# Patient Record
Sex: FEMALE | Race: WHITE | Employment: PART TIME | URBAN - METROPOLITAN AREA
[De-identification: names, ages, dates, MRNs, and addresses within clinical notes are randomized per-mention and may not be internally consistent; named-entity substitution may affect disease eponyms.]

---

## 2017-02-20 ENCOUNTER — MYC MEDICAL ADVICE (OUTPATIENT)
Dept: FAMILY MEDICINE | Facility: CLINIC | Age: 32
End: 2017-02-20

## 2017-02-20 DIAGNOSIS — F33.1 MAJOR DEPRESSIVE DISORDER, RECURRENT EPISODE, MODERATE (H): ICD-10-CM

## 2017-02-20 DIAGNOSIS — F43.23 ADJUSTMENT DISORDER WITH MIXED ANXIETY AND DEPRESSED MOOD: ICD-10-CM

## 2017-02-20 RX ORDER — SERTRALINE HYDROCHLORIDE 100 MG/1
TABLET, FILM COATED ORAL
Qty: 135 TABLET | Refills: 1 | Status: SHIPPED | OUTPATIENT
Start: 2017-02-20 | End: 2017-03-27

## 2017-02-20 NOTE — TELEPHONE ENCOUNTER
Prescription approved per [facility] Refill Protocol.     Buffy Carmichael RN   February 20, 2017 1:29 PM  [facility] Triage   [phone]

## 2017-03-01 ENCOUNTER — MYC MEDICAL ADVICE (OUTPATIENT)
Dept: FAMILY MEDICINE | Facility: CLINIC | Age: 32
End: 2017-03-01

## 2017-03-01 NOTE — TELEPHONE ENCOUNTER
Panel Management Review      Patient has the following on her problem list:     Depression / Dysthymia review  PHQ-9 SCORE 10/7/2016 10/21/2016 12/6/2016   Total Score - - -   Total Score MyChart - - -   Total Score 20 18 20      Patient is due for:  DAP    Asthma review     ACT Total Scores 12/6/2016   ACT TOTAL SCORE (Goal Greater than or Equal to 20) 11   In the past 12 months, how many times did you visit the emergency room for your asthma without being admitted to the hospital? 0   In the past 12 months, how many times were you hospitalized overnight because of your asthma? 0      1. Is Asthma diagnosis on the Problem List? Yes    2. Is Asthma listed on Health Maintenance? Yes    3. Patient is due for:  AAP      Composite cancer screening  Chart review shows that this patient is due/due soon for the following None  Summary:    Patient is due/failing the following:   Repeat ACT, AAP and DAP    Action needed:   Patient needs to do ACT. Last ACT was 11.    Type of outreach:    Copy of ACT mailed to patient, will reach out in 5 days.    Questions for provider review:    None                                                                                                                                    Robyn Felton MA      Chart routed to Care Team .

## 2017-03-01 NOTE — LETTER
[address] [id number] [phone]      March 1, 2017      Mary Shipman  [address] [facility] [address]              Dear Mary,    At [facility] we care about your health and well-being. A review of your chart has indicated that you are due for an Asthma Control Test. For copyright reasons we have attached a copy of the questionnaire. Please complete the questions and a nurse will call you in one to two weeks to review your answers.    You may contact the clinic at [phone] if you have any questions or concerns about this request.        Sincerely,    [facility] Care Staff/ sd

## 2017-03-07 ENCOUNTER — OFFICE VISIT (OUTPATIENT)
Dept: FAMILY MEDICINE | Facility: CLINIC | Age: 32
End: 2017-03-07
Payer: COMMERCIAL

## 2017-03-07 VITALS
WEIGHT: 245.25 LBS | TEMPERATURE: 98.6 F | DIASTOLIC BLOOD PRESSURE: 82 MMHG | SYSTOLIC BLOOD PRESSURE: 116 MMHG | HEART RATE: 72 BPM | HEIGHT: 65 IN | BODY MASS INDEX: 40.86 KG/M2

## 2017-03-07 DIAGNOSIS — J45.20 INTERMITTENT ASTHMA, UNCOMPLICATED: ICD-10-CM

## 2017-03-07 DIAGNOSIS — F41.1 GENERALIZED ANXIETY DISORDER: ICD-10-CM

## 2017-03-07 DIAGNOSIS — G43.809 OTHER MIGRAINE WITHOUT STATUS MIGRAINOSUS, NOT INTRACTABLE: Primary | ICD-10-CM

## 2017-03-07 DIAGNOSIS — F33.1 MAJOR DEPRESSIVE DISORDER, RECURRENT EPISODE, MODERATE (H): ICD-10-CM

## 2017-03-07 PROCEDURE — 99214 OFFICE O/P EST MOD 30 MIN: CPT | Performed by: PHYSICIAN ASSISTANT

## 2017-03-07 RX ORDER — TOPIRAMATE 25 MG/1
TABLET, FILM COATED ORAL
Qty: 70 TABLET | Refills: 0 | Status: SHIPPED
Start: 2017-03-07 | End: 2017-04-20

## 2017-03-07 RX ORDER — PREDNISONE 20 MG/1
TABLET ORAL
Qty: 14 TABLET | Refills: 0 | Status: SHIPPED | OUTPATIENT
Start: 2017-03-07 | End: 2017-03-27

## 2017-03-07 ASSESSMENT — ANXIETY QUESTIONNAIRES
3. WORRYING TOO MUCH ABOUT DIFFERENT THINGS: MORE THAN HALF THE DAYS
2. NOT BEING ABLE TO STOP OR CONTROL WORRYING: MORE THAN HALF THE DAYS
7. FEELING AFRAID AS IF SOMETHING AWFUL MIGHT HAPPEN: MORE THAN HALF THE DAYS
6. BECOMING EASILY ANNOYED OR IRRITABLE: NEARLY EVERY DAY
1. FEELING NERVOUS, ANXIOUS, OR ON EDGE: MORE THAN HALF THE DAYS
5. BEING SO RESTLESS THAT IT IS HARD TO SIT STILL: MORE THAN HALF THE DAYS
GAD7 TOTAL SCORE: 16

## 2017-03-07 ASSESSMENT — PAIN SCALES - GENERAL: PAINLEVEL: MODERATE PAIN (4)

## 2017-03-07 ASSESSMENT — PATIENT HEALTH QUESTIONNAIRE - PHQ9: 5. POOR APPETITE OR OVEREATING: NEARLY EVERY DAY

## 2017-03-07 NOTE — PROGRESS NOTES
SUBJECTIVE:                                                    Mary Shipman is a 31 year old female who presents to clinic today for the following health issues:      Depression and Anxiety Follow-Up    Status since last visit: No change for either depression or the anxiety    Other associated symptoms:None    Complicating factors:     Significant life event: Yes-  Still dealing with bankruptcy. Dealing with her ex-boyfriend/Fiance.  Is managing her current job but doesn't like it. She's got legal proceedings. Overall, feels quite overwhelmed. She was without insurance for a while so she didn't see her therapist. She's struggling today. No self harm thoughts or feelings.     Current substance abuse: None    PHQ-9 SCORE 10/7/2016 10/21/2016 12/6/2016   Total Score - - -   Total Score MyChart - - -   Total Score 20 18 20     CRUZ-7 SCORE 10/7/2016 10/21/2016 12/6/2016   Total Score - - -   Total Score 19 20 19        PHQ-9  English      PHQ-9   Any Language     GAD7     Asthma Follow-Up    Was ACT completed today?    Yes    ACT Total Scores 12/6/2016   ACT TOTAL SCORE (Goal Greater than or Equal to 20) 11   In the past 12 months, how many times did you visit the emergency room for your asthma without being admitted to the hospital? 0   In the past 12 months, how many times were you hospitalized overnight because of your asthma? 0       Recent asthma triggers that patient is dealing with: None      Migraine Follow-Up    Headaches symptoms:  Worsened - more frequent, more severe. Quality is the same. Vision darkens, she gets pounding, tends to be Unilateral. No new symptoms. Just worse. A lot of stress. Is also working a new job. Has 2 screens she watches.     Frequency: daily     Duration of headaches: usually starts about 10:00am/midmorning and lasts all day    Able to do normal daily activities/work with migraines: Yes except for one time    Rescue/Relief medication:ibuprofen (Advil, Motrin)               "Effectiveness: no relief    Preventative medication: None    Neurologic complications: No new stroke-like symptoms, loss of vision or speech, numbness or weakness    In the past 4 weeks, how often have you gone to Urgent Care or the emergency room because of your headaches?  0       Amount of exercise or physical activity: None    Problems taking medications regularly: No    Medication side effects: none  Diet: regular (no restrictions)    Problem list and histories reviewed & adjusted, as indicated.  Additional history: as documented    Labs reviewed in EPIC    Reviewed and updated as needed this visit by clinical staff       Reviewed and updated as needed this visit by Provider         ROS:  Constitutional, HEENT, cardiovascular, pulmonary, gi and gu systems are negative, except as otherwise noted.    OBJECTIVE:                                                    /82 (BP Location: Right arm, Cuff Size: Adult Regular)  Pulse 72  Temp 98.6  F (37  C) (Oral)  Ht 5' 5\" (1.651 m)  Wt 245 lb 4 oz (111.2 kg)  BMI 40.81 kg/m2  Body mass index is 40.81 kg/(m^2).  GENERAL: healthy, alert and no distress  EYES: Eyes grossly normal to inspection, PERRL and conjunctivae and sclerae normal  HENT: ear canals and TM's normal, nose and mouth without ulcers or lesions  NECK: no adenopathy, no asymmetry, masses, or scars and thyroid normal to palpation  RESP: lungs clear to auscultation - no rales, rhonchi or wheezes  CV: regular rate and rhythm, normal S1 S2, no S3 or S4, no murmur, click or rub, no peripheral edema and peripheral pulses strong  ABDOMEN: soft, nontender, no hepatosplenomegaly, no masses and bowel sounds normal  MS: no gross musculoskeletal defects noted, no edema  SKIN: no suspicious lesions or rashes  NEURO: Normal strength and tone, mentation intact and speech normal  PSYCH: tearful at times, but otherwise mentation appears normal, affect normal/bright  LYMPH: no cervical, supraclavicular, axillary, or " inguinal adenopathy    Diagnostic Test Results:  none      ASSESSMENT/PLAN:                                                      (G43.809) Other migraine without status migrainosus, not intractable  (primary encounter diagnosis)  Comment:   Plan: topiramate (TOPAMAX) 25 MG tablet, predniSONE         (DELTASONE) 20 MG tablet        Reviewed. Worse. She has stressors and work related triggers. Reviewed ergonomics, self cares, stretching. Prednisone burst as she seems acutely in a cycle. Restart Topamax. Did well while on it last time. Food, lifestyle changes advised    (J45.20) Intermittent asthma, uncomplicated  Comment:   Plan: Doing well    (F33.1) Major depressive disorder, recurrent episode, moderate (H)  Comment:   Plan: Worse - but due to specific life events. Encouraged get back into therapy. She agrees.     (F41.1) Generalized anxiety disorder  Comment:   Plan: As noted     Recheck in a few weeks. I Spent 30 minutes with her over 50% of which was counseling.     FRANSICO JOHNSON PA-C  [facility]

## 2017-03-07 NOTE — MR AVS SNAPSHOT
"              After Visit Summary   3/7/2017    Mary Shipman    MRN: [id number]           Patient Information     Date Of Birth          1985        Visit Information        Provider Department      3/7/2017 6:00 PM Miguel Hammer PA-C [facility]        Today's Diagnoses     Other migraine without status migrainosus, not intractable    -  1       Follow-ups after your visit        Who to contact     If you have questions or need follow up information about today's clinic visit or your schedule please contact [facility] directly at [phone].  Normal or non-critical lab and imaging results will be communicated to you by [employer], letter or phone within 4 business days after the clinic has received the results. If you do not hear from us within 7 days, please contact the clinic through [employer] or phone. If you have a critical or abnormal lab result, we will notify you by phone as soon as possible.  Submit refill requests through [employer] or call your pharmacy and they will forward the refill request to us. Please allow 3 business days for your refill to be completed.          Additional Information About Your Visit        MyChart Information     [employer] gives you secure access to your electronic health record. If you see a primary care provider, you can also send messages to your care team and make appointments. If you have questions, please call your primary care clinic.  If you do not have a primary care provider, please call [phone] and they will assist you.        Care EveryWhere ID     This is your Care EveryWhere ID. This could be used by other organizations to access your [facility] medical records  [id number]        Your Vitals Were     Pulse Temperature Height BMI (Body Mass Index)          72 98.6  F (37  C) (Oral) 5' 5\" (1.651 m) 40.81 kg/m2         Blood Pressure from Last 3 Encounters:   03/07/17 116/82   12/06/16 118/80   10/21/16 108/82    Weight " from Last 3 Encounters:   03/07/17 245 lb 4 oz (111.2 kg)   12/06/16 244 lb (110.7 kg)   10/21/16 240 lb (108.9 kg)              Today, you had the following     No orders found for display         Today's Medication Changes          These changes are accurate as of: 3/7/17  6:41 PM.  If you have any questions, ask your nurse or doctor.               Start taking these medicines.        Dose/Directions    predniSONE 20 MG tablet   Commonly known as:  DELTASONE   Used for:  Other migraine without status migrainosus, not intractable   Started by:  Miguel Hammer PA-C        3 tablets daily for 3 days, 2 tablets for 2 days, 1 tablet for 1 day   Quantity:  14 tablet   Refills:  0       topiramate 25 MG tablet   Commonly known as:  TOPAMAX   Used for:  Other migraine without status migrainosus, not intractable   Started by:  Miguel Hammer PA-C        Take 1 tablet (25 mg) at bedtime for 1 week, then 1 tablet twice daily for 1 week, then 1 tablet in AM and 2 in PM for 1 week, then 2 tablets twice daily.   Quantity:  70 tablet   Refills:  0            Where to get your medicines      These medications were sent to [facility] [address] [place] [id number]     Phone:  [phone]     predniSONE 20 MG tablet    topiramate 25 MG tablet                Primary Care Provider Office Phone # Fax #    Miguel Hammer PA-C [phone]       [address] [address]        Thank you!     Thank you for choosing [facility]  for your care. Our goal is always to provide you with excellent care. Hearing back from our patients is one way we can continue to improve our services. Please take a few minutes to complete the written survey that you may receive in the mail after your visit with us. Thank you!             Your Updated Medication List -  Protect others around you: Learn how to safely use, store and throw away your medicines at www.disposemymeds.org.          This list is accurate as of: 3/7/17  6:41 PM.  Always use your most recent med list.                   Brand Name Dispense Instructions for use    albuterol 108 (90 BASE) MCG/ACT Inhaler    PROAIR HFA/PROVENTIL HFA/VENTOLIN HFA    3 Inhaler    Inhale 2 puffs into the lungs every 6 hours as needed for shortness of breath / dyspnea or wheezing       clonazePAM 0.5 MG tablet    klonoPIN    30 tablet    Take 1 tablet (0.5 mg) by mouth 2 times daily as needed for anxiety       fexofenadine-pseudoePHEDrine  MG per 12 hr tablet    ALLEGRA-D 12 HOUR    60 tablet    Take 1 tablet by mouth 2 times daily.       fluticasone 50 MCG/ACT spray    FLONASE    3 Package    Spray 1-2 sprays into both nostrils daily.       hyoscyamine 0.125 MG tablet    ANASPAZ/LEVSIN    40 tablet    Take 1-2 tablets (125-250 mcg) by mouth every 4 hours as needed for cramping       ibuprofen 400-800 mg tablet    ADVIL,MOTRIN    30 tablet    Take 1-2 tablets by mouth every 6 hours as needed (cramping).       LANsoprazole 30 MG CR capsule    PREVACID    90 capsule    Take 1 capsule (30 mg) by mouth daily Take 30-60 minutes before a meal.       levalbuterol 1.25 MG/0.5ML Nebu neb solution    XOPENEX CONC     Take 1.25 mg by nebulization every 6 hours as needed for wheezing       metFORMIN 500 MG 24 hr tablet    GLUCOPHAGE-XR    180 tablet    Take 1 tablet (500 mg) by mouth 2 times daily (with meals)       mometasone-formoterol 200-5 MCG/ACT oral inhaler    DULERA    39 g    Inhale 2 puffs into the lungs 2 times daily       montelukast 10 MG tablet    SINGULAIR    90 tablet    Take 1 tablet (10 mg) by mouth At Bedtime       polyethylene glycol powder    MIRALAX    510 g    Take 17 g by mouth daily       predniSONE 20 MG tablet    DELTASONE    14 tablet    3 tablets daily for 3 days, 2 tablets for 2 days, 1 tablet for 1 day        QUEtiapine 25 MG tablet    SEROQUEL    30 tablet    1 to 2 tablets at bedtime       ranitidine 150 MG tablet    ZANTAC    60 tablet    Take 1 tablet by mouth 2 times daily.       sertraline 100 MG tablet    ZOLOFT    135 tablet    1 and 1/2 table daily       topiramate 25 MG tablet    TOPAMAX    70 tablet    Take 1 tablet (25 mg) at bedtime for 1 week, then 1 tablet twice daily for 1 week, then 1 tablet in AM and 2 in PM for 1 week, then 2 tablets twice daily.       traZODone 50 MG tablet    DESYREL    270 tablet    2 to 3 at bedtime for insomnia

## 2017-03-08 ASSESSMENT — ASTHMA QUESTIONNAIRES: ACT_TOTALSCORE: 19

## 2017-03-08 ASSESSMENT — ANXIETY QUESTIONNAIRES: GAD7 TOTAL SCORE: 16

## 2017-03-08 ASSESSMENT — PATIENT HEALTH QUESTIONNAIRE - PHQ9: SUM OF ALL RESPONSES TO PHQ QUESTIONS 1-9: 15

## 2017-03-08 NOTE — TELEPHONE ENCOUNTER
ACT was done in clinic yesterday.     Buffy Carmichael RN   March 8, 2017 4:02 PM  [facility] Triage   [phone]

## 2017-03-08 NOTE — NURSING NOTE
"Chief Complaint   Patient presents with     Headache     Asthma     Anxiety     Depression       Initial There were no vitals taken for this visit. Estimated body mass index is 40.6 kg/(m^2) as calculated from the following:    Height as of 12/6/16: 5' 5\" (1.651 m).    Weight as of 12/6/16: 244 lb (110.7 kg).  Medication Reconciliation: complete   Luci Sorto CMA      "

## 2017-03-27 ENCOUNTER — APPOINTMENT (OUTPATIENT)
Dept: CT IMAGING | Facility: CLINIC | Age: 32
End: 2017-03-27
Attending: EMERGENCY MEDICINE
Payer: COMMERCIAL

## 2017-03-27 ENCOUNTER — HOSPITAL ENCOUNTER (EMERGENCY)
Facility: CLINIC | Age: 32
Discharge: ANOTHER HOSPITAL | End: 2017-03-28
Attending: EMERGENCY MEDICINE | Admitting: EMERGENCY MEDICINE
Payer: COMMERCIAL

## 2017-03-27 ENCOUNTER — APPOINTMENT (OUTPATIENT)
Dept: MRI IMAGING | Facility: CLINIC | Age: 32
End: 2017-03-27
Attending: EMERGENCY MEDICINE
Payer: COMMERCIAL

## 2017-03-27 DIAGNOSIS — R56.9 SEIZURE (H): ICD-10-CM

## 2017-03-27 LAB
ALBUMIN SERPL-MCNC: 3.8 G/DL (ref 3.4–5)
ALP SERPL-CCNC: 79 U/L (ref 40–150)
ALT SERPL W P-5'-P-CCNC: 17 U/L (ref 0–50)
ANION GAP SERPL CALCULATED.3IONS-SCNC: 11 MMOL/L (ref 3–14)
AST SERPL W P-5'-P-CCNC: 11 U/L (ref 0–45)
BILIRUB SERPL-MCNC: 0.3 MG/DL (ref 0.2–1.3)
BUN SERPL-MCNC: 10 MG/DL (ref 7–30)
CALCIUM SERPL-MCNC: 8.9 MG/DL (ref 8.5–10.1)
CHLORIDE SERPL-SCNC: 108 MMOL/L (ref 94–109)
CO2 SERPL-SCNC: 20 MMOL/L (ref 20–32)
CREAT SERPL-MCNC: 0.77 MG/DL (ref 0.52–1.04)
ERYTHROCYTE [DISTWIDTH] IN BLOOD BY AUTOMATED COUNT: 12.5 % (ref 10–15)
GFR SERPL CREATININE-BSD FRML MDRD: 86 ML/MIN/1.7M2
GLUCOSE SERPL-MCNC: 92 MG/DL (ref 70–99)
HCG SERPL QL: NEGATIVE
HCT VFR BLD AUTO: 43 % (ref 35–47)
HGB BLD-MCNC: 14.7 G/DL (ref 11.7–15.7)
INTERPRETATION ECG - MUSE: NORMAL
MCH RBC QN AUTO: 29.8 PG (ref 26.5–33)
MCHC RBC AUTO-ENTMCNC: 34.2 G/DL (ref 31.5–36.5)
MCV RBC AUTO: 87 FL (ref 78–100)
PLATELET # BLD AUTO: 330 10E9/L (ref 150–450)
POTASSIUM SERPL-SCNC: 3.6 MMOL/L (ref 3.4–5.3)
PROT SERPL-MCNC: 7.6 G/DL (ref 6.8–8.8)
RBC # BLD AUTO: 4.93 10E12/L (ref 3.8–5.2)
SODIUM SERPL-SCNC: 139 MMOL/L (ref 133–144)
WBC # BLD AUTO: 6.5 10E9/L (ref 4–11)

## 2017-03-27 PROCEDURE — 96375 TX/PRO/DX INJ NEW DRUG ADDON: CPT

## 2017-03-27 PROCEDURE — 25500064 ZZH RX 255 OP 636: Performed by: EMERGENCY MEDICINE

## 2017-03-27 PROCEDURE — 25000128 H RX IP 250 OP 636: Performed by: EMERGENCY MEDICINE

## 2017-03-27 PROCEDURE — 80053 COMPREHEN METABOLIC PANEL: CPT | Performed by: EMERGENCY MEDICINE

## 2017-03-27 PROCEDURE — 93005 ELECTROCARDIOGRAM TRACING: CPT

## 2017-03-27 PROCEDURE — 70553 MRI BRAIN STEM W/O & W/DYE: CPT

## 2017-03-27 PROCEDURE — 96361 HYDRATE IV INFUSION ADD-ON: CPT

## 2017-03-27 PROCEDURE — 85027 COMPLETE CBC AUTOMATED: CPT | Performed by: EMERGENCY MEDICINE

## 2017-03-27 PROCEDURE — 84703 CHORIONIC GONADOTROPIN ASSAY: CPT | Performed by: EMERGENCY MEDICINE

## 2017-03-27 PROCEDURE — 99285 EMERGENCY DEPT VISIT HI MDM: CPT | Mod: 25

## 2017-03-27 PROCEDURE — 96374 THER/PROPH/DIAG INJ IV PUSH: CPT | Mod: 59

## 2017-03-27 PROCEDURE — 70450 CT HEAD/BRAIN W/O DYE: CPT

## 2017-03-27 PROCEDURE — A9585 GADOBUTROL INJECTION: HCPCS | Performed by: EMERGENCY MEDICINE

## 2017-03-27 RX ORDER — FLUTICASONE PROPIONATE 50 MCG
1-2 SPRAY, SUSPENSION (ML) NASAL DAILY PRN
Status: CANCELLED | OUTPATIENT
Start: 2017-03-27

## 2017-03-27 RX ORDER — FLUTICASONE PROPIONATE 50 MCG
1-2 SPRAY, SUSPENSION (ML) NASAL DAILY PRN
COMMUNITY
End: 2020-03-17

## 2017-03-27 RX ORDER — LANSOPRAZOLE 30 MG/1
30 CAPSULE, DELAYED RELEASE ORAL DAILY
Status: CANCELLED | OUTPATIENT
Start: 2017-03-28

## 2017-03-27 RX ORDER — GADOBUTROL 604.72 MG/ML
11 INJECTION INTRAVENOUS ONCE
Status: COMPLETED | OUTPATIENT
Start: 2017-03-27 | End: 2017-03-27

## 2017-03-27 RX ORDER — FEXOFENADINE HCL AND PSEUDOEPHEDRINE HCL 180; 240 MG/1; MG/1
1 TABLET, EXTENDED RELEASE ORAL DAILY
COMMUNITY
End: 2022-02-14

## 2017-03-27 RX ORDER — ALBUTEROL SULFATE 90 UG/1
2 AEROSOL, METERED RESPIRATORY (INHALATION) EVERY 6 HOURS PRN
Status: CANCELLED | OUTPATIENT
Start: 2017-03-27

## 2017-03-27 RX ORDER — MONTELUKAST SODIUM 10 MG/1
10 TABLET ORAL AT BEDTIME
Status: CANCELLED | OUTPATIENT
Start: 2017-03-27

## 2017-03-27 RX ORDER — TRAZODONE HYDROCHLORIDE 50 MG/1
50-150 TABLET, FILM COATED ORAL AT BEDTIME
Status: CANCELLED | OUTPATIENT
Start: 2017-03-27

## 2017-03-27 RX ORDER — KETOROLAC TROMETHAMINE 15 MG/ML
15 INJECTION, SOLUTION INTRAMUSCULAR; INTRAVENOUS ONCE
Status: COMPLETED | OUTPATIENT
Start: 2017-03-27 | End: 2017-03-27

## 2017-03-27 RX ORDER — POLYETHYLENE GLYCOL 3350 17 G/17G
17 POWDER, FOR SOLUTION ORAL DAILY PRN
COMMUNITY
End: 2019-08-30

## 2017-03-27 RX ORDER — METOCLOPRAMIDE HYDROCHLORIDE 5 MG/ML
10 INJECTION INTRAMUSCULAR; INTRAVENOUS ONCE
Status: COMPLETED | OUTPATIENT
Start: 2017-03-27 | End: 2017-03-27

## 2017-03-27 RX ORDER — FEXOFENADINE HCL AND PSEUDOEPHEDRINE HCL 180; 240 MG/1; MG/1
1 TABLET, EXTENDED RELEASE ORAL DAILY
Status: CANCELLED | OUTPATIENT
Start: 2017-03-28

## 2017-03-27 RX ORDER — POLYETHYLENE GLYCOL 3350 17 G/17G
17 POWDER, FOR SOLUTION ORAL DAILY PRN
Status: CANCELLED | OUTPATIENT
Start: 2017-03-27

## 2017-03-27 RX ORDER — TOPIRAMATE 25 MG/1
25 TABLET, FILM COATED ORAL AT BEDTIME
Status: CANCELLED | OUTPATIENT
Start: 2017-03-27

## 2017-03-27 RX ADMIN — KETOROLAC TROMETHAMINE 15 MG: 15 INJECTION, SOLUTION INTRAMUSCULAR; INTRAVENOUS at 17:55

## 2017-03-27 RX ADMIN — SODIUM CHLORIDE 1000 ML: 9 INJECTION, SOLUTION INTRAVENOUS at 17:38

## 2017-03-27 RX ADMIN — GADOBUTROL 11 ML: 604.72 INJECTION INTRAVENOUS at 20:03

## 2017-03-27 RX ADMIN — METOCLOPRAMIDE 10 MG: 5 INJECTION, SOLUTION INTRAMUSCULAR; INTRAVENOUS at 17:55

## 2017-03-27 ASSESSMENT — ENCOUNTER SYMPTOMS
CONFUSION: 1
WEAKNESS: 1
ABDOMINAL PAIN: 0
HEADACHES: 1
SEIZURES: 1
NUMBNESS: 1

## 2017-03-27 NOTE — ED PROVIDER NOTES
History     Chief Complaint:  Seizures     HPI   Mary Shipman is a 32 year old female with a history of Migraines, asthma, IBS, and hyperlipidemia who presents to the emergency department via EMS for evaluation following a seizure. Of note, the patient states that she has had chronic intermittent headaches for the last two weeks, with intermittent left sided tingling and weakness in her left leg for which she has been following with her primary care provider, though notes that she has otherwise been feeling generally well as of late. The patient was at work this afternoon when her coworker heard a noise and noted that the patient had approximately 5 minutes of generalized body convulsions. The patient states that she does not recall this incident, though notes that she sustained a tongue injury as a result. This seizure prompted her coworkers to contact EMS to seek further evaluation here in the emergency department.     Here in the emergency department, the patient continues to complain of headache, tongue pain, and is markedly confused per her 's report. She denies any recent abdominal pain and denies sustaining any other significant injuries as of result of this seizure. The patient has no prior personal or familial history of seizures and denies any excessive alcohol use.     Allergies:  Levaquin [Levofloxacin]  Acetaminophen  Hydrocodone    Medications:    topiramate (TOPAMAX) 25 MG tablet  sertraline (ZOLOFT) 100 MG tablet  clonazePAM (KLONOPIN) 0.5 MG tablet  traZODone (DESYREL) 50 MG tablet  albuterol (PROAIR HFA, PROVENTIL HFA, VENTOLIN HFA) 108 (90 BASE) MCG/ACT inhaler  mometasone-formoterol (DULERA) 200-5 MCG/ACT oral inhaler  metFORMIN (GLUCOPHAGE-XR) 500 MG 24 hr tablet  hyoscyamine (ANASPAZ,LEVSIN) 0.125 MG tablet  LANsoprazole (PREVACID) 30 MG capsule  levalbuterol (XOPENEX CONC) 1.25 MG/0.5ML NEBU  montelukast (SINGULAIR) 10 MG tablet  fexofenadine-pseudoePHEDrine (ALLEGRA-D 12 HOUR)  " MG per tablet  fluticasone (FLONASE) 50 MCG/ACT nasal spray  ranitidine (ZANTAC) 150 MG tablet    Past Medical History:    Antepartum mild preeclampsia  Asthma  generalized anxiety disorder  Urticaria  Migraines  Depression  GERD  Frequent UTIs  IBS  hyperlipidemia  Breast mass  Polycystic ovaries  Thyrotoxicosis    Past Surgical History:    appendectomy  Nasal surgery  T and A    Family History:    Ectopic pregnancy  Colorectal cancer  Kidney problems  hyperlipidemia  CAD  Psychotic disorder    Social History:  Presents with her significant other.  Negative for tobacco use.  Negative for alcohol use.  Marital Status:   [2]    Review of Systems   HENT:        Positive for tongue injury.    Gastrointestinal: Negative for abdominal pain.   Neurological: Positive for seizures, weakness, numbness (Tingling on left side) and headaches.   Psychiatric/Behavioral: Positive for confusion.   All other systems reviewed and are negative.    Physical Exam   First Vitals:  BP: 138/80  Pulse: 84  Temp: 98.7  F (37.1  C)  Resp: 18  Height: 165.1 cm (5' 5\")  Weight: 113.4 kg (250 lb)  SpO2: 100 %      Physical Exam  General: Resting comfortably on the gurney  Eyes:  The pupils are equal and round    Conjunctivae and sclerae are normal  ENT:    The nose is normal    Pinnae are normal    The oropharynx is clear    Tongue biting on left side of tongue  Neck:  Normal range of motion    No neck stiffness  CV:  Regular rate and rhythm    Skin warm and well perfused   Resp:  Lungs are clear    Non-labored    No rales    No wheezing   GI:  Abdomen is soft, there is no rigidity    No distension    No rebound tenderness     No abdominal tenderness  MS:  Normal muscular tone  Skin:  No rash or acute skin lesions noted  NEURO: Mildly postictal appearing    Speech is normal and fluent    Face is symmetric    Slight left lower extremity weakness compared to right    Normal finger-nose-finger     strength equal " bilaterally    Subjective numbness on left side of body    No arm drift or weakness    Oriented x3 but slow to answer get them right  Psych:  Normal affect.  Appropriate interactions.    Emergency Department Course   ECG:  Indication: Seizure  Time: 1711  Vent. Rate 84 bpm. LA interval 198. QRS duration 86. QT/QTc 384/453. P-R-T axis 47 26 53.  Normal sinus rhythm. Normal ECG. Read time: 1718    Imaging:  Radiographic findings were communicated with the patient and family who voiced understanding of the findings.    CT Head without contrast:   Normal CT scan of the head. As per radiology    MR Brain w/o and w/ contrast:   1. Supratentorial white matter lesions. Although these are nonspecific  as to etiology, the distribution and appearance raises the possibility  of demyelination. These do not fulfill the Egan criteria for  diagnosing MS with MRI.  2. Developmental venous anomaly in the right occipital lobe. No  adjacent hemorrhage.  3. Atrophy of the left side of the tongue with small lateral fluid  collection, of indeterminate etiology. As per radiology    Laboratory:  CBC: WBC: 6.5, HGB: 14.7, PLT: 330  CMP: all WNL (Creatinine: 0.77)    HCG qualitative blood: Negative    Interventions:  1738 NS 1L IV  1755 Toradol, 30 mg, IV injection   Reglan 10 mg IV    Emergency Department Course:  Nursing notes and vitals reviewed. I performed an exam of the patient as documented above.     EKG obtained in the ED, see results above.     IV inserted. Medicine administered as documented above. Blood drawn. This was sent to the lab for further testing, results above.    The patient provided a urine sample here in the emergency department. This was sent for laboratory testing, findings above.     The patient was sent for a Head CT while in the emergency department, findings above.     1739 I rechecked the patient and discussed the results of her workup thus far.     1826 I again checked the patient regarding her current  symptoms.    The patient was sent for a MRI Brain while in the emergency department, findings above.     2104 I rechecked the patient and discussed the results of her MRI.     2114 I consulted with Dr. Espino, neurology, regarding the patient's history and presentation here in the emergency department. He is recommending admission at this time.     2120 I updated the patient with plan for admission.     2121  I consulted with Dr. Hdz of the hospitalist services. They are in agreement to accept the patient for admission.    Although Dr. Hdz accepted the patient, there are currently no neurology beds available here at [facility]. Therefore, the plan will be for transfer at this time.    2222 I spoke with Dr. Singh, hospitalist at the [facility], regarding the patient's history and presentation here in the emergency department. They are in agreement to accept the patient for transfer.      Findings and plan explained to the Patient. Patient will be transferred to [facility] via EMS. Discussed the case with Dr. Singh, who will admit the patient to a monitored bed for further monitoring, evaluation, and treatment.    Impression & Plan    Medical Decision Making:  Mary Shipman is a 32 year old female who presented to the emergency department with seizures. Vital signs are normal. She is mildly postictal appearing. She does have slight left lower extremity weakness on exam. Also, she is also complaining of subjective numbness on the left side. Both of these have been ongoing for two weeks she thinks. Does not meet criteria for code stroke. Laboratory values obtained, which are unremarkable. CT head performed, which is unremarkable. Given the numbness and weakness, I did obtain an MRI, which shows supratentorial white matter lesions, which raises the possibility of demyelination such as MS. No hx of MS. They also note atrophy of the left side of the  tongue with small lateral fluid collection. This is likely from her biting the left side of her tongue, which is obvious on exam. I discussed patient with neurology, who recommended admission, EEG, and lumbar puncture in the morning. He recommended holding off on antiepileptics at this time. Patient back to normal on re-evaluation per  though still does have slight leg weakness compared to right. This was discussed with the patient, who agreed with this. I discussed the patient with hospitalist, who evaluated the patient. After this, I was told that there were no neurological beds here in the hospital, so she will need to be transferred to the [facility]. I discussed patient with  from neurology about transfer and she will be admit to the neurology floor at the [facility]. Transport was arranged.     Diagnosis:    ICD-10-CM   1. Seizure (H) R56.9     Disposition:  Transferred to [facility]    Melyssa BRAVO, am serving as a scribe on 3/27/2017 at 4:53 PM to personally document services performed by Verona Kohli MD based on my observations and the provider's statements to me.       Melyssa Kim  3/27/2017    EMERGENCY DEPARTMENT       Verona Kohli MD  03/28/17 0040

## 2017-03-27 NOTE — ED NOTES
Bed: ED04  Expected date:   Expected time:   Means of arrival:   Comments:  Dat 423 Seizure 32 female

## 2017-03-28 ENCOUNTER — HOSPITAL ENCOUNTER (INPATIENT)
Facility: CLINIC | Age: 32
LOS: 1 days | Discharge: HOME | DRG: 101 | End: 2017-03-29
Attending: PSYCHIATRY & NEUROLOGY | Admitting: PSYCHIATRY & NEUROLOGY
Payer: COMMERCIAL

## 2017-03-28 ENCOUNTER — APPOINTMENT (OUTPATIENT)
Dept: SPEECH THERAPY | Facility: CLINIC | Age: 32
DRG: 101 | End: 2017-03-28
Attending: PSYCHIATRY & NEUROLOGY
Payer: COMMERCIAL

## 2017-03-28 ENCOUNTER — APPOINTMENT (OUTPATIENT)
Dept: MRI IMAGING | Facility: CLINIC | Age: 32
DRG: 101 | End: 2017-03-28
Attending: PSYCHIATRY & NEUROLOGY
Payer: COMMERCIAL

## 2017-03-28 ENCOUNTER — APPOINTMENT (OUTPATIENT)
Dept: OCCUPATIONAL THERAPY | Facility: CLINIC | Age: 32
DRG: 101 | End: 2017-03-28
Attending: PSYCHIATRY & NEUROLOGY
Payer: COMMERCIAL

## 2017-03-28 ENCOUNTER — ALLIED HEALTH/NURSE VISIT (OUTPATIENT)
Dept: NEUROLOGY | Facility: CLINIC | Age: 32
DRG: 101 | End: 2017-03-28
Attending: PSYCHIATRY & NEUROLOGY
Payer: COMMERCIAL

## 2017-03-28 VITALS
RESPIRATION RATE: 16 BRPM | TEMPERATURE: 98.7 F | WEIGHT: 250 LBS | BODY MASS INDEX: 41.65 KG/M2 | OXYGEN SATURATION: 96 % | HEART RATE: 68 BPM | DIASTOLIC BLOOD PRESSURE: 58 MMHG | HEIGHT: 65 IN | SYSTOLIC BLOOD PRESSURE: 110 MMHG

## 2017-03-28 DIAGNOSIS — R56.9 CONVULSIONS, UNSPECIFIED CONVULSION TYPE (H): Primary | ICD-10-CM

## 2017-03-28 DIAGNOSIS — R56.9 SEIZURE (H): Primary | ICD-10-CM

## 2017-03-28 LAB
ALBUMIN UR-MCNC: ABNORMAL MG/DL
AMORPH CRY #/AREA URNS HPF: ABNORMAL /HPF
APPEARANCE UR: ABNORMAL
BILIRUB UR QL STRIP: NEGATIVE
COLOR UR AUTO: YELLOW
ERYTHROCYTE [DISTWIDTH] IN BLOOD BY AUTOMATED COUNT: 12.8 % (ref 10–15)
GLUCOSE UR STRIP-MCNC: NEGATIVE MG/DL
HCT VFR BLD AUTO: 40 % (ref 35–47)
HGB BLD-MCNC: 13.5 G/DL (ref 11.7–15.7)
HGB UR QL STRIP: NEGATIVE
KETONES UR STRIP-MCNC: NEGATIVE MG/DL
LEUKOCYTE ESTERASE UR QL STRIP: NEGATIVE
MCH RBC QN AUTO: 29.9 PG (ref 26.5–33)
MCHC RBC AUTO-ENTMCNC: 33.8 G/DL (ref 31.5–36.5)
MCV RBC AUTO: 89 FL (ref 78–100)
MUCOUS THREADS #/AREA URNS LPF: PRESENT /LPF
NITRATE UR QL: NEGATIVE
PH UR STRIP: 5.5 PH (ref 5–7)
PLATELET # BLD AUTO: 255 10E9/L (ref 150–450)
RBC # BLD AUTO: 4.51 10E12/L (ref 3.8–5.2)
RBC #/AREA URNS AUTO: 3 /HPF (ref 0–2)
SP GR UR STRIP: 1.02 (ref 1–1.03)
SQUAMOUS #/AREA URNS AUTO: 4 /HPF (ref 0–1)
URN SPEC COLLECT METH UR: ABNORMAL
UROBILINOGEN UR STRIP-MCNC: NORMAL MG/DL (ref 0–2)
WBC # BLD AUTO: 6 10E9/L (ref 4–11)
WBC #/AREA URNS AUTO: 1 /HPF (ref 0–2)

## 2017-03-28 PROCEDURE — 40000225 ZZH STATISTIC SLP WARD VISIT

## 2017-03-28 PROCEDURE — 25000132 ZZH RX MED GY IP 250 OP 250 PS 637: Performed by: STUDENT IN AN ORGANIZED HEALTH CARE EDUCATION/TRAINING PROGRAM

## 2017-03-28 PROCEDURE — 81001 URINALYSIS AUTO W/SCOPE: CPT | Performed by: PSYCHIATRY & NEUROLOGY

## 2017-03-28 PROCEDURE — 25500064 ZZH RX 255 OP 636: Performed by: PSYCHIATRY & NEUROLOGY

## 2017-03-28 PROCEDURE — 97530 THERAPEUTIC ACTIVITIES: CPT | Mod: GO | Performed by: OCCUPATIONAL THERAPIST

## 2017-03-28 PROCEDURE — 85027 COMPLETE CBC AUTOMATED: CPT | Performed by: PSYCHIATRY & NEUROLOGY

## 2017-03-28 PROCEDURE — 25000132 ZZH RX MED GY IP 250 OP 250 PS 637: Performed by: PSYCHIATRY & NEUROLOGY

## 2017-03-28 PROCEDURE — 70553 MRI BRAIN STEM W/O & W/DYE: CPT

## 2017-03-28 PROCEDURE — 40000133 ZZH STATISTIC OT WARD VISIT: Performed by: OCCUPATIONAL THERAPIST

## 2017-03-28 PROCEDURE — 70546 MR ANGIOGRAPH HEAD W/O&W/DYE: CPT

## 2017-03-28 PROCEDURE — A9585 GADOBUTROL INJECTION: HCPCS | Performed by: PSYCHIATRY & NEUROLOGY

## 2017-03-28 PROCEDURE — 97165 OT EVAL LOW COMPLEX 30 MIN: CPT | Mod: GO | Performed by: OCCUPATIONAL THERAPIST

## 2017-03-28 PROCEDURE — 36415 COLL VENOUS BLD VENIPUNCTURE: CPT | Performed by: PSYCHIATRY & NEUROLOGY

## 2017-03-28 PROCEDURE — 92610 EVALUATE SWALLOWING FUNCTION: CPT | Mod: GN

## 2017-03-28 PROCEDURE — 25000128 H RX IP 250 OP 636: Performed by: PSYCHIATRY & NEUROLOGY

## 2017-03-28 PROCEDURE — 12000008 ZZH R&B INTERMEDIATE UMMC

## 2017-03-28 RX ORDER — LEVETIRACETAM 500 MG/1
1000 TABLET ORAL 2 TIMES DAILY
Status: DISCONTINUED | OUTPATIENT
Start: 2017-03-28 | End: 2017-03-29

## 2017-03-28 RX ORDER — FEXOFENADINE HCL AND PSEUDOEPHEDRINE HCL 180; 240 MG/1; MG/1
1 TABLET, EXTENDED RELEASE ORAL DAILY
Status: DISCONTINUED | OUTPATIENT
Start: 2017-03-28 | End: 2017-03-29 | Stop reason: HOSPADM

## 2017-03-28 RX ORDER — TOPIRAMATE 25 MG/1
25 TABLET, FILM COATED ORAL EVERY EVENING
Status: DISCONTINUED | OUTPATIENT
Start: 2017-03-28 | End: 2017-03-29 | Stop reason: HOSPADM

## 2017-03-28 RX ORDER — GADOBUTROL 604.72 MG/ML
10 INJECTION INTRAVENOUS ONCE
Status: COMPLETED | OUTPATIENT
Start: 2017-03-28 | End: 2017-03-28

## 2017-03-28 RX ORDER — KETOROLAC TROMETHAMINE 30 MG/ML
30 INJECTION, SOLUTION INTRAMUSCULAR; INTRAVENOUS ONCE
Status: COMPLETED | OUTPATIENT
Start: 2017-03-28 | End: 2017-03-28

## 2017-03-28 RX ORDER — HEPARIN SODIUM 5000 [USP'U]/.5ML
5000 INJECTION, SOLUTION INTRAVENOUS; SUBCUTANEOUS EVERY 12 HOURS
Status: DISCONTINUED | OUTPATIENT
Start: 2017-03-28 | End: 2017-03-28

## 2017-03-28 RX ORDER — TRAZODONE HYDROCHLORIDE 50 MG/1
50 TABLET, FILM COATED ORAL
Status: DISCONTINUED | OUTPATIENT
Start: 2017-03-28 | End: 2017-03-29 | Stop reason: HOSPADM

## 2017-03-28 RX ORDER — MONTELUKAST SODIUM 10 MG/1
10 TABLET ORAL AT BEDTIME
Status: DISCONTINUED | OUTPATIENT
Start: 2017-03-28 | End: 2017-03-29 | Stop reason: HOSPADM

## 2017-03-28 RX ORDER — LIDOCAINE 40 MG/G
CREAM TOPICAL
Status: DISCONTINUED | OUTPATIENT
Start: 2017-03-28 | End: 2017-03-29 | Stop reason: HOSPADM

## 2017-03-28 RX ORDER — LORAZEPAM 2 MG/ML
2 INJECTION INTRAMUSCULAR
Status: DISCONTINUED | OUTPATIENT
Start: 2017-03-28 | End: 2017-03-29 | Stop reason: HOSPADM

## 2017-03-28 RX ORDER — LANSOPRAZOLE 30 MG/1
30 CAPSULE, DELAYED RELEASE ORAL DAILY
Status: DISCONTINUED | OUTPATIENT
Start: 2017-03-28 | End: 2017-03-29 | Stop reason: HOSPADM

## 2017-03-28 RX ORDER — METFORMIN HCL 500 MG
500 TABLET, EXTENDED RELEASE 24 HR ORAL 2 TIMES DAILY WITH MEALS
Status: DISCONTINUED | OUTPATIENT
Start: 2017-03-28 | End: 2017-03-29 | Stop reason: HOSPADM

## 2017-03-28 RX ADMIN — FLUTICASONE FUROATE AND VILANTEROL TRIFENATATE 1 PUFF: 200; 25 POWDER RESPIRATORY (INHALATION) at 07:53

## 2017-03-28 RX ADMIN — LEVETIRACETAM 1000 MG: 500 TABLET ORAL at 11:15

## 2017-03-28 RX ADMIN — METFORMIN HYDROCHLORIDE 500 MG: 500 TABLET, EXTENDED RELEASE ORAL at 07:53

## 2017-03-28 RX ADMIN — GADOBUTROL 10 ML: 604.72 INJECTION INTRAVENOUS at 18:55

## 2017-03-28 RX ADMIN — SERTRALINE HYDROCHLORIDE 150 MG: 100 TABLET ORAL at 07:53

## 2017-03-28 RX ADMIN — KETOROLAC TROMETHAMINE 30 MG: 30 INJECTION, SOLUTION INTRAMUSCULAR at 03:32

## 2017-03-28 RX ADMIN — MONTELUKAST SODIUM 10 MG: 10 TABLET, FILM COATED ORAL at 21:34

## 2017-03-28 RX ADMIN — ALUMINUM HYDROXIDE, MAGNESIUM HYDROXIDE, SIMETHICONE 10 ML: 400; 400; 40 SUSPENSION ORAL at 11:15

## 2017-03-28 RX ADMIN — LANSOPRAZOLE 30 MG: 30 CAPSULE, DELAYED RELEASE ORAL at 07:53

## 2017-03-28 RX ADMIN — ALUMINUM HYDROXIDE, MAGNESIUM HYDROXIDE, SIMETHICONE 10 ML: 400; 400; 40 SUSPENSION ORAL at 17:16

## 2017-03-28 RX ADMIN — TOPIRAMATE 25 MG: 25 TABLET ORAL at 21:34

## 2017-03-28 RX ADMIN — FEXOFENADINE HYDROCHLORIDE AND PSEUDOEPHEDRINE HYDROCHLORIDE 1 TABLET: 180; 240 TABLET, FILM COATED, EXTENDED RELEASE ORAL at 07:53

## 2017-03-28 RX ADMIN — HEPARIN SODIUM 5000 UNITS: 5000 INJECTION, SOLUTION INTRAVENOUS; SUBCUTANEOUS at 07:54

## 2017-03-28 RX ADMIN — LEVETIRACETAM 1000 MG: 500 TABLET ORAL at 21:34

## 2017-03-28 ASSESSMENT — VISUAL ACUITY
OU: NORMAL ACUITY

## 2017-03-28 ASSESSMENT — ACTIVITIES OF DAILY LIVING (ADL)
PREVIOUS_RESPONSIBILITIES: MEAL PREP;HOUSEKEEPING;LAUNDRY;SHOPPING;YARDWORK;MEDICATION MANAGEMENT;FINANCES;DRIVING;WORK;CHILD CARE

## 2017-03-28 NOTE — CONSULTS
32 year old woman with migraines, two weeks of left sided weakness and numbness. Today, she had a first time seizure, described as a GTC, lasted 5 minute. She is no longer having post ictal confusion but is still noted to have left leg weakness. Plan was to admit locally but no neuro beds available.    MRI brain with multiple punctate white matter lesions supratentorially.     We accepted her in transfer with tentative plan for EEG, LP, PT assessments in AM.

## 2017-03-28 NOTE — PROGRESS NOTES
03/28/17 1100   General Information   Onset Date 03/28/17   Start of Care Date 03/28/17   Referring Physician Zane Julien MD    Patient Profile Review/OT: Additional Occupational Profile Info See Profile for full history and prior level of function   Patient/Family Goals Statement Pt would like oral pain to go away   Swallowing Evaluation Bedside swallow evaluation   Behaviorial Observations Alert   Mode of current nutrition Oral diet   Type of oral diet Thin liquid  (clear liquids)   Comments Mary Shipman is a 32 year old female with pmh migraine, asthma, IBS, HLD, PCOS, who presents with first time seizure. The patient was at work this afternoon when it occurred. 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  She was reportedly witnessed to have generalized convulsions for 5 minutes. She bit her angie with this. She was taken to [facility], where she was felt to be postictal, with significant confusion which gradually cleared over the course of 2 hours. Admission was recommended, but there were no neuro beds available at [facility], so she was transferred to [facility] for further management.   Clinical Swallow Evaluation   Oral Musculature generally intact   Structural Abnormalities present  (L lateral tongue reddened, swollen)   Dentition present and adequate   Mucosal Quality sticky   Mandibular Strength and Mobility intact   Oral Labial Strength and Mobility WFL  (restricted movement likely 2/2 pain)   Lingual Strength and Mobility impaired protrusion;impaired anterior elevation;impaired right lateral movement;impaired coordination;impaired left lateral movement  (likely due to pain and swollen)   Velar Elevation (unable to visualize)   Buccal Strength and Mobility intact   Laryngeal Function Cough;Swallow;Voicing initiated;Dry swallow palpated   Oral Musculature Comments structures appear generally intact, restriction in  movement/coordination secondary to pain   Additional Documentation Yes   Clinical Swallow Eval: Thin Liquid Texture Trial   Mode of Presentation, Thin Liquids cup;spoon;self-fed   Volume of Liquid or Food Presented 2 oz   Oral Phase of Swallow Poor AP movement   Pharyngeal Phase of Swallow intact   Diagnostic Statement no overt s/s of aspiration, wincing with straw sips that minimally reduced with cup sips, swallow functional for thin liquids, increased AP transfer time likely due to pain    Clinical Swallow Eval: Puree Solid Texture Trial   Mode of Presentation, Puree spoon;self-fed   Volume of Puree Presented 2 tsp   Oral Phase, Puree Poor AP movement;Residue in oral cavity   Oral Residue, Puree mid posterior tongue   Pharyngeal Phase, Puree intact   Diagnostic Statement no overt s/s of aspiration, wincing with bolus prep phase and reduced AP movement secondary to pain associated with movement, min oral stasis cleared with thin liquid wash   VFSS Evaluation   VFSS Additional Documentation No   FEES Evaluation   Additional Documentation No   Swallow Compensations   Swallow Compensations Pacing;Reduce amounts;Alternate viscosity of consistencies   Results Oral difficulties only   Esophageal Phase of Swallow   Patient reports or presents with symptoms of esophageal dysphagia No   General Therapy Interventions   Planned Therapy Interventions Dysphagia Treatment   Dysphagia treatment Instruction of safe swallow strategies;Modified diet education   Swallow Eval: Clinical Impressions   Skilled Criteria for Therapy Intervention Skilled criteria met.  Treatment indicated.   Functional Assessment Scale (FAS) 4   Treatment Diagnosis mild moderat oral dysphagia   Diet texture recommendations Full liquid   Recommended Feeding/Eating Techniques maintain upright posture during/after eating for 30 mins;small sips/bites;alternate between small bites and sips of food/liquid   Demonstrates Need for Referral to Another Service  occupational therapy;physical therapy   Therapy Frequency 5 times/wk   Predicted Duration of Therapy Intervention (days/wks) 2 weeks   Anticipated Discharge Disposition (TBD)   Risks and Benefits of Treatment have been explained. Yes   Patient, family and/or staff in agreement with Plan of Care Yes   Clinical Impression Comments Clinical swallow evaluation completed per MD order. Per exam, Pt presents with mild-moderate oral dysphagia in the setting of tongue injury during most recent seizure. Oral motor examination limited secondary to pain associated with movement and swollen tongue. Pt reports pain with talking and swallowing but willing to participate in exam. Pt tolerated thin liquids and puree with no s/s of aspiration. Solid textures deferred due to reduced lingual ROM and pain. Recommend full liquid diet. Pt with improved oral transit when bolus placed in R side of mouth. Pt should be upright, take small bites/sips and alternate consistencies. Given significant pain associated with PO/oral movement, anticipate nutritional PO intake to be reduced. Team may wish to consider temporary means of hydration/nutrition given likely to have minimal PO intake. SLP to follow.    Total Evaluation Time   Total Evaluation Time (Minutes) 18

## 2017-03-28 NOTE — PLAN OF CARE
Problem: Goal Outcome Summary  Goal: Goal Outcome Summary  OT/6A: OT evaluation completed.  Pt with flat affect, alert, oriented x3.  Pt presenting with decreased LUE proximal strength and c/o intermittent n/t LUE and LLE.  Pt ambulating ~300 feet with  CGA and no AD. Pt will benefit from PT consult due to LLE weakness impacting pt ability to achieve full weighbearing with mobility.  Pt IND with seated ADLs, completes LB dressing with SBA.  Pt reports recent changes in cognition, will benefit from continued monitoring and formal assessment as indicated.   REC: Anticipate home with assist, OP OT to address LUE impairments

## 2017-03-28 NOTE — IP AVS SNAPSHOT
[address] [address]    Phone:  [phone]                                       After Visit Summary   3/28/2017    Mary Shipman    MRN: [id number]           After Visit Summary Signature Page     I have received my discharge instructions, and my questions have been answered. I have discussed any challenges I see with this plan with the nurse or doctor.    ..........................................................................................................................................  Patient/Patient Representative Signature      ..........................................................................................................................................  Patient Representative Print Name and Relationship to Patient    ..................................................               ................................................  Date                                            Time    ..........................................................................................................................................  Reviewed by Signature/Title    ...................................................              ..............................................  Date                                                            Time

## 2017-03-28 NOTE — PLAN OF CARE
Problem: Goal Outcome Summary  Goal: Goal Outcome Summary     SLP: Clinical swallow evaluation completed per MD order. Per exam, Pt presents with mild-moderate oral dysphagia in the setting of tongue injury during most recent seizure. Oral motor examination limited secondary to pain associated with movement and swollen tongue. Pt reports pain with talking and swallowing but willing to participate in exam. Pt tolerated thin liquids and puree with no s/s of aspiration. Solid textures deferred due to reduced lingual ROM and pain. Recommend full liquid diet. Pt with improved oral transit when bolus placed in R side of mouth. Pt should be upright, take small bites/sips and alternate consistencies. Given significant pain associated with PO/oral movement, anticipate nutritional PO intake to be reduced. Team may wish to consider temporary means of hydration/nutrition given likely to have minimal PO intake. SLP to follow.

## 2017-03-28 NOTE — PLAN OF CARE
Problem: Goal Outcome Summary  Goal: Goal Outcome Summary  Outcome: No Change  Admitted from [facility] with new onset seizures. Pt arrived via litter at 1245AM. VSS. A&Ox4. Neuros include intermittent left side numbness/tingling, currently pt denies any n/t. C/o tongue pain from injury during seizure activity. MRI completed before transfer, demyelization present. Possible diagnosis of early onset MS. PIV SL'd. Voiding spont. Up SBA and GB. Clear diet, pt having a hard time swallowing d/t toungue injury, speech consult ordered. Seizure precautions maintained. Plan for VEEG leads to be placed this AM. PT needs UA/UC collected. Admission completed. Continue to monitor and follow POC.

## 2017-03-28 NOTE — PLAN OF CARE
Problem: Goal Outcome Summary  Goal: Goal Outcome Summary  Outcome: No Change  Admitted with new onset seizures. VSS. A&Ox4. Neuros include intermittent left side numbness/tingling, currently pt denies any n/t. C/o tongue pain from injury during seizure activity, magic mouthwash givex1 with relief. PIV SL'd. Voiding spont. Up SBA and GB. Walked hallsx1. Full liquid diet, pt having a hard time swallowing d/t L tongue injury. Seizure precautions maintained, pads re-ordred. Short VEEG session completed this AM. Plan for Brain MRA to be completed around 1900. Continue to monitor and follow POC.

## 2017-03-28 NOTE — MR AVS SNAPSHOT
After Visit Summary   3/28/2017    Mary Shipman    MRN: [id number]           Patient Information     Date Of Birth          1985        Visit Information        Provider Department      3/28/2017 9:00 AM [facility] EEG TECH 2 [facility] EEG        Today's Diagnoses     Seizure (H)    -  1       Follow-ups after your visit        Your next 10 appointments already scheduled     Mar 28, 2017  7:00 PM CDT   MR HEAD W/O & W CONTRAST ANGIOGRAM with UUMR2   [facility], [place], MRI ([facility])    [address] [facility] [address] [phone]           Take your medicines as usual, unless your doctor tells you not to. Bring a list of your current medicines to your exam (including vitamins, minerals and over-the-counter drugs).  You will be given intravenous contrast for this exam. To prepare:   The day before your exam, drink extra fluids at least six 8-ounce glasses (unless your doctor tells you to restrict your fluids).   Have a blood test (creatinine test) within 30 days of your exam. Go to your clinic or Diagnostic Imaging Department for this test.  The MRI machine uses a strong magnet. Please wear clothes without metal (snaps, zippers). A sweatsuit works well, or we may give you a hospital gown.  Please remove any body piercings and hair extensions before you arrive. You will also remove watches, jewelry, hairpins, wallets, dentures, partial dental plates and hearing aids. You may wear contact lenses, and you may be able to wear your rings. We have a safe place to keep your personal items, but it is safer to leave them at home.   **IMPORTANT** THE INSTRUCTIONS BELOW ARE ONLY FOR THOSE PATIENTS WHO HAVE BEEN TOLD THEY WILL RECEIVE SEDATION OR GENERAL ANESTHESIA DURING THEIR MRI PROCEDURE:  IF YOU WILL RECEIVE SEDATION (take medicine to help you relax during your exam):   You must get the medicine from your doctor before you arrive. Bring the  medicine to the exam. Do not take it at home.   Arrive one hour early. Bring someone who can take you home after the test. Your medicine will make you sleepy. After the exam, you may not drive, take a bus or take a taxi by yourself.   No eating 8 hours before your exam. You may have clear liquids up until 4 hours before your exam. (Clear liquids include water, clear tea, black coffee and fruit juice without pulp.)  IF YOU WILL RECEIVE ANESTHESIA (be asleep for your exam):   Arrive 1 1/2 hours early. Bring someone who can take you home after the test. You may not drive, take a bus or take a taxi by yourself.   No eating 8 hours before your exam. You may have clear liquids up until 4 hours before your exam. (Clear liquids include water, clear tea, black coffee and fruit juice without pulp.)  Please call the Imaging Department at your exam site with any questions.              Future tests that were ordered for you today     Open Standing Orders        Priority Remaining Interval Expires Ordered    Platelet count Routine 12/12 EVERY THREE DAYS  3/28/2017    Notify Provider Routine [id number] PRN  3/28/2017    Pulse oximetry nursing Routine [id number] PRN  3/28/2017    Oxygen: Oxygen mask Routine [id number] PRN  3/28/2017            Who to contact     Please call your clinic at [phone] to:    Ask questions about your health    Make or cancel appointments    Discuss your medicines    Learn about your test results    Speak to your doctor   If you have compliments or concerns about an experience at your clinic, or if you wish to file a complaint, please contact [facility] Patient Relations at [phone] or email us at [email or web address]         Additional Information About Your Visit        [employer] Information     [employer] gives you secure access to your electronic health record. If you see a primary care provider, you can also send messages to your care team and make  appointments. If you have questions, please call your primary care clinic.  If you do not have a primary care provider, please call [phone] and they will assist you.      [employer] is an electronic gateway that provides easy, online access to your medical records. With [employer], you can request a clinic appointment, read your test results, renew a prescription or communicate with your care team.     To access your existing account, please contact your [facility] or call [phone] for assistance.        Care EveryWhere ID     This is your Care EveryWhere ID. This could be used by other organizations to access your [facility] medical records  [id number]         Blood Pressure from Last 3 Encounters:   03/28/17 125/71   03/27/17 110/58   03/07/17 116/82    Weight from Last 3 Encounters:   03/28/17 243 lb (110.2 kg)   03/27/17 250 lb (113.4 kg)   03/07/17 245 lb 4 oz (111.2 kg)              Today, you had the following     No orders found for display         Today's Medication Changes      Notice     This visit is during an admission. Changes to the med list made in this visit will be reflected in the After Visit Summary of the admission.             Primary Care Provider Office Phone # Fax #    Miguel Hammer PA-C [phone]       [address] [address]        Thank you!     Thank you for choosing [facility]  for your care. Our goal is always to provide you with excellent care. Hearing back from our patients is one way we can continue to improve our services. Please take a few minutes to complete the written survey that you may receive in the mail after your visit with us. Thank you!             Your Updated Medication List - Protect others around you: Learn how to safely use, store and throw away your medicines at www.disposemymeds.org.      Notice     This visit is during an admission. Changes to the med list made  in this visit will be reflected in the After Visit Summary of the admission.

## 2017-03-28 NOTE — ED NOTES
"[facility]  ED Nurse Handoff Report    ED Chief complaint: Seizures (Tonic clonic seizure at work, co worker noted duration of 5 min, no known history of seizure, otherwise healthy)      ED Diagnosis:   Final diagnoses:   None       Code Status: Full Code    Allergies:   Allergies   Allergen Reactions     Levaquin [Levofloxacin] Anaphylaxis     Acetaminophen      Uncertain - hives/anaphylaxis     Hydrocodone Hives     Vicodin       Activity level:  Stand with Assist     Needed?: No    Isolation: No  Infection: Not Applicable    Bariatric?: No      Vital Signs:   Vitals:    03/27/17 1637   BP: 138/80   Pulse: 84   Resp: 18   Temp: 98.7  F (37.1  C)   TempSrc: Oral   SpO2: 100%   Weight: 113.4 kg (250 lb)   Height: 1.651 m (5' 5\")       Cardiac Rhythm: ,        Pain level:      Is this patient confused?: No    Patient Report: Initial Complaint: Seizure  Focused Assessment: Patient came to ED after new onset of seizure at work, Co-worker heard noise and found her on the floor having full body convulsion for 5 minutes. Patient does not recall the incident, post ictal upon ED arrival with mild confusion but recovered couple of hours later. Reports 2 weeks of migraine headache and left sided numbness and tingling been followed up with PMD. PMD recommended to to head CT if symptoms did not improve.   Tests Performed: labs, CT of head, MRI of head  Abnormal Results: MRI  Treatments provided: NS bolus, Compazine, Toradol    Family Comments:  at bedside    OBS brochure/video discussed/provided to patient: N/A    ED Medications:   Medications   0.9% sodium chloride BOLUS (0 mLs Intravenous Stopped 3/27/17 1859)   ketorolac (TORADOL) injection 15 mg (15 mg Intravenous Given 3/27/17 1755)   metoclopramide (REGLAN) injection 10 mg (10 mg Intravenous Given 3/27/17 1755)   gadobutrol (GADAVIST) injection 11 mL (11 mLs Intravenous Given 3/27/17 2003)       Drips infusing?:  No      ED NURSE PHONE " NUMBER: [phone]

## 2017-03-28 NOTE — H&P
[facility]: [place]  Neurology H & P  DOS:  March 28, 2017  CC: Seizure, headache    HPI  Mary Shipman is a 32 year old female with pmh migraine, asthma, IBS, HLD, PCOS, who presents with first time seizure. The patient was at work this afternoon when it occurred. 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  She was reportedly witnessed to have generalized convulsions for 5 minutes. She bit her angie with this. She was taken to [facility], where she was felt to be postictal, with significant confusion which gradually cleared over the course of 2 hours. Admission was recommended, but there were no neuro beds available at [facility], so she was transferred to [facility] for further management.    Patient additionally reports intermittent weakness and numbness of her left arm and leg for the last 2 weeks. Those symptoms are much worse since her seizure. Over that same time period she reports a persistent headache, with blurred vision, fatigue, and difficulty concentrating. She denies any associated photophobia, phonophobia, or nausea. It is mildly improved by lying down. She has been taking ibuprofen 800mg BID for this, and feels it helps somewhat. She also has history of chronic migraines, but this is different from those as there is no nausea or photophobia. She was just started on topamax for this by her PCP.    The patient denies any prior history of seizure, or head trauma in her life. Patient reports a prior history of a blood clot in her knee as a young girl, and recalls she was on blood thinners for this for a short period. Reports feeling cold recently, but denies any recent fevers, nor any illness including GI//pulm.    10-point ROS non-contributory except as noted above.    Past Medical/Surgical History  Past Medical History:   Diagnosis Date     Allergic rhinitis due to other allergen      Antepartum mild  "preeclampsia 11/23/2012     Asthma      Depressive disorder      Esophageal reflux      Frequent UTI     had a kidney infection also in the past     Gestational hypertension 11/20/2012     Helicobacter pylori (H. pylori)     treated     Hyperlipidemia      Irritable bowel syndrome      Liver disease     \"fatty liver\" resolved on own.     Lump or mass in breast 11/30/2015     Lump or mass in breast      Mild preeclampsia 12/18/2012     Obesity      Polycystic ovaries      Thyrotoxicosis 5/9/2007     Past Surgical History:   Procedure Laterality Date     C APPENDECTOMY       C NONSPECIFIC PROCEDURE      nasal surgery      TONSILLECTOMY & ADENOIDECTOMY      surgery several times for this     Medications  Topiramate  Singulair  Dulera  Albuterol PRN  Metformin 500 BID (for PCOS)  Lansoprazole  Ibuprofen 800mg BID  Trazodone 50 qhs  Sertraline 150 qd  Allergies   Allergen Reactions     Levaquin [Levofloxacin] Anaphylaxis     Acetaminophen      Uncertain - hives/anaphylaxis     Hydrocodone Hives     Vicodin     Social History  Social History   Substance Use Topics     Smoking status: Never Smoker     Smokeless tobacco: Never Used     Alcohol use No   Patient confirms no tobacco use and no history of smoking, nor drug use.    Family History  Family History   Problem Relation Age of Onset     Gynecology Mother      ectopic pregnancy/endometriosis     Cancer - colorectal Mother      Congenital Anomalies Mother      kidney problems     Colon Cancer Mother      Hyperlipidemia Mother      Other Cancer Mother      Lung, Skin, Brain and Colon cancer     Gynecology Sister      ectopic pregnancy/endometriosis     Unknown/Adopted Sister      HEART DISEASE Father      Coronary Artery Disease Father      Psychotic Disorder Brother      Unknown/Adopted Brother      Unknown/Adopted Brother      Unknown/Adopted Brother      Unknown/Adopted Brother      Unknown/Adopted Brother      Unknown/Adopted Sister      CEREBROVASCULAR DISEASE " "Maternal Grandmother      Unknown/Adopted Maternal Grandfather      Unknown/Adopted Paternal Grandmother      Unknown/Adopted Paternal Grandfather       Other       Other       Other       Other       Other       Other    No known family history of seizure      OBJECTIVE  /67  Pulse 63  Temp 97.8  F (36.6  C) (Oral)  Resp 18  Ht 1.651 m (5' 5\")  Wt 110.2 kg (243 lb)  SpO2 98%  BMI 40.44 kg/m2  GEN Cooperative. NAD.  HEENT Sclerae anicteric, MMM. No nuchal rigidity. Significant lac and bruising over the left side of the tongue, mild lac over the R tongue. No cheek lacs noted.  CVS Peripheral Pulse palpable  PULM No respiratory distress  ABD NT. No masses.  MSK ROM intact. No joint swelling. No C-spine tenderness.  PSYC Affect wnl  NEURO   MS Appropriate in alertness, attention, orientation. No aphasia.   CN Visual fields intact. PERRL. EOMI with normal smooth pursuit. Facial movements symmetric. Hearing intact to conversation. Palate elevation symmetric, uvula midline. Tongue protrusion midline.  No dysarthria   STR No involuntary movements observed. Normal tone. No drift.    R/L SS:5  EF:  EE:-  WE:5-  H/5    R/L HF:5/3  DF:   SNS Decreased to pin in the left cheek (V1), and the left arm. Fairly symmetric in the left leg.   RFLX Brisk with spread (3+), moreso on the left than R in the biceps, brach, and hips (cross hip-adduction on the left). Ankles 2+ and symmetric. Toes somewhat upgoing bilaterally.   CRD FN intact. HS limited by strength but grossly intact.   GAIT Deferred d/t weakness    All labs and imaging reviewed.      ASSESSMENT/PLAN  # Seizure  Patient presenting with first time seizure. Left arm and leg weakness and numbness raise concern for partial onset, or even simple partial seizures with secondary generalization. New persistent headache, with blurred vision & fatigue raise concern for CVT, particularly given the patient's history of blood clot, and PCOS.  --MRI reveals " no stroke. MRI does demonstrate a couple periventricular areas of T2 hyperintensity - may be manifestations of migraine. No enhancement to suggest active demyelinating disease. Also of note is a non-hemorrhagic DVT in the R occipital lobe.  --Patient has no sign of infection (no fever, no nuchal rigidity, no leukocytosis, no confusion). Meningitis is felt to be unlikely on this basis, though LP could be considered if patient worsens.  1. No AEDs at this time  2. MRV brain - eval for CVT  3. Routine EEG  4. PT/OT for gait and arm weakness  5. SLP for tongue lac.  6. NSAIDS and Magic mouthwash PRN for tongue pain/swelling    # PCOS: Cont metformin 500 BID  # Migraine: Continue topamax  # Depression: Cont sertraline  # Asthma: Cont dulera, singulair  # GERD: Cont Lansoprazole    # Ppx: SCDs + heparin  # FEN: ADAT (not tolerating much d/t tongue lac)  # Code: Full    Patient was discussed with staff neurologist, Dr. Leyva, who agrees with the plan.  Zane Julien MD, PGY-4 Neurology.

## 2017-03-28 NOTE — PHARMACY-ADMISSION MEDICATION HISTORY
Admission medication history interview status for the 3/27/2017  admission is complete. See EPIC admission navigator for prior to admission medications     Medication history source reliability:Good    Actions taken by pharmacist (provider contacted, etc): spoke with patient     Additional medication history information not noted on PTA med list : Pt just started topamax 1 week ago, has yet to start next step in taper (so still on 25mg daily dose).     Medication reconciliation/reorder completed by provider prior to medication history? No    Time spent in this activity: 15 minutes    Prior to Admission medications    Medication Sig Last Dose Taking? Auth Provider   fexofenadine-pseudoePHEDrine (ALLEGRA-D 24) 180-240 MG per 24 hr tablet Take 1 tablet by mouth daily 3/26/2017 at Unknown time Yes Unknown, Entered By History   fluticasone (FLONASE) 50 MCG/ACT spray Spray 1-2 sprays into both nostrils daily as needed for rhinitis or allergies PRN Yes Unknown, Entered By History   polyethylene glycol (MIRALAX/GLYCOLAX) Packet Take 17 g by mouth daily as needed for constipation PRN Yes Unknown, Entered By History   TRAZODONE HCL PO Take  mg by mouth At Bedtime 3/26/2017 at Unknown time Yes Unknown, Entered By History   SERTRALINE HCL PO Take 150 mg by mouth daily 100mg x 1.5 tabs 3/26/2017 at Unknown time Yes Unknown, Entered By History   topiramate (TOPAMAX) 25 MG tablet Take 1 tablet (25 mg) at bedtime for 1 week, then 1 tablet twice daily for 1 week, then 1 tablet in AM and 2 in PM for 1 week, then 2 tablets twice daily. 3/26/2017 at Unknown time Yes Miguel Hammer PA-C   albuterol (PROAIR HFA, PROVENTIL HFA, VENTOLIN HFA) 108 (90 BASE) MCG/ACT inhaler Inhale 2 puffs into the lungs every 6 hours as needed for shortness of breath / dyspnea or wheezing PRN Yes Miguel Hammer PA-C   mometasone-formoterol (DULERA) 200-5 MCG/ACT oral inhaler Inhale 2 puffs into the lungs 2 times daily 3/26/2017 at Unknown  time Yes Miguel Hammer PA-C   metFORMIN (GLUCOPHAGE-XR) 500 MG 24 hr tablet Take 1 tablet (500 mg) by mouth 2 times daily (with meals) 3/26/2017 at Unknown time Yes Gisela Gardner MD   LANsoprazole (PREVACID) 30 MG capsule Take 1 capsule (30 mg) by mouth daily Take 30-60 minutes before a meal. 3/26/2017 at Unknown time Yes Miguel Hammer PA-C   levalbuterol (XOPENEX CONC) 1.25 MG/0.5ML NEBU Take 1.25 mg by nebulization every 6 hours as needed for wheezing PRN Yes Reported, Patient   montelukast (SINGULAIR) 10 MG tablet Take 1 tablet (10 mg) by mouth At Bedtime 3/26/2017 at Unknown time Yes Miguel Hammer PA-C   ibuprofen (ADVIL,MOTRIN) 400-800 mg tablet Take 1-2 tablets by mouth every 6 hours as needed (cramping). PRN Yes Zane Buck MD   ranitidine (ZANTAC) 150 MG tablet Take 150 mg by mouth 2 times daily as needed  PRN Yes Miguel Hammer PA-C Tiffany M. Reinitz, PharmD

## 2017-03-28 NOTE — PROCEDURES
[facility] EEG #  (In-patient Routine EEG)       DATE OF RECORDIN2017       DURATION OF RECORDIN minutes and 25 seconds.      CLINICAL SUMMARY:  Portable routine EEG on Mary Shipman, a 32-year-old female with history of migraine, who presented with witnessed first time seizure.  The seizure was described as generalized convulsions lasting 5 minutes. On this day of recording, patient reportedly was on topiramate (for headache).     TECHNICAL SUMMARY: This routine EEG monitoring procedure was performed with 23 scalp electrodes in 10-20 electrode system placements, and additional scalp, precordial and other surface electrodes used for electrical referencing and artifact detection.     BACKGROUND EEG ACTIVITIES:  During quiet wakefulness, there is a symmetric, well-modulated, approximately 9-10 Hz posterior dominant rhythm, which attenuates with eye opening.  There were occasional diffuse irregular theta slowing seen in waking.  There were frequent theta and delta range activities that were at times generalized with bi-posterior predominance, and at other times were only seen in bilateral posterior regions.  The slowing occurred in brief bursts lasting 0.5 to 2 seconds.      Frequent low-amplitude left occipital spike discharges were seen with phase reversal at O1 and extending to O2 and occasionally to P3 and a little bit to T3.  The spike discharges were followed by more widespread slow waves in bilateral posterior regions.  These spike and slow wave discharges were at times triggered by photic stimulation.      ICTAL RECORDINGS:  No electrographic seizures and no paroxysmal behavioral events were recorded today.      IMPRESSION:  Abnormal awake EEG due to the presence of occasional diffuse irregular theta slowing in waking, and frequent synchronized theta-delta slowing in bilateral posterior regions.  There were also frequent low amplitude left occipital spike  discharges with phase reversal at O1 often followed by a more widespread slow waves in posterior leads of bilateral hemispheres.  These EEG findings are consistent with mild diffuse nonspecific encephalopathy and additional focal cerebral dysfunction in bilateral posterior (especially occipital) regions.  The left occipital spike-slow wave discharges, together with her recent history of witnessed grand mal seizure, is consistent with the clinical diagnosis of partial epilepsy.   Clinical correlation is recommended.  Dictated By: Tarun Caldwell MD, Clinical Neurophysiology Fellow   I agree with the findings as reported.  I personally reviewed this EEG recording.    Armaan Bronson M.D., Professor of Neurology     D: 2017 15:39   T: 2017 16:17   MT: CD      Name:     АЛЕКСАНДР ERICKSON   MRN:      [id number]        Account:        [id number]   :      1985           Procedure Date: 2017      Document: [id number]

## 2017-03-28 NOTE — PROGRESS NOTES
03/28/17 1112   Quick Adds   Type of Visit Initial Occupational Therapy Evaluation   Living Environment   Lives With child(дмитрий), dependent;spouse   Living Arrangements house   Home Accessibility no concerns   Number of Stairs to Enter Home 0   Number of Stairs Within Home 0   Transportation Available car;family or friend will provide   Living Environment Comment Pt reports living in single story house with spouse and 5 yo son. Pt report no accessibility concerns. Pt works full time.    Self-Care   Dominant Hand right   Usual Activity Tolerance good   Current Activity Tolerance moderate   Equipment Currently Used at Home none   Functional Level Prior   Ambulation 0-->independent   Transferring 0-->independent   Toileting 0-->independent   Bathing 0-->independent   Dressing 0-->independent   Eating 0-->independent   Communication 0-->understands/communicates without difficulty   Swallowing 0-->swallows foods/liquids without difficulty   Cognition 0 - no cognition issues reported   Fall history within last six months yes   Number of times patient has fallen within last six months 1   Which of the above functional risks had a recent onset or change? (with seizure )   Prior Functional Level Comment IND with all ADLs/IADLs   General Information   Onset of Illness/Injury or Date of Surgery - Date 03/28/17   Referring Physician Zane Julien MD   Patient/Family Goals Statement Return to home   Additional Occupational Profile Info/Pertinent History of Current Problem Patient presenting with first time seizure. Left arm and leg weakness and numbness raise concern for partial onset, or even simple partial seizures with secondary generalization. New persistent headache, with blurred vision & fatigue raise concern for CVT, particularly given the patient's history of blood clot, and PCOS.   Precautions/Limitations fall precautions;seizure precautions   Weight-Bearing Status - LUE full weight-bearing   Weight-Bearing  Status - RUE full weight-bearing   Weight-Bearing Status - LLE full weight-bearing   Weight-Bearing Status - RLE full weight-bearing   General Observations Pt supine in bed on arrival, seizure precautions in place   General Info Comments Activity: Ambulate with Assist   Cognitive Status Examination   Orientation orientation to person, place and time   Level of Consciousness alert   Cognitive Comment Pt reports improvement from admission regarding confusion, although reports slow processing altered from baseline   Visual Perception   Visual Perception Comments Pt reports no new deficits   Sensory Examination   Sensory Comments Pt reports n/t LUE/LLE; intact to light touch   Pain Assessment   Patient Currently in Pain No   Range of Motion (ROM)   ROM Comment BUE WNL   Strength   Strength Comments RUE 5/5; LUE 4/5 proximally, 5/5 distally   Hand Strength   Left hand  (pounds) 58 pounds   Right hand  (pounds) 64 pounds   Coordination   Coordination Comments finger to nose intact   Mobility   Bed Mobility Comments SBA   Transfer Skill: Bed to Chair/Chair to Bed   Level of [place]: Bed to Chair contact guard   Transfer Skill: Sit to Stand   Level of [place]: Sit/Stand stand-by assist   Upper Body Dressing   Level of [place]: Dress Upper Body independent   Lower Body Dressing   Level of [place]: Dress Lower Body stand-by assist   Toileting   Level of [place]: Toilet stand-by assist   Grooming   Level of [place]: Grooming independent   Eating/Self Feeding   Level of [place]: Eating independent   Instrumental Activities of Daily Living (IADL)   Previous Responsibilities meal prep;housekeeping;laundry;shopping;yardwork;medication management;finances;driving;work;   Activities of Daily Living Analysis   Impairments Contributing to Impaired Activities of Daily Living balance impaired;cognition impaired;strength decreased;sensation decreased   General Therapy Interventions  "  Planned Therapy Interventions ADL retraining;IADL retraining;cognition;strengthening   Clinical Impression   Criteria for Skilled Therapeutic Interventions Met yes, treatment indicated   OT Diagnosis Decreased IADL IND   Influenced by the following impairments strength, cognition, medical condition   Assessment of Occupational Performance 1-3 Performance Deficits   Identified Performance Deficits home mgmt, work   Clinical Decision Making (Complexity) Low complexity   Therapy Frequency (6x/week)   Predicted Duration of Therapy Intervention (days/wks) 2 weeks   Anticipated Equipment Needs at Discharge (TBD during OT sessions)   Anticipated Discharge Disposition Home with Assist;Home with Outpatient Therapy   Risks and Benefits of Treatment have been explained. Yes   Patient, Family & other staff in agreement with plan of care Yes   Clinical Impression Comments Pt presenting with LUE impairments impacting overall participation in IADLs. Pt endorses cognitive changes, will benefit from OT to monitor and assess for safety with ADLs/IADLs   [facility] TM \"6 Clicks\"   2016, Trustees of [facility], under license to [employer].  All rights reserved.   6 Clicks Short Forms Daily Activity Inpatient Short Form   [facility] AM-PAC  \"6 Clicks\" Daily Activity Inpatient Short Form   1. Putting on and taking off regular lower body clothing? 4 - None   2. Bathing (including washing, rinsing, drying)? 3 - A Little   3. Toileting, which includes using toilet, bedpan or urinal? 4 - None   4. Putting on and taking off regular upper body clothing? 4 - None   5. Taking care of personal grooming such as brushing teeth? 4 - None   6. Eating meals? 4 - None   Daily Activity Raw Score (Score out of 24.Lower scores equate to lower levels of function) 23   Total Evaluation Time   Total Evaluation Time (Minutes) 10     "

## 2017-03-29 ENCOUNTER — APPOINTMENT (OUTPATIENT)
Dept: SPEECH THERAPY | Facility: CLINIC | Age: 32
DRG: 101 | End: 2017-03-29
Attending: PSYCHIATRY & NEUROLOGY
Payer: COMMERCIAL

## 2017-03-29 ENCOUNTER — APPOINTMENT (OUTPATIENT)
Dept: GENERAL RADIOLOGY | Facility: CLINIC | Age: 32
DRG: 101 | End: 2017-03-29
Attending: PSYCHIATRY & NEUROLOGY
Payer: COMMERCIAL

## 2017-03-29 ENCOUNTER — APPOINTMENT (OUTPATIENT)
Dept: OCCUPATIONAL THERAPY | Facility: CLINIC | Age: 32
DRG: 101 | End: 2017-03-29
Attending: PSYCHIATRY & NEUROLOGY
Payer: COMMERCIAL

## 2017-03-29 ENCOUNTER — APPOINTMENT (OUTPATIENT)
Dept: PHYSICAL THERAPY | Facility: CLINIC | Age: 32
DRG: 101 | End: 2017-03-29
Attending: PSYCHIATRY & NEUROLOGY
Payer: COMMERCIAL

## 2017-03-29 VITALS
DIASTOLIC BLOOD PRESSURE: 65 MMHG | SYSTOLIC BLOOD PRESSURE: 110 MMHG | HEART RATE: 82 BPM | WEIGHT: 243 LBS | HEIGHT: 65 IN | BODY MASS INDEX: 40.48 KG/M2 | OXYGEN SATURATION: 97 % | RESPIRATION RATE: 18 BRPM | TEMPERATURE: 98.7 F

## 2017-03-29 DIAGNOSIS — R56.9 CONVULSIONS, UNSPECIFIED CONVULSION TYPE (H): ICD-10-CM

## 2017-03-29 LAB
APPEARANCE CSF: CLEAR
COLOR CSF: COLORLESS
GLUCOSE CSF-MCNC: 47 MG/DL (ref 40–70)
GRAM STN SPEC: NORMAL
INR PPP: 1.08 (ref 0.86–1.14)
LYMPH ABN NFR CSF MANUAL: 95 %
MICRO REPORT STATUS: NORMAL
MONOS+MACROS NFR CSF MANUAL: 5 %
PROT CSF-MCNC: 47 MG/DL (ref 15–60)
RBC # CSF MANUAL: 58 /UL (ref 0–2)
SPECIMEN SOURCE: NORMAL
TUBE # CSF: 4 #
WBC # CSF MANUAL: 7 /UL (ref 0–5)

## 2017-03-29 PROCEDURE — 82040 ASSAY OF SERUM ALBUMIN: CPT | Performed by: PSYCHIATRY & NEUROLOGY

## 2017-03-29 PROCEDURE — 83519 RIA NONANTIBODY: CPT | Performed by: PSYCHIATRY & NEUROLOGY

## 2017-03-29 PROCEDURE — 97116 GAIT TRAINING THERAPY: CPT | Mod: GP

## 2017-03-29 PROCEDURE — 82164 ANGIOTENSIN I ENZYME TEST: CPT | Performed by: PSYCHIATRY & NEUROLOGY

## 2017-03-29 PROCEDURE — 82042 OTHER SOURCE ALBUMIN QUAN EA: CPT | Performed by: PSYCHIATRY & NEUROLOGY

## 2017-03-29 PROCEDURE — 92526 ORAL FUNCTION THERAPY: CPT | Mod: GN

## 2017-03-29 PROCEDURE — 36415 COLL VENOUS BLD VENIPUNCTURE: CPT | Performed by: PSYCHIATRY & NEUROLOGY

## 2017-03-29 PROCEDURE — 97110 THERAPEUTIC EXERCISES: CPT | Mod: GP

## 2017-03-29 PROCEDURE — 86341 ISLET CELL ANTIBODY: CPT | Performed by: PSYCHIATRY & NEUROLOGY

## 2017-03-29 PROCEDURE — 85610 PROTHROMBIN TIME: CPT | Performed by: PSYCHIATRY & NEUROLOGY

## 2017-03-29 PROCEDURE — 87529 HSV DNA AMP PROBE: CPT | Performed by: PSYCHIATRY & NEUROLOGY

## 2017-03-29 PROCEDURE — 40000556 ZZH STATISTIC PERIPHERAL IV START W US GUIDANCE

## 2017-03-29 PROCEDURE — 82784 ASSAY IGA/IGD/IGG/IGM EACH: CPT | Performed by: PSYCHIATRY & NEUROLOGY

## 2017-03-29 PROCEDURE — 25000125 ZZHC RX 250: Performed by: RADIOLOGY

## 2017-03-29 PROCEDURE — 84157 ASSAY OF PROTEIN OTHER: CPT | Performed by: PSYCHIATRY & NEUROLOGY

## 2017-03-29 PROCEDURE — 27210995 ZZH RX 272

## 2017-03-29 PROCEDURE — 25000128 H RX IP 250 OP 636: Performed by: STUDENT IN AN ORGANIZED HEALTH CARE EDUCATION/TRAINING PROGRAM

## 2017-03-29 PROCEDURE — 62270 DX LMBR SPI PNXR: CPT | Mod: TC

## 2017-03-29 PROCEDURE — 87070 CULTURE OTHR SPECIMN AEROBIC: CPT | Performed by: PSYCHIATRY & NEUROLOGY

## 2017-03-29 PROCEDURE — 87252 VIRUS INOCULATION TISSUE: CPT | Performed by: PSYCHIATRY & NEUROLOGY

## 2017-03-29 PROCEDURE — 82945 GLUCOSE OTHER FLUID: CPT | Performed by: PSYCHIATRY & NEUROLOGY

## 2017-03-29 PROCEDURE — 86255 FLUORESCENT ANTIBODY SCREEN: CPT | Performed by: PSYCHIATRY & NEUROLOGY

## 2017-03-29 PROCEDURE — 83916 OLIGOCLONAL BANDS: CPT | Performed by: PSYCHIATRY & NEUROLOGY

## 2017-03-29 PROCEDURE — 25000132 ZZH RX MED GY IP 250 OP 250 PS 637: Performed by: PSYCHIATRY & NEUROLOGY

## 2017-03-29 PROCEDURE — 97535 SELF CARE MNGMENT TRAINING: CPT | Mod: GO | Performed by: OCCUPATIONAL THERAPIST

## 2017-03-29 PROCEDURE — 89051 BODY FLUID CELL COUNT: CPT | Performed by: PSYCHIATRY & NEUROLOGY

## 2017-03-29 PROCEDURE — 87205 SMEAR GRAM STAIN: CPT | Performed by: PSYCHIATRY & NEUROLOGY

## 2017-03-29 PROCEDURE — 40000225 ZZH STATISTIC SLP WARD VISIT

## 2017-03-29 PROCEDURE — 40000133 ZZH STATISTIC OT WARD VISIT: Performed by: OCCUPATIONAL THERAPIST

## 2017-03-29 PROCEDURE — 97161 PT EVAL LOW COMPLEX 20 MIN: CPT | Mod: GP

## 2017-03-29 PROCEDURE — 009U3ZX DRAINAGE OF SPINAL CANAL, PERCUTANEOUS APPROACH, DIAGNOSTIC: ICD-10-PCS | Performed by: RADIOLOGY

## 2017-03-29 PROCEDURE — 40000193 ZZH STATISTIC PT WARD VISIT

## 2017-03-29 PROCEDURE — 97110 THERAPEUTIC EXERCISES: CPT | Mod: GO | Performed by: OCCUPATIONAL THERAPIST

## 2017-03-29 PROCEDURE — 84999 UNLISTED CHEMISTRY PROCEDURE: CPT | Performed by: PSYCHIATRY & NEUROLOGY

## 2017-03-29 PROCEDURE — 25000132 ZZH RX MED GY IP 250 OP 250 PS 637: Performed by: STUDENT IN AN ORGANIZED HEALTH CARE EDUCATION/TRAINING PROGRAM

## 2017-03-29 RX ORDER — LEVETIRACETAM 750 MG/1
750 TABLET ORAL 2 TIMES DAILY
Qty: 180 TABLET | Refills: 0 | Status: SHIPPED | OUTPATIENT
Start: 2017-03-29 | End: 2017-04-11

## 2017-03-29 RX ORDER — LORAZEPAM 2 MG/ML
1 INJECTION INTRAMUSCULAR ONCE
Status: COMPLETED | OUTPATIENT
Start: 2017-03-29 | End: 2017-03-29

## 2017-03-29 RX ORDER — LEVETIRACETAM 750 MG/1
750 TABLET ORAL 2 TIMES DAILY
Status: DISCONTINUED | OUTPATIENT
Start: 2017-03-29 | End: 2017-03-29 | Stop reason: HOSPADM

## 2017-03-29 RX ADMIN — METFORMIN HYDROCHLORIDE 500 MG: 500 TABLET, EXTENDED RELEASE ORAL at 09:01

## 2017-03-29 RX ADMIN — ALUMINUM HYDROXIDE, MAGNESIUM HYDROXIDE, SIMETHICONE 10 ML: 400; 400; 40 SUSPENSION ORAL at 01:12

## 2017-03-29 RX ADMIN — ALUMINUM HYDROXIDE, MAGNESIUM HYDROXIDE, SIMETHICONE 10 ML: 400; 400; 40 SUSPENSION ORAL at 09:02

## 2017-03-29 RX ADMIN — LANSOPRAZOLE 30 MG: 30 CAPSULE, DELAYED RELEASE ORAL at 09:01

## 2017-03-29 RX ADMIN — FEXOFENADINE HYDROCHLORIDE AND PSEUDOEPHEDRINE HYDROCHLORIDE 1 TABLET: 180; 240 TABLET, FILM COATED, EXTENDED RELEASE ORAL at 09:02

## 2017-03-29 RX ADMIN — LEVETIRACETAM 1000 MG: 500 TABLET ORAL at 09:03

## 2017-03-29 RX ADMIN — LORAZEPAM 1 MG: 2 INJECTION, SOLUTION INTRAMUSCULAR; INTRAVENOUS at 08:15

## 2017-03-29 RX ADMIN — SERTRALINE HYDROCHLORIDE 150 MG: 100 TABLET ORAL at 09:01

## 2017-03-29 RX ADMIN — LIDOCAINE HYDROCHLORIDE 10 ML: 10 INJECTION, SOLUTION INFILTRATION; PERINEURAL at 10:33

## 2017-03-29 RX ADMIN — FLUTICASONE FUROATE AND VILANTEROL TRIFENATATE 1 PUFF: 200; 25 POWDER RESPIRATORY (INHALATION) at 09:02

## 2017-03-29 ASSESSMENT — VISUAL ACUITY
OU: NORMAL ACUITY

## 2017-03-29 ASSESSMENT — PAIN DESCRIPTION - DESCRIPTORS: DESCRIPTORS: SORE

## 2017-03-29 NOTE — PROGRESS NOTES
"Neurology Progress Note    S: No overnight events. Feels sleepy this morning, inquires about whether this is side effect of keppra.    O:  /67 (BP Location: Right arm)  Pulse 65  Temp 98.1  F (36.7  C) (Oral)  Resp 18  Ht 1.651 m (5' 5\")  Wt 110.2 kg (243 lb)  SpO2 95%  BMI 40.44 kg/m2    Gen: NAD  CV: RRR  Lungs: CTAB  Abd: soft  Ext: no pedal edema  Neuro: awake & alert, fluent speech, follows commands appropriately. EOMI, PERRL, symmetric facial expressions, no dysarthria. Normal tone throughout. Strength 4+/5 left hip flexion, roughly 5/5 elsewhere. Decreased sensation to LT over left arm & leg.     A/P:    # First-time seizure. With left occipital spike-slow discharges on EEG, so fulfills clinical diagnosis of partial epilepsy. Has subtle LLE weakness & L-sided numbness on exam. Imaging significant for few scattered white matter lesions of unclear etiology & venous anomaly in R occipital lobe. Unclear whether these imaging findings are relevant to patient's presentation.  -keppra dose reduced to 750mg BID due to possible side effect of sedation. If continues to feel sleepy, then can consider stopping/reducing home trazodone & topiramate before switching to different AED.  -LP today - send cell counts, prot, gluc, HSV, ACE, oligoclonal bands, bact/viral cultures. May add-on additional labs if needed.  -PT/OT     # PCOS - home metformin  # Migraine/insomnia - home topiramate, trazodone  # Depression- home sertaline  # Reflux- home lansoprazole  # Allergies/atopic dz- home montelukast, fluticasone, allegra    FEN: regular diet  DVT ppx: holding for LP this AM, then can resume sq heparin  Code: full  Dispo: pending above w/u    Patient seen & discussed with Staff Dr. Leyva.    Sharon Tompkins  G2 Neurology        "

## 2017-03-29 NOTE — PROGRESS NOTES
03/29/17 1400   Quick Adds   Type of Visit Initial PT Evaluation   Living Environment   Lives With child(дмитрий), dependent;spouse;parent(s)  (lives w/ mother )   Living Arrangements house   Home Accessibility stairs to enter home   Number of Stairs to Enter Home 6   Number of Stairs Within Home 0   Transportation Available family or friend will provide   Self-Care   Usual Activity Tolerance good   Current Activity Tolerance fair   Regular Exercise no   Equipment Currently Used at Home none   Activity/Exercise/Self-Care Comment Pt has shower chair at home. Pt will have 24/7 assist from family upon DC to assist PRN. Pt stated that she does not participate in regurlar exercise however has 5 y/o son that she states keeps her on her feet and busy. 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    General Information   Onset of Illness/Injury or Date of Surgery - Date 03/28/17   Referring Physician Patient presenting with first time seizure. Left arm and leg weakness and numbness raise concern for partial onset, or even simple partial seizures with secondary generalization. New persistent headache, with blurred vision & fatigue raise concern for CVT, particularly given the patient's history of blood clot, and PCOS.   Patient/Family Goals Statement To get stronger and return home   Pertinent History of Current Problem (include personal factors and/or comorbidities that impact the POC) Patient presenting with first time seizure. Left arm and leg weakness and numbness raise concern for partial onset, or even simple partial seizures with  secondary generalization. New persistent headache, with blurred vision & fatigue raise concern for CVT, particularly given the patient's history of blood clot, and PCOS.   Precautions/Limitations fall precautions;seizure precautions   General Observations Activity: Ambulate w/ assist    Cognitive Status Examination   Orientation orientation to person, place and time   Level of Consciousness alert   Follows Commands and Answers Questions 100% of the time   Personal Safety and Judgment intact   Pain Assessment   Patient Currently in Pain No   Integumentary/Edema   Integumentary/Edema no deficits were identifed   Posture    Posture Forward head position;Protracted shoulders   Range of Motion (ROM)   ROM Comment WFL   Strength   Strength Comments Not formely assessed, grossly > 3/5 BLE however pt demonstrates increased LLE weakness compared to right side. Pt w/ gait and balance impairments    Bed Mobility   Bed Mobility Comments SBA supine > sit   Transfer Skills   Transfer Comments STS w/o AD, CGA provided, use of UE    Gait   Gait Comments Ambulates w/ FWW and SBA demonstrating decreased LLE stance time, emma, and step length    Balance   Balance Comments Good static/dynamic sitting. Poor dynamic standing. Fair static standing   Sensory Examination   Sensory Perception Comments Intact to light touch however diminished LLE compared to R. Denies N/T in UE and LE this day    Coordination   Coordination no deficits were identified   General Therapy Interventions   Planned Therapy Interventions balance training;bed mobility training;gait training;neuromuscular re-education;strengthening;transfer training;home program guidelines   Clinical Impression   Criteria for Skilled Therapeutic Intervention yes, treatment indicated   PT Diagnosis Decreased functional mobility    Influenced by the following impairments balance, strength, endurance   Functional limitations due to impairments Impaired gait and tolerance to OOB  "activity, decreased overall functional mobility    Clinical Presentation Stable/Uncomplicated   Clinical Presentation Rationale Pt w/ good family support, young age, few comorbidities, highly motivated to participate in therapy   Clinical Decision Making (Complexity) Low complexity   Therapy Frequency` daily   Predicted Duration of Therapy Intervention (days/wks) 2 weeks   Anticipated Discharge Disposition Home with Assist;Home with Home Therapy;Home with Outpatient Therapy   Risk & Benefits of therapy have been explained Yes   Patient, Family & other staff in agreement with plan of care Yes   [facility] AM-PAC  \"6 Clicks\" V.2 Basic Mobility Inpatient Short Form   1. Turning from your back to your side while in a flat bed without using bedrails? 3 - A Little   2. Moving from lying on your back to sitting on the side of a flat bed without using bedrails? 3 - A Little   3. Moving to and from a bed to a chair (including a wheelchair)? 3 - A Little   4. Standing up from a chair using your arms (e.g., wheelchair, or bedside chair)? 3 - A Little   5. To walk in hospital room? 3 - A Little   6. Climbing 3-5 steps with a railing? 2 - A Lot   Basic Mobility Raw Score (Score out of 24.Lower scores equate to lower levels of function) 17   Total Evaluation Time   Total Evaluation Time (Minutes) 12     "

## 2017-03-29 NOTE — PLAN OF CARE
Problem: Goal Outcome Summary  Goal: Goal Outcome Summary  Outcome: No Change  VSS. A&Ox4. No events witnessed or reported this shift. Pt c/o tongue pain, magic mouthwash q6hrs. Denies numbness/tingling. LLE < RLE, otherwise intact. Voiding spont. Up SBA. PIV SL. Full liquid diet. Continue to monitor.

## 2017-03-29 NOTE — PLAN OF CARE
Problem: Goal Outcome Summary  Goal: Goal Outcome Summary  PT/6A: Initial evaluation complete, treatment initiated. Pt demonstrates LLE weakness greater than RLE. Pt able to perform STS w/ CGA w/ use of BUE. Pt ambulated 100' + 200' progressing from FWW and SBA to HHA. Pt demonstrates balance and gait impairments and tends to reach for objects in hallway to steady self. Pt engaged in BLE supine strengthening exercise, required AAROM LLE.      Pending progress in therapy recommend 24/7 assist from family PRN as well as HHPT vs OPPT to address balance, strength, and gait deficits as pt below baseline functional level at this time.

## 2017-03-29 NOTE — PLAN OF CARE
Problem: Goal Outcome Summary  Goal: Goal Outcome Summary  Outcome: Improving  Discharged to home with spouse.  All discharge instructions including meds and follow up reviewed with patient and spouse using teach-back.

## 2017-03-29 NOTE — DISCHARGE SUMMARY
"[facility] Discharge Summary  Neurology Service    Mary Shipman  MRN# [id number]    Age: 32 year old year old YOB: 1985      Date of Admission:  3/28/2017   Date of Discharge::  3/29/2017    Primary Care Provider: Miguel Hammer   Discharged to: home          Admission Diagnoses:   Seizure          Discharge Diagnosis:   Seizure  White matter abnormalities on MRI          Procedures:   Lumbar puncture 3/29/17    EEG 4/1/17  \"IMPRESSION: Abnormal awake EEG due to the presence of occasional diffuse irregular theta slowing in waking, and frequent synchronized theta-delta slowing in bilateral posterior regions. There were also frequent low amplitude left occipital spike discharges with phase reversal at O1 often followed by a more widespread slow waves in posterior leads of bilateral hemispheres. These EEG findings are consistent with mild diffuse nonspecific encephalopathy and additional focal cerebral dysfunction in bilateral posterior (especially occipital) regions. The left occipital spike-slow wave discharges, together with her recent history of witnessed grand mal seizure, is consistent with the clinical diagnosis of partial epilepsy.   Clinical correlation is recommended.\"         Imaging:   MRI brain with contrast 3/28/17  \"Impression:   Unchanged, approximately 5 scattered foci of FLAIR hyperintensities  involving the bilateral cerebral hemispheres, such as the left  periventricular, right corona radiata and right occipital lobe. No  abnormal enhancement to suggest active demyelination.\"    MR venogram 3/28/17  \"Impression:  Head MRV demonstrates patent major dural and deep venous sinuses  Intracranially.\"          Discharge Medications:     Current Discharge Medication List      START taking these medications    Details   levETIRAcetam (KEPPRA) 750 MG tablet Take 1 tablet (750 mg) by mouth 2 times daily  Qty: 180 tablet, Refills: 0    Associated Diagnoses: Convulsions, unspecified " convulsion type (H)         CONTINUE these medications which have NOT CHANGED    Details   fexofenadine-pseudoePHEDrine (ALLEGRA-D 24) 180-240 MG per 24 hr tablet Take 1 tablet by mouth daily      fluticasone (FLONASE) 50 MCG/ACT spray Spray 1-2 sprays into both nostrils daily as needed for rhinitis or allergies      TRAZODONE HCL PO Take  mg by mouth At Bedtime      SERTRALINE HCL PO Take 150 mg by mouth daily 100mg x 1.5 tabs      topiramate (TOPAMAX) 25 MG tablet Take 1 tablet (25 mg) at bedtime for 1 week, then 1 tablet twice daily for 1 week, then 1 tablet in AM and 2 in PM for 1 week, then 2 tablets twice daily.  Qty: 70 tablet, Refills: 0    Associated Diagnoses: Other migraine without status migrainosus, not intractable      albuterol (PROAIR HFA, PROVENTIL HFA, VENTOLIN HFA) 108 (90 BASE) MCG/ACT inhaler Inhale 2 puffs into the lungs every 6 hours as needed for shortness of breath / dyspnea or wheezing  Qty: 3 Inhaler, Refills: 0    Associated Diagnoses: Intermittent asthma, uncomplicated      mometasone-formoterol (DULERA) 200-5 MCG/ACT oral inhaler Inhale 2 puffs into the lungs 2 times daily  Qty: 39 g, Refills: 0    Associated Diagnoses: Intermittent asthma, uncomplicated      metFORMIN (GLUCOPHAGE-XR) 500 MG 24 hr tablet Take 1 tablet (500 mg) by mouth 2 times daily (with meals)  Qty: 180 tablet, Refills: 3    Associated Diagnoses: Polycystic ovaries      LANsoprazole (PREVACID) 30 MG capsule Take 1 capsule (30 mg) by mouth daily Take 30-60 minutes before a meal.  Qty: 90 capsule, Refills: 0    Associated Diagnoses: Gastroesophageal reflux disease without esophagitis      montelukast (SINGULAIR) 10 MG tablet Take 1 tablet (10 mg) by mouth At Bedtime  Qty: 90 tablet, Refills: 1    Associated Diagnoses: Intermittent asthma, uncomplicated      polyethylene glycol (MIRALAX/GLYCOLAX) Packet Take 17 g by mouth daily as needed for constipation      levalbuterol (XOPENEX CONC) 1.25 MG/0.5ML NEBU Take 1.25  "mg by nebulization every 6 hours as needed for wheezing      ibuprofen (ADVIL,MOTRIN) 400-800 mg tablet Take 1-2 tablets by mouth every 6 hours as needed (cramping).  Qty: 30 tablet, Refills: 0    Associated Diagnoses: Abdominal pain      ranitidine (ZANTAC) 150 MG tablet Take 150 mg by mouth 2 times daily as needed   Qty: 60 tablet, Refills: 1                   Consultations:   No consultations were requested during this admission          Brief History of Illness:     \"Mary Shipman is a 32 year old female with pmh migraine, asthma, IBS, HLD, PCOS, who presents with first time seizure. The patient was at work this afternoon when it occurred. 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 She was reportedly witnessed to have generalized convulsions for 5 minutes. She bit her angie with this. She was taken to [facility], where she was felt to be postictal, with significant confusion which gradually cleared over the course of 2 hours. Admission was recommended, but there were no neuro beds available at [facility], so she was transferred to [facility] for further management.     Patient additionally reports intermittent weakness and numbness of her left arm and leg for the last 2 weeks. Those symptoms are much worse since her seizure. Over that same time period she reports a persistent headache, with blurred vision, fatigue, and difficulty concentrating. She denies any associated photophobia, phonophobia, or nausea. It is mildly improved by lying down. She has been taking ibuprofen 800mg BID for this, and feels it helps somewhat. She also has history of chronic migraines, but this is different from those as there is no nausea or photophobia. She was just started on topamax for this by her PCP.     The patient denies any prior history of seizure, or head trauma in her life. Patient reports a prior history of a blood clot in her knee as a young girl, and " "recalls she was on blood thinners for this for a short period. Reports feeling cold recently, but denies any recent fevers, nor any illness including GI//pulm.\"    See H&P for additional details.          Hospital Course (by problem list):     # First time seizure. Patient presented with first-time seizure. Found to have left occipital spike-slow discharges on EEG. Differential includes ideopathic partial epilepsy, viral meningitis, or autoimmune process. Unclear whether there is symptomatic component- found to have white matter abnormalities on MRI (few of which are juxtacorical), but none seen in left occipital lobe so did not correlate with EEG findings. No other toxic/metabolic derangements identified. MR venogram did not show venous sinus thrombosis. LP was performed: CSF with mild pleocytosis (WBC 7, RBC 58, gluc 47, prot 47) but otherwise unremarkable; [facility] Epilepsy panel is still pending. Patient was started on keppra 1000mg BID. Dose was reduced to 750mg BID due to side effect of sedation. If continues to feel sleepy, then can consider stopping/reducing home trazodone & topiramate before switching to different AED. She will follow-up in Neurology clinic.    # White matter abnormalities on MRI. Differential includes demyelinating disease, post-infectious sequelae, or migraine-related phenomenon. Patient does describe history of fluctuating left-sided weakness & numbness, so demyelinating disease is possible. No contrast enhancing lesions on MRI, so no indication for steroids. Oligoclonal bands were sent in CSF. Patient should follow-up in Neurology clinic regarding this.       ITEMS TO FOLLOW-UP ON AFTER DISCHARGE   1) Follow-up in Neurology clinic for:  -pending CSF studies include: oligoclonal bands, ACE, viral culture, [facility] Epilepsy panel).  -consider further evaluation for demyelinating disease.  -AED management: keppra 750mg BID.          Physical Exam:     BP 94/56 (BP Location: Left arm)  Pulse 101 " " Temp 98.4  F (36.9  C) (Oral)  Resp 18  Ht 1.651 m (5' 5\")  Wt 110.2 kg (243 lb)  SpO2 99%  BMI 40.44 kg/m2   Gen: NAD  CV: RRR  Lungs: CTAB  Abd: soft  Ext: no pedal edema  Neuro: awake & alert, fluent speech, follows commands appropriately. EOMI, PERRL, symmetric facial expressions, no dysarthria. Normal tone throughout. Strength 4+/5 left hip flexion, roughly 5/5 elsewhere. Decreased sensation to LT over left arm & leg.       DISCHARGE ORDERS      NEUROLOGY ADULT REFERRAL     Physical Therapy Referral     Occupational Therapy Referral     Reason for your hospital stay   Seizure     Adult [facility] Follow-up and recommended labs and tests   Follow-up in Neurology Clinic at [facility] in 2-3 weeks.    Appointments on [facility] and/or [facility] (with [facility] or [facility] provider or service). Call [phone] if you haven't heard regarding these appointments within 7 days of discharge.     Activity   Your activity upon discharge: activity as tolerated     Discharge Instructions   1) Take keppra 750mg twice daily for seizures.  2) If still feeling side effects of sedation/sleepiness, then consider decreasing or stopping topiramate and/or trazadone which can worsen these side effects.   3) Follow-up in Neurology clinic at [facility] regarding seizures & follow-up on pending tests.  4) Follow-up with PT & OT as outpatient for ongoing therapies.     Full Code     Diet   Follow this diet upon discharge: Orders Placed This Encounter     Advance Diet as Tolerated: Full Liquid Diet         Patient discussed with Staff Dr. Leyva.    Sharon Tompkins    Neurology  [phone]       "

## 2017-03-29 NOTE — PLAN OF CARE
Problem: Goal Outcome Summary  Goal: Goal Outcome Summary  Outcome: No Change  Alert and oriented x4.  Neuros intact with exception of LLE weakness and slight right tongue deviation unrelated to swelling.  Sleeping intermittently.  Tolerating full liquids. Magic mouthwash x1 for tongue pain.  Tongue swollen and bruised with intact sutures.  Up with SBA.  MRA completed this shift.  Seizure precautions in place.

## 2017-03-29 NOTE — PLAN OF CARE
Problem: Goal Outcome Summary  Goal: Goal Outcome Summary  Outcome: Improving  Pt is on 6A after first-time seizure. Pt is Alert. Orientedx4. AVSS. LLE slightly weaker than RLE.  Speech slurred d/t pain caused by tongue bite. L side of tongue is bruised. Magic mouthwash prn q6h given. Denies numbness or tingling today. Pt had lumbar puncture this morning. Denies HA. Back is sore at LP site, managed with rest and repositioning. Pt is up SBA to bathroom. Voiding spontaneously. Full liquid diet. Continue to monitor.

## 2017-03-29 NOTE — PLAN OF CARE
Problem: Goal Outcome Summary  Goal: Goal Outcome Summary     SLP: Pt seen for dysphagia tx. Pt reports tongue pain still present and that she has been placing all food/liquid on right side of mouth for ease. Pt accepted x1 trial of semi-solid textures, with extreme oral pain during oral phase. No s/s of aspiration. Pt reports none, and demonstrated no s/s of aspiration or sensations of pharyngeal retention. Recommend pt advance to regular diet and thin liquids but to self-select softer, preferred items as oral pain reduces.  Pt cognisant and cognitvely appropriate to self-select softer food itmes at this time. No further skilled SLP intervention warranted as oropharyngeal swallow function is WNL.     Speech Language Therapy Discharge Summary     Reason for therapy discharge:    All goals and outcomes met, no further needs identified.     Progress towards therapy goal(s). See goals on Care Plan in [facility] electronic health record for goal details.  Goals met     Therapy recommendation(s):    No further therapy is recommended.

## 2017-03-29 NOTE — PLAN OF CARE
Problem: Goal Outcome Summary  Goal: Goal Outcome Summary  OT/6A: Pt demonstrating decreased strength proximal LUE. Instructed in BUE HEP to increase strength for improved ADL/IADL performance. Pt completes HEP ind with use of visual handout. Educated on IADL performance and gradual increase to daily activities in home. Pt reports has assistance with childcare and IADLs within home from spouse and childcare provider.      REC: Home with Assist, OP PT to address LUE/LLE strength.

## 2017-03-30 ENCOUNTER — TELEPHONE (OUTPATIENT)
Dept: FAMILY MEDICINE | Facility: CLINIC | Age: 32
End: 2017-03-30

## 2017-03-30 ENCOUNTER — CARE COORDINATION (OUTPATIENT)
Dept: CARDIOLOGY | Facility: CLINIC | Age: 32
End: 2017-03-30

## 2017-03-30 DIAGNOSIS — R56.9 CONVULSIONS, UNSPECIFIED CONVULSION TYPE (H): Primary | ICD-10-CM

## 2017-03-30 LAB
HSV1 DNA CSF QL NAA+PROBE: NORMAL
HSV2 DNA CSF QL NAA+PROBE: NORMAL
MICROBIOLOGIST REVIEW: NORMAL
MISCELLANEOUS TEST: NORMAL

## 2017-03-30 NOTE — PROGRESS NOTES
Added-on [facility] CSF Autoimmune Epilepsy panel to CSF that was collected 3/29/17. Patient will follow-up on results during Neurology follow-up visit.    Sharon Tompkins  G2 Neurology

## 2017-03-30 NOTE — PLAN OF CARE
Problem: Goal Outcome Summary  Goal: Goal Outcome Summary  Occupational Therapy Discharge Summary     Reason for therapy discharge:    Discharged to home with outpatient therapy.     Progress towards therapy goal(s). See [facility] on Care Plan [facility] for goal details.  Goals partially met.  Barriers to achieving goals:   discharge from facility.     Therapy recommendation(s):    Continued therapy is recommended.  Rationale/Recommendations:  OP PT for LLE strengthenging and balance..

## 2017-03-30 NOTE — PROGRESS NOTES
"[facility]  \"Hello, my name is Toyin Shin , and I am calling from the [facility].  I want to check in and see how you are doing, after leaving the hospital.  You may also receive a call from your Care Coordinator (care team), but I want to make sure you don t have any urgent needs.  I have a couple questions to review with you:     Post-Discharge Outreach                                                    Mary Shipman is a 32 year old female     Follow-up Appointments           Adult [facility] Follow-up and recommended labs and tests       Follow-up in Neurology Clinic at [facility] in 2-3 weeks.                Care Team:    Patient Care Team       Relationship Specialty Notifications Start Miguel Farias PA-C PCP - General   11/25/05     Comment:  Primary Clinic is [facility] and Primary Provider is Dr Mingo Hammer 11/18/2012    Phone: [phone] Fax: [phone]         [facility] [address] [address]    Vamshi Downing MD MD ENT  5/6/13     Phone: [phone] Fax: [phone]         [address] [address]    Yehuda Javed     5/6/13     Phone: [phone] Fax: [phone]         [address] [address]            Transition of Care Review                                                      Did you have a surgery or procedure during your hospital visit? No   If yes, do you have any of the following:     Signs of infection: No    Pain:  No     Pain Scale (0-10) 0/10     Location: NA    Wound/incision concerns? NA    Do you have all of your medications/refills?  Yes    Are you having any side effects or questions about your medication(s)? No    Do you have any new or worsening symptoms?  Yes- sleepy from medication (Keppra)    Do you have any future appointments scheduled?   No             Plan                                             "          Thanks for your time.  Your Care Coordinator may follow-up within the next couple days.  In the meantime if you have questions, concerns or problems call your care team.        Toyin Shin

## 2017-03-30 NOTE — PLAN OF CARE
Problem: Goal Outcome Summary  Goal: Goal Outcome Summary  Physical Therapy Discharge Summary     Reason for therapy discharge:    Discharged to home with outpatient therapy.     Progress towards therapy goal(s). See goals on Care Plan in [facility] electronic health record for goal details.  Goals partially met.  Barriers to achieving goals:   discharge from facility.  Goals not met.  Barriers to achieving goals:   discharge from facility.     Therapy recommendation(s):    Continued therapy is recommended.  Rationale/Recommendations:  OP PT to increase LLE strength and functional mobility (I).

## 2017-03-31 LAB — ACE CSF-CCNC: 1.7 U/L

## 2017-04-01 ENCOUNTER — TELEPHONE (OUTPATIENT)
Dept: NURSING | Facility: CLINIC | Age: 32
End: 2017-04-01

## 2017-04-01 NOTE — TELEPHONE ENCOUNTER
"Call Type: Triage Call    Presenting Problem: Returned outbound call to Mary. Caller was  recently hospitalized at [facility] with seizures. Mary reports  feeling really tired about 30 mins after taking her Keppra and is  wondering if this is normal. Other symptoms include: mild dizziness,  persistent headache 5-6/10 located on back of head. Left side  continues to be \"really weak\" as a result of the seizures. Caller  also states, \"I know I suffer from depression and have been more  emotional [teary and anxiety] for the past 2 days.\" Triage  guidelines recommend to see a provider within 24 hours. FNA paged on  call provider, Dr. Ferguson, via Smart Web at 10:39am and 10:49am to  call FNA back directly as RN wanted to huddle with provider. On call  provider called back and advised that feeling tired after taking  Keppra is normal. In regards to the headaches ok to take Motrin if  no interaction between Keppea. FNA verified via Micromedex and there  were no drug to drug interaction. RN called Mary back and relayed  message from provider. Caller verbalized understanding of directives  and had no further questions. FNA warm transferred caller to  schedulers to set up an appointment with PCP for her hospital stay  follow up and current triage recommendation/ disposition. FNA  advised Mary to seek emergency care for worsening headache and/  or call FNA back with further questions or concerns.  Triage Note:  Guideline Title: Headache  Recommended Disposition: See Provider within 24 hours  Original Inclination: Did not know what to do  Override Disposition:  Intended Action: Call PCP/HCP  Physician Contacted: Yes  Symptoms began after starting or changing dose of prescription, nonprescription,  alternative medication, or illicit drug within last 7 days ?  YES  Follows head trauma ? NO  Unconscious now ? NO  Smoke/carbon monoxide exposure ? NO  Typical headache AND usual therapy is not available or is not working ? " "NO  Headache not relieved with home care measures and occurring within 48 hours of  head trauma ? NO  New seizure now or within last 6 hours ? NO  Pregnancy 20 weeks or more with sudden onset or worsening facial or hand swelling  ? NO  Temperature of 101.5 F (38.6 C) or greater that has not responded to 24 hours of  home care measures ? NO  New onset of severe dizziness/vertigo ? NO  Constant headache AND unrelieved with nonprescription medications ? NO  Sudden loss or change in vision (double or blurred vision, increased light  sensitivity, partial loss of visual field) AND not previously evaluated ? NO  Sudden, severe disabling head pain OR caller spontaneously verbalizes \"worst  headache of my life\" ? NO  Sudden change in mental status or new change in speech ? NO  New onset of moderate to severe headache and taking anticoagulants (e.g. Coumadin,  warfarin, Lovenox, Plavix, Pradaxa, or Xarelto). ? NO  New tenderness over temporal area in individuals age 40 years or older ? NO  Sudden onset of severe headache after arrival at a high altitude (5000 ft. or  higher above the altitude of their usual residence) ? NO  Being treated by a provider for a secondary infection AND no improvement in  symptoms, symptoms have worsened OR has new symptoms after following treatment  plan for the time specified by provider. ? NO  New onset of severe or worsening generalized headache AND fever, neck pain with  forward head movement (no injury) or altered mental status ? NO  High to low (but not zero) risk of exposure to Ebola within the past 21 days ? NO  Traveled out of country in past 2 months and new onset of unexplained symptom(s) ?  NO  Any temperature elevation in an immunocompromised individual OR frail elderly with  signs of dehydration ? NO  New numbness, weakness or paralysis involving face, arm or leg, especially on same  side of body, loss of balance or coordination, confusion or trouble speaking  occurring now or within " last 8 hours ? NO  Sudden onset of one-sided headache with severe eye pain or eye tearing on same  side ? NO  New onset of severe or unusual headache unrelieved with 4 hours of home care ? NO  Headache associated with or made worse by coughing, sneezing, straining at stool,  exertion or sexual activity ? NO  Onset of vomiting associated with severe, worsening headache, especially if  different from previous headaches                       See Provider within 4  hours ? NO  Physician Instructions:  Care Advice: Call  immediately if any of the following occur: any  loss of consciousness  new confusion, drowsiness or agitation  difficulty speaking  new weakness or paralysis, severe numbness, or difficulty moving.

## 2017-04-03 LAB
BACTERIA SPEC CULT: NO GROWTH
MICRO REPORT STATUS: NORMAL
SPECIMEN SOURCE: NORMAL

## 2017-04-04 ENCOUNTER — OFFICE VISIT (OUTPATIENT)
Dept: FAMILY MEDICINE | Facility: CLINIC | Age: 32
End: 2017-04-04
Payer: COMMERCIAL

## 2017-04-04 VITALS
WEIGHT: 233 LBS | HEIGHT: 65 IN | TEMPERATURE: 98.7 F | DIASTOLIC BLOOD PRESSURE: 70 MMHG | HEART RATE: 72 BPM | SYSTOLIC BLOOD PRESSURE: 112 MMHG | BODY MASS INDEX: 38.82 KG/M2

## 2017-04-04 DIAGNOSIS — G40.89 OTHER SEIZURES (H): Primary | ICD-10-CM

## 2017-04-04 DIAGNOSIS — R53.1 LEFT-SIDED WEAKNESS: ICD-10-CM

## 2017-04-04 LAB
ALB CSF/SERPL: ABNORMAL {RATIO}
ALBUMIN CSF-MCNC: 24 MG/DL
ALBUMIN SERPL-MCNC: ABNORMAL G/DL
IGG CSF-MCNC: 10.1 MG/DL
IGG SERPL-MCNC: ABNORMAL MG/DL
IGG SYNTH RATE SER+CSF CALC-MRATE: ABNORMAL MG/DL
IGG/ALB CLEAR SER+CSF-RTO: ABNORMAL
IGG/ALB CSF: 0.42 {RATIO}
OLIGOCLONAL BANDS CSF ELPH-IMP: ABNORMAL
OLIGOCLONAL BANDS CSF ELPH-IMP: ABNORMAL
OLIGOCLONAL BANDS CSF IEF: ABNORMAL

## 2017-04-04 PROCEDURE — 99214 OFFICE O/P EST MOD 30 MIN: CPT | Performed by: PHYSICIAN ASSISTANT

## 2017-04-04 RX ORDER — PREDNISONE 20 MG/1
TABLET ORAL
Qty: 30 TABLET | Refills: 0 | Status: SHIPPED | OUTPATIENT
Start: 2017-04-04 | End: 2017-04-12

## 2017-04-04 RX ORDER — LEVETIRACETAM 500 MG/1
500 TABLET ORAL 2 TIMES DAILY
Qty: 60 TABLET | Refills: 0 | Status: SHIPPED | OUTPATIENT
Start: 2017-04-04 | End: 2017-05-05 | Stop reason: DRUGHIGH

## 2017-04-04 NOTE — NURSING NOTE
"Chief Complaint   Patient presents with     Hospital F/U       Initial /70 (BP Location: Right arm, Cuff Size: Adult Large)  Pulse 72  Temp 98.7  F (37.1  C) (Oral)  Ht 5' 5\" (1.651 m)  Wt 233 lb (105.7 kg)  BMI 38.77 kg/m2 Estimated body mass index is 38.77 kg/(m^2) as calculated from the following:    Height as of this encounter: 5' 5\" (1.651 m).    Weight as of this encounter: 233 lb (105.7 kg).  Medication Reconciliation: complete     Robyn Felton MA     "

## 2017-04-04 NOTE — MR AVS SNAPSHOT
After Visit Summary   4/4/2017    Mary Shipman    MRN: [id number]           Patient Information     Date Of Birth          1985        Visit Information        Provider Department      4/4/2017 1:20 PM Miguel Hammer PA-C [facility]        Today's Diagnoses     Other seizures (H)    -  1    Left-sided weakness           Follow-ups after your visit        Your next 10 appointments already scheduled     Apr 06, 2017  3:45 PM CDT   Evaluation with Mitzy Horner OT   [facility] ([facility])    [address] [facility] [address]   [phone]            Apr 11, 2017 10:00 AM CDT   (Arrive by 9:45 AM)   Return Visit with Catrachito Michael MD   [facility] ([facility])    [address] [place] [id number] [phone]            Apr 17, 2017  4:30 PM CDT   Evaluation with Na Hills, PT   [facility] ([facility])    [address] [facility] [address]   [phone]              Who to contact     If you have questions or need follow up information about today's clinic visit or your schedule please contact [facility] directly at [phone].  Normal or non-critical lab and imaging results will be communicated to you by MyChart, letter or phone within 4 business days after the clinic has received the results. If you do not hear from us within 7 days, please contact the clinic through MyChart or phone. If you have a critical or abnormal lab result, we will notify you by phone as soon as possible.  Submit refill requests through [employer] or call your pharmacy and they will forward the refill request to us. Please allow 3 business days for your refill to be completed.          Additional Information About Your Visit        [employer] Information     [employer] gives you secure access to your electronic  "health record. If you see a primary care provider, you can also send messages to your care team and make appointments. If you have questions, please call your primary care clinic.  If you do not have a primary care provider, please call [phone] and they will assist you.        Care EveryWhere ID     This is your Care EveryWhere ID. This could be used by other organizations to access your [place] medical records  [id number]        Your Vitals Were     Pulse Temperature Height BMI (Body Mass Index)          72 98.7  F (37.1  C) (Oral) 5' 5\" (1.651 m) 38.77 kg/m2         Blood Pressure from Last 3 Encounters:   04/04/17 112/70   03/29/17 110/65   03/27/17 110/58    Weight from Last 3 Encounters:   04/04/17 233 lb (105.7 kg)   03/28/17 243 lb (110.2 kg)   03/27/17 250 lb (113.4 kg)              Today, you had the following     No orders found for display         Today's Medication Changes          These changes are accurate as of: 4/4/17  2:03 PM.  If you have any questions, ask your nurse or doctor.               Start taking these medicines.        Dose/Directions    cholecalciferol 1000 UNIT tablet   Commonly known as:  vitamin D   Used for:  Other seizures (H), Left-sided weakness   Started by:  Miguel Hammer PA-C        4 caps daily)   Quantity:  100 tablet   Refills:  3       predniSONE 20 MG tablet   Commonly known as:  DELTASONE   Used for:  Other seizures (H)   Started by:  Miguel Hammer PA-C        4 pills daily until neurology appointment   Quantity:  30 tablet   Refills:  0         These medicines have changed or have updated prescriptions.        Dose/Directions    * levETIRAcetam 750 MG tablet   Commonly known as:  KEPPRA   This may have changed:  Another medication with the same name was added. Make sure you understand how and when to take each.   Used for:  Convulsions, unspecified convulsion type (H)        Dose:  750 mg   Take 1 tablet (750 mg) by mouth 2 times daily   Quantity:  " 180 tablet   Refills:  0       * levETIRAcetam 500 MG tablet   Commonly known as:  KEPPRA   This may have changed:  You were already taking a medication with the same name, and this prescription was added. Make sure you understand how and when to take each.   Used for:  Other seizures (H)   Changed by:  Miguel Hammer PA-C        Dose:  500 mg   Take 1 tablet (500 mg) by mouth 2 times daily   Quantity:  60 tablet   Refills:  0       * Notice:  This list has 2 medication(s) that are the same as other medications prescribed for you. Read the directions carefully, and ask your doctor or other care provider to review them with you.         Where to get your medicines      These medications were sent to [facility] - [address].  [address] [place] [address], [place] [id number]     Phone:  [phone]     cholecalciferol 1000 UNIT tablet    levETIRAcetam 500 MG tablet    predniSONE 20 MG tablet                Primary Care Provider Office Phone # Fax #    Miguel Hammer PA-C [phone]       [address] [address]        Thank you!     Thank you for choosing [facility]  for your care. Our goal is always to provide you with excellent care. Hearing back from our patients is one way we can continue to improve our services. Please take a few minutes to complete the written survey that you may receive in the mail after your visit with us. Thank you!             Your Updated Medication List - Protect others around you: Learn how to safely use, store and throw away your medicines at www.disposemymeds.org.          This list is accurate as of: 4/4/17  2:03 PM.  Always use your most recent med list.                   Brand Name Dispense Instructions for use    albuterol 108 (90 BASE) MCG/ACT Inhaler    PROAIR HFA/PROVENTIL HFA/VENTOLIN HFA    3 Inhaler    Inhale 2 puffs into the lungs every 6  hours as needed for shortness of breath / dyspnea or wheezing       cholecalciferol 1000 UNIT tablet    vitamin D    100 tablet    4 caps daily)       fexofenadine-pseudoePHEDrine 180-240 MG per 24 hr tablet    ALLEGRA-D 24     Take 1 tablet by mouth daily       fluticasone 50 MCG/ACT spray    FLONASE     Spray 1-2 sprays into both nostrils daily as needed for rhinitis or allergies       ibuprofen 400-800 mg tablet    ADVIL,MOTRIN    30 tablet    Take 1-2 tablets by mouth every 6 hours as needed (cramping).       LANsoprazole 30 MG CR capsule    PREVACID    90 capsule    Take 1 capsule (30 mg) by mouth daily Take 30-60 minutes before a meal.       levalbuterol 1.25 MG/0.5ML Nebu neb solution    XOPENEX CONC     Take 1.25 mg by nebulization every 6 hours as needed for wheezing       * levETIRAcetam 750 MG tablet    KEPPRA    180 tablet    Take 1 tablet (750 mg) by mouth 2 times daily       * levETIRAcetam 500 MG tablet    KEPPRA    60 tablet    Take 1 tablet (500 mg) by mouth 2 times daily       metFORMIN 500 MG 24 hr tablet    GLUCOPHAGE-XR    180 tablet    Take 1 tablet (500 mg) by mouth 2 times daily (with meals)       mometasone-formoterol 200-5 MCG/ACT oral inhaler    DULERA    39 g    Inhale 2 puffs into the lungs 2 times daily       montelukast 10 MG tablet    SINGULAIR    90 tablet    Take 1 tablet (10 mg) by mouth At Bedtime       polyethylene glycol Packet    MIRALAX/GLYCOLAX     Take 17 g by mouth daily as needed for constipation       predniSONE 20 MG tablet    DELTASONE    30 tablet    4 pills daily until neurology appointment       ranitidine 150 MG tablet    ZANTAC    60 tablet    Take 150 mg by mouth 2 times daily as needed       SERTRALINE HCL PO      Take 150 mg by mouth daily 100mg x 1.5 tabs       topiramate 25 MG tablet    TOPAMAX    70 tablet    Take 1 tablet (25 mg) at bedtime for 1 week, then 1 tablet twice daily for 1 week, then 1 tablet in AM and 2 in PM for 1 week, then 2 tablets twice  daily.       TRAZODONE HCL PO      Take  mg by mouth At Bedtime       * Notice:  This list has 2 medication(s) that are the same as other medications prescribed for you. Read the directions carefully, and ask your doctor or other care provider to review them with you.

## 2017-04-04 NOTE — PROGRESS NOTES
"  SUBJECTIVE:                                                    Mary Shipman is a 32 year old female who presents to clinic today for the following health issues:    ED/UC Followup:    Facility:  [facility]  Date of visit: 3/27/17  Reason for visit: Seizures  Status: Patient states that her left side still feels \"weird\"/ numb.     Seizure while working, left sided - seen in ER and admitted to [facility].     ER / Hospital Discharge notes     # First time seizure. Patient presented with first-time seizure. Found to have left occipital spike-slow discharges on EEG. Differential includes ideopathic partial epilepsy, viral meningitis, or autoimmune process. Unclear whether there is symptomatic component- found to have white matter abnormalities on MRI (few of which are juxtacorical), but none seen in left occipital lobe so did not correlate with EEG findings. No other toxic/metabolic derangements identified. MR venogram did not show venous sinus thrombosis. LP was performed: CSF with mild pleocytosis (WBC 7, RBC 58, gluc 47, prot 47) but otherwise unremarkable; [place] Epilepsy panel is still pending. Patient was started on keppra 1000mg BID. Dose was reduced to 750mg BID due to side effect of sedation. If continues to feel sleepy, then can consider stopping/reducing home trazodone & topiramate before switching to different AED. She will follow-up in Neurology clinic.     # White matter abnormalities on MRI. Differential includes demyelinating disease, post-infectious sequelae, or migraine-related phenomenon. Patient does describe history of fluctuating left-sided weakness & numbness, so demyelinating disease is possible. No contrast enhancing lesions on MRI, so no indication for steroids. Oligoclonal bands were sent in CSF. Patient should follow-up in Neurology clinic regarding this.     ITEMS TO FOLLOW-UP ON AFTER DISCHARGE   1) Follow-up in Neurology clinic for:  -pending CSF studies include: oligoclonal " "bands, ACE, viral culture, [place] Epilepsy panel).  -consider further evaluation for demyelinating disease.  -AED management: keppra 750mg BID.      Physical Exam:      BP 94/56 (BP Location: Left arm)  Pulse 101  Temp 98.4  F (36.9  C) (Oral)  Resp 18  Ht 1.651 m (5' 5\")  Wt 110.2 kg (243 lb)  SpO2 99%  BMI 40.44 kg/m2   Gen: NAD  CV: RRR  Lungs: CTAB  Abd: soft  Ext: no pedal edema  Neuro: awake & alert, fluent speech, follows commands appropriately. EOMI, PERRL, symmetric facial expressions, no dysarthria. Normal tone throughout. Strength 4+/5 left hip flexion, roughly 5/5 elsewhere. Decreased sensation to LT over left arm & leg.         DISCHARGE ORDERS        NEUROLOGY ADULT REFERRAL      Physical Therapy Referral      Occupational Therapy Referral      Reason for your hospital stay   Seizure      Adult [facility] Follow-up and recommended labs and tests   Follow-up in Neurology Clinic at [facility] in 2-3 weeks.    Appointments on [place] and/or [facility] (with [facility] or [facility] provider or service). Call [phone] if you haven't heard regarding these appointments within 7 days of discharge.      Activity   Your activity upon discharge: activity as tolerated      Discharge Instructions   1) Take keppra 750mg twice daily for seizures.  2) If still feeling side effects of sedation/sleepiness, then consider decreasing or stopping topiramate and/or trazadone which can worsen these side effects.   3) Follow-up in Neurology clinic at [facility] regarding seizures & follow-up on pending tests.  4) Follow-up with PT & OT as outpatient for ongoing therapies       Hospital Follow-up Visit:    Hospital/Nursing Home/IP Rehab Facility: [facility]  Date of Admission: 3/28/17  Date of Discharge: 3/29/17  Reason(s) for Admission: Convulzions            Problems taking medications regularly:  None       Medication changes since discharge: None       Problems adhering to non-medication therapy:  " "None    Summary of hospitalization:  [facility] discharge summary reviewed  Diagnostic Tests/Treatments reviewed.  Follow up needed: none  Other Healthcare Providers Involved in Patient s Care:         None  Update since discharge: improved.     Post Discharge Medication Reconciliation: discharge medications reconciled, continue medications without change.  Plan of care communicated with patient     Coding guidelines for this visit:  Type of Medical   Decision Making Face-to-Face Visit       within 7 Days of discharge Face-to-Face Visit        within 14 days of discharge   Moderate Complexity 21733 [id number]   High Complexity 34738 [id number]               Problem list and histories reviewed & adjusted, as indicated.  Additional history: as documented    Labs reviewed in EPIC    Reviewed and updated as needed this visit by clinical staff       Reviewed and updated as needed this visit by Provider         ROS:  Constitutional, HEENT, cardiovascular, pulmonary, GI, , musculoskeletal, neuro, skin, endocrine and psych systems are negative, except as otherwise noted.    OBJECTIVE:                                                    /70 (BP Location: Right arm, Cuff Size: Adult Large)  Pulse 72  Temp 98.7  F (37.1  C) (Oral)  Ht 5' 5\" (1.651 m)  Wt 233 lb (105.7 kg)  BMI 38.77 kg/m2  Body mass index is 38.77 kg/(m^2).  GENERAL: healthy, alert and no distress  NECK: no adenopathy, no asymmetry, masses, or scars and thyroid normal to palpation  RESP: lungs clear to auscultation - no rales, rhonchi or wheezes  CV: regular rate and rhythm, normal S1 S2, no S3 or S4, no murmur, click or rub, no peripheral edema and peripheral pulses strong  NEURO: Mild left sided weakness of left leg, left foot. Left arm, trace weakness. Hands strength is normal and symmetric. No rashes. Facial strength is mildly decreased on the left side        ASSESSMENT/PLAN:                                                        (G40.89) Other " seizures (H)  (primary encounter diagnosis)  Comment:   Plan: levETIRAcetam (KEPPRA) 500 MG tablet,         predniSONE (DELTASONE) 20 MG tablet,         cholecalciferol (VITAMIN D) 1000 UNIT tablet        Uncertain, see below     (M62.81) Left-sided weakness  Comment:   Plan: cholecalciferol (VITAMIN D) 1000 UNIT tablet        Uncertain    Reviewed. The work up in the hospital confirmed seizure like EEG activity. She hasn't been on excess alcohol. She wasn't really on the Topiramate despite what she said to the hospital docs. Also doesn't seem to be related to stopping clonazepam suddenly, had only been on 0.5 mg daily.     Her work up suggests possible demyelenating condition as well. She had positive Oligoclonal Bands noted on labs/CSF rest of the labs are pending.     Because she still has some left sided weakness and eye symptoms today, I'm not sure if this is some kind of inflammatory / autoimmune process. I want to start her on steroids - higher dose until she gets in with neurology. She agrees with this.     FRANSICO JOHNSON PA-C  [facility]

## 2017-04-06 ENCOUNTER — HOSPITAL ENCOUNTER (OUTPATIENT)
Dept: OCCUPATIONAL THERAPY | Facility: CLINIC | Age: 32
Setting detail: THERAPIES SERIES
End: 2017-04-06
Attending: PSYCHIATRY & NEUROLOGY
Payer: COMMERCIAL

## 2017-04-06 PROCEDURE — 97535 SELF CARE MNGMENT TRAINING: CPT | Mod: GO | Performed by: OCCUPATIONAL THERAPIST

## 2017-04-06 PROCEDURE — 97166 OT EVAL MOD COMPLEX 45 MIN: CPT | Mod: GO | Performed by: OCCUPATIONAL THERAPIST

## 2017-04-06 PROCEDURE — 40000125 ZZHC STATISTIC OT OUTPT VISIT: Performed by: OCCUPATIONAL THERAPIST

## 2017-04-06 ASSESSMENT — ACTIVITIES OF DAILY LIVING (ADL)
IADL_QUICK_ADDS: MEAL PLANNING/PREPARATION;HOME/FINANCIAL/MANAGEMENT;COMMUNICATION/COMPUTER USE;COMMUNITY MOBILITY;CARE OF OTHERS

## 2017-04-11 ENCOUNTER — TELEPHONE (OUTPATIENT)
Dept: FAMILY MEDICINE | Facility: CLINIC | Age: 32
End: 2017-04-11

## 2017-04-11 ENCOUNTER — OFFICE VISIT (OUTPATIENT)
Dept: NEUROLOGY | Facility: CLINIC | Age: 32
End: 2017-04-11

## 2017-04-11 VITALS — HEIGHT: 65 IN | HEART RATE: 75 BPM | SYSTOLIC BLOOD PRESSURE: 120 MMHG | DIASTOLIC BLOOD PRESSURE: 76 MMHG

## 2017-04-11 DIAGNOSIS — R47.1 DYSARTHRIA: Primary | ICD-10-CM

## 2017-04-11 DIAGNOSIS — G40.909 SEIZURE DISORDER (H): ICD-10-CM

## 2017-04-11 DIAGNOSIS — G40.909 SEIZURE DISORDER (H): Primary | ICD-10-CM

## 2017-04-11 DIAGNOSIS — G43.001 MIGRAINE WITHOUT AURA AND WITH STATUS MIGRAINOSUS, NOT INTRACTABLE: ICD-10-CM

## 2017-04-11 DIAGNOSIS — R56.9 CONVULSIONS, UNSPECIFIED CONVULSION TYPE (H): ICD-10-CM

## 2017-04-11 RX ORDER — LEVETIRACETAM 500 MG/1
TABLET ORAL
Qty: 60 TABLET | Refills: 11 | Status: SHIPPED | OUTPATIENT
Start: 2017-04-11 | End: 2017-05-30

## 2017-04-11 RX ORDER — TOPIRAMATE 50 MG/1
50 TABLET, FILM COATED ORAL 2 TIMES DAILY
Qty: 60 TABLET | Refills: 11 | Status: SHIPPED | OUTPATIENT
Start: 2017-04-11 | End: 2017-04-14

## 2017-04-11 ASSESSMENT — PAIN SCALES - GENERAL: PAINLEVEL: MODERATE PAIN (4)

## 2017-04-11 NOTE — NURSING NOTE
Chief Complaint   Patient presents with     [facility] f/u    Odette German CMA

## 2017-04-11 NOTE — PROGRESS NOTES
2017      Miguel Hammer PA-C    [facility]    [address] [place] [address]       RE: Mary Shipman   MRN: [id number]   : 1985      Dear Mr. Hammer:      Mrs. Mary Shipman is a 32-year-old woman who was recently admitted to the hospital on  after she was witnessed at work to have what appears a clearly defined generalized tonic-clonic seizure where she bit the left side of her tongue badly.  She says she never had seizures before, and her mother is with her and confirms that.  She said she had an aura where she lost vision in the left eye, she fell down, trying to stand up and fell to the floor, losing consciousness and apparently having generalized tonic-clonic activity.  She was admitted to the [facility] and actually to [facility], transferred here, and an EEG did show occipital spikes and some mild generalized slowing.  She was started on Keppra, developed some anxiety at 750 twice a day and has been reduced to 750/500 combo.  Prior to this, she has been well except she has been having increasing number of migraine headaches with photophobia and other characteristics.      As far as seizure, there is no family history of seizures, no history of meningitis, encephalitis or any risk factors and no history is clearly present in the father.  The patient has not previously had any seizures or had any febrile convulsions or any other problems.  She does have a significant migraine history and recently has had increased associated with problems with her .  She has not been able to get to therapy.  Her headaches have been happening since childhood.  They have been often, and they are associated with photophobia, some visual auras and she says over the last few weeks she has had increasing amounts of these and the left side of her face has felt a little different, and she has also reported some intermittent numbness of the left leg which got accentuated  the day of the seizure.  When she was admitted here, they did report some sensory loss but this is not well defined, although I did not find Dr. Leyva's and Dr. Julien's notes.  Her imaging studies were pretty good.  She did have some flare hyperintensity lesions scattered, 4 or 5, but no changes in the DWI or the ADC mappings.  Regarding neurovascular she had venogram studies which were normal.  She did apparently have a lumbar puncture done at the time of her hospitalization, and she had a number of studies with that, which includes oligoclonal banding because of this POTS and the oligoclonal banding revealed 2 more unique bands in the CSF and no serum sample was obtained.  The significance of these 2 bands is unknown.  She did have oligoclonal IgG CSF increased, and the CSF IgG to albumin ratio was also increased.  Question of MS came up, and she has been started on steroids, and she has been on 80 mg per day and this is being tapered by yourself.  She got an angiotensin converting enzyme in the CSF, and this was negative.  Other laboratory studies showed HSV types were negative.  Cell count on CSF on 03/29 showed 7 white blood cells and 58 RBCs with the protein in the CSF being 47 and the glucose being 47 as well.      She had a paraneoplastic panel, I believe sent out to [place], and this was on the 30th and the results are not back yet.  She was discharged and referred to the clinic.  Her headaches have continued.  She still has this funny feeling in the left side of her face and in the left leg to some degree.  The headaches have become more severe, as the topiramate she run out of prescriptions.  She had tried Imitrex in the past with no help, and that was a number of years ago.  Headaches have been occurring about once per week and now this left-sided symptomatology.      PAST MEDICAL HISTORY:  She does have insulin-resistance diabetes because of all endocrine reasons, history of asthma and she has previously  been hospitalized for appendectomy and tonsillectomy.  Her diagnoses include generalized anxiety disorder, major depression, low back pain, menometrorrhagia, esophageal reflux, Helicobacter pylori, polycystic ovaries, obesity, asthma, fatty liver.  She has 1 child who is 4 years old.      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  They have a 4-year-old child.      REVIEW OF SYSTEMS:  Review of systems is given.      PHYSICAL EXAMINATION:  She looks very photophobic.  She keeps her left eye especially closed.  She duenas up and talks.  She has a nice affect.  Her mental status exam is normal.  Cranial nerve exam is also normal.  Fundus was difficult to see because of photophobia and normal eye movements.  No visual defect, no facial asymmetry, no facial loss of sensation.  She has some functional findings in the left leg.  She cannot get up well on heels and toes.  Yet on individual testing these are normal.  Reflexes are brisk throughout.  She has bilateral Aris.  There is no Babinski, no clonus.  She walks with her eyes partly closed because of the photophobia.  Vibratory sensation and position sensation is equal, and she did have in the left leg some patchy areas she could not discriminate sharp from dull.      This also possibly could be functional.  In her gait she tends to drag her left leg.  Romberg is negative.      In summary, in putting this case altogether, she does have a history of migraines and has been under increasing stress with her  and did have a seizure on 03/27 which was associated with occipital spike.  I wonder relation between her migraine and epilepsy which has been reported.      She has been on topiramate before and this was discontinued.  We will restart it at 50 b.i.d.  She does need to stay on the levetiracetam.  There are some CSF studies that are pending.  She is on therapy, PT and OT  that is encouraged.  She needs to resume her normal life.  MRI is not very impressive.  She had some few flare abnormalities which did not appear to be MS-like.  The patient's seizure etiology could be the migraine relationship.      She is diabetic and has polycystic ovary syndrome.      At this point, we will resume that topiramate, and she is already on a taper of prednisone.  We will slow that down to 20 mg taper every 4 days so that she is on 80 mg per day, which is a good dose.  We will check concentration of levetiracetam.  I told her there is no driving for 3 months and very strict about that.  I will see her in 4 weeks and will be in touch with her after the EEG and the Keppra level comes back.      Sincerely,            MD SAMI Cantu MD             D: 2017 11:30   T: 2017 18:22   MT: TAMI      Name:     АЛЕКСАНДР ERICKSON   MRN:      [id number]        Account:      [id number]   :      1985           Service Date: 2017      Document: [id number]

## 2017-04-11 NOTE — MR AVS SNAPSHOT
After Visit Summary   4/11/2017    Mary Shipman    MRN: [id number]           Patient Information     Date Of Birth          1985        Visit Information        Provider Department      4/11/2017 10:00 AM Catrachito Michael MD [facility]        Today's Diagnoses     Seizure disorder (H)    -  1    Migraine without aura and with status migrainosus, not intractable        Convulsions, unspecified convulsion type (H)           Follow-ups after your visit        Follow-up notes from your care team     Return in about 4 weeks (around 5/9/2017).      Your next 10 appointments already scheduled     Apr 11, 2017 11:45 AM CDT   Lab with  LAB   [facility] ([facility])    [address] [place] [address]  1st [facility] [address] [phone]            Apr 12, 2017  9:45 AM CDT   Treatment 60 with [facility] ([facility])    [address] [facility] [address]   [phone]            Apr 13, 2017  3:45 PM CDT   Treatment 60 with [facility] ([facility])    [address] [facility] [address]   [phone]            Apr 17, 2017  4:30 PM CDT   Evaluation with Na [place], [place]   [facility] ([facility])    [address] [facility] [address]   [phone]            Apr 18, 2017  2:00 PM CDT   Treatment 60 with [facility] ([facility])    [address] [facility] [address]   [phone]            Apr 19, 2017  4:00 PM CDT   (Arrive by 3:45 PM)   [facility] Routine Visit with  EEG TECH 1   [facility] ([facility])    [address] [place] [address]  3rd [facility] [id number] [phone]            Apr 25, 2017  2:45 PM CDT   Treatment 60 with KRYSTAL Khalil  [facility] ([facility])    [address] [facility] [address]   [phone]            May 04, 2017  4:30 PM CDT   Treatment 60 with Mitzy Horner OT   [facility] ([facility])    [address] [facility] [address]   [phone]            May 11, 2017  2:45 PM CDT   Treatment 60 with Mitzy Hroner OT   [facility] ([facility])    [address] [facility] [address]   [phone]            May 18, 2017  2:45 PM CDT   Treatment 60 with Mitzy Horner OT   [facility] ([facility])    [address] [facility] [address]   [phone]              Future tests that were ordered for you today     Open Future Orders        Priority Expected Expires Ordered    Levetiracetam level Routine  4/11/2018 4/11/2017    EEG ROUTINE (58104) Routine  5/9/2017 4/11/2017            Who to contact     Please call your clinic at [phone] to:    Ask questions about your health    Make or cancel appointments    Discuss your medicines    Learn about your test results    Speak to your doctor   If you have compliments or concerns about an experience at your clinic, or if you wish to file a complaint, please contact [facility] Patient Relations at [phone] or email us at [email or web address]         Additional Information About Your Visit        [employer] Information     [employer] gives you secure access to your electronic health record. If you see a primary care provider, you can also send messages to your care team and make appointments. If you have questions, please call your primary care clinic.  If you do not have a primary care provider, please call [phone] and they will assist you.      [employer] is an electronic gateway that provides easy, online access to your medical records.  "With [employer], you can request a clinic appointment, read your test results, renew a prescription or communicate with your care team.     To access your existing account, please contact your [facility] or call [phone] for assistance.        Care EveryWhere ID     This is your Care EveryWhere ID. This could be used by other organizations to access your [facility] medical records  [id number]        Your Vitals Were     Pulse Height                75 1.651 m (5' 5\")           Blood Pressure from Last 3 Encounters:   04/11/17 120/76   04/04/17 112/70   03/29/17 110/65    Weight from Last 3 Encounters:   04/04/17 105.7 kg (233 lb)   03/28/17 110.2 kg (243 lb)   03/27/17 113.4 kg (250 lb)                 Today's Medication Changes          These changes are accurate as of: 4/11/17 11:36 AM.  If you have any questions, ask your nurse or doctor.               These medicines have changed or have updated prescriptions.        Dose/Directions    * levETIRAcetam 500 MG tablet   Commonly known as:  KEPPRA   This may have changed:  Another medication with the same name was changed. Make sure you understand how and when to take each.   Used for:  Other seizures (H)   Changed by:  Miguel Hammer PA-C        Dose:  500 mg   Take 1 tablet (500 mg) by mouth 2 times daily   Quantity:  60 tablet   Refills:  0       * levETIRAcetam 500 MG tablet   Commonly known as:  KEPPRA   This may have changed:    - medication strength  - how much to take  - how to take this  - when to take this  - additional instructions   Used for:  Convulsions, unspecified convulsion type (H)   Changed by:  Catrachito Michael MD        750 mg in am and 500 mg pm   Quantity:  60 tablet   Refills:  11       * topiramate 25 MG tablet   Commonly known as:  TOPAMAX   This may have changed:  Another medication with the same name was added. Make sure you understand how and when to take each.   Used for:  Other migraine without " status migrainosus, not intractable   Changed by:  Miguel Hammer PA-C        Take 1 tablet (25 mg) at bedtime for 1 week, then 1 tablet twice daily for 1 week, then 1 tablet in AM and 2 in PM for 1 week, then 2 tablets twice daily.   Quantity:  70 tablet   Refills:  0       * topiramate 50 MG tablet   Commonly known as:  TOPAMAX   This may have changed:  You were already taking a medication with the same name, and this prescription was added. Make sure you understand how and when to take each.   Used for:  Seizure disorder (H), Migraine without aura and with status migrainosus, not intractable   Changed by:  Catrachito Michael MD        Dose:  50 mg   Take 1 tablet (50 mg) by mouth 2 times daily   Quantity:  60 tablet   Refills:  11       * Notice:  This list has 4 medication(s) that are the same as other medications prescribed for you. Read the directions carefully, and ask your doctor or other care provider to review them with you.         Where to get your medicines      These medications were sent to [facility] [address] [place] [address] AT [address] [place] [address]     Phone:  [phone]     levETIRAcetam 500 MG tablet    topiramate 50 MG tablet                Primary Care Provider Office Phone # Fax #    Miguel Hammer PA-C [phone]       [address] [address]        Thank you!     Thank you for choosing [facility]  for your care. Our goal is always to provide you with excellent care. Hearing back from our patients is one way we can continue to improve our services. Please take a few minutes to complete the written survey that you may receive in the mail after your visit with us. Thank you!             Your Updated Medication List - Protect others around you: Learn how to safely use, store and throw away your medicines at www.disposemymeds.org.           This list is accurate as of: 4/11/17 11:36 AM.  Always use your most recent med list.                   Brand Name Dispense Instructions for use    albuterol 108 (90 BASE) MCG/ACT Inhaler    PROAIR HFA/PROVENTIL HFA/VENTOLIN HFA    3 Inhaler    Inhale 2 puffs into the lungs every 6 hours as needed for shortness of breath / dyspnea or wheezing       cholecalciferol 1000 UNIT tablet    vitamin D    100 tablet    4 caps daily)       fexofenadine-pseudoePHEDrine 180-240 MG per 24 hr tablet    ALLEGRA-D 24     Take 1 tablet by mouth daily       fluticasone 50 MCG/ACT spray    FLONASE     Spray 1-2 sprays into both nostrils daily as needed for rhinitis or allergies       ibuprofen 400-800 mg tablet    ADVIL,MOTRIN    30 tablet    Take 1-2 tablets by mouth every 6 hours as needed (cramping).       LANsoprazole 30 MG CR capsule    PREVACID    90 capsule    Take 1 capsule (30 mg) by mouth daily Take 30-60 minutes before a meal.       levalbuterol 1.25 MG/0.5ML Nebu neb solution    XOPENEX CONC     Take 1.25 mg by nebulization every 6 hours as needed for wheezing       * levETIRAcetam 500 MG tablet    KEPPRA    60 tablet    Take 1 tablet (500 mg) by mouth 2 times daily       * levETIRAcetam 500 MG tablet    KEPPRA    60 tablet    750 mg in am and 500 mg pm       metFORMIN 500 MG 24 hr tablet    GLUCOPHAGE-XR    180 tablet    Take 1 tablet (500 mg) by mouth 2 times daily (with meals)       mometasone-formoterol 200-5 MCG/ACT oral inhaler    DULERA    39 g    Inhale 2 puffs into the lungs 2 times daily       montelukast 10 MG tablet    SINGULAIR    90 tablet    Take 1 tablet (10 mg) by mouth At Bedtime       polyethylene glycol Packet    MIRALAX/GLYCOLAX     Take 17 g by mouth daily as needed for constipation       predniSONE 20 MG tablet    DELTASONE    30 tablet    4 pills daily until neurology appointment       ranitidine 150 MG tablet    ZANTAC    60 tablet    Take 150 mg by mouth 2 times daily as needed       SERTRALINE  HCL PO      Take 150 mg by mouth daily 100mg x 1.5 tabs       * topiramate 25 MG tablet    TOPAMAX    70 tablet    Take 1 tablet (25 mg) at bedtime for 1 week, then 1 tablet twice daily for 1 week, then 1 tablet in AM and 2 in PM for 1 week, then 2 tablets twice daily.       * topiramate 50 MG tablet    TOPAMAX    60 tablet    Take 1 tablet (50 mg) by mouth 2 times daily       TRAZODONE HCL PO      Take  mg by mouth At Bedtime       * Notice:  This list has 4 medication(s) that are the same as other medications prescribed for you. Read the directions carefully, and ask your doctor or other care provider to review them with you.

## 2017-04-11 NOTE — LETTER
2017       RE: Mary Shipman  [address] [facility] [place] [address]     Dear Colleague,    Thank you for referring your patient, Mary Shipman, to the [facility] NEUROLOGY at [facility]. Please see a copy of my visit note below.    2017      Miguel Hammer PA-C    [facility]    [address] [place]  [address]       RE: Mary Shipman   MRN: [id number]   : 1985      Dear  Harman:      Mrs. Mary Shipman is a 32-year-old woman who was recently admitted to the hospital on  after she was witnessed at work to have what appears a clearly defined generalized tonic-clonic seizure where she bit the left side of her tongue badly.  She says she never had seizures before, and her mother is with her and confirms that.  She said she had an aura where she lost vision in the left eye, she fell down, trying to stand up and fell to the floor, losing consciousness and apparently having generalized tonic-clonic activity.  She was admitted to the [facility] and actually to [facility], transferred here, and an EEG did show occipital spikes and some mild generalized slowing.  She was started on Keppra, developed some anxiety at 750 twice a day and has been reduced to 750/500 combo.  Prior to this, she has been well except she has been having increasing number of migraine headaches with photophobia and other characteristics.      As far as seizure, there is no family history of seizures, no history of meningitis, encephalitis or any risk factors and no history is clearly present in the father.  The patient has not previously had any seizures or had any febrile convulsions or any other problems.  She does have a significant migraine history and recently has had increased associated with problems with her .  She has not been able to get to therapy.  Her headaches have been happening since childhood.  They have been often, and they  are associated with photophobia, some visual auras and she says over the last few weeks she has had increasing amounts of these and the left side of her face has felt a little different, and she has also reported some intermittent numbness of the left leg which got accentuated the day of the seizure.  When she was admitted here, they did report some sensory loss but this is not well defined, although I did not find Dr. Leyva's and Dr. Julien's notes.  Her imaging studies were pretty good.  She did have some flare hyperintensity lesions scattered, 4 or 5, but no changes in the DWI or the ADC mappings.  Regarding neurovascular she had venogram studies which were normal.  She did apparently have a lumbar puncture done at the time of her hospitalization, and she had a number of studies with that, which includes oligoclonal banding because of this POTS and the oligoclonal banding revealed 2 more unique bands in the CSF and no serum sample was obtained.  The significance of these 2 bands is unknown.  She did have oligoclonal IgG CSF increased, and the CSF IgG to albumin ratio was also increased.  Question of MS came up, and she has been started on steroids, and she has been on 80 mg per day and this is being tapered by yourself.  She got an angiotensin converting enzyme in the CSF, and this was negative.  Other laboratory studies showed HSV types were negative.  Cell count on CSF on 03/29 showed 7 white blood cells and 58 RBCs with the protein in the CSF being 47 and the glucose being 47 as well.      She had a paraneoplastic panel, I believe sent out to [facility], and this was on the 30th and the results are not back yet.  She was discharged and referred to the clinic.  Her headaches have continued.  She still has this funny feeling in the left side of her face and in the left leg to some degree.  The headaches have become more severe, as the topiramate she run out of prescriptions.  She had tried Imitrex in the past with no  help, and that was a number of years ago.  Headaches have been occurring about once per week and now this left-sided symptomatology.      PAST MEDICAL HISTORY:  She does have insulin-resistance diabetes because of all endocrine reasons, history of asthma and she has previously been hospitalized for appendectomy and tonsillectomy.  Her diagnoses include generalized anxiety disorder, major depression, low back pain, menometrorrhagia, esophageal reflux, Helicobacter pylori, polycystic ovaries, obesity, asthma, fatty liver.  She has 1 child who is 4 years old.      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  They have a 4-year-old child.      REVIEW OF SYSTEMS:  Review of systems is given.      PHYSICAL EXAMINATION:  She looks very photophobic.  She keeps her left eye especially closed.  She duenas up and talks.  She has a nice affect.  Her mental status exam is normal.  Cranial nerve exam is also normal.  Fundus was difficult to see because of photophobia and normal eye movements.  No visual defect, no facial asymmetry, no facial loss of sensation.  She has some functional findings in the left leg.  She cannot get up well on heels and toes.  Yet on individual testing these are normal.  Reflexes are brisk throughout.  She has bilateral Aris.  There is no Babinski, no clonus.  She walks with her eyes partly closed because of the photophobia.  Vibratory sensation and position sensation is equal, and she did have in the left leg some patchy areas she could not discriminate sharp from dull.      This also possibly could be functional.  In her gait she tends to drag her left leg.  Romberg is negative.      In summary, in putting this case altogether, she does have a history of migraines and has been under increasing stress with her  and did have a seizure on 03/27 which was associated with occipital spike.  I wonder relation  between her migraine and epilepsy which has been reported.      She has been on topiramate before and this was discontinued.  We will restart it at 50 b.i.d.  She does need to stay on the levetiracetam.  There are some CSF studies that are pending.  She is on therapy, PT and OT that is encouraged.  She needs to resume her normal life.  MRI is not very impressive.  She had some few flare abnormalities which did not appear to be MS-like.  The patient's seizure etiology could be the migraine relationship.      She is diabetic and has polycystic ovary syndrome.      At this point, we will resume that topiramate, and she is already on a taper of prednisone.  We will slow that down to 20 mg taper every 4 days so that she is on 80 mg per day, which is a good dose.  We will check concentration of levetiracetam.  I told her there is no driving for 3 months and very strict about that.  I will see her in 4 weeks and will be in touch with her after the EEG and the Keppra level comes back.      Sincerely,   Catrachito Michael MD           D: 2017 11:30   T: 2017 18:22   MT: TAMI      Name:     АЛЕКСАНДР ERICKSON   MRN:      [id number]        Account:      [id number]   :      1985           Service Date: 2017      Document: [id number]

## 2017-04-12 ENCOUNTER — TELEPHONE (OUTPATIENT)
Dept: NEUROLOGY | Facility: CLINIC | Age: 32
End: 2017-04-12

## 2017-04-12 ENCOUNTER — HOSPITAL ENCOUNTER (OUTPATIENT)
Dept: OCCUPATIONAL THERAPY | Facility: CLINIC | Age: 32
Setting detail: THERAPIES SERIES
End: 2017-04-12
Attending: PSYCHIATRY & NEUROLOGY
Payer: COMMERCIAL

## 2017-04-12 DIAGNOSIS — G40.89 OTHER SEIZURES (H): ICD-10-CM

## 2017-04-12 LAB — LEVETIRACETAM SERPL-MCNC: 6 UG/ML

## 2017-04-12 PROCEDURE — 40000125 ZZHC STATISTIC OT OUTPT VISIT: Performed by: OCCUPATIONAL THERAPIST

## 2017-04-12 PROCEDURE — 97532 ZZHC OT COGNITIVE SKILLS EA 15 MIN: CPT | Mod: GO | Performed by: OCCUPATIONAL THERAPIST

## 2017-04-12 RX ORDER — PREDNISONE 20 MG/1
TABLET ORAL
Qty: 25 TABLET | Refills: 0 | Status: SHIPPED | OUTPATIENT
Start: 2017-04-12 | End: 2017-05-26

## 2017-04-12 NOTE — TELEPHONE ENCOUNTER
Writer received message:  Caller name:pt [phone]     Treating provider/specialty:Alec     Nurse:asked for Marciano     Best time to return call:     Message left?     Description of issue/question:   Seen Alec yesterday, no script sent to pharmacy for pred taper.   Please address and educate pt please.     [facility] [address] [place] [address] AT [address] [place] [address]     Writer called patient who indicates that she does not have enough Prednisone to complete taper as directed by Dr. Michael.  Patient indicates shelli a Prescription was to be sent yesterday, but pharmacy did not receive it as verified by EHR.  Writer familiar with Dr. Gorman intent to do so as Writer was requested to type up taper and presant in exam room while they spoke about it- writer placeing order for enough to complete taper.    Marciano Powers

## 2017-04-12 NOTE — ADDENDUM NOTE
Encounter addended by: Mitzy Horner OT on: 4/12/2017 11:07 AM<BR>     Actions taken: Flowsheet accepted

## 2017-04-12 NOTE — ADDENDUM NOTE
Encounter addended by: Mitzy Horner OT on: 4/12/2017 11:03 AM<BR>     Actions taken: Sign clinical note, Flowsheet accepted

## 2017-04-12 NOTE — ADDENDUM NOTE
Encounter addended by: Mitzy Horner OT on: 4/12/2017  6:30 PM<BR>     Actions taken: Flowsheet accepted

## 2017-04-12 NOTE — PROGRESS NOTES
04/06/17 1543   Quick Adds   Type of Visit Initial Outpatient Occupational Therapy Evaluation   General Information   Start Of Care Date 04/06/17   Referring Physician Verona Kohli MD   Orders Evaluate and treat as indicated   Orders Date 03/28/17   Medical Diagnosis convulsion,  MRI changes indicating possible demyelinating disorder   Onset of Illness/Injury or Date of Surgery 03/27/17   Surgical/Medical History Reviewed Yes   Additional Occupational Profile Info/Pertinent History of Current Problem Pt had first time seizure on 3/27/17. For 3-4 weeks prior to this, was having migraines, loss of vision in L eye, L sided numbness, L facial weakness, and difficulties with comprehension/concentration.  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  Undergoing workup for MS but not officially diagnosed.  Pt sees Neurology on 4/11.  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  Pt is  and has 4 year old son, who may have autism but not formally diagnosed. Has support from her mother.     Role/Living Environment   Current Community Support Family/friend caregiver   Patient role/Employment history Employed   Current Living Environment House   Number of Stairs to Enter Home 0   Number of Stairs Within Home 0   Primary Bathroom Location/Comments has tub/shower combination, reports that she has refused using a shower chair, wants to push self back to normalcy. Has felt slightly unsteady in shower but does not feel chair or grab bar is required.    Prior Level - Transfers Independent   Prior Level - Ambulation Independent   Prior Level - ADLS Independent   Prior Responsibilities - IADL Meal Preparation;Housekeeping;Laundry;Shopping;Yardwork;Medication management;Finances;Driving;Work;   Prior Level Comments Pt did all household tasks, childcare, bill paying, groceries.    Role/Living Environment Comments wants to return to work asap,  "concerned [place] she just started this job and isn't sure they will hold her position.  Job involves a lot of computer work, 12 hour days, multi-tasking   Patient/family Goals Statement to work on my concentration and memory, to get as close to my normal as possible   Fall Risk Screen   Fall screen completed by OT   Have you fallen 2 or more times in the last year? No   Have you fallen and had an injury in the past year? No   Comments denies falls since seizure, though endorses difficulty with balance, especially when quickly scanning side to side   System Outcome Measures   Outcome Measures AM-PAC   AM-PAC  Basic Mobility Score Level  (Lower scores equate to lower levels of function) 52.48   AM-PAC  Daily Activity Score Level  (Lower scores equate to lower levels of function) 43.7   AM-PAC  Applied Cognitive Score Level  (Lower scores equate to lower levels of function) 32.38   Cognitive Status Examination   Orientation Orientation to person, place and time   Level of Consciousness Lethargic/somnolent  (slow in movements and in speech, eyelids drooping)   Follows Commands and Answers Questions 100% of the time   Personal Safety and Judgment Intact   Memory Impaired   Memory Comments recalled 4/5 words after 10 min delay. Able to accurately describe her medical history and results of medical tests   Attention Reports problems attending   Executive Function Cognitive flexibility impaired   Cognitive Comment Subjective: c/o slowed thinking, very poor concentration. Prior to seizure, was having moments of \"blanking\" with work tasks.   Objective: scored 25/30 on Newton. Defiicts with visuo-spatial construction of a cube and alignment on a clock. Short term recall impaired (recalled 4/5 words), word fluency (stated 10 words in 1 min).   All fundamental skills (abstraction, math, sequencing) were WNL, but pt required MUCH increased time to produce answers.       Visual Perception   Visual Acuity will continue to assess. Pt " squinting when reading, but able to read 12 point font.    Visual Field Able to scan into full L periphery, but reports this has been harder. Gets sense of dizziness/unsteadiness when looking to the L   Oculomotor nystagmus when scanning from the L to midline, and with downward visual scanning.  Convergence impaired.    Visual Perception Comments Overall, appears photophobic, demo'd squinting or fluttering of eyes throughout session, even with shades drawn and lights dim.    Sensation   Sensation Comments reports numbness/tingling in L face and LUE.     Posture   Posture Comments in seated, tilts head to the R   Range of Motion (ROM)   ROM Comments B shoulder flexion limited to ~100, pt reports overhead reaching feels stiff   Hand Strength   Left Hand  (pounds) 35 pounds  (>2 standard deviations below mean)   Right Hand  (pounds) 90 pounds  (1 SD above mean)   Left Lateral Pinch (pounds) 8 pounds   Right Lateral Pinch (pounds) 20 pounds   Left Three Point Pinch (pounds) 12 pounds   Right Three Point Pinch (pounds) 15 pounds   Muscle Tone   Muscle Tone Comments L hand tremor with fatigue. started to show mild tremor in fingers at end of 9 hole peg test   Coordination   Left Hand, Nine Hole Peg Test (seconds) 28   Right Hand, Nine Hole Peg Test (seconds) 27   Coordination Comments scores are >2 SD below the mean bilaterally.  Vision was a limiting factor.    Balance   Balance Comments sees PT on 4/17.   Functional Mobility   Functional Mobility Comments slowed walking speed, wide stance with gait, difficulty with visual scanning when walking   Bathing   Bathing Comments slow and cautious, feels unsteady in shower.  Only showers when someone else is at home, and has only showered twice since leaving hospital.    Upper Body Dressing   Upper Body Dressing Comments difficulty donning bra, doing buttons due to LUE weakness   Lower Body Dressing   Lower Body Dressing Comments difficulty tying shoes    Eating/Self-Feeding   Eating/Self Feeding Comments difficulty cutting foods, preparing meals, opening containers   Activity Tolerance   Activity Tolerance increased fatiguability. Speed of hand coordination decreased within 30 seconds of testing.   strength decreased with each progressive trial.    Instrumental Activities of Daily Living Assessment   IADL Quick Adds Meal Planning/Preparation;Home/Financial/Management;Communication/Computer Use;Community Mobility;Care of Others   Meal Planning/Preparation dificulty with meal prep, grocery shopping   Home/Financial Management unable to do bill paying right now, a little difficulty making purchases   Communication/Computer Use difficulty with computer use, but hasn't done much since her seizure   Community Mobility not driving for 3 months due to seizure protocol   Care of Others pt's mom has been helping more with care of her 4 year old son. Son is very active and pt's vestibular symptoms increase when she tries to watch him.   Planned Therapy Interventions   Planned Therapy Interventions ADL training;IADL training;Cognitive skills;Coordination training;Neuromuscular re-education;Self care/Home management;Strengthening;Therapeutic activities;Visual perception   Adult OT Eval Goals   OT Eval Goals (Adult) 1;2;3;4;5   OT Goal 1   Goal Identifier  strength   Goal Description Pt will demonstrate a 15 lb increase in L  strength (increase to 50#), for return to PLOF in work and leisure activities.    Target Date 07/01/17   OT Goal 2   Goal Identifier visual accommodations   Goal Description Pt will demonstrate understanding and implementation of at least 2 compensatory strategies for improved near and far vision.    Target Date 07/01/17   OT Goal 3   Goal Identifier multitasking   Goal Description Pt will complete a moderately difficult bill paying task in a busy setting, for return to prior level of function in home management tasks.    Target Date 07/01/17    OT Goal 4   Goal Identifier attention/concentration   Goal Description Pt will attend to reading task for 20 continuous minutes, for increased concentration needed to return to work.    Target Date 07/01/17   OT Goal 5   Goal Identifier Memory   Goal Description Pt will demonstrate 75% accuracy in recall of 5-word lists, for improved independence in IADLs.    Target Date 07/01/17   Clinical Impression   Criteria for Skilled Therapeutic Interventions Met Yes, treatment indicated   OT Diagnosis impaired ADLs and IADLs   Influenced by the following impairments impaired cognition, decreased L sided strength, decreased coordination, visual deficits   Assessment of Occupational Performance 3-5 Performance Deficits   Identified Performance Deficits , computer use, work, driving, grooming   Clinical Decision Making (Complexity) Moderate complexity   Therapy Frequency 2x/week, tapering down to weekly   Predicted Duration of Therapy Intervention (days/wks) 8 weeks   Risks and Benefits of Treatment have been explained. Yes   Patient, Family & other staff in agreement with plan of care Yes   Total Evaluation Time   Total Evaluation Time 30       KATARZYNA Khalil/L  [facility]  [email or web address]  Phone: [phone]

## 2017-04-13 ENCOUNTER — HOSPITAL ENCOUNTER (OUTPATIENT)
Dept: OCCUPATIONAL THERAPY | Facility: CLINIC | Age: 32
Setting detail: THERAPIES SERIES
End: 2017-04-13
Attending: PSYCHIATRY & NEUROLOGY
Payer: COMMERCIAL

## 2017-04-13 PROCEDURE — 97110 THERAPEUTIC EXERCISES: CPT | Mod: GO | Performed by: OCCUPATIONAL THERAPIST

## 2017-04-13 PROCEDURE — 40000125 ZZHC STATISTIC OT OUTPT VISIT: Performed by: OCCUPATIONAL THERAPIST

## 2017-04-13 NOTE — ADDENDUM NOTE
Encounter addended by: Mitzy Horner OT on: 4/13/2017  5:13 PM<BR>     Actions taken: Flowsheet accepted

## 2017-04-14 ENCOUNTER — MYC MEDICAL ADVICE (OUTPATIENT)
Dept: FAMILY MEDICINE | Facility: CLINIC | Age: 32
End: 2017-04-14

## 2017-04-14 DIAGNOSIS — G43.001 MIGRAINE WITHOUT AURA AND WITH STATUS MIGRAINOSUS, NOT INTRACTABLE: ICD-10-CM

## 2017-04-14 DIAGNOSIS — G47.00 INSOMNIA, UNSPECIFIED TYPE: Primary | ICD-10-CM

## 2017-04-14 DIAGNOSIS — G40.909 SEIZURE DISORDER (H): ICD-10-CM

## 2017-04-14 LAB
RESULT: NORMAL
SEND OUTS MISC TEST CODE: NORMAL
SEND OUTS MISC TEST SPECIMEN: NORMAL
TEST NAME: NORMAL

## 2017-04-14 RX ORDER — TOPIRAMATE 50 MG/1
TABLET, FILM COATED ORAL
Qty: 120 TABLET | Refills: 11 | Status: SHIPPED | OUTPATIENT
Start: 2017-04-14 | End: 2017-05-30

## 2017-04-14 RX ORDER — CLONAZEPAM 1 MG/1
0.5-1 TABLET ORAL
Qty: 20 TABLET | Refills: 0 | Status: CANCELLED | OUTPATIENT
Start: 2017-04-14

## 2017-04-14 RX ORDER — CLONAZEPAM 1 MG/1
0.5-1 TABLET ORAL
Qty: 20 TABLET | Refills: 0 | Status: SHIPPED | OUTPATIENT
Start: 2017-04-14 | End: 2017-05-05

## 2017-04-14 NOTE — TELEPHONE ENCOUNTER
Pharmacy pended-[facility].    Joel Lorenzana RN

## 2017-04-14 NOTE — TELEPHONE ENCOUNTER
"See note below. Patient recently had a seizure and was taking trazodone for sleep. Since starting new anti-seizure medications, she does not like taking the trazodone because of how it makes her feel.  Huddled with PCP, who said if patient has clonazepam or seroquel she could take that. If she doesn't, covering provider could prescribe (see medication history). Patient does not have these medications any more. Could someone please prescribe? Thanks, Joel Lorenzana RN     Mary Shipman is a 32 year old female who calls with dull headache, insomnia.    NURSING ASSESSMENT:  Description:  For the past few days, patient has been unable to sleep. She has insomnia and usually takes trazadone, but she does not like how it makes her feel with all of the other medications she is on. She has had a dull headache, she believes due to the lack of sleep. She denies \"worst headache ever\", vision changes, sudden weakness/unsteady gait, numbness/tingling on one side of the body, confusion, sudden onset of difficulty speaking/slurred speech (she has had slurred speech since her seizure), fever, vision changes, rash, vomiting, high blood pressure, light headedness, eye pain, change in ability to walk, pain that interferes with activity.   Onset/duration:  2 days  Precip. factors:  Lack of sleep  Associated symptoms:  n/a  Improves/worsens symptoms:  n/a  Pain scale (0-10)   4/10  Allergies:   Allergies   Allergen Reactions     Levaquin [Levofloxacin] Anaphylaxis     Acetaminophen      Uncertain - hives/anaphylaxis     Hydrocodone Hives     Vicodin       MEDICATIONS:   Taking medication(s) as prescribed? Yes  Taking over the counter medication(s?) No  Any medication side effects? No significant side effects    Any barriers to taking medication(s) as prescribed?  Yes-she does not like how the trazodone makes her feel  Medication(s) improving/managing symptoms?  No  Medication reconciliation completed: No      NURSING PLAN: Huddle with " "provider, plan includes see if patient still has seroquel or clonazepam. If so, she can take that. If not, will forward to covering providers to prescribe.    RECOMMENDED DISPOSITION:  Home care advice - see above. If patient is able to sleep and headache goes away no follow up needed. If headache persists, follow up with provider.  Will comply with recommendation: Yes  If further questions/concerns or if symptoms do not improve, worsen or new symptoms develop, call your PCP or [facility] as soon as possible.      Guideline used:  Telephone Triage Protocols for Nurses, Fifth Edition, Silvana Parks \"headache\"    Joel Lorenzana RN    "

## 2017-04-14 NOTE — TELEPHONE ENCOUNTER
Both clonazepam and seroquel are at risk of possibly making her symptoms worse as well.    PCP can fill seroquel remotely - or provide direction to RN.  And if PCP prefers to fill clonazepam I would want direction as to how much should be prescribed, as Clonazepam is a controlled substance.

## 2017-04-17 ENCOUNTER — HOSPITAL ENCOUNTER (OUTPATIENT)
Dept: PHYSICAL THERAPY | Facility: CLINIC | Age: 32
Setting detail: THERAPIES SERIES
End: 2017-04-17
Attending: PSYCHIATRY & NEUROLOGY
Payer: COMMERCIAL

## 2017-04-17 PROCEDURE — 40000719 ZZHC STATISTIC PT DEPARTMENT NEURO VISIT: Performed by: PHYSICAL THERAPIST

## 2017-04-17 PROCEDURE — 97162 PT EVAL MOD COMPLEX 30 MIN: CPT | Mod: GP | Performed by: PHYSICAL THERAPIST

## 2017-04-17 PROCEDURE — 97110 THERAPEUTIC EXERCISES: CPT | Mod: GP | Performed by: PHYSICAL THERAPIST

## 2017-04-17 NOTE — PROGRESS NOTES
" 04/17/17 1600   Quick Adds   Type of Visit Initial OP PT Evaluation   General Information   Start of Care Date 04/17/17   Referring Physician Preet Tompkins MD   Orders Evaluate and Treat as Indicated   Order Date 03/29/17   Medical Diagnosis convulsions   Onset of illness/injury or Date of Surgery 03/27/17   Precautions/Limitations fall precautions   Surgical/Medical history reviewed Yes   Pertinent history of current problem (include personal factors and/or comorbidities that impact the POC) Patient has seizure on 3/27/17 and was hospitalized because of this. Prior to seizures she was having some L sided sensations changes with weakness and \"aura\" that caused migraines. She has been taking anti-epiletic drugs since this episode. States that she feels like her L side is weak since she had her seizure on 3/27/17. She states she has trouble holding things. She says she feels off balance, has difficulty moving her LLE and it feels weak. She states that her weakness feels about the same since her seizure, except her L arm is starting to feel better since she started OT with less nerve sensation. No falls but has come close. She was told to use a walker but she reports that she had not used because she does not want to and is \"stuborn\". Has been getting very dizzy and lightheaded and always has someone with her. Reports that she has been getting dizzy without any explanation for the onset. She gets headaches regularly (had migraines prior to seizures) and is very photophobic. She also has been more diaphoretic from medications (prednisone). Trying to taper down right now. Reports that her memory has been difficult. Is on disability from work right now. Reports lots of fatigue which she thinks is related to her medications. Is seeing OT 2 times/week and is going to start SLP services soon too.    Pertinent Visual History  photophobic, L eye is blurry and has difficulty seeing out of it.    Prior level of function " comment Was working full time prior to seizure, 12 hour a day, .    Diagnostic Tests MRI;CT Scan   Current Community Support Family/friend caregiver  (son- 4 years olds also has  to help )   Patient role/Employment history Employed   Living environment House/[facility]   Home/Community Accessibility Comments no stairs    Assistive Devices Comments does not own ADs   Patient/Family Goals Statement Get her L side moving better and stronger   General Information Comments /72,    Fall Risk Screen   Fall screen completed by PT   Per patient - Fall 2 or more times in past year? No   Fall screen comments reports close calls with falling    System Outcome Measures   AM-PAC  Basic Mobility Score Level  (Lower scores equate to lower levels of function) 52.74   AM-PAC  Daily Activity Score Level  (Lower scores equate to lower levels of function) 44.05   AM-PAC  Applied Cognitive Score Level  (Lower scores equate to lower levels of function) 33.16   Pain   Pain comments pain on L side starting on L side of face, arm and then down to leg with numbness and nerve pain    Vital Signs   Vital Signs Pulse;BP   Pulse 112   /72   Cognitive Status Examination   Cognitive Comment Reported cognitive impairments since seizure; working with OT on this, see OT eval for full details   Integumentary   Integumentary Comments nothing remarkable    Posture   Posture Comments forward shoulder posture and foward head    Range of Motion (ROM)   ROM Comment WFL    Strength   Strength Comments RLE: grossly 5/5 for major mm group during seated exam. L significant strength deficits during testing: ankle 3-/5 df, pf, knee ext <3/5, hip flexion < 3/5 during seated exam. Functional weak in LLE as noted with decreased WS into LLE with sit<>stand and shakiness of LLE at glut/quat when stepping up onto step    Bed Mobility   Bed Mobility Comments independent with bed mobility on mat table but moves slow    Transfer Skills    Transfer Comments sit<>stand from chair independent with use of UEs; favors R LE with this transfer    Gait   Gait Comments Ambulates with slow gait speed and decreased L toe clearance. Reaching out of walls for balance.    Gait Special Tests   Gait Special Tests 25 FOOT TIMED WALK;DYNAMIC GAIT INDEX   Gait Special Tests 25 Foot Timed Walk   Seconds 18.59   Steps 21 Steps   Comments reaching out to box for balance at times    Gait Special Tests Dynamic Gait Index   Comments short form DGI- 4/12 indicating fall risk    Balance Special Tests   Balance Special Tests Sit to stand reps;Modified CTSIB Conditions   Balance Special Tests Modified CTSIB Conditions   Condition 1, seconds 30 Seconds   Condition 2, seconds 30 Seconds   Condition 4, seconds 30 Seconds   Condition 5, seconds 20 Seconds   Modified CTSIB Comments increased sway and unsteadiness with condition 5    Balance Special Tests Sit to Stand Reps in 30 Seconds   Reps in 30 seconds 2.5   Height 18   Comments green chair   Sensory Examination   Sensory Perception Comments tingling numbness on L side of face, L arm and LLE. Severe deficits with light touch testing to L foot mostly on toes, normal sensation to light touch from ankle above. Decreased proprioception at L toe and ankle. Pain with DF on LLE when therapist moved stating it feels uncomfortable; hypersensitive to touch on L side in general; twitching of L eye throughout session    Muscle Tone   Muscle Tone Comments with clonus testing, did not tolerate d/t nerve pain with quick movement of ankle    Planned Therapy Interventions   Planned Therapy Interventions balance training;gait training;neuromuscular re-education;ROM;strengthening;stretching;transfer training   Clinical Impression   Criteria for Skilled Therapeutic Interventions Met yes, treatment indicated   PT Diagnosis L sided weakness and balance deficits    Influenced by the following impairments decreased strength, balance impairments,  abnormal gait, decreased sensation and proprioception, decreased activity tolerance    Functional limitations due to impairments decreased safety with ambulation and functional mobility, unable to work at current job, difficulty taking care of self and four year old child    Clinical Presentation Evolving/Changing   Clinical Presentation Rationale waxing/waning status, several PT impairments, co-morbidites    Clinical Decision Making (Complexity) Moderate complexity   Therapy Frequency 1 time/week   Predicted Duration of Therapy Intervention (days/wks) 12 weeks    Risk & Benefits of therapy have been explained Yes   Patient, Family & other staff in agreement with plan of care Yes   Clinical Impression Comments D/t to the above impairments, patient will benefit from skilled OP PT in order to work toward improving her balance and strength to improve her function and quality of life with hopes or returning to work.    Education Assessment   Preferred Learning Style Listening;Demonstration;Pictures/video   Barriers to Learning Cognitive  (memory impairments )   GOALS   PT Eval Goals 1;2;3;4   Goal 1   Goal Identifier strength   Goal Description Patient will perform 6 sit<>stands in 30 secs in order to show improvement in LE strength and activity tolerance to improve ease of functional mobility.    Target Date 07/15/17   Goal 2   Goal Identifier stairs   Goal Description Patient will be able to negotiate 8 stairs with 1 rail and reciprocal pattern in order to better access her community.    Target Date 07/15/17   Goal 3   Goal Identifier DGI   Goal Description Patient will score a 15 or > on DGI in order to show improvement with her balance in order to more safely access her community and facilitate return to work.    Target Date 07/15/17   Goal 4   Goal Identifier HEP   Goal Description Patient will be independent with HEP to continue making functioal gains.    Target Date 07/15/17   Total Evaluation Time   Total  Evaluation Time (Minutes) 35

## 2017-04-18 ENCOUNTER — HOSPITAL ENCOUNTER (OUTPATIENT)
Dept: OCCUPATIONAL THERAPY | Facility: CLINIC | Age: 32
Setting detail: THERAPIES SERIES
End: 2017-04-18
Attending: PSYCHIATRY & NEUROLOGY
Payer: COMMERCIAL

## 2017-04-18 PROCEDURE — 40000249 ZZH STATISTIC VISIT LOW VISION CLINIC: Performed by: OCCUPATIONAL THERAPIST

## 2017-04-18 PROCEDURE — 97532 ZZHC OT COGNITIVE SKILLS EA 15 MIN: CPT | Mod: GO | Performed by: OCCUPATIONAL THERAPIST

## 2017-04-18 PROCEDURE — 97530 THERAPEUTIC ACTIVITIES: CPT | Mod: GO | Performed by: OCCUPATIONAL THERAPIST

## 2017-04-18 PROCEDURE — 97535 SELF CARE MNGMENT TRAINING: CPT | Mod: GO | Performed by: OCCUPATIONAL THERAPIST

## 2017-04-18 PROCEDURE — 40000125 ZZHC STATISTIC OT OUTPT VISIT: Performed by: OCCUPATIONAL THERAPIST

## 2017-04-19 ENCOUNTER — ALLIED HEALTH/NURSE VISIT (OUTPATIENT)
Dept: NEUROLOGY | Facility: CLINIC | Age: 32
End: 2017-04-19

## 2017-04-19 DIAGNOSIS — G43.001 MIGRAINE WITHOUT AURA AND WITH STATUS MIGRAINOSUS, NOT INTRACTABLE: ICD-10-CM

## 2017-04-19 DIAGNOSIS — G40.909 SEIZURE DISORDER (H): ICD-10-CM

## 2017-04-19 LAB
SPECIMEN SOURCE: NORMAL
STATUS - QUEST: NORMAL
VIRUS SPEC CULT: NORMAL

## 2017-04-19 NOTE — MR AVS SNAPSHOT
After Visit Summary   4/19/2017    Mary Shipman    MRN: [id number]           Patient Information     Date Of Birth          1985        Visit Information        Provider Department      4/19/2017 4:00 PM  EEG TECH 1 [facility]        Today's Diagnoses     Seizure disorder (H)        Migraine without aura and with status migrainosus, not intractable           Follow-ups after your visit        Your next 10 appointments already scheduled     May 01, 2017 11:30 AM CDT   Return Visit with López Mehta, [facility]    [address] [place] [address]   [phone]            May 01, 2017  1:45 PM CDT   Treatment 45 with Anamaria Pascual, PT   [facility] ([facility]    [address] [facility] [address]   [phone]            May 01, 2017  2:30 PM CDT   Treatment 60 with Mitzy Horner, OT   [facility] ([facility])    [address] [facility] [address]   [phone]            May 02, 2017 10:00 AM CDT   Treatment 60 with Kathryn Torres, OT   [facility] ([facility])    [address] [facility] [address]   [phone]            May 08, 2017  1:45 PM CDT   Treatment 60 with Mitzy Horner, OT   [facility] ([facility])    [address] [facility] [address]   [phone]            May 08, 2017  2:45 PM CDT   Treatment 45 with Patsy Lopez, SLP   [facility] ([facility])    [address] [facility] [address]   [phone]            May 11, 2017  2:00 PM CDT   Treatment 45 with Anamaria Pascual, PT   [facility] ([facility])    [address] [facility] [address]   [phone]            May 11, 2017  2:45 PM CDT    Treatment 60 with Mitzy Horner, OT   [facility] ([facility])    [address] [facility] [address] [phone]            May 15, 2017  1:45 PM CDT   Treatment 45 with Anamaria Pascual, PT   [facility] ([facility])    [address] [facility] [address] [phone]            May 15, 2017  2:30 PM CDT   Treatment 60 with Mitzy Horner, OT   [facility] ([facility])    [address] [facility] [id number] [phone]              Who to contact     Please call your clinic at [phone] to:    Ask questions about your health    Make or cancel appointments    Discuss your medicines    Learn about your test results    Speak to your doctor   If you have compliments or concerns about an experience at your clinic, or if you wish to file a complaint, please contact [facility] Patient Relations at [phone] or email us at [email or web address]         Additional Information About Your Visit        [employer] Information     [employer] gives you secure access to your electronic health record. If you see a primary care provider, you can also send messages to your care team and make appointments. If you have questions, please call your primary care clinic.  If you do not have a primary care provider, please call [phone] and they will assist you.      [employer] is an electronic gateway that provides easy, online access to your medical records. With [employer], you can request a clinic appointment, read your test results, renew a prescription or communicate with your care team.     To access your existing account, please contact your [facility] or call [phone] for assistance.        Care EveryWhere ID     This is your Care EveryWhere ID. This could be used by other organizations to access your  [facility] medical records  [id number]         Blood Pressure from Last 3 Encounters:   No data found for BP    Weight from Last 3 Encounters:   No data found for Wt              Today, you had the following     No orders found for display       Primary Care Provider Office Phone # Fax #    Miguel Hammer PA-C [phone]       [facility] [address] [place] [id number]        Thank you!     Thank you for choosing [facility]  for your care. Our goal is always to provide you with excellent care. Hearing back from our patients is one way we can continue to improve our services. Please take a few minutes to complete the written survey that you may receive in the mail after your visit with us. Thank you!             Your Updated Medication List - Protect others around you: Learn how to safely use, store and throw away your medicines at www.disposemymeds.org.          This list is accurate as of: 4/19/17 11:59 PM.  Always use your most recent med list.                   Brand Name Dispense Instructions for use    albuterol 108 (90 BASE) MCG/ACT Inhaler    PROAIR HFA/PROVENTIL HFA/VENTOLIN HFA    3 Inhaler    Inhale 2 puffs into the lungs every 6 hours as needed for shortness of breath / dyspnea or wheezing       cholecalciferol 1000 UNIT tablet    vitamin D    100 tablet    4 caps daily)       clonazePAM 1 MG tablet    klonoPIN    20 tablet    Take 0.5-1 tablets (0.5-1 mg) by mouth nightly as needed       fexofenadine-pseudoePHEDrine 180-240 MG per 24 hr tablet    ALLEGRA-D 24     Take 1 tablet by mouth daily       fluticasone 50 MCG/ACT spray    FLONASE     Spray 1-2 sprays into both nostrils daily as needed for rhinitis or allergies       ibuprofen 400-800 mg tablet    ADVIL,MOTRIN    30 tablet    Take 1-2 tablets by mouth every 6 hours as needed (cramping).       LANsoprazole 30 MG CR capsule    PREVACID    90 capsule    Take 1 capsule (30 mg) by mouth daily  Take 30-60 minutes before a meal.       levalbuterol 1.25 MG/0.5ML Nebu neb solution    XOPENEX CONC     Take 1.25 mg by nebulization every 6 hours as needed for wheezing       * levETIRAcetam 500 MG tablet    KEPPRA    60 tablet    Take 1 tablet (500 mg) by mouth 2 times daily       * levETIRAcetam 500 MG tablet    KEPPRA    60 tablet    750 mg in am and 500 mg pm       metFORMIN 500 MG 24 hr tablet    GLUCOPHAGE-XR    180 tablet    Take 1 tablet (500 mg) by mouth 2 times daily (with meals)       mometasone-formoterol 200-5 MCG/ACT oral inhaler    DULERA    39 g    Inhale 2 puffs into the lungs 2 times daily       montelukast 10 MG tablet    SINGULAIR    90 tablet    Take 1 tablet (10 mg) by mouth At Bedtime       polyethylene glycol Packet    MIRALAX/GLYCOLAX     Take 17 g by mouth daily as needed for constipation       predniSONE 20 MG tablet    DELTASONE    25 tablet    Taper by 20 mg every 4 day until stopping on 4/24/17       ranitidine 150 MG tablet    ZANTAC    60 tablet    Take 150 mg by mouth 2 times daily as needed       SERTRALINE HCL PO      Take 150 mg by mouth daily 100mg x 1.5 tabs       * topiramate 25 MG tablet    TOPAMAX    70 tablet    Take 1 tablet (25 mg) at bedtime for 1 week, then 1 tablet twice daily for 1 week, then 1 tablet in AM and 2 in PM for 1 week, then 2 tablets twice daily.       * topiramate 50 MG tablet    TOPAMAX    120 tablet    2 tablets twice a day.       TRAZODONE HCL PO      Take  mg by mouth At Bedtime       * Notice:  This list has 4 medication(s) that are the same as other medications prescribed for you. Read the directions carefully, and ask your doctor or other care provider to review them with you.

## 2017-04-20 ENCOUNTER — OFFICE VISIT (OUTPATIENT)
Dept: FAMILY MEDICINE | Facility: CLINIC | Age: 32
End: 2017-04-20
Payer: COMMERCIAL

## 2017-04-20 VITALS
TEMPERATURE: 98.5 F | DIASTOLIC BLOOD PRESSURE: 72 MMHG | HEART RATE: 76 BPM | SYSTOLIC BLOOD PRESSURE: 118 MMHG | BODY MASS INDEX: 38.38 KG/M2 | HEIGHT: 65 IN | WEIGHT: 230.38 LBS

## 2017-04-20 DIAGNOSIS — R56.9 CONVULSIONS, UNSPECIFIED CONVULSION TYPE (H): Primary | ICD-10-CM

## 2017-04-20 DIAGNOSIS — G81.94 LEFT HEMIPLEGIA (H): ICD-10-CM

## 2017-04-20 DIAGNOSIS — G47.00 INSOMNIA, UNSPECIFIED TYPE: ICD-10-CM

## 2017-04-20 DIAGNOSIS — G43.809 OTHER MIGRAINE WITHOUT STATUS MIGRAINOSUS, NOT INTRACTABLE: ICD-10-CM

## 2017-04-20 PROCEDURE — 99214 OFFICE O/P EST MOD 30 MIN: CPT | Performed by: PHYSICIAN ASSISTANT

## 2017-04-20 NOTE — PROCEDURES
EEG#:        HISTORY:  EEG performed on Mary Erickson, a 32-year-old, who was recently admitted with a new onset of partial seizures with secondary generalization.  Left occipital epileptiform discharges were found on EEG.  She is currently being treated with levetiracetam and topiramate.      FINDINGS:  Posterior dominant rhythm ranges from 10-12 Hz.  Subharmonics in the 6-7 Hz range were seen on occasion, so-called alpha variant.  Well-sustained posterior dominant rhythm is present throughout.  There are a few brief periods of drowsiness but deeper sleep is not seen at any point.  Eye flutter with eye flutter artifact is seen on occasion.  Hyperventilation and photic stimulation adds no information.      INTERICTAL ABNORMALITIES:  No epileptiform discharges.      ICTAL ABNORMALITIES:  No electrographic seizures.      IMPRESSION:  Normal study.  No epileptiform activity.  No seizures.         YA LOCKWOOD MD             D: 2017 17:34   T: 2017 23:52   MT: ALLIE      Name:     MARY ERICKSON   MRN:      [id number]        Account:        [id number]   :      1985           Procedure Date: 2017      Document: [id number]

## 2017-04-20 NOTE — NURSING NOTE
"Chief Complaint   Patient presents with     Forms     Short Term Disability forms       Initial There were no vitals taken for this visit. Estimated body mass index is 38.77 kg/(m^2) as calculated from the following:    Height as of 4/4/17: 5' 5\" (1.651 m).    Weight as of 4/4/17: 233 lb (105.7 kg).  Medication Reconciliation: complete   Luci Sorto CMA      "

## 2017-04-20 NOTE — PROGRESS NOTES
SUBJECTIVE:                                                    Mary Shipman is a 32 year old female who presents to clinic today for the following health issues:    Patient is here today to have Short Term Disability forms completed.   Patient also wanted to follow up on treatment plans. See prior notes.    She was admitted for acute seizure like behaviors and found to have EEG spikes suggesting seizure. MRI of the brain showed inconclusive findings of white matter changes. Her CSF and blood work up were not diagnostic per se per the neurologist.    Currently she is managed for seizures and is on Topamax and Keppra - see notes from the previous visit with Dr. Michael.    She continues to have left sided weakness, slurred / irregular speech, sensitivity to light on the left side and headaches. My review of the Neurology note shows it isn't clear exactly the cause of these symptoms as she didn't have a stroke but there may be migrainous cause vs MS? Vs fictitious cause.     Patient Active Problem List   Diagnosis     Esophageal reflux     Allergic rhinitis due to other allergen     Polycystic ovaries     Thyrotoxicosis     Urticaria     Obesity     Low back pain     Intermittent asthma     HYPERLIPIDEMIA LDL GOAL <160     Irritable bowel syndrome with constipation     Migraine headache     Not immune to rubella     Major depressive disorder, recurrent episode, moderate (H)     Health Care Home     Health care home, active care coordination     Generalized anxiety disorder     Lump or mass in breast     Menometrorrhagia     Seizure (H)      Current Outpatient Prescriptions   Medication     topiramate (TOPAMAX) 50 MG tablet     clonazePAM (KLONOPIN) 1 MG tablet     predniSONE (DELTASONE) 20 MG tablet     levETIRAcetam (KEPPRA) 500 MG tablet     levETIRAcetam (KEPPRA) 500 MG tablet     cholecalciferol (VITAMIN D) 1000 UNIT tablet     fexofenadine-pseudoePHEDrine (ALLEGRA-D 24) 180-240 MG per 24 hr tablet      "fluticasone (FLONASE) 50 MCG/ACT spray     polyethylene glycol (MIRALAX/GLYCOLAX) Packet     TRAZODONE HCL PO     SERTRALINE HCL PO     albuterol (PROAIR HFA, PROVENTIL HFA, VENTOLIN HFA) 108 (90 BASE) MCG/ACT inhaler     mometasone-formoterol (DULERA) 200-5 MCG/ACT oral inhaler     metFORMIN (GLUCOPHAGE-XR) 500 MG 24 hr tablet     LANsoprazole (PREVACID) 30 MG capsule     levalbuterol (XOPENEX CONC) 1.25 MG/0.5ML NEBU     montelukast (SINGULAIR) 10 MG tablet     ibuprofen (ADVIL,MOTRIN) 400-800 mg tablet     ranitidine (ZANTAC) 150 MG tablet     [DISCONTINUED] topiramate (TOPAMAX) 25 MG tablet     No current facility-administered medications for this visit.         Problem list and histories reviewed & adjusted, as indicated.  Additional history: as documented    Labs reviewed in EPIC    Reviewed and updated as needed this visit by clinical staff       Reviewed and updated as needed this visit by Provider         ROS:  Constitutional, HEENT, cardiovascular, pulmonary, gi and gu systems are negative, except as otherwise noted.    OBJECTIVE:                                                    /72 (BP Location: Right arm, Cuff Size: Adult Large)  Pulse 76  Temp 98.5  F (36.9  C) (Oral)  Ht 5' 5\" (1.651 m)  Wt 230 lb 6 oz (104.5 kg)  BMI 38.34 kg/m2  Body mass index is 38.34 kg/(m^2).  GENERAL: healthy, alert and no distress  NEURO: CN II-XII intact, her speech is stuttered and slurred. She has normal facial movements. Her left leg is weak 1/5 compared to right, her left arm is weak 2-3/5 compared to the right.        ASSESSMENT/PLAN:                                                        ICD-10-CM    1. Convulsions, unspecified convulsion type (H) R56.9    2. Other migraine without status migrainosus, not intractable G43.809    3. Left hemiplegia (H) G81.94    4. Insomnia, unspecified type G47.00       Unclear. Recommend continued evaluations and management by the neurology team. Watch for other symptoms. " Her speech seems to come and go, at times she speaks clearly and others she stutters and stops. Her left side weakness doesn't seem to be clearly delineated in terms of cause. She will continue speech, OT, PT. She will continue with our therapist. Could possibly be related to actual known migraine, some issues with the seizure and possibly some associated conversion type symptoms. It is odd to me that her speech is clear at times and then is quite muddled when stressed.     Follow up with me as needed - defer neurological work up and plan to neurology.    Miguel Hammer, MPAS, PA-C

## 2017-04-20 NOTE — MR AVS SNAPSHOT
After Visit Summary   4/20/2017    Mary Shipman    MRN: [id number]           Patient Information     Date Of Birth          1985        Visit Information        Provider Department      4/20/2017 1:00 PM Miguel Hammer PA-C [facility]        Today's Diagnoses     Convulsions, unspecified convulsion type (H)    -  1    Other migraine without status migrainosus, not intractable        Left hemiplegia (H)        Insomnia, unspecified type           Follow-ups after your visit        Your next 10 appointments already scheduled     Apr 24, 2017  2:00 PM CDT   Treatment 60 with K[facility]STAL [facility] ([facility])    [address] [facility] [address]   [phone]            Apr 25, 2017  3:00 PM CDT   Treatment 60 with Mitzy Horner OT   [facility] ([facility])    [address] [facility] [address]   [phone]            Apr 26, 2017  1:45 PM CDT   Evaluation with Patsy Lopez, SLP   [facility] ([facility])    [address] [facility] [address]   [phone]            May 01, 2017 11:30 AM CDT   Return Visit with López Mehta, [facility] ([facility])    [address] [place] [address]   [phone]            May 01, 2017  1:45 PM CDT   Treatment 45 with Anamaria Pascual, PT   [facility] ([facility])    [address] [facility] [address]   [phone]            May 01, 2017  2:30 PM CDT   Treatment 60 with KRYSTAL [facility] ([facility])    [address] [facility] [address]   [phone]            May 01, 2017  3:30 PM CDT   Treatment with Patsy Lopez, SLP   [facility] ([facility])    [address]  [facility] [address]   [phone]            May 02, 2017 10:00 AM CDT   Treatment 60 with Kathryn ABDULLAHI Melissa, OT   [facility] [place] [facility]    [address] [facility] [address]   [phone]            May 04, 2017  4:30 PM CDT   Treatment 60 with Mitzy KRYSTAL Horner   [facility] ([facility] [place] [facility]    [address] [facility] [address]   [phone]            May 08, 2017  1:45 PM CDT   Treatment 60 with Mitzy KRYSTAL Horner   [facility] ([facility] [place] [facility]    [address] [facility] [address]   [phone]              Who to contact     If you have questions or need follow up information about today's clinic visit or your schedule please contact [facility] directly at [phone].  Normal or non-critical lab and imaging results will be communicated to you by [employer], letter or phone within 4 business days after the clinic has received the results. If you do not hear from us within 7 days, please contact the clinic through [employer] or phone. If you have a critical or abnormal lab result, we will notify you by phone as soon as possible.  Submit refill requests through [employer] or call your pharmacy and they will forward the refill request to us. Please allow 3 business days for your refill to be completed.          Additional Information About Your Visit        [employer] Information     [employer] gives you secure access to your electronic health record. If you see a primary care provider, you can also send messages to your care team and make appointments. If you have questions, please call your primary care clinic.  If you do not have a primary care provider, please call [phone] and they will assist you.        Care EveryWhere ID     This is your Care EveryWhere ID. This could be used by other organizations to access your [facility]  "records  [id number]        Your Vitals Were     Pulse Temperature Height BMI (Body Mass Index)          76 98.5  F (36.9  C) (Oral) 5' 5\" (1.651 m) 38.34 kg/m2         Blood Pressure from Last 3 Encounters:   04/20/17 118/72   04/11/17 120/76   04/04/17 112/70    Weight from Last 3 Encounters:   04/20/17 230 lb 6 oz (104.5 kg)   04/04/17 233 lb (105.7 kg)   03/28/17 243 lb (110.2 kg)              Today, you had the following     No orders found for display         Today's Medication Changes          These changes are accurate as of: 4/20/17  1:39 PM.  If you have any questions, ask your nurse or doctor.               These medicines have changed or have updated prescriptions.        Dose/Directions    topiramate 50 MG tablet   Commonly known as:  TOPAMAX   This may have changed:  Another medication with the same name was removed. Continue taking this medication, and follow the directions you see here.   Used for:  Seizure disorder (H), Migraine without aura and with status migrainosus, not intractable   Changed by:  Catrachito Michael MD        2 tablets twice a day.   Quantity:  120 tablet   Refills:  11                Primary Care Provider Office Phone # Fax #    Miguel Hammer PA-C [phone]       [address] [address]        Thank you!     Thank you for choosing [facility]  for your care. Our goal is always to provide you with excellent care. Hearing back from our patients is one way we can continue to improve our services. Please take a few minutes to complete the written survey that you may receive in the mail after your visit with us. Thank you!             Your Updated Medication List - Protect others around you: Learn how to safely use, store and throw away your medicines at www.disposemymeds.org.          This list is accurate as of: 4/20/17  1:39 PM.  Always use your most recent med list.                   Brand Name " Dispense Instructions for use    albuterol 108 (90 BASE) MCG/ACT Inhaler    PROAIR HFA/PROVENTIL HFA/VENTOLIN HFA    3 Inhaler    Inhale 2 puffs into the lungs every 6 hours as needed for shortness of breath / dyspnea or wheezing       cholecalciferol 1000 UNIT tablet    vitamin D    100 tablet    4 caps daily)       clonazePAM 1 MG tablet    klonoPIN    20 tablet    Take 0.5-1 tablets (0.5-1 mg) by mouth nightly as needed       fexofenadine-pseudoePHEDrine 180-240 MG per 24 hr tablet    ALLEGRA-D 24     Take 1 tablet by mouth daily       fluticasone 50 MCG/ACT spray    FLONASE     Spray 1-2 sprays into both nostrils daily as needed for rhinitis or allergies       ibuprofen 400-800 mg tablet    ADVIL,MOTRIN    30 tablet    Take 1-2 tablets by mouth every 6 hours as needed (cramping).       LANsoprazole 30 MG CR capsule    PREVACID    90 capsule    Take 1 capsule (30 mg) by mouth daily Take 30-60 minutes before a meal.       levalbuterol 1.25 MG/0.5ML Nebu neb solution    XOPENEX CONC     Take 1.25 mg by nebulization every 6 hours as needed for wheezing       * levETIRAcetam 500 MG tablet    KEPPRA    60 tablet    Take 1 tablet (500 mg) by mouth 2 times daily       * levETIRAcetam 500 MG tablet    KEPPRA    60 tablet    750 mg in am and 500 mg pm       metFORMIN 500 MG 24 hr tablet    GLUCOPHAGE-XR    180 tablet    Take 1 tablet (500 mg) by mouth 2 times daily (with meals)       mometasone-formoterol 200-5 MCG/ACT oral inhaler    DULERA    39 g    Inhale 2 puffs into the lungs 2 times daily       montelukast 10 MG tablet    SINGULAIR    90 tablet    Take 1 tablet (10 mg) by mouth At Bedtime       polyethylene glycol Packet    MIRALAX/GLYCOLAX     Take 17 g by mouth daily as needed for constipation       predniSONE 20 MG tablet    DELTASONE    25 tablet    Taper by 20 mg every 4 day until stopping on 4/24/17       ranitidine 150 MG tablet    ZANTAC    60 tablet    Take 150 mg by mouth 2 times daily as needed        SERTRALINE HCL PO      Take 150 mg by mouth daily 100mg x 1.5 tabs       topiramate 50 MG tablet    TOPAMAX    120 tablet    2 tablets twice a day.       TRAZODONE HCL PO      Take  mg by mouth At Bedtime       * Notice:  This list has 2 medication(s) that are the same as other medications prescribed for you. Read the directions carefully, and ask your doctor or other care provider to review them with you.

## 2017-04-24 ENCOUNTER — HOSPITAL ENCOUNTER (OUTPATIENT)
Dept: OCCUPATIONAL THERAPY | Facility: CLINIC | Age: 32
Setting detail: THERAPIES SERIES
End: 2017-04-24
Attending: PSYCHIATRY & NEUROLOGY
Payer: COMMERCIAL

## 2017-04-24 PROCEDURE — 40000125 ZZHC STATISTIC OT OUTPT VISIT: Performed by: OCCUPATIONAL THERAPIST

## 2017-04-24 PROCEDURE — 97532 ZZHC OT COGNITIVE SKILLS EA 15 MIN: CPT | Mod: GO | Performed by: OCCUPATIONAL THERAPIST

## 2017-04-24 PROCEDURE — 97112 NEUROMUSCULAR REEDUCATION: CPT | Mod: GO | Performed by: OCCUPATIONAL THERAPIST

## 2017-04-25 ENCOUNTER — HOSPITAL ENCOUNTER (OUTPATIENT)
Dept: OCCUPATIONAL THERAPY | Facility: CLINIC | Age: 32
Setting detail: THERAPIES SERIES
End: 2017-04-25
Attending: PSYCHIATRY & NEUROLOGY
Payer: COMMERCIAL

## 2017-04-25 PROCEDURE — 97110 THERAPEUTIC EXERCISES: CPT | Mod: GO | Performed by: OCCUPATIONAL THERAPIST

## 2017-04-25 PROCEDURE — 40000125 ZZHC STATISTIC OT OUTPT VISIT: Performed by: OCCUPATIONAL THERAPIST

## 2017-04-25 PROCEDURE — 97535 SELF CARE MNGMENT TRAINING: CPT | Mod: GO | Performed by: OCCUPATIONAL THERAPIST

## 2017-04-25 PROCEDURE — 97112 NEUROMUSCULAR REEDUCATION: CPT | Mod: GO | Performed by: OCCUPATIONAL THERAPIST

## 2017-04-26 ENCOUNTER — HOSPITAL ENCOUNTER (OUTPATIENT)
Dept: SPEECH THERAPY | Facility: CLINIC | Age: 32
Setting detail: THERAPIES SERIES
End: 2017-04-26
Attending: PSYCHIATRY & NEUROLOGY
Payer: COMMERCIAL

## 2017-04-26 PROCEDURE — 40000211 ZZHC STATISTIC SLP  DEPARTMENT VISIT: Performed by: SPEECH-LANGUAGE PATHOLOGIST

## 2017-04-26 PROCEDURE — 92523 SPEECH SOUND LANG COMPREHEN: CPT | Mod: GN | Performed by: SPEECH-LANGUAGE PATHOLOGIST

## 2017-04-26 PROCEDURE — 92507 TX SP LANG VOICE COMM INDIV: CPT | Mod: GN | Performed by: SPEECH-LANGUAGE PATHOLOGIST

## 2017-04-29 NOTE — ADDENDUM NOTE
Encounter addended by: Mitzy Horner, OT on: 4/29/2017 12:32 PM<BR>     Actions taken: Flowsheet accepted, Charge Capture section accepted

## 2017-04-29 NOTE — ADDENDUM NOTE
Encounter addended by: Mitzy Horner OT on: 4/29/2017 12:36 PM<BR>     Actions taken: Flowsheet accepted

## 2017-04-29 NOTE — ADDENDUM NOTE
Encounter addended by: Mitzy Horner OT on: 4/29/2017 12:18 PM<BR>     Actions taken: Flowsheet data copied forward, Flowsheet accepted

## 2017-04-29 NOTE — ADDENDUM NOTE
Encounter addended by: Mitzy Horner OT on: 4/29/2017 12:41 PM<BR>     Actions taken: Flowsheet accepted

## 2017-04-29 NOTE — ADDENDUM NOTE
Encounter addended by: Mitzy Horner OT on: 4/29/2017 11:56 AM<BR>     Actions taken: Flowsheet data copied forward, Flowsheet accepted

## 2017-04-29 NOTE — ADDENDUM NOTE
Encounter addended by: Mitzy Horner OT on: 4/29/2017 12:56 PM<BR>     Actions taken: Flowsheet data copied forward, Flowsheet accepted

## 2017-05-01 ENCOUNTER — HOSPITAL ENCOUNTER (OUTPATIENT)
Dept: PHYSICAL THERAPY | Facility: CLINIC | Age: 32
Setting detail: THERAPIES SERIES
End: 2017-05-01
Attending: PSYCHIATRY & NEUROLOGY
Payer: COMMERCIAL

## 2017-05-01 ENCOUNTER — OFFICE VISIT (OUTPATIENT)
Dept: BEHAVIORAL HEALTH | Facility: CLINIC | Age: 32
End: 2017-05-01
Payer: COMMERCIAL

## 2017-05-01 ENCOUNTER — HOSPITAL ENCOUNTER (OUTPATIENT)
Dept: OCCUPATIONAL THERAPY | Facility: CLINIC | Age: 32
Setting detail: THERAPIES SERIES
End: 2017-05-01
Attending: PSYCHIATRY & NEUROLOGY
Payer: COMMERCIAL

## 2017-05-01 DIAGNOSIS — F41.1 GENERALIZED ANXIETY DISORDER: ICD-10-CM

## 2017-05-01 DIAGNOSIS — F33.1 MAJOR DEPRESSIVE DISORDER, RECURRENT EPISODE, MODERATE (H): Primary | ICD-10-CM

## 2017-05-01 PROCEDURE — 97110 THERAPEUTIC EXERCISES: CPT | Mod: GP | Performed by: PHYSICAL THERAPIST

## 2017-05-01 PROCEDURE — 40000719 ZZHC STATISTIC PT DEPARTMENT NEURO VISIT: Performed by: PHYSICAL THERAPIST

## 2017-05-01 PROCEDURE — 97116 GAIT TRAINING THERAPY: CPT | Mod: GP | Performed by: PHYSICAL THERAPIST

## 2017-05-01 PROCEDURE — 97535 SELF CARE MNGMENT TRAINING: CPT | Mod: GO | Performed by: OCCUPATIONAL THERAPIST

## 2017-05-01 PROCEDURE — 40000125 ZZHC STATISTIC OT OUTPT VISIT: Performed by: OCCUPATIONAL THERAPIST

## 2017-05-01 PROCEDURE — 97112 NEUROMUSCULAR REEDUCATION: CPT | Mod: GO | Performed by: OCCUPATIONAL THERAPIST

## 2017-05-01 PROCEDURE — 90791 PSYCH DIAGNOSTIC EVALUATION: CPT | Performed by: SOCIAL WORKER

## 2017-05-01 ASSESSMENT — ANXIETY QUESTIONNAIRES
2. NOT BEING ABLE TO STOP OR CONTROL WORRYING: MORE THAN HALF THE DAYS
6. BECOMING EASILY ANNOYED OR IRRITABLE: SEVERAL DAYS
GAD7 TOTAL SCORE: 11
7. FEELING AFRAID AS IF SOMETHING AWFUL MIGHT HAPPEN: MORE THAN HALF THE DAYS
IF YOU CHECKED OFF ANY PROBLEMS ON THIS QUESTIONNAIRE, HOW DIFFICULT HAVE THESE PROBLEMS MADE IT FOR YOU TO DO YOUR WORK, TAKE CARE OF THINGS AT HOME, OR GET ALONG WITH OTHER PEOPLE: VERY DIFFICULT
5. BEING SO RESTLESS THAT IT IS HARD TO SIT STILL: SEVERAL DAYS
1. FEELING NERVOUS, ANXIOUS, OR ON EDGE: MORE THAN HALF THE DAYS
3. WORRYING TOO MUCH ABOUT DIFFERENT THINGS: MORE THAN HALF THE DAYS

## 2017-05-01 ASSESSMENT — PATIENT HEALTH QUESTIONNAIRE - PHQ9: 5. POOR APPETITE OR OVEREATING: SEVERAL DAYS

## 2017-05-01 NOTE — DISCHARGE INSTRUCTIONS
This Week:    1. Continue exercises- focus on getting weight through your L leg when you do this.     2. Walking- focus on not dragging L foot, using hip and knee to pull leg through and relaxing arms and shoulders.    3. Step-ups x 10 reps using L leg for strengthening.     4. Try and stretch out L back- L arm overhead pulling forward to get that area of pain/knot. Have spouse massage x 5 minute each day if possible to see if this helps.    See you next week!!    Matty, PT, DPT

## 2017-05-01 NOTE — IP AVS SNAPSHOT
MRN:[id number]                      After Visit Summary   5/1/2017    Mary Shipman    MRN: [id number]           Visit Information        Provider Department      5/1/2017  1:45 PM Anamaria Pascual, PT [facility]        Your next 10 appointments already scheduled     May 02, 2017 10:00 AM CDT   Treatment 60 with Kathryn Torres, OT   [facility] ([facility])    [address] [facility] [address]   [phone]            May 02, 2017 12:20 PM CDT   MyChart Long with Miguel Hammer PA-C   [facility]    [address] [place] [address]   [phone]            May 08, 2017  1:45 PM CDT   Treatment 60 with Mitzy Horner OT   [facility] ([facility])    [address] [facility] [address]   [phone]            May 08, 2017  2:45 PM CDT   Treatment 45 with WYATT Mcgee   [facility] ([facility])    [address] [facility] [address]   [phone]            May 11, 2017  2:00 PM CDT   Treatment 45 with Anamaria Pascual, PT   [facility] ([facility])    [address] [facility] [address]   [phone]            May 11, 2017  2:45 PM CDT   Treatment 60 with Mitzy Horner OT   [facility] ([facility])    [address] [facility] [address]   [phone]            May 15, 2017  1:00 PM CDT   Treatment 45 with WYATT Mcgee   [facility] ([facility])    [address] [facility] [address]   [phone]            May 15, 2017  1:45 PM CDT   Treatment 45 with Anamaria Pascual, PT   [facility] ([facility])    [address] [facility] [address]   [phone]            May 15,  2017  2:30 PM CDT   Treatment 60 with Mitzy Horner OT   [facility] ([facility])    [address] [facility] [address] [phone]            May 16, 2017 10:30 AM CDT   Return Visit with López Mehta, [facility] ([facility])    [address] [facility] [address] [phone]                Further instructions from your care team       This Week:    1. Continue exercises- focus on getting weight through your L leg when you do this.     2. Walking- focus on not dragging L foot, using hip and knee to pull leg through and relaxing arms and shoulders.    3. Step-ups x 10 reps using L leg for strengthening.     4. Try and stretch out L back- L arm overhead pulling forward to get that area of pain/knot. Have spouse massage x 5 minute each day if possible to see if this helps.    See you next week!!    Matty, PT, DPT    [employer] Information     [employer] gives you secure access to your electronic health record. If you see a primary care provider, you can also send messages to your care team and make appointments. If you have questions, please call your primary care clinic.  If you do not have a primary care provider, please call [phone] and they will assist you.        Care EveryWhere ID     This is your Care EveryWhere ID. This could be used by other organizations to access your [place] medical records  [id number]

## 2017-05-01 NOTE — IP AVS SNAPSHOT
MRN:[id number]                      After Visit Summary   5/1/2017    Mary Shipman    MRN: [id number]           Visit Information        Provider Department      5/1/2017  1:45 PM Anamaria Pascual, PT [facility]        Your next 10 appointments already scheduled     May 02, 2017 10:00 AM CDT   Treatment 60 with Kathryn Torres, OT   [facility] ([facility])    [address] [facility] [address]   [phone]            May 02, 2017 12:20 PM CDT   MyChart Long with Miguel Hammer PA-C   [facility] ([facility])    [address] [place] [address]   [phone]            May 08, 2017  1:45 PM CDT   Treatment 60 with Mitzy Horner OT   [facility] ([facility])    [address] [facility] [address]   [phone]            May 08, 2017  2:45 PM CDT   Treatment 45 with WYATT Mcgee   [facility])    [address] [facility] [address]   [phone]            May 11, 2017  2:00 PM CDT   Treatment 45 with Anamaria Pascual, PT   [facility] ([facility])    [address] [facility] [address]   [phone]            May 11, 2017  2:45 PM CDT   Treatment 60 with Mitzy Horner OT   [facility] ([facility])    [address] [facility] [address]   [phone]            May 15, 2017  1:00 PM CDT   Treatment 45 with WYATT Mcgee   [facility])    [address] [facility] [address]   [phone]            May 15, 2017  1:45 PM CDT   Treatment 45 with Anamaria Pascual, PT   [facility] ([facility])    [address] [facility] [address]   [phone]            May 15,  2017  2:30 PM CDT   Treatment 60 with Mitzy Horner OT   [facility] ([facility] [place] [facility]    [address] [facility] [address] [phone]            May 16, 2017 10:30 AM CDT   Return Visit with López Mehta, [facility] ([facility])    [address] [facility] [address] [phone]                Further instructions from your care team       This Week:    1. Continue exercises- focus on getting weight through your L leg when you do this.     2. Walking- focus on not dragging L foot, using hip and knee to pull leg through and relaxing arms and shoulders.    3. Step-ups x 10 reps using L leg for strengthening.     4. Try and stretch out L back- L arm overhead pulling forward to get that area of pain/knot. Have spouse massage x 5 minute each day if possible to see if this helps.    See you next week!!    Matty, PT, DPT    [employer] Information     [employer] gives you secure access to your electronic health record. If you see a primary care provider, you can also send messages to your care team and make appointments. If you have questions, please call your primary care clinic.  If you do not have a primary care provider, please call [phone] and they will assist you.        Care EveryWhere ID     This is your Care EveryWhere ID. This could be used by other organizations to access your [place] medical records  [id number]
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
## 2017-05-01 NOTE — MR AVS SNAPSHOT
MRN:[id number]                      After Visit Summary   5/1/2017    Mary Shipman    MRN: [id number]           Visit Information        Provider Department      5/1/2017 11:30 AM López Mehta, [facility] Generic      Your next 10 appointments already scheduled     May 01, 2017  1:45 PM CDT   Treatment 45 with Anamaria Pascual, PT   [facility] ([facility])    [address] [facility] [address]   [phone]            May 01, 2017  2:30 PM CDT   Treatment 60 with Mitzy Horner, OT   [facility] ([facility])    [address] [facility] [address]   [phone]            May 02, 2017 10:00 AM CDT   Treatment 60 with Kathryn Torres, OT   [facility] ([facility])    [address] [facility] [address]   [phone]            May 02, 2017 12:20 PM CDT   MyChart Long with Miguel Hammer PA-C   [facility]    [address] [facility] [address]   [phone]            May 08, 2017  1:45 PM CDT   Treatment 60 with Mitzy Horner, OT   [facility] ([facility])    [address] [facility] [address]   [phone]            May 08, 2017  2:45 PM CDT   Treatment 45 with Patsy Lopez, SLP   [facility] ([facility])    [address] [facility] [address]   [phone]            May 11, 2017  2:00 PM CDT   Treatment 45 with Anamaria Pascual, PT   [facility] ([facility])    [address] [facility] [address]   [phone]            May 11, 2017  2:45 PM CDT   Treatment 60 with Mitzy Horner, OT   [facility] ([facility]    [address] [facility]  [place] [address]   [phone]            May 15, 2017  1:45 PM CDT   Treatment 45 with Anamaria Pascual PT   [facility] [place]    [address] [facility] [address] [phone]            May 15, 2017  2:30 PM CDT   Treatment 60 with Mitzy Horner OT   [facility]    [address] [facility] [address] [phone]              [facility] Information     [facility] gives you secure access to your electronic health record. If you see a primary care provider, you can also send messages to your care team and make appointments. If you have questions, please call your primary care clinic.  If you do not have a primary care provider, please call [phone] and they will assist you.        Care EveryWhere ID     This is your Care EveryWhere ID. This could be used by other organizations to access your [facility] medical records  [id number]

## 2017-05-02 ENCOUNTER — HOSPITAL ENCOUNTER (OUTPATIENT)
Dept: OCCUPATIONAL THERAPY | Facility: CLINIC | Age: 32
Setting detail: THERAPIES SERIES
End: 2017-05-02
Attending: PSYCHIATRY & NEUROLOGY
Payer: COMMERCIAL

## 2017-05-02 PROCEDURE — 97535 SELF CARE MNGMENT TRAINING: CPT | Mod: GO | Performed by: OCCUPATIONAL THERAPIST

## 2017-05-02 PROCEDURE — 40000249 ZZH STATISTIC VISIT LOW VISION CLINIC: Performed by: OCCUPATIONAL THERAPIST

## 2017-05-02 ASSESSMENT — PATIENT HEALTH QUESTIONNAIRE - PHQ9: SUM OF ALL RESPONSES TO PHQ QUESTIONS 1-9: 16

## 2017-05-02 ASSESSMENT — ANXIETY QUESTIONNAIRES: GAD7 TOTAL SCORE: 11

## 2017-05-02 NOTE — DISCHARGE INSTRUCTIONS
Visual Rehabilitation Summary      05/02/2017  1. Computer Settings  a. To make the display bigger  i. Control Panel  ii. Ease of Access  iii. Magnifier  iv.  +  or  -  for amount of magnification  1. You liked 200% on the laptop, less on a larger monitor  2. Phone Settings  a. Settings  i. Display  1. Very large font  2. Brightness control  ii. General  1. Accessibility  a. Vision  Zoom  3. Tinted Glasses  a. Trial light plum for indoor settings that are not too bright  b. Use the medium plum for outdoors and bright environments such as [employer] or [employer]  Kathryn Torres, OTR/L   [phone]

## 2017-05-05 ENCOUNTER — OFFICE VISIT (OUTPATIENT)
Dept: FAMILY MEDICINE | Facility: CLINIC | Age: 32
End: 2017-05-05
Payer: COMMERCIAL

## 2017-05-05 VITALS
SYSTOLIC BLOOD PRESSURE: 98 MMHG | HEART RATE: 84 BPM | HEIGHT: 65 IN | WEIGHT: 234 LBS | BODY MASS INDEX: 38.99 KG/M2 | TEMPERATURE: 99 F | DIASTOLIC BLOOD PRESSURE: 70 MMHG

## 2017-05-05 DIAGNOSIS — F41.1 GENERALIZED ANXIETY DISORDER: ICD-10-CM

## 2017-05-05 DIAGNOSIS — G47.00 INSOMNIA, UNSPECIFIED TYPE: ICD-10-CM

## 2017-05-05 DIAGNOSIS — G43.109 MIGRAINE WITH AURA AND WITHOUT STATUS MIGRAINOSUS, NOT INTRACTABLE: ICD-10-CM

## 2017-05-05 DIAGNOSIS — R56.9 CONVULSIONS, UNSPECIFIED CONVULSION TYPE (H): ICD-10-CM

## 2017-05-05 DIAGNOSIS — G81.94 LEFT HEMIPLEGIA (H): Primary | ICD-10-CM

## 2017-05-05 PROCEDURE — 99214 OFFICE O/P EST MOD 30 MIN: CPT | Performed by: PHYSICIAN ASSISTANT

## 2017-05-05 RX ORDER — CLONAZEPAM 1 MG/1
TABLET ORAL
Qty: 30 TABLET | Refills: 1 | Status: SHIPPED | OUTPATIENT
Start: 2017-05-05 | End: 2017-08-31

## 2017-05-05 ASSESSMENT — ANXIETY QUESTIONNAIRES
1. FEELING NERVOUS, ANXIOUS, OR ON EDGE: NEARLY EVERY DAY
GAD7 TOTAL SCORE: 15
2. NOT BEING ABLE TO STOP OR CONTROL WORRYING: MORE THAN HALF THE DAYS
7. FEELING AFRAID AS IF SOMETHING AWFUL MIGHT HAPPEN: MORE THAN HALF THE DAYS
6. BECOMING EASILY ANNOYED OR IRRITABLE: NEARLY EVERY DAY
3. WORRYING TOO MUCH ABOUT DIFFERENT THINGS: MORE THAN HALF THE DAYS
5. BEING SO RESTLESS THAT IT IS HARD TO SIT STILL: SEVERAL DAYS

## 2017-05-05 ASSESSMENT — PATIENT HEALTH QUESTIONNAIRE - PHQ9: 5. POOR APPETITE OR OVEREATING: MORE THAN HALF THE DAYS

## 2017-05-05 NOTE — NURSING NOTE
"Chief Complaint   Patient presents with     Mass     lump under ribcage     Insomnia       Initial BP 98/70 (Cuff Size: Adult Large)  Pulse 84  Temp 99  F (37.2  C) (Oral)  Ht 5' 5\" (1.651 m)  Wt 234 lb (106.1 kg)  LMP 04/17/2017 (Approximate)  BMI 38.94 kg/m2 Estimated body mass index is 38.94 kg/(m^2) as calculated from the following:    Height as of this encounter: 5' 5\" (1.651 m).    Weight as of this encounter: 234 lb (106.1 kg).  Medication Reconciliation: complete   Kori Jeff, Certified Medical Assistant (AAMA)     "

## 2017-05-05 NOTE — PROGRESS NOTES
SUBJECTIVE:                                                    Mary Shipman is a 32 year old female who presents to clinic today for the following health issues:    Medication Followup of klonopin    Taking Medication as prescribed: yes    Side Effects:  None    Medication Helping Symptoms:  yes     Patient would also like the lump on left side under ribs looked at today.  She states that there is no change. Present for a few years     See previous chart notes. Admitted for syncopal / seizure like activity and evaluated by [facility]. Has seen Dr. Michael. Developed left sided hemiplegia like symptoms, slowed speech and mental slowing. Not completely clear cause - MS has been on the differential.     Per review with Dr. Michael and Neurology evaluations in patient she did have positive EEG findings suggesting a partial seizure, possibly brought on by severe stress. Her other neurologic symptoms not as clear - possibly being MS related vs conversion type issues vs fictitious due to severe stress and anxiety.     Today she reports continued left sided weakness. She is improving. Seeing OT, PT and Speech currently. She has no primary concerns otherwise.    Patient Active Problem List   Diagnosis     Esophageal reflux     Allergic rhinitis due to other allergen     Polycystic ovaries     Thyrotoxicosis     Urticaria     Obesity     Low back pain     Intermittent asthma     HYPERLIPIDEMIA LDL GOAL <160     Irritable bowel syndrome with constipation     Migraine headache     Not immune to rubella     Major depressive disorder, recurrent episode, moderate (H)     Health Care Home     Health care home, active care coordination     Generalized anxiety disorder     Lump or mass in breast     Menometrorrhagia     Seizure (H)      Current Outpatient Prescriptions   Medication     clonazePAM (KLONOPIN) 1 MG tablet     topiramate (TOPAMAX) 50 MG tablet     predniSONE (DELTASONE) 20 MG tablet     levETIRAcetam (KEPPRA) 500 MG  "tablet     cholecalciferol (VITAMIN D) 1000 UNIT tablet     fexofenadine-pseudoePHEDrine (ALLEGRA-D 24) 180-240 MG per 24 hr tablet     fluticasone (FLONASE) 50 MCG/ACT spray     polyethylene glycol (MIRALAX/GLYCOLAX) Packet     SERTRALINE HCL PO     albuterol (PROAIR HFA, PROVENTIL HFA, VENTOLIN HFA) 108 (90 BASE) MCG/ACT inhaler     mometasone-formoterol (DULERA) 200-5 MCG/ACT oral inhaler     metFORMIN (GLUCOPHAGE-XR) 500 MG 24 hr tablet     LANsoprazole (PREVACID) 30 MG capsule     levalbuterol (XOPENEX CONC) 1.25 MG/0.5ML NEBU     montelukast (SINGULAIR) 10 MG tablet     ibuprofen (ADVIL,MOTRIN) 400-800 mg tablet     ranitidine (ZANTAC) 150 MG tablet     No current facility-administered medications for this visit.         Problem list and histories reviewed & adjusted, as indicated.  Additional history: as documented    Labs reviewed in EPIC    Reviewed and updated as needed this visit by clinical staff  Tobacco  Allergies  Med Hx  Surg Hx  Fam Hx  Soc Hx      Reviewed and updated as needed this visit by Provider         ROS:  Constitutional, HEENT, cardiovascular, pulmonary, gi and gu systems are negative, except as otherwise noted.    OBJECTIVE:                                                    BP 98/70 (Cuff Size: Adult Large)  Pulse 84  Temp 99  F (37.2  C) (Oral)  Ht 5' 5\" (1.651 m)  Wt 234 lb (106.1 kg)  LMP 04/17/2017 (Approximate)  BMI 38.94 kg/m2  Body mass index is 38.94 kg/(m^2).  GENERAL: healthy, alert and no distress  NEURO: Left sided mild 4/5 weakness of the lower and upper extremities. CN II-XII otherwise grossly intact.   PSYCH: Psych: Appropriate appearance.  Alert and oriented times 3; occasionally slurred and incoherent speech - but mostly coherent and normal, normal   rate and volume, able to articulate logical thoughts, able   to abstract reason, no tangential thoughts, no hallucinations   or delusions.  Normal behavior.  Her affect is flat.         ASSESSMENT/PLAN:            "                                           (G81.94) Left hemiplegia (H)  (primary encounter diagnosis)  Comment:   Plan: Unclear. Continue to follow with OT, Speech and Neurology. As noted above it is not clear if this is related to an MS type finding vs fictitious/conversion. I don't think she's malingering, she has some baseline psychiatric needs, this might be a real neurologic issue vs a manifestation of her chronic anxiety, somatic dysfunctions. Will discuss with her therapist.     (R56.9) Convulsions, unspecified convulsion type (H)  Comment:   Plan: As noted - managed by neurology     (G47.00) Insomnia, unspecified type  Comment:   Plan: Stable     (F41.1) Generalized anxiety disorder  Comment:   Plan: clonazePAM (KLONOPIN) 1 MG tablet        Stable - using Clonazepam occasionally     (G43.109) Migraine with aura and without status migrainosus, not intractable  Comment:   Plan: Noting a flare this past few days, typical for her usual migraines and seems related to ENT / Allergy cause.     FRANSICO JOHNSON PA-C  [facility]

## 2017-05-05 NOTE — Clinical Note
Luis Antonio - See my note. It isn't completely clear if her symptoms are neurologic vs some kind of psychiatric manifestation per my brief chat with Dr. Michael. I know she had a seizure, it just isn't clear why she has slowed thinking, left sided weakness, etc. I don't think she's malingering, but I do think it is being a bit played up. Franky is back in her life to help her, she has a lot of focus on her medical needs, she has an excuse not to work, etc. Let me know if you see any recognizable patterns of behavior, conversion disorder? I don't know. We might have to have neuropsych evaluate her. Thanks for all your good work.

## 2017-05-05 NOTE — MR AVS SNAPSHOT
After Visit Summary   5/5/2017    Mary Shipman    MRN: [id number]           Patient Information     Date Of Birth          1985        Visit Information        Provider Department      5/5/2017 2:40 PM Miguel Hammer PA-C [facility]        Today's Diagnoses     Migraine with aura and without status migrainosus, not intractable    -  1    Convulsions, unspecified convulsion type (H)        Insomnia, unspecified type        Generalized anxiety disorder           Follow-ups after your visit        Your next 10 appointments already scheduled     May 08, 2017  1:45 PM CDT   Treatment 60 with Mitzy Horner, OT   [facility] ([facility])    [address] [facility] [address]   [phone]            May 08, 2017  2:45 PM CDT   Treatment 45 with Patsy Loepz, SLP   [facility] ([facility])    [address] [facility] [address]   [phone]            May 09, 2017 11:00 AM CDT   Treatment 60 with Kathryn Torres, OT   [facility] ([facility])    [address] [facility] [address]   [phone]            May 11, 2017  2:00 PM CDT   Treatment 45 with Anamaria Pascual, PT   [facility] ([facility])    [address] [facility] [address]   [phone]            May 11, 2017  2:45 PM CDT   Treatment 60 with Mitzy Horner, OT   [facility] ([facility])    [address] [facility] [address]   [phone]            May 15, 2017  1:00 PM CDT   Treatment 45 with Anamaria Pascual, PT   [facility] ([facility])    [address] [facility] [address]   [phone]            May 15, 2017  1:45 PM CDT   Treatment 45 with Patsy Lopez, SLP   [facility] ([facility])    [address] [facility] [address]   [phone]            May 15, 2017  2:30 PM CDT   Treatment 60 with [facility] ([facility])    [address] [facility] [address]   [phone]            May 16, 2017 10:30 AM CDT   Return Visit with L[facility] ([address] [address]   [phone]            May 18, 2017  2:45 PM CDT   Treatment 60 with Mitzy Horner OT   [facility])    [address] [facility] [address]   [phone]              Who to contact     If you have questions or need follow up information about today's clinic visit or your schedule please contact [facility] directly at [phone].  Normal or non-critical lab and imaging results will be communicated to you by [employer], letter or phone within 4 business days after the clinic has received the results. If you do not hear from us within 7 days, please contact the clinic through [employer] or phone. If you have a critical or abnormal lab result, we will notify you by phone as soon as possible.  Submit refill requests through [employer] or call your pharmacy and they will forward the refill request to us. Please allow 3 business days for your refill to be completed.          Additional Information About Your Visit        [employer] Information     [employer] gives you secure access to your electronic health record. If you see a primary care provider, you can also send messages to your care team and make appointments. If you have questions, please call your primary care clinic.  If you do not have a primary care provider, please call [phone] and they will assist you.        Care EveryWhere ID     This is your Care EveryWhere ID. This could be used by other organizations to access your  "[place] medical records  [id number]        Your Vitals Were     Pulse Temperature Height Last Period BMI (Body Mass Index)       84 99  F (37.2  C) (Oral) 5' 5\" (1.651 m) 04/17/2017 (Approximate) 38.94 kg/m2        Blood Pressure from Last 3 Encounters:   05/05/17 98/70   04/20/17 118/72   04/11/17 120/76    Weight from Last 3 Encounters:   05/05/17 234 lb (106.1 kg)   04/20/17 230 lb 6 oz (104.5 kg)   04/04/17 233 lb (105.7 kg)              Today, you had the following     No orders found for display         Today's Medication Changes          These changes are accurate as of: 5/5/17  3:13 PM.  If you have any questions, ask your nurse or doctor.               These medicines have changed or have updated prescriptions.        Dose/Directions    clonazePAM 1 MG tablet   Commonly known as:  klonoPIN   This may have changed:    - how much to take  - how to take this  - when to take this  - reasons to take this  - additional instructions   Used for:  Generalized anxiety disorder   Changed by:  Miguel Hammer PA-C        1 tablet daily in evening   Quantity:  30 tablet   Refills:  1       levETIRAcetam 500 MG tablet   Commonly known as:  KEPPRA   This may have changed:  Another medication with the same name was removed. Continue taking this medication, and follow the directions you see here.   Used for:  Convulsions, unspecified convulsion type (H)   Changed by:  Catrachito Michael MD        750 mg in am and 500 mg pm   Quantity:  60 tablet   Refills:  11         Stop taking these medicines if you haven't already. Please contact your care team if you have questions.     TRAZODONE HCL PO   Stopped by:  Miguel Hammer PA-C                Where to get your medicines      Some of these will need a paper prescription and others can be bought over the counter.  Ask your nurse if you have questions.     Bring a paper prescription for each of these medications     clonazePAM 1 MG tablet                Primary Care " Provider Office Phone # Fax #    Miguel Hammer PA-C [phone]       [facility] [address] [place] [address]        Thank you!     Thank you for choosing [facility]  for your care. Our goal is always to provide you with excellent care. Hearing back from our patients is one way we can continue to improve our services. Please take a few minutes to complete the written survey that you may receive in the mail after your visit with us. Thank you!             Your Updated Medication List - Protect others around you: Learn how to safely use, store and throw away your medicines at www.disposemymeds.org.          This list is accurate as of: 5/5/17  3:13 PM.  Always use your most recent med list.                   Brand Name Dispense Instructions for use    albuterol 108 (90 BASE) MCG/ACT Inhaler    PROAIR HFA/PROVENTIL HFA/VENTOLIN HFA    3 Inhaler    Inhale 2 puffs into the lungs every 6 hours as needed for shortness of breath / dyspnea or wheezing       cholecalciferol 1000 UNIT tablet    vitamin D    100 tablet    4 caps daily)       clonazePAM 1 MG tablet    klonoPIN    30 tablet    1 tablet daily in evening       fexofenadine-pseudoePHEDrine 180-240 MG per 24 hr tablet    ALLEGRA-D 24     Take 1 tablet by mouth daily       fluticasone 50 MCG/ACT spray    FLONASE     Spray 1-2 sprays into both nostrils daily as needed for rhinitis or allergies       ibuprofen 400-800 mg tablet    ADVIL,MOTRIN    30 tablet    Take 1-2 tablets by mouth every 6 hours as needed (cramping).       LANsoprazole 30 MG CR capsule    PREVACID    90 capsule    Take 1 capsule (30 mg) by mouth daily Take 30-60 minutes before a meal.       levalbuterol 1.25 MG/0.5ML Nebu neb solution    XOPENEX CONC     Take 1.25 mg by nebulization every 6 hours as needed for wheezing       levETIRAcetam 500 MG tablet    KEPPRA    60 tablet    750 mg in am and 500 mg pm       metFORMIN 500 MG 24  hr tablet    GLUCOPHAGE-XR    180 tablet    Take 1 tablet (500 mg) by mouth 2 times daily (with meals)       mometasone-formoterol 200-5 MCG/ACT oral inhaler    DULERA    39 g    Inhale 2 puffs into the lungs 2 times daily       montelukast 10 MG tablet    SINGULAIR    90 tablet    Take 1 tablet (10 mg) by mouth At Bedtime       polyethylene glycol Packet    MIRALAX/GLYCOLAX     Take 17 g by mouth daily as needed for constipation       predniSONE 20 MG tablet    DELTASONE    25 tablet    Taper by 20 mg every 4 day until stopping on 4/24/17       ranitidine 150 MG tablet    ZANTAC    60 tablet    Take 150 mg by mouth 2 times daily as needed       SERTRALINE HCL PO      Take 150 mg by mouth daily 100mg x 1.5 tabs       topiramate 50 MG tablet    TOPAMAX    120 tablet    2 tablets twice a day.

## 2017-05-06 ASSESSMENT — ANXIETY QUESTIONNAIRES: GAD7 TOTAL SCORE: 15

## 2017-05-06 ASSESSMENT — PATIENT HEALTH QUESTIONNAIRE - PHQ9: SUM OF ALL RESPONSES TO PHQ QUESTIONS 1-9: 15

## 2017-05-08 ENCOUNTER — HOSPITAL ENCOUNTER (OUTPATIENT)
Dept: SPEECH THERAPY | Facility: CLINIC | Age: 32
Setting detail: THERAPIES SERIES
End: 2017-05-08
Attending: PSYCHIATRY & NEUROLOGY
Payer: COMMERCIAL

## 2017-05-08 ENCOUNTER — HOSPITAL ENCOUNTER (OUTPATIENT)
Dept: OCCUPATIONAL THERAPY | Facility: CLINIC | Age: 32
Setting detail: THERAPIES SERIES
End: 2017-05-08
Attending: PSYCHIATRY & NEUROLOGY
Payer: COMMERCIAL

## 2017-05-08 DIAGNOSIS — H53.141 PHOTOPHOBIA OF RIGHT EYE: Primary | ICD-10-CM

## 2017-05-08 PROCEDURE — 97535 SELF CARE MNGMENT TRAINING: CPT | Mod: GO | Performed by: OCCUPATIONAL THERAPIST

## 2017-05-08 PROCEDURE — 92507 TX SP LANG VOICE COMM INDIV: CPT | Mod: GN | Performed by: SPEECH-LANGUAGE PATHOLOGIST

## 2017-05-08 PROCEDURE — 40000211 ZZHC STATISTIC SLP  DEPARTMENT VISIT: Performed by: SPEECH-LANGUAGE PATHOLOGIST

## 2017-05-08 PROCEDURE — 97112 NEUROMUSCULAR REEDUCATION: CPT | Mod: GO | Performed by: OCCUPATIONAL THERAPIST

## 2017-05-08 PROCEDURE — 40000125 ZZHC STATISTIC OT OUTPT VISIT: Performed by: OCCUPATIONAL THERAPIST

## 2017-05-08 NOTE — ADDENDUM NOTE
Encounter addended by: Patsy Lopez, SLP on: 5/8/2017  3:39 PM<BR>     Actions taken: Episode edited, Flowsheet accepted, Charge Capture section accepted

## 2017-05-08 NOTE — ADDENDUM NOTE
Encounter addended by: Patsy Lopez, SLP on: 5/8/2017 11:58 AM<BR>     Actions taken: Sign clinical note, Flowsheet accepted

## 2017-05-08 NOTE — PROGRESS NOTES
Adult Outpatient Speech and Language Evaluation   04/26/17 1300       Present No   General Information   Type of Evaluation Speech and Language;Dysarthria   Type Of Visit Initial   Start Of Care Date 04/26/17   Referring Physician Miguel Hammer PA-C   Orders Evaluate And Treat   Orders Comment Dysarthria.   Medical Diagnosis s/p seizure   Onset Of Illness/injury Or Date Of Surgery 03/27/17   Precautions/Limitations no known precautions/limitations   Hearing WFL   Surgical/Medical history reviewed Yes   Pertinent History Of Current Problem Mary had a seizure on 3/27/17 from an unknown cause, but may be linked to her long history of migraines. She has been receiving PT and OT services and recently had speech services requested. Mary presents with Dysarthria. This is likely affected by an injury to her tongue when she bit it during her seizure, but continues to have ongoing dysarthria. In addition to Dysarthria, Mary also reports signs of word-finding difficulty.   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 She has not been able to return to work.   Patient Role/employment History Other/comments  (Medical leave from dispatching/communication for [employer].)   Living environment [place]  (With her  and 4 year old son.)   Patient/family Goals To return to her previous abilities, including full time work.   FALL RISK SCREEN   Comments Mary is currently being followed by PT.   Speech   Deficits in Speech Respiration None   Deficits in Phonation None   Deficits in Articulation Other (Comment)  (Unspecified Dysarthria.)   Deficits in Resonance None   Deficits in Prosody None   AIDS-words, % intelligible 98%   Speech Comments Mary has notable dysarthria, affecting her more with longer, sentence length material compared to word-level material. As noted above, Mary had an injury to her tongue during her seizure on 3/27/17 from  "biting her tongue. She reports this may be affecting her speech, but feels that this is not the only cause for the change in her speech. Mary participated in diadochokinetic speech rates with the following results: pa = 30, ta = 24 and ka = 29. [place] was completed 16 times in 12 seconds. Her \"pa\" and \"ka\" productions were mildly slowed with her \"ta\" movements being mild to moderately slowed. This is not surprising, given her tongue injury.   Language: Auditory Comprehension (understanding of spoken language)   Comments (Auditory Comprehension) Receptive skills were not formally assessed. However, Mary showed no difficulty in conversation or complex medical history questions.   Language: Verbal Expression (use of spoken language to express information)   Tests were administered at the following levels Moderate (routine daily activities);Complex (vocation/community/social activities)   [place] Naming Test, start at 30 (out of 60 total) 52  (Mild to moderate increased processing time.)   Generate Sentences; [place] Test for Differential Diagnosis Of Aphasia (out of 6 total) 6   Define Words; [place] Test for Differential Diagnosis Of Aphasia (out of 10 total) 10   Generative Naming Score; Cognitive Linguistic Quick Test 5   Generative Naming; Cognitive Linguistic Quick Test Result Below mean  (Avg for her age range is 6.5)   Conversation; [place] Diagnostic Aphasia Exam rating (out of 5 total) 4   Functional Assessment Scale (Verbal Expression) Mild Impairment   Comments (Verbal Expression) Mary demonstrates mild deficits to her expressive language, word-finding abilities. This, combined with her dysarthria, does have an affect on her expressive communication.   General Therapy Interventions   Planned Therapy Interventions Language;Communication   Language Verbal expression   Communication Improve speech intelligibility   Clinical Impression, SLP Eval   Criteria for Skilled Therapeutic Interventions Met " yes;treatment indicated   SLP Diagnosis Mild expressive language deficits and mild to moderate dysarthria.   Influenced by the following factors/impairments Injury to tongue which may be affecting Mary's dysarthria.   Functional limitations due to impairments The combination of Mary's expressive language deficits and dysarthria result in a mild to moderate affect on her efficiency and effectiveness of her swallow.   Rehab potential affected by Other symptoms including photophobia and decreased attention.   Therapy Frequency 1 time;per week   Predicted Duration of Therapy Intervention (days/wks) 2 months   Risks and Benefits of Treatment have been explained. Yes   Patient, Family & other staff in agreement with plan of care Yes   Clinical Impression Comments Mary presents with mild to moderate dysarthria and expressive language deficits. She would benefit from therapy targeting improved tongue movement and word-finding.   Language/Cognition Goals   Language/Cognition Goals 1;2;3   Language/Cognition Goal 1   Goal Identifier speech   Goal Description Mary will demonstrate at least 90% intelligibility in sentence length material.   Target Date 07/26/17   Language/Cognition Goal 2   Goal Identifier word-finding   Goal Description Mary will complete complex level word-finding tasks with at least 90% accuracy and no more than minimal increased processing time.   Target Date 07/26/17   Language/Cognition Goal 3   Goal Identifier functional   Goal Description Mary will report communication abilities in a range of environments with a range of communication partners with no more than minimal increased processing time.   Target Date 07/26/17   Total Session Time   Total Evaluation Time 40 minutes     Patsy Lopez MA, [facility]-SLP  [facility]  [phone] (Phone)  [phone] (Fax)

## 2017-05-09 ENCOUNTER — TELEPHONE (OUTPATIENT)
Dept: NEUROLOGY | Facility: CLINIC | Age: 32
End: 2017-05-09

## 2017-05-09 ENCOUNTER — HOSPITAL ENCOUNTER (OUTPATIENT)
Dept: OCCUPATIONAL THERAPY | Facility: CLINIC | Age: 32
Setting detail: THERAPIES SERIES
End: 2017-05-09
Attending: PSYCHIATRY & NEUROLOGY
Payer: COMMERCIAL

## 2017-05-09 DIAGNOSIS — H53.10 SUBJECTIVE VISUAL DISTURBANCE: Primary | ICD-10-CM

## 2017-05-09 PROCEDURE — 40000249 ZZH STATISTIC VISIT LOW VISION CLINIC: Performed by: OCCUPATIONAL THERAPIST

## 2017-05-09 PROCEDURE — 97535 SELF CARE MNGMENT TRAINING: CPT | Mod: GO | Performed by: OCCUPATIONAL THERAPIST

## 2017-05-11 ENCOUNTER — HOSPITAL ENCOUNTER (OUTPATIENT)
Dept: PHYSICAL THERAPY | Facility: CLINIC | Age: 32
Setting detail: THERAPIES SERIES
End: 2017-05-11
Attending: PSYCHIATRY & NEUROLOGY
Payer: COMMERCIAL

## 2017-05-11 PROBLEM — G81.94 LEFT HEMIPLEGIA (H): Status: ACTIVE | Noted: 2017-05-11

## 2017-05-11 PROCEDURE — 97110 THERAPEUTIC EXERCISES: CPT | Mod: GP | Performed by: PHYSICAL THERAPIST

## 2017-05-11 PROCEDURE — 97116 GAIT TRAINING THERAPY: CPT | Mod: GP | Performed by: PHYSICAL THERAPIST

## 2017-05-11 PROCEDURE — 40000719 ZZHC STATISTIC PT DEPARTMENT NEURO VISIT: Performed by: PHYSICAL THERAPIST

## 2017-05-15 ENCOUNTER — HOSPITAL ENCOUNTER (OUTPATIENT)
Dept: PHYSICAL THERAPY | Facility: CLINIC | Age: 32
Setting detail: THERAPIES SERIES
End: 2017-05-15
Attending: PSYCHIATRY & NEUROLOGY
Payer: COMMERCIAL

## 2017-05-15 ENCOUNTER — HOSPITAL ENCOUNTER (OUTPATIENT)
Dept: OCCUPATIONAL THERAPY | Facility: CLINIC | Age: 32
Setting detail: THERAPIES SERIES
End: 2017-05-15
Attending: PSYCHIATRY & NEUROLOGY
Payer: COMMERCIAL

## 2017-05-15 ENCOUNTER — HOSPITAL ENCOUNTER (OUTPATIENT)
Dept: SPEECH THERAPY | Facility: CLINIC | Age: 32
Setting detail: THERAPIES SERIES
End: 2017-05-15
Attending: PSYCHIATRY & NEUROLOGY
Payer: COMMERCIAL

## 2017-05-15 PROCEDURE — 40000125 ZZHC STATISTIC OT OUTPT VISIT: Performed by: OCCUPATIONAL THERAPIST

## 2017-05-15 PROCEDURE — 92507 TX SP LANG VOICE COMM INDIV: CPT | Mod: GN | Performed by: SPEECH-LANGUAGE PATHOLOGIST

## 2017-05-15 PROCEDURE — 97116 GAIT TRAINING THERAPY: CPT | Mod: GP | Performed by: PHYSICAL THERAPIST

## 2017-05-15 PROCEDURE — 97112 NEUROMUSCULAR REEDUCATION: CPT | Mod: GO | Performed by: OCCUPATIONAL THERAPIST

## 2017-05-15 PROCEDURE — 97110 THERAPEUTIC EXERCISES: CPT | Mod: GP | Performed by: PHYSICAL THERAPIST

## 2017-05-15 PROCEDURE — 40000211 ZZHC STATISTIC SLP  DEPARTMENT VISIT: Performed by: SPEECH-LANGUAGE PATHOLOGIST

## 2017-05-15 PROCEDURE — 40000719 ZZHC STATISTIC PT DEPARTMENT NEURO VISIT: Performed by: PHYSICAL THERAPIST

## 2017-05-16 ENCOUNTER — OFFICE VISIT (OUTPATIENT)
Dept: OPHTHALMOLOGY | Facility: CLINIC | Age: 32
End: 2017-05-16
Attending: OPHTHALMOLOGY
Payer: COMMERCIAL

## 2017-05-16 DIAGNOSIS — R25.3 EYELID TWITCH: ICD-10-CM

## 2017-05-16 DIAGNOSIS — H53.143 PHOTOPHOBIA OF BOTH EYES: Primary | ICD-10-CM

## 2017-05-16 DIAGNOSIS — H53.10 SUBJECTIVE VISUAL DISTURBANCE: ICD-10-CM

## 2017-05-16 PROCEDURE — 99212 OFFICE O/P EST SF 10 MIN: CPT | Mod: ZF

## 2017-05-16 ASSESSMENT — TONOMETRY
OS_IOP_MMHG: 15
OD_IOP_MMHG: 18
IOP_METHOD: ICARE

## 2017-05-16 ASSESSMENT — EXTERNAL EXAM - RIGHT EYE: OD_EXAM: NORMAL

## 2017-05-16 ASSESSMENT — VISUAL ACUITY
OD_SC: 20/20
OS_SC+: +1
OS_SC: 20/25
METHOD: SNELLEN - LINEAR

## 2017-05-16 ASSESSMENT — CONF VISUAL FIELD
OS_NORMAL: 1
OD_NORMAL: 1

## 2017-05-16 ASSESSMENT — SLIT LAMP EXAM - LIDS
COMMENTS: NORMAL
COMMENTS: NORMAL

## 2017-05-16 ASSESSMENT — CUP TO DISC RATIO
OS_RATIO: 0.1
OD_RATIO: 0.1

## 2017-05-16 ASSESSMENT — EXTERNAL EXAM - LEFT EYE: OS_EXAM: NORMAL

## 2017-05-16 NOTE — NURSING NOTE
Chief Complaints and History of Present Illnesses   Patient presents with     Consult For     photophobia     HPI    Affected eye(s):  Both   Symptoms:        Frequency:  Constant       Do you have eye pain now?:  No      Comments:  Seizures on March 27, 2017  Since she has had seizures she also has photophobia more the LE.    Has had some left side dental nerve sensitivity  Increased blurry  Migraines where better with steroids  Angy Catalan COT 7:39 AM May 16, 2017

## 2017-05-16 NOTE — MR AVS SNAPSHOT
After Visit Summary   5/16/2017    Mary Shipman    MRN: [id number]           Patient Information     Date Of Birth          1985        Visit Information        Provider Department      5/16/2017 7:30 AM Karl Alcantara [facility]        Today's Diagnoses     Photophobia of both eyes    -  1    Subjective visual disturbance        Eyelid twitch           Follow-ups after your visit        Your next 10 appointments already scheduled     May 18, 2017  2:45 PM CDT   Treatment 60 with Mitzy Horner OT   [facility] ([facility])    [address] [facility] [address]   [phone]            May 19, 2017  1:30 PM CDT   Return Visit with López Mehta, [facility] ([address] [address]   [phone]            May 22, 2017  1:00 PM CDT   Treatment 60 with Mitzy Horner OT   [facility] ([facility])    [address] [facility] [address]   [phone]            May 22, 2017  2:00 PM CDT   Treatment 45 with Patsy Lopez, SLP   [place] [facility] ([facility])    [address] [facility] [address]   [phone]            May 24, 2017  9:30 AM CDT   Return Visit with López Mehta, [facility] ([address] [address]   [phone]            May 25, 2017  2:00 PM CDT   Treatment 45 with Anamaria Pascual, PT   [facility] ([facility])    [address] [facility] [address]   [phone]            May 25, 2017  2:45 PM CDT   Treatment 60 with Mitzy Horner OT   [facility] ([facility])    [address] [facility] [address]   [phone]            May 30, 2017  1:00  PM CDT   Treatment 60 with Mitzy Horner OT   [facility] ([facility])    [address] [facility] [address] [phone]            May 30, 2017  2:45 PM CDT   Treatment 45 with Anamaria Pascual, PT   [facility] ([facility])    [address] [facility] [address] [phone]            May 30, 2017  4:30 PM CDT   (Arrive by 4:15 PM)   Return Visit with Catrachito Michael MD   [facility] ([facility])    [address] [facility]  [address]rd [facility] [address] [phone]              Who to contact     Please call your clinic at [phone] to:    Ask questions about your health    Make or cancel appointments    Discuss your medicines    Learn about your test results    Speak to your doctor   If you have compliments or concerns about an experience at your clinic, or if you wish to file a complaint, please contact [facility] Patient Relations at [phone] or email us at [email or web address]         Additional Information About Your Visit        [employer] Medical Information     [email or web address] gives you secure access to your electronic health record. If you see a primary care provider, you can also send messages to your care team and make appointments. If you have questions, please call your primary care clinic.  If you do not have a primary care provider, please call [phone] and they will assist you.      [email or web address] is an electronic gateway that provides easy, online access to your medical records. With [email or web address], you can request a clinic appointment, read your test results, renew a prescription or communicate with your care team.     To access your existing account, please contact your [facility] or call [phone] for assistance.        Care EveryWhere ID     This is your Care EveryWhere ID. This could be used by  other organizations to access your [place] medical records  [id number]        Your Vitals Were     Last Period                   04/17/2017 (Approximate)            Blood Pressure from Last 3 Encounters:   05/05/17 98/70   04/20/17 118/72   04/11/17 120/76    Weight from Last 3 Encounters:   05/05/17 106.1 kg (234 lb)   04/20/17 104.5 kg (230 lb 6 oz)   04/04/17 105.7 kg (233 lb)              We Performed the Following     IOP Measurement        Primary Care Provider Office Phone # Fax #    Miguel Hammer PA-C [phone]       [facility] [address] [place] [id number]        Thank you!     Thank you for choosing [facility]INIC  for your care. Our goal is always to provide you with excellent care. Hearing back from our patients is one way we can continue to improve our services. Please take a few minutes to complete the written survey that you may receive in the mail after your visit with us. Thank you!             Your Updated Medication List - Protect others around you: Learn how to safely use, store and throw away your medicines at www.disposemymeds.org.          This list is accurate as of: 5/16/17 10:51 AM.  Always use your most recent med list.                   Brand Name Dispense Instructions for use    albuterol 108 (90 BASE) MCG/ACT Inhaler    PROAIR HFA/PROVENTIL HFA/VENTOLIN HFA    3 Inhaler    Inhale 2 puffs into the lungs every 6 hours as needed for shortness of breath / dyspnea or wheezing       cholecalciferol 1000 UNIT tablet    vitamin D    100 tablet    4 caps daily)       clonazePAM 1 MG tablet    klonoPIN    30 tablet    1 tablet daily in evening       fexofenadine-pseudoePHEDrine 180-240 MG per 24 hr tablet    ALLEGRA-D 24     Take 1 tablet by mouth daily       fluticasone 50 MCG/ACT spray    FLONASE     Spray 1-2 sprays into both nostrils daily as needed for rhinitis or allergies       ibuprofen 400-800 mg tablet    ADVIL,MOTRIN    30 tablet     Take 1-2 tablets by mouth every 6 hours as needed (cramping).       LANsoprazole 30 MG CR capsule    PREVACID    90 capsule    Take 1 capsule (30 mg) by mouth daily Take 30-60 minutes before a meal.       levalbuterol 1.25 MG/0.5ML Nebu neb solution    XOPENEX CONC     Take 1.25 mg by nebulization every 6 hours as needed for wheezing       levETIRAcetam 500 MG tablet    KEPPRA    60 tablet    750 mg in am and 500 mg pm       metFORMIN 500 MG 24 hr tablet    GLUCOPHAGE-XR    180 tablet    Take 1 tablet (500 mg) by mouth 2 times daily (with meals)       mometasone-formoterol 200-5 MCG/ACT oral inhaler    DULERA    39 g    Inhale 2 puffs into the lungs 2 times daily       montelukast 10 MG tablet    SINGULAIR    90 tablet    Take 1 tablet (10 mg) by mouth At Bedtime       polyethylene glycol Packet    MIRALAX/GLYCOLAX     Take 17 g by mouth daily as needed for constipation       predniSONE 20 MG tablet    DELTASONE    25 tablet    Taper by 20 mg every 4 day until stopping on 4/24/17       ranitidine 150 MG tablet    ZANTAC    60 tablet    Take 150 mg by mouth 2 times daily as needed       SERTRALINE HCL PO      Take 150 mg by mouth daily 100mg x 1.5 tabs       topiramate 50 MG tablet    TOPAMAX    120 tablet    2 tablets twice a day.

## 2017-05-16 NOTE — LETTER
2017         RE:  :  MRN: Mary Shipman  1985  [id number]     Dear Dr. Michael,    Thank you for asking me to see your very pleasant patient, Mary Shipman, in neuro-ophthalmic consultation.  I would like to thank you for sending your records and I have summarized them in the history of present illness.   My assessment and plan are below.  For further details, please see my attached clinic note.          Assessment & Plan     Mary Shipman is a 32 year old female with the following diagnoses:   1. Photophobia of both eyes    2. Subjective visual disturbance    3. Eyelid twitch       Ms. Shipman presents today for evaluation of photophobia, eyelid twitches and migraine with visual aura. She has longstanding migraine and recently started to have ocular symptoms including light sensitivity, and episodes of vision loss since 2016. She was admitted in 2017 to the hospital for evaluation of tonic clonic seizure and brain MRI showed few non specific supratentorial white matter lesions. She also had LP that showed oligoclonal bands with a question about MS.    Today her examination showed normal visual acuity, color vision and confrontation fields. No APD. Normal looking anterior and posterior segments.     We have discussed different options to help with her light sensitivity including FL-41 tints, which she liked, and smart lightbulbs. No evidence of optic neuropathy given the questionable diagnosis of MS. I would recommend a facial EMG since her lid twitches are extremely atypical for hemifacial spasm.  I will ask Dr. Michael if he could possibly arrange.          Again, thank you for allowing me to participate in the care of your patient.      Sincerely,    Karl Alcantara MD  Professor, Neuro-Ophthalmology  Department of Ophthalmology and Visual Neurosciences  [facility]    CC: Catrachito Michael MD   Physicians  [address] [facility] [address]  [facility] [address]  VIA In Basket     Miguel  Prasanth Hammer PA-C  [facility]  [address] [facility] [address]  VIA In Basket     Vamshi Downing MD  [place] [address] [facility] [address]  VIA Facsimile: [phone]     Yehuda Javed  [address] [address]  VIA Facsimile: [phone]     Marciano Powers RN  VIA In Basket       DX = photophobia, atypical hemifacial spasm

## 2017-05-16 NOTE — PROGRESS NOTES
Assessment & Plan     Mary Shipman is a 32 year old female with the following diagnoses:   1. Photophobia of both eyes    2. Subjective visual disturbance    3. Eyelid twitch       Ms. Shipman presents today for evaluation of photophobia, eyelid twitches and migraine with visual aura. She has longstanding migraine and recently started to have ocular symptoms including light sensitivity, and episodes of vision loss since October 2016. She was admitted in March 2017 to the hospital for evaluation of tonic clonic seizure and brain MRI showed few non specific supratentorial white matter lesions. She also had LP that showed oligoclonal bands with a question about MS.    Today her examination showed normal visual acuity, color vision and confrontation fields. No APD. Normal looking anterior and posterior segments.     We have discussed different options to help with her light sensitivity including FL-41 tints, which she liked, and smart lightbulbs. No evidence of optic neuropathy given the questionable diagnosis of MS. I would recommend a facial EMG since her lid twitches are extremely atypical for hemifacial spasm.  I will ask Dr. Michael if he could possibly arrange.           Attending Physician Attestation:  Complete documentation of historical and exam elements from today's encounter can be found in the full encounter summary report (not reduplicated in this progress note).  I personally obtained the chief complaint(s) and history of present illness.  I confirmed and edited as necessary the review of systems, past medical/surgical history, family history, social history, and examination findings as documented by others; and I examined the patient myself.  I personally reviewed the relevant tests, images, and reports as documented above.  I formulated and edited as necessary the assessment and plan and discussed the findings and management plan with the patient and family. - Karl Alcantara MD 10:49 AM  5/16/2017

## 2017-05-19 ENCOUNTER — OFFICE VISIT (OUTPATIENT)
Dept: BEHAVIORAL HEALTH | Facility: CLINIC | Age: 32
End: 2017-05-19
Payer: COMMERCIAL

## 2017-05-19 DIAGNOSIS — F33.1 MAJOR DEPRESSIVE DISORDER, RECURRENT EPISODE, MODERATE (H): Primary | ICD-10-CM

## 2017-05-19 PROCEDURE — 90834 PSYTX W PT 45 MINUTES: CPT | Performed by: SOCIAL WORKER

## 2017-05-19 NOTE — PROGRESS NOTES
"                                             Progress Note    Client Name: Mary Shipman  Date: 5/19/2017          Service Type: Individual      Session Start Time: 130  Session End Time: 215      Session Length: 45m     Session #: 2     Attendees: Client attended alone    Treatment Plan Last Reviewed: 5/19/2017   PHQ-9 / CRUZ-7 :      DATA      Progress Since Last Session (Related to Symptoms / Goals / Homework):   Symptoms: Stable    Homework: Did not complete      Episode of Care Goals: Satisfactory progress - CONTEMPLATION (Considering change and yet undecided); Intervened by assessing the negative and positive thinking (ambivalence) about behavior change     Current / Ongoing Stressors and Concerns:     Things have been \"stressful.\"  Sounds like they closed her department at work, so she does not have a job right now.  Is still on short-term disability now and plans to apply for unemployment.  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  Client has had a friend come over and help her with household tasks during the weekend.  Long discussion today about challenges with treating migraine headaches and depression.  Feels like her migraines are better when she is in a dark room with quiet but knows this is not good for her depression.                    Treatment Objective(s) Addressed in This Session:    Client will use identified behavioral and cognitive skills to challenge negative self talk 90% of the time.       Intervention:   CBT: -   Discussed cognitive restructuring and behavioral activation.  Explored the connection between thoughts, feelings, and actions by using examples relative to client's presenting concerns.  Explained major domains of symptoms (cognitive, emotional, somatic, behavioral, interpersonal) and importance of targeted and specific interventions to reduce symptoms of anxiety and depression.  " Discussed role of symptom monitoring in cognitive behavioral treatment.       ASSESSMENT: Current Emotional / Mental Status (status of significant symptoms):   Risk status (Self / Other harm or suicidal ideation)   Client denies current fears or concerns for personal safety.   Client denies current or recent suicidal ideation or behaviors.   Client denies current or recent homicidal ideation or behaviors.   Client denies current or recent self injurious behavior or ideation.   Client denies other safety concerns.   A safety and risk management plan has not been developed at this time, however client was given the after-hours number / 911 should there be a change in any of these risk factors.     Appearance:   Appropriate    Eye Contact:   Good    Psychomotor Behavior: Retarded (Slowed)    Attitude:   Cooperative    Orientation:   All   Speech    Rate / Production: Monotone  Slow     Volume:  Normal    Mood:    Depressed    Affect:    Blunted    Thought Content:  Clear    Thought Form:  Coherent  Logical    Insight:    Good      Medication Review:   No changes to current psychiatric medication(s)     Medication Compliance:   Yes     Changes in Health Issues:   None reported     Chemical Use Review:   Substance Use: Chemical use reviewed, no active concerns identified      Tobacco Use: No current tobacco use.       Collateral Reports Completed:   Not Applicable    PLAN: (Client Tasks / Therapist Tasks / Other)  1.  More supportive therapy and CBT at next meeting  2.  Client will call to schedule next appointment        LAUREL WebbSW                                                         ________________________________________________________________________    Treatment Plan    Client's Name: Mary Shipman  YOB: 1985    Date: 5/19/2017     DSM5 Diagnoses: (Sustained by DSM5 Criteria Listed Above)  Diagnoses: 296.23 Major Depressive Disorder, Single Episode, Severe _  300.02 (F41.1)  Generalized Anxiety Disorder  Psychosocial & Contextual Factors: finances;  from ; stress with work  WHODAS 2.0 (12 item)  This questionnaire asks about difficulties due to health conditions. Health conditions  include  disease or illnesses, other health problems that may be short or long lasting,  injuries, mental health or emotional problems, and problems with alcohol or drugs.      Think back over the past 30 days and answer these questions, thinking about how much  difficulty you had doing the following activities. For each question, please Chinik only  one response.     S1 Standing for long periods such as 30 minutes? None = 1   S2 Taking care of household responsibilities? Mild = 2   S3 Learning a new task, for example, learning how to get to a new place? None = 1   S4 How much of a problem do you have joining community activities (for example, festivals, Orthodox or other activities) in the same way as anyone else can? Moderate = 3   S5 How much have you been emotionally affected by your health problems? Moderate = 3           In the past 30 days, how much difficulty did you have in:   S6 Concentrating on doing something for ten minutes? Mild = 2   S7 Walking a long distance such as a kilometer (or equivalent)? Mild = 2   S8 Washing your whole body? None = 1   S9 Getting dressed? None = 1   S10 Dealing with people you do not know? None = 1   S11 Maintaining a friendship? Mild = 2   S12 Your day to day work? Mild = 2      H1 Overall, in the past 30 days, how many days were these difficulties present? Record number of days 30   H2 In the past 30 days, for how many days were you totally unable to carry out your usual activities or work because of any health condition? Record number of days 0   H3 In the past 30 days, not counting the days that you were totally unable, for how many days did you cut back or reduce your usual activities or work because of any health condition? Record number of days  30       Referral / Collaboration:  Referral to another professional/service is not indicated at this time..    Anticipated number of session or this episode of care: 12-18      MeasurableTreatment Goal(s) related to diagnosis / functional impairment(s)  Goal-Depression: Client will decrease depressed mood    I will know I've met my goal when I am less depressed.      Objective #A (Client Action)    Status: New - Date: 5/19/2017    Client will use identified behavioral and cognitive skills to challenge negative self talk 90% of the time.    Intervention(s)  Therapist will provide psychoeducation, behavioral activation, and cognitive restructuring.      Objective #B  Client will complete at least 10 minutes of self-regulation practice (e.g.: yoga, meditation, relaxation breathing, etc.) per day.    Status: New - Date: 5/19/2017    Intervention(s)  Therapist will provide psychoeducation, behavioral activation, and cognitive restructuring.      Objective #C  Client will exercise 30 minutes 36 times in the next 12 weeks.  Status: New - Date: 5/19/2017    Intervention(s)  Therapist will provide psychoeducation, behavioral activation, and cognitive restructuring.                Client has reviewed and agreed to the above plan.      Abad Torres, [facility]  May 19, 2017

## 2017-05-19 NOTE — MR AVS SNAPSHOT
MRN:[id number]                      After Visit Summary   5/19/2017    Mary Shipman    MRN: [id number]           Visit Information        Provider Department      5/19/2017 1:30 PM López Mehta, [facility]      Your next 10 appointments already scheduled     May 22, 2017  1:00 PM CDT   Treatment 60 with Mitzy Horner OT   [facility] ([facility])    [address] [facility] [address]   [phone]            May 22, 2017  2:00 PM CDT   Treatment 45 with Patsy Lopez, SLP   [facility]    [address] [facility] [address]   [phone]            May 24, 2017  9:30 AM CDT   [facility]   [facility] ([facility])    [address] [place] [address]   [phone]            May 25, 2017  2:00 PM CDT   Treatment 45 with Anamaria Pascual, PT   [facility] ([facility])    [address] [facility] [address]   [phone]            May 25, 2017  2:45 PM CDT   Treatment 60 with Mitzy Horner, OT   [facility] ([facility])    [address] [facility] [address]   [phone]            May 30, 2017  1:00 PM CDT   Treatment 60 with Mitzy Horner OT   [facility] ([facility])    [address] [facility] [address]   [phone]            May 30, 2017  2:00 PM CDT   Treatment 45 with Patsy Lopez, SLP   [facility] ([facility])    [address] [facility] [address]   [phone]            May 30, 2017  2:45 PM CDT   Treatment 45 with Anamaria Pascual, PT   [facility] ([facility]    [address] [facility]  [address]   [phone]            May 30, 2017  4:30 PM CDT   (Arrive by 4:15 PM)   Return Visit with Catrachito Michael MD   [facility] ([facility])    [address] [facility]  [address]rd [facility] [address]   [phone]            Jun 05, 2017  1:00 PM CDT   Treatment 45 with Anamaria Pascual, PT   [facility] ([facility]    [address] [facility] [address]   [phone]              [employer] Information     [employer] gives you secure access to your electronic health record. If you see a primary care provider, you can also send messages to your care team and make appointments. If you have questions, please call your primary care clinic.  If you do not have a primary care provider, please call [phone] and they will assist you.        Care EveryWhere ID     This is your Care EveryWhere ID. This could be used by other organizations to access your [place] medical records  [id number]

## 2017-05-22 ENCOUNTER — HOSPITAL ENCOUNTER (OUTPATIENT)
Dept: OCCUPATIONAL THERAPY | Facility: CLINIC | Age: 32
Setting detail: THERAPIES SERIES
End: 2017-05-22
Attending: PSYCHIATRY & NEUROLOGY
Payer: COMMERCIAL

## 2017-05-22 ENCOUNTER — HOSPITAL ENCOUNTER (OUTPATIENT)
Dept: SPEECH THERAPY | Facility: CLINIC | Age: 32
Setting detail: THERAPIES SERIES
End: 2017-05-22
Attending: PSYCHIATRY & NEUROLOGY
Payer: COMMERCIAL

## 2017-05-22 PROCEDURE — 97532 ZZHC OT COGNITIVE SKILLS EA 15 MIN: CPT | Mod: GO | Performed by: OCCUPATIONAL THERAPIST

## 2017-05-22 PROCEDURE — 92507 TX SP LANG VOICE COMM INDIV: CPT | Mod: GN | Performed by: SPEECH-LANGUAGE PATHOLOGIST

## 2017-05-22 PROCEDURE — 40000218 ZZH STATISTIC SLP PEDS DEPT VISIT: Performed by: SPEECH-LANGUAGE PATHOLOGIST

## 2017-05-22 PROCEDURE — 40000125 ZZHC STATISTIC OT OUTPT VISIT: Performed by: OCCUPATIONAL THERAPIST

## 2017-05-23 ENCOUNTER — TELEPHONE (OUTPATIENT)
Dept: NEUROLOGY | Facility: CLINIC | Age: 32
End: 2017-05-23

## 2017-05-23 DIAGNOSIS — G40.909 SEIZURE DISORDER (H): Primary | ICD-10-CM

## 2017-05-23 NOTE — TELEPHONE ENCOUNTER
Writer received message from Dr. Michael indicating that patient should have AED levels drawn and that she needed to see a ophthalmologist.  Orders for labs put into chart and printed out; sent to her local [facility] in [place] at Fax #[phone]    Writer called patient to inform her of ophthalmology referral and orders for labs and she indicates that she has already seen an ophthalmologist.    She will go to her local [facility] for blood draws.

## 2017-05-24 ENCOUNTER — OFFICE VISIT (OUTPATIENT)
Dept: BEHAVIORAL HEALTH | Facility: CLINIC | Age: 32
End: 2017-05-24
Payer: COMMERCIAL

## 2017-05-24 DIAGNOSIS — F33.1 MAJOR DEPRESSIVE DISORDER, RECURRENT EPISODE, MODERATE (H): Primary | ICD-10-CM

## 2017-05-24 DIAGNOSIS — G40.909 SEIZURE DISORDER (H): ICD-10-CM

## 2017-05-24 PROCEDURE — 90834 PSYTX W PT 45 MINUTES: CPT | Performed by: SOCIAL WORKER

## 2017-05-24 PROCEDURE — 80177 DRUG SCRN QUAN LEVETIRACETAM: CPT | Mod: 90 | Performed by: PSYCHIATRY & NEUROLOGY

## 2017-05-24 PROCEDURE — 36415 COLL VENOUS BLD VENIPUNCTURE: CPT | Performed by: PSYCHIATRY & NEUROLOGY

## 2017-05-24 PROCEDURE — 80201 ASSAY OF TOPIRAMATE: CPT | Mod: 90 | Performed by: PSYCHIATRY & NEUROLOGY

## 2017-05-24 PROCEDURE — 99000 SPECIMEN HANDLING OFFICE-LAB: CPT | Performed by: PSYCHIATRY & NEUROLOGY

## 2017-05-24 NOTE — PROGRESS NOTES
"                                             Progress Note    Client Name: Mary Shipman  Date: 5/24/2017          Service Type: Individual      Session Start Time: 130  Session End Time: 215      Session Length: 45m     Session #: 3     Attendees: Client attended alone    Treatment Plan Last Reviewed: 5/19/2017   PHQ-9 / CRUZ-7 :      DATA      Progress Since Last Session (Related to Symptoms / Goals / Homework):   Symptoms: Stable    Homework: Did not complete      Episode of Care Goals: Satisfactory progress - CONTEMPLATION (Considering change and yet undecided); Intervened by assessing the negative and positive thinking (ambivalence) about behavior change     Current / Ongoing Stressors and Concerns:     Things have been \"lots of stressl.\"  Has been offered another job but does not think that she will take it.  Sounds like this would be a stressful position, and client is not sure she is up to it at this point.  Also some stress with Franky, they have been fighting more lately; he is not around that much and when they are together they tend to fight.  She does not think that he would be open to couples/family therapy, but we discussed that this would probably be a good support right now.  Also having trouble with her tongue, apparently she bit down on it during the seizure and it is still bothering her.  Doing speech therapy but has trouble with the homework because it hurts.               Treatment Objective(s) Addressed in This Session:    Client will use identified behavioral and cognitive skills to challenge negative self talk 90% of the time.       Intervention:   CBT: -   Discussed cognitive restructuring and behavioral activation.  Explored the connection between thoughts, feelings, and actions by using examples relative to client's presenting concerns.  Explained major domains of symptoms (cognitive, emotional, somatic, behavioral, interpersonal) and importance of targeted and specific interventions to " reduce symptoms of anxiety and depression.  Discussed role of symptom monitoring in cognitive behavioral treatment.       ASSESSMENT: Current Emotional / Mental Status (status of significant symptoms):   Risk status (Self / Other harm or suicidal ideation)   Client denies current fears or concerns for personal safety.   Client denies current or recent suicidal ideation or behaviors.   Client denies current or recent homicidal ideation or behaviors.   Client denies current or recent self injurious behavior or ideation.   Client denies other safety concerns.   A safety and risk management plan has not been developed at this time, however client was given the after-hours number / 911 should there be a change in any of these risk factors.     Appearance:   Appropriate    Eye Contact:   Good    Psychomotor Behavior: Retarded (Slowed)    Attitude:   Cooperative    Orientation:   All   Speech    Rate / Production: Monotone  Slow     Volume:  Normal    Mood:    Depressed    Affect:    Blunted    Thought Content:  Clear    Thought Form:  Coherent  Logical    Insight:    Good      Medication Review:   No changes to current psychiatric medication(s)     Medication Compliance:   Yes     Changes in Health Issues:   None reported     Chemical Use Review:   Substance Use: Chemical use reviewed, no active concerns identified      Tobacco Use: No current tobacco use.       Collateral Reports Completed:   Not Applicable    PLAN: (Client Tasks / Therapist Tasks / Other)  1.  More supportive therapy and CBT at next meeting  2.  Client will call to schedule next appointment        BEHZAD Webb                                                         ________________________________________________________________________    Treatment Plan    Client's Name: Mary Shipman  YOB: 1985    Date: 5/19/2017     DSM5 Diagnoses: (Sustained by DSM5 Criteria Listed Above)  Diagnoses: 296.23 Major Depressive Disorder,  Single Episode, Severe _  300.02 (F41.1) Generalized Anxiety Disorder  Psychosocial & Contextual Factors: finances;  from ; stress with work  WHODAS 2.0 (12 item)  This questionnaire asks about difficulties due to health conditions. Health conditions  include  disease or illnesses, other health problems that may be short or long lasting,  injuries, mental health or emotional problems, and problems with alcohol or drugs.      Think back over the past 30 days and answer these questions, thinking about how much  difficulty you had doing the following activities. For each question, please [place] only  one response.     S1 Standing for long periods such as 30 minutes? None = 1   S2 Taking care of household responsibilities? Mild = 2   S3 Learning a new task, for example, learning how to get to a new place? None = 1   S4 How much of a problem do you have joining community activities (for example, festivals, [employer] or other activities) in the same way as anyone else can? Moderate = 3   S5 How much have you been emotionally affected by your health problems? Moderate = 3           In the past 30 days, how much difficulty did you have in:   S6 Concentrating on doing something for ten minutes? Mild = 2   S7 Walking a long distance such as a kilometer (or equivalent)? Mild = 2   S8 Washing your whole body? None = 1   S9 Getting dressed? None = 1   S10 Dealing with people you do not know? None = 1   S11 Maintaining a friendship? Mild = 2   S12 Your day to day work? Mild = 2      H1 Overall, in the past 30 days, how many days were these difficulties present? Record number of days 30   H2 In the past 30 days, for how many days were you totally unable to carry out your usual activities or work because of any health condition? Record number of days 0   H3 In the past 30 days, not counting the days that you were totally unable, for how many days did you cut back or reduce your usual activities or work because of  any health condition? Record number of days 30       Referral / Collaboration:  Referral to another professional/service is not indicated at this time..    Anticipated number of session or this episode of care: 12-18      MeasurableTreatment Goal(s) related to diagnosis / functional impairment(s)  Goal-Depression: Client will decrease depressed mood    I will know I've met my goal when I am less depressed.      Objective #A (Client Action)    Status: New - Date: 5/19/2017    Client will use identified behavioral and cognitive skills to challenge negative self talk 90% of the time.    Intervention(s)  Therapist will provide psychoeducation, behavioral activation, and cognitive restructuring.      Objective #B  Client will complete at least 10 minutes of self-regulation practice (e.g.: yoga, meditation, relaxation breathing, etc.) per day.    Status: New - Date: 5/19/2017    Intervention(s)  Therapist will provide psychoeducation, behavioral activation, and cognitive restructuring.      Objective #C  Client will exercise 30 minutes 36 times in the next 12 weeks.  Status: New - Date: 5/19/2017    Intervention(s)  Therapist will provide psychoeducation, behavioral activation, and cognitive restructuring.                Client has reviewed and agreed to the above plan.      Abad Torres, [facility]  May 19, 2017

## 2017-05-25 ENCOUNTER — HOSPITAL ENCOUNTER (OUTPATIENT)
Dept: OCCUPATIONAL THERAPY | Facility: CLINIC | Age: 32
Setting detail: THERAPIES SERIES
End: 2017-05-25
Attending: PSYCHIATRY & NEUROLOGY
Payer: COMMERCIAL

## 2017-05-25 LAB
LEVETIRACETAM SERPL-MCNC: 9 UG/ML
TOPIRAMATE SERPL-MCNC: 4 UG/ML

## 2017-05-25 PROCEDURE — 97535 SELF CARE MNGMENT TRAINING: CPT | Mod: GO | Performed by: OCCUPATIONAL THERAPIST

## 2017-05-25 PROCEDURE — 40000125 ZZHC STATISTIC OT OUTPT VISIT: Performed by: OCCUPATIONAL THERAPIST

## 2017-05-26 ENCOUNTER — OFFICE VISIT (OUTPATIENT)
Dept: URGENT CARE | Facility: URGENT CARE | Age: 32
End: 2017-05-26
Payer: COMMERCIAL

## 2017-05-26 VITALS
TEMPERATURE: 97.3 F | DIASTOLIC BLOOD PRESSURE: 80 MMHG | SYSTOLIC BLOOD PRESSURE: 98 MMHG | HEART RATE: 84 BPM | WEIGHT: 231.5 LBS | BODY MASS INDEX: 38.52 KG/M2 | OXYGEN SATURATION: 100 %

## 2017-05-26 DIAGNOSIS — J01.90 ACUTE SINUSITIS WITH SYMPTOMS > 10 DAYS: Primary | ICD-10-CM

## 2017-05-26 PROCEDURE — 99213 OFFICE O/P EST LOW 20 MIN: CPT | Performed by: FAMILY MEDICINE

## 2017-05-26 NOTE — MR AVS SNAPSHOT
After Visit Summary   5/26/2017    Mary Shipman    MRN: [id number]           Patient Information     Date Of Birth          1985        Visit Information        Provider Department      5/26/2017 11:00 AM Pablo Hung MD [facility]        Today's Diagnoses     Acute sinusitis with symptoms > 10 days    -  1       Follow-ups after your visit        Your next 10 appointments already scheduled     May 30, 2017 12:00 PM CDT   Treatment 60 with Kathryn Torres, OT   [facility] ([facility])    [address] [facility] [address]   [phone]            May 30, 2017  2:45 PM CDT   Treatment 45 with Anamaria Pascual PT   [facility] ([facility])    [address] [facility] [address]   [phone]            May 30, 2017  4:30 PM CDT   (Arrive by 4:15 PM)   Return Visit with Catrachito Michael MD   [facility] ([facility])    [address] [place] [address]   [phone]            Jun 05, 2017  9:40 AM CDT   SHORT with Miguel Hammer PA-C   [facility] ([address] [address]   [phone]            Jun 05, 2017  1:00 PM CDT   Treatment 45 with Anamaria Pascual PT   [facility] ([facility])    [address] [facility] [address]   [phone]            Jun 07, 2017  1:30 PM CDT   Neuro Treatment with Mitzy Horner OT   [facility] ([facility])    [address] [facility] [address]   [phone]            Jun 09, 2017 10:30 AM CDT   Return Visit with López Mehta [facility] ([facility])    [address] [place] [id number] [phone]            Jun 13, 2017  10:15 AM CDT   Neuro Treatment with Mitzy Horner OT   [facility] [place]    [address] [facility] [address] [phone]            Jun 13, 2017 11:15 AM CDT   Treatment 45 with Anamaria Pascual, PT   [facility] ([facility]    [address] [facility] [address] [phone]            Jun 13, 2017 12:00 PM CDT   Treatment 45 with Patsy Lopez, SLP   [facility] ([facility]    [address] [facility] [address] [phone]              Who to contact     If you have questions or need follow up information about today's clinic visit or your schedule please contact [facility] directly at [phone].  Normal or non-critical lab and imaging results will be communicated to you by Vouchereshart, letter or phone within 4 business days after the clinic has received the results. If you do not hear from us within 7 days, please contact the clinic through Intelligent Fingerprintingt or phone. If you have a critical or abnormal lab result, we will notify you by phone as soon as possible.  Submit refill requests through Takkle or call your pharmacy and they will forward the refill request to us. Please allow 3 business days for your refill to be completed.          Additional Information About Your Visit        Vouchereshart Information     Takkle gives you secure access to your electronic health record. If you see a primary care provider, you can also send messages to your care team and make appointments. If you have questions, please call your primary care clinic.  If you do not have a primary care provider, please call [phone] and they will assist you.        Care EveryWhere ID     This is your Care EveryWhere ID. This could be used by other organizations to access your Early medical records  [id number]        Your Vitals Were     Pulse Temperature Last Period Pulse  Oximetry Breastfeeding? BMI (Body Mass Index)    84 97.3  F (36.3  C) (Oral) 04/17/2017 (Approximate) 100% No 38.52 kg/m2       Blood Pressure from Last 3 Encounters:   05/26/17 98/80   05/05/17 98/70   04/20/17 118/72    Weight from Last 3 Encounters:   05/26/17 231 lb 8 oz (105 kg)   05/05/17 234 lb (106.1 kg)   04/20/17 230 lb 6 oz (104.5 kg)              Today, you had the following     No orders found for display         Today's Medication Changes          These changes are accurate as of: 5/26/17 12:26 PM.  If you have any questions, ask your nurse or doctor.               Start taking these medicines.        Dose/Directions    amoxicillin-clavulanate 875-125 MG per tablet   Commonly known as:  AUGMENTIN   Used for:  Acute sinusitis with symptoms > 10 days   Started by:  Pablo Hung MD        Dose:  1 tablet   Take 1 tablet by mouth 2 times daily for 10 days   Quantity:  20 tablet   Refills:  0            Where to get your medicines      These medications were sent to [facility]/pharmacy #[id number] - [place], [place] - [address].  [address] [facility] [address]     Phone:  [phone]     amoxicillin-clavulanate 875-125 MG per tablet                Primary Care Provider Office Phone # Fax #    Miguel Hammer PA-C [phone]       [address] [address]        Thank you!     Thank you for choosing [facility]  for your care. Our goal is always to provide you with excellent care. Hearing back from our patients is one way we can continue to improve our services. Please take a few minutes to complete the written survey that you may receive in the mail after your visit with us. Thank you!             Your Updated Medication List - Protect others around you: Learn how to safely use, store and throw away your medicines at www.disposemymeds.org.          This list is accurate as of: 5/26/17 12:26 PM.   Always use your most recent med list.                   Brand Name Dispense Instructions for use    albuterol 108 (90 BASE) MCG/ACT Inhaler    PROAIR HFA/PROVENTIL HFA/VENTOLIN HFA    3 Inhaler    Inhale 2 puffs into the lungs every 6 hours as needed for shortness of breath / dyspnea or wheezing       amoxicillin-clavulanate 875-125 MG per tablet    AUGMENTIN    20 tablet    Take 1 tablet by mouth 2 times daily for 10 days       cholecalciferol 1000 UNIT tablet    vitamin D    100 tablet    4 caps daily)       clonazePAM 1 MG tablet    klonoPIN    30 tablet    1 tablet daily in evening       fexofenadine-pseudoePHEDrine 180-240 MG per 24 hr tablet    ALLEGRA-D 24     Take 1 tablet by mouth daily       fluticasone 50 MCG/ACT spray    FLONASE     Spray 1-2 sprays into both nostrils daily as needed for rhinitis or allergies       ibuprofen 400-800 mg tablet    ADVIL,MOTRIN    30 tablet    Take 1-2 tablets by mouth every 6 hours as needed (cramping).       LANsoprazole 30 MG CR capsule    PREVACID    90 capsule    Take 1 capsule (30 mg) by mouth daily Take 30-60 minutes before a meal.       levalbuterol 1.25 MG/0.5ML Nebu neb solution    XOPENEX CONC     Take 1.25 mg by nebulization every 6 hours as needed for wheezing       levETIRAcetam 500 MG tablet    KEPPRA    60 tablet    750 mg in am and 500 mg pm       metFORMIN 500 MG 24 hr tablet    GLUCOPHAGE-XR    180 tablet    Take 1 tablet (500 mg) by mouth 2 times daily (with meals)       mometasone-formoterol 200-5 MCG/ACT oral inhaler    DULERA    39 g    Inhale 2 puffs into the lungs 2 times daily       montelukast 10 MG tablet    SINGULAIR    90 tablet    Take 1 tablet (10 mg) by mouth At Bedtime       polyethylene glycol Packet    MIRALAX/GLYCOLAX     Take 17 g by mouth daily as needed for constipation       ranitidine 150 MG tablet    ZANTAC    60 tablet    Take 150 mg by mouth 2 times daily as needed       SERTRALINE HCL PO      Take 150 mg by mouth daily 100mg x  1.5 tabs       topiramate 50 MG tablet    TOPAMAX    120 tablet    2 tablets twice a day.

## 2017-05-26 NOTE — NURSING NOTE
"Chief Complaint   Patient presents with     Sinus Problem     present for about 3 weeks, productive cough - brownish greenish color, post nasal drip, ear pressure and sinus pressure and headaches       Initial BP 98/80 (BP Location: Right arm, Patient Position: Supine, Cuff Size: Adult Regular)  Pulse 84  Temp 97.3  F (36.3  C) (Oral)  Wt 231 lb 8 oz (105 kg)  LMP 04/17/2017 (Approximate)  SpO2 100%  Breastfeeding? No  BMI 38.52 kg/m2 Estimated body mass index is 38.52 kg/(m^2) as calculated from the following:    Height as of 5/5/17: 5' 5\" (1.651 m).    Weight as of this encounter: 231 lb 8 oz (105 kg).  Medication Reconciliation: incomplete   Eda Chandler MA    "

## 2017-05-26 NOTE — PROGRESS NOTES
"   Some of this note was populated by a medical assistant.    SUBJECTIVE:                                                    Mary Shipman is a 32 year old female who presents to clinic today for the following health issues:  RESPIRATORY SYMPTOMS      Duration: for about three weeks.     Description  nasal congestion, facial pain/pressure, cough, wheezing, ear pressure bilateral, headache --  Maxillary pressure Left > right , fatigue/malaise and hoarse voice.         Severity: severe    Accompanying signs and symptoms: as indicated above    History (predisposing factors):  strep exposure and asthma    Precipitating or alleviating factors: None    Therapies tried and outcome:  rest and fluids oral decongestant antihistamine acetaminophen nasal spray/wash - with no resolution to symptoms.  She also tried the roc pot.     Hx of sinus infection -- approx 7-8 per year and \"see [facility]\"  -- last appt was a year ago.   ENT \"wants to due surgery for a deviated septum\" has had previous polypectomy.   Hx of seasonal allergy -- allegra D and singulair   Asthma - albuterol and dulera       March 27 th seizure, first seizure while at work. Admitted to the [facility] and did not establish a diagnosis.   \"traces of MS\" -- will see neurology at the \"   May 30 th.   Hx of migraine.     Problem list and histories reviewed & adjusted, as indicated.  Additional history: as documented    Patient Active Problem List   Diagnosis     Esophageal reflux     Allergic rhinitis due to other allergen     Polycystic ovaries     Thyrotoxicosis     Urticaria     Obesity     Low back pain     Intermittent asthma     HYPERLIPIDEMIA LDL GOAL <160     Irritable bowel syndrome with constipation     Migraine headache     Not immune to rubella     Major depressive disorder, recurrent episode, moderate (H)     Health Care Home     Health care home, active care coordination     Generalized anxiety disorder     Lump or mass in breast     " Menometrorrhagia     Seizure (H)     Left hemiplegia (H)     Past Surgical History:   Procedure Laterality Date     C APPENDECTOMY       C NONSPECIFIC PROCEDURE      nasal surgery      TONSILLECTOMY & ADENOIDECTOMY      surgery several times for this       Social History   Substance Use Topics     Smoking status: Never Smoker     Smokeless tobacco: Never Used     Alcohol use No     Family History   Problem Relation Age of Onset     Gynecology Mother      ectopic pregnancy/endometriosis     Cancer - colorectal Mother      Congenital Anomalies Mother      kidney problems     Colon Cancer Mother      Hyperlipidemia Mother      Other Cancer Mother      Lung, Skin, Brain and Colon cancer     Gynecology Sister      ectopic pregnancy/endometriosis     Unknown/Adopted Sister      HEART DISEASE Father      Coronary Artery Disease Father      Psychotic Disorder Brother      Unknown/Adopted Brother      Unknown/Adopted Brother      Unknown/Adopted Brother      Unknown/Adopted Brother      Unknown/Adopted Brother      Unknown/Adopted Sister      CEREBROVASCULAR DISEASE Maternal Grandmother      Unknown/Adopted Maternal Grandfather      Unknown/Adopted Paternal Grandmother      Unknown/Adopted Paternal Grandfather       Other       Other       Other       Other       Other       Other          Current Outpatient Prescriptions   Medication Sig Dispense Refill     clonazePAM (KLONOPIN) 1 MG tablet 1 tablet daily in evening 30 tablet 1     topiramate (TOPAMAX) 50 MG tablet 2 tablets twice a day. 120 tablet 11     levETIRAcetam (KEPPRA) 500 MG tablet 750 mg in am and 500 mg pm 60 tablet 11     cholecalciferol (VITAMIN D) 1000 UNIT tablet 4 caps daily) 100 tablet 3     fexofenadine-pseudoePHEDrine (ALLEGRA-D 24) 180-240 MG per 24 hr tablet Take 1 tablet by mouth daily       fluticasone (FLONASE) 50 MCG/ACT spray Spray 1-2 sprays into both nostrils daily as needed for rhinitis or allergies       polyethylene glycol (MIRALAX/GLYCOLAX)  Packet Take 17 g by mouth daily as needed for constipation       SERTRALINE HCL PO Take 150 mg by mouth daily 100mg x 1.5 tabs       albuterol (PROAIR HFA, PROVENTIL HFA, VENTOLIN HFA) 108 (90 BASE) MCG/ACT inhaler Inhale 2 puffs into the lungs every 6 hours as needed for shortness of breath / dyspnea or wheezing 3 Inhaler 0     mometasone-formoterol (DULERA) 200-5 MCG/ACT oral inhaler Inhale 2 puffs into the lungs 2 times daily 39 g 0     metFORMIN (GLUCOPHAGE-XR) 500 MG 24 hr tablet Take 1 tablet (500 mg) by mouth 2 times daily (with meals) 180 tablet 3     LANsoprazole (PREVACID) 30 MG capsule Take 1 capsule (30 mg) by mouth daily Take 30-60 minutes before a meal. 90 capsule 0     montelukast (SINGULAIR) 10 MG tablet Take 1 tablet (10 mg) by mouth At Bedtime 90 tablet 1     ibuprofen (ADVIL,MOTRIN) 400-800 mg tablet Take 1-2 tablets by mouth every 6 hours as needed (cramping). 30 tablet 0     ranitidine (ZANTAC) 150 MG tablet Take 150 mg by mouth 2 times daily as needed  60 tablet 1     levalbuterol (XOPENEX CONC) 1.25 MG/0.5ML NEBU Take 1.25 mg by nebulization every 6 hours as needed for wheezing       Allergies   Allergen Reactions     Levaquin [Levofloxacin] Anaphylaxis     Acetaminophen      Uncertain - hives/anaphylaxis     Hydrocodone Hives     Vicodin     Hydrocodone-Acetaminophen Rash       Reviewed and updated as needed this visit by clinical staff  Tobacco  Allergies  Meds       Reviewed and updated as needed this visit by Provider         ROS:  Constitutional, HEENT, cardiovascular, pulmonary, gi and gu systems are negative, except as otherwise noted.    OBJECTIVE:                                                    BP 98/80 (BP Location: Right arm, Patient Position: Supine, Cuff Size: Adult Regular)  Pulse 84  Temp 97.3  F (36.3  C) (Oral)  Wt 231 lb 8 oz (105 kg)  LMP 04/17/2017 (Approximate)  SpO2 100%  Breastfeeding? No  BMI 38.52 kg/m2  Body mass index is 38.52 kg/(m^2).  GENERAL:  healthy, alert and no distress  NECK: no adenopathy, no asymmetry, masses, or scars and thyroid normal to palpation  Sinus congestion noted.   RESP: lungs clear to auscultation - no rales, rhonchi or wheezes  CV: regular rate and rhythm, normal S1 S2, no S3 or S4, no murmur, click or rub, no peripheral edema and peripheral pulses strong  ABDOMEN: soft, nontender, no hepatosplenomegaly, no masses and bowel sounds normal  MS: no gross musculoskeletal defects noted, no edema    Diagnostic Test Results:  none      ASSESSMENT/PLAN:                                                        ICD-10-CM    1. Acute sinusitis with symptoms > 10 days J01.90 amoxicillin-clavulanate (AUGMENTIN) 875-125 MG per tablet      PLAN  Continue allergy medicine including nasal saline and nasal steroid spray.   Follow-up per EN recommendations.   Patient educational/instructional material provided including reasons for follow-up    The patient indicates understanding of these issues and agrees with the plan.  Pablo Hung MD      [facility]

## 2017-05-30 ENCOUNTER — OFFICE VISIT (OUTPATIENT)
Dept: NEUROLOGY | Facility: CLINIC | Age: 32
End: 2017-05-30

## 2017-05-30 ENCOUNTER — HOSPITAL ENCOUNTER (OUTPATIENT)
Dept: PHYSICAL THERAPY | Facility: CLINIC | Age: 32
Setting detail: THERAPIES SERIES
End: 2017-05-30
Attending: PSYCHIATRY & NEUROLOGY
Payer: COMMERCIAL

## 2017-05-30 VITALS
RESPIRATION RATE: 20 BRPM | OXYGEN SATURATION: 98 % | SYSTOLIC BLOOD PRESSURE: 107 MMHG | HEART RATE: 91 BPM | HEIGHT: 65 IN | WEIGHT: 233 LBS | BODY MASS INDEX: 38.82 KG/M2 | DIASTOLIC BLOOD PRESSURE: 63 MMHG

## 2017-05-30 DIAGNOSIS — G43.001 MIGRAINE WITHOUT AURA AND WITH STATUS MIGRAINOSUS, NOT INTRACTABLE: ICD-10-CM

## 2017-05-30 DIAGNOSIS — G40.909 SEIZURE DISORDER (H): ICD-10-CM

## 2017-05-30 DIAGNOSIS — R56.9 CONVULSIONS, UNSPECIFIED CONVULSION TYPE (H): ICD-10-CM

## 2017-05-30 PROCEDURE — 97110 THERAPEUTIC EXERCISES: CPT | Mod: GP | Performed by: PHYSICAL THERAPIST

## 2017-05-30 PROCEDURE — 40000188 ZZHC STATISTIC PT OP PEDS VISIT: Performed by: PHYSICAL THERAPIST

## 2017-05-30 RX ORDER — LEVETIRACETAM 500 MG/1
TABLET ORAL
Qty: 60 TABLET | Refills: 11 | Status: SHIPPED | OUTPATIENT
Start: 2017-05-30 | End: 2017-06-12

## 2017-05-30 RX ORDER — TOPIRAMATE 50 MG/1
TABLET, FILM COATED ORAL
Qty: 120 TABLET | Refills: 11 | Status: SHIPPED | OUTPATIENT
Start: 2017-05-30 | End: 2017-06-05

## 2017-05-30 ASSESSMENT — PAIN SCALES - GENERAL: PAINLEVEL: MILD PAIN (2)

## 2017-05-30 NOTE — LETTER
2017       RE: Mary Shipman  [address] [facility] [place] [address]     Dear Colleague,    Thank you for referring your patient, Mary Shipman, to the [facility] NEUROLOGY at [facility]. Please see a copy of my visit note below.    May 30, 2017        Miguel Hammer PA-C   [facility]    [address] [place] [address]      RE: Mary Shipman   MRN: [id number]   : 1985       Dear Dr. Hammer:        Mrs. Mary Shipman is 32 and I have followed her for seizure problem where she had a generalized seizure and a positive EEG.  She is gradually improving.      However, the anticonvulsant medication is still in the lower range and we increased her dose to 750 in the morning and 1000 in the evening.  She has some difficulties with the higher dose before but since she is a little better and will do it gradually she should be all right.  She is still very photophobic and Ophthalmology checked her.  She reports they did not find a significant problem with left eye.  They thought it was related to migraine.  They questioned whether there was left hemifacial spasm.  She still has severe photophobia.  Her headaches have improved, although she is still having frequent several times a week, bad headaches.  She is on Topamax 100 twice a day.      PHYSICAL EXAMINATION:  Today, she has a blood pressure 107/62.  Pulse is 91.  His left eye has some twitching but it is really not hemifacial spasm it is more because of photophobia.  The eye movements full.  The pupils looks good.  We could not do her eyeground exam because she is so photophobic.  She is wearing dark glasses.      In summary, she is seizure-free.  We will increase her levetiracetam dose to a better level and also increase the Topamax to 150 b.i.d.  She had a pressure check in the eye and that was okay.  We will see her in 3 months.        Sincerely,        Catrachito Michael MD

## 2017-05-30 NOTE — MR AVS SNAPSHOT
After Visit Summary   5/30/2017    Mary Shipman    MRN: [id number]           Patient Information     Date Of Birth          1985        Visit Information        Provider Department      5/30/2017 4:30 PM Catrachito Michael MD [facility] Neurology        Today's Diagnoses     Convulsions, unspecified convulsion type (H)        Seizure disorder (H)        Migraine without aura and with status migrainosus, not intractable           Follow-ups after your visit        Follow-up notes from your care team     Return in about 3 months (around 8/30/2017).      Your next 10 appointments already scheduled     May 30, 2017  5:00 PM CDT   LAB with  LAB   [facility] ([facility] [place])    [address] [address] [phone]           Patient must bring picture ID.  Patient should be prepared to give a urine specimen  Please do not eat 10-12 hours before your appointment if you are coming in fasting for labs on lipids, cholesterol, or glucose (sugar).  Pregnant women should follow their Care Team instructions. Water with medications is okay. Do not drink coffee or other fluids.   If you have concerns about taking  your medications, please ask at office or if scheduling via [employer], send a message by clicking on Secure Messaging, Message Your Care Team.            Jun 05, 2017  9:40 AM CDT   SHORT with Miguel Hammer PA-C   [facility]    [address] [place] [id number] [phone]            Jun 05, 2017  1:00 PM CDT   Treatment 45 with Anamaria Pascual, PT   [facility] ([facility])    [address] [facility] [address] [phone]            Jun 06, 2017 12:00 PM CDT   L/Vision Treatment with Kathryn Torres, OT   [facility] ([facility])    [address] [facility] [address]    [phone]            Jun 07, 2017  1:30 PM CDT   Neuro Treatment with Mitzy Horner, OT   [facility] ([facility]    [address] [facility] [address]   [phone]            Jun 09, 2017 10:30 AM CDT   Return Visit with López Mehta, [facility]    [address] [place] [address]   [phone]            Jun 13, 2017 10:15 AM CDT   Neuro Treatment with Mitzy Horner, OT   [facility] ([facility]    [address] [facility] [address]   [phone]            Jun 13, 2017 11:15 AM CDT   Treatment 45 with Anamaria Pascual, PT   [facility]    [address] [facility] [address]   [phone]            Jun 13, 2017 12:00 PM CDT   Treatment 45 with Patsy Lopez, SLP   [place] [place] [facility] ([facility]    [address] [facility] [address]   [phone]            Jun 19, 2017  1:00 PM CDT   Treatment 45 with Anamaria Pascual, PT   [facility] ([facility]    [address] [facility] [address] [phone]              Who to contact     Please call your clinic at [phone] to:    Ask questions about your health    Make or cancel appointments    Discuss your medicines    Learn about your test results    Speak to your doctor   If you have compliments or concerns about an experience at your clinic, or if you wish to file a complaint, please contact [facility] Patient Relations at [phone] or email us at [email or web address]         Additional Information About Your Visit        MyChart Information     [employer] gives you secure access to your electronic health record. If you see a primary care provider, you can also send messages to your care  "team and make appointments. If you have questions, please call your primary care clinic.  If you do not have a primary care provider, please call [phone] and they will assist you.      [employer] is an electronic gateway that provides easy, online access to your medical records. With [employer], you can request a clinic appointment, read your test results, renew a prescription or communicate with your care team.     To access your existing account, please contact your [facility] or call [phone] for assistance.        Care EveryWhere ID     This is your Care EveryWhere ID. This could be used by other organizations to access your [place] medical records  [id number]        Your Vitals Were     Pulse Respirations Height Last Period Pulse Oximetry Breastfeeding?    91 20 1.651 m (5' 5\") 04/17/2017 (Approximate) 98% No    BMI (Body Mass Index)                   38.77 kg/m2            Blood Pressure from Last 3 Encounters:   05/30/17 107/63   05/26/17 98/80   05/05/17 98/70    Weight from Last 3 Encounters:   05/30/17 105.7 kg (233 lb)   05/26/17 105 kg (231 lb 8 oz)   05/05/17 106.1 kg (234 lb)              Today, you had the following     No orders found for display         Today's Medication Changes          These changes are accurate as of: 5/30/17  4:36 PM.  If you have any questions, ask your nurse or doctor.               These medicines have changed or have updated prescriptions.        Dose/Directions    levETIRAcetam 500 MG tablet   Commonly known as:  KEPPRA   This may have changed:  additional instructions   Used for:  Convulsions, unspecified convulsion type (H), Seizure disorder (H), Migraine without aura and with status migrainosus, not intractable   Changed by:  Catrachito Michael MD        750 mg in am and 1000 MG AT BEDTIME.   Quantity:  60 tablet   Refills:  11            Where to get your medicines      These medications were sent to [facility]/pharmacy #[id number] - [place] " [place], [place] - [address]  [address] [facility] [place] [address]     Phone:  [phone]     levETIRAcetam 500 MG tablet    topiramate 50 MG tablet                Primary Care Provider Office Phone # Fax #    Miguel Hammer PA-C [phone]       [place] [address] [facility] [address]        Thank you!     Thank you for choosing [facility] NEUROLOGY  for your care. Our goal is always to provide you with excellent care. Hearing back from our patients is one way we can continue to improve our services. Please take a few minutes to complete the written survey that you may receive in the mail after your visit with us. Thank you!             Your Updated Medication List - Protect others around you: Learn how to safely use, store and throw away your medicines at www.disposemymeds.org.          This list is accurate as of: 5/30/17  4:36 PM.  Always use your most recent med list.                   Brand Name Dispense Instructions for use    albuterol 108 (90 BASE) MCG/ACT Inhaler    PROAIR HFA/PROVENTIL HFA/VENTOLIN HFA    3 Inhaler    Inhale 2 puffs into the lungs every 6 hours as needed for shortness of breath / dyspnea or wheezing       amoxicillin-clavulanate 875-125 MG per tablet    AUGMENTIN    20 tablet    Take 1 tablet by mouth 2 times daily for 10 days       cholecalciferol 1000 UNIT tablet    vitamin D    100 tablet    4 caps daily)       clonazePAM 1 MG tablet    klonoPIN    30 tablet    1 tablet daily in evening       fexofenadine-pseudoePHEDrine 180-240 MG per 24 hr tablet    ALLEGRA-D 24     Take 1 tablet by mouth daily       fluticasone 50 MCG/ACT spray    FLONASE     Spray 1-2 sprays into both nostrils daily as needed for rhinitis or allergies       ibuprofen 400-800 mg tablet    ADVIL,MOTRIN    30 tablet    Take 1-2 tablets by mouth every 6 hours as needed (cramping).       LANsoprazole 30 MG CR capsule    PREVACID    90 capsule    Take 1  capsule (30 mg) by mouth daily Take 30-60 minutes before a meal.       levalbuterol 1.25 MG/0.5ML Nebu neb solution    XOPENEX CONC     Take 1.25 mg by nebulization every 6 hours as needed for wheezing       levETIRAcetam 500 MG tablet    KEPPRA    60 tablet    750 mg in am and 1000 MG AT BEDTIME.       metFORMIN 500 MG 24 hr tablet    GLUCOPHAGE-XR    180 tablet    Take 1 tablet (500 mg) by mouth 2 times daily (with meals)       mometasone-formoterol 200-5 MCG/ACT oral inhaler    DULERA    39 g    Inhale 2 puffs into the lungs 2 times daily       montelukast 10 MG tablet    SINGULAIR    90 tablet    Take 1 tablet (10 mg) by mouth At Bedtime       polyethylene glycol Packet    MIRALAX/GLYCOLAX     Take 17 g by mouth daily as needed for constipation       ranitidine 150 MG tablet    ZANTAC    60 tablet    Take 150 mg by mouth 2 times daily as needed       SERTRALINE HCL PO      Take 150 mg by mouth daily 100mg x 1.5 tabs       topiramate 50 MG tablet    TOPAMAX    120 tablet    2 tablets twice a day.

## 2017-05-31 NOTE — PROGRESS NOTES
May 30, 2017        Miguel Hammer PA-C   [facility]    [address] [place] [address]      RE: Александр Erickson   MRN: [id number]   : 1985              Dear Dr. Hammer:        Mrs. Александр Erickson is 32 and I have followed her for seizure problem where she had a generalized seizure and a positive EEG.  She is gradually improving.      However, the anticonvulsant medication is still in the lower range and we increased her dose to 750 in the morning and 1000 in the evening.  She has some difficulties with the higher dose before but since she is a little better and will do it gradually she should be all right.  She is still very photophobic and Ophthalmology checked her.  She reports they did not find a significant problem with left eye.  They thought it was related to migraine.  They questioned whether there was left hemifacial spasm.  She still has severe photophobia.  Her headaches have improved, although she is still having frequent several times a week, bad headaches.  She is on Topamax 100 twice a day.      PHYSICAL EXAMINATION:  Today, she has a blood pressure 107/62.  Pulse is 91.  His left eye has some twitching but it is really not hemifacial spasm it is more because of photophobia.  The eye movements full.  The pupils looks good.  We could not do her eyeground exam because she is so photophobic.  She is wearing dark glasses.      In summary, she is seizure-free.  We will increase her levetiracetam dose to a better level and also increase the Topamax to 150 b.i.d.  She had a pressure check in the eye and that was okay.  We will see her in 3 months.        Sincerely,        MD SAMI Hart MD             D: 2017 16:36   T: 2017 09:54   MT: tb      Name:     АЛЕКСАНДР ERICKSON   MRN:      [id number]        Account:      [id number]   :      1985           Service Date: 2017      Document: [id number]

## 2017-06-02 ENCOUNTER — TELEPHONE (OUTPATIENT)
Dept: FAMILY MEDICINE | Facility: CLINIC | Age: 32
End: 2017-06-02

## 2017-06-02 DIAGNOSIS — F43.23 ADJUSTMENT DISORDER WITH MIXED ANXIETY AND DEPRESSED MOOD: ICD-10-CM

## 2017-06-02 DIAGNOSIS — F33.1 MAJOR DEPRESSIVE DISORDER, RECURRENT EPISODE, MODERATE (H): ICD-10-CM

## 2017-06-02 RX ORDER — SERTRALINE HYDROCHLORIDE 100 MG/1
TABLET, FILM COATED ORAL
Qty: 135 TABLET | Refills: 1 | Status: SHIPPED | OUTPATIENT
Start: 2017-06-02 | End: 2017-07-21

## 2017-06-02 NOTE — TELEPHONE ENCOUNTER
SERTRALINE HCL PO   150 mg, DAILY   Reorder     Summary: Take 150 mg by mouth daily 100mg x 1.5 tabs   Dose, Route, Frequency: 150 mg, Oral, DAILY  Ord/Sold: 3/27/2017 (O)  Report  Taking:   Long-term:   Med Dose History  EditDiscontinue       Patient Sig: Take 150 mg by mouth daily 100mg x 1.5 tabs       Ordered on: 3/27/2017       Authorized by: UNKNOWN, ENTERED BY HISTORY       Admin Instructions: 100mg x 1.5 tabs           Last Office Visit with LUCY, P or [facility] prescribing provider: 5-5-2017  Future Office visit:    Next 5 appointments (look out 90 days)     Jun 05, 2017  9:40 AM CDT   SHORT with Miguel Hammer PA-C   [facility] ([address] [address]   [phone]            Jun 09, 2017 10:30 AM CDT   Return Visit with BEHZAD Adkins   [facility] ([address] [address]   [phone]            Jun 30, 2017 10:30 AM CDT   Return Visit with BEHZAD Adkins   [facility] ([address] [address]   [phone]                   Routing refill request to provider for review/approval because:  Medication is reported/historical

## 2017-06-02 NOTE — TELEPHONE ENCOUNTER
Routing refill request to provider for review/approval because:  Medication is reported/historical    Joel Lorenzana RN

## 2017-06-05 ENCOUNTER — OFFICE VISIT (OUTPATIENT)
Dept: FAMILY MEDICINE | Facility: CLINIC | Age: 32
End: 2017-06-05
Payer: MEDICAID

## 2017-06-05 ENCOUNTER — HOSPITAL ENCOUNTER (OUTPATIENT)
Dept: PHYSICAL THERAPY | Facility: CLINIC | Age: 32
Setting detail: THERAPIES SERIES
End: 2017-06-05
Attending: PSYCHIATRY & NEUROLOGY
Payer: MEDICAID

## 2017-06-05 VITALS
HEIGHT: 65 IN | SYSTOLIC BLOOD PRESSURE: 124 MMHG | TEMPERATURE: 98.4 F | DIASTOLIC BLOOD PRESSURE: 70 MMHG | WEIGHT: 230 LBS | BODY MASS INDEX: 38.32 KG/M2 | HEART RATE: 76 BPM

## 2017-06-05 DIAGNOSIS — F41.1 GAD (GENERALIZED ANXIETY DISORDER): ICD-10-CM

## 2017-06-05 DIAGNOSIS — R56.9 CONVULSIONS, UNSPECIFIED CONVULSION TYPE (H): ICD-10-CM

## 2017-06-05 DIAGNOSIS — R47.9 SPEECH PROBLEM: ICD-10-CM

## 2017-06-05 DIAGNOSIS — G81.94 LEFT HEMIPLEGIA (H): ICD-10-CM

## 2017-06-05 DIAGNOSIS — H53.149 SENSITIVENESS TO LIGHT: ICD-10-CM

## 2017-06-05 DIAGNOSIS — G40.909 SEIZURE DISORDER (H): Primary | ICD-10-CM

## 2017-06-05 DIAGNOSIS — G43.001 MIGRAINE WITHOUT AURA AND WITH STATUS MIGRAINOSUS, NOT INTRACTABLE: ICD-10-CM

## 2017-06-05 PROCEDURE — 99214 OFFICE O/P EST MOD 30 MIN: CPT | Performed by: PHYSICIAN ASSISTANT

## 2017-06-05 PROCEDURE — 40000719 ZZHC STATISTIC PT DEPARTMENT NEURO VISIT: Performed by: PHYSICAL THERAPIST

## 2017-06-05 PROCEDURE — 97112 NEUROMUSCULAR REEDUCATION: CPT | Mod: GP | Performed by: PHYSICAL THERAPIST

## 2017-06-05 PROCEDURE — 97110 THERAPEUTIC EXERCISES: CPT | Mod: GP | Performed by: PHYSICAL THERAPIST

## 2017-06-05 RX ORDER — TOPIRAMATE 50 MG/1
TABLET, FILM COATED ORAL
Qty: 180 TABLET | Refills: 5 | Status: SHIPPED | OUTPATIENT
Start: 2017-06-05 | End: 2017-06-20

## 2017-06-05 NOTE — NURSING NOTE
Disability forms copied and placed into abstract bin.    Silvia Vale CMA ([facility])

## 2017-06-05 NOTE — NURSING NOTE
"Chief Complaint   Patient presents with     RECHECK     Follow up after seeing neurology     Forms     Disability       Initial /70 (BP Location: Right arm, Cuff Size: Adult Large)  Pulse 76  Temp 98.4  F (36.9  C) (Oral)  Ht 5' 5\" (1.651 m)  Wt 230 lb (104.3 kg)  BMI 38.27 kg/m2 Estimated body mass index is 38.27 kg/(m^2) as calculated from the following:    Height as of this encounter: 5' 5\" (1.651 m).    Weight as of this encounter: 230 lb (104.3 kg).  Medication Reconciliation: complete     Robyn Felton MA     "

## 2017-06-05 NOTE — PROGRESS NOTES
[facility]        OUTPATIENT PHYSICAL THERAPY FUNCTIONAL EVALUATION  PLAN OF TREATMENT FOR OUTPATIENT REHABILITATION  (COMPLETE FOR INITIAL CLAIMS ONLY)  Patient's Last Name, First Name, M.I.  YOB: 1985  ShipmanMary  AARTI     Provider's Name   [facility]   Medical Record No.  [id number]     Start of Care Date:   4/17/17 (MA became active 6/2/17)   Onset Date:   3/27/17   Type:     _X__PT   ____OT  ____SLP Medical Diagnosis:        PT Diagnosis:  L sided weakness and balance deficits  Visits from SOC:  1                              __________________________________________________________________________________  Plan of Treatment/Functional Goals:              GOALS  strength  Patient will perform 6 sit<>stands in 30 secs in order to show improvement in LE strength and activity tolerance to improve ease of functional mobility.   07/15/17    stairs  Patient will be able to negotiate 8 stairs with 1 rail and reciprocal pattern in order to better access her community.   07/15/17    DGI  Patient will score a 15 or > on DGI in order to show improvement with her balance in order to more safely access her community and facilitate return to work.   07/15/17    HEP  Patient will be independent with HEP to continue making functioal gains.   07/15/17       Therapy Frequency:    1 time/week  Predicted Duration of Therapy Intervention: until 7/31/17      Anamaria Pascual PT                                    I CERTIFY THE NEED FOR THESE SERVICES FURNISHED UNDER        THIS PLAN OF TREATMENT AND WHILE UNDER MY CARE     (Physician co-signature of this document indicates review and certification of the therapy plan).                Certification Date From: 6/2/17  Certification Date To:   7/31/17    Referring Provider:   Dr. Sharon Tompkins    Initial Assessment  See Epic  Evaluation-

## 2017-06-05 NOTE — DISCHARGE INSTRUCTIONS
Focus on for PT:    1. Eye Exercise   -Track you progress  -3-5 times/day   -after you feel dizzy- continue for 5-10 more secs  -symptoms should go away after 10-20 minutes    2. Strengthening  -burst of ankle pumps  -standing marching  -clamshell   But can do others     3. Walking more each day- trying to get 15-30 minutes at a time.     4. Work on posture- stretches and strengthening

## 2017-06-05 NOTE — MR AVS SNAPSHOT
After Visit Summary   6/5/2017    Mary Shipman    MRN: [id number]           Patient Information     Date Of Birth          1985        Visit Information        Provider Department      6/5/2017 9:40 AM Miguel Hammer PA-C [facility]        Today's Diagnoses     Seizure disorder (H)    -  1    Migraine without aura and with status migrainosus, not intractable        Convulsions, unspecified convulsion type (H)        Left hemiplegia (H)        Speech problem        Sensitiveness to light        CRUZ (generalized anxiety disorder)           Follow-ups after your visit        Additional Services     NEUROLOGY ADULT REFERRAL       Your provider has referred you to: [facility]: [facility] [place] [phone]   [email or web address]    Reason for Referral: Consult    Please be aware that coverage of these services is subject to the terms and limitations of your health insurance plan.  Call member services at your health plan with any benefit or coverage questions.      Please bring the following with you to your appointment:    (1) Any X-Rays, CTs or MRIs which have been performed.  Contact the facility where they were done to arrange for  prior to your scheduled appointment.    (2) List of current medications  (3) This referral request   (4) Any documents/labs given to you for this referral                  Your next 10 appointments already scheduled     Jun 05, 2017  1:00 PM CDT   Treatment 45 with Anamaria Pascual, PT   [facility]    [address] [facility] [address]   [phone]            Jun 06, 2017 12:00 PM CDT   L/Vision Treatment with Kathryn Torres, OT   [facility] ([facility]    [address] [facility] [address]   [phone]            Jun 09, 2017 10:30 AM CDT   Return Visit with López  Ozzy Mehta, Located within [facility] ([facility])    [address] [facility] [address]   [phone]            Jun 13, 2017 10:15 AM CDT   Neuro Treatment with Mitzy Horner, OT   [facility] ([facility]    [address] [facility] [address]   [phone]            Jun 13, 2017 11:15 AM CDT   Treatment 45 with Anamaria Pascual, PT   [facility]    [address] [facility] [address]   [phone]            Jun 13, 2017 12:00 PM CDT   Treatment 45 with WYATT Mcgee   [facility] ([facility]    [address] [facility] [address]   [phone]            Jun 19, 2017  1:00 PM CDT   Treatment 45 with Anamaria Pascual, PT   [facility] ([facility]    [address] [facility] [address]   [phone]            Jun 19, 2017  1:45 PM CDT   Neuro Treatment with Mitzy Horner, OT   [facility] ([facility]    [address] [facility] [address]   [phone]            Jun 26, 2017  1:45 PM CDT   Neuro Treatment with Anamaria Pascual, PT   [facility] ([facility]    [address] [facility] [address]   [phone]            Jun 26, 2017  2:30 PM CDT   Neuro Treatment with Mitzy Horner, OT   [facility] ([facility]    [address] [facility] [address]   [phone]              Who to contact     If you have questions or need follow up information about today's clinic visit or your schedule please contact [facility] directly at [phone].  Normal or non-critical lab and imaging results will be communicated to you by MyChart, letter or phone within 4 business days after the clinic has  "received the results. If you do not hear from us within 7 days, please contact the clinic through [employer] or phone. If you have a critical or abnormal lab result, we will notify you by phone as soon as possible.  Submit refill requests through [employer] or call your pharmacy and they will forward the refill request to us. Please allow 3 business days for your refill to be completed.          Additional Information About Your Visit        [employer] Information     [employer] gives you secure access to your electronic health record. If you see a primary care provider, you can also send messages to your care team and make appointments. If you have questions, please call your primary care clinic.  If you do not have a primary care provider, please call [phone] and they will assist you.        Care EveryWhere ID     This is your Care EveryWhere ID. This could be used by other organizations to access your [place] medical records  [id number]        Your Vitals Were     Pulse Temperature Height BMI (Body Mass Index)          76 98.4  F (36.9  C) (Oral) 5' 5\" (1.651 m) 38.27 kg/m2         Blood Pressure from Last 3 Encounters:   06/05/17 124/70   05/30/17 107/63   05/26/17 98/80    Weight from Last 3 Encounters:   06/05/17 230 lb (104.3 kg)   05/30/17 233 lb (105.7 kg)   05/26/17 231 lb 8 oz (105 kg)              We Performed the Following     NEUROLOGY ADULT REFERRAL          Today's Medication Changes          These changes are accurate as of: 6/5/17 10:13 AM.  If you have any questions, ask your nurse or doctor.               These medicines have changed or have updated prescriptions.        Dose/Directions    sertraline 100 MG tablet   Commonly known as:  ZOLOFT   This may have changed:  Another medication with the same name was removed. Continue taking this medication, and follow the directions you see here.   Used for:  Major depressive disorder, recurrent episode, moderate (H), Adjustment disorder with mixed anxiety " and depressed mood   Changed by:  Miguel Hammer PA-C        1 and 1/2 table daily   Quantity:  135 tablet   Refills:  1       topiramate 50 MG tablet   Commonly known as:  TOPAMAX   This may have changed:  additional instructions   Used for:  Seizure disorder (H), Migraine without aura and with status migrainosus, not intractable, Convulsions, unspecified convulsion type (H)   Changed by:  Miguel Hammer PA-C        3 tablets twice a day.   Quantity:  180 tablet   Refills:  5         Stop taking these medicines if you haven't already. Please contact your care team if you have questions.     amoxicillin-clavulanate 875-125 MG per tablet   Commonly known as:  AUGMENTIN   Stopped by:  Miguel Hammer PA-C                Where to get your medicines      These medications were sent to [facility]pharm[facility] #[id number] - [place], [place] - [address].  [address] [facility] [address]     Phone:  [phone]     topiramate 50 MG tablet                Primary Care Provider Office Phone # Fax #    Miguel Hammer PA-C [phone]       [address] [address]        Thank you!     Thank you for choosing [facility]  for your care. Our goal is always to provide you with excellent care. Hearing back from our patients is one way we can continue to improve our services. Please take a few minutes to complete the written survey that you may receive in the mail after your visit with us. Thank you!             Your Updated Medication List - Protect others around you: Learn how to safely use, store and throw away your medicines at www.disposemymeds.org.          This list is accurate as of: 6/5/17 10:13 AM.  Always use your most recent med list.                   Brand Name Dispense Instructions for use    albuterol 108 (90 BASE) MCG/ACT Inhaler    PROAIR HFA/PROVENTIL HFA/VENTOLIN HFA    3 Inhaler    Inhale 2 puffs into  the lungs every 6 hours as needed for shortness of breath / dyspnea or wheezing       cholecalciferol 1000 UNIT tablet    vitamin D    100 tablet    4 caps daily)       clonazePAM 1 MG tablet    klonoPIN    30 tablet    1 tablet daily in evening       fexofenadine-pseudoePHEDrine 180-240 MG per 24 hr tablet    ALLEGRA-D 24     Take 1 tablet by mouth daily       fluticasone 50 MCG/ACT spray    FLONASE     Spray 1-2 sprays into both nostrils daily as needed for rhinitis or allergies       ibuprofen 400-800 mg tablet    ADVIL,MOTRIN    30 tablet    Take 1-2 tablets by mouth every 6 hours as needed (cramping).       LANsoprazole 30 MG CR capsule    PREVACID    90 capsule    Take 1 capsule (30 mg) by mouth daily Take 30-60 minutes before a meal.       levalbuterol 1.25 MG/0.5ML Nebu neb solution    XOPENEX CONC     Take 1.25 mg by nebulization every 6 hours as needed for wheezing       levETIRAcetam 500 MG tablet    KEPPRA    60 tablet    750 mg in am and 1000 MG AT BEDTIME.       metFORMIN 500 MG 24 hr tablet    GLUCOPHAGE-XR    180 tablet    Take 1 tablet (500 mg) by mouth 2 times daily (with meals)       mometasone-formoterol 200-5 MCG/ACT oral inhaler    DULERA    39 g    Inhale 2 puffs into the lungs 2 times daily       montelukast 10 MG tablet    SINGULAIR    90 tablet    Take 1 tablet (10 mg) by mouth At Bedtime       polyethylene glycol Packet    MIRALAX/GLYCOLAX     Take 17 g by mouth daily as needed for constipation       ranitidine 150 MG tablet    ZANTAC    60 tablet    Take 150 mg by mouth 2 times daily as needed       sertraline 100 MG tablet    ZOLOFT    135 tablet    1 and 1/2 table daily       topiramate 50 MG tablet    TOPAMAX    180 tablet    3 tablets twice a day.

## 2017-06-05 NOTE — IP AVS SNAPSHOT
MRN:[id number]                      After Visit Summary   6/5/2017    Mary Shipman    MRN: [id number]           Visit Information        Provider Department      6/5/2017  1:00 PM Anamaria Pascual, PT [facility]        Your next 10 appointments already scheduled     Jun 06, 2017 12:00 PM CDT   L/Vision Treatment with Kathryn Torres, OT   [facility] ([facility])    [address] [employer] [address]   [phone]            Jun 09, 2017 10:30 AM CDT   Return Visit with López Mehta, [facility] ([facility])    [address] [place] [address]   [phone]            Jun 13, 2017 10:15 AM CDT   Neuro Treatment with Mitzy Horner, OT   [facility] ([facility])    [address] [employer] [address]   [phone]            Jun 13, 2017 11:15 AM CDT   Treatment 45 with Anamaria Pascual, PT   [facility] ([facility])    [address] [employer] [facility] [address]   [phone]            Jun 13, 2017 12:00 PM CDT   Treatment 45 with Patsy Lopez, SLP   [facility] ([facility])    [address] [employer] [address]   [phone]            Jun 19, 2017  1:00 PM CDT   Treatment 45 with Anamaria Pascual, PT   [facility] ([facility])    [address] [employer] [address]   [phone]            Jun 19, 2017  1:45 PM CDT   Neuro Treatment with Mitzy Horner, OT   [facility] ([facility])    [address] [employer] [address]   [phone]            Jun 20, 2017  1:40 PM CDT   SHORT with Miguel Hammer PA-C   [facility]    [address] [facility] [address]    [phone]            Jun 26, 2017  1:45 PM CDT   Neuro Treatment with Anamaria Pascual, PT   [facility] ([facility])    [address] [facility] [address] [phone]            Jun 26, 2017  2:30 PM CDT   Neuro Treatment with Mitzy Horner, OT   [facility] ([facility])    [address] [facility] [address] [phone]                Further instructions from your care team       Focus on for PT:    1. Eye Exercise   -Track you progress  -3-5 times/day   -after you feel dizzy- continue for 5-10 more secs  -symptoms should go away after 10-20 minutes    2. Strengthening  -burst of ankle pumps  -standing marching  -clamshell   But can do others     3. Walking more each day- trying to get 15-30 minutes at a time.     4. Work on posture- stretches and strengthening         [employer] Information     [employer] gives you secure access to your electronic health record. If you see a primary care provider, you can also send messages to your care team and make appointments. If you have questions, please call your primary care clinic.  If you do not have a primary care provider, please call [phone] and they will assist you.        Care EveryWhere ID     This is your Care EveryWhere ID. This could be used by other organizations to access your [place] medical records  [id number]

## 2017-06-05 NOTE — PROGRESS NOTES
SUBJECTIVE:                                                    Mary Shipman is a 32 year old female who presents to clinic today for the following health issues:    Recheck - see previous chart notes. She is primarily concerned as she notes she doesn't feel she's getting good care from neurology. She reports she has a lot of questions and feels they aren't answered. She's frustrated that it isn't clear what is causing her symptoms. She primarily wants a new neurologist.     My review of the chart and discussions with Dr. Michael have been complete and timely, so I'm not entirely clear on her specific concerns, but she's mostly requesting a 2nd / new opinion from a new neurologist in a new system.    To summarize, it isn't clear what is going on with Mary. 3/27 she was taken via EMS to the [place] ER for a syncopal / seizure like episode and was evaluated, found to have EEG changes suggestive of some seizure like behavior. She was started on anti-seizure meds. The running theory was that she had migraines/seizures.    What hasn't been clear is that she's suffered from left sided hemiplegia, light sensitivity, left sided tongue pain and injury, intermittent slurred speech. She thinks this is mostly related to migraines vs seizure. However, imaging has been normal.    I've been in touch with her OT and Neurologist Dr. Michael as well as her therapist and the goal has been to optimize her current care and encourage her continued work toward improved gait, speech, mood, anxiety management, etc.     Her anxiety she says is okay if she's not thinking about anxiety inducing things like work, family needs, and bills.     Patient Active Problem List   Diagnosis     Esophageal reflux     Allergic rhinitis due to other allergen     Polycystic ovaries     Thyrotoxicosis     Urticaria     Obesity     Low back pain     Intermittent asthma     HYPERLIPIDEMIA LDL GOAL <160     Irritable bowel syndrome with constipation      Migraine headache     Not immune to rubella     Major depressive disorder, recurrent episode, moderate (H)     Health Care Home     Health care home, active care coordination     Generalized anxiety disorder     Lump or mass in breast     Menometrorrhagia     Seizure (H)     Left hemiplegia (H)      Current Outpatient Prescriptions   Medication     topiramate (TOPAMAX) 50 MG tablet     sertraline (ZOLOFT) 100 MG tablet     levETIRAcetam (KEPPRA) 500 MG tablet     clonazePAM (KLONOPIN) 1 MG tablet     cholecalciferol (VITAMIN D) 1000 UNIT tablet     fexofenadine-pseudoePHEDrine (ALLEGRA-D 24) 180-240 MG per 24 hr tablet     fluticasone (FLONASE) 50 MCG/ACT spray     polyethylene glycol (MIRALAX/GLYCOLAX) Packet     albuterol (PROAIR HFA, PROVENTIL HFA, VENTOLIN HFA) 108 (90 BASE) MCG/ACT inhaler     mometasone-formoterol (DULERA) 200-5 MCG/ACT oral inhaler     metFORMIN (GLUCOPHAGE-XR) 500 MG 24 hr tablet     LANsoprazole (PREVACID) 30 MG capsule     levalbuterol (XOPENEX CONC) 1.25 MG/0.5ML NEBU     montelukast (SINGULAIR) 10 MG tablet     ibuprofen (ADVIL,MOTRIN) 400-800 mg tablet     ranitidine (ZANTAC) 150 MG tablet     [DISCONTINUED] topiramate (TOPAMAX) 50 MG tablet     [DISCONTINUED] SERTRALINE HCL PO     No current facility-administered medications for this visit.       Social History     Social History     Marital status:      Spouse name: N/A     Number of children: 0     Years of education: N/A     Occupational History     Not on file.     Social History Main Topics     Smoking status: Never Smoker     Smokeless tobacco: Never Used     Alcohol use No     Drug use: No     Sexual activity: Yes     Partners: Male     Birth control/ protection: None     Other Topics Concern     Parent/Sibling W/ Cabg, Mi Or Angioplasty Before 65f 55m? No     Social History Narrative    Caffeine intake/servings daily - 0    Calcium intake/servings daily - 1-2    Exercise 0 times weekly - describe     Sunscreen used - No  "   Seatbelts used - Yes    Guns stored in the home - No    Self Breast Exam - No    Pap test up to date -  Yes    Eye exam up to date -  Yes    Dental exam up to date -  Yes    DEXA scan up to date -  Not Applicable    Flex Sig/Colonoscopy up to date -  Yes    Mammography up to date -  Not Applicable    Immunizations reviewed and up to date - Yes    Abuse: Current or Past (Physical, Sexual or Emotional) - No    Do you feel safe in your environment - Yes    Do you cope well with stress - Yes    Do you suffer from insomnia - No    Last updated by: STEPHANIE SANDERS  5/21/2012                                        Problem list and histories reviewed & adjusted, as indicated.  Additional history: as documented    Labs reviewed in EPIC    Reviewed and updated as needed this visit by clinical staff  Tobacco  Allergies  Med Hx  Surg Hx  Fam Hx  Soc Hx      Reviewed and updated as needed this visit by Provider         ROS:  Constitutional, HEENT, cardiovascular, pulmonary, gi and gu systems are negative, except as otherwise noted.    OBJECTIVE:                                                    /70 (BP Location: Right arm, Cuff Size: Adult Large)  Pulse 76  Temp 98.4  F (36.9  C) (Oral)  Ht 5' 5\" (1.651 m)  Wt 230 lb (104.3 kg)  BMI 38.27 kg/m2  Body mass index is 38.27 kg/(m^2).  GENERAL: healthy, alert and no distress  NEURO: CN II-XII intact. Speech is clear when she's talking about something non threatening but seems to slur or get stutter like when pressed / stress issues are asked about. She has trace weakness 4-5/5 on the left side, normal on the right. She has slow movements on the right and an unsteady shuffling gait on the left. No spasticity noted.   Psych: Appropriate appearance.  Alert and oriented times 3; coherent speech, normal   rate and volume, able to articulate logical thoughts, able   to abstract reason, no tangential thoughts, no hallucinations   or delusions.  Normal behavior.  Her " "affect is flat.    MOUTH: Linear area of erythema along the left side of the tongue. No open areas.      ASSESSMENT/PLAN:                                                        ICD-10-CM    1. Seizure disorder (H) G40.909 topiramate (TOPAMAX) 50 MG tablet   2. Migraine without aura and with status migrainosus, not intractable G43.001 topiramate (TOPAMAX) 50 MG tablet   3. Convulsions, unspecified convulsion type (H) R56.9 topiramate (TOPAMAX) 50 MG tablet     NEUROLOGY ADULT REFERRAL   4. Left hemiplegia (H) G81.94 NEUROLOGY ADULT REFERRAL   5. Speech problem R47.9 NEUROLOGY ADULT REFERRAL   6. Sensitiveness to light H53.149 NEUROLOGY ADULT REFERRAL   7. CRUZ (generalized anxiety disorder) F41.1       Reviewed. I'm still not clear what is going on. She obviously had seizure like findings on the EEG upon admission, she also has known migraines. MS was not found but not completely ruled out as I understand it.     She continues to do OT and see her therapist.    Her exam and history are not clear. I do not doubt she has very real symptoms to her. However the speech comes and goes and her strength, gait and ROM, seem to come and go. I think some of this could be a conversion disorder of sorts though things aren't really clear.    She wants a second opinion with a new neurologist - that's fine, I think a full neuropsych evaluation somewhere would benefit her as well. For now she's seizure free and migraines are okay though she claims she has a \"constant migraine.\" Her Topamax was just increased to 150 twice daily - will see how that goes.     Overall, she's stable however and no red flags are occurring right now. We can monitor for now and go from there. Follow up with me in 2-3 weeks. Sooner if issues.     Miguel Hammer, IDAS, PA-C   "

## 2017-06-08 ENCOUNTER — MYC MEDICAL ADVICE (OUTPATIENT)
Dept: FAMILY MEDICINE | Facility: CLINIC | Age: 32
End: 2017-06-08

## 2017-06-09 ENCOUNTER — OFFICE VISIT (OUTPATIENT)
Dept: BEHAVIORAL HEALTH | Facility: CLINIC | Age: 32
End: 2017-06-09
Payer: MEDICAID

## 2017-06-09 DIAGNOSIS — F33.1 MAJOR DEPRESSIVE DISORDER, RECURRENT EPISODE, MODERATE (H): Primary | ICD-10-CM

## 2017-06-09 PROCEDURE — 90834 PSYTX W PT 45 MINUTES: CPT | Performed by: SOCIAL WORKER

## 2017-06-09 NOTE — MR AVS SNAPSHOT
MRN:[id number]                      After Visit Summary   6/9/2017    Mary Shipman    MRN: [id number]           Visit Information        Provider Department      6/9/2017 10:30 AM López Mehta, [facility]      Your next 10 appointments already scheduled     Jun 13, 2017 10:15 AM CDT   Neuro Treatment with Mitzy Horner, OT   [facility] ([facility])    [address] [facility] [address]   [phone]            Jun 13, 2017 11:15 AM CDT   Treatment 45 with Anamaria Pascual, PT   [facility] ([facility])    [address] [facility] [address]   [phone]            Jun 13, 2017 12:00 PM CDT   Treatment 45 with Patsy Lopez, SLP   [facility] ([facility])    [address] [facility] [address]   [phone]            Jun 19, 2017  1:00 PM CDT   Treatment 45 with Anamaria Pascual, PT   [facility] ([facility])    [address] [facility] [address]   [phone]            Jun 19, 2017  1:45 PM CDT   Neuro Treatment with Mitzy Horner, OT   [facility] ([facility])    [address] [facility] [address]   [phone]            Jun 20, 2017  1:40 PM CDT   SHORT with Miguel Hammer [facility]-C   [facility]    [address] [facility] [address] [phone]            Jun 26, 2017  1:45 PM CDT   Neuro Treatment with Anamaria Pascual, PT   [facility] ([facility])    [address] [facility] [address]   [phone]            Jun 26, 2017  2:30 PM CDT   Neuro Treatment with Mitzy Horner, OT   [facility] ([facility])    [address] [place] [address]  [place] [address]   [phone]            Jun 30, 2017 10:30 AM CDT   Return Visit with López Mehta [facility] ([facility])    [address] [place] [address] [phone]            Aug 31, 2017  4:30 PM CDT   (Arrive by 4:15 PM)   Return Visit with Catrachito Michael MD   [facility] ([facility])    [address] [place] [id number] [phone]              Your [employer] Information     [employer] gives you secure access to your electronic health record. If you see a primary care provider, you can also send messages to your care team and make appointments. If you have questions, please call your primary care clinic.  If you do not have a primary care provider, please call [phone] and they will assist you.        Care EveryWhere ID     This is your Care EveryWhere ID. This could be used by other organizations to access your [place] medical records  [id number]

## 2017-06-09 NOTE — PROGRESS NOTES
"                                             Progress Note    Client Name: Mary Shipman  Date: 6/9/2017          Service Type: Individual      Session Start Time: 130  Session End Time: 215      Session Length: 45m     Session #: 4     Attendees: Client attended alone    Treatment Plan Last Reviewed: 5/19/2017   PHQ-9 / CRUZ-7 :      DATA      Progress Since Last Session (Related to Symptoms / Goals / Homework):   Symptoms: Stable    Homework: Did not complete      Episode of Care Goals: Satisfactory progress - CONTEMPLATION (Considering change and yet undecided); Intervened by assessing the negative and positive thinking (ambivalence) about behavior change     Current / Ongoing Stressors and Concerns:     Things have been \"pretty crazy.\"  Notes that she has been dealing with \"lots of chaos.\"  She has been able to submit all of the documents to her  for her bankruptcy case.  Did miss a court appearance but is working with her  to get this case handled.  Reports trouble with concentration, attention, and focus.  Irritability has been up as well.  Short-term disability ends later this months and client has submitted the paperwork for long-term disability.  Processed her stress with this.                Led client in a standing devaughn chi meditation exercise focused on breathing, balance, and staying present-focused. Explained neurobiological benefits of relaxation.  Discussed the benefits of daily practice as a way to manage stress and anxiety.  Encouraged client to practice on his own daily 5 minutes in AM, 5 minutes in PM, and whenever client notices an increase in anxiety, depression, stress, or strong emotion.       Treatment Objective(s) Addressed in This Session:    Client will use identified behavioral and cognitive skills to challenge negative self talk 90% of the time.       Intervention:   CBT: -   Discussed cognitive restructuring and behavioral activation.  Explored the connection between " thoughts, feelings, and actions by using examples relative to client's presenting concerns.  Explained major domains of symptoms (cognitive, emotional, somatic, behavioral, interpersonal) and importance of targeted and specific interventions to reduce symptoms of anxiety and depression.  Discussed role of symptom monitoring in cognitive behavioral treatment.       ASSESSMENT: Current Emotional / Mental Status (status of significant symptoms):   Risk status (Self / Other harm or suicidal ideation)   Client denies current fears or concerns for personal safety.   Client denies current or recent suicidal ideation or behaviors.   Client denies current or recent homicidal ideation or behaviors.   Client denies current or recent self injurious behavior or ideation.   Client denies other safety concerns.   A safety and risk management plan has not been developed at this time, however client was given the after-hours number / 911 should there be a change in any of these risk factors.     Appearance:   Appropriate    Eye Contact:   Good    Psychomotor Behavior: Retarded (Slowed)    Attitude:   Cooperative    Orientation:   All   Speech    Rate / Production: Monotone  Slow     Volume:  Normal    Mood:    Depressed    Affect:    Blunted    Thought Content:  Clear    Thought Form:  Coherent  Logical    Insight:    Good      Medication Review:   No changes to current psychiatric medication(s)     Medication Compliance:   Yes     Changes in Health Issues:   None reported     Chemical Use Review:   Substance Use: Chemical use reviewed, no active concerns identified      Tobacco Use: No current tobacco use.       Collateral Reports Completed:   Routed note to PCP  Communicated with: clinic     PLAN: (Client Tasks / Therapist Tasks / Other)  1.  More supportive therapy and CBT at next meeting  2.  Standing meditation 5 mn am/pm  3.  Client will call to schedule next appointment        BEHZAD Webb                                                          ________________________________________________________________________    Treatment Plan    Client's Name: Mary Shipman  YOB: 1985    Date: 5/19/2017     DSM5 Diagnoses: (Sustained by DSM5 Criteria Listed Above)  Diagnoses: 296.23 Major Depressive Disorder, Single Episode, Severe _  300.02 (F41.1) Generalized Anxiety Disorder  Psychosocial & Contextual Factors: finances;  from ; stress with work  WHODAS 2.0 (12 item)  This questionnaire asks about difficulties due to health conditions. Health conditions  include  disease or illnesses, other health problems that may be short or long lasting,  injuries, mental health or emotional problems, and problems with alcohol or drugs.      Think back over the past 30 days and answer these questions, thinking about how much  difficulty you had doing the following activities. For each question, please [place] only  one response.     S1 Standing for long periods such as 30 minutes? None = 1   S2 Taking care of household responsibilities? Mild = 2   S3 Learning a new task, for example, learning how to get to a new place? None = 1   S4 How much of a problem do you have joining community activities (for example, festivals, Jehovah's witness or other activities) in the same way as anyone else can? Moderate = 3   S5 How much have you been emotionally affected by your health problems? Moderate = 3           In the past 30 days, how much difficulty did you have in:   S6 Concentrating on doing something for ten minutes? Mild = 2   S7 Walking a long distance such as a kilometer (or equivalent)? Mild = 2   S8 Washing your whole body? None = 1   S9 Getting dressed? None = 1   S10 Dealing with people you do not know? None = 1   S11 Maintaining a friendship? Mild = 2   S12 Your day to day work? Mild = 2      H1 Overall, in the past 30 days, how many days were these difficulties present? Record number of days 30   H2 In the  past 30 days, for how many days were you totally unable to carry out your usual activities or work because of any health condition? Record number of days 0   H3 In the past 30 days, not counting the days that you were totally unable, for how many days did you cut back or reduce your usual activities or work because of any health condition? Record number of days 30       Referral / Collaboration:  Referral to another professional/service is not indicated at this time..    Anticipated number of session or this episode of care: 12-18      MeasurableTreatment Goal(s) related to diagnosis / functional impairment(s)  Goal-Depression: Client will decrease depressed mood    I will know I've met my goal when I am less depressed.      Objective #A (Client Action)    Status: New - Date: 5/19/2017    Client will use identified behavioral and cognitive skills to challenge negative self talk 90% of the time.    Intervention(s)  Therapist will provide psychoeducation, behavioral activation, and cognitive restructuring.      Objective #B  Client will complete at least 10 minutes of self-regulation practice (e.g.: yoga, meditation, relaxation breathing, etc.) per day.    Status: New - Date: 5/19/2017    Intervention(s)  Therapist will provide psychoeducation, behavioral activation, and cognitive restructuring.      Objective #C  Client will exercise 30 minutes 36 times in the next 12 weeks.  Status: New - Date: 5/19/2017    Intervention(s)  Therapist will provide psychoeducation, behavioral activation, and cognitive restructuring.                Client has reviewed and agreed to the above plan.      Abad Torres, [facility]  May 19, 2017

## 2017-06-09 NOTE — Clinical Note
Hi all, Working with Mary and it sounds like she has run into a snag with her insurance.  Has been working with the county to get insurance straightened out.  Wondering if Olga might have some suggestions or ideas??? -Luis Antonio

## 2017-06-12 DIAGNOSIS — G43.001 MIGRAINE WITHOUT AURA AND WITH STATUS MIGRAINOSUS, NOT INTRACTABLE: ICD-10-CM

## 2017-06-12 DIAGNOSIS — G40.909 SEIZURE DISORDER (H): ICD-10-CM

## 2017-06-12 DIAGNOSIS — R56.9 CONVULSIONS, UNSPECIFIED CONVULSION TYPE (H): ICD-10-CM

## 2017-06-12 RX ORDER — LEVETIRACETAM 500 MG/1
TABLET ORAL
Qty: 60 TABLET | Refills: 11 | Status: SHIPPED | OUTPATIENT
Start: 2017-06-12 | End: 2017-06-20

## 2017-06-15 NOTE — PROGRESS NOTES
Chart reviewed.  Encounter was not reviewed with provider.  Patient was not examined by me.  Amina Fraga MD

## 2017-06-16 ENCOUNTER — TELEPHONE (OUTPATIENT)
Dept: NEUROLOGY | Facility: CLINIC | Age: 32
End: 2017-06-16

## 2017-06-16 NOTE — TELEPHONE ENCOUNTER
Received phone transferred from triage nurse who indicates patient is upset about issues with not being able to contact pt records about getting her records.  Spoke with patient who asks to have them sent to her home address.  Writer called pt. Records with patient information and request that they give her a call.  Person who answered took information and agreed to call patient.

## 2017-06-19 ENCOUNTER — HOSPITAL ENCOUNTER (OUTPATIENT)
Dept: PHYSICAL THERAPY | Facility: CLINIC | Age: 32
Setting detail: THERAPIES SERIES
End: 2017-06-19
Attending: PSYCHIATRY & NEUROLOGY
Payer: MEDICAID

## 2017-06-19 ENCOUNTER — HOSPITAL ENCOUNTER (OUTPATIENT)
Dept: OCCUPATIONAL THERAPY | Facility: CLINIC | Age: 32
Setting detail: THERAPIES SERIES
End: 2017-06-19
Attending: PSYCHIATRY & NEUROLOGY
Payer: MEDICAID

## 2017-06-19 PROCEDURE — 97535 SELF CARE MNGMENT TRAINING: CPT | Mod: GO | Performed by: OCCUPATIONAL THERAPIST

## 2017-06-19 PROCEDURE — 40000719 ZZHC STATISTIC PT DEPARTMENT NEURO VISIT: Performed by: PHYSICAL THERAPIST

## 2017-06-19 PROCEDURE — 97112 NEUROMUSCULAR REEDUCATION: CPT | Mod: GP | Performed by: PHYSICAL THERAPIST

## 2017-06-19 PROCEDURE — 97112 NEUROMUSCULAR REEDUCATION: CPT | Mod: GO | Performed by: OCCUPATIONAL THERAPIST

## 2017-06-19 PROCEDURE — 40000125 ZZHC STATISTIC OT OUTPT VISIT: Performed by: OCCUPATIONAL THERAPIST

## 2017-06-19 NOTE — DISCHARGE INSTRUCTIONS
"Exercises for PT:    Retraining exercises----    1. Keeping doing the Gaze Stabilization exercises I gave you last session  -small head movements in both directions (do not separate R and L)  -as long as you can tolerate + 5-10 secs    2. Moving your head R to L in standing- try to let the eyes move naturally with your head   10 reps in each direction     3-5 times/day    Substitution/symptoms management-----    3. Walking or standing and keeping eyes focus on a target.    4. Turning head when walking but keeping eyes \"focused\" on object to prevent onset of dizziness and balance problems     Continue walking and trying to stay active during the day  Left Leg strengthening     Thanks,  Matty, PT, DPT            "

## 2017-06-19 NOTE — IP AVS SNAPSHOT
MRN:[id number]                      After Visit Summary   6/19/2017    Mary Shipman    MRN: [id number]           Visit Information        Provider Department      6/19/2017  1:00 PM Anamaria Pascual, PT [facility]        Your next 10 appointments already scheduled     Jun 20, 2017  1:40 PM CDT   SHORT with Miguel Hammer PA-C   [facility] ([facility])    [address] [place] [id number]   [phone]            Jun 26, 2017  1:45 PM CDT   Neuro Treatment with Anamaria Pascual, PT   [facility] ([facility])    [address] [facility] [address]   [phone]            Jun 26, 2017  2:30 PM CDT   Neuro Treatment with Mitzy Horner, OT   [facility] ([facility])    [address] [facility] [address]   [phone]            Jun 30, 2017 10:30 AM CDT   Return Visit with López Mehta [facility] ([facility])    [address] [place] [address]   [phone]            Aug 31, 2017  4:30 PM CDT   (Arrive by 4:15 PM)   Return Visit with Catrachito Michael MD   [facility] ([facility])    [address] [place] [id number] [phone]                Further instructions from your care team       Exercises for PT:    Retraining exercises----    1. Keeping doing the Gaze Stabilization exercises I gave you last session  -small head movements in both directions (do not separate R and L)  -as long as you can tolerate + 5-10 secs    2. Moving your head R to L in standing- try to let the eyes move naturally with your head   10 reps in each direction     3-5 times/day    Substitution/symptoms management-----    3. Walking or standing and keeping eyes focus on a target.    4. Turning head when walking but  "keeping eyes \"focused\" on object to prevent onset of dizziness and balance problems     Continue walking and trying to stay active during the day  Left Leg strengthening     Thanks,  Matty, PT, DPT              [facility] Information     [employer] gives you secure access to your electronic health record. If you see a primary care provider, you can also send messages to your care team and make appointments. If you have questions, please call your primary care clinic.  If you do not have a primary care provider, please call [phone] and they will assist you.        Care EveryWhere ID     This is your Care EveryWhere ID. This could be used by other organizations to access your [place] medical records  [id number]        "

## 2017-06-20 ENCOUNTER — OFFICE VISIT (OUTPATIENT)
Dept: FAMILY MEDICINE | Facility: CLINIC | Age: 32
End: 2017-06-20
Payer: MEDICAID

## 2017-06-20 VITALS
HEIGHT: 65 IN | BODY MASS INDEX: 38.15 KG/M2 | HEART RATE: 76 BPM | TEMPERATURE: 98.6 F | SYSTOLIC BLOOD PRESSURE: 110 MMHG | DIASTOLIC BLOOD PRESSURE: 62 MMHG | WEIGHT: 229 LBS

## 2017-06-20 DIAGNOSIS — G43.001 MIGRAINE WITHOUT AURA AND WITH STATUS MIGRAINOSUS, NOT INTRACTABLE: ICD-10-CM

## 2017-06-20 DIAGNOSIS — G81.94 LEFT HEMIPLEGIA (H): Primary | ICD-10-CM

## 2017-06-20 DIAGNOSIS — G40.909 SEIZURE DISORDER (H): ICD-10-CM

## 2017-06-20 DIAGNOSIS — R56.9 CONVULSIONS, UNSPECIFIED CONVULSION TYPE (H): ICD-10-CM

## 2017-06-20 PROCEDURE — 99214 OFFICE O/P EST MOD 30 MIN: CPT | Performed by: PHYSICIAN ASSISTANT

## 2017-06-20 RX ORDER — LEVETIRACETAM 500 MG/1
TABLET ORAL
Qty: 60 TABLET | Refills: 11 | COMMUNITY
Start: 2017-06-20 | End: 2017-08-31

## 2017-06-20 RX ORDER — TOPIRAMATE 50 MG/1
TABLET, FILM COATED ORAL
Qty: 180 TABLET | Refills: 5 | COMMUNITY
Start: 2017-06-20 | End: 2017-08-31

## 2017-06-20 ASSESSMENT — ANXIETY QUESTIONNAIRES
1. FEELING NERVOUS, ANXIOUS, OR ON EDGE: NEARLY EVERY DAY
GAD7 TOTAL SCORE: 18
7. FEELING AFRAID AS IF SOMETHING AWFUL MIGHT HAPPEN: NEARLY EVERY DAY
3. WORRYING TOO MUCH ABOUT DIFFERENT THINGS: MORE THAN HALF THE DAYS
5. BEING SO RESTLESS THAT IT IS HARD TO SIT STILL: MORE THAN HALF THE DAYS
6. BECOMING EASILY ANNOYED OR IRRITABLE: NEARLY EVERY DAY
2. NOT BEING ABLE TO STOP OR CONTROL WORRYING: MORE THAN HALF THE DAYS

## 2017-06-20 ASSESSMENT — PATIENT HEALTH QUESTIONNAIRE - PHQ9: 5. POOR APPETITE OR OVEREATING: NEARLY EVERY DAY

## 2017-06-20 NOTE — PROGRESS NOTES
SUBJECTIVE:                                                    Mary Shipman is a 32 year old female who presents to clinic today for the following health issues:    Medication Followup of TOPAMAX    Taking Medication as prescribed: NO-Is taking 2 tablets in the AM and 3 tablets at night.    Side Effects:  Unsure - is on so many meds and isn't sure    Medication Helping Symptoms:  Unsure because she is on so many meds.     Reviewing her ongoing neurologic concerns. She has an appointment with a new neurologist with [facility] Dr. Molina. She notes that she wants a second opinion. She has not had seizures or seizure like activity. She is having ongoing migraine type headaches. Her Topamax was increased but she didn't go up on the dose due to daytime side effects, only doing 2 pills twice daily. Denies focal neurologic symptoms that are progressive.    Mood is still difficult. Feeling anxious and worried and tense. Ongoing issues. She is seeing her therapist Luis Antonio Means. She feels like she's still having a hard time with her anxiety.     Patient Active Problem List   Diagnosis     Esophageal reflux     Allergic rhinitis due to other allergen     Polycystic ovaries     Thyrotoxicosis     Urticaria     Obesity     Low back pain     Intermittent asthma     HYPERLIPIDEMIA LDL GOAL <160     Irritable bowel syndrome with constipation     Migraine headache     Not immune to rubella     Major depressive disorder, recurrent episode, moderate (H)     Health Care Home     Health care home, active care coordination     Generalized anxiety disorder     Lump or mass in breast     Menometrorrhagia     Seizure (H)     Left hemiplegia (H)      Current Outpatient Prescriptions   Medication     levETIRAcetam (KEPPRA) 500 MG tablet     topiramate (TOPAMAX) 50 MG tablet     sertraline (ZOLOFT) 100 MG tablet     clonazePAM (KLONOPIN) 1 MG tablet     cholecalciferol (VITAMIN D) 1000 UNIT tablet      "fexofenadine-pseudoePHEDrine (ALLEGRA-D 24) 180-240 MG per 24 hr tablet     fluticasone (FLONASE) 50 MCG/ACT spray     polyethylene glycol (MIRALAX/GLYCOLAX) Packet     albuterol (PROAIR HFA, PROVENTIL HFA, VENTOLIN HFA) 108 (90 BASE) MCG/ACT inhaler     mometasone-formoterol (DULERA) 200-5 MCG/ACT oral inhaler     metFORMIN (GLUCOPHAGE-XR) 500 MG 24 hr tablet     LANsoprazole (PREVACID) 30 MG capsule     levalbuterol (XOPENEX CONC) 1.25 MG/0.5ML NEBU     montelukast (SINGULAIR) 10 MG tablet     ibuprofen (ADVIL,MOTRIN) 400-800 mg tablet     ranitidine (ZANTAC) 150 MG tablet     No current facility-administered medications for this visit.         Problem list and histories reviewed & adjusted, as indicated.  Additional history: as documented    Labs reviewed in EPIC    Reviewed and updated as needed this visit by clinical staff       Reviewed and updated as needed this visit by Provider         ROS:  Constitutional, HEENT, cardiovascular, pulmonary, gi and gu systems are negative, except as otherwise noted.    OBJECTIVE:                                                    /62 (BP Location: Right arm, Cuff Size: Adult Large)  Pulse 76  Temp 98.6  F (37  C) (Oral)  Ht 5' 5\" (1.651 m)  Wt 229 lb (103.9 kg)  BMI 38.11 kg/m2  Body mass index is 38.11 kg/(m^2).  GENERAL: healthy, alert and no distress  EYES: Eyes grossly normal to inspection, PERRL and conjunctivae and sclerae normal  NEURO: Normal strength and tone, mentation intact and speech normal  Psych: Appropriate appearance.  Alert and oriented times 3; coherent speech, normal   rate and volume, able to articulate logical thoughts, able   to abstract reason, no tangential thoughts, no hallucinations   or delusions.  Normal behavior.  Her affect is flat.      Diagnostic Test Results:  none      ASSESSMENT/PLAN:                                                      (G81.94) Left hemiplegia (H)  (primary encounter diagnosis)  Comment:   Plan: Improving. Her " exam shows better focus, movement, gait. She is still light sensitive. She reports continued biting of her tongue at night. Her mouth guard was not tolerated. She continues PT / OT. Again, it is not clear the specific cause of her left hemiplegia as she had an MRI and EEG showing probable seizure but no findings of stroke or MS. The 2nd opinion she will be having is fine. Referral given.    (R56.9) Convulsions, unspecified convulsion type (H)  Comment:   Plan: levETIRAcetam (KEPPRA) 500 MG tablet,         topiramate (TOPAMAX) 50 MG tablet        As noted. She wanted me to adjust and manage her anti-seizure meds but I am not comfortable doing that. She needs to work with her current neurologist or discuss with her new one. I do think she can try increasing her night time Topamax, so taking 100 mg in the AM and 150 mg in the PM. She agrees to try that.     (G40.909) Seizure disorder (H)  Comment:   Plan: levETIRAcetam (KEPPRA) 500 MG tablet,         topiramate (TOPAMAX) 50 MG tablet        As noted     (G43.001) Migraine without aura and with status migrainosus, not intractable  Comment:   Plan: levETIRAcetam (KEPPRA) 500 MG tablet,         topiramate (TOPAMAX) 50 MG tablet        As noted     Continue PT, continue with therapist. Will be seeing new neurologist.     FRANSICO JOHNSON PA-C  [facility]

## 2017-06-20 NOTE — MR AVS SNAPSHOT
After Visit Summary   6/20/2017    Mary Shipman    MRN: [id number]           Patient Information     Date Of Birth          1985        Visit Information        Provider Department      6/20/2017 1:40 PM Miguel Hammer PA-C [facility]        Today's Diagnoses     Left hemiplegia (H)    -  1    Convulsions, unspecified convulsion type (H)        Seizure disorder (H)        Migraine without aura and with status migrainosus, not intractable           Follow-ups after your visit        Your next 10 appointments already scheduled     Jun 26, 2017  1:45 PM CDT   Neuro Treatment with Anamaria Pascual, PT   [facility]    [address] [facility] [address]   [phone]            Jun 27, 2017  9:30 AM CDT   L/Vision Treatment with Kathryn Torres, OT   [facility]    [address] [facility] [address]   [phone]            Jun 30, 2017 10:30 AM CDT   [facility]   [facility] ([facility])    [address] [place] [address]   [phone]            Jul 07, 2017  8:30 AM CDT   [facility]   [facility] ([facility])    [address] [place] [address]   [phone]            Jul 10, 2017  1:30 PM CDT   Neuro Treatment with Mitzy Horner, OT   [facility]    [address] [facility] [address]   [phone]            Jul 10, 2017  2:45 PM CDT   Neuro Treatment with Anamaria Pascual, PT   [facility] ([facility]    [address] [facility] [address]   [phone]            Jul 14, 2017 10:30 AM CDT   Return Visit with López Mehta, [facility]  [address] [address]   [phone]            Jul 17, 2017  1:00 PM CDT   Neuro Treatment with Anamaria Pascual PT   [facility] ([facility] [place])    [address] [facility] [address]   [phone]            Jul 17, 2017  1:45 PM CDT   Neuro Treatment with Mitzy Horner OT   [facility] ([facility] [place])    [address] [facility] [address]   [phone]            Jul 21, 2017 10:30 AM CDT   Return Visit with López Mehta, [facility] ([facility])    [address] [place] [id number] [phone]              Who to contact     If you have questions or need follow up information about today's clinic visit or your schedule please contact [facility] directly at [phone].  Normal or non-critical lab and imaging results will be communicated to you by MyChart, letter or phone within 4 business days after the clinic has received the results. If you do not hear from us within 7 days, please contact the clinic through [employer] or phone. If you have a critical or abnormal lab result, we will notify you by phone as soon as possible.  Submit refill requests through [employer] or call your pharmacy and they will forward the refill request to us. Please allow 3 business days for your refill to be completed.          Additional Information About Your Visit        MyChart Information     [employer] gives you secure access to your electronic health record. If you see a primary care provider, you can also send messages to your care team and make appointments. If you have questions, please call your primary care clinic.  If you do not have a primary care provider, please call [phone] and they will assist you.        Care EveryWhere ID     This is your Care  "EveryWhere ID. This could be used by other organizations to access your [place] medical records  [id number]        Your Vitals Were     Pulse Temperature Height BMI (Body Mass Index)          76 98.6  F (37  C) (Oral) 5' 5\" (1.651 m) 38.11 kg/m2         Blood Pressure from Last 3 Encounters:   06/20/17 110/62   06/05/17 124/70   05/30/17 107/63    Weight from Last 3 Encounters:   06/20/17 229 lb (103.9 kg)   06/05/17 230 lb (104.3 kg)   05/30/17 233 lb (105.7 kg)              Today, you had the following     No orders found for display         Today's Medication Changes          These changes are accurate as of: 6/20/17  2:33 PM.  If you have any questions, ask your nurse or doctor.               These medicines have changed or have updated prescriptions.        Dose/Directions    levETIRAcetam 500 MG tablet   Commonly known as:  KEPPRA   This may have changed:  additional instructions   Used for:  Convulsions, unspecified convulsion type (H), Seizure disorder (H), Migraine without aura and with status migrainosus, not intractable   Changed by:  Miguel Hammer PA-C        1000 mg in am and 1000 MG AT BEDTIME. (Pt Reports Taking 500 am and 1000 pm)   Quantity:  60 tablet   Refills:  11       topiramate 50 MG tablet   Commonly known as:  TOPAMAX   This may have changed:  additional instructions   Used for:  Seizure disorder (H), Migraine without aura and with status migrainosus, not intractable, Convulsions, unspecified convulsion type (H)   Changed by:  Miguel Hammer PA-C        3 tablets twice a day. (Patient reports 100 in AM and 150 in PM)   Quantity:  180 tablet   Refills:  5                Primary Care Provider Office Phone # Fax #    Miguel Hammer PA-C [phone]       [address] [address]        Thank you!     Thank you for choosing [facility]  for your care. Our goal is always to provide you with " excellent care. Hearing back from our patients is one way we can continue to improve our services. Please take a few minutes to complete the written survey that you may receive in the mail after your visit with us. Thank you!             Your Updated Medication List - Protect others around you: Learn how to safely use, store and throw away your medicines at www.disposemymeds.org.          This list is accurate as of: 6/20/17  2:33 PM.  Always use your most recent med list.                   Brand Name Dispense Instructions for use    albuterol 108 (90 BASE) MCG/ACT Inhaler    PROAIR HFA/PROVENTIL HFA/VENTOLIN HFA    3 Inhaler    Inhale 2 puffs into the lungs every 6 hours as needed for shortness of breath / dyspnea or wheezing       cholecalciferol 1000 UNIT tablet    vitamin D    100 tablet    4 caps daily)       clonazePAM 1 MG tablet    klonoPIN    30 tablet    1 tablet daily in evening       fexofenadine-pseudoePHEDrine 180-240 MG per 24 hr tablet    ALLEGRA-D 24     Take 1 tablet by mouth daily       fluticasone 50 MCG/ACT spray    FLONASE     Spray 1-2 sprays into both nostrils daily as needed for rhinitis or allergies       ibuprofen 400-800 mg tablet    ADVIL,MOTRIN    30 tablet    Take 1-2 tablets by mouth every 6 hours as needed (cramping).       LANsoprazole 30 MG CR capsule    PREVACID    90 capsule    Take 1 capsule (30 mg) by mouth daily Take 30-60 minutes before a meal.       levalbuterol 1.25 MG/0.5ML Nebu neb solution    XOPENEX CONC     Take 1.25 mg by nebulization every 6 hours as needed for wheezing       levETIRAcetam 500 MG tablet    KEPPRA    60 tablet    1000 mg in am and 1000 MG AT BEDTIME. (Pt Reports Taking 500 am and 1000 pm)       metFORMIN 500 MG 24 hr tablet    GLUCOPHAGE-XR    180 tablet    Take 1 tablet (500 mg) by mouth 2 times daily (with meals)       mometasone-formoterol 200-5 MCG/ACT oral inhaler    DULERA    39 g    Inhale 2 puffs into the lungs 2 times daily       montelukast  10 MG tablet    SINGULAIR    90 tablet    Take 1 tablet (10 mg) by mouth At Bedtime       polyethylene glycol Packet    MIRALAX/GLYCOLAX     Take 17 g by mouth daily as needed for constipation       ranitidine 150 MG tablet    ZANTAC    60 tablet    Take 150 mg by mouth 2 times daily as needed       sertraline 100 MG tablet    ZOLOFT    135 tablet    1 and 1/2 table daily       topiramate 50 MG tablet    TOPAMAX    180 tablet    3 tablets twice a day. (Patient reports 100 in AM and 150 in PM)

## 2017-06-20 NOTE — NURSING NOTE
"Chief Complaint   Patient presents with     Follow Up For     seizures     Recheck Medication     topamax       Initial There were no vitals taken for this visit. Estimated body mass index is 38.27 kg/(m^2) as calculated from the following:    Height as of 6/5/17: 5' 5\" (1.651 m).    Weight as of 6/5/17: 230 lb (104.3 kg).  Medication Reconciliation: complete   Luci Sorto CMA      "

## 2017-06-21 ASSESSMENT — ASTHMA QUESTIONNAIRES: ACT_TOTALSCORE: 21

## 2017-06-21 ASSESSMENT — PATIENT HEALTH QUESTIONNAIRE - PHQ9: SUM OF ALL RESPONSES TO PHQ QUESTIONS 1-9: 20

## 2017-06-21 ASSESSMENT — ANXIETY QUESTIONNAIRES: GAD7 TOTAL SCORE: 18

## 2017-06-26 ENCOUNTER — HOSPITAL ENCOUNTER (OUTPATIENT)
Dept: PHYSICAL THERAPY | Facility: CLINIC | Age: 32
Setting detail: THERAPIES SERIES
End: 2017-06-26
Attending: PSYCHIATRY & NEUROLOGY
Payer: MEDICAID

## 2017-06-26 PROCEDURE — 97110 THERAPEUTIC EXERCISES: CPT | Mod: GP | Performed by: PHYSICAL THERAPIST

## 2017-06-26 PROCEDURE — 40000719 ZZHC STATISTIC PT DEPARTMENT NEURO VISIT: Performed by: PHYSICAL THERAPIST

## 2017-06-26 PROCEDURE — 97112 NEUROMUSCULAR REEDUCATION: CPT | Mod: GP | Performed by: PHYSICAL THERAPIST

## 2017-06-26 NOTE — ADDENDUM NOTE
Encounter addended by: Anamaria Pascual PT on: 6/26/2017  1:51 PM<BR>     Actions taken: Sign clinical note

## 2017-06-27 ENCOUNTER — HOSPITAL ENCOUNTER (OUTPATIENT)
Dept: OCCUPATIONAL THERAPY | Facility: CLINIC | Age: 32
Setting detail: THERAPIES SERIES
End: 2017-06-27
Attending: PHYSICIAN ASSISTANT
Payer: MEDICAID

## 2017-06-27 PROCEDURE — 97535 SELF CARE MNGMENT TRAINING: CPT | Mod: GO | Performed by: OCCUPATIONAL THERAPIST

## 2017-06-27 PROCEDURE — 40000249 ZZH STATISTIC VISIT LOW VISION CLINIC: Performed by: OCCUPATIONAL THERAPIST

## 2017-06-27 NOTE — DISCHARGE INSTRUCTIONS
Online ordering is not yet available.  Call [phone] to order and receive a 10% discount    [email or web address]    Filter #88/Style KM  $36.50

## 2017-06-30 ENCOUNTER — OFFICE VISIT (OUTPATIENT)
Dept: BEHAVIORAL HEALTH | Facility: CLINIC | Age: 32
End: 2017-06-30
Payer: MEDICAID

## 2017-06-30 ENCOUNTER — TELEPHONE (OUTPATIENT)
Dept: FAMILY MEDICINE | Facility: CLINIC | Age: 32
End: 2017-06-30

## 2017-06-30 DIAGNOSIS — F41.1 GENERALIZED ANXIETY DISORDER: ICD-10-CM

## 2017-06-30 DIAGNOSIS — F33.1 MAJOR DEPRESSIVE DISORDER, RECURRENT EPISODE, MODERATE (H): Primary | ICD-10-CM

## 2017-06-30 PROCEDURE — 90834 PSYTX W PT 45 MINUTES: CPT | Performed by: SOCIAL WORKER

## 2017-06-30 NOTE — TELEPHONE ENCOUNTER
Reason for Call:  Form, our goal is to have forms completed with 72 hours, however, some forms may require a visit or additional information.    Type of letter, form or note:  Statement of sickness    Who is the form from?: Patient    Where did the form come from: Patient or family brought in       What clinic location was the form placed at?: [facility])    Where the form was placed: 's Box    What number is listed as a contact on the form?: [phone]       Additional comments: please call patient to  form when complete. Patient also left a note with form.    Call taken on 6/30/2017 at 10:23 AM by Nicole Israel

## 2017-06-30 NOTE — PROGRESS NOTES
"                                             Progress Note    Client Name: Mary Shipman  Date: 6/30/2017          Service Type: Individual      Session Start Time: 130  Session End Time: 215      Session Length: 45m     Session #: 5     Attendees: Client attended alone    Treatment Plan Last Reviewed: 5/19/2017   PHQ-9 / CRUZ-7 :      DATA      Progress Since Last Session (Related to Symptoms / Goals / Homework):   Symptoms: Stable    Homework: Did not complete      Episode of Care Goals: Satisfactory progress - CONTEMPLATION (Considering change and yet undecided); Intervened by assessing the negative and positive thinking (ambivalence) about behavior change     Current / Ongoing Stressors and Concerns:    Things have been \"still the same craziness.\"  Short-term disability ended, so client has applied for long-term disability and is still waiting to hear back on this.  Sounds like they need some more information from the neurologist.  Client does have an appointment to see a new neurologist next month.  Mood has been \"stressed.\"  Reports taking her psych meds as prescribed.  Has been trying to take care of her bankruptcy case and expects she will be getting a court date soon.  Also has been working on Franky's immigration case.  Does report that she feels \"good\" about \"not avoiding stuff any more.\"             Treatment Objective(s) Addressed in This Session:    Client will use identified behavioral and cognitive skills to challenge negative self talk 90% of the time.       Intervention:   CBT: -   Discussed cognitive restructuring and behavioral activation.  Explored the connection between thoughts, feelings, and actions by using examples relative to client's presenting concerns.  Explained major domains of symptoms (cognitive, emotional, somatic, behavioral, interpersonal) and importance of targeted and specific interventions to reduce symptoms of anxiety and depression.  Discussed role of symptom monitoring in " cognitive behavioral treatment.       ASSESSMENT: Current Emotional / Mental Status (status of significant symptoms):   Risk status (Self / Other harm or suicidal ideation)   Client denies current fears or concerns for personal safety.   Client denies current or recent suicidal ideation or behaviors.   Client denies current or recent homicidal ideation or behaviors.   Client denies current or recent self injurious behavior or ideation.   Client denies other safety concerns.   A safety and risk management plan has not been developed at this time, however client was given the after-hours number / 911 should there be a change in any of these risk factors.     Appearance:   Appropriate    Eye Contact:   Good    Psychomotor Behavior: Retarded (Slowed)    Attitude:   Cooperative    Orientation:   All   Speech    Rate / Production: Monotone  Slow     Volume:  Normal    Mood:    Depressed    Affect:    Blunted    Thought Content:  Clear    Thought Form:  Coherent  Logical    Insight:    Good      Medication Review:   No changes to current psychiatric medication(s)     Medication Compliance:   Yes     Changes in Health Issues:   None reported     Chemical Use Review:   Substance Use: Chemical use reviewed, no active concerns identified      Tobacco Use: No current tobacco use.       Collateral Reports Completed:   Routed note to PCP  Communicated with: clinic     PLAN: (Client Tasks / Therapist Tasks / Other)  1.  More supportive therapy and CBT at next meeting  2.  Standing meditation 5 mn am/pm  3.  Meet next week        BEHZAD Webb                                                         ________________________________________________________________________    Treatment Plan    Client's Name: Mary Shipman  YOB: 1985    Date: 5/19/2017     DSM5 Diagnoses: (Sustained by DSM5 Criteria Listed Above)  Diagnoses: 296.23 Major Depressive Disorder, Single Episode, Severe _  300.02  (F41.1) Generalized Anxiety Disorder  Psychosocial & Contextual Factors: finances;  from ; stress with work  WHODAS 2.0 (12 item)  This questionnaire asks about difficulties due to health conditions. Health conditions  include  disease or illnesses, other health problems that may be short or long lasting,  injuries, mental health or emotional problems, and problems with alcohol or drugs.      Think back over the past 30 days and answer these questions, thinking about how much  difficulty you had doing the following activities. For each question, please [place] only  one response.     S1 Standing for long periods such as 30 minutes? None = 1   S2 Taking care of household responsibilities? Mild = 2   S3 Learning a new task, for example, learning how to get to a new place? None = 1   S4 How much of a problem do you have joining community activities (for example, festivals, [employer] or other activities) in the same way as anyone else can? Moderate = 3   S5 How much have you been emotionally affected by your health problems? Moderate = 3           In the past 30 days, how much difficulty did you have in:   S6 Concentrating on doing something for ten minutes? Mild = 2   S7 Walking a long distance such as a kilometer (or equivalent)? Mild = 2   S8 Washing your whole body? None = 1   S9 Getting dressed? None = 1   S10 Dealing with people you do not know? None = 1   S11 Maintaining a friendship? Mild = 2   S12 Your day to day work? Mild = 2      H1 Overall, in the past 30 days, how many days were these difficulties present? Record number of days 30   H2 In the past 30 days, for how many days were you totally unable to carry out your usual activities or work because of any health condition? Record number of days 0   H3 In the past 30 days, not counting the days that you were totally unable, for how many days did you cut back or reduce your usual activities or work because of any health condition? Record number  of days 30       Referral / Collaboration:  Referral to another professional/service is not indicated at this time..    Anticipated number of session or this episode of care: 12-18      MeasurableTreatment Goal(s) related to diagnosis / functional impairment(s)  Goal-Depression: Client will decrease depressed mood    I will know I've met my goal when I am less depressed.      Objective #A (Client Action)    Status: New - Date: 5/19/2017    Client will use identified behavioral and cognitive skills to challenge negative self talk 90% of the time.    Intervention(s)  Therapist will provide psychoeducation, behavioral activation, and cognitive restructuring.      Objective #B  Client will complete at least 10 minutes of self-regulation practice (e.g.: yoga, meditation, relaxation breathing, etc.) per day.    Status: New - Date: 5/19/2017    Intervention(s)  Therapist will provide psychoeducation, behavioral activation, and cognitive restructuring.      Objective #C  Client will exercise 30 minutes 36 times in the next 12 weeks.  Status: New - Date: 5/19/2017    Intervention(s)  Therapist will provide psychoeducation, behavioral activation, and cognitive restructuring.                Client has reviewed and agreed to the above plan.      Abad Torres, [facility]  May 19, 2017

## 2017-06-30 NOTE — MR AVS SNAPSHOT
MRN:[id number]                      After Visit Summary   6/30/2017    Mary Shipman    MRN: [id number]           Visit Information        Provider Department      6/30/2017 10:30 AM López Mehta, [facility]      Your next 10 appointments already scheduled     Jul 07, 2017  8:30 AM CDT   Return Visit with López Mehta, [facility] ([facility])    [address] [place] [address]   [phone]            Jul 10, 2017  1:30 PM CDT   Neuro Treatment with Mitzy Horner, OT   [facility] ([facility])    [address] [facility] [address]   [phone]            Jul 10, 2017  2:45 PM CDT   Neuro Treatment with Anamaria Samara, PT   [facility] ([facility])    [address] [facility] [address]   [phone]            Jul 14, 2017 10:30 AM CDT   Return Visit with López Mehta, [facility] ([facility])    [address] [place] [address]   [phone]            Jul 17, 2017  1:00 PM CDT   Neuro Treatment with Anamaria Samara, PT   [facility] ([facility])    [address] [facility] [address]   [phone]            Jul 17, 2017  1:45 PM CDT   Neuro Treatment with Mitzy Horner, OT   [facility] ([facility])    [address] [facility] [address]   [phone]            Jul 21, 2017 10:30 AM CDT   Return Visit with López Mehta, [facility] ([facility])    [address] [place] [address]   [phone]            Jul 24, 2017  1:00 PM CDT   Neuro Treatment with Anamariayue Pascual, PT   [facility] ([place]  [facility])    [address] [facility] [address]   [phone]            Jul 31, 2017  1:00 PM CDT   Neuro Treatment with Anamaria Pascual PT   [facility] ([facility])    [address] [facility] [address]   [phone]            Aug 31, 2017  4:30 PM CDT   (Arrive by 4:15 PM)   Return Visit with Catrachito Michael MD   [facility] ([facility] [place])    [address] [address] [phone]              [employer] Information     [employer] gives you secure access to your electronic health record. If you see a primary care provider, you can also send messages to your care team and make appointments. If you have questions, please call your primary care clinic.  If you do not have a primary care provider, please call [phone] and they will assist you.        Care EveryWhere ID     This is your Care EveryWhere ID. This could be used by other organizations to access your [place] medical records  [id number]        Equal Access to Services     JANA DUCKWORTH : Hadii lorraine Fields, waivoryda brennan, qacarlosta irving zaldivar, germain salcedo. So [facility] [phone].    ATENCIÓN: Si habla español, tiene a omer disposición servicios gratuitos de asistencia lingüística. Miguel al [phone].    We comply with applicable federal civil rights laws and [place] laws. We do not discriminate on the basis of race, color, national origin, age, disability sex, sexual orientation or gender identity.

## 2017-07-03 ENCOUNTER — OFFICE VISIT (OUTPATIENT)
Dept: FAMILY MEDICINE | Facility: CLINIC | Age: 32
End: 2017-07-03
Payer: COMMERCIAL

## 2017-07-03 VITALS
OXYGEN SATURATION: 99 % | SYSTOLIC BLOOD PRESSURE: 120 MMHG | BODY MASS INDEX: 37.82 KG/M2 | DIASTOLIC BLOOD PRESSURE: 76 MMHG | HEART RATE: 91 BPM | HEIGHT: 65 IN | WEIGHT: 227 LBS | TEMPERATURE: 98.5 F

## 2017-07-03 DIAGNOSIS — J01.01 ACUTE RECURRENT MAXILLARY SINUSITIS: ICD-10-CM

## 2017-07-03 DIAGNOSIS — J45.21 MILD INTERMITTENT ASTHMA WITH EXACERBATION: Primary | ICD-10-CM

## 2017-07-03 PROCEDURE — 99214 OFFICE O/P EST MOD 30 MIN: CPT | Performed by: PHYSICIAN ASSISTANT

## 2017-07-03 RX ORDER — CEFDINIR 300 MG/1
300 CAPSULE ORAL 2 TIMES DAILY
Qty: 20 CAPSULE | Refills: 0 | Status: SHIPPED | OUTPATIENT
Start: 2017-07-03 | End: 2017-07-21

## 2017-07-03 RX ORDER — PREDNISONE 20 MG/1
TABLET ORAL
Qty: 14 TABLET | Refills: 1 | Status: SHIPPED | OUTPATIENT
Start: 2017-07-03 | End: 2017-07-21

## 2017-07-03 NOTE — TELEPHONE ENCOUNTER
Forms received from [employer]/ Statement of Sickness for Mingo Hammer PA-C.  Forms placed in provider 'sign me' folder.  Please call patient at [phone] when form has been completed.    Yolanda Glover  Patient Representative

## 2017-07-03 NOTE — NURSING NOTE
"Chief Complaint   Patient presents with     Asthma     Sinus Problem       Initial /76 (BP Location: Right arm, Cuff Size: Adult Large)  Pulse 91  Temp 98.5  F (36.9  C) (Oral)  Ht 5' 5\" (1.651 m)  Wt 227 lb (103 kg)  SpO2 99%  BMI 37.77 kg/m2 Estimated body mass index is 37.77 kg/(m^2) as calculated from the following:    Height as of this encounter: 5' 5\" (1.651 m).    Weight as of this encounter: 227 lb (103 kg).  Medication Reconciliation: complete     Silvia Vale CMA (AAMA)      "

## 2017-07-03 NOTE — MR AVS SNAPSHOT
After Visit Summary   7/3/2017    Mary Shipman    MRN: [id number]           Patient Information     Date Of Birth          1985        Visit Information        Provider Department      7/3/2017 10:40 AM Miguel Hammer PA-C [facility]        Today's Diagnoses     Mild intermittent asthma with exacerbation    -  1    Acute recurrent maxillary sinusitis           Follow-ups after your visit        Your next 10 appointments already scheduled     Jul 07, 2017  8:30 AM CDT   Return Visit with López Mehta [facility] ([facility])    [address] [place] [address]   [phone]            Jul 10, 2017  1:30 PM CDT   Neuro Treatment with Mitzy Horner, OT   [facility] ([facility]    [address] [facility] [address]   [phone]            Jul 10, 2017  2:45 PM CDT   Neuro Treatment with Anamaria Pascual, PT   [facility] ([facility])    [address] [facility] [address]   [phone]            Jul 14, 2017 10:30 AM CDT   Return Visit with López Mehta, BEHZAD   [facility] ([facility])    [address] [place] [address]   [phone]            Jul 17, 2017  1:00 PM CDT   Neuro Treatment with Anamaria Pascual, PT   [facility] ([facility])    [address] [facility] [address]   [phone]            Jul 17, 2017  1:45 PM CDT   Neuro Treatment with Mitzy Horner, OT   [facility] ([facility])    [address] [facility] [address]   [phone]            Jul 21, 2017 10:30 AM CDT   [facility]   [facility] ([facility])    [address] [place] [address]  [address]   [phone]            Jul 24, 2017  1:00 PM CDT   Neuro Treatment with Anamaria Pascual, PT   [facility] ([facility]    [address] [facility] [address]   [phone]            Jul 31, 2017  1:00 PM CDT   Neuro Treatment with Anamaria Pascual, PT   [facility] ([facility]    [address] [facility] [address]   [phone]            Aug 31, 2017  4:30 PM CDT   (Arrive by 4:15 PM)   Return Visit with Catrachito Michael MD   [facility] ([facility])    [address] [facility]  [address] [facility] [address] [phone]              Who to contact     If you have questions or need follow up information about today's clinic visit or your schedule please contact [facility] directly at [phone].  Normal or non-critical lab and imaging results will be communicated to you by [email or web address], letter or phone within 4 business days after the clinic has received the results. If you do not hear from us within 7 days, please contact the clinic through [employer] or phone. If you have a critical or abnormal lab result, we will notify you by phone as soon as possible.  Submit refill requests through [employer] or call your pharmacy and they will forward the refill request to us. Please allow 3 business days for your refill to be completed.          Additional Information About Your Visit        [email or web address] Information     [employer] gives you secure access to your electronic health record. If you see a primary care provider, you can also send messages to your care team and make appointments. If you have questions, please call your primary care clinic.  If you do not have a primary care provider, please call [phone] and they will assist you.        Care EveryWhere ID     This is your Care EveryWhere ID. This could be used by other organizations to access your [place]  "medical records  [id number]        Your Vitals Were     Pulse Temperature Height Pulse Oximetry BMI (Body Mass Index)       91 98.5  F (36.9  C) (Oral) 5' 5\" (1.651 m) 99% 37.77 kg/m2        Blood Pressure from Last 3 Encounters:   07/03/17 120/76   06/20/17 110/62   06/05/17 124/70    Weight from Last 3 Encounters:   07/03/17 227 lb (103 kg)   06/20/17 229 lb (103.9 kg)   06/05/17 230 lb (104.3 kg)              Today, you had the following     No orders found for display         Today's Medication Changes          These changes are accurate as of: 7/3/17 11:17 AM.  If you have any questions, ask your nurse or doctor.               Start taking these medicines.        Dose/Directions    cefdinir 300 MG capsule   Commonly known as:  OMNICEF   Used for:  Mild intermittent asthma with exacerbation, Acute recurrent maxillary sinusitis   Started by:  Miguel Hammer PA-C        Dose:  300 mg   Take 1 capsule (300 mg) by mouth 2 times daily   Quantity:  20 capsule   Refills:  0       predniSONE 20 MG tablet   Commonly known as:  DELTASONE   Used for:  Mild intermittent asthma with exacerbation, Acute recurrent maxillary sinusitis   Started by:  Miguel Hammer PA-C        3 tablets daily for 3 days, 2 tablets for 2 days, 1 tablet for 1 day   Quantity:  14 tablet   Refills:  1            Where to get your medicines      These medications were sent to [facility]/pharmacy #[id number] - [place], [place] - [address] [facility]  [address] [place] [address]     Phone:  [phone]     cefdinir 300 MG capsule    predniSONE 20 MG tablet                Primary Care Provider Office Phone # Fax #    Miguel Hammer PA-C [phone]       [address] [address]        Equal Access to Services     JANA DUCKWORTH AH: Lila juárez Soaileen, waaxda luqadaha, qaybta kaalmada adeegyada, germain salcedo. So [facility] " [phone].    ATENCIÓN: Si lilliam erazo, tiene a omer disposición servicios gratuitos de asistencia lingüística. Miguel us [phone].    We comply with applicable federal civil rights laws and [place] laws. We do not discriminate on the basis of race, color, national origin, age, disability sex, sexual orientation or gender identity.            Thank you!     Thank you for choosing [facility]  for your care. Our goal is always to provide you with excellent care. Hearing back from our patients is one way we can continue to improve our services. Please take a few minutes to complete the written survey that you may receive in the mail after your visit with us. Thank you!             Your Updated Medication List - Protect others around you: Learn how to safely use, store and throw away your medicines at www.disposemymeds.org.          This list is accurate as of: 7/3/17 11:17 AM.  Always use your most recent med list.                   Brand Name Dispense Instructions for use Diagnosis    albuterol 108 (90 BASE) MCG/ACT Inhaler    PROAIR HFA/PROVENTIL HFA/VENTOLIN HFA    3 Inhaler    Inhale 2 puffs into the lungs every 6 hours as needed for shortness of breath / dyspnea or wheezing    Intermittent asthma, uncomplicated       cefdinir 300 MG capsule    OMNICEF    20 capsule    Take 1 capsule (300 mg) by mouth 2 times daily    Mild intermittent asthma with exacerbation, Acute recurrent maxillary sinusitis       cholecalciferol 1000 UNIT tablet    vitamin D    100 tablet    4 caps daily)    Other seizures (H), Left-sided weakness       clonazePAM 1 MG tablet    klonoPIN    30 tablet    1 tablet daily in evening    Generalized anxiety disorder       fexofenadine-pseudoePHEDrine 180-240 MG per 24 hr tablet    ALLEGRA-D 24     Take 1 tablet by mouth daily        fluticasone 50 MCG/ACT spray    FLONASE     Spray 1-2 sprays into both nostrils daily as needed for rhinitis or allergies        ibuprofen  400-800 mg tablet    ADVIL,MOTRIN    30 tablet    Take 1-2 tablets by mouth every 6 hours as needed (cramping).    Abdominal pain       LANsoprazole 30 MG CR capsule    PREVACID    90 capsule    Take 1 capsule (30 mg) by mouth daily Take 30-60 minutes before a meal.    Gastroesophageal reflux disease without esophagitis       levalbuterol 1.25 MG/0.5ML Nebu neb solution    XOPENEX CONC     Take 1.25 mg by nebulization every 6 hours as needed for wheezing        levETIRAcetam 500 MG tablet    KEPPRA    60 tablet    1000 mg in am and 1000 MG AT BEDTIME. (Pt Reports Taking 500 am and 1000 pm)    Convulsions, unspecified convulsion type (H), Seizure disorder (H), Migraine without aura and with status migrainosus, not intractable       metFORMIN 500 MG 24 hr tablet    GLUCOPHAGE-XR    180 tablet    Take 1 tablet (500 mg) by mouth 2 times daily (with meals)    Polycystic ovaries       mometasone-formoterol 200-5 MCG/ACT oral inhaler    DULERA    39 g    Inhale 2 puffs into the lungs 2 times daily    Intermittent asthma, uncomplicated       montelukast 10 MG tablet    SINGULAIR    90 tablet    Take 1 tablet (10 mg) by mouth At Bedtime    Intermittent asthma, uncomplicated       polyethylene glycol Packet    MIRALAX/GLYCOLAX     Take 17 g by mouth daily as needed for constipation        predniSONE 20 MG tablet    DELTASONE    14 tablet    3 tablets daily for 3 days, 2 tablets for 2 days, 1 tablet for 1 day    Mild intermittent asthma with exacerbation, Acute recurrent maxillary sinusitis       ranitidine 150 MG tablet    ZANTAC    60 tablet    Take 150 mg by mouth 2 times daily as needed        sertraline 100 MG tablet    ZOLOFT    135 tablet    1 and 1/2 table daily    Major depressive disorder, recurrent episode, moderate (H), Adjustment disorder with mixed anxiety and depressed mood       topiramate 50 MG tablet    TOPAMAX    180 tablet    3 tablets twice a day. (Patient reports 100 in AM and 150 in PM)    Seizure  disorder (H), Migraine without aura and with status migrainosus, not intractable, Convulsions, unspecified convulsion type (H)

## 2017-07-03 NOTE — PROGRESS NOTES
"  SUBJECTIVE:                                                    Mary Shipman is a 32 year old female who presents to clinic today for the following health issues:    Asthma Follow-Up    Was ACT completed today?  No      Respiratory symptoms:   Cough: Yes-     Wheezing: Yes-     Shortness of breath: Yes-      Use of short- acting(rescue) inhaler: Yes and nebulizing     Taking controlled (daily) meds as prescribed: Yes    ER/UC visits or hospital admissions since last visit: none     Recent asthma triggers that patient is dealing with: Yes, sinusitis / URI / Allergy season flaring        Amount of exercise or physical activity: Physical Therapy weekly     Problems taking medications regularly: No    Medication side effects: none    Diet: regular (no restrictions)    ENT Symptoms             Symptoms: cc Present Absent Comment   Fever/Chills   x    Fatigue   x    Muscle Aches   x    Eye Irritation  x  Itchy eyes    Sneezing   x    Nasal Saturnino/Drg  x     Sinus Pressure/Pain  x     Loss of smell  x     Dental pain   x    Sore Throat   x    Swollen Glands   x    Ear Pain/Fullness  x  Pressure, trouble hearing    Cough  x     Wheeze  x     Chest Pain  x     Shortness of breath  x     Rash   x    Other         Symptom duration:  3 days    Symptom severity:  Moderate    Treatments tried:  Nebs and allergy medications listed on med list    Contacts:  None        Problem list and histories reviewed & adjusted, as indicated.  Additional history: as documented    Labs reviewed in EPIC    Reviewed and updated as needed this visit by clinical staff       Reviewed and updated as needed this visit by Provider         ROS:  Constitutional, HEENT, cardiovascular, pulmonary, gi and gu systems are negative, except as otherwise noted.    OBJECTIVE:     /76 (BP Location: Right arm, Cuff Size: Adult Large)  Pulse 91  Temp 98.5  F (36.9  C) (Oral)  Ht 5' 5\" (1.651 m)  Wt 227 lb (103 kg)  SpO2 99%  BMI 37.77 kg/m2  Body mass " index is 37.77 kg/(m^2).  GENERAL: healthy, alert and no distress  HENT: Sinus tenderness noted, otherwise ear canals and TM's normal, nose and mouth without ulcers or lesions  NECK: no adenopathy, no asymmetry, masses, or scars and thyroid normal to palpation  RESP: lungs clear to auscultation - no rales, rhonchi or wheezes  CV: regular rate and rhythm, normal S1 S2, no S3 or S4, no murmur, click or rub, no peripheral edema and peripheral pulses strong      ASSESSMENT/PLAN:     (J45.21) Mild intermittent asthma with exacerbation  (primary encounter diagnosis)  Comment:   Plan: predniSONE (DELTASONE) 20 MG tablet, cefdinir         (OMNICEF) 300 MG capsule        Will treat. This prescription is given with a discussion of side effects, risks and proper use.  Instructions are given to follow up if not improving or symptoms change or worsen as discussed.     (J01.01) Acute recurrent maxillary sinusitis  Comment:   Plan: predniSONE (DELTASONE) 20 MG tablet, cefdinir         (OMNICEF) 300 MG capsule        Sinusitis recurred after what was probably amoxicillin or augmentin per her recollection. Treatment as noted. This prescription is given with a discussion of side effects, risks and proper use.  Instructions are given to follow up if not improving or symptoms change or worsen as discussed.     FRANSICO JOHNSON PA-C  [facility]\

## 2017-07-05 NOTE — PROGRESS NOTES
[facility]          OUTPATIENT OCCUPATIONAL THERAPY  EVALUATION  PLAN OF TREATMENT FOR OUTPATIENT REHABILITATION  (COMPLETE FOR INITIAL CLAIMS ONLY)  Patient's Last Name, First Name, M.I.  YOB: 1985  Mary Shipman  AARTI                        Provider's Name  [facility] Medical Record No.  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   Target Date: 07/01/17  Date Met: 04/24/17  Goal Identifier: visual accommodations  Goal Description:  Pt will demonstrate understanding and implementation of at least 2 compensatory strategies for improved near and far vision.   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   Target Date: 07/01/17     Goal Identifier: attention/concentration  Goal Description: Pt will attend to reading task for 20 continuous minutes, for increased concentration needed to return to work.   Target Date: 07/01/17     Goal Identifier: Memory  Goal Description: Pt will demonstrate 75% accuracy in recall of 5-word lists, for improved independence in IADLs.   Target Date: 07/01/17     Goal Identifier: typing  Goal Description: Pt  will demonstrate typing 45 words per minute, for return to PLOF in work tasks.   Target Date: 07/01/17  Date Met:  (max of 42 on 6/19)                  Mitzy Horner OT          I CERTIFY THE NEED FOR THESE SERVICES FURNISHED UNDER        THIS PLAN OF TREATMENT AND WHILE UNDER MY CARE     (Physician co-signature of this document indicates review and certification of the therapy plan).                 ,    Certification date from: 06/02/17, Certification date to: 08/30/17                     Initial Assessment        See Epic Evaluation             KATARZYNA Khalil/REGINO  [facility]  [email or web address]    Phone: [phone]

## 2017-07-05 NOTE — ADDENDUM NOTE
Encounter addended by: Mitzy Horner OT on: 7/5/2017 11:02 AM<BR>     Actions taken: Flowsheet accepted, Sign clinical note, Document created

## 2017-07-06 ENCOUNTER — MYC MEDICAL ADVICE (OUTPATIENT)
Dept: FAMILY MEDICINE | Facility: CLINIC | Age: 32
End: 2017-07-06

## 2017-07-11 ENCOUNTER — MYC MEDICAL ADVICE (OUTPATIENT)
Dept: FAMILY MEDICINE | Facility: CLINIC | Age: 32
End: 2017-07-11

## 2017-07-11 NOTE — TELEPHONE ENCOUNTER
Forms completed, signed, copy made for chart and placed at  for pickup. [employer] message sent to patient, see 7/11 TE.    Rosenda Strickland,

## 2017-07-14 ENCOUNTER — OFFICE VISIT (OUTPATIENT)
Dept: BEHAVIORAL HEALTH | Facility: CLINIC | Age: 32
End: 2017-07-14
Payer: COMMERCIAL

## 2017-07-14 DIAGNOSIS — F41.1 GENERALIZED ANXIETY DISORDER: Primary | ICD-10-CM

## 2017-07-14 DIAGNOSIS — F33.1 MAJOR DEPRESSIVE DISORDER, RECURRENT EPISODE, MODERATE (H): ICD-10-CM

## 2017-07-14 PROCEDURE — 90834 PSYTX W PT 45 MINUTES: CPT | Performed by: SOCIAL WORKER

## 2017-07-14 NOTE — PROGRESS NOTES
"                                             Progress Note    Client Name: Mary Shipman  Date: 7/14/2017          Service Type: Individual      Session Start Time: 1030  Session End Time: 1115      Session Length: 45m     Session #: 6     Attendees: Client attended alone    Treatment Plan Last Reviewed: 5/19/2017   PHQ-9 / CRUZ-7 :      DATA      Progress Since Last Session (Related to Symptoms / Goals / Homework):   Symptoms: Stable    Homework: Did not complete      Episode of Care Goals: Satisfactory progress - CONTEMPLATION (Considering change and yet undecided); Intervened by assessing the negative and positive thinking (ambivalence) about behavior change     Current / Ongoing Stressors and Concerns:    Things have been \"just an adventure.\"  Still having some trouble with sleep and made an appointment with Mingo for next week to discuss this.  Also will be discussing anxiety with him at this visit.  Wants to find a \"long-term plan\" to manage her insomnia and anxiety.  No additional seizures since March, has an appointment with her new neurologist next Monday and is looking forward to working with him.  She has been resting and \"trying to heal.\"  Still depending on  Franky more than she would like, but she does need his help.           Treatment Objective(s) Addressed in This Session:    Client will use identified behavioral and cognitive skills to challenge negative self talk 90% of the time.       Intervention:   CBT: -   Discussed cognitive restructuring and behavioral activation.  Explored the connection between thoughts, feelings, and actions by using examples relative to client's presenting concerns.  Explained major domains of symptoms (cognitive, emotional, somatic, behavioral, interpersonal) and importance of targeted and specific interventions to reduce symptoms of anxiety and depression.  Discussed role of symptom monitoring in cognitive behavioral treatment.       ASSESSMENT: Current " Emotional / Mental Status (status of significant symptoms):   Risk status (Self / Other harm or suicidal ideation)   Client denies current fears or concerns for personal safety.   Client denies current or recent suicidal ideation or behaviors.   Client denies current or recent homicidal ideation or behaviors.   Client denies current or recent self injurious behavior or ideation.   Client denies other safety concerns.   A safety and risk management plan has not been developed at this time, however client was given the after-hours number / 911 should there be a change in any of these risk factors.     Appearance:   Appropriate    Eye Contact:   Good    Psychomotor Behavior: Retarded (Slowed)    Attitude:   Cooperative    Orientation:   All   Speech    Rate / Production: Monotone  Slow     Volume:  Normal    Mood:    Depressed    Affect:    Blunted    Thought Content:  Clear    Thought Form:  Coherent  Logical    Insight:    Good      Medication Review:   No changes to current psychiatric medication(s)     Medication Compliance:   Yes     Changes in Health Issues:   None reported     Chemical Use Review:   Substance Use: Chemical use reviewed, no active concerns identified      Tobacco Use: No current tobacco use.       Collateral Reports Completed:   Routed note to PCP  Communicated with: clinic     PLAN: (Client Tasks / Therapist Tasks / Other)  1.  More supportive therapy and CBT at next meeting  2.  Standing meditation 5 mn am/pm  3.  Meet next week        BEHZAD Webb                                                         ________________________________________________________________________    Treatment Plan    Client's Name: Mary Shipman  YOB: 1985    Date: 5/19/2017     DSM5 Diagnoses: (Sustained by DSM5 Criteria Listed Above)  Diagnoses: 296.23 Major Depressive Disorder, Single Episode, Severe _  300.02 (F41.1) Generalized Anxiety Disorder  Psychosocial & Contextual  Factors: finances;  from ; stress with work  WHODAS 2.0 (12 item)  This questionnaire asks about difficulties due to health conditions. Health conditions  include  disease or illnesses, other health problems that may be short or long lasting,  injuries, mental health or emotional problems, and problems with alcohol or drugs.      Think back over the past 30 days and answer these questions, thinking about how much  difficulty you had doing the following activities. For each question, please [place] only  one response.     S1 Standing for long periods such as 30 minutes? None = 1   S2 Taking care of household responsibilities? Mild = 2   S3 Learning a new task, for example, learning how to get to a new place? None = 1   S4 How much of a problem do you have joining community activities (for example, festivals, Mu-ism or other activities) in the same way as anyone else can? Moderate = 3   S5 How much have you been emotionally affected by your health problems? Moderate = 3           In the past 30 days, how much difficulty did you have in:   S6 Concentrating on doing something for ten minutes? Mild = 2   S7 Walking a long distance such as a kilometer (or equivalent)? Mild = 2   S8 Washing your whole body? None = 1   S9 Getting dressed? None = 1   S10 Dealing with people you do not know? None = 1   S11 Maintaining a friendship? Mild = 2   S12 Your day to day work? Mild = 2      H1 Overall, in the past 30 days, how many days were these difficulties present? Record number of days 30   H2 In the past 30 days, for how many days were you totally unable to carry out your usual activities or work because of any health condition? Record number of days 0   H3 In the past 30 days, not counting the days that you were totally unable, for how many days did you cut back or reduce your usual activities or work because of any health condition? Record number of days 30       Referral / Collaboration:  Referral to another  professional/service is not indicated at this time..    Anticipated number of session or this episode of care: 12-18      MeasurableTreatment Goal(s) related to diagnosis / functional impairment(s)  Goal-Depression: Client will decrease depressed mood    I will know I've met my goal when I am less depressed.      Objective #A (Client Action)    Status: New - Date: 5/19/2017    Client will use identified behavioral and cognitive skills to challenge negative self talk 90% of the time.    Intervention(s)  Therapist will provide psychoeducation, behavioral activation, and cognitive restructuring.      Objective #B  Client will complete at least 10 minutes of self-regulation practice (e.g.: yoga, meditation, relaxation breathing, etc.) per day.    Status: New - Date: 5/19/2017    Intervention(s)  Therapist will provide psychoeducation, behavioral activation, and cognitive restructuring.      Objective #C  Client will exercise 30 minutes 36 times in the next 12 weeks.  Status: New - Date: 5/19/2017    Intervention(s)  Therapist will provide psychoeducation, behavioral activation, and cognitive restructuring.                Client has reviewed and agreed to the above plan.      Abad Torres, [facility]  May 19, 2017

## 2017-07-14 NOTE — MR AVS SNAPSHOT
MRN:[id number]                      After Visit Summary   7/14/2017    Mary Shipman    MRN: [id number]           Visit Information        Provider Department      7/14/2017 10:30 AM López Mehta, [facility] Generic      Your next 10 appointments already scheduled     Jul 17, 2017  1:00 PM CDT   Neuro Treatment with Anamaria Pascual, PT   [facility] ([facility])    [address] [facility] [address]   [phone]            Jul 17, 2017  1:45 PM CDT   Neuro Treatment with Mitzy Horner, OT   [facility] ([facility])    [address] [facility] [address]   [phone]            Jul 18, 2017  2:00 PM CDT   Office Visit with Miguel Hammer PA-C   [facility] ([facility])    [address] [place] [address]   [phone]           Bring a current list of meds and any records pertaining to this visit.  For Physicals, please bring immunization records and any forms needing to be filled out.  Please arrive 10 minutes early to complete paperwork.            Jul 21, 2017 10:30 AM CDT   Return Visit with López Mehta [facility] ([facility]    [address] [place] [address]   [phone]            Jul 24, 2017 11:30 AM CDT   Return Visit with López Mehta [facility]    [address] [place] [address]   [phone]            Jul 24, 2017  1:00 PM CDT   Neuro Treatment with Anamaria Pascual, PT   [facility] ([facility])    [address] [facility] [address]   [phone]            Jul 31, 2017  8:30 AM CDT   Return Visit with López Mehta [facility] ([facility])    [address] [facility] [address]   [phone]            Jul 31, 2017  1:00 PM CDT   Neuro Treatment with Anamaria Pascual PT   [facility] ([facility])    [address] [facility] [address]   [phone]            Aug 31, 2017  4:30 PM CDT   (Arrive by 4:15 PM)   Return Visit with Catrachito Michael MD   [facility] ([facility])    [address] [address] [phone]              MyChart Information     [employer] gives you secure access to your electronic health record. If you see a primary care provider, you can also send messages to your care team and make appointments. If you have questions, please call your primary care clinic.  If you do not have a primary care provider, please call [phone] and they will assist you.        Care EveryWhere ID     This is your Care EveryWhere ID. This could be used by other organizations to access your [place] medical records  [id number]        Equal Access to Services     JANA DUCKWORTH : Hadii lorraine Fields, emilee amin, qaangelica zaldivar, germain salcedo. So [facility] [phone].    ATENCIÓN: Si habla español, tiene a omer disposición servicios gratuitos de asistencia lingüística. Miguel al [phone].    We comply with applicable federal civil rights laws and [place] laws. We do not discriminate on the basis of race, color, national origin, age, disability sex, sexual orientation or gender identity.

## 2017-07-17 ENCOUNTER — HOSPITAL ENCOUNTER (OUTPATIENT)
Dept: OCCUPATIONAL THERAPY | Facility: CLINIC | Age: 32
Setting detail: THERAPIES SERIES
End: 2017-07-17
Attending: PHYSICIAN ASSISTANT
Payer: COMMERCIAL

## 2017-07-17 ENCOUNTER — TRANSFERRED RECORDS (OUTPATIENT)
Dept: HEALTH INFORMATION MANAGEMENT | Facility: CLINIC | Age: 32
End: 2017-07-17

## 2017-07-17 PROCEDURE — 40000125 ZZHC STATISTIC OT OUTPT VISIT: Performed by: OCCUPATIONAL THERAPIST

## 2017-07-17 PROCEDURE — 97110 THERAPEUTIC EXERCISES: CPT | Mod: GO | Performed by: OCCUPATIONAL THERAPIST

## 2017-07-17 PROCEDURE — 97530 THERAPEUTIC ACTIVITIES: CPT | Mod: GO | Performed by: OCCUPATIONAL THERAPIST

## 2017-07-17 NOTE — PROGRESS NOTES
"Outpatient Occupational Therapy Progress Note     Patient: Александр Shipman  : 1985  Insurance:   Payor/Plan Subscriber Name Rel Member # Group #   UCARE - UCARE АЛЕКСАНДР MARIE  [id number]      [address]       Beginning/End Dates of Reporting Period:  17 to 2017    Referring Provider: Verona Kohli MD    Therapy Diagnosis: Convulsions, unspecified convulsion type     Client Self Report: Pt reports she no longer has support from her  or care attendant, so she's been 'thrown into life\" and managing relatively well.  Biggest challenges are with ongoing tightness in shoulders, spasms and \"nerve twitching\" in L hand (especially when typing), and getting very overwhelmed/oversitmulated when going into big stores like [employer]. She reports on one instance she was flustered in a noisy setting and over-paid someone by $100.   Pt started with a new neurologist today, who is going to run some additional tests, though Александр was unable of the specifics on which tests she will be doing.     Objective Measurements:     Objective Measure:  strength   Details: R  90#, L 70# (35 lb improvement since evaluation on 17)     Objective Measure: typing test   Details: 2 trials: 38, 39 words per minute (initial score was 37 on 17)     Objective Measure: 9 hole peg    Details: R 19 seconds, 21 seconds  (scores are now WNL. R hand has inproved by 9 seconds and L hand has improved by 6 seconds)    Objective Measure: Dynavision (last tested on )   Details: 65 BUE, while wearing light purple lenses.  This score is above the safe driving cutoff, but pt was still encouraged to get clearance from her MD regarding driving. She does report she has already self-initiated driving since it has been more than 3 months since her seizure.    Outcome Measures (most recent score):  AM-PAC  Basic Mobility Score Level  (Lower scores equate to lower levels of function): 60.54  AM-PAC  Daily " Activity Score Level  (Lower scores equate to lower levels of function): 46.12  AM-PAC  Applied Cognitive Score Level  (Lower scores equate to lower levels of function): 42.92      Goals:     Goal Identifier  strength   Goal Description Pt will demonstrate a  strength of at least 65 lb, for return to PLOF in work and leisure activities.    Target Date 07/01/17   Date Met  04/24/17   Progress: Goal met. Pt with  strength of 70 on L, which is slightly above the mean for her age, but still within 1 SD     Goal Identifier visual accommodations   Goal Description  Pt will demonstrate understanding and implementation of at least 2 compensatory strategies for improved near and far vision.    Target Date 07/01/17   Date Met  06/27/17   Progress: Goal met. Pt has obtained specialty tinted lenses. States they are not the exact strength that Dr. Alcantara was recommending, but she feels that they help greatly, and her migraines have been very few with the lenses on.     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    Target Date 07/01/17   Date Met      Progress: Goal partially met. Pt reports she has been doing her own bill paying, as well as some of the bill paying worksheets provided during therapy. She does indicate increased difficulty with counting change and making purchases when she is in a busy setting like [facility]. Will continue goal.      Goal Identifier attention/concentration   Goal Description Pt will attend to reading task for 20 continuous minutes, for increased concentration needed to return to work.    Target Date 07/01/17   Date Met      Progress: Goal met     Goal Identifier Memory   Goal Description Pt will demonstrate 75% accuracy in recall of 5-word lists, for improved independence in IADLs.    Target Date 07/01/17   Date Met      Progress: Goal partially  met on 6/19, and has not been re-tested since then. Will continue goal.      Goal Identifier typing   Goal Description Pt will demonstrate typing 45 words per minute, for return to PLOF in work tasks.    Target Date 07/01/17   Date Met   (max of 42 on 6/19)   Progress: Goal partially met. Pt has demo'd a max of 42 wpm. She has continued to work on typing skills at home.  Will continue goal, as pt has plans to return to work, she hopes for within the next 2-3 months.        Progress Toward Goals:   Progress this reporting period: Pt has been seen for 17 visits since her initial evaluation. She has made good progress in all above goal areas, see details above for individual goals.  She has had fair attendance of the past month, with several cancellations due to difficulty with transportation, strep throat, and some no shows without reason why.      Plan:  Changes to therapy plan of care: Plan for 4 more visits, including today, at a biweekly frequency.   New Goal: pt 8/21/17, pt will demonstrate full active ROM in both shoulders, for increased ease of overhead reaching and decreased pain.  (Note if no improvement, will seek out OP PT with orthopedic specialty. Pt was not yet ready to start with a new therapist at this time).    Discharge:  No

## 2017-07-21 ENCOUNTER — OFFICE VISIT (OUTPATIENT)
Dept: FAMILY MEDICINE | Facility: CLINIC | Age: 32
End: 2017-07-21
Payer: COMMERCIAL

## 2017-07-21 VITALS
HEART RATE: 79 BPM | HEIGHT: 65 IN | BODY MASS INDEX: 37.65 KG/M2 | WEIGHT: 226 LBS | DIASTOLIC BLOOD PRESSURE: 72 MMHG | OXYGEN SATURATION: 99 % | TEMPERATURE: 98.8 F | SYSTOLIC BLOOD PRESSURE: 124 MMHG

## 2017-07-21 DIAGNOSIS — G47.00 INSOMNIA, UNSPECIFIED TYPE: ICD-10-CM

## 2017-07-21 DIAGNOSIS — F41.1 GAD (GENERALIZED ANXIETY DISORDER): Primary | ICD-10-CM

## 2017-07-21 DIAGNOSIS — G40.909 SEIZURE DISORDER (H): ICD-10-CM

## 2017-07-21 DIAGNOSIS — F33.1 MAJOR DEPRESSIVE DISORDER, RECURRENT EPISODE, MODERATE (H): ICD-10-CM

## 2017-07-21 DIAGNOSIS — F43.23 ADJUSTMENT DISORDER WITH MIXED ANXIETY AND DEPRESSED MOOD: ICD-10-CM

## 2017-07-21 PROCEDURE — 99214 OFFICE O/P EST MOD 30 MIN: CPT | Performed by: PHYSICIAN ASSISTANT

## 2017-07-21 RX ORDER — SERTRALINE HYDROCHLORIDE 100 MG/1
TABLET, FILM COATED ORAL
Qty: 180 TABLET | Refills: 0 | Status: SHIPPED | OUTPATIENT
Start: 2017-07-21 | End: 2017-11-16

## 2017-07-21 RX ORDER — CLONAZEPAM 1 MG/1
TABLET ORAL
Qty: 30 TABLET | Refills: 0 | Status: SHIPPED | OUTPATIENT
Start: 2017-07-21 | End: 2017-08-31

## 2017-07-21 ASSESSMENT — ANXIETY QUESTIONNAIRES
1. FEELING NERVOUS, ANXIOUS, OR ON EDGE: NEARLY EVERY DAY
2. NOT BEING ABLE TO STOP OR CONTROL WORRYING: MORE THAN HALF THE DAYS
GAD7 TOTAL SCORE: 19
3. WORRYING TOO MUCH ABOUT DIFFERENT THINGS: MORE THAN HALF THE DAYS
6. BECOMING EASILY ANNOYED OR IRRITABLE: NEARLY EVERY DAY
5. BEING SO RESTLESS THAT IT IS HARD TO SIT STILL: NEARLY EVERY DAY
7. FEELING AFRAID AS IF SOMETHING AWFUL MIGHT HAPPEN: NEARLY EVERY DAY

## 2017-07-21 ASSESSMENT — PATIENT HEALTH QUESTIONNAIRE - PHQ9: 5. POOR APPETITE OR OVEREATING: NEARLY EVERY DAY

## 2017-07-21 NOTE — NURSING NOTE
"Chief Complaint   Patient presents with     Anxiety       Initial /72 (BP Location: Right arm, Cuff Size: Adult Large)  Pulse 79  Temp 98.8  F (37.1  C) (Oral)  Ht 5' 5\" (1.651 m)  Wt 226 lb (102.5 kg)  SpO2 99%  BMI 37.61 kg/m2 Estimated body mass index is 37.61 kg/(m^2) as calculated from the following:    Height as of this encounter: 5' 5\" (1.651 m).    Weight as of this encounter: 226 lb (102.5 kg).  Medication Reconciliation: complete     Silvia Vale CMA (AAMA)      "

## 2017-07-21 NOTE — MR AVS SNAPSHOT
After Visit Summary   7/21/2017    Mary Shipman    MRN: [id number]           Patient Information     Date Of Birth          1985        Visit Information        Provider Department      7/21/2017 12:40 PM Miguel Hammer PA-C [facility]        Today's Diagnoses     CRUZ (generalized anxiety disorder)    -  1    Insomnia, unspecified type        Major depressive disorder, recurrent episode, moderate (H)        Adjustment disorder with mixed anxiety and depressed mood          Care Instructions    1. Increase Sertraline to 200 mg daily  2. Clonazepam 1 mg bed time           Follow-ups after your visit        Your next 10 appointments already scheduled     Jul 24, 2017 11:30 AM CDT   Return Visit with López Mehta, [facility] ([facility])    [address] [place] [address]   [phone]            Jul 24, 2017  1:00 PM CDT   Neuro Treatment with Anamaria Pascual, PT   [facility] ([facility]    [address] [facility] [address]   [phone]            Jul 24, 2017  1:45 PM CDT   Neuro Treatment with Mitzy Horner, OT   [facility] ([facility]    [address] [facility] [address]   [phone]            Jul 31, 2017  8:30 AM CDT   Return Visit with López Mehta, [facility] ([facility])    [address] [place] [address]   [phone]            Jul 31, 2017  1:00 PM CDT   Neuro Treatment with Anamaria Pascual, PT   [facility] ([facility]    [address] [facility] [address]   [phone]            Aug 07, 2017 10:15 AM CDT   Neuro Treatment with Mitzy Horner, OT   [facility] ([facility]    [address] [facility] [place]  [address]   [phone]            Aug 07, 2017 11:15 AM CDT   Neuro Treatment with Anamaria Pascual, PT   [facility] ([facility])    [address] [facility] [address]   [phone]            Aug 21, 2017 10:15 AM CDT   Neuro Treatment with Mitzy Horner, OT   [facility] ([facility])    [address] [facility] [address]   [phone]            Aug 21, 2017 11:15 AM CDT   Neuro Treatment with Anamaria Pascual, PT   [facility] ([facility])    [address] [facility] [address]   [phone]            Aug 31, 2017  4:30 PM CDT   (Arrive by 4:15 PM)   Return Visit with Catrachito Michael MD   [facility] ([facility])    [address] [place] [address] [phone]              Who to contact     If you have questions or need follow up information about today's clinic visit or your schedule please contact [facility] directly at [phone].  Normal or non-critical lab and imaging results will be communicated to you by MyChart, letter or phone within 4 business days after the clinic has received the results. If you do not hear from us within 7 days, please contact the clinic through MyChart or phone. If you have a critical or abnormal lab result, we will notify you by phone as soon as possible.  Submit refill requests through [employer] or call your pharmacy and they will forward the refill request to us. Please allow 3 business days for your refill to be completed.          Additional Information About Your Visit        [employer] Information     [employer] gives you secure access to your electronic health record. If you see a primary care provider, you can also send messages to your care team and make appointments. If you have questions, please call your primary care clinic.  If you do not  "have a primary care provider, please call [phone] and they will assist you.        Care EveryWhere ID     This is your Care EveryWhere ID. This could be used by other organizations to access your [place] medical records  [id number]        Your Vitals Were     Pulse Temperature Height Pulse Oximetry BMI (Body Mass Index)       79 98.8  F (37.1  C) (Oral) 5' 5\" (1.651 m) 99% 37.61 kg/m2        Blood Pressure from Last 3 Encounters:   07/21/17 124/72   07/03/17 120/76   06/20/17 110/62    Weight from Last 3 Encounters:   07/21/17 226 lb (102.5 kg)   07/03/17 227 lb (103 kg)   06/20/17 229 lb (103.9 kg)              Today, you had the following     No orders found for display         Today's Medication Changes          These changes are accurate as of: 7/21/17  1:27 PM.  If you have any questions, ask your nurse or doctor.               These medicines have changed or have updated prescriptions.        Dose/Directions    * clonazePAM 1 MG tablet   Commonly known as:  klonoPIN   This may have changed:  Another medication with the same name was added. Make sure you understand how and when to take each.   Used for:  Generalized anxiety disorder   Changed by:  Miguel Hammer PA-C        1 tablet daily in evening   Quantity:  30 tablet   Refills:  1       * clonazePAM 1 MG tablet   Commonly known as:  klonoPIN   This may have changed:  You were already taking a medication with the same name, and this prescription was added. Make sure you understand how and when to take each.   Used for:  CRUZ (generalized anxiety disorder), Insomnia, unspecified type   Changed by:  Miguel Hammer PA-C        1 at bedtime daily   Quantity:  30 tablet   Refills:  0       sertraline 100 MG tablet   Commonly known as:  ZOLOFT   This may have changed:  additional instructions   Used for:  Major depressive disorder, recurrent episode, moderate (H), Adjustment disorder with mixed anxiety and depressed mood   Changed by:  Harman" Miguel Rader PA-C        2 tablets daily   Quantity:  180 tablet   Refills:  0       * Notice:  This list has 2 medication(s) that are the same as other medications prescribed for you. Read the directions carefully, and ask your doctor or other care provider to review them with you.         Where to get your medicines      These medications were sent to [facility]/pharmacy #[id number] - [facility], [place] - [address]  [address] [facility] [place] [address]     Phone:  [phone]     sertraline 100 MG tablet         Some of these will need a paper prescription and others can be bought over the counter.  Ask your nurse if you have questions.     Bring a paper prescription for each of these medications     clonazePAM 1 MG tablet                Primary Care Provider Office Phone # Fax #    Miguel Hammer PA-C [phone]       [facility] [address] [facility] [address]        Equal Access to Services     JANA DUCKWORTH AH: Hadii lorraine ku hadasho Soomaali, waaxda luqadaha, qaybta kaalmada adeegyada, waxay idiin hayaan amber khsusy rey . So [facility] [phone].    ATENCIÓN: Si habla español, tiene a omer disposición servicios gratuitos de asistencia lingüística. Roxame al [phone].    We comply with applicable federal civil rights laws and [place] laws. We do not discriminate on the basis of race, color, national origin, age, disability sex, sexual orientation or gender identity.            Thank you!     Thank you for choosing [facility]  for your care. Our goal is always to provide you with excellent care. Hearing back from our patients is one way we can continue to improve our services. Please take a few minutes to complete the written survey that you may receive in the mail after your visit with us. Thank you!             Your Updated Medication List - Protect others around you: Learn how to safely use, store and throw away your medicines at  www.disposemymeds.org.          This list is accurate as of: 7/21/17  1:27 PM.  Always use your most recent med list.                   Brand Name Dispense Instructions for use Diagnosis    albuterol 108 (90 BASE) MCG/ACT Inhaler    PROAIR HFA/PROVENTIL HFA/VENTOLIN HFA    3 Inhaler    Inhale 2 puffs into the lungs every 6 hours as needed for shortness of breath / dyspnea or wheezing    Intermittent asthma, uncomplicated       cholecalciferol 1000 UNIT tablet    vitamin D    100 tablet    4 caps daily)    Other seizures (H), Left-sided weakness       * clonazePAM 1 MG tablet    klonoPIN    30 tablet    1 tablet daily in evening    Generalized anxiety disorder       * clonazePAM 1 MG tablet    klonoPIN    30 tablet    1 at bedtime daily    CRUZ (generalized anxiety disorder), Insomnia, unspecified type       fexofenadine-pseudoePHEDrine 180-240 MG per 24 hr tablet    ALLEGRA-D 24     Take 1 tablet by mouth daily        fluticasone 50 MCG/ACT spray    FLONASE     Spray 1-2 sprays into both nostrils daily as needed for rhinitis or allergies        ibuprofen 400-800 mg tablet    ADVIL,MOTRIN    30 tablet    Take 1-2 tablets by mouth every 6 hours as needed (cramping).    Abdominal pain       LANsoprazole 30 MG CR capsule    PREVACID    90 capsule    Take 1 capsule (30 mg) by mouth daily Take 30-60 minutes before a meal.    Gastroesophageal reflux disease without esophagitis       levalbuterol 1.25 MG/0.5ML Nebu neb solution    XOPENEX CONC     Take 1.25 mg by nebulization every 6 hours as needed for wheezing        levETIRAcetam 500 MG tablet    KEPPRA    60 tablet    2000 MG AT BEDTIME    Convulsions, unspecified convulsion type (H), Seizure disorder (H), Migraine without aura and with status migrainosus, not intractable       metFORMIN 500 MG 24 hr tablet    GLUCOPHAGE-XR    180 tablet    Take 1 tablet (500 mg) by mouth 2 times daily (with meals)    Polycystic ovaries       mometasone-formoterol 200-5 MCG/ACT oral  inhaler    DULERA    39 g    Inhale 2 puffs into the lungs 2 times daily    Intermittent asthma, uncomplicated       montelukast 10 MG tablet    SINGULAIR    90 tablet    Take 1 tablet (10 mg) by mouth At Bedtime    Intermittent asthma, uncomplicated       polyethylene glycol Packet    MIRALAX/GLYCOLAX     Take 17 g by mouth daily as needed for constipation        ranitidine 150 MG tablet    ZANTAC    60 tablet    Take 150 mg by mouth 2 times daily as needed        sertraline 100 MG tablet    ZOLOFT    180 tablet    2 tablets daily    Major depressive disorder, recurrent episode, moderate (H), Adjustment disorder with mixed anxiety and depressed mood       topiramate 50 MG tablet    TOPAMAX    180 tablet    3 tablets twice a day. (Patient reports 100 in AM and 200 in PM)    Seizure disorder (H), Migraine without aura and with status migrainosus, not intractable, Convulsions, unspecified convulsion type (H)       * Notice:  This list has 2 medication(s) that are the same as other medications prescribed for you. Read the directions carefully, and ask your doctor or other care provider to review them with you.

## 2017-07-21 NOTE — PROGRESS NOTES
SUBJECTIVE:                                                    Mary Shipman is a 32 year old female who presents to clinic today for the following health issues:      Anxiety Follow-Up    Status since last visit: Patient feels it could be controlled better     Other associated symptoms:None    Complicating factors:   Significant life event: No   Current substance abuse: None  Depression symptoms: No  CRUZ-7 SCORE 5/1/2017 5/5/2017 6/20/2017   Total Score - - -   Total Score 11 15 18       CRUZ    Insomnia  Has had ongoing severe insomnia issues. She reports inability to fall and stay asleep. Hasn't slept these past 2 nights. Not sleeping triggers her migraines and seizure like activity. She's hopeful to get something better. The best thing has been the clonazepam and she wants to consider something like that.    Did just see Dr. Molina at [facility] and was really happy with the visit. Things went well. The notes were just faxed over today and she was seen for an 80 minute evaluation visit.     Patient Active Problem List   Diagnosis     Esophageal reflux     Allergic rhinitis due to other allergen     Polycystic ovaries     Thyrotoxicosis     Urticaria     Obesity     Low back pain     Intermittent asthma     HYPERLIPIDEMIA LDL GOAL <160     Irritable bowel syndrome with constipation     Migraine headache     Not immune to rubella     Major depressive disorder, recurrent episode, moderate (H)     Health Care Home     Health care home, active care coordination     Generalized anxiety disorder     Lump or mass in breast     Menometrorrhagia     Seizure (H)     Left hemiplegia (H)      Current Outpatient Prescriptions   Medication     sertraline (ZOLOFT) 100 MG tablet     clonazePAM (KLONOPIN) 1 MG tablet     levETIRAcetam (KEPPRA) 500 MG tablet     topiramate (TOPAMAX) 50 MG tablet     clonazePAM (KLONOPIN) 1 MG tablet     cholecalciferol (VITAMIN D) 1000 UNIT tablet      "fexofenadine-pseudoePHEDrine (ALLEGRA-D 24) 180-240 MG per 24 hr tablet     fluticasone (FLONASE) 50 MCG/ACT spray     polyethylene glycol (MIRALAX/GLYCOLAX) Packet     albuterol (PROAIR HFA, PROVENTIL HFA, VENTOLIN HFA) 108 (90 BASE) MCG/ACT inhaler     mometasone-formoterol (DULERA) 200-5 MCG/ACT oral inhaler     metFORMIN (GLUCOPHAGE-XR) 500 MG 24 hr tablet     LANsoprazole (PREVACID) 30 MG capsule     levalbuterol (XOPENEX CONC) 1.25 MG/0.5ML NEBU     montelukast (SINGULAIR) 10 MG tablet     ibuprofen (ADVIL,MOTRIN) 400-800 mg tablet     ranitidine (ZANTAC) 150 MG tablet     [DISCONTINUED] sertraline (ZOLOFT) 100 MG tablet     [DISCONTINUED] KLONOPIN 0.5 MG OR TABS     No current facility-administered medications for this visit.           Amount of exercise or physical activity: None    Problems taking medications regularly: No    Medication side effects: none  Diet: regular (no restrictions)    Problem list and histories reviewed & adjusted, as indicated.  Additional history: as documented    Labs reviewed in EPIC    Reviewed and updated as needed this visit by clinical staffTobacco  Allergies  Meds  Med Hx  Surg Hx  Fam Hx  Soc Hx      Reviewed and updated as needed this visit by Provider         ROS:  Constitutional, HEENT, cardiovascular, pulmonary, gi and gu systems are negative, except as otherwise noted.      OBJECTIVE:   /72 (BP Location: Right arm, Cuff Size: Adult Large)  Pulse 79  Temp 98.8  F (37.1  C) (Oral)  Ht 5' 5\" (1.651 m)  Wt 226 lb (102.5 kg)  SpO2 99%  BMI 37.61 kg/m2  Body mass index is 37.61 kg/(m^2).  GENERAL: healthy, alert and no distress  Psych: Appropriate appearance.  Alert and oriented times 3; coherent speech, normal   rate and volume, able to articulate logical thoughts, able   to abstract reason, no tangential thoughts, no hallucinations   or delusions.  Normal behavior.  Her affect is bright.    NEURO: CN II-XII intact     ASSESSMENT/PLAN:     (F41.1) CRUZ " (generalized anxiety disorder)  (primary encounter diagnosis)  Comment:   Plan: clonazePAM (KLONOPIN) 1 MG tablet        Reviewed options. Recommend we increase her Sertraline to 200 mg. She notes her anxiety is still not well controlled. This prescription is given with a discussion of side effects, risks and proper use.  Instructions are given to follow up if not improving or symptoms change or worsen as discussed.     (G47.00) Insomnia, unspecified type  Comment:   Plan: clonazePAM (KLONOPIN) 1 MG tablet        I'm fine with Clonazepam if taken scheduled, daily, same time for insomnia and next day anxiety and panic. She agrees.    (F33.1) Major depressive disorder, recurrent episode, moderate (H)  Comment:   Plan: sertraline (ZOLOFT) 100 MG tablet        As noted     (G40.909) Seizure disorder (H)  Comment:   Plan: Dr. Molina's note was chart reviewed with patient and the plans are in place to re-admit and work up some of her findings that took place in the [facility]    (F43.23) Adjustment disorder with mixed anxiety and depressed mood  Comment:   Plan: sertraline (ZOLOFT) 100 MG tablet        As noted.     Follow up as needed. Continue with therapy.     FRANSICO JOHNSON PA-C  [facility]

## 2017-07-22 ASSESSMENT — ANXIETY QUESTIONNAIRES: GAD7 TOTAL SCORE: 19

## 2017-07-22 ASSESSMENT — PATIENT HEALTH QUESTIONNAIRE - PHQ9: SUM OF ALL RESPONSES TO PHQ QUESTIONS 1-9: 20

## 2017-07-24 ENCOUNTER — OFFICE VISIT (OUTPATIENT)
Dept: BEHAVIORAL HEALTH | Facility: CLINIC | Age: 32
End: 2017-07-24
Payer: COMMERCIAL

## 2017-07-24 DIAGNOSIS — F33.1 MAJOR DEPRESSIVE DISORDER, RECURRENT EPISODE, MODERATE (H): Primary | ICD-10-CM

## 2017-07-24 PROCEDURE — 90834 PSYTX W PT 45 MINUTES: CPT | Performed by: SOCIAL WORKER

## 2017-07-24 NOTE — MR AVS SNAPSHOT
MRN:[id number]                      After Visit Summary   7/24/2017    Mary Shipman    MRN: [id number]           Visit Information        Provider Department      7/24/2017 11:30 AM López Mehta, [facility] Generic      Your next 10 appointments already scheduled     Jul 24, 2017  1:00 PM CDT   Neuro Treatment with Anamaria Callarturoo, PT   [facility] ([facility])    [address] [facility] [address]   [phone]            Jul 24, 2017  1:45 PM CDT   Neuro Treatment with Jeris Siri, OT   [facility] ([facility])    [address] [facility] [address]   [phone]            Jul 31, 2017  8:30 AM CDT   Return Visit with López Mehta, [facility] ([facility])    [address] [place] [address]   [phone]            Jul 31, 2017  1:00 PM CDT   Neuro Treatment with Anamaria Arabellao, PT   [facility] ([facility])    [address] [facility] [address]   [phone]            Aug 07, 2017  9:30 AM CDT   Return Visit with López Mehta, [facility] ([facility])    [address] [place] [address]   [phone]            Aug 07, 2017 10:15 AM CDT   Neuro Treatment with Jeris Siri, OT   [facility] ([facility])    [address] [facility] [address]   [phone]            Aug 07, 2017 11:15 AM CDT   Neuro Treatment with Anamaria Callisto, PT   [facility] ([facility])    [address] [facility] [address]   [phone]            Aug 21, 2017 10:15 AM CDT   Neuro Treatment with Jeris Tristenberg, OT   [facility] ([facility])    [address] [facility] [address]   [phone]            Aug 21, 2017 11:15 AM CDT   Neuro Treatment with Anamaria Pascual PT   [facility] ([facility])    [address] [facility] [address]   [phone]            Aug 31, 2017  4:30 PM CDT   (Arrive by 4:15 PM)   Return Visit with Catrachito Michael MD   [facility] ([facility] [place])    [address] [address] [phone]              MyChart Information     [employer] gives you secure access to your electronic health record. If you see a primary care provider, you can also send messages to your care team and make appointments. If you have questions, please call your primary care clinic.  If you do not have a primary care provider, please call [phone] and they will assist you.        Care EveryWhere ID     This is your Care EveryWhere ID. This could be used by other organizations to access your [facility] medical records  [id number]        Equal Access to Services     JANA DUCKWORTH : Hadii lorraine Fields, waivoryda brennan, qacarlosta irving zaldivar, germain salcedo. So [facility] [phone].    ATENCIÓN: Si habla español, tiene a omer disposición servicios gratuitos de asistencia lingüística. Miguel al [phone].    We comply with applicable federal civil rights laws and [place] laws. We do not discriminate on the basis of race, color, national origin, age, disability sex, sexual orientation or gender identity.

## 2017-07-24 NOTE — PROGRESS NOTES
"                                             Progress Note    Client Name: Mary Shipman  Date: 7/24/2017           Service Type: Individual      Session Start Time: 1130  Session End Time: 1215      Session Length: 45m     Session #: 7     Attendees: Client attended alone    Treatment Plan Last Reviewed: 5/19/2017   PHQ-9 / CRUZ-7 :      DATA      Progress Since Last Session (Related to Symptoms / Goals / Homework):   Symptoms: Stable    Homework: Did not complete      Episode of Care Goals: Satisfactory progress - CONTEMPLATION (Considering change and yet undecided); Intervened by assessing the negative and positive thinking (ambivalence) about behavior change     Current / Ongoing Stressors and Concerns:    Things have been \"its ok.\"  Not much has changed with her situation.  Sounds like her anxiety has been better, not worrying so much about what is going on with Franky and her.  Doing her best to set some boundaries with him.  Also is letting him handle his immigration process rather than handling it herself and getting stressed out about it.  Thinks that she needs to focus on herself and her son.  Discussed productive and unproductive worry.  Framed productive worry as prudent, focused on events or problems, might reasonably cause a problem if left unattended, and that lead to a potential solution of a problem.  Explained productive worry as a \"to do\" list and unproductive worry as a \"what if\" list.  Used metaphor of car trip to describe the differences.  Explored how client has distinguish between the two in the past and might do this better in the future.  Discussed logging productive and unproductive worry between sessions.                Treatment Objective(s) Addressed in This Session:    Client will use identified behavioral and cognitive skills to challenge negative self talk 90% of the time.       Intervention:   CBT: -   Discussed cognitive restructuring and behavioral activation.  Explored the " connection between thoughts, feelings, and actions by using examples relative to client's presenting concerns.  Explained major domains of symptoms (cognitive, emotional, somatic, behavioral, interpersonal) and importance of targeted and specific interventions to reduce symptoms of anxiety and depression.  Discussed role of symptom monitoring in cognitive behavioral treatment.       ASSESSMENT: Current Emotional / Mental Status (status of significant symptoms):   Risk status (Self / Other harm or suicidal ideation)   Client denies current fears or concerns for personal safety.   Client denies current or recent suicidal ideation or behaviors.   Client denies current or recent homicidal ideation or behaviors.   Client denies current or recent self injurious behavior or ideation.   Client denies other safety concerns.   A safety and risk management plan has not been developed at this time, however client was given the after-hours number / 911 should there be a change in any of these risk factors.     Appearance:   Appropriate    Eye Contact:   Good    Psychomotor Behavior: Retarded (Slowed)    Attitude:   Cooperative    Orientation:   All   Speech    Rate / Production: Monotone  Slow     Volume:  Normal    Mood:    Depressed    Affect:    Blunted    Thought Content:  Clear    Thought Form:  Coherent  Logical    Insight:    Good      Medication Review:   No changes to current psychiatric medication(s)     Medication Compliance:   Yes     Changes in Health Issues:   None reported     Chemical Use Review:   Substance Use: Chemical use reviewed, no active concerns identified      Tobacco Use: No current tobacco use.       Collateral Reports Completed:   Routed note to PCP  Communicated with: clinic     PLAN: (Client Tasks / Therapist Tasks / Other)  1.  More supportive therapy and CBT at next meeting  2.  Standing meditation 5 mn am/pm  3.  Meet next week        BEHZAD Webb                                                          ________________________________________________________________________    Treatment Plan    Client's Name: Mary Shipman  YOB: 1985    Date: 5/19/2017     DSM5 Diagnoses: (Sustained by DSM5 Criteria Listed Above)  Diagnoses: 296.23 Major Depressive Disorder, Single Episode, Severe _  300.02 (F41.1) Generalized Anxiety Disorder  Psychosocial & Contextual Factors: finances;  from ; stress with work  WHODAS 2.0 (12 item)  This questionnaire asks about difficulties due to health conditions. Health conditions  include  disease or illnesses, other health problems that may be short or long lasting,  injuries, mental health or emotional problems, and problems with alcohol or drugs.      Think back over the past 30 days and answer these questions, thinking about how much  difficulty you had doing the following activities. For each question, please Taz[place]ina only  one response.     S1 Standing for long periods such as 30 minutes? None = 1   S2 Taking care of household responsibilities? Mild = 2   S3 Learning a new task, for example, learning how to get to a new place? None = 1   S4 How much of a problem do you have joining community activities (for example, festivals, Synagogue or other activities) in the same way as anyone else can? Moderate = 3   S5 How much have you been emotionally affected by your health problems? Moderate = 3           In the past 30 days, how much difficulty did you have in:   S6 Concentrating on doing something for ten minutes? Mild = 2   S7 Walking a long distance such as a kilometer (or equivalent)? Mild = 2   S8 Washing your whole body? None = 1   S9 Getting dressed? None = 1   S10 Dealing with people you do not know? None = 1   S11 Maintaining a friendship? Mild = 2   S12 Your day to day work? Mild = 2      H1 Overall, in the past 30 days, how many days were these difficulties present? Record number of days 30   H2 In the past 30 days,  for how many days were you totally unable to carry out your usual activities or work because of any health condition? Record number of days 0   H3 In the past 30 days, not counting the days that you were totally unable, for how many days did you cut back or reduce your usual activities or work because of any health condition? Record number of days 30       Referral / Collaboration:  Referral to another professional/service is not indicated at this time..    Anticipated number of session or this episode of care: 12-18      MeasurableTreatment Goal(s) related to diagnosis / functional impairment(s)  Goal-Depression: Client will decrease depressed mood    I will know I've met my goal when I am less depressed.      Objective #A (Client Action)    Status: New - Date: 5/19/2017    Client will use identified behavioral and cognitive skills to challenge negative self talk 90% of the time.    Intervention(s)  Therapist will provide psychoeducation, behavioral activation, and cognitive restructuring.      Objective #B  Client will complete at least 10 minutes of self-regulation practice (e.g.: yoga, meditation, relaxation breathing, etc.) per day.    Status: New - Date: 5/19/2017    Intervention(s)  Therapist will provide psychoeducation, behavioral activation, and cognitive restructuring.      Objective #C  Client will exercise 30 minutes 36 times in the next 12 weeks.  Status: New - Date: 5/19/2017    Intervention(s)  Therapist will provide psychoeducation, behavioral activation, and cognitive restructuring.                Client has reviewed and agreed to the above plan.      Abad Torres, [facility]  May 19, 2017

## 2017-07-31 ENCOUNTER — TRANSFERRED RECORDS (OUTPATIENT)
Dept: HEALTH INFORMATION MANAGEMENT | Facility: CLINIC | Age: 32
End: 2017-07-31

## 2017-08-03 ENCOUNTER — CARE COORDINATION (OUTPATIENT)
Dept: CARE COORDINATION | Facility: CLINIC | Age: 32
End: 2017-08-03

## 2017-08-03 NOTE — LETTER
[facility] CARE COORDINATION  [address] [facility] [address]  Phone: [phone]      August 4, 2017      Mary Shipman  [address] [facility] [address]    Dear Mary,  I am the Clinic Care Coordinator that works with your primary care provider's clinic. I recently tried to call and was unable to reach you. Below is a description of what Clinic Care Coordination is and how I can further assist you.     The Clinic Care Coordinator role is a Registered Nurse and/or  who understands the health care system. The goal of Clinic Care Coordination is to help you manage your health and improve access to the [facility] in the most efficient manner.  The Registered Nurse can assist you in meeting your health care goals by providing education, coordinating services, and strengthening the communication among your providers. The  can assist you with financial, behavioral, psychosocial, and chemical dependency and counseling/psychiatric resources.    Please feel free to keep this letter and contact information to contact me at [phone] with any further questions or concerns that may arise. We at [facility] are focused on providing you with the highest-quality healthcare experience possible and that all starts with you.       Sincerely,     Olga Arizmendi, MANOLO, MSW    Clinical Care Coordination  [facility]  [phone]      Enclosed: I have enclosed a copy of a 24 Hour Access Plan. This has helpful phone numbers for you to call when needed. Please keep this in an easy to access place to use as needed.

## 2017-08-03 NOTE — LETTER
Health Care Home - Access Care Plan    About Me  Patient Name:  Mary Erickson    YOB: 1985  Age:                            32 year old   [place] MRN:         [id number] Telephone Information:     Home Phone [phone]   Mobile [phone]       Address:    [address] [place] [address] Email address:  [email or web address]      Emergency Contact(s)  Name Relationship Lgl Grd Work Phone Home Phone Mobile Phone   1. JOSE ANTONIO ERICKSON Spouse   [phone]   2. JAMAR LEWIS Mother   [phone]              My Access Plan  Medical Emergency 911   Questions or concerns during clinic hours Primary Clinic Line, I will call the clinic directly: Primary Clinic: [facility] [phone]   24 Hour Appointment Line [phone] or  [phone])  (toll free)   24 Hour Nurse Line [phone] (toll free)   Questions or concerns outside clinic hours 24 Hour Appointment Line, I will call the after-hours on-call line:   [facility] [phone] or [phone] ([phone]) (toll-free)   Preferred Urgent Care Preferred Urgent Care:  (unknown)   Preferred Hospital Preferred Hospital:  (unknown)   Preferred Pharmacy [facility] - PHARMACY CLOSED - RELOCATED TO [facility]     Behavioral Health Crisis Line The National Suicide Prevention Lifeline at 1-276.252.5390 or 914     My Care Team Members  Patient Care Team       Relationship Specialty Notifications Start End    Miguel Hammer PA-C PCP - General   11/25/05     Comment:  Primary Clinic is [facility] and Primary Provider is Dr Mingo Hammer 11/18/2012    Phone: [phone] Fax: [phone]         [facility] [address] [facility] [address]    Vamshi Downing MD MD ENT  5/6/13     Phone: [phone] Fax: [phone]         [facility] [address] [facility] [address] [facility] [address]    Yehuda Javed     5/6/13     Phone:  [phone] Fax: [phone]         [facility] [address] [facility] [address]    Catrachito Michael MD MD Neurology  3/31/17     Phone: [phone] Fax: [phone]          [facility] [address] [facility] [address] [facility] [address]    Marciano Powers, RN Registered Nurse  Admissions 4/12/17     Comment:  Epilepsy [phone]    Karl Alcantara MD MD Ophthalmology  5/9/17     Phone: [phone] Fax: [phone]          [facility] [address] [facility] [address] [facility] [address]    Olga Arizmendi Clinic Care Coordinator  - Clinical  8/4/17     Phone: [phone] Fax: [phone]            My Medical and Care Information  Problem List   Patient Active Problem List   Diagnosis     Esophageal reflux     Allergic rhinitis due to other allergen     Polycystic ovaries     Thyrotoxicosis     Urticaria     Obesity     Low back pain     Intermittent asthma     HYPERLIPIDEMIA LDL GOAL <160     Irritable bowel syndrome with constipation     Migraine headache     Not immune to rubella     Major depressive disorder, recurrent episode, moderate (H)     Health Care Home     Health care home, active care coordination     Generalized anxiety disorder     Lump or mass in breast     Menometrorrhagia     Seizure (H)     Left hemiplegia (H)

## 2017-08-04 NOTE — PROGRESS NOTES
Clinic Care Coordination Contact--Social Work Initial Call/Assessment  UTC/Voicemail    Clinical Data: Care Coordinator Outreach.  Financial concerns were noted by care team, SW calling for need assessment  Outreach attempted x 7 ([phone]).  Left message on voicemail with call back information and requested return call.    Plan: Care Coordinator mailed out care coordination introduction letter today. Care Coordinator will try to reach patient again in 3-5 business days.    MANOLO Byrd, MSKENNETH   [phone]  8/4/2017 1:07 PM    Clinic Care Coordination Contact--Social Work Initial Call/Assessment  UTC/Voicemail    Clinical Data: Care Coordinator Outreach.  Financial concerns were noted by care team, SW calling for need assessment  Outreach attempted x 2 ([phone]).  The voice mail is full.     Plan: Care Coordinator will call again in 1 week for introduction and needs assessment    MANOLO Byrd, MSKENNETH   [phone]  10/2/2017 1:07 PM    Clinic Care Coordination Contact--Social Work Initial Call/Assessment  UTC/Voicemail    Clinical Data: Care Coordinator Outreach.  Financial concerns were noted by care team, ROOSEVELT calling for need assessment  Outreach attempted x 2 ([phone]).  Message left for Patient requesting return call.      Plan:  SW will close to Care Coordination, will update PCP    MANOLO Byrd, MSKENNETH   [phone]  11/6/2017 1:55 PM

## 2017-08-19 ENCOUNTER — HEALTH MAINTENANCE LETTER (OUTPATIENT)
Age: 32
End: 2017-08-19

## 2017-08-28 ENCOUNTER — TRANSFERRED RECORDS (OUTPATIENT)
Dept: HEALTH INFORMATION MANAGEMENT | Facility: CLINIC | Age: 32
End: 2017-08-28

## 2017-08-28 ENCOUNTER — OFFICE VISIT (OUTPATIENT)
Dept: BEHAVIORAL HEALTH | Facility: CLINIC | Age: 32
End: 2017-08-28
Payer: COMMERCIAL

## 2017-08-28 DIAGNOSIS — F41.1 GENERALIZED ANXIETY DISORDER: Primary | ICD-10-CM

## 2017-08-28 PROCEDURE — 90834 PSYTX W PT 45 MINUTES: CPT | Performed by: SOCIAL WORKER

## 2017-08-28 NOTE — PROGRESS NOTES
"                                             Progress Note    Client Name: Mary Shipman  Date: 8/28/2017          Service Type: Individual      Session Start Time: 130  Session End Time: 215      Session Length: 45m     Session #: 8      Attendees: Client attended alone    Treatment Plan Last Reviewed: 8/28/2017   PHQ-9 / CRUZ-7 :      DATA      Progress Since Last Session (Related to Symptoms / Goals / Homework):   Symptoms: Stable    Homework: Did not complete      Episode of Care Goals: Satisfactory progress - CONTEMPLATION (Considering change and yet undecided); Intervened by assessing the negative and positive thinking (ambivalence) about behavior change     Current / Ongoing Stressors and Concerns:    Things have been \"nothing changes.\"  Has been working 7am-9pm and \"health started deteriorating.\"  Sounds like anxiety and depression have been worse, so have her migraine headaches.  Gets minimal support from  Atilio.  Lots of stress these days, she had some help with childcare that fell through.  Long discussion today about cognitive restructuring and behavioral activation.  Explored the connection between thoughts, feelings, and actions by using examples relative to client's presenting concerns.  Explained major domains of symptoms (cognitive, emotional, somatic, behavioral, interpersonal) and importance of targeted and specific interventions to reduce symptoms of anxiety and depression.  Discussed role of symptom monitoring in cognitive behavioral treatment.               Treatment Objective(s) Addressed in This Session:    Client will use identified behavioral and cognitive skills to challenge negative self talk 90% of the time.       Intervention:   CBT: -   Discussed concept of cognitive distortions today in session.  Explored connection between automatic thinking and cognitive distortions.  Discussed common examples (e.g., catastrophizing, mindreading, dichotomous thinking) of cognitive " "distortions in session.  Handout provided.  Discussed connection between cognitive distortions and depression, anxiety, and stress.  Introduced concept of challenging cognitive distortions by asking \"is this reaction logical?\" , \"is this reaction helpful?\" , \"am I overreacting?\" , and \"how can I respond to make this situation better?\"                 ASSESSMENT: Current Emotional / Mental Status (status of significant symptoms):   Risk status (Self / Other harm or suicidal ideation)   Client denies current fears or concerns for personal safety.   Client denies current or recent suicidal ideation or behaviors.   Client denies current or recent homicidal ideation or behaviors.   Client denies current or recent self injurious behavior or ideation.   Client denies other safety concerns.   A safety and risk management plan has not been developed at this time, however client was given the after-hours number / 911 should there be a change in any of these risk factors.     Appearance:   Appropriate    Eye Contact:   Good    Psychomotor Behavior: Retarded (Slowed)    Attitude:   Cooperative    Orientation:   All   Speech    Rate / Production: Monotone  Slow     Volume:  Normal    Mood:    Depressed    Affect:    Blunted    Thought Content:  Clear    Thought Form:  Coherent  Logical    Insight:    Good      Medication Review:   No changes to current psychiatric medication(s)     Medication Compliance:   Yes     Changes in Health Issues:   None reported     Chemical Use Review:   Substance Use: Chemical use reviewed, no active concerns identified      Tobacco Use: No current tobacco use.       Collateral Reports Completed:   Routed note to PCP  Communicated with: clinic     PLAN: (Client Tasks / Therapist Tasks / Other)  1.  More supportive therapy and CBT at next meeting  2.  Standing meditation 5 mn am/pm  3.  Meet next week        BEHZAD Webb                                                     "     ________________________________________________________________________    Treatment Plan    Client's Name: Mary Shipman  YOB: 1985    Date: 5/19/2017     DSM5 Diagnoses: (Sustained by DSM5 Criteria Listed Above)  Diagnoses: 296.23 Major Depressive Disorder, Single Episode, Severe _  300.02 (F41.1) Generalized Anxiety Disorder  Psychosocial & Contextual Factors: finances;  from ; stress with work  WHODAS 2.0 (12 item)  This questionnaire asks about difficulties due to health conditions. Health conditions  include  disease or illnesses, other health problems that may be short or long lasting,  injuries, mental health or emotional problems, and problems with alcohol or drugs.      Think back over the past 30 days and answer these questions, thinking about how much  difficulty you had doing the following activities. For each question, please [place] only  one response.     S1 Standing for long periods such as 30 minutes? None = 1   S2 Taking care of household responsibilities? Mild = 2   S3 Learning a new task, for example, learning how to get to a new place? None = 1   S4 How much of a problem do you have joining community activities (for example, festivals, Yarsanism or other activities) in the same way as anyone else can? Moderate = 3   S5 How much have you been emotionally affected by your health problems? Moderate = 3           In the past 30 days, how much difficulty did you have in:   S6 Concentrating on doing something for ten minutes? Mild = 2   S7 Walking a long distance such as a kilometer (or equivalent)? Mild = 2   S8 Washing your whole body? None = 1   S9 Getting dressed? None = 1   S10 Dealing with people you do not know? None = 1   S11 Maintaining a friendship? Mild = 2   S12 Your day to day work? Mild = 2      H1 Overall, in the past 30 days, how many days were these difficulties present? Record number of days 30   H2 In the past 30 days, for how many days were you  totally unable to carry out your usual activities or work because of any health condition? Record number of days 0   H3 In the past 30 days, not counting the days that you were totally unable, for how many days did you cut back or reduce your usual activities or work because of any health condition? Record number of days 30       Referral / Collaboration:  Referral to another professional/service is not indicated at this time..    Anticipated number of session or this episode of care: 12-18      MeasurableTreatment Goal(s) related to diagnosis / functional impairment(s)  Goal-Depression: Client will decrease depressed mood    I will know I've met my goal when I am less depressed.      Objective #A (Client Action)    Status: continued- Date: 8/28/2017     Client will use identified behavioral and cognitive skills to challenge negative self talk 90% of the time.    Intervention(s)  Therapist will provide psychoeducation, behavioral activation, and cognitive restructuring.      Objective #B  Client will complete at least 10 minutes of self-regulation practice (e.g.: yoga, meditation, relaxation breathing, etc.) per day.    Status: continued- Date: 8/28/2017     Intervention(s)  Therapist will provide psychoeducation, behavioral activation, and cognitive restructuring.      Objective #C  Client will exercise 30 minutes 36 times in the next 12 weeks.  Status: continued- Date: 8/28/2017     Intervention(s)  Therapist will provide psychoeducation, behavioral activation, and cognitive restructuring.                Client has reviewed and agreed to the above plan.      Abad Torres, [facility]  May 19, 2017

## 2017-08-28 NOTE — MR AVS SNAPSHOT
MRN:[id number]                      After Visit Summary   8/28/2017    Mary Shipman    MRN: [id number]           Visit Information        Provider Department      8/28/2017 1:30 PM López Mehta, [facility] Generic      Your next 10 appointments already scheduled     Aug 31, 2017  7:20 AM CDT   [facility]-C   [facility]    [address] [place] [id number] [phone]            Aug 31, 2017  4:30 PM CDT   (Arrive by 4:15 PM)   Return Visit with Catrachito Michael MD   [facility] ([facility])    [address] [place] [id number] [phone]            Sep 11, 2017  1:30 PM CDT   Return Visit with López Mehta Located within [facility] ([facility])    [address] [place] [id number] [phone]            Sep 18, 2017  1:30 PM CDT   Return Visit with López Mehta Located within [facility] ([facility])    [address] [place] [id number] [phone]              MyChart Information     [employer] gives you secure access to your electronic health record. If you see a primary care provider, you can also send messages to your care team and make appointments. If you have questions, please call your primary care clinic.  If you do not have a primary care provider, please call [phone] and they will assist you.        Care EveryWhere ID     This is your Care EveryWhere ID. This could be used by other organizations to access your [place] medical records  [id number]        Equal Access to Services     JANA DUCKWORTH : Lila Fields, emilee amin, qaangelica zaldivar, [place] [facility] [phone].    ATENCIÓN: Si destini alarcon  a omer disposición servicios gratuitos de asistencia lingüística. Llame al [phone].    We comply with applicable federal civil rights laws and [place] laws. We do not discriminate on the basis of race, color, national origin, age, disability sex, sexual orientation or gender identity.

## 2017-08-31 ENCOUNTER — OFFICE VISIT (OUTPATIENT)
Dept: FAMILY MEDICINE | Facility: CLINIC | Age: 32
End: 2017-08-31
Payer: COMMERCIAL

## 2017-08-31 VITALS
SYSTOLIC BLOOD PRESSURE: 98 MMHG | TEMPERATURE: 98.6 F | HEIGHT: 65 IN | BODY MASS INDEX: 38.32 KG/M2 | WEIGHT: 230 LBS | HEART RATE: 76 BPM | DIASTOLIC BLOOD PRESSURE: 78 MMHG

## 2017-08-31 DIAGNOSIS — R56.9 SEIZURE (H): ICD-10-CM

## 2017-08-31 DIAGNOSIS — M25.512 ACUTE PAIN OF BOTH SHOULDERS: ICD-10-CM

## 2017-08-31 DIAGNOSIS — M62.830 BACK MUSCLE SPASM: ICD-10-CM

## 2017-08-31 DIAGNOSIS — F41.1 GAD (GENERALIZED ANXIETY DISORDER): Primary | ICD-10-CM

## 2017-08-31 DIAGNOSIS — G47.00 INSOMNIA, UNSPECIFIED TYPE: ICD-10-CM

## 2017-08-31 DIAGNOSIS — G43.809 OTHER MIGRAINE WITHOUT STATUS MIGRAINOSUS, NOT INTRACTABLE: ICD-10-CM

## 2017-08-31 DIAGNOSIS — G81.94 LEFT HEMIPLEGIA (H): ICD-10-CM

## 2017-08-31 DIAGNOSIS — G43.001 MIGRAINE WITHOUT AURA AND WITH STATUS MIGRAINOSUS, NOT INTRACTABLE: ICD-10-CM

## 2017-08-31 DIAGNOSIS — G40.909 SEIZURE DISORDER (H): ICD-10-CM

## 2017-08-31 DIAGNOSIS — R56.9 CONVULSIONS, UNSPECIFIED CONVULSION TYPE (H): ICD-10-CM

## 2017-08-31 DIAGNOSIS — M25.511 ACUTE PAIN OF BOTH SHOULDERS: ICD-10-CM

## 2017-08-31 PROCEDURE — 99214 OFFICE O/P EST MOD 30 MIN: CPT | Performed by: PHYSICIAN ASSISTANT

## 2017-08-31 RX ORDER — LEVETIRACETAM 500 MG/1
TABLET ORAL
Qty: 60 TABLET | Refills: 0 | Status: SHIPPED | OUTPATIENT
Start: 2017-08-31 | End: 2018-01-12

## 2017-08-31 RX ORDER — TOPIRAMATE 50 MG/1
250 TABLET, FILM COATED ORAL
Qty: 180 TABLET | Refills: 5 | COMMUNITY
Start: 2017-08-31 | End: 2017-10-06

## 2017-08-31 RX ORDER — CYCLOBENZAPRINE HCL 10 MG
10 TABLET ORAL 3 TIMES DAILY PRN
Qty: 90 TABLET | Refills: 1 | Status: SHIPPED | OUTPATIENT
Start: 2017-08-31 | End: 2018-08-14

## 2017-08-31 RX ORDER — CLONAZEPAM 1 MG/1
TABLET ORAL
Qty: 30 TABLET | Refills: 2 | Status: SHIPPED | OUTPATIENT
Start: 2017-08-31 | End: 2018-01-12

## 2017-08-31 ASSESSMENT — ANXIETY QUESTIONNAIRES
3. WORRYING TOO MUCH ABOUT DIFFERENT THINGS: MORE THAN HALF THE DAYS
2. NOT BEING ABLE TO STOP OR CONTROL WORRYING: MORE THAN HALF THE DAYS
5. BEING SO RESTLESS THAT IT IS HARD TO SIT STILL: MORE THAN HALF THE DAYS
7. FEELING AFRAID AS IF SOMETHING AWFUL MIGHT HAPPEN: NEARLY EVERY DAY
1. FEELING NERVOUS, ANXIOUS, OR ON EDGE: NEARLY EVERY DAY
6. BECOMING EASILY ANNOYED OR IRRITABLE: NEARLY EVERY DAY
GAD7 TOTAL SCORE: 18

## 2017-08-31 ASSESSMENT — PATIENT HEALTH QUESTIONNAIRE - PHQ9
5. POOR APPETITE OR OVEREATING: NEARLY EVERY DAY
SUM OF ALL RESPONSES TO PHQ QUESTIONS 1-9: 19

## 2017-08-31 NOTE — PROGRESS NOTES
SUBJECTIVE:   Mary Shipman is a 32 year old female who presents to clinic today for the following health issues:    Anxiety Follow-Up    Status since last visit: No change    Other associated symptoms:None    Complicating factors:   Significant life event: No   Current substance abuse: None  Depression symptoms: Yes-  Same as before  CURZ-7 SCORE 5/5/2017 6/20/2017 7/21/2017   Total Score - - -   Total Score 15 18 19     GAD7    Anxiety worse - has a new job. Worked first 2 weeks 80 hours. Is a bit better, her friend that got her the job is back, so work days are back to being shorter. She notes it is a pretty easy job overall.    Franky her ex-partner is still providing financially but is still drinking a lot and isn't there for their son much.     Migraine / left side hemiplegia issues are a bit worse since stressors flared. More weakness on the left, more slurred speech and tongue biting. She has been on a bit less Keppra due to running out of her dose - is on 750 mg instead of 1000 mg. Can't tolerate more than that.     Migraine Follow-Up    Headaches symptoms:  Worse, starting to get worse again    Frequency: daily     Duration of headaches: all day    Able to do normal daily activities/work with migraines: Yes, barely     Rescue/Relief medication:ibuprofen (Advil, Motrin) 800mg every 6 hours             Effectiveness: intermittent relief from ibuprofen    Preventative medication: topamax    Neurologic complications: speech issues and twitches have started up again    In the past 4 weeks, how often have you gone to Urgent Care or the emergency room because of your headaches?  0        Amount of exercise or physical activity: None    Problems taking medications regularly: No    Medication side effects: none  Diet: regular (no restrictions)    Upper chest wall / shoulder / back pain - 2-3 weeks, seems to have shooting pains, nerve like pains - both arms, both shoulders. No new weakness in upper arms at all.  "Taking \"a lot of\" ibuprofen. 800 mg a day.     Problem list and histories reviewed & adjusted, as indicated.  Additional history: as documented    Labs reviewed in EPIC    Reviewed and updated as needed this visit by clinical staffTobacco  Allergies  Med Hx  Surg Hx  Fam Hx  Soc Hx      Reviewed and updated as needed this visit by Provider         ROS:  Constitutional, HEENT, cardiovascular, pulmonary, gi and gu systems are negative, except as otherwise noted.      OBJECTIVE:   BP 98/78 (Cuff Size: Adult Large)  Pulse 76  Temp 98.6  F (37  C) (Oral)  Ht 5' 5\" (1.651 m)  Wt 230 lb (104.3 kg)  LMP 08/01/2017  BMI 38.27 kg/m2  Body mass index is 38.27 kg/(m^2).  GENERAL: healthy, alert and no distress  Psych: Appropriate appearance.  Alert and oriented times 3; coherent speech, normal   rate and volume, able to articulate logical thoughts, able   to abstract reason, no tangential thoughts, no hallucinations   or delusions.  Normal behavior.  Her affect is flat, she is tearful.  MUSC: Tender spasms of cervical, upper back, shoulder, ROM is tender. No crepitus.     ASSESSMENT/PLAN:     (F41.1) CRUZ (generalized anxiety disorder)  (primary encounter diagnosis)  Comment:   Plan: clonazePAM (KLONOPIN) 1 MG tablet        Worse - acutely due to job and relationship issues. Reviewed, recommend continue therapy, exercise, self cares, limits on work, work on relationship challenges - they aren't together, but I think there's a certain co-dependence there, he gives her financial cares but nothing else and she needs a place to stay. Follow up with me as needed    (G81.94) Left hemiplegia (H)  Comment:   Plan: Needs to call her neurologist - I think this is related to stressors / migraine variant with some conversion related causes of neurologic symptoms but I can't be certain - she can contact Dr. Molina and let me know if questions    (R56.9) Seizure (H)  Comment:   Plan: Has follow up next week with neurology "     (G43.809) Other migraine without status migrainosus, not intractable  Comment:   Plan: As noted    (G40.909) Seizure disorder (H)  Comment:   Plan: topiramate (TOPAMAX) 50 MG tablet,         levETIRAcetam (KEPPRA) 500 MG tablet        As noted     (G43.001) Migraine without aura and with status migrainosus, not intractable  Comment:   Plan: topiramate (TOPAMAX) 50 MG tablet,         levETIRAcetam (KEPPRA) 500 MG tablet            (R56.9) Convulsions, unspecified convulsion type (H)  Comment:   Plan: topiramate (TOPAMAX) 50 MG tablet,         levETIRAcetam (KEPPRA) 500 MG tablet            (G47.00) Insomnia, unspecified type  Comment:   Plan: clonazePAM (KLONOPIN) 1 MG tablet        Refills    (M25.511,  M25.512) Acute pain of both shoulders  Comment:   Plan: cyclobenzaprine (FLEXERIL) 10 MG tablet, [facility]         PT, HAND, AND CHIROPRACTIC REFERRAL            (M62.830) Back muscle spasm  Comment:   Plan: cyclobenzaprine (FLEXERIL) 10 MG tablet, [facility]         PT, HAND, AND CHIROPRACTIC REFERRAL        Spasms noted. Recommend Stretching, PT, massage, heat and refer to [facility] for therapy / chiro    FRANSICO JOHNSON PA-C  [facility]

## 2017-08-31 NOTE — NURSING NOTE
One paper script handed to patient.    clonazePAM (KLONOPIN) 1 MG tablet    Silvia Vale CMA ([facility])

## 2017-08-31 NOTE — NURSING NOTE
"Chief Complaint   Patient presents with     Anxiety     Headache       Initial BP 98/78 (Cuff Size: Adult Large)  Pulse 76  Temp 98.6  F (37  C) (Oral)  Ht 5' 5\" (1.651 m)  Wt 230 lb (104.3 kg)  LMP 08/01/2017  BMI 38.27 kg/m2 Estimated body mass index is 38.27 kg/(m^2) as calculated from the following:    Height as of this encounter: 5' 5\" (1.651 m).    Weight as of this encounter: 230 lb (104.3 kg).  Medication Reconciliation: complete   Kori Jeff, Certified Medical Assistant (AAMA)     "

## 2017-08-31 NOTE — MR AVS SNAPSHOT
After Visit Summary   8/31/2017    Mary Shipman    MRN: [id number]           Patient Information     Date Of Birth          1985        Visit Information        Provider Department      8/31/2017 7:20 AM Miguel Hammer PA-C [facility]        Today's Diagnoses     CRUZ (generalized anxiety disorder)    -  1    Left hemiplegia (H)        Seizure (H)        Other migraine without status migrainosus, not intractable        Seizure disorder (H)        Migraine without aura and with status migrainosus, not intractable        Convulsions, unspecified convulsion type (H)        Insomnia, unspecified type        Acute pain of both shoulders        Back muscle spasm           Follow-ups after your visit        Additional Services     [facility] PT, HAND, AND CHIROPRACTIC REFERRAL       **This order will print in the [facility] Scheduling Office**    Physical Therapy, Hand Therapy and Chiropractic Care are available through:    *[facility]  *[facility]  *[facility]    Call one number to schedule at any of the above locations: [phone].    Your provider has referred you to: [facility] - PT and/or Chiropractic Care determined by clinical presentation at [facility] or [facility] Initial Visit    Indication/Reason for Referral: Spasm / muscle pain / side pain / upper back pain - probably all muscle/stress  Onset of Illness: 1 month ago   Therapy Orders: Evaluate and Treat  Special Programs: None  Special Request: None    Mariah Lemus      Additional Comments for the Therapist or Chiropractor: Thanks     Please be aware that coverage of these services is subject to the terms and limitations of your health insurance plan.  Call member services at your health plan with any benefit or coverage questions.      Please bring the following to your appointment:    *Your personal calendar for scheduling future appointments  *Comfortable clothing                   Your next 10 appointments already scheduled     Aug 31, 2017  4:30 PM CDT   (Arrive by 4:15 PM)   Return Visit with Catrachito Michael MD   [facility] ([facility])    [address]  [address] [facility] [id number] [phone]            Sep 11, 2017  1:30 PM CDT   Return Visit with López Mehta, [facility] ([facility])    [address] [place] [id number] [phone]            Sep 18, 2017  1:30 PM CDT   Return Visit with López Mehta, [facility] ([facility])    [address] [place] [id number] [phone]              Who to contact     If you have questions or need follow up information about today's clinic visit or your schedule please contact [facility] directly at [phone].  Normal or non-critical lab and imaging results will be communicated to you by MyChart, letter or phone within 4 business days after the clinic has received the results. If you do not hear from us within 7 days, please contact the clinic through [employer] or phone. If you have a critical or abnormal lab result, we will notify you by phone as soon as possible.  Submit refill requests through [employer] or call your pharmacy and they will forward the refill request to us. Please allow 3 business days for your refill to be completed.          Additional Information About Your Visit        [employer] Information     [employer] gives you secure access to your electronic health record. If you see a primary care provider, you can also send messages to your care team and make appointments. If you have questions, please call your primary care clinic.  If you do not have a primary care provider, please call [phone] and they will assist you.        Care EveryWhere ID     This is your Care EveryWhere ID. This could be used by other  "organizations to access your [place] medical records  [id number]        Your Vitals Were     Pulse Temperature Height Last Period BMI (Body Mass Index)       76 98.6  F (37  C) (Oral) 5' 5\" (1.651 m) 08/01/2017 38.27 kg/m2        Blood Pressure from Last 3 Encounters:   08/31/17 98/78   07/21/17 124/72   07/03/17 120/76    Weight from Last 3 Encounters:   08/31/17 230 lb (104.3 kg)   07/21/17 226 lb (102.5 kg)   07/03/17 227 lb (103 kg)              We Performed the Following     [facility] PT, HAND, AND CHIROPRACTIC REFERRAL          Today's Medication Changes          These changes are accurate as of: 8/31/17  8:21 AM.  If you have any questions, ask your nurse or doctor.               Start taking these medicines.        Dose/Directions    cyclobenzaprine 10 MG tablet   Commonly known as:  FLEXERIL   Used for:  Acute pain of both shoulders, Back muscle spasm   Started by:  Miguel Hammer PA-C        Dose:  10 mg   Take 1 tablet (10 mg) by mouth 3 times daily as needed for muscle spasms   Quantity:  90 tablet   Refills:  1         These medicines have changed or have updated prescriptions.        Dose/Directions    clonazePAM 1 MG tablet   Commonly known as:  klonoPIN   This may have changed:  Another medication with the same name was removed. Continue taking this medication, and follow the directions you see here.   Used for:  CRUZ (generalized anxiety disorder), Insomnia, unspecified type   Changed by:  Miguel Hammer PA-C        1 at bedtime daily   Quantity:  30 tablet   Refills:  2       levETIRAcetam 500 MG tablet   Commonly known as:  KEPPRA   This may have changed:  additional instructions   Used for:  Convulsions, unspecified convulsion type (H), Seizure disorder (H), Migraine without aura and with status migrainosus, not intractable   Changed by:  Miguel Hammer PA-C        1000 mg at bedtime   Quantity:  60 tablet   Refills:  0       topiramate 50 MG tablet   Commonly known as:  TOPAMAX "   This may have changed:  additional instructions   Used for:  Seizure disorder (H), Migraine without aura and with status migrainosus, not intractable, Convulsions, unspecified convulsion type (H)   Changed by:  Miguel Hammer PA-C        200 mg at bedtime   Quantity:  180 tablet   Refills:  5            Where to get your medicines      These medications were sent to [facility]/pharmacy #[id number] - [place], [place] - [address]  [address] [place] [address]     Phone:  [phone]     cyclobenzaprine 10 MG tablet    levETIRAcetam 500 MG tablet         Some of these will need a paper prescription and others can be bought over the counter.  Ask your nurse if you have questions.     Bring a paper prescription for each of these medications     clonazePAM 1 MG tablet                Primary Care Provider Office Phone # Fax #    Miguel Hammer PA-C [phone] [address] [facility] [address]        Equal Access to Services     JANA DUCKWORTH : Hadii aad ku hadasho Soomaali, waaxda luqadaha, qaybta kaalmada adeegyada, waxay idiin hayaan adeeg yisel rey . So [facility] [phone].    ATENCIÓN: Si habla español, tiene a omer disposición servicios gratuitos de asistencia lingüística. [facility] [phone].    We comply with applicable federal civil rights laws and [place] laws. We do not discriminate on the basis of race, color, national origin, age, disability sex, sexual orientation or gender identity.            Thank you!     Thank you for choosing [facility]  for your care. Our goal is always to provide you with excellent care. Hearing back from our patients is one way we can continue to improve our services. Please take a few minutes to complete the written survey that you may receive in the mail after your visit with us. Thank you!             Your Updated Medication List - Protect others around you: Learn how to safely use, store and throw  away your medicines at www.disposemymeds.org.          This list is accurate as of: 8/31/17  8:21 AM.  Always use your most recent med list.                   Brand Name Dispense Instructions for use Diagnosis    albuterol 108 (90 BASE) MCG/ACT Inhaler    PROAIR HFA/PROVENTIL HFA/VENTOLIN HFA    3 Inhaler    Inhale 2 puffs into the lungs every 6 hours as needed for shortness of breath / dyspnea or wheezing    Intermittent asthma, uncomplicated       cholecalciferol 1000 UNIT tablet    vitamin D    100 tablet    4 caps daily)    Other seizures (H), Left-sided weakness       clonazePAM 1 MG tablet    klonoPIN    30 tablet    1 at bedtime daily    CRUZ (generalized anxiety disorder), Insomnia, unspecified type       cyclobenzaprine 10 MG tablet    FLEXERIL    90 tablet    Take 1 tablet (10 mg) by mouth 3 times daily as needed for muscle spasms    Acute pain of both shoulders, Back muscle spasm       fexofenadine-pseudoePHEDrine 180-240 MG per 24 hr tablet    ALLEGRA-D 24     Take 1 tablet by mouth daily        fluticasone 50 MCG/ACT spray    FLONASE     Spray 1-2 sprays into both nostrils daily as needed for rhinitis or allergies        ibuprofen 400-800 mg tablet    ADVIL,MOTRIN    30 tablet    Take 1-2 tablets by mouth every 6 hours as needed (cramping).    Abdominal pain       LANsoprazole 30 MG CR capsule    PREVACID    90 capsule    Take 1 capsule (30 mg) by mouth daily Take 30-60 minutes before a meal.    Gastroesophageal reflux disease without esophagitis       levalbuterol 1.25 MG/0.5ML Nebu neb solution    XOPENEX CONC     Take 1.25 mg by nebulization every 6 hours as needed for wheezing        levETIRAcetam 500 MG tablet    KEPPRA    60 tablet    1000 mg at bedtime    Convulsions, unspecified convulsion type (H), Seizure disorder (H), Migraine without aura and with status migrainosus, not intractable       metFORMIN 500 MG 24 hr tablet    GLUCOPHAGE-XR    180 tablet    Take 1 tablet (500 mg) by mouth 2 times  daily (with meals)    Polycystic ovaries       mometasone-formoterol 200-5 MCG/ACT oral inhaler    DULERA    39 g    Inhale 2 puffs into the lungs 2 times daily    Intermittent asthma, uncomplicated       montelukast 10 MG tablet    SINGULAIR    90 tablet    Take 1 tablet (10 mg) by mouth At Bedtime    Intermittent asthma, uncomplicated       polyethylene glycol Packet    MIRALAX/GLYCOLAX     Take 17 g by mouth daily as needed for constipation        ranitidine 150 MG tablet    ZANTAC    60 tablet    Take 150 mg by mouth 2 times daily as needed        sertraline 100 MG tablet    ZOLOFT    180 tablet    2 tablets daily    Major depressive disorder, recurrent episode, moderate (H), Adjustment disorder with mixed anxiety and depressed mood       topiramate 50 MG tablet    TOPAMAX    180 tablet    200 mg at bedtime    Seizure disorder (H), Migraine without aura and with status migrainosus, not intractable, Convulsions, unspecified convulsion type (H)

## 2017-09-01 ASSESSMENT — ANXIETY QUESTIONNAIRES: GAD7 TOTAL SCORE: 18

## 2017-09-11 ENCOUNTER — TRANSFERRED RECORDS (OUTPATIENT)
Dept: HEALTH INFORMATION MANAGEMENT | Facility: CLINIC | Age: 32
End: 2017-09-11

## 2017-09-12 ENCOUNTER — THERAPY VISIT (OUTPATIENT)
Dept: CHIROPRACTIC MEDICINE | Facility: CLINIC | Age: 32
End: 2017-09-12
Payer: COMMERCIAL

## 2017-09-12 DIAGNOSIS — M99.01 CERVICAL SEGMENT DYSFUNCTION: Primary | ICD-10-CM

## 2017-09-12 DIAGNOSIS — M54.2 CERVICALGIA: ICD-10-CM

## 2017-09-12 DIAGNOSIS — M99.02 THORACIC SEGMENT DYSFUNCTION: ICD-10-CM

## 2017-09-12 DIAGNOSIS — M62.838 SPASM OF MUSCLE: ICD-10-CM

## 2017-09-12 PROCEDURE — 99203 OFFICE O/P NEW LOW 30 MIN: CPT | Mod: 25 | Performed by: CHIROPRACTOR

## 2017-09-12 PROCEDURE — 98940 CHIROPRACT MANJ 1-2 REGIONS: CPT | Mod: AT | Performed by: CHIROPRACTOR

## 2017-09-12 NOTE — PROGRESS NOTES
Chiropractic Clinic Visit    PCP: Miguel Hammeroralia Shipman is a 32 year old female who is seen  in consultation at the request of  Miguel Hammer M.D. presenting with neck and arm pain. Patient reports that the onset was March 27,2017 had a seizure. When asked, patient denies:, falling, slipping, bending and reaching or sleeping awkwardly.  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 Patient notes that due to symptoms, they can only sit for 10-15 minutes at a time and then needs to move her body. Mary Shipman notes   Neck and arm pain rated at a 5/10 and  prior to this onset it was 1/10.    Injury: seizure on March 27, 2017    Location of Pain: bilateral, upper back/neck at the following level(s) Occiput, C1 , C2 , C3 , C4 , C5 , C6 , C7 , T1 , T2 , T3  and T4   Duration of Pain: 2-3 day(s)  Rating of Pain at worst: 6/10  Rating of Pain Currently: 5/10  Symptoms are better with: Other medications: pain and needs power drinks to just stay awake from the meds  Symptoms are worse with: flexion, sitting and stairs  Additional Features: numbness, weakness and pain in other joints       Health History  as reported by the patient:    How does the patient rate their own health:   Fair    Current or past medical history:   Asthma, Depression, High blood pressure, Mental Illness, Migraines/headaches, Numbness/tingling, Overweight, Seizures, Sleep disorder/apnea and Weakness    Medical allergies  Other: Francois FLOREZ    Past Traumas/Surgeries  Other:  Apendix, sinus, tonsils    Family History  This patient has no significant family history    Medications:  Anti-depressants, Anti-inflammatory, Anti-seizure, Muscle relaxants and Sleep    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    Past Medical History:   Diagnosis Date     Allergic rhinitis due to other allergen      Antepartum mild preeclampsia 11/23/2012     Asthma      Depressive disorder      Esophageal reflux      Frequent UTI     had a kidney infection also in the past     Gestational hypertension 11/20/2012     Helicobacter pylori (H. pylori)     treated     Hyperlipidemia      Irritable bowel syndrome      Liver disease     \"fatty liver\" resolved on own.     Lump or mass in breast 11/30/2015     Lump or mass in breast      Lump or mass in breast      Mild preeclampsia 12/18/2012     Obesity      Polycystic ovaries      Thyrotoxicosis 5/9/2007     Past Surgical History:   Procedure Laterality Date     C APPENDECTOMY       C NONSPECIFIC PROCEDURE      nasal surgery      TONSILLECTOMY & ADENOIDECTOMY      surgery several times for this       Objective  LMP 08/01/2017    GENERAL APPEARANCE: healthy, alert and mild distress   GAIT: NORMAL  SKIN: no suspicious lesions or rashes  NEURO: Normal strength and tone, mentation intact and speech normal  PSYCH:  mentation appears normal and affect normal/bright    Mary was asked to complete the SPADI Disability Index (65.53%), Oswestry Disability index: 60%, Mariah Start: sub score 3, total score 6, PL 6/10.    Cervical Spine Exam    Range of Motion:         Full active and passive ROM forward flexion, extension, lateral rotation, lateral flexion.    Inspection:         No visible deformity        normal lordotic curvature maintained    Tender:        upper border of trapezius    Non-Tender:        remainder of cervical spine area    Muscle strength:       C4 (shoulder shrug)  symmetric 5/5       C5 (shoulder abduction) symmetric 5/5       C6 (elbow flexion) symmetric 5/5       C7 (elbow extension) symmetric " 5/5       C8 (finger abduction, thumb flexion) symmetric 5/5    Reflexes:        C5 (biceps) symmetric normal    Sensation:       grossly intact througout bilateral upper extremities    Special Tests:       neg (-) Spurling      Lymphatics:        no edema noted in the upper extremities       Segmental spinal dysfunction/restrictions found at:  :  C1 Left rotation restricted  C6 Right rotation restricted  C7 Right rotation restricted  T1 Right rotation restricted  T2 Right rotation restricted  T6 Extension restriction  T7 Extension restriction.      The following soft tissue hypotonicities were observed:Traps: ache and stiff, referred pain: no  T-Spine paraspinal: ache and stiff, referred pain: no    Trigger points were found in:T-spine paraspinal and Traps    Muscle spasm found in:T-spine paraspinal and Traps      Radiology:      Assessment:    1. Cervical segment dysfunction    2. Cervicalgia    3. Thoracic segment dysfunction    4. Spasm of muscle        RX ordered/plan of care  Anticipated outcomes  Possible risks and side effects    After discussing the risk and benefits of care, patient consented to treatment    Patient's condition:  Patient had restrictions pre-manipulation    Treatment effectiveness:  Post manipulation there is better intersegmental movement and Patient claims to feel looser post manipulation    Plan:    Procedures:    Evaluation and Management:  66548 Moderate level exam 30 min    CMT:  66718 Chiropractic manipulative treatment 1-2 regions performed   Cervical: Activator, C1 , C6, C7 , Prone  Thoracic: Activator, T1, T2, T5, T6, T7, Prone    Modalities:  34526: Heat:   For 5 min to T-spine paraspinal and Traps  18389: MSTM:  To T-spine paraspinal and Traps  for 5 min    Therapeutic procedures:  None    Response to Treatment  Reduction in symptoms as reported by patient    Prognosis: Good      Treatment plan and goals:  Goals:  PAIN: the patient specific goal of thier symptoms is to attain  the pre-inury state of 2/10 on the VAS    Frequency of care  Duration of care is estimated to be 8 weeks, from the initial treatment.  It is estimated that the patient will need a total of 8 visits to resolve this episode.  For the initial therapeutic trial of care, the frequency is recommended at 1 X week, once daily.  A reevaluation would be clinically appropriate in 8 visits, to determine progress and further course of care.    In-Office Treatment  Evaluation  Spinal Chiropractic Manipulative Therapy:    Modalities:  Heat and mstm      Recommendations:    Instructions:ice 20 minutes every other hour as needed    Follow-up:  Return to care in one week.     Disclaimer: This note consists of symbols derived from keyboarding, dictation and/or voice recognition software. As a result, there may be errors in the script that have gone undetected. Please consider this when interpreting information found in this chart.

## 2017-09-18 ENCOUNTER — THERAPY VISIT (OUTPATIENT)
Dept: CHIROPRACTIC MEDICINE | Facility: CLINIC | Age: 32
End: 2017-09-18
Payer: COMMERCIAL

## 2017-09-18 DIAGNOSIS — M99.01 CERVICAL SEGMENT DYSFUNCTION: Primary | ICD-10-CM

## 2017-09-18 DIAGNOSIS — M99.02 THORACIC SEGMENT DYSFUNCTION: ICD-10-CM

## 2017-09-18 DIAGNOSIS — M62.838 SPASM OF MUSCLE: ICD-10-CM

## 2017-09-18 DIAGNOSIS — M54.2 CERVICALGIA: ICD-10-CM

## 2017-09-18 PROCEDURE — 98940 CHIROPRACT MANJ 1-2 REGIONS: CPT | Mod: AT | Performed by: CHIROPRACTOR

## 2017-09-18 NOTE — PROGRESS NOTES
Visit #:  2 of 8, based on treatment plan    Subjective:  Mary Shipman is a 32 year old female who is seen in f/u up for:        Cervical segment dysfunction  Cervicalgia  Thoracic segment dysfunction  Spasm of muscle.     Since last visit on 9/12/2017,  Mary Shipman reports the following changes: Pain immediately after last treatment: 2/10 and their pain level today 6/10.  Mary reports that her pain came back a few days after her last visit and has been intense since.  She is feeling very tight and sore.  She also mentions that she is having a tough time using her foam roller and it is causing pain, when in the past it was relieving.    Area of chief complaint:  Cervical and Thoracic :  Symptoms are graded at 6/10. The quality is described as stiff, achey, dull.  Motion has remained about the same, no improvement. Patient feels that they have not improved and feel the same.        Objective:  The following was observed:    P: palpatory tenderness, upper traps    A: static palpation demonstrates intersegmental asymmetry , cervical and thoracic    R: motion palpation notes restricted motion, :  C1 Right rotation restricted  C6 Right rotation restricted  C7 Right rotation restricted  T1 Left rotation restricted  T2 Left rotation restricted  T3 Left rotation restricted    T: hypertonicity at: Traps Bilaterally      Assessment:    Segmental spinal dysfunction/restrictions found at:  C1   C6   C7   T1   T2   T3    Diagnoses:      1. Cervical segment dysfunction    2. Cervicalgia    3. Thoracic segment dysfunction    4. Spasm of muscle        Patient's condition:  Patient had restrictions pre-manipulation    Treatment effectiveness:  Post manipulation there is better intersegmental movement and Patient claims to feel looser post manipulation      Procedures:  CMT:  91161 Chiropractic manipulative treatment 1-2 regions performed   Cervical: Activator, C1 , C6, C7 , Prone  Thoracic: Activator, T1, T2, T3,  Prone    Modalities:  95487: Heat:   For 5 min to Traps  08744: MSTM:  To Traps  for 5 min    Therapeutic procedures:  None      Prognosis: Good    Progress towards Goals: Patient is making progress towards the goal     Response to Treatment:   Reduction in symptoms as reported by patient      Recommendations:    Instructions:ice 20 minutes every other hour as needed and stretch as instructed at visit    Follow-up:  Return to care in one week

## 2017-09-21 ENCOUNTER — OFFICE VISIT (OUTPATIENT)
Dept: FAMILY MEDICINE | Facility: CLINIC | Age: 32
End: 2017-09-21
Payer: COMMERCIAL

## 2017-09-21 VITALS
WEIGHT: 236 LBS | TEMPERATURE: 98.3 F | DIASTOLIC BLOOD PRESSURE: 78 MMHG | SYSTOLIC BLOOD PRESSURE: 102 MMHG | BODY MASS INDEX: 39.32 KG/M2 | HEIGHT: 65 IN | HEART RATE: 80 BPM

## 2017-09-21 DIAGNOSIS — R41.3 MEMORY PROBLEM: ICD-10-CM

## 2017-09-21 DIAGNOSIS — M54.6 BILATERAL THORACIC BACK PAIN, UNSPECIFIED CHRONICITY: ICD-10-CM

## 2017-09-21 DIAGNOSIS — G81.94 LEFT HEMIPLEGIA (H): ICD-10-CM

## 2017-09-21 DIAGNOSIS — G43.809 OTHER MIGRAINE WITHOUT STATUS MIGRAINOSUS, NOT INTRACTABLE: ICD-10-CM

## 2017-09-21 DIAGNOSIS — M62.830 BACK MUSCLE SPASM: ICD-10-CM

## 2017-09-21 DIAGNOSIS — R79.89 LOW VITAMIN D LEVEL: ICD-10-CM

## 2017-09-21 DIAGNOSIS — M54.2 CERVICALGIA: Primary | ICD-10-CM

## 2017-09-21 PROCEDURE — 99214 OFFICE O/P EST MOD 30 MIN: CPT | Performed by: PHYSICIAN ASSISTANT

## 2017-09-21 RX ORDER — AMITRIPTYLINE HYDROCHLORIDE 10 MG/1
10 TABLET ORAL
Refills: 11 | COMMUNITY
Start: 2017-09-11 | End: 2018-09-25

## 2017-09-21 ASSESSMENT — PATIENT HEALTH QUESTIONNAIRE - PHQ9
SUM OF ALL RESPONSES TO PHQ QUESTIONS 1-9: 22
5. POOR APPETITE OR OVEREATING: NEARLY EVERY DAY

## 2017-09-21 ASSESSMENT — ANXIETY QUESTIONNAIRES
GAD7 TOTAL SCORE: 18
6. BECOMING EASILY ANNOYED OR IRRITABLE: NEARLY EVERY DAY
1. FEELING NERVOUS, ANXIOUS, OR ON EDGE: NEARLY EVERY DAY
7. FEELING AFRAID AS IF SOMETHING AWFUL MIGHT HAPPEN: MORE THAN HALF THE DAYS
2. NOT BEING ABLE TO STOP OR CONTROL WORRYING: MORE THAN HALF THE DAYS
5. BEING SO RESTLESS THAT IT IS HARD TO SIT STILL: MORE THAN HALF THE DAYS
3. WORRYING TOO MUCH ABOUT DIFFERENT THINGS: NEARLY EVERY DAY

## 2017-09-21 NOTE — PROGRESS NOTES
"  SUBJECTIVE:   Mary Shipman is a 32 year old female who presents to clinic today for the following health issues:    Depression and Anxiety Follow-Up    Status since last visit: very up and down due to all of the medication changes    Other associated symptoms:None    Complicating factors:     Significant life event: No     Current substance abuse: None    Started on Amitriptyline on 9/11/2017 by her neurologist -   Asked by neurology to stop energy drinks and Mountain Dew - was doing 40 ounces a day - now off - feeling very tired. Having \"withdrawal\" symptoms. She had been on this amount of energy drink and mountain due for many years. Reports that going off has been hard.    The amitriptyline seems to be causing issues with fatigue, low energy, lower mood, next day fatigue.     Muscle spasms - seeing Nael Rodriguez DC. Feels the adjustments are making things worse. She wants to do some PT.    PHQ-9 SCORE 6/20/2017 7/21/2017 8/31/2017   Total Score - - -   Total Score MyChart - - -   Total Score 20 20 19     CRUZ-7 SCORE 6/20/2017 7/21/2017 8/31/2017   Total Score - - -   Total Score 18 19 18       PHQ-9  English  PHQ-9   Any Language  GAD7      Amount of exercise or physical activity: None    Problems taking medications regularly: No    Medication side effects: none  Diet: regular (no restrictions)    Patient also wants to discuss her visit with the neurologist, she has many questions regarding medication changes that were made.    Problem list and histories reviewed & adjusted, as indicated.  Additional history: as documented    Labs reviewed in EPIC    Reviewed and updated as needed this visit by clinical staffTobacco  Allergies  Med Hx  Surg Hx  Fam Hx  Soc Hx      Reviewed and updated as needed this visit by Provider         ROS:  Constitutional, HEENT, cardiovascular, pulmonary, gi and gu systems are negative, except as otherwise noted.      OBJECTIVE:   /78 (Cuff Size: Adult Large)  Pulse 80  " "Temp 98.3  F (36.8  C) (Oral)  Ht 5' 5\" (1.651 m)  Wt 236 lb (107 kg)  LMP 08/28/2017 (Approximate)  BMI 39.27 kg/m2  Body mass index is 39.27 kg/(m^2).  GENERAL: healthy, alert and no distress  MUSC: Tender spasms of right side shoulder area, rhomboid, levator, cervical paraspinal muscle groups  Psych: Appropriate appearance.  Alert and oriented times 3; coherent speech, normal   rate and volume, able to articulate logical thoughts, able   to abstract reason, no tangential thoughts, no hallucinations   or delusions.  Normal behavior.  Her affect is bright.    Diagnostic Test Results:  none     ASSESSMENT/PLAN:     (M54.2) Cervicalgia  (primary encounter diagnosis)  Comment:   Plan: ACUPUNCTURE REFERRAL, [facility], HAND, AND         CHIROPRACTIC REFERRAL        Refer back for PT and wants to do acupuncture. Referral given - mostly spasms.     (M54.6) Bilateral thoracic back pain, unspecified chronicity  Comment:   Plan: ACUPUNCTURE REFERRAL, [facility] PT, HAND, AND         CHIROPRACTIC REFERRAL        As noted     (M62.830) Back muscle spasm  Comment:   Plan: ACUPUNCTURE REFERRAL, [facility] PT, HAND, AND         CHIROPRACTIC REFERRAL        As noted    (G81.94) Left hemiplegia (H)  Comment:   Plan: improving - neurology evaluations ongoing     (R41.3) Memory problem  Comment:   Plan: Related to stress, migraine, depression    (G43.809) Other migraine without status migrainosus, not intractable  Comment:   Plan: Ongoing - neurology cares in place    (E55.9) Low vitamin D level  Comment:   Plan: cholecalciferol (VITAMIN D) 1000 UNIT tablet        Refills    30 min visit over 50% counseling     I'm really glad she stopped all the caffeine intake. She's done a good thing. Continue this self care. Also in good hands with neurology at [facility]. Follow up with them as needed    Can see me a few weeks after her next neurology praveen JOHNSON PA-C  [facility]  "

## 2017-09-21 NOTE — MR AVS SNAPSHOT
After Visit Summary   9/21/2017    Mary Shipman    MRN: [id number]           Patient Information     Date Of Birth          1985        Visit Information        Provider Department      9/21/2017 8:20 AM Miguel Hammer PA-C [facility]        Today's Diagnoses     Cervicalgia    -  1    Bilateral thoracic back pain, unspecified chronicity        Back muscle spasm        Left hemiplegia (H)        Memory problem        Other migraine without status migrainosus, not intractable        Low vitamin D level          Care Instructions    1. Can do B-Complex  2. Ok to do the B12            Follow-ups after your visit        Additional Services     ACUPUNCTURE REFERRAL       Your provider has referred you to: Nael Rodriguez DC - through [facility]     Please be aware that coverage of these services is subject to the terms and limitations of your health insurance plan.  Call member services at your health plan with any benefit or coverage questions.      Please bring the following with you to your appointment:    (1) Any X-Rays, CTs or MRIs which have been performed.  Contact the facility where they were done to arrange for  prior to your scheduled appointment.  (2) List of current medications   (3) This referral request   (4) Any documents/labs given to you for this referral  Services Requested: Acupuncture    Please answer the following questions:    1. How long have you been treating this patient for this pain issue?      10 year(s)    2. Have there been any of the following problematic behaviors?      Medication Management Issues: NO      Chemical Dependency: NO      Psychiatric History or Current Psych/Social Issues: NO    3. Has the patient been to any other pain clinics and/or programs?      NO            [facility] PT, HAND, AND CHIROPRACTIC REFERRAL       **This order will print in the [facility] Scheduling Office**    Physical Therapy, Hand Therapy and Chiropractic Care are available  through:    *[facility]  *[facility]  *[facility]    Call one number to schedule at any of the above locations: [phone].    Your provider has referred you to: Physical Therapy at [facility] or [facility]    Indication/Reason for Referral: Back spasms - is seeing Nael Rodriguez DC - I also want her to get PT and PT has helped her in the past in addition to the chiro she is getting   Onset of Illness: 1+ years   Therapy Orders: Evaluate and Treat  Special Programs: None  Special Request: None    Mariah Lemus      Additional Comments for the Therapist or Chiropractor: Thanks     Please be aware that coverage of these services is subject to the terms and limitations of your health insurance plan.  Call member services at your health plan with any benefit or coverage questions.      Please bring the following to your appointment:    *Your personal calendar for scheduling future appointments  *Comfortable clothing                  Your next 10 appointments already scheduled     Sep 26, 2017  9:00 AM CDT   Chiro Acupuncture Follow Up with Nael Rodriguez DC   [facility] [place] [facility] ([facility])    [address]  [place] [address]   [phone]            Oct 02, 2017  8:30 AM CDT   Return Visit with López Mehta, [facility] ([address] [address]   [phone]            Oct 03, 2017  8:15 AM CDT   [facility] Chiropractor with Nael Rodriguez DC   [facility] [place] [facility] ([facility] [place])    [address]  [place] [address]   [phone]            Oct 09, 2017  8:30 AM CDT   Return Visit with López Mehta, [facility] ([address] [address]   [phone]            Oct 10, 2017  8:15 AM CDT   [facility] Chiropractor with Nael Soler  [facility]   [facility] [place] [facility] ([facility] [place])    [address]  [place] [address]   [phone]            Oct 16, 2017  8:30 AM CDT   Return Visit with BEHZAD Adkins   [facility] ([facility])    [address] [address] [phone]            Oct 23, 2017  2:00 PM CDT   PHYSICAL with Gisela Gardner MD   [facility] ([facility]    [address] [place] [address] [phone]              Who to contact     If you have questions or need follow up information about today's clinic visit or your schedule please contact [facility] directly at [phone].  Normal or non-critical lab and imaging results will be communicated to you by MyChart, letter or phone within 4 business days after the clinic has received the results. If you do not hear from us within 7 days, please contact the clinic through Teleporthart or phone. If you have a critical or abnormal lab result, we will notify you by phone as soon as possible.  Submit refill requests through Pur[employer] or call your pharmacy and they will forward the refill request to us. Please allow 3 business days for your refill to be completed.          Additional Information About Your Visit        TeleportharKlypper Information     [employer] gives you secure access to your electronic health record. If you see a primary care provider, you can also send messages to your care team and make appointments. If you have questions, please call your primary care clinic.  If you do not have a primary care provider, please call [phone] and they will assist you.        Care EveryWhere ID     This is your Care EveryWhere ID. This could be used by other organizations to access your [place] medical records  [id number]        Your Vitals Were     Pulse Temperature Height Last Period BMI (Body Mass Index)       80  "98.3  F (36.8  C) (Oral) 5' 5\" (1.651 m) 08/28/2017 (Approximate) 39.27 kg/m2        Blood Pressure from Last 3 Encounters:   09/21/17 102/78   08/31/17 98/78   07/21/17 124/72    Weight from Last 3 Encounters:   09/21/17 236 lb (107 kg)   08/31/17 230 lb (104.3 kg)   07/21/17 226 lb (102.5 kg)              We Performed the Following     ACUPUNCTURE REFERRAL     [facility] PT, HAND, AND CHIROPRACTIC REFERRAL          Today's Medication Changes          These changes are accurate as of: 9/21/17  9:28 AM.  If you have any questions, ask your nurse or doctor.               These medicines have changed or have updated prescriptions.        Dose/Directions    * cholecalciferol 1000 UNIT tablet   Commonly known as:  vitamin D   This may have changed:  additional instructions   Used for:  Other seizures (H), Left-sided weakness   Changed by:  Miguel Hammer PA-C        4 caps daily)   Quantity:  100 tablet   Refills:  3       * cholecalciferol 1000 UNIT tablet   Commonly known as:  vitamin D   This may have changed:  You were already taking a medication with the same name, and this prescription was added. Make sure you understand how and when to take each.   Used for:  Low vitamin D level   Changed by:  Miguel Hammer PA-C        Take 5 daily (total 5000 IU)   Quantity:  450 tablet   Refills:  1       levETIRAcetam 500 MG tablet   Commonly known as:  KEPPRA   This may have changed:    - how much to take  - additional instructions   Used for:  Convulsions, unspecified convulsion type (H), Seizure disorder (H), Migraine without aura and with status migrainosus, not intractable        1000 mg at bedtime   Quantity:  60 tablet   Refills:  0       * Notice:  This list has 2 medication(s) that are the same as other medications prescribed for you. Read the directions carefully, and ask your doctor or other care provider to review them with you.         Where to get your medicines      These medications were sent to " [id number] [place], [place] - [address]  [address] [place] [address]     Phone:  [phone]     cholecalciferol 1000 UNIT tablet                Primary Care Provider Office Phone # Fax #    Miguel Hammer PA-C [phone]       [address] [address]        Equal Access to Services     JANA DUCKWORTH : Hadii aad ku hadasho Soomaali, waaxda luqadaha, qaybta kaalmada adeegyada, waxay idiin hayaan adeeg kharash lamagdiel . So [facility] [phone].    ATENCIÓN: Si habla español, tiene a omer disposición servicios gratuitos de asistencia lingüística. [facility] [phone].    We comply with applicable federal civil rights laws and [place] laws. We do not discriminate on the basis of race, color, national origin, age, disability sex, sexual orientation or gender identity.            Thank you!     Thank you for choosing [facility]  for your care. Our goal is always to provide you with excellent care. Hearing back from our patients is one way we can continue to improve our services. Please take a few minutes to complete the written survey that you may receive in the mail after your visit with us. Thank you!             Your Updated Medication List - Protect others around you: Learn how to safely use, store and throw away your medicines at www.disposemymeds.org.          This list is accurate as of: 9/21/17  9:28 AM.  Always use your most recent med list.                   Brand Name Dispense Instructions for use Diagnosis    albuterol 108 (90 BASE) MCG/ACT Inhaler    PROAIR HFA/PROVENTIL HFA/VENTOLIN HFA    3 Inhaler    Inhale 2 puffs into the lungs every 6 hours as needed for shortness of breath / dyspnea or wheezing    Intermittent asthma, uncomplicated       amitriptyline 10 MG tablet    ELAVIL     10 mg        * cholecalciferol 1000 UNIT tablet    vitamin D    100 tablet    4 caps daily)    Other seizures (H), Left-sided  weakness       * cholecalciferol 1000 UNIT tablet    vitamin D    450 tablet    Take 5 daily (total 5000 IU)    Low vitamin D level       clonazePAM 1 MG tablet    klonoPIN    30 tablet    1 at bedtime daily    CRUZ (generalized anxiety disorder), Insomnia, unspecified type       cyclobenzaprine 10 MG tablet    FLEXERIL    90 tablet    Take 1 tablet (10 mg) by mouth 3 times daily as needed for muscle spasms    Acute pain of both shoulders, Back muscle spasm       fexofenadine-pseudoePHEDrine 180-240 MG per 24 hr tablet    ALLEGRA-D 24     Take 1 tablet by mouth daily        fluticasone 50 MCG/ACT spray    FLONASE     Spray 1-2 sprays into both nostrils daily as needed for rhinitis or allergies        ibuprofen 400-800 mg tablet    ADVIL,MOTRIN    30 tablet    Take 1-2 tablets by mouth every 6 hours as needed (cramping).    Abdominal pain       LANsoprazole 30 MG CR capsule    PREVACID    90 capsule    Take 1 capsule (30 mg) by mouth daily Take 30-60 minutes before a meal.    Gastroesophageal reflux disease without esophagitis       levalbuterol 1.25 MG/0.5ML Nebu neb solution    XOPENEX CONC     Take 1.25 mg by nebulization every 6 hours as needed for wheezing        levETIRAcetam 500 MG tablet    KEPPRA    60 tablet    1000 mg at bedtime    Convulsions, unspecified convulsion type (H), Seizure disorder (H), Migraine without aura and with status migrainosus, not intractable       metFORMIN 500 MG 24 hr tablet    GLUCOPHAGE-XR    180 tablet    Take 1 tablet (500 mg) by mouth 2 times daily (with meals)    Polycystic ovaries       mometasone-formoterol 200-5 MCG/ACT oral inhaler    DULERA    39 g    Inhale 2 puffs into the lungs 2 times daily    Intermittent asthma, uncomplicated       montelukast 10 MG tablet    SINGULAIR    90 tablet    Take 1 tablet (10 mg) by mouth At Bedtime    Intermittent asthma, uncomplicated       polyethylene glycol Packet    MIRALAX/GLYCOLAX     Take 17 g by mouth daily as needed for  constipation        ranitidine 150 MG tablet    ZANTAC    60 tablet    Take 150 mg by mouth 2 times daily as needed        sertraline 100 MG tablet    ZOLOFT    180 tablet    2 tablets daily    Major depressive disorder, recurrent episode, moderate (H), Adjustment disorder with mixed anxiety and depressed mood       topiramate 50 MG tablet    TOPAMAX    180 tablet    250 mg 250 mg at bedtime, increasing to 300 mg    Seizure disorder (H), Migraine without aura and with status migrainosus, not intractable, Convulsions, unspecified convulsion type (H)       * Notice:  This list has 2 medication(s) that are the same as other medications prescribed for you. Read the directions carefully, and ask your doctor or other care provider to review them with you.

## 2017-09-21 NOTE — NURSING NOTE
"Chief Complaint   Patient presents with     Anxiety     Depression     Follow Up For     neurologist       Initial /78 (Cuff Size: Adult Large)  Pulse 80  Temp 98.3  F (36.8  C) (Oral)  Ht 5' 5\" (1.651 m)  Wt 236 lb (107 kg)  LMP 08/28/2017 (Approximate)  BMI 39.27 kg/m2 Estimated body mass index is 39.27 kg/(m^2) as calculated from the following:    Height as of this encounter: 5' 5\" (1.651 m).    Weight as of this encounter: 236 lb (107 kg).  Medication Reconciliation: complete   Kori Jeff, Certified Medical Assistant (AAMA)     "

## 2017-09-22 ASSESSMENT — ANXIETY QUESTIONNAIRES: GAD7 TOTAL SCORE: 18

## 2017-09-26 ENCOUNTER — THERAPY VISIT (OUTPATIENT)
Dept: CHIROPRACTIC MEDICINE | Facility: CLINIC | Age: 32
End: 2017-09-26
Payer: COMMERCIAL

## 2017-09-26 DIAGNOSIS — M62.838 SPASM OF MUSCLE: ICD-10-CM

## 2017-09-26 DIAGNOSIS — M99.01 CERVICAL SEGMENT DYSFUNCTION: Primary | ICD-10-CM

## 2017-09-26 DIAGNOSIS — M99.02 THORACIC SEGMENT DYSFUNCTION: ICD-10-CM

## 2017-09-26 DIAGNOSIS — M54.2 CERVICALGIA: ICD-10-CM

## 2017-09-26 PROCEDURE — 98940 CHIROPRACT MANJ 1-2 REGIONS: CPT | Mod: AT | Performed by: CHIROPRACTOR

## 2017-09-26 PROCEDURE — 97810 ACUP 1/> WO ESTIM 1ST 15 MIN: CPT | Performed by: CHIROPRACTOR

## 2017-09-26 NOTE — PROGRESS NOTES
Visit #:  2 of 8, based on treatment plan    Subjective:  Mary Shipman is a 32 year old female who is seen in f/u up for:    Cervical segment dysfunction  Cervicalgia  Thoracic segment dysfunction  Spasm of muscle.     Since last visit on 9/18/2017,  Mary Shipman reports the following changes: Pain immediately after last treatment: 2/10 and their pain level today 6-7/10.  Mary reports that her pain came back a few days after her last visit and has been intense since.  She is feeling very tight and sore.  She also mentions that she is having a tough time using her foam roller and it is causing pain, when in the past it was relieving.    Area of chief complaint:  Cervical and Thoracic :  Symptoms are graded at 6-7/10. The quality is described as stiff, achey, dull.  Motion has remained about the same, no improvement. Patient feels that they have not improved and feel the same.        Objective:  The following was observed:    P: palpatory tenderness, upper traps    A: static palpation demonstrates intersegmental asymmetry , cervical and thoracic    R: motion palpation notes restricted motion, :  C1 Right rotation restricted  C6 Right rotation restricted  C7 Right rotation restricted  T1 Left rotation restricted  T2 Left rotation restricted  T3 Left rotation restricted    T: hypertonicity at: Traps Bilaterally      Assessment:    Segmental spinal dysfunction/restrictions found at:  C1   C6   C7   T1   T2   T3    Diagnoses:      1. Cervical segment dysfunction    2. Cervicalgia    3. Thoracic segment dysfunction    4. Spasm of muscle        Patient's condition:  Patient had restrictions pre-manipulation    Treatment effectiveness:  Post manipulation there is better intersegmental movement and Patient claims to feel looser post manipulation      Procedures:  CMT:  55168 Chiropractic manipulative treatment 1-2 regions performed   Cervical: Activator, C1 , C6, C7 , Prone  Thoracic: Activator, T1, T2, T3,  Prone    Modalities:  60438: Acupuncture, for 15 minutes:  Points: Kelley Points in thoracic spein  Ahsi point in upper traps and t-spine paraspinal  For 15 minutes    Therapeutic procedures:  None      Prognosis: Good    Progress towards Goals: Patient is making progress towards the goal     Response to Treatment:   Reduction in symptoms as reported by patient      Recommendations:    Instructions:ice 20 minutes every other hour as needed and stretch as instructed at visit    Follow-up:  Return to care in one week

## 2017-10-03 ENCOUNTER — THERAPY VISIT (OUTPATIENT)
Dept: CHIROPRACTIC MEDICINE | Facility: CLINIC | Age: 32
End: 2017-10-03
Payer: COMMERCIAL

## 2017-10-03 DIAGNOSIS — M99.02 THORACIC SEGMENT DYSFUNCTION: ICD-10-CM

## 2017-10-03 DIAGNOSIS — M99.01 CERVICAL SEGMENT DYSFUNCTION: Primary | ICD-10-CM

## 2017-10-03 DIAGNOSIS — M62.838 SPASM OF MUSCLE: ICD-10-CM

## 2017-10-03 DIAGNOSIS — M54.2 CERVICALGIA: ICD-10-CM

## 2017-10-03 PROCEDURE — 97810 ACUP 1/> WO ESTIM 1ST 15 MIN: CPT | Performed by: CHIROPRACTOR

## 2017-10-03 PROCEDURE — 98940 CHIROPRACT MANJ 1-2 REGIONS: CPT | Mod: AT | Performed by: CHIROPRACTOR

## 2017-10-03 NOTE — PROGRESS NOTES
Visit #:  3 of 8, based on treatment plan    Subjective:  Mary Shipman is a 32 year old female who is seen in f/u up for:    Cervical segment dysfunction  Cervicalgia  Thoracic segment dysfunction  Spasm of muscle.     Since last visit on 9/26/2017,  Mary Shipman reports the following changes: Pain immediately after last treatment: 2/10 and their pain level today 8/10.  Mary reports that her pain came back a few days after her last visit and has been intense since.  She is feeling very tight and sore.  She also mentions that she is not really using her foam roller since it was causing her pain.  Mary does report that she is going to start PT.    Area of chief complaint:  Cervical and Thoracic :  Symptoms are graded at 8/10. The quality is described as stiff, achey, dull.  Motion has remained about the same, no improvement. Patient feels that they have not improved and feel the same.        Objective:  The following was observed:    P: palpatory tenderness, upper traps    A: static palpation demonstrates intersegmental asymmetry , cervical and thoracic    R: motion palpation notes restricted motion, :  C1 Right rotation restricted  C6 Right rotation restricted  C7 Right rotation restricted  T1 Left rotation restricted  T2 Left rotation restricted  T3 Left rotation restricted    T: hypertonicity at: Traps Bilaterally      Assessment:    Segmental spinal dysfunction/restrictions found at:  C1   C6   C7   T1   T2   T3    Diagnoses:      1. Cervical segment dysfunction    2. Cervicalgia    3. Thoracic segment dysfunction    4. Spasm of muscle        Patient's condition:  Patient had restrictions pre-manipulation    Treatment effectiveness:  Post manipulation there is better intersegmental movement and Patient claims to feel looser post manipulation      Procedures:  CMT:  98776 Chiropractic manipulative treatment 1-2 regions performed   Cervical: Activator, C1 , C6, C7 , Prone  Thoracic: Activator, T1, T2,  T3, Prone    Modalities:  77941: Acupuncture, for 15 minutes:  Points: Kelley Points in thoracic spein  Ahsi point in upper traps and t-spine paraspinal  For 15 minutes    Therapeutic procedures:  None      Prognosis: Good    Progress towards Goals: Patient is making progress towards the goal     Response to Treatment:   Reduction in symptoms as reported by patient      Recommendations:    Instructions:ice 20 minutes every other hour as needed and stretch as instructed at visit    Follow-up:  Return to care in one week

## 2017-10-09 ENCOUNTER — OFFICE VISIT (OUTPATIENT)
Dept: BEHAVIORAL HEALTH | Facility: CLINIC | Age: 32
End: 2017-10-09
Payer: COMMERCIAL

## 2017-10-09 ENCOUNTER — OFFICE VISIT (OUTPATIENT)
Dept: FAMILY MEDICINE | Facility: CLINIC | Age: 32
End: 2017-10-09
Payer: COMMERCIAL

## 2017-10-09 VITALS
OXYGEN SATURATION: 100 % | WEIGHT: 237 LBS | TEMPERATURE: 97.7 F | HEART RATE: 78 BPM | SYSTOLIC BLOOD PRESSURE: 109 MMHG | DIASTOLIC BLOOD PRESSURE: 72 MMHG | BODY MASS INDEX: 39.44 KG/M2

## 2017-10-09 DIAGNOSIS — J45.41 MODERATE PERSISTENT ASTHMA WITH EXACERBATION: ICD-10-CM

## 2017-10-09 DIAGNOSIS — J01.00 ACUTE NON-RECURRENT MAXILLARY SINUSITIS: Primary | ICD-10-CM

## 2017-10-09 DIAGNOSIS — F41.1 GENERALIZED ANXIETY DISORDER: Primary | ICD-10-CM

## 2017-10-09 PROCEDURE — 90834 PSYTX W PT 45 MINUTES: CPT | Performed by: SOCIAL WORKER

## 2017-10-09 PROCEDURE — 99213 OFFICE O/P EST LOW 20 MIN: CPT | Performed by: NURSE PRACTITIONER

## 2017-10-09 RX ORDER — PREDNISONE 20 MG/1
TABLET ORAL
Qty: 20 TABLET | Refills: 0 | Status: SHIPPED | OUTPATIENT
Start: 2017-10-09 | End: 2017-10-23

## 2017-10-09 ASSESSMENT — PAIN SCALES - GENERAL: PAINLEVEL: EXTREME PAIN (8)

## 2017-10-09 NOTE — PROGRESS NOTES
SUBJECTIVE:   Mary Shipman is a 32 year old female who presents to clinic today for the following health issues:      ED/UC Followup:    Facility:  [facility]  Date of visit: 9/21/17  Reason for visit: URI and sinus infection  Current Status: Still sick     Went to [facility] 2 weeks ago  Treated with 5 days of prednisone and omnicef  Noticed minimal relief in symptoms  Now feels her symptoms are worsening  Cough productive of colored sputum  Complains of SOB and wheezing  Pressure in right ear, right maxillary pain and pressure  Painful swallowing right side last 2 days  Denies fever, chills, nausea, vomiting  Nonsmoker  Using nebulizer every 4 hours      Problem list and histories reviewed & adjusted, as indicated.  Additional history: none    Patient Active Problem List   Diagnosis     Esophageal reflux     Allergic rhinitis due to other allergen     Polycystic ovaries     Thyrotoxicosis     Urticaria     Obesity     Low back pain     Intermittent asthma     HYPERLIPIDEMIA LDL GOAL <160     Irritable bowel syndrome with constipation     Migraine headache     Not immune to rubella     Major depressive disorder, recurrent episode, moderate (H)     Health Care Home     Health care home, active care coordination     Generalized anxiety disorder     Lump or mass in breast     Menometrorrhagia     Seizure (H)     Left hemiplegia (H)     Past Surgical History:   Procedure Laterality Date     C APPENDECTOMY       C NONSPECIFIC PROCEDURE      nasal surgery      TONSILLECTOMY & ADENOIDECTOMY      surgery several times for this       Social History   Substance Use Topics     Smoking status: Never Smoker     Smokeless tobacco: Never Used     Alcohol use No     Family History   Problem Relation Age of Onset     Gynecology Mother      ectopic pregnancy/endometriosis     Cancer - colorectal Mother      Congenital Anomalies Mother      kidney problems     Colon Cancer Mother      Hyperlipidemia Mother      Other Cancer  Mother      Lung, Skin, Brain and Colon cancer     Gynecology Sister      ectopic pregnancy/endometriosis     Unknown/Adopted Sister      HEART DISEASE Father      Coronary Artery Disease Father      Psychotic Disorder Brother      Unknown/Adopted Brother      Unknown/Adopted Brother      Unknown/Adopted Brother      Unknown/Adopted Brother      Unknown/Adopted Brother      Unknown/Adopted Sister      CEREBROVASCULAR DISEASE Maternal Grandmother      Unknown/Adopted Maternal Grandfather      Unknown/Adopted Paternal Grandmother      Unknown/Adopted Paternal Grandfather       Other       Other       Other       Other       Other       Other              Reviewed and updated as needed this visit by clinical staffTobacco  Meds  Med Hx  Surg Hx  Fam Hx  Soc Hx      Reviewed and updated as needed this visit by Provider         ROS:  Constitutional, HEENT, cardiovascular, pulmonary, gi and gu systems are negative, except as otherwise noted.      OBJECTIVE:   /72 (BP Location: Left arm, Patient Position: Chair, Cuff Size: Adult Large)  Pulse 78  Temp 97.7  F (36.5  C) (Oral)  Wt 237 lb (107.5 kg)  LMP 08/28/2017 (Approximate)  SpO2 100%  Breastfeeding? No  BMI 39.44 kg/m2  Body mass index is 39.44 kg/(m^2).  GENERAL: appears ill, alert and no distress  HENT: normal cephalic/atraumatic, ear canals and TM's normal, nose and mouth without ulcers or lesions, nasal mucosa edematous , rhinorrhea yellow, oropharynx clear, oral mucous membranes moist and sinuses: maxillary tenderness on right  NECK: no adenopathy, no asymmetry, masses, or scars and thyroid normal to palpation  RESP: good excursion bilaterally, scattered expiratory wheeze, no crackles   CV: regular rate and rhythm, normal S1 S2, no S3 or S4, no murmur, click or rub, no peripheral edema and peripheral pulses strong    Diagnostic Test Results:  none     ASSESSMENT/PLAN:       ICD-10-CM    1. Acute non-recurrent maxillary sinusitis J01.00  amoxicillin-clavulanate (AUGMENTIN) 875-125 MG per tablet     predniSONE (DELTASONE) 20 MG tablet   2. Moderate persistent asthma with exacerbation J45.41        She appears quite ill today with profound tenderness over right maxillary sinus. Recommend Abx  She requests steroid burst for asthma exacerbation. I reviewed risks and benefits of steroid use which she is aware of. She reports the 5 days burst did not help and she usually requires 12 day burst which I agreed to prescribe  Reviewed self cares    GLENNA Lopez CNP  [facility]

## 2017-10-09 NOTE — PROGRESS NOTES
"                                             Progress Note    Client Name: Mary Shipman  Date: 10/9/2017          Service Type: Individual      Session Start Time: 830  Session End Time: 915      Session Length: 45m     Session #: 9     Attendees: Client attended alone    Treatment Plan Last Reviewed: 8/28/2017   PHQ-9 / RCUZ-7 :      DATA      Progress Since Last Session (Related to Symptoms / Goals / Homework):   Symptoms: Stable    Homework: Did not complete      Episode of Care Goals: Satisfactory progress - CONTEMPLATION (Considering change and yet undecided); Intervened by assessing the negative and positive thinking (ambivalence) about behavior change     Current / Ongoing Stressors and Concerns:    Things have been \"always crazy, that's my life.\"  She has been sick for the last few weeks.  Her son has also been sick with a respiratory infection and was in the hospital for a bit.  Client plans to go in to see her pcp for this.  Has been back to the neurologist for a follow up, and they are working on treating her migraines.  Discussed productive and unproductive worry.  Framed productive worry as prudent, focused on events or problems, might reasonably cause a problem if left unattended, and that lead to a potential solution of a problem.  Explained productive worry as a \"to do\" list and unproductive worry as a \"what if\" list.  Used metaphor of car trip to describe the differences.  Explored how client has distinguish between the two in the past and might do this better in the future.  Discussed logging productive and unproductive worry between sessions.                    Treatment Objective(s) Addressed in This Session:    Client will use identified behavioral and cognitive skills to challenge negative self talk 90% of the time.       Intervention:   CBT: -   Discussed concept of cognitive distortions today in session.  Explored connection between automatic thinking and cognitive distortions.  Discussed " "common examples (e.g., catastrophizing, mindreading, dichotomous thinking) of cognitive distortions in session.  Handout provided.  Discussed connection between cognitive distortions and depression, anxiety, and stress.  Introduced concept of challenging cognitive distortions by asking \"is this reaction logical?\" , \"is this reaction helpful?\" , \"am I overreacting?\" , and \"how can I respond to make this situation better?\"                 ASSESSMENT: Current Emotional / Mental Status (status of significant symptoms):   Risk status (Self / Other harm or suicidal ideation)   Client denies current fears or concerns for personal safety.   Client denies current or recent suicidal ideation or behaviors.   Client denies current or recent homicidal ideation or behaviors.   Client denies current or recent self injurious behavior or ideation.   Client denies other safety concerns.   A safety and risk management plan has not been developed at this time, however client was given the after-hours number / 911 should there be a change in any of these risk factors.     Appearance:   Appropriate    Eye Contact:   Good    Psychomotor Behavior: Retarded (Slowed)    Attitude:   Cooperative    Orientation:   All   Speech    Rate / Production: Monotone  Slow     Volume:  Normal    Mood:    Depressed    Affect:    Blunted    Thought Content:  Clear    Thought Form:  Coherent  Logical    Insight:    Good      Medication Review:   No changes to current psychiatric medication(s)     Medication Compliance:   Yes     Changes in Health Issues:   None reported     Chemical Use Review:   Substance Use: Chemical use reviewed, no active concerns identified      Tobacco Use: No current tobacco use.       Collateral Reports Completed:   Routed note to PCP  Communicated with: clinic     PLAN: (Client Tasks / Therapist Tasks / Other)  1.  More supportive therapy and CBT at next meeting  2.  Standing meditation 5 mn am/pm  3.  Meet next " week        Ende López Torres, LICSW                                                         ________________________________________________________________________    Treatment Plan    Client's Name: Mary Shipman  YOB: 1985    Date: 5/19/2017     DSM5 Diagnoses: (Sustained by DSM5 Criteria Listed Above)  Diagnoses: 296.23 Major Depressive Disorder, Single Episode, Severe _  300.02 (F41.1) Generalized Anxiety Disorder  Psychosocial & Contextual Factors: finances;  from ; stress with work  WHODAS 2.0 (12 item)  This questionnaire asks about difficulties due to health conditions. Health conditions  include  disease or illnesses, other health problems that may be short or long lasting,  injuries, mental health or emotional problems, and problems with alcohol or drugs.      Think back over the past 30 days and answer these questions, thinking about how much  difficulty you had doing the following activities. For each question, please [place] only  one response.     S1 Standing for long periods such as 30 minutes? None = 1   S2 Taking care of household responsibilities? Mild = 2   S3 Learning a new task, for example, learning how to get to a new place? None = 1   S4 How much of a problem do you have joining community activities (for example, festivals, [employer] or other activities) in the same way as anyone else can? Moderate = 3   S5 How much have you been emotionally affected by your health problems? Moderate = 3           In the past 30 days, how much difficulty did you have in:   S6 Concentrating on doing something for ten minutes? Mild = 2   S7 Walking a long distance such as a kilometer (or equivalent)? Mild = 2   S8 Washing your whole body? None = 1   S9 Getting dressed? None = 1   S10 Dealing with people you do not know? None = 1   S11 Maintaining a friendship? Mild = 2   S12 Your day to day work? Mild = 2      H1 Overall, in the past 30 days, how many days were these  difficulties present? Record number of days 30   H2 In the past 30 days, for how many days were you totally unable to carry out your usual activities or work because of any health condition? Record number of days 0   H3 In the past 30 days, not counting the days that you were totally unable, for how many days did you cut back or reduce your usual activities or work because of any health condition? Record number of days 30       Referral / Collaboration:  Referral to another professional/service is not indicated at this time..    Anticipated number of session or this episode of care: 12-18      MeasurableTreatment Goal(s) related to diagnosis / functional impairment(s)  Goal-Depression: Client will decrease depressed mood    I will know I've met my goal when I am less depressed.      Objective #A (Client Action)    Status: continued- Date: 8/28/2017     Client will use identified behavioral and cognitive skills to challenge negative self talk 90% of the time.    Intervention(s)  Therapist will provide psychoeducation, behavioral activation, and cognitive restructuring.      Objective #B  Client will complete at least 10 minutes of self-regulation practice (e.g.: yoga, meditation, relaxation breathing, etc.) per day.    Status: continued- Date: 8/28/2017     Intervention(s)  Therapist will provide psychoeducation, behavioral activation, and cognitive restructuring.      Objective #C  Client will exercise 30 minutes 36 times in the next 12 weeks.  Status: continued- Date: 8/28/2017     Intervention(s)  Therapist will provide psychoeducation, behavioral activation, and cognitive restructuring.                Client has reviewed and agreed to the above plan.      Abad Torres, [facility]  May 19, 2017

## 2017-10-09 NOTE — LETTER
[address] [address]  Phone: [phone]  Fax: [phone]    October 9, 2017        Mary Shipman  [address] [facility] [place] [address]          To whom it may concern:    RE: Mary Shipman    Patient was seen and treated today at our clinic. Please excuse her absence from work today.     Please contact me for questions or concerns.      Sincerely,        GLENNA Lopez CNP

## 2017-10-09 NOTE — NURSING NOTE
"Chief Complaint   Patient presents with     URI       Initial /72 (BP Location: Left arm, Patient Position: Chair, Cuff Size: Adult Large)  Pulse 78  Temp 97.7  F (36.5  C) (Oral)  Wt 237 lb (107.5 kg)  LMP 08/28/2017 (Approximate)  SpO2 100%  Breastfeeding? No  BMI 39.44 kg/m2 Estimated body mass index is 39.44 kg/(m^2) as calculated from the following:    Height as of 9/21/17: 5' 5\" (1.651 m).    Weight as of this encounter: 237 lb (107.5 kg).  Medication Reconciliation: complete.  YEIMI Salinas MA      "

## 2017-10-09 NOTE — MR AVS SNAPSHOT
MRN:[id number]                      After Visit Summary   10/9/2017    Mary Shipman    MRN: [id number]           Visit Information        Provider Department      10/9/2017 8:30 AM López Mehta, [facility]      Your next 10 appointments already scheduled     Oct 09, 2017 10:20 AM CDT   Office Visit with GLENNA [facility] ([facility])    [address] [facility] [address]   [phone]           Bring a current list of meds and any records pertaining to this visit. For Physicals, please bring immunization records and any forms needing to be filled out. Please arrive 10 minutes early to complete paperwork.            Oct 09, 2017 12:00 PM CDT   (Arrive by 11:45 AM)   [facility] Sp[facility] with Shirin Buckley, PT   [facility] [place] [facility] ([facility] [place])    [address]  [place] [address]   [phone]            Oct 10, 2017  8:15 AM CDT   [facility]up[facility] Follow Up with Nael Rodriguez DC   [facility] [place] [facility] ([facility]    [address]  [place] [address]   [phone]            Oct 16, 2017  8:30 AM CDT   Return Visit with López Mehta, [facility] ([facility])    [address] [place] [address]   [phone]            Oct 19, 2017  9:20 AM CDT   [facility] Spine with Shirin Buckley, PT   [facility] ([facility] [place])    [address]  [address]  [place] [address]   [phone]            Oct 23, 2017  9:20 AM CDT   [facility] Spine with Shirin Buckley PT   [facility] ([facility] [place])    [address]  [address]  [place] [address]   [phone]            Oct 23, 2017  2:00 PM CDT   PHYSICAL with Gisela Medina  MD Jj   [facility] ([facility]    [address] [place] [address] [phone]              MyChart Information     FPSIt gives you secure access to your electronic health record. If you see a primary care provider, you can also send messages to your care team and make appointments. If you have questions, please call your primary care clinic.  If you do not have a primary care provider, please call [phone] and they will assist you.        Care EveryWhere ID     This is your Care EveryWhere ID. This could be used by other organizations to access your [facility] medical records  [id number]        Equal Access to Services     JANA DUCKWORTH : Lila Fields, emilee amin, bobby zaldivar, germain salcedo. So [facility] [place] [phone].    ATENCIÓN: Si habla español, tiene a omer disposición servicios gratuitos de asistencia lingüística. Llame al [phone].    We comply with applicable federal civil rights laws and [place] laws. We do not discriminate on the basis of race, color, national origin, age, disability, sex, sexual orientation, or gender identity.

## 2017-10-09 NOTE — MR AVS SNAPSHOT
After Visit Summary   10/9/2017    Mary Shipman    MRN: [id number]           Patient Information     Date Of Birth          1985        Visit Information        Provider Department      10/9/2017 10:20 AM Lucretia Figueroa APRN CNP [facility]        Today's Diagnoses     Acute non-recurrent maxillary sinusitis    -  1       Follow-ups after your visit        Your next 10 appointments already scheduled     Oct 09, 2017 12:00 PM CDT   (Arrive by 11:45 AM)   [facility] Spine with Shirin Buckley PT   [facility] ([facility])    [address]  [place] [address]   [phone]            Oct 10, 2017  8:15 AM CDT   [facility] Acupuncture Follow Up with Nael Rodriguez DC   [facility] ([facility])    [address]  [place] [address]   [phone]            Oct 16, 2017  8:30 AM CDT   Return Visit with López Mehta [facility] ([address] [address]   [phone]            Oct 19, 2017  9:20 AM CDT   LUIZA Spine with Shirin Buckley PT   [facility] ([facility])    [address]  [place] [address]   [phone]            Oct 23, 2017  9:20 AM CDT   L[facility] Spine with Shirin Buckley PT   [facility] ([facility])    [address]  [place] [address]   [phone]            Oct 23, 2017  2:00 PM CDT   PHYSICAL with Gisela Gardner MD   [facility] ([facility]    [address] [place] [id number] [phone]              Who to contact     If you have questions or need follow up information about today's clinic visit or your schedule please contact [facility] directly at  [phone].  Normal or non-critical lab and imaging results will be communicated to you by [employer], letter or phone within 4 business days after the clinic has received the results. If you do not hear from us within 7 days, please contact the clinic through [employer] or phone. If you have a critical or abnormal lab result, we will notify you by phone as soon as possible.  Submit refill requests through [employer] or call your pharmacy and they will forward the refill request to us. Please allow 3 business days for your refill to be completed.          Additional Information About Your Visit        [employer] Information     [employer] gives you secure access to your electronic health record. If you see a primary care provider, you can also send messages to your care team and make appointments. If you have questions, please call your primary care clinic.  If you do not have a primary care provider, please call [phone] and they will assist you.        Care EveryWhere ID     This is your Care EveryWhere ID. This could be used by other organizations to access your [facility] medical records  [id number]        Your Vitals Were     Pulse Temperature Last Period Pulse Oximetry Breastfeeding? BMI (Body Mass Index)    78 97.7  F (36.5  C) (Oral) 08/28/2017 (Approximate) 100% No 39.44 kg/m2       Blood Pressure from Last 3 Encounters:   10/09/17 109/72   09/21/17 102/78   08/31/17 98/78    Weight from Last 3 Encounters:   10/09/17 237 lb (107.5 kg)   09/21/17 236 lb (107 kg)   08/31/17 230 lb (104.3 kg)              Today, you had the following     No orders found for display         Today's Medication Changes          These changes are accurate as of: 10/9/17 11:11 AM.  If you have any questions, ask your nurse or doctor.               Start taking these medicines.        Dose/Directions    amoxicillin-clavulanate 875-125 MG per tablet   Commonly known as:  AUGMENTIN   Used for:  Acute non-recurrent maxillary sinusitis   Started  by:  Lucretia Figueroa APRN CNP        Dose:  1 tablet   Take 1 tablet by mouth 2 times daily   Quantity:  20 tablet   Refills:  0       predniSONE 20 MG tablet   Commonly known as:  DELTASONE   Used for:  Acute non-recurrent maxillary sinusitis   Started by:  Lucretia Figueroa APRN CNP        Take 3 tabs (60 mg) by mouth daily x 3 days, 2 tabs (40 mg) daily x 3 days, 1 tab (20 mg) daily x 3 days, then 1/2 tab (10 mg) x 3 days.   Quantity:  20 tablet   Refills:  0            Where to get your medicines      These medications were sent to [facility]/pharmacy #[id number] - [facility] [place] - [address]  [address] [facility] [address]     Phone:  [phone]     amoxicillin-clavulanate 875-125 MG per tablet    predniSONE 20 MG tablet                Primary Care Provider Office Phone # Fax #    Miguel Hammer PA-C [phone]       [address] [place] [address]        Equal Access to Services     [facility]: Hadii lorraine ku hadasho Soomaali, waaxda luqadaha, qaybta kaalmada ademirianyamarily, germain salcedo. So [facility] [phone].    ATENCIÓN: Si habla español, tiene a omer disposición servicios gratuitos de asistencia lingüística. [facility] [phone].    We comply with applicable federal civil rights laws and [place] laws. We do not discriminate on the basis of race, color, national origin, age, disability, sex, sexual orientation, or gender identity.            Thank you!     Thank you for choosing [facility]  for your care. Our goal is always to provide you with excellent care. Hearing back from our patients is one way we can continue to improve our services. Please take a few minutes to complete the written survey that you may receive in the mail after your visit with us. Thank you!             Your Updated Medication List - Protect others around you: Learn how to safely use, store and throw away your medicines at  www.disposemymeds.org.          This list is accurate as of: 10/9/17 11:11 AM.  Always use your most recent med list.                   Brand Name Dispense Instructions for use Diagnosis    albuterol 108 (90 BASE) MCG/ACT Inhaler    PROAIR HFA/PROVENTIL HFA/VENTOLIN HFA    3 Inhaler    Inhale 2 puffs into the lungs every 6 hours as needed for shortness of breath / dyspnea or wheezing    Intermittent asthma, uncomplicated       amitriptyline 10 MG tablet    ELAVIL     10 mg        amoxicillin-clavulanate 875-125 MG per tablet    AUGMENTIN    20 tablet    Take 1 tablet by mouth 2 times daily    Acute non-recurrent maxillary sinusitis       cholecalciferol 1000 UNIT tablet    vitamin D    450 tablet    Take 5 daily (total 5000 IU)    Low vitamin D level       clonazePAM 1 MG tablet    klonoPIN    30 tablet    1 at bedtime daily    CRUZ (generalized anxiety disorder), Insomnia, unspecified type       cyclobenzaprine 10 MG tablet    FLEXERIL    90 tablet    Take 1 tablet (10 mg) by mouth 3 times daily as needed for muscle spasms    Acute pain of both shoulders, Back muscle spasm       fexofenadine-pseudoePHEDrine 180-240 MG per 24 hr tablet    ALLEGRA-D 24     Take 1 tablet by mouth daily        fluticasone 50 MCG/ACT spray    FLONASE     Spray 1-2 sprays into both nostrils daily as needed for rhinitis or allergies        ibuprofen 400-800 mg tablet    ADVIL,MOTRIN    30 tablet    Take 1-2 tablets by mouth every 6 hours as needed (cramping).    Abdominal pain       LANsoprazole 30 MG CR capsule    PREVACID    90 capsule    Take 1 capsule (30 mg) by mouth daily Take 30-60 minutes before a meal.    Gastroesophageal reflux disease without esophagitis       levalbuterol 1.25 MG/0.5ML Nebu neb solution    XOPENEX CONC     Take 1.25 mg by nebulization every 6 hours as needed for wheezing        levETIRAcetam 500 MG tablet    KEPPRA    60 tablet    1000 mg at bedtime    Convulsions, unspecified convulsion type (H), Seizure  disorder (H), Migraine without aura and with status migrainosus, not intractable       metFORMIN 500 MG 24 hr tablet    GLUCOPHAGE-XR    180 tablet    Take 1 tablet (500 mg) by mouth 2 times daily (with meals)    Polycystic ovaries       mometasone-formoterol 200-5 MCG/ACT oral inhaler    DULERA    39 g    Inhale 2 puffs into the lungs 2 times daily    Intermittent asthma, uncomplicated       montelukast 10 MG tablet    SINGULAIR    90 tablet    Take 1 tablet (10 mg) by mouth At Bedtime    Intermittent asthma, uncomplicated       polyethylene glycol Packet    MIRALAX/GLYCOLAX     Take 17 g by mouth daily as needed for constipation        predniSONE 20 MG tablet    DELTASONE    20 tablet    Take 3 tabs (60 mg) by mouth daily x 3 days, 2 tabs (40 mg) daily x 3 days, 1 tab (20 mg) daily x 3 days, then 1/2 tab (10 mg) x 3 days.    Acute non-recurrent maxillary sinusitis       ranitidine 150 MG tablet    ZANTAC    60 tablet    Take 150 mg by mouth 2 times daily as needed        sertraline 100 MG tablet    ZOLOFT    180 tablet    2 tablets daily    Major depressive disorder, recurrent episode, moderate (H), Adjustment disorder with mixed anxiety and depressed mood       topiramate 50 MG tablet    TOPAMAX    180 tablet    Take 6 tablets (300 mg) by mouth At Bedtime    Seizure disorder (H), Migraine without aura and with status migrainosus, not intractable, Convulsions, unspecified convulsion type (H)

## 2017-10-10 ENCOUNTER — THERAPY VISIT (OUTPATIENT)
Dept: CHIROPRACTIC MEDICINE | Facility: CLINIC | Age: 32
End: 2017-10-10
Payer: COMMERCIAL

## 2017-10-10 DIAGNOSIS — M54.2 CERVICALGIA: ICD-10-CM

## 2017-10-10 DIAGNOSIS — M99.01 CERVICAL SEGMENT DYSFUNCTION: Primary | ICD-10-CM

## 2017-10-10 DIAGNOSIS — M62.838 SPASM OF MUSCLE: ICD-10-CM

## 2017-10-10 DIAGNOSIS — M99.02 THORACIC SEGMENT DYSFUNCTION: ICD-10-CM

## 2017-10-10 PROCEDURE — 97810 ACUP 1/> WO ESTIM 1ST 15 MIN: CPT | Performed by: CHIROPRACTOR

## 2017-10-10 PROCEDURE — 98940 CHIROPRACT MANJ 1-2 REGIONS: CPT | Mod: AT | Performed by: CHIROPRACTOR

## 2017-10-10 NOTE — PROGRESS NOTES
Visit #: 4 of 8, based on treatment plan    Subjective:  Mary Shipman is a 32 year old female who is seen in f/u up for:    Cervical segment dysfunction  Cervicalgia  Thoracic segment dysfunction  Spasm of muscle.     Since last visit on 10/3/2017,  Mary Shipman reports the following changes: Pain immediately after last treatment: 2/10 and their pain level today 7-8/10.  Mary reports that her pain is still there in neck, mid back areas. But, she is taking meds due to a recent upper respiratory infection.  She is very tight and sore with most motions and has not really done any exercises over the past week.  Mary does report that she is going to start PT in near future.    Area of chief complaint:  Cervical and Thoracic :  Symptoms are graded at 7-8/10. The quality is described as stiff, achey, dull.  Motion has remained about the same, no improvement. Patient feels that they have not improved and feel the same.        Objective:  The following was observed:    P: palpatory tenderness, upper traps    A: static palpation demonstrates intersegmental asymmetry , cervical and thoracic    R: motion palpation notes restricted motion, :  C1 Right rotation restricted  C6 Right rotation restricted  C7 Right rotation restricted  T1 Left rotation restricted  T2 Left rotation restricted  T3 Left rotation restricted    T: hypertonicity at: Traps Bilaterally      Assessment:    Segmental spinal dysfunction/restrictions found at:  C1   C6   C7   T1   T2   T3    Diagnoses:      1. Cervical segment dysfunction    2. Cervicalgia    3. Thoracic segment dysfunction    4. Spasm of muscle        Patient's condition:  Patient had restrictions pre-manipulation    Treatment effectiveness:  Post manipulation there is better intersegmental movement and Patient claims to feel looser post manipulation      Procedures:  CMT:  14609 Chiropractic manipulative treatment 1-2 regions performed   Cervical: Activator, C1 , C6, C7 ,  Prone  Thoracic: Activator, T1, T2, T3, Prone    Modalities:  58927: Acupuncture, for 15 minutes:  Points: Kelley Points in thoracic spein  Ahsi point in upper traps and t-spine paraspinal  For 15 minutes    Therapeutic procedures:  None      Prognosis: Good    Progress towards Goals: Patient is making progress towards the goal     Response to Treatment:   Reduction in symptoms as reported by patient      Recommendations:    Instructions:ice 20 minutes every other hour as needed and stretch as instructed at visit    Follow-up:  Return to care in one week

## 2017-10-16 ENCOUNTER — OFFICE VISIT (OUTPATIENT)
Dept: BEHAVIORAL HEALTH | Facility: CLINIC | Age: 32
End: 2017-10-16
Payer: COMMERCIAL

## 2017-10-16 DIAGNOSIS — F41.1 GENERALIZED ANXIETY DISORDER: Primary | ICD-10-CM

## 2017-10-16 PROCEDURE — 90834 PSYTX W PT 45 MINUTES: CPT | Performed by: SOCIAL WORKER

## 2017-10-16 NOTE — PROGRESS NOTES
"                                             Progress Note    Client Name: Mary Shipman  Date: 10/16/2017          Service Type: Individual      Session Start Time: 830  Session End Time: 915      Session Length: 45m     Session #: 10     Attendees: Client attended alone    Treatment Plan Last Reviewed: 8/28/2017   PHQ-9 / CRUZ-7 :      DATA      Progress Since Last Session (Related to Symptoms / Goals / Homework):   Symptoms: Stable    Homework: Did not complete      Episode of Care Goals: Satisfactory progress - CONTEMPLATION (Considering change and yet undecided); Intervened by assessing the negative and positive thinking (ambivalence) about behavior change     Current / Ongoing Stressors and Concerns:    Things have been \"still feeling sick and trying to get better.\"  Have note been working much more than 15 hours per week due to being sick with respiratory infection and sinus infection.  Getting an apartment on her own so she will not have to depend madelaine much on marcie for financial support and feeling hopeful about this.  Discussed productive and unproductive worry.  Framed productive worry as prudent, focused on events or problems, might reasonably cause a problem if left unattended, and that lead to a potential solution of a problem.  Explained productive worry as a \"to do\" list and unproductive worry as a \"what if\" list.  Used metaphor of car trip to describe the differences.  Explored how client has distinguish between the two in the past and might do this better in the future.  Discussed logging productive and unproductive worry between sessions.                            Treatment Objective(s) Addressed in This Session:    Client will use identified behavioral and cognitive skills to challenge negative self talk 90% of the time.       Intervention:   CBT: -   Discussed concept of cognitive distortions today in session.  Explored connection between automatic thinking and cognitive distortions.  " "Discussed common examples (e.g., catastrophizing, mindreading, dichotomous thinking) of cognitive distortions in session.  Handout provided.  Discussed connection between cognitive distortions and depression, anxiety, and stress.  Introduced concept of challenging cognitive distortions by asking \"is this reaction logical?\" , \"is this reaction helpful?\" , \"am I overreacting?\" , and \"how can I respond to make this situation better?\"                 ASSESSMENT: Current Emotional / Mental Status (status of significant symptoms):   Risk status (Self / Other harm or suicidal ideation)   Client denies current fears or concerns for personal safety.   Client denies current or recent suicidal ideation or behaviors.   Client denies current or recent homicidal ideation or behaviors.   Client denies current or recent self injurious behavior or ideation.   Client denies other safety concerns.   A safety and risk management plan has not been developed at this time, however client was given the after-hours number / 911 should there be a change in any of these risk factors.     Appearance:   Appropriate    Eye Contact:   Good    Psychomotor Behavior: Retarded (Slowed)    Attitude:   Cooperative    Orientation:   All   Speech    Rate / Production: Monotone  Slow     Volume:  Normal    Mood:    Depressed    Affect:    Blunted    Thought Content:  Clear    Thought Form:  Coherent  Logical    Insight:    Good      Medication Review:   No changes to current psychiatric medication(s)     Medication Compliance:   Yes     Changes in Health Issues:   None reported     Chemical Use Review:   Substance Use: Chemical use reviewed, no active concerns identified      Tobacco Use: No current tobacco use.       Collateral Reports Completed:   Routed note to PCP  Communicated with: clinic     PLAN: (Client Tasks / Therapist Tasks / Other)  1.  More supportive therapy and CBT at next meeting  2.  Standing meditation 5 mn am/pm  3.  Meet " in three weeks        Abad Torres, LICSW                                                         ________________________________________________________________________    Treatment Plan    Client's Name: Mary Shipman  YOB: 1985    Date: 5/19/2017     DSM5 Diagnoses: (Sustained by DSM5 Criteria Listed Above)  Diagnoses: 296.23 Major Depressive Disorder, Single Episode, Severe _  300.02 (F41.1) Generalized Anxiety Disorder  Psychosocial & Contextual Factors: finances;  from ; stress with work  WHODAS 2.0 (12 item)  This questionnaire asks about difficulties due to health conditions. Health conditions  include  disease or illnesses, other health problems that may be short or long lasting,  injuries, mental health or emotional problems, and problems with alcohol or drugs.      Think back over the past 30 days and answer these questions, thinking about how much  difficulty you had doing the following activities. For each question, please [place] only  one response.     S1 Standing for long periods such as 30 minutes? None = 1   S2 Taking care of household responsibilities? Mild = 2   S3 Learning a new task, for example, learning how to get to a new place? None = 1   S4 How much of a problem do you have joining community activities (for example, festivals, [employer] or other activities) in the same way as anyone else can? Moderate = 3   S5 How much have you been emotionally affected by your health problems? Moderate = 3           In the past 30 days, how much difficulty did you have in:   S6 Concentrating on doing something for ten minutes? Mild = 2   S7 Walking a long distance such as a kilometer (or equivalent)? Mild = 2   S8 Washing your whole body? None = 1   S9 Getting dressed? None = 1   S10 Dealing with people you do not know? None = 1   S11 Maintaining a friendship? Mild = 2   S12 Your day to day work? Mild = 2      H1 Overall, in the past 30 days, how many days were these  difficulties present? Record number of days 30   H2 In the past 30 days, for how many days were you totally unable to carry out your usual activities or work because of any health condition? Record number of days 0   H3 In the past 30 days, not counting the days that you were totally unable, for how many days did you cut back or reduce your usual activities or work because of any health condition? Record number of days 30       Referral / Collaboration:  Referral to another professional/service is not indicated at this time..    Anticipated number of session or this episode of care: 12-18      MeasurableTreatment Goal(s) related to diagnosis / functional impairment(s)  Goal-Depression: Client will decrease depressed mood    I will know I've met my goal when I am less depressed.      Objective #A (Client Action)    Status: continued- Date: 8/28/2017     Client will use identified behavioral and cognitive skills to challenge negative self talk 90% of the time.    Intervention(s)  Therapist will provide psychoeducation, behavioral activation, and cognitive restructuring.      Objective #B  Client will complete at least 10 minutes of self-regulation practice (e.g.: yoga, meditation, relaxation breathing, etc.) per day.    Status: continued- Date: 8/28/2017     Intervention(s)  Therapist will provide psychoeducation, behavioral activation, and cognitive restructuring.      Objective #C  Client will exercise 30 minutes 36 times in the next 12 weeks.  Status: continued- Date: 8/28/2017     Intervention(s)  Therapist will provide psychoeducation, behavioral activation, and cognitive restructuring.                Client has reviewed and agreed to the above plan.      Abad Torres, [facility]  May 19, 2017

## 2017-10-16 NOTE — MR AVS SNAPSHOT
MRN:[id number]                      After Visit Summary   10/16/2017    Mary Shipman    MRN: [id number]           Visit Information        Provider Department      10/16/2017 8:30 AM López Mehta, [facility] Generic      Your next 10 appointments already scheduled     Oct 19, 2017  9:20 AM CDT   (Arrive by 9:05 AM)   LUIZA Spine with Shirin Buckley PT   [facility] ([facility])    [address]  [place] [address]   [phone]            Oct 23, 2017  9:20 AM CDT   LUIZA Spine with Shirin Buckley PT   [facility] ([facility] [place])    [address]  [place] [address]   [phone]            Oct 23, 2017  2:00 PM CDT   PHYSICAL with Gisela Gardner MD   [facility] ([address] [address]   [phone]            Nov 13, 2017  8:30 AM CST   Return Visit with López Mehta [facility] ([facility])    [address] [place] [id number] [phone]            Nov 27, 2017  8:30 AM CST   Return Visit with López Mehta [facility] ([facility])    [address] [place] [address]   [phone]              MyChart Information     [employer] gives you secure access to your electronic health record. If you see a primary care provider, you can also send messages to your care team and make appointments. If you have questions, please call your primary care clinic.  If you do not have a primary care provider, please call [phone] and they will assist you.        Care EveryWhere ID     This is your Care EveryWhere ID. This could be used by other organizations to access your [facility]  records  [id number]        Equal Access to Services     JANA DUCKWORTH : Lila Fields, emilee amin, bobby zaldivar, germain salcedo. So [facility] [phone].    ATENCIÓN: Si habla español, tiene a omer disposición servicios gratuitos de asistencia lingüística. Llame al [phone].    We comply with applicable federal civil rights laws and [place] laws. We do not discriminate on the basis of race, color, national origin, age, disability, sex, sexual orientation, or gender identity.

## 2017-10-23 ENCOUNTER — THERAPY VISIT (OUTPATIENT)
Dept: PHYSICAL THERAPY | Facility: CLINIC | Age: 32
End: 2017-10-23
Payer: COMMERCIAL

## 2017-10-23 ENCOUNTER — OFFICE VISIT (OUTPATIENT)
Dept: FAMILY MEDICINE | Facility: CLINIC | Age: 32
End: 2017-10-23
Payer: COMMERCIAL

## 2017-10-23 VITALS
DIASTOLIC BLOOD PRESSURE: 79 MMHG | TEMPERATURE: 98 F | SYSTOLIC BLOOD PRESSURE: 116 MMHG | HEIGHT: 65 IN | OXYGEN SATURATION: 100 % | BODY MASS INDEX: 39.99 KG/M2 | WEIGHT: 240 LBS | HEART RATE: 80 BPM

## 2017-10-23 DIAGNOSIS — M54.2 CERVICALGIA: Primary | ICD-10-CM

## 2017-10-23 DIAGNOSIS — F33.1 MAJOR DEPRESSIVE DISORDER, RECURRENT EPISODE, MODERATE (H): ICD-10-CM

## 2017-10-23 DIAGNOSIS — M54.6 BILATERAL THORACIC BACK PAIN, UNSPECIFIED CHRONICITY: ICD-10-CM

## 2017-10-23 DIAGNOSIS — F41.1 GENERALIZED ANXIETY DISORDER: ICD-10-CM

## 2017-10-23 DIAGNOSIS — R07.0 THROAT PAIN: ICD-10-CM

## 2017-10-23 DIAGNOSIS — J06.9 VIRAL URI: Primary | ICD-10-CM

## 2017-10-23 PROCEDURE — 99214 OFFICE O/P EST MOD 30 MIN: CPT | Performed by: PHYSICIAN ASSISTANT

## 2017-10-23 PROCEDURE — 97112 NEUROMUSCULAR REEDUCATION: CPT | Mod: GP | Performed by: PHYSICAL THERAPIST

## 2017-10-23 PROCEDURE — 97162 PT EVAL MOD COMPLEX 30 MIN: CPT | Mod: GP | Performed by: PHYSICAL THERAPIST

## 2017-10-23 PROCEDURE — 97110 THERAPEUTIC EXERCISES: CPT | Mod: GP | Performed by: PHYSICAL THERAPIST

## 2017-10-23 RX ORDER — DIPHENHYDRAMINE HYDROCHLORIDE AND LIDOCAINE HYDROCHLORIDE AND ALUMINUM HYDROXIDE AND MAGNESIUM HYDRO
5-10 KIT EVERY 6 HOURS PRN
Qty: 237 ML | Refills: 1 | Status: SHIPPED | OUTPATIENT
Start: 2017-10-23 | End: 2018-01-12

## 2017-10-23 RX ORDER — NYSTATIN 100000/ML
500000 SUSPENSION, ORAL (FINAL DOSE FORM) ORAL 4 TIMES DAILY
Qty: 280 ML | Refills: 0 | Status: SHIPPED | OUTPATIENT
Start: 2017-10-23 | End: 2018-01-12

## 2017-10-23 NOTE — MR AVS SNAPSHOT
After Visit Summary   10/23/2017    Mary Shipman    MRN: [id number]           Patient Information     Date Of Birth          1985        Visit Information        Provider Department      10/23/2017 11:20 AM Miguel Hammer PA-C [facility] [place] [facility]        Today's Diagnoses     Viral URI    -  1    Throat pain           Follow-ups after your visit        Your next 10 appointments already scheduled     Oct 23, 2017  2:00 PM CDT   PHYSICAL with Gisela Gardner MD   [facility] ([address] [address]   [phone]            Oct 24, 2017  8:15 AM CDT   Chiro Acupuncture Follow Up with Nael Rodriguez DC   [facility] ([facility])    [address]  [place] [address]   [phone]            Oct 30, 2017 10:40 AM CDT   LUIZA Spine with Shirin Buckley, PT   [facility] ([facility])    [address]  [place] [address]   [phone]            Oct 31, 2017  8:15 AM CDT   Chiro Acupuncture Follow Up with Nael Rodriguez DC   [facility] ([facility])    [address]  [place] [address]   [phone]            Nov 06, 2017 10:40 AM CST   LUIZA Spine with Shirin Buckley, PT   [facility] ([facility])    [address]  [place] [address]   [phone]            Nov 07, 2017  8:15 AM CST   Chiro Acupuncture Follow Up with Nael Rodriguez DC   [facility] ([facility])    [address]  [place] [address]   [phone]            Nov 13, 2017  8:30 AM CST   Return Visit with López Mehta [facility] ([facility])    [address] [place] [id number] [phone]            Nov 13, 2017  10:40 AM CST   LUIZA Spine with Shirin Buckley PT   [facility] [place] [facility] ([facility] [place])    [address]  [place] [address]   [phone]            Nov 20, 2017 10:40 AM CST   LUIZA Spine with Shirin Buckley PT   [facility] [place] [facility] ([facility] [place])    [address]  [address]  [place] [address]   [phone]            Nov 27, 2017  8:30 AM CST   Return Visit with López Mehta, [facility] ([facility])    [address] [place] [address] [phone]              Who to contact     If you have questions or need follow up information about today's clinic visit or your schedule please contact [facility] directly at [phone].  Normal or non-critical lab and imaging results will be communicated to you by Hotel Table[employer] Themeshart, letter or phone within 4 business days after the clinic has received the results. If you do not hear from us within 7 days, please contact the clinic through Se[employer]v-FermenS[employer] or phone. If you have a critical or abnormal lab result, we will notify you by phone as soon as possible.  Submit refill requests through DanceJam or call your pharmacy and they will forward the refill request to us. Please allow 3 business days for your refill to be completed.          Additional Information About Your Visit        [employer] Information     DanceJam gives you secure access to your electronic health record. If you see a primary care provider, you can also send messages to your care team and make appointments. If you have questions, please call your primary care clinic.  If you do not have a primary care provider, please call [phone] and they will assist you.        Care EveryWhere ID     This is your Care EveryWhere ID. This could be used by other organizations to access your [place] medical records  [id number]        Your Vitals Were      "Pulse Temperature Height Pulse Oximetry BMI (Body Mass Index)       80 98  F (36.7  C) (Oral) 5' 5\" (1.651 m) 100% 39.94 kg/m2        Blood Pressure from Last 3 Encounters:   10/23/17 116/79   10/09/17 109/72   09/21/17 102/78    Weight from Last 3 Encounters:   10/23/17 240 lb (108.9 kg)   10/09/17 237 lb (107.5 kg)   09/21/17 236 lb (107 kg)              Today, you had the following     No orders found for display         Today's Medication Changes          These changes are accurate as of: 10/23/17 11:56 AM.  If you have any questions, ask your nurse or doctor.               Start taking these medicines.        Dose/Directions    FIRST-MOUTHWASH BLM MT Susp compounding kit   Used for:  Viral URI, Throat pain   Started by:  Miguel Hammer PA-C        Dose:  5-10 mL   Swish and swallow 5-10 mLs in mouth every 6 hours as needed for mouth sores   Quantity:  237 mL   Refills:  1       nystatin [id number] UNIT/ML suspension   Commonly known as:  MYCOSTATIN   Used for:  Viral URI, Throat pain   Started by:  Miguel Hammer PA-C        Dose:  354145 Units   Take 5 mLs (500,000 Units) by mouth 4 times daily   Quantity:  280 mL   Refills:  0         Stop taking these medicines if you haven't already. Please contact your care team if you have questions.     amoxicillin-clavulanate 875-125 MG per tablet   Commonly known as:  AUGMENTIN   Stopped by:  Miguel Hammer PA-C           predniSONE 20 MG tablet   Commonly known as:  DELTASONE   Stopped by:  Miguel Hammer PA-C                Where to get your medicines      These medications were sent to [facility]/pharmacy #[id number] - [facility], [place] - [address].  [address] [facility] [address]     Phone:  [phone]     FIRST-MOUTHWASH BLM MT Susp compounding kit    nystatin [id number] UNIT/ML suspension                Primary Care Provider Office Phone # Fax #    Miguel Hammer PA-C [phone]       [address] [place] [address]" [facility] [address]        Equal Access to Services     [facility] : Hadii lorraine saleh hadwhintey Romeroali, waaxda luqadaha, qaybta kaalmada zen, germain salcedo. So [facility] [phone].    ATENCIÓN: Si habla español, tiene a omer disposición servicios gratuitos de asistencia lingüística. Miguel al [phone].    We comply with applicable federal civil rights laws and [place] laws. We do not discriminate on the basis of race, color, national origin, age, disability, sex, sexual orientation, or gender identity.            Thank you!     Thank you for choosing [facility]  for your care. Our goal is always to provide you with excellent care. Hearing back from our patients is one way we can continue to improve our services. Please take a few minutes to complete the written survey that you may receive in the mail after your visit with us. Thank you!             Your Updated Medication List - Protect others around you: Learn how to safely use, store and throw away your medicines at www.disposemymeds.org.          This list is accurate as of: 10/23/17 11:56 AM.  Always use your most recent med list.                   Brand Name Dispense Instructions for use Diagnosis    albuterol 108 (90 BASE) MCG/ACT Inhaler    PROAIR HFA/PROVENTIL HFA/VENTOLIN HFA    3 Inhaler    Inhale 2 puffs into the lungs every 6 hours as needed for shortness of breath / dyspnea or wheezing    Intermittent asthma, uncomplicated       amitriptyline 10 MG tablet    ELAVIL     10 mg        cholecalciferol 1000 UNIT tablet    vitamin D3    450 tablet    Take 5 daily (total 5000 IU)    Low vitamin D level       clonazePAM 1 MG tablet    klonoPIN    30 tablet    1 at bedtime daily    CRUZ (generalized anxiety disorder), Insomnia, unspecified type       cyclobenzaprine 10 MG tablet    FLEXERIL    90 tablet    Take 1 tablet (10 mg) by mouth 3 times daily as needed for muscle spasms    Acute pain of both shoulders,  Back muscle spasm       fexofenadine-pseudoePHEDrine 180-240 MG per 24 hr tablet    ALLEGRA-D 24     Take 1 tablet by mouth daily        FIRST-MOUTHWASH BLM MT Susp compounding kit     237 mL    Swish and swallow 5-10 mLs in mouth every 6 hours as needed for mouth sores    Viral URI, Throat pain       fluticasone 50 MCG/ACT spray    FLONASE     Spray 1-2 sprays into both nostrils daily as needed for rhinitis or allergies        ibuprofen 400-800 mg tablet    ADVIL,MOTRIN    30 tablet    Take 1-2 tablets by mouth every 6 hours as needed (cramping).    Abdominal pain       LANsoprazole 30 MG CR capsule    PREVACID    90 capsule    Take 1 capsule (30 mg) by mouth daily Take 30-60 minutes before a meal.    Gastroesophageal reflux disease without esophagitis       levalbuterol 1.25 MG/0.5ML Nebu neb solution    XOPENEX CONC     Take 1.25 mg by nebulization every 6 hours as needed for wheezing        levETIRAcetam 500 MG tablet    KEPPRA    60 tablet    1000 mg at bedtime    Convulsions, unspecified convulsion type (H), Seizure disorder (H), Migraine without aura and with status migrainosus, not intractable       metFORMIN 500 MG 24 hr tablet    GLUCOPHAGE-XR    180 tablet    Take 1 tablet (500 mg) by mouth 2 times daily (with meals)    Polycystic ovaries       mometasone-formoterol 200-5 MCG/ACT oral inhaler    DULERA    39 g    Inhale 2 puffs into the lungs 2 times daily    Intermittent asthma, uncomplicated       montelukast 10 MG tablet    SINGULAIR    90 tablet    Take 1 tablet (10 mg) by mouth At Bedtime    Intermittent asthma, uncomplicated       nystatin [id number] UNIT/ML suspension    MYCOSTATIN    280 mL    Take 5 mLs (500,000 Units) by mouth 4 times daily    Viral URI, Throat pain       polyethylene glycol Packet    MIRALAX/GLYCOLAX     Take 17 g by mouth daily as needed for constipation        ranitidine 150 MG tablet    ZANTAC    60 tablet    Take 150 mg by mouth 2 times daily as needed        sertraline 100 MG  tablet    ZOLOFT    180 tablet    2 tablets daily    Major depressive disorder, recurrent episode, moderate (H), Adjustment disorder with mixed anxiety and depressed mood       topiramate 50 MG tablet    TOPAMAX    180 tablet    Take 6 tablets (300 mg) by mouth At Bedtime    Seizure disorder (H), Migraine without aura and with status migrainosus, not intractable, Convulsions, unspecified convulsion type (H)

## 2017-10-23 NOTE — PROGRESS NOTES
"[facility] Initial Evaluation    Subjective:    Patient is a 32 year old female presenting with rehab cervical spine hpi. The history is provided by the patient. No  was used.   Mary Shipman is a 32 year old female with a cervical spine and thoracic spine condition.  Condition occurred with:  Insidious onset.  Condition occurred: for unknown reasons.  This is a chronic condition  On 3/27/17, patient reports suffering a seizure at work and falling down onto concrete while walking back from the restroom.  She describes LOC, memory loss, and being \"semi-paralyzed\" after this incident with some loss of function in her left arm and leg.  Limb function apparently improved following an earlier course of PT.   Onset of current complaints began insidiously in August 2017, when patient began experiencing increased spasm in her upper back/neck coupled with increasing difficulty reaching behind her back with either arm.  Patient is right hand dominant.  She reports being  from her  at the present time and going through a lot of personal stress.        .    Patient reports pain:  Cervical left side, cervical right side, thoracic right side and thoracic left side.  Radiates to:  Upper arm left, upper arm right, shoulder right, shoulder left and other (bilateral axillary region when she tries to move her arms).  Pain is described as shooting, stabbing and aching and is constant and reported as 8/10.  Associated symptoms:  Loss of motion/stiffness. Pain is worse during the night.  Symptoms are exacerbated by rotating head and other (reaching behnd back; reaching across body to apply deodorant; turning the steering wheel when driving) and relieved by NSAID's, muscle relaxants and other (steroids -- recently completed a course of Prednisone for upper respiratory infection and asthma).  Since onset symptoms are unchanged.    Previous treatment includes chiropractic.  There " was mild improvement following previous treatment.  General health as reported by patient is fair.  Pertinent medical history includes:  Seizures, overweight, asthma, migraines, sleep disorder/apnea, depression and mental illness (Anxiety).  Medical allergies: yes (see EMR).  Surgical history: Appendectomy, Tonsillectomy, and sinus   Current medications:  Muscle relaxants, anti-inflammatory, anti-depressants, anti-seizure medication and steroids.  Current occupation is Dispatcher for [employer].  Patient is working in normal job without restrictions.  Primary job tasks include:  Prolonged sitting and repetitive tasks.    Barriers include:  None as reported by the patient.    Red flags:  Pain at rest/night.                        Objective:    System              Cervical/Thoracic Evaluation      Cervical Myotomes:  normal                                                                          Vamshi Cervical Evaluation    Posture:  Sitting: poor  Standing: poor  Protruding Head: yes  Wry Neck: no  Correction of Posture: no effect    Movement Loss:  Protrusion (PRO): nil and pain  Flexion (Flex): pain and nil  Retraction (RET): major and pain  Extension (EXT): pain and mod  Lateral Flexion Right (LF R): nil  Lateral Flexion Left (LF L): min and pain  Rotation Right (ROT R): nil and pain  Rotation Left (ROT L): min and pain  Test Movements:  Pretest Pain Sitting: Bilateral upper back   PRO: During: no effect    Repeat PRO: During: no effect    RET: During: increases  After: no worse    Repeat RET: During: decreases  After: better  Mechanical Response: IncROM                          Conclusion: derangement  Principle of Treatment:  Posture Correction: Use of lumbar support when sitting    Extension: Seated retraction w/self-OP                           Vamshi Thoracic Evalution:    Movement Loss:  Flexion (Flex):  Mod and pain  Extension (EXT): major and pain  Rotation Lt (ROT L): min and pain  Rotation Rt  (ROT R): min and pain                        Shoulder Screen ( AROM):  Flexion: mild/mod loss bilaterally with pain  Abduction: mod/signif loss bilaterally with pain and extreme effort  IR/Ext: posterior iliac crest left and L4/5 right with significantly increased pain    Shoulder PROM: not assessed today due to time constraints    ROS    Assessment/Plan:      Patient is a 32 year old female with cervical, thoracic and bilateral shoulder complaints. Responded well to repetitive cervical retraction with self-overpressure today, noting a reduction in pain. May need to further address shoulder complaints at subsequent appointments if these symptoms do not improve with Rx to cervical/thoracic spine.     Patient has the following significant findings with corresponding treatment plan.                Diagnosis 1:  Back Spasms    Pain -  self management, education, directional preference exercise and home program  Decreased ROM/flexibility - manual therapy and therapeutic exercise  Decreased strength - therapeutic exercise  Decreased function - therapeutic activities and home program  Impaired posture - neuro re-education    Therapy Evaluation Codes:   1) History comprised of:   Personal factors that impact the plan of care:      Anxiety and Coping style.    Comorbidity factors that impact the plan of care are:      Asthma, Depression, Mental illness, Migraines/headaches, Overweight, Pain at night/rest, Seizures and Sleep disorder/apnea.     Medications impacting care: Anti-depressant, Muscle relaxant, Steroids and Anti-seizure.  2) Examination of Body Systems comprised of:   Body structures and functions that impact the plan of care:      Cervical spine, Shoulder and Thoracic Spine.   Activity limitations that impact the plan of care are:      Bathing, Driving, Dressing, Reading/Computer work and Sleeping.  3) Clinical presentation characteristics are:   Evolving/Changing.  4) Decision-Making    Moderate complexity using  standardized patient assessment instrument and/or measureable assessment of functional outcome.  Cumulative Therapy Evaluation is: Moderate complexity.    Previous and current functional limitations:  (See Goal Flow Sheet for this information)    Short term and Long term goals: (See Goal Flow Sheet for this information)     Communication ability:  Patient appears to be able to clearly communicate and understand verbal and written communication and follow directions correctly.  Treatment Explanation - The following has been discussed with the patient:    RX ordered/plan of care  Anticipated outcomes  Possible risks and side effects  This patient would benefit from PT intervention to resume normal activities.   Rehab potential is good.    Frequency:  2 X week, once daily  Duration:  for 2 weeks tapering to 1 X a week over 4 weeks  Discharge Plan:  Achieve all LTG.  Independent in home treatment program.  Reach maximal therapeutic benefit.    Please refer to the daily flowsheet for treatment today, total treatment time and time spent performing 1:1 timed codes.

## 2017-10-23 NOTE — PROGRESS NOTES
"  SUBJECTIVE:   Mary Shipman is a 32 year old female who presents to clinic today for the following health issues:     Viral URI - see previous notes from Lucretia Figueroa a few weeks ago. Had sinus pain and congestion, sore throat, URI symptoms Oral steroids and antibiotics seem to have helped but she's having continued sore throat and painful swallowing. Denies difficulty passing due to swelling, only painful to swallow causing pain.    Mood challenges - she's in a new place with her partner Franky. She's asked that he not live with her any longer. Apparently Franky is using drugs and alcohol. She says she still wants to be a friend and is supporting him but will not continue to live with him. She's quite clear about that.     Depression and Anxiety Follow-Up    Status since last visit: No change    Other associated symptoms:None    Complicating factors:     Significant life event: No     Current substance abuse: None    PHQ-9 Score and MyChart F/U Questions 7/21/2017 8/31/2017 9/21/2017   Total Score 20 19 22   Q9: Suicide Ideation Not at all Not at all Not at all     CRUZ-7 SCORE 7/21/2017 8/31/2017 9/21/2017   Total Score - - -   Total Score 19 18 18       PHQ-9  English  PHQ-9   Any Language  GAD7  Suicide Assessment Five-step Evaluation and Treatment (SAFE-T)      Amount of exercise or physical activity: None    Problems taking medications regularly: No    Medication side effects: none    Diet: regular (no restrictions)    Problem list and histories reviewed & adjusted, as indicated.  Additional history: as documented    Labs reviewed in EPIC    Reviewed and updated as needed this visit by clinical staff       Reviewed and updated as needed this visit by Provider         ROS:  Constitutional, HEENT, cardiovascular, pulmonary, gi and gu systems are negative, except as otherwise noted.      OBJECTIVE:   /79  Pulse 80  Temp 98  F (36.7  C) (Oral)  Ht 5' 5\" (1.651 m)  Wt 240 lb (108.9 kg)  SpO2 100%  BMI " 39.94 kg/m2  Body mass index is 39.94 kg/(m^2).  GENERAL: healthy, alert and no distress  HENT: ear canals and TM's normal, nose and mouth without ulcers or lesions  NECK: no adenopathy, no asymmetry, masses, or scars and thyroid normal to palpation  RESP: lungs clear to auscultation - no rales, rhonchi or wheezes  Psych: Appropriate appearance.  Alert and oriented times 3; coherent speech, normal   rate and volume, able to articulate logical thoughts, able   to abstract reason, no tangential thoughts, no hallucinations   or delusions.  Normal behavior.  Her affect is bright.      ASSESSMENT/PLAN:     (J06.9,  B97.89) Viral URI  (primary encounter diagnosis)  Comment:   Plan: magic mouthwash suspension (diphenhydramine,         lidocaine, aluminum-magnesium & simethicone),         nystatin (MYCOSTATIN) [id number] UNIT/ML suspension        Viral symptoms. Symptomatic measures and suggestions for self care given.  Follow up if not improving or symptoms change as discussed.  All questions were answered. She has throat pain. Will treat pain. Could have thrush, will treat. This prescription is given with a discussion of side effects, risks and proper use.  Instructions are given to follow up if not improving or symptoms change or worsen as discussed.     (R07.0) Throat pain  Comment:   Plan: magic mouthwash suspension (diphenhydramine,         lidocaine, aluminum-magnesium & simethicone),         nystatin (MYCOSTATIN) [id number] UNIT/ML suspension        As noted     (F33.1) Major depressive disorder, recurrent episode, moderate (H)  Comment:   Plan: Ongoing issues related to her challenges but I think Mary is in a better place right now.    (F41.1) Generalized anxiety disorder  Comment:   Plan: Ongoing issues - continue therapy. She's in a much better place now than she was and is very clear on the importance of separation from Franky. Follow up as needed.     30 min visit over 50% counseling     FRANSICO JOHNSON,  ROBERT  [facility]

## 2017-10-23 NOTE — MR AVS SNAPSHOT
After Visit Summary   10/23/2017    Mary Shipman    MRN: [id number]           Patient Information     Date Of Birth          1985        Visit Information        Provider Department      10/23/2017 9:20 AM Shirin Buckley PT [facility]        Today's Diagnoses     Cervicalgia    -  1    Bilateral thoracic back pain, unspecified chronicity           Follow-ups after your visit        Your next 10 appointments already scheduled     Oct 23, 2017 11:20 AM CDT   SHORT with Miguel Hammer PA-C   [facility] ([facility])    [address] [place] [address]   [phone]            Oct 23, 2017  2:00 PM CDT   PHYSICAL with Gisela Gardner MD   [facility] ([facility])    [address] [place] [address]   [phone]            Oct 24, 2017  8:15 AM CDT   Chiro Acupuncture Follow Up with Nael Rodriguez DC   [facility] [place] [facility] ([facility]    [address]  [place] [address]   [phone]            Oct 30, 2017 10:40 AM CDT   LUIZA Spine with Shirin Buckley PT   [facility] ([facility])    [address]  [address]  [place] [address]   [phone]            Oct 31, 2017  8:15 AM CDT   Chiro Acupuncture Follow Up with Nael Rodriguez DC   [facility] ([facility]    [address]  [place] [address]   [phone]            Nov 06, 2017 10:40 AM CST   [facility] Spine with Shirin Buckley PT   [facility] ([facility])    [address]  [address]  [place] [address]   [phone]            Nov 07, 2017  8:15 AM CST   Chiro Acupuncture Follow Up with Nael Rodriguez DC   [facility] [place] [facility] ([facility]    [address]  [place] [address]    [phone]            Nov 13, 2017  8:30 AM CST   Return Visit with López Mehta, [facility] ([facility])    [address] [address]   [phone]            Nov 13, 2017 10:40 AM CST   LUIZA Spine with Shirin Buckley, PT   [facility] ([facility] [place])    [address]  [address]  [place] [address] [phone]            Nov 20, 2017 10:40 AM CST   [facility] Spine with Shirin Buckley PT   [facility] ([facility] [place])    [address]  [place] [address] [phone]              Who to contact     If you have questions or need follow up information about today's clinic visit or your schedule please contact [facility] directly at [phone].  Normal or non-critical lab and imaging results will be communicated to you by MyChart, letter or phone within 4 business days after the clinic has received the results. If you do not hear from us within 7 days, please contact the clinic through [employer] or phone. If you have a critical or abnormal lab result, we will notify you by phone as soon as possible.  Submit refill requests through [employer] or call your pharmacy and they will forward the refill request to us. Please allow 3 business days for your refill to be completed.          Additional Information About Your Visit        [employer] Information     [employer] gives you secure access to your electronic health record. If you see a primary care provider, you can also send messages to your care team and make appointments. If you have questions, please call your primary care clinic.  If you do not have a primary care provider, please call [phone] and they will assist you.        Care EveryWhere ID     This is your Care EveryWhere ID. This could be used by other organizations to access your [facility]  records  [id number]         Blood Pressure from Last 3 Encounters:   10/09/17 109/72   09/21/17 102/78   08/31/17 98/78    Weight from Last 3 Encounters:   10/09/17 107.5 kg (237 lb)   09/21/17 107 kg (236 lb)   08/31/17 104.3 kg (230 lb)              We Performed the Following     HC PT EVAL, MODERATE COMPLEXITY     [facility] INITIAL EVAL REPORT     NEUROMUSCULAR RE-EDUCATION     THERAPEUTIC EXERCISES        Primary Care Provider Office Phone # Fax #    Miguel Hammer PA-C [phone]       [address] [facility] [address] [facility] [address]        Equal Access to Services     [facility] : Hadii aad ku hadasho Soalejandroali, waaxda luqadaha, qaybta kaalmada adeegyada, waxlarissa rey . So [facility] [phone].    ATENCIÓN: Si habla español, tiene a omer disposición servicios gratuitos de asistencia lingüística. Llame al [phone].    We comply with applicable federal civil rights laws and [place] laws. We do not discriminate on the basis of race, color, national origin, age, disability, sex, sexual orientation, or gender identity.            Thank you!     Thank you for choosing [facility]  for your care. Our goal is always to provide you with excellent care. Hearing back from our patients is one way we can continue to improve our services. Please take a few minutes to complete the written survey that you may receive in the mail after your visit with us. Thank you!             Your Updated Medication List - Protect others around you: Learn how to safely use, store and throw away your medicines at www.disposemymeds.org.          This list is accurate as of: 10/23/17 11:19 AM.  Always use your most recent med list.                   Brand Name Dispense Instructions for use Diagnosis    albuterol 108 (90 BASE) MCG/ACT Inhaler    PROAIR HFA/PROVENTIL HFA/VENTOLIN HFA    3 Inhaler    Inhale 2 puffs into the lungs every 6 hours as needed for shortness of breath /  dyspnea or wheezing    Intermittent asthma, uncomplicated       amitriptyline 10 MG tablet    ELAVIL     10 mg        amoxicillin-clavulanate 875-125 MG per tablet    AUGMENTIN    20 tablet    Take 1 tablet by mouth 2 times daily    Acute non-recurrent maxillary sinusitis       cholecalciferol 1000 UNIT tablet    vitamin D3    450 tablet    Take 5 daily (total 5000 IU)    Low vitamin D level       clonazePAM 1 MG tablet    klonoPIN    30 tablet    1 at bedtime daily    CRUZ (generalized anxiety disorder), Insomnia, unspecified type       cyclobenzaprine 10 MG tablet    FLEXERIL    90 tablet    Take 1 tablet (10 mg) by mouth 3 times daily as needed for muscle spasms    Acute pain of both shoulders, Back muscle spasm       fexofenadine-pseudoePHEDrine 180-240 MG per 24 hr tablet    ALLEGRA-D 24     Take 1 tablet by mouth daily        fluticasone 50 MCG/ACT spray    FLONASE     Spray 1-2 sprays into both nostrils daily as needed for rhinitis or allergies        ibuprofen 400-800 mg tablet    ADVIL,MOTRIN    30 tablet    Take 1-2 tablets by mouth every 6 hours as needed (cramping).    Abdominal pain       LANsoprazole 30 MG CR capsule    PREVACID    90 capsule    Take 1 capsule (30 mg) by mouth daily Take 30-60 minutes before a meal.    Gastroesophageal reflux disease without esophagitis       levalbuterol 1.25 MG/0.5ML Nebu neb solution    XOPENEX CONC     Take 1.25 mg by nebulization every 6 hours as needed for wheezing        levETIRAcetam 500 MG tablet    KEPPRA    60 tablet    1000 mg at bedtime    Convulsions, unspecified convulsion type (H), Seizure disorder (H), Migraine without aura and with status migrainosus, not intractable       metFORMIN 500 MG 24 hr tablet    GLUCOPHAGE-XR    180 tablet    Take 1 tablet (500 mg) by mouth 2 times daily (with meals)    Polycystic ovaries       mometasone-formoterol 200-5 MCG/ACT oral inhaler    DULERA    39 g    Inhale 2 puffs into the lungs 2 times daily    Intermittent  asthma, uncomplicated       montelukast 10 MG tablet    SINGULAIR    90 tablet    Take 1 tablet (10 mg) by mouth At Bedtime    Intermittent asthma, uncomplicated       polyethylene glycol Packet    MIRALAX/GLYCOLAX     Take 17 g by mouth daily as needed for constipation        predniSONE 20 MG tablet    DELTASONE    20 tablet    Take 3 tabs (60 mg) by mouth daily x 3 days, 2 tabs (40 mg) daily x 3 days, 1 tab (20 mg) daily x 3 days, then 1/2 tab (10 mg) x 3 days.    Acute non-recurrent maxillary sinusitis       ranitidine 150 MG tablet    ZANTAC    60 tablet    Take 150 mg by mouth 2 times daily as needed        sertraline 100 MG tablet    ZOLOFT    180 tablet    2 tablets daily    Major depressive disorder, recurrent episode, moderate (H), Adjustment disorder with mixed anxiety and depressed mood       topiramate 50 MG tablet    TOPAMAX    180 tablet    Take 6 tablets (300 mg) by mouth At Bedtime    Seizure disorder (H), Migraine without aura and with status migrainosus, not intractable, Convulsions, unspecified convulsion type (H)

## 2017-10-23 NOTE — NURSING NOTE
"Chief Complaint   Patient presents with     Chronic Disease Management     Health Maintenance     Flu Shot     declined       Initial /79  Pulse 80  Temp 98  F (36.7  C) (Oral)  Ht 5' 5\" (1.651 m)  Wt 240 lb (108.9 kg)  SpO2 100%  BMI 39.94 kg/m2 Estimated body mass index is 39.94 kg/(m^2) as calculated from the following:    Height as of this encounter: 5' 5\" (1.651 m).    Weight as of this encounter: 240 lb (108.9 kg).  Medication Reconciliation: complete   Opal Davis MA      "

## 2017-10-26 NOTE — ADDENDUM NOTE
Encounter addended by: Patsy Lopez, SLP on: 10/26/2017 11:42 AM<BR>     Actions taken: Sign clinical note, Episode resolved

## 2017-10-26 NOTE — PROGRESS NOTES
Outpatient Speech Language Pathology Discharge Note     Patient: Mary Shipman  : 1985    Beginning/End Dates of Reporting Period:  2017 to 2017    Referring Provider: Miguel Hammer PA-C    Therapy Diagnosis: Dysarthria    Client Self Report: Mary had OT immediately prior to this session. She reports she had a job interview this week, but has decided she is not physically ready to return to work given that she can't drive and her ongoing symptoms.    Update: Mary was seen for a total of 4 sessions over about a month's time. Minimal to mild dysarthria and word finding was identified in structured tasks. In conversation within sessions, Mary showed highly functional communication. 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 She stopped coming to scheduled appointments and was unable to reached for further communication of therapy plan.    Goals:  Goal Identifier speech   Goal Description Mary will demonstrate at least 90% intelligibility in sentence length material.   Target Date 17   Date Met      Progress:     Goal Identifier word-finding   Goal Description Mary will complete complex level word-finding tasks with at least 90% accuracy and no more than minimal increased processing time.   Target Date 17   Date Met      Progress:     Goal Identifier functional   Goal Description Mary will report communication abilities in a range of environments with a range of communication partners with no more than minimal increased processing time.   Target Date 17   Date Met      Progress:     Plan:  Discharge from therapy.    Discharge:    Reason for Discharge: Patient has failed to schedule further appointments.    Discharge Plan: No planning able to be completed.    Patsy Lopez MA, [facility]-SLP  [facility]  [phone] (Phone)  [phone] (Fax)

## 2017-11-07 ENCOUNTER — THERAPY VISIT (OUTPATIENT)
Dept: CHIROPRACTIC MEDICINE | Facility: CLINIC | Age: 32
End: 2017-11-07
Payer: COMMERCIAL

## 2017-11-07 DIAGNOSIS — M62.838 SPASM OF MUSCLE: ICD-10-CM

## 2017-11-07 DIAGNOSIS — M99.01 CERVICAL SEGMENT DYSFUNCTION: Primary | ICD-10-CM

## 2017-11-07 DIAGNOSIS — M54.2 CERVICALGIA: ICD-10-CM

## 2017-11-07 DIAGNOSIS — M99.02 THORACIC SEGMENT DYSFUNCTION: ICD-10-CM

## 2017-11-07 PROCEDURE — 98940 CHIROPRACT MANJ 1-2 REGIONS: CPT | Mod: AT | Performed by: CHIROPRACTOR

## 2017-11-07 PROCEDURE — 97813 ACUP 1/> W/ESTIM 1ST 15 MIN: CPT | Performed by: CHIROPRACTOR

## 2017-11-07 NOTE — PROGRESS NOTES
Visit #: 5 of 8, based on treatment plan    Subjective:  Mary Shipman is a 32 year old female who is seen in f/u up for:    Cervical segment dysfunction  Cervicalgia  Thoracic segment dysfunction  Spasm of muscle.     Since last visit on 10/10/2017,  Mary Shipman reports the following changes: Pain immediately after last treatment: 2/10 and their pain level today 10/10.  Mary reports that her pain is still neck and mid back areas. Pt had to move herself last month so, she is very sore and not sleeping at all.  She is very tight and sore with most motions and has not really done any exercises over the past week.  Mary does report that she is going PT but, only did 1 visit thus far.    Area of chief complaint:  Cervical and Thoracic :  Symptoms are graded at 10/10. The quality is described as stiff, achey, dull.  Motion has not improved due to ongoing symptoms that have gotten worse over the past few weeks.  Patient feels that they have not improved and feel worse.        Objective:  The following was observed:    P: palpatory tenderness, upper traps    A: static palpation demonstrates intersegmental asymmetry , cervical and thoracic    R: motion palpation notes restricted motion, :  C1 Right rotation restricted  C6 Right rotation restricted  C7 Right rotation restricted  T1 Left rotation restricted  T2 Left rotation restricted  T3 Left rotation restricted    T: hypertonicity at: Traps Bilaterally      Assessment:    Segmental spinal dysfunction/restrictions found at:  C1   C6   C7   T1   T2   T3    Diagnoses:      1. Cervical segment dysfunction    2. Cervicalgia    3. Thoracic segment dysfunction    4. Spasm of muscle        Patient's condition:  Patient had restrictions pre-manipulation    Treatment effectiveness:  Post manipulation there is better intersegmental movement and Patient claims to feel looser post manipulation      Procedures:  CMT:  66349 Chiropractic manipulative treatment 1-2 regions  performed   Cervical: Activator, C1 , C6, C7 , Prone  Thoracic: Activator, T1, T2, T3, Prone    Modalities:  58931: Acupuncture, for 15 minutes:  Points: Kelley Points in thoracic spein  Ahsi point in upper traps and t-spine paraspinal  For 15 minutes    Therapeutic procedures:  None      Prognosis: Good    Progress towards Goals: Patient is making progress towards the goal     Response to Treatment:   Reduction in symptoms as reported by patient      Recommendations:    Instructions:ice 20 minutes every other hour as needed and stretch as instructed at visit    Follow-up:  Return to care in one week

## 2017-11-16 ENCOUNTER — MYC MEDICAL ADVICE (OUTPATIENT)
Dept: FAMILY MEDICINE | Facility: CLINIC | Age: 32
End: 2017-11-16

## 2017-11-16 DIAGNOSIS — F43.23 ADJUSTMENT DISORDER WITH MIXED ANXIETY AND DEPRESSED MOOD: ICD-10-CM

## 2017-11-16 DIAGNOSIS — F33.1 MAJOR DEPRESSIVE DISORDER, RECURRENT EPISODE, MODERATE (H): ICD-10-CM

## 2017-11-16 RX ORDER — SERTRALINE HYDROCHLORIDE 100 MG/1
TABLET, FILM COATED ORAL
Qty: 180 TABLET | Refills: 0 | Status: SHIPPED | OUTPATIENT
Start: 2017-11-16 | End: 2018-03-05

## 2017-11-20 ENCOUNTER — THERAPY VISIT (OUTPATIENT)
Dept: PHYSICAL THERAPY | Facility: CLINIC | Age: 32
End: 2017-11-20
Payer: COMMERCIAL

## 2017-11-20 DIAGNOSIS — M54.2 CERVICALGIA: ICD-10-CM

## 2017-11-20 DIAGNOSIS — M54.6 BILATERAL THORACIC BACK PAIN, UNSPECIFIED CHRONICITY: ICD-10-CM

## 2017-11-20 PROCEDURE — 97112 NEUROMUSCULAR REEDUCATION: CPT | Mod: GP | Performed by: PHYSICAL THERAPIST

## 2017-11-20 PROCEDURE — 97110 THERAPEUTIC EXERCISES: CPT | Mod: GP | Performed by: PHYSICAL THERAPIST

## 2017-11-27 ENCOUNTER — THERAPY VISIT (OUTPATIENT)
Dept: PHYSICAL THERAPY | Facility: CLINIC | Age: 32
End: 2017-11-27
Payer: COMMERCIAL

## 2017-11-27 ENCOUNTER — OFFICE VISIT (OUTPATIENT)
Dept: BEHAVIORAL HEALTH | Facility: CLINIC | Age: 32
End: 2017-11-27
Payer: COMMERCIAL

## 2017-11-27 DIAGNOSIS — M54.6 BILATERAL THORACIC BACK PAIN, UNSPECIFIED CHRONICITY: ICD-10-CM

## 2017-11-27 DIAGNOSIS — M54.2 CERVICALGIA: ICD-10-CM

## 2017-11-27 DIAGNOSIS — F41.1 GENERALIZED ANXIETY DISORDER: Primary | ICD-10-CM

## 2017-11-27 PROCEDURE — 97112 NEUROMUSCULAR REEDUCATION: CPT | Mod: GP | Performed by: PHYSICAL THERAPIST

## 2017-11-27 PROCEDURE — 97110 THERAPEUTIC EXERCISES: CPT | Mod: GP | Performed by: PHYSICAL THERAPIST

## 2017-11-27 PROCEDURE — 90834 PSYTX W PT 45 MINUTES: CPT | Performed by: SOCIAL WORKER

## 2017-11-27 NOTE — PROGRESS NOTES
"                                             Progress Note    Client Name: Mary Shipman  Date: 11/27/2017          Service Type: Individual      Session Start Time: 830  Session End Time: 915      Session Length: 45m     Session #: 11     Attendees: Client attended alone    Treatment Plan Last Reviewed: 11/27/2017   PHQ-9 / CRUZ-7 :      DATA      Progress Since Last Session (Related to Symptoms / Goals / Homework):   Symptoms: Stable    Homework: Did not complete      Episode of Care Goals: Satisfactory progress - CONTEMPLATION (Considering change and yet undecided); Intervened by assessing the negative and positive thinking (ambivalence) about behavior change     Current / Ongoing Stressors and Concerns:    Things have been \"ok, still alot of stress.\"  Moved into her own apartment, and this took a toll on client physically.  She is back in physical therapy and does note that this is helping.  PCP recommended that client come in to see me to work on anxiety.  Everything is a \"rollercoaster,\" Atilio is renting a room someplace else.  He had been paying child support every month, but apparently stopped paying last month.  Discussed productive and unproductive worry.  Framed productive worry as prudent, focused on events or problems, might reasonably cause a problem if left unattended, and that lead to a potential solution of a problem.  Explained productive worry as a \"to do\" list and unproductive worry as a \"what if\" list.  Used metaphor of car trip to describe the differences.  Explored how client has distinguish between the two in the past and might do this better in the future.  Discussed logging productive and unproductive worry between sessions.                                   Treatment Objective(s) Addressed in This Session:    Client will use identified behavioral and cognitive skills to challenge negative self talk 90% of the time.       Intervention:   CBT: -   Discussed concept of cognitive distortions " "today in session.  Explored connection between automatic thinking and cognitive distortions.  Discussed common examples (e.g., catastrophizing, mindreading, dichotomous thinking) of cognitive distortions in session.  Handout provided.  Discussed connection between cognitive distortions and depression, anxiety, and stress.  Introduced concept of challenging cognitive distortions by asking \"is this reaction logical?\" , \"is this reaction helpful?\" , \"am I overreacting?\" , and \"how can I respond to make this situation better?\"                 ASSESSMENT: Current Emotional / Mental Status (status of significant symptoms):   Risk status (Self / Other harm or suicidal ideation)   Client denies current fears or concerns for personal safety.   Client denies current or recent suicidal ideation or behaviors.   Client denies current or recent homicidal ideation or behaviors.   Client denies current or recent self injurious behavior or ideation.   Client denies other safety concerns.   A safety and risk management plan has not been developed at this time, however client was given the after-hours number / 911 should there be a change in any of these risk factors.     Appearance:   Appropriate    Eye Contact:   Good    Psychomotor Behavior: Retarded (Slowed)    Attitude:   Cooperative    Orientation:   All   Speech    Rate / Production: Monotone  Slow     Volume:  Normal    Mood:    Depressed    Affect:    Blunted    Thought Content:  Clear    Thought Form:  Coherent  Logical    Insight:    Good      Medication Review:   No changes to current psychiatric medication(s)     Medication Compliance:   Yes     Changes in Health Issues:   None reported     Chemical Use Review:   Substance Use: Chemical use reviewed, no active concerns identified      Tobacco Use: No current tobacco use.       Collateral Reports Completed:   Routed note to PCP  Communicated with: clinic     PLAN: (Client Tasks / Therapist Tasks / Other)  1.  " More supportive therapy and CBT at next meeting  2.  Standing meditation 5 mn am/pm  3.  Meet in three weeks        Abad López Torres, LICSW                                                         ________________________________________________________________________    Treatment Plan    Client's Name: Mary Shipman  YOB: 1985    Date: 5/19/2017     DSM5 Diagnoses: (Sustained by DSM5 Criteria Listed Above)  Diagnoses: 296.23 Major Depressive Disorder, Single Episode, Severe _  300.02 (F41.1) Generalized Anxiety Disorder  Psychosocial & Contextual Factors: finances;  from ; stress with work  WHODAS 2.0 (12 item)  This questionnaire asks about difficulties due to health conditions. Health conditions  include  disease or illnesses, other health problems that may be short or long lasting,  injuries, mental health or emotional problems, and problems with alcohol or drugs.      Think back over the past 30 days and answer these questions, thinking about how much  difficulty you had doing the following activities. For each question, please Jena only  one response.     S1 Standing for long periods such as 30 minutes? None = 1   S2 Taking care of household responsibilities? Mild = 2   S3 Learning a new task, for example, learning how to get to a new place? None = 1   S4 How much of a problem do you have joining community activities (for example, festivals, Gnosticism or other activities) in the same way as anyone else can? Moderate = 3   S5 How much have you been emotionally affected by your health problems? Moderate = 3           In the past 30 days, how much difficulty did you have in:   S6 Concentrating on doing something for ten minutes? Mild = 2   S7 Walking a long distance such as a kilometer (or equivalent)? Mild = 2   S8 Washing your whole body? None = 1   S9 Getting dressed? None = 1   S10 Dealing with people you do not know? None = 1   S11 Maintaining a friendship? Mild = 2   S12  Your day to day work? Mild = 2      H1 Overall, in the past 30 days, how many days were these difficulties present? Record number of days 30   H2 In the past 30 days, for how many days were you totally unable to carry out your usual activities or work because of any health condition? Record number of days 0   H3 In the past 30 days, not counting the days that you were totally unable, for how many days did you cut back or reduce your usual activities or work because of any health condition? Record number of days 30       Referral / Collaboration:  Referral to another professional/service is not indicated at this time..    Anticipated number of session or this episode of care: 12-18      MeasurableTreatment Goal(s) related to diagnosis / functional impairment(s)  Goal-Depression: Client will decrease depressed mood    I will know I've met my goal when I am less depressed.      Objective #A (Client Action)    Status: continued- Date: 11/27/2017     Client will use identified behavioral and cognitive skills to challenge negative self talk 90% of the time.    Intervention(s)  Therapist will provide psychoeducation, behavioral activation, and cognitive restructuring.      Objective #B  Client will complete at least 10 minutes of self-regulation practice (e.g.: yoga, meditation, relaxation breathing, etc.) per day.    Status: continued- Date: 11/27/2017     Intervention(s)  Therapist will provide psychoeducation, behavioral activation, and cognitive restructuring.      Objective #C  Client will exercise 30 minutes 36 times in the next 12 weeks.  Status: continued- Date: 11/27/2017     Intervention(s)  Therapist will provide psychoeducation, behavioral activation, and cognitive restructuring.                Client has reviewed and agreed to the above plan.      Abad Torres, [facility] HospitalSW  May 19, 2017

## 2017-11-27 NOTE — PROGRESS NOTES
Subjective:    HPI                    Objective:    System    Physical Exam    General     ROS    Assessment/Plan:      PROGRESS  REPORT    Progress reporting period is from 10/23/17 to 11/27/17.       SUBJECTIVE  Patient reports mild improvement in her neck pain since the start of therapy, but no improvement in her back spasms and left shoulder complaints. Recently adjusted her computer ergonomics at work which seems to help some. Continues to note ongoing difficulty using left arm to hook her bra, get dressed, or pony tail her hair.      Current Pain level: 7/10.     Previous pain level was  8/10  .   Changes in function:  Increased tolerance to computer work with modifications to computer ergonomics  Adverse reaction to treatment or activity: None    OBJECTIVE  Cervical AROM: flexion WNL, Ext mod loss, retraction mod loss, rotation WNL, SB WNL. Complaints of pain with right SB.   Left Shoulder AROM: flexion 105, Abd 85, ER 30,  IR/Ext to sacrum.    Left Shoulder PROM: Flex 125, Abd 100,  ER 45, IR 55   Left Shoulder Strength: Flexion 5/5, ER 5/5, IR 5/5.  Abduction: unable to position arm for MMT due to pain.  Postural awareness remains poor, although improved slightly from initial visit.     ASSESSMENT/PLAN  Updated problem list and treatment plan: Diagnosis 1:  Back Spasms    Pain -  self management, education and home program  Decreased ROM/flexibility - therapeutic exercise  Decreased function - therapeutic activities and home program  Impaired posture - neuro re-education    STG/LTGs have been met or progress has been made towards goals:  minimal  Assessment of Progress: The patient's condition has potential to improve.  Self Management Plans:  Patient has been instructed in a home exercise program.  I have re-evaluated this patient and find that the nature, scope, duration and intensity of the therapy is appropriate for the medical condition of the patient.  Mary continues to require the following  intervention to meet STG and LTG's:  PT    Recommendations:  Patient exhibits a capsular pattern of limitation in her left shoulder which appears to be fueling some of her spasm complaints. Since current orders are for back spasm, I am wondering if another order could be generated to Rx Mary's shoulder pain as well?  Please advise. If any questions with POC, you may contact me directly at [phone].        Please refer to the daily flowsheet for treatment today, total treatment time and time spent performing 1:1 timed codes.

## 2017-11-27 NOTE — Clinical Note
Miguel, Please review updated SD in [facility] on this patient. I am requesting orders to Rx shoulder but want to get your approval first.  Let me know if you have any questions.    Thank you, Shirin Buckley, PT, OCS

## 2017-11-27 NOTE — MR AVS SNAPSHOT
After Visit Summary   11/27/2017    Mary Shipman    MRN: [id number]           Patient Information     Date Of Birth          1985        Visit Information        Provider Department      11/27/2017 10:40 AM Shirin Buckley, PT [facility] [place] [facility]        Today's Diagnoses     Cervicalgia        Bilateral thoracic back pain, unspecified chronicity           Follow-ups after your visit        Your next 10 appointments already scheduled     Nov 28, 2017  8:00 AM CST   [facility] Acupuncture Follow Up with Nael Rodriguez DC   [facility] [place] [facility] ([facility]    [address]  [place] [address]   [phone]            Dec 05, 2017  8:00 AM CST   [facility] Acupuncture Follow Up with Nael Rodriguez DC   [facility] [place] [facility] ([facility]    [address]  [place] [address]   [phone]            Dec 07, 2017  8:30 AM CST   Return Visit with López Mehta, [facility] ([address] [address]   [phone]            Dec 11, 2017 10:40 AM CST   [facility] Spine with Shirin Buckley PT   [facility] [place] [facility] ([facility])    [address]  [place] [address]   [phone]            Dec 12, 2017  8:15 AM CST   [facility] Acupuncture Follow Up with Nael Rodriguez DC   [facility] [place] [facility] ([facility]    [address]  [place] [address]   [phone]            Dec 15, 2017  8:30 AM CST   Return Visit with López Mehta, [facility] ([facility])    [address] [place] [id number] [phone]            Dec 18, 2017 10:40 AM CST   [facility] Spine with Shirin Buckley, PT   [facility] [place] [facility] ([facility])    [address]  [address]  [place]  [address]   [phone]            Dec 19, 2017  8:15 AM CST   [facility]iro Acupuncture Follow Up with Nael Rodriguez DC   [facility] [place] [facility] ([facility]    [address]  [place] [address]   [phone]            Jan 08, 2018 10:40 AM CST   [facility] Spine with Shirin Buckley PT   [facility] [place] [facility] ([facility] [place])    [address]  [address]  [place] [id number] [phone]              Who to contact     If you have questions or need follow up information about today's clinic visit or your schedule please contact [facility] [place] [facility] directly at [phone].  Normal or non-critical lab and imaging results will be communicated to you by [employer], letter or phone within 4 business days after the clinic has received the results. If you do not hear from us within 7 days, please contact the clinic through [employer] or phone. If you have a critical or abnormal lab result, we will notify you by phone as soon as possible.  Submit refill requests through in[employer]apps or call your pharmacy and they will forward the refill request to us. Please allow 3 business days for your refill to be completed.          Additional Information About Your Visit        [employer] Information     [employer] gives you secure access to your electronic health record. If you see a primary care provider, you can also send messages to your care team and make appointments. If you have questions, please call your primary care clinic.  If you do not have a primary care provider, please call [phone] and they will assist you.        Care EveryWhere ID     This is your Care EveryWhere ID. This could be used by other organizations to access your [facility] medical records  [id number]         Blood Pressure from Last 3 Encounters:   10/23/17 116/79   10/09/17 109/72   09/21/17 102/78    Weight from Last 3 Encounters:   10/23/17 108.9 kg (240 lb)   10/09/17 107.5 kg (237  lb)   09/21/17 107 kg (236 lb)              We Performed the Following     [facility] PROGRESS NOTES REPORT     NEUROMUSCULAR RE-EDUCATION     THERAPEUTIC EXERCISES        Primary Care Provider Office Phone # Fax #    Miguel Hammer PA-C [phone]       [address] [facility] [address]        Equal Access to Services     JANA DUCKWORTH : Hadii aad ku hadasho Soomaali, waaxda luqadaha, qaybta kaalmada adeegyada, waxay idiin hayaan adeeg kharash la'aan . So [facility] [phone].    ATENCIÓN: Si habla español, tiene a omer disposición servicios gratuitos de asistencia lingüística. Roxame al [phone].    We comply with applicable federal civil rights laws and [place] laws. We do not discriminate on the basis of race, color, national origin, age, disability, sex, sexual orientation, or gender identity.            Thank you!     Thank you for choosing [facility]  for your care. Our goal is always to provide you with excellent care. Hearing back from our patients is one way we can continue to improve our services. Please take a few minutes to complete the written survey that you may receive in the mail after your visit with us. Thank you!             Your Updated Medication List - Protect others around you: Learn how to safely use, store and throw away your medicines at www.disposemymeds.org.          This list is accurate as of: 11/27/17  4:45 PM.  Always use your most recent med list.                   Brand Name Dispense Instructions for use Diagnosis    albuterol 108 (90 BASE) MCG/ACT Inhaler    PROAIR HFA/PROVENTIL HFA/VENTOLIN HFA    3 Inhaler    Inhale 2 puffs into the lungs every 6 hours as needed for shortness of breath / dyspnea or wheezing    Intermittent asthma, uncomplicated       amitriptyline 10 MG tablet    ELAVIL     10 mg        cholecalciferol 1000 UNIT tablet    vitamin D3    450 tablet    Take 5 daily (total 5000 IU)    Low vitamin D level        clonazePAM 1 MG tablet    klonoPIN    30 tablet    1 at bedtime daily    CRUZ (generalized anxiety disorder), Insomnia, unspecified type       cyclobenzaprine 10 MG tablet    FLEXERIL    90 tablet    Take 1 tablet (10 mg) by mouth 3 times daily as needed for muscle spasms    Acute pain of both shoulders, Back muscle spasm       fexofenadine-pseudoePHEDrine 180-240 MG per 24 hr tablet    ALLEGRA-D 24     Take 1 tablet by mouth daily        FIRST-MOUTHWASH BLM MT Susp compounding kit     237 mL    Swish and swallow 5-10 mLs in mouth every 6 hours as needed for mouth sores    Viral URI, Throat pain       fluticasone 50 MCG/ACT spray    FLONASE     Spray 1-2 sprays into both nostrils daily as needed for rhinitis or allergies        ibuprofen 400-800 mg tablet    ADVIL,MOTRIN    30 tablet    Take 1-2 tablets by mouth every 6 hours as needed (cramping).    Abdominal pain       LANsoprazole 30 MG CR capsule    PREVACID    90 capsule    Take 1 capsule (30 mg) by mouth daily Take 30-60 minutes before a meal.    Gastroesophageal reflux disease without esophagitis       levalbuterol 1.25 MG/0.5ML Nebu neb solution    XOPENEX CONC     Take 1.25 mg by nebulization every 6 hours as needed for wheezing        levETIRAcetam 500 MG tablet    KEPPRA    60 tablet    1000 mg at bedtime    Convulsions, unspecified convulsion type (H), Seizure disorder (H), Migraine without aura and with status migrainosus, not intractable       metFORMIN 500 MG 24 hr tablet    GLUCOPHAGE-XR    180 tablet    Take 1 tablet (500 mg) by mouth 2 times daily (with meals)    Polycystic ovaries       mometasone-formoterol 200-5 MCG/ACT oral inhaler    DULERA    39 g    Inhale 2 puffs into the lungs 2 times daily    Intermittent asthma, uncomplicated       montelukast 10 MG tablet    SINGULAIR    90 tablet    Take 1 tablet (10 mg) by mouth At Bedtime    Intermittent asthma, uncomplicated       nystatin 236233 UNIT/ML suspension    MYCOSTATIN    280 mL    Take 5  mLs (500,000 Units) by mouth 4 times daily    Viral URI, Throat pain       polyethylene glycol Packet    MIRALAX/GLYCOLAX     Take 17 g by mouth daily as needed for constipation        ranitidine 150 MG tablet    ZANTAC    60 tablet    Take 150 mg by mouth 2 times daily as needed        sertraline 100 MG tablet    ZOLOFT    180 tablet    2 tablets daily    Major depressive disorder, recurrent episode, moderate (H), Adjustment disorder with mixed anxiety and depressed mood       topiramate 50 MG tablet    TOPAMAX    180 tablet    Take 6 tablets (300 mg) by mouth At Bedtime    Seizure disorder (H), Migraine without aura and with status migrainosus, not intractable, Convulsions, unspecified convulsion type (H)

## 2017-11-27 NOTE — MR AVS SNAPSHOT
MRN:[id number]                      After Visit Summary   11/27/2017    Mary Shipman    MRN: [id number]           Visit Information        Provider Department      11/27/2017 8:30 AM López Mehta, [facility]      Your next 10 appointments already scheduled     Nov 27, 2017 10:40 AM CST   [facility] Spine with Shirin Buckley, PT   [facility] [place] [facility] ([facility] [place])    [address]  [address]  [place] [address]   [phone]            Nov 28, 2017  8:00 AM CST   [facility] Acupuncture Follow Up with Nael Rodriguez DC   [facility] [place] [facility] ([facility] [place])    [address]  [place] [address]   [phone]            Dec 05, 2017  8:00 AM CST   [facility] Acupuncture Follow Up with Nael Rodriguez DC   [facility] [place] [facility] ([facility] [place])    [address]  [place] [address]   [phone]            Dec 07, 2017  8:30 AM CST   Return Visit with López Mehta, [facility] ([facility])    [address] [place] [address]   [phone]            Dec 11, 2017 10:40 AM CST   [facility] Spine with Shirin Buckley, PT   [facility] [place] [facility] ([facility] [place])    [address]  [address]  [place] [address]   [phone]            Dec 12, 2017  8:15 AM CST   [facility] Acupuncture Follow Up with Nael Rodriguez DC   [facility] [place] [facility] ([facility] [place])    [address]  [place] [address]   [phone]            Dec 15, 2017  8:30 AM CST   Return Visit with López Mehta, [facility] ([facility])    [address] [place] [address]   [phone]            Dec 18, 2017 10:40 AM CST   [facility] Spine with Shirin Buckley, PT   [facility] [place] [facility] ([facility] [place])    [address]  [address]  [place] [address]   [phone]            Dec 19, 2017  8:15 AM CST   [facility] Acupuncture Follow Up with SYLVIA [facility] [place] [facility] ([facility] [place])    [address]  [place] [address]   [phone]            Jan 08, 2018 10:40 AM CST   [facility] Spine with Shirin Buckley PT   [facility] [place] [facility] ([facility] [place])    [address]  [address]  [place] [address]   [phone]              [employer] Information     [facility] gives you secure access to your electronic health record. If you see a primary care provider, you can also send messages to your care team and make appointments. If you have questions, please call your primary care clinic.  If you do not have a primary care provider, please call [phone] and they will assist you.        Care EveryWhere ID     This is your Care EveryWhere ID. This could be used by other organizations to access your [place] medical records  [id number]        Equal Access to Services     JANA DUCKWORTH : Hadii lorraine Fields, waivoryda brennan, qaybta kaalmada zen, germain salcedo. So [facility] [phone].    ATENCIÓN: Si habla español, tiene a omer disposición servicios gratgennaroos de asistencia lingüística. Llame al [phone].    We comply with applicable federal civil rights laws and [place] laws. We do not discriminate on the basis of race, color, national origin, age, disability, sex, sexual orientation, or gender identity.

## 2017-11-28 ENCOUNTER — THERAPY VISIT (OUTPATIENT)
Dept: CHIROPRACTIC MEDICINE | Facility: CLINIC | Age: 32
End: 2017-11-28
Payer: COMMERCIAL

## 2017-11-28 DIAGNOSIS — M99.01 CERVICAL SEGMENT DYSFUNCTION: Primary | ICD-10-CM

## 2017-11-28 DIAGNOSIS — M54.2 CERVICALGIA: ICD-10-CM

## 2017-11-28 DIAGNOSIS — M62.838 SPASM OF MUSCLE: ICD-10-CM

## 2017-11-28 DIAGNOSIS — M99.02 THORACIC SEGMENT DYSFUNCTION: ICD-10-CM

## 2017-11-28 PROCEDURE — 98940 CHIROPRACT MANJ 1-2 REGIONS: CPT | Mod: AT | Performed by: CHIROPRACTOR

## 2017-11-28 PROCEDURE — 97810 ACUP 1/> WO ESTIM 1ST 15 MIN: CPT | Performed by: CHIROPRACTOR

## 2017-11-28 NOTE — PROGRESS NOTES
Visit #: 6 of 8, based on treatment plan    Subjective:  Mary Shipman is a 32 year old female who is seen in f/u up for:    Cervical segment dysfunction  Cervicalgia  Thoracic segment dysfunction  Spasm of muscle.     Since last visit on 11/7/2017,  Mary Shipman reports the following changes: Pain immediately after last treatment: 2/10 and their pain level today 8-9/10.  Mary reports that her pain is still along the neck and mid back areas.  She is tight and sore with most motions and has resumed doing her exercises over the past week.  Mary does report that she is going to PT for the neck/thoracic issues.    Area of chief complaint:  Cervical and Thoracic:  Symptoms are graded at 8-9/10. The quality is described as stiff, achey, dull.  Motion has slowly improved due to the symptoms.  Patient feels that they have slowly improved and feel worse mostly in the AM hours.        Objective:  The following was observed:    P: palpatory tenderness, upper traps    A: static palpation demonstrates intersegmental asymmetry , cervical and thoracic    R: motion palpation notes restricted motion, :  C1 Right rotation restricted  C6 Right rotation restricted  C7 Right rotation restricted  T1 Left rotation restricted  T2 Left rotation restricted  T3 Left rotation restricted    T: hypertonicity at: Traps Bilaterally      Assessment:    Segmental spinal dysfunction/restrictions found at:  C1   C6   C7   T1   T2   T3    Diagnoses:      1. Cervical segment dysfunction    2. Cervicalgia    3. Thoracic segment dysfunction    4. Spasm of muscle        Patient's condition:  Patient had restrictions pre-manipulation    Treatment effectiveness:  Post manipulation there is better intersegmental movement and Patient claims to feel looser post manipulation      Procedures:  CMT:  03923 Chiropractic manipulative treatment 1-2 regions performed   Cervical: Activator, C1 , C6, C7 , Prone  Thoracic: Activator, T1, T2, T3,  Prone    Modalities:  57464: Acupuncture, for 15 minutes:  Points: Kelley Points in thoracic spein  Ahsi point in upper traps and t-spine paraspinal  For 15 minutes    Therapeutic procedures:  None      Prognosis: Good    Progress towards Goals: Patient is making progress towards the goal     Response to Treatment:   Reduction in symptoms as reported by patient      Recommendations:    Instructions:ice 20 minutes every other hour as needed and stretch as instructed at visit    Follow-up:  Return to care in one week

## 2017-12-04 DIAGNOSIS — M25.519 SHOULDER PAIN, UNSPECIFIED CHRONICITY, UNSPECIFIED LATERALITY: Primary | ICD-10-CM

## 2017-12-18 ENCOUNTER — MYC MEDICAL ADVICE (OUTPATIENT)
Dept: FAMILY MEDICINE | Facility: CLINIC | Age: 32
End: 2017-12-18

## 2017-12-18 NOTE — TELEPHONE ENCOUNTER
Any phone visit same day or book over a 40 minute physical time is fine.  Thanks.  Miguel Hammer, MPAS, PA-C

## 2017-12-18 NOTE — TELEPHONE ENCOUNTER
Is a phone visit okay for migraines & refills? She also has to work on Thursday & Friday, but could take a break to do a phone visit. Seen last 10/23.  Marni Sorto RN

## 2018-01-04 ENCOUNTER — THERAPY VISIT (OUTPATIENT)
Dept: PHYSICAL THERAPY | Facility: CLINIC | Age: 33
End: 2018-01-04
Payer: COMMERCIAL

## 2018-01-04 ENCOUNTER — THERAPY VISIT (OUTPATIENT)
Dept: CHIROPRACTIC MEDICINE | Facility: CLINIC | Age: 33
End: 2018-01-04
Payer: COMMERCIAL

## 2018-01-04 DIAGNOSIS — M99.02 THORACIC SEGMENT DYSFUNCTION: ICD-10-CM

## 2018-01-04 DIAGNOSIS — R51.9 HEAD ACHE: ICD-10-CM

## 2018-01-04 DIAGNOSIS — M54.2 CERVICALGIA: ICD-10-CM

## 2018-01-04 DIAGNOSIS — M54.6 BILATERAL THORACIC BACK PAIN, UNSPECIFIED CHRONICITY: ICD-10-CM

## 2018-01-04 DIAGNOSIS — M62.838 SPASM OF MUSCLE: ICD-10-CM

## 2018-01-04 DIAGNOSIS — M99.01 CERVICAL SEGMENT DYSFUNCTION: Primary | ICD-10-CM

## 2018-01-04 PROCEDURE — 97535 SELF CARE MNGMENT TRAINING: CPT | Mod: GP | Performed by: PHYSICAL THERAPIST

## 2018-01-04 PROCEDURE — 98940 CHIROPRACT MANJ 1-2 REGIONS: CPT | Mod: AT | Performed by: CHIROPRACTOR

## 2018-01-04 PROCEDURE — 97110 THERAPEUTIC EXERCISES: CPT | Mod: GP | Performed by: PHYSICAL THERAPIST

## 2018-01-04 PROCEDURE — 97810 ACUP 1/> WO ESTIM 1ST 15 MIN: CPT | Performed by: CHIROPRACTOR

## 2018-01-04 NOTE — MR AVS SNAPSHOT
After Visit Summary   1/4/2018    Mary Shipman    MRN: [id number]           Patient Information     Date Of Birth          1985        Visit Information        Provider Department      1/4/2018 8:00 AM Shirin Buckley, PT [facility] [place] [facility]        Today's Diagnoses     Cervicalgia        Bilateral thoracic back pain, unspecified chronicity           Follow-ups after your visit        Your next 10 appointments already scheduled     Jan 08, 2018 10:40 AM CST   [facility] [place] [facility] ([facility] [place])    [address] [facility] [address] [facility] [address]  [place] [address]   [phone]            Jan 09, 2018  8:45 AM CST   [facility] Acupuncture Follow Up with Nael Rodriguez DC   [facility] [place] [facility] ([facility])    [address]  [place] [address]   [phone]            Jan 12, 2018  9:20 AM CST   SHORT with Miguel Hammer PA-C   [facility] ([address] [address]   [phone]            Jan 18, 2018  8:00 AM CST   [facility] [place] [facility] ([facility] [place])    [address] [facility] [address]  [place] [address]   [phone]            Jan 18, 2018  8:45 AM CST   [facility]iro Acupuncture Follow Up with Nael Rodriguez DC   [facility] [place] [facility] ([facility] [place])    [address]  [place] [address]   [phone]              Who to contact     If you have questions or need follow up information about today's clinic visit or your schedule please contact [facility] directly at [phone].  Normal or non-critical lab and imaging results will be communicated to you by MyChart, letter or phone within 4 business days after the clinic has received the results. If you do not hear from us within 7  days, please contact the clinic through [employer] or phone. If you have a critical or abnormal lab result, we will notify you by phone as soon as possible.  Submit refill requests through [employer] TV or call your pharmacy and they will forward the refill request to us. Please allow 3 business days for your refill to be completed.          Additional Information About Your Visit        WebSideStoryhart Information     [employer] gives you secure access to your electronic health record. If you see a primary care provider, you can also send messages to your care team and make appointments. If you have questions, please call your primary care clinic.  If you do not have a primary care provider, please call [phone] and they will assist you.        Care EveryWhere ID     This is your Care EveryWhere ID. This could be used by other organizations to access your [place] medical records  [id number]         Blood Pressure from Last 3 Encounters:   10/23/17 116/79   10/09/17 109/72   09/21/17 102/78    Weight from Last 3 Encounters:   10/23/17 108.9 kg (240 lb)   10/09/17 107.5 kg (237 lb)   09/21/17 107 kg (236 lb)              We Performed the Following     [facility] PROGRESS NOTES REPORT     Self Care Management Training     THERAPEUTIC EXERCISES        Primary Care Provider Office Phone # Fax #    Miguel Hammer PA-C [phone]       [address] [place] [address]        Equal Access to Services     [facility] : Hadii aad ku hadasho Soomaali, waaxda luqadaha, qaybta kaalmada adeegyada, germain rey . So [facility] [facility] [phone].    ATENCIÓN: Si habla español, tiene a omer disposición servicios gratuitos de asistencia lingüística. Miguel al [phone].    We comply with applicable federal civil rights laws and [place] laws. We do not discriminate on the basis of race, color, national origin, age, disability, sex, sexual orientation, or gender identity.            Thank you!     Thank  you for choosing [facility]  for your care. Our goal is always to provide you with excellent care. Hearing back from our patients is one way we can continue to improve our services. Please take a few minutes to complete the written survey that you may receive in the mail after your visit with us. Thank you!             Your Updated Medication List - Protect others around you: Learn how to safely use, store and throw away your medicines at www.disposemymeds.org.          This list is accurate as of: 1/4/18 12:22 PM.  Always use your most recent med list.                   Brand Name Dispense Instructions for use Diagnosis    albuterol 108 (90 BASE) MCG/ACT Inhaler    PROAIR HFA/PROVENTIL HFA/VENTOLIN HFA    3 Inhaler    Inhale 2 puffs into the lungs every 6 hours as needed for shortness of breath / dyspnea or wheezing    Intermittent asthma, uncomplicated       amitriptyline 10 MG tablet    ELAVIL     10 mg        cholecalciferol 1000 UNIT tablet    vitamin D3    450 tablet    Take 5 daily (total 5000 IU)    Low vitamin D level       clonazePAM 1 MG tablet    klonoPIN    30 tablet    1 at bedtime daily    CRUZ (generalized anxiety disorder), Insomnia, unspecified type       cyclobenzaprine 10 MG tablet    FLEXERIL    90 tablet    Take 1 tablet (10 mg) by mouth 3 times daily as needed for muscle spasms    Acute pain of both shoulders, Back muscle spasm       fexofenadine-pseudoePHEDrine 180-240 MG per 24 hr tablet    ALLEGRA-D 24     Take 1 tablet by mouth daily        fluticasone 50 MCG/ACT spray    FLONASE     Spray 1-2 sprays into both nostrils daily as needed for rhinitis or allergies        ibuprofen 400-800 mg tablet    ADVIL,MOTRIN    30 tablet    Take 1-2 tablets by mouth every 6 hours as needed (cramping).    Abdominal pain       LANsoprazole 30 MG CR capsule    PREVACID    90 capsule    Take 1 capsule (30 mg) by mouth daily Take 30-60 minutes before a meal.    Gastroesophageal  reflux disease without esophagitis       levalbuterol 1.25 MG/0.5ML Nebu neb solution    XOPENEX CONC     Take 1.25 mg by nebulization every 6 hours as needed for wheezing        levETIRAcetam 500 MG tablet    KEPPRA    60 tablet    1000 mg at bedtime    Convulsions, unspecified convulsion type (H), Seizure disorder (H), Migraine without aura and with status migrainosus, not intractable       magic mouthwash suspension (diphenhydrAMINE, lidocaine, aluminum-magnesium & simethicone) Susp compounding kit     237 mL    Swish and swallow 5-10 mLs in mouth every 6 hours as needed for mouth sores    Viral URI, Throat pain       metFORMIN 500 MG 24 hr tablet    GLUCOPHAGE-XR    180 tablet    Take 1 tablet (500 mg) by mouth 2 times daily (with meals)    Polycystic ovaries       mometasone-formoterol 200-5 MCG/ACT oral inhaler    DULERA    39 g    Inhale 2 puffs into the lungs 2 times daily    Intermittent asthma, uncomplicated       montelukast 10 MG tablet    SINGULAIR    90 tablet    Take 1 tablet (10 mg) by mouth At Bedtime    Intermittent asthma, uncomplicated       nystatin 191350 UNIT/ML suspension    MYCOSTATIN    280 mL    Take 5 mLs (500,000 Units) by mouth 4 times daily    Viral URI, Throat pain       polyethylene glycol Packet    MIRALAX/GLYCOLAX     Take 17 g by mouth daily as needed for constipation        ranitidine 150 MG tablet    ZANTAC    60 tablet    Take 150 mg by mouth 2 times daily as needed        sertraline 100 MG tablet    ZOLOFT    180 tablet    2 tablets daily    Major depressive disorder, recurrent episode, moderate (H), Adjustment disorder with mixed anxiety and depressed mood       topiramate 50 MG tablet    TOPAMAX    180 tablet    Take 6 tablets (300 mg) by mouth At Bedtime    Seizure disorder (H), Migraine without aura and with status migrainosus, not intractable, Convulsions, unspecified convulsion type (H)

## 2018-01-04 NOTE — PROGRESS NOTES
"Subjective:  HPI                    Objective:  System    Physical Exam    General     ROS    Assessment/Plan:    PROGRESS  REPORT    Progress reporting period is from 11/27/17 to 1/04/17.       SUBJECTIVE  Patient returns to PT following a 5-1/2 week hiatus.  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 Patient states she has been doing her shoulder exercises but \"they still don't take the pain away.\"  Right shoulder now seems to be getting as bad as the left.  Patient also reports experiencing migraine HA's lately as wells as episodic numbness/tingling in medial aspect of left forearm (C7-T8 distribution).   I had a rather lengthy discussion with patient today regarding her shoulder complaints and expectations.  Explained that in order for therapy to be effective at improving pain and function, more consistent attendance and follow-through will be necessary on her part.      Current pain level is 4/10  .     Previous pain level was  8/10  .   Changes in function:   Patient has made minimal to no progress on her goals since starting therapy in October.  Adverse reaction to treatment or activity: None    OBJECTIVE  * denotes pain  SHOULDER AROM LEFT PROM LEFT AROM RIGHT PROM RIGHT   Flexion 110 150 120 150   Extension 33 * 40 * 37 * 45 *   Abduction 85 * 105 * 110 * 125 *   IR/Ext L glute *  L5 *    ER 30 * 35 * 70 * 75 *     Note: empty end feel with all PROM bilaterally  Shoulder Strength:  5/5 for bilateral flexion and ER; IR 4/5 left and 5/5 right; abduction unable to resist due to pain  Cervical AROM:  WNL. Complaints of \"pulling\" with left SB  Posture: poor      ASSESSMENT/PLAN  Updated problem list and treatment plan: Diagnosis 1:  Shoulder Pain   Pain -  self management, education and home program  Decreased ROM/flexibility - manual therapy and therapeutic exercise  Decreased strength - therapeutic exercise  Decreased function - home " program  Impaired posture - neuro re-education    STG/LTGs have been met or progress has been made towards goals:  None  Assessment of Progress: The patient's condition has potential to improve.  Self Management Plans:  Patient has been instructed in a home treatment program; however, exercise progression has been hampered by poor attendance.  I have re-evaluated this patient and find that the nature, scope, duration and intensity of the therapy is appropriate for the medical condition of the patient.  Mary continues to require the following intervention to meet STG and LTG's:  PT    Recommendations:  Patient could benefit from additional PT at least 1x/week for 6 more weeks, provided her willingness to comply with plan of care.     Please refer to the daily flowsheet for treatment today, total treatment time and time spent performing 1:1 timed codes.

## 2018-01-04 NOTE — PROGRESS NOTES
Visit #: 7 of 8, based on treatment plan    Subjective:  Mary Shipman is a 32 year old female who is seen in f/u up for:    Cervical segment dysfunction  Cervicalgia  Thoracic segment dysfunction  Spasm of muscle.     Since last visit on 11/28/2017,  Mary Shipman reports the following changes: Pain immediately after last treatment: 2/10 and their pain level today 2/10.  Mary reports that her neck pain is improved and she rates this at a 2 out of 10.  She is having daily headaches. This has not improved.  Her shoulder continue to be painful and she is seeing PT for this    Area of chief complaint:  Cervical and Thoracic:  Symptoms are graded at 2/10. The quality is described as stiff, achey, dull.  Motion has slowly improved due to the symptoms.  Patient feels that they have slowly improved and feel worse mostly in the AM hours.        Objective:  The following was observed:    P: palpatory tenderness, upper traps    A: static palpation demonstrates intersegmental asymmetry , cervical and thoracic    R: motion palpation notes restricted motion, :  C1 Right rotation restricted  C6 Right rotation restricted  C7 Right rotation restricted  T1 Left rotation restricted  T2 Left rotation restricted  T3 Left rotation restricted    T: hypertonicity at: Traps Bilaterally      Assessment:    Segmental spinal dysfunction/restrictions found at:  C1   C6   C7   T1   T2   T3    Diagnoses:      1. Cervical segment dysfunction    2. Cervicalgia    3. Thoracic segment dysfunction    4. Spasm of muscle        Patient's condition:  Patient had restrictions pre-manipulation    Treatment effectiveness:  Post manipulation there is better intersegmental movement and Patient claims to feel looser post manipulation      Procedures:  CMT:  36678 Chiropractic manipulative treatment 1-2 regions performed   Cervical: Activator, C1 , C6, C7 , Prone  Thoracic: Activator, T1, T2, T3, Prone    Modalities:  42115: Acupuncture, for 15 minutes:   Points: [place] Points in cervical spine  Ahsi point in upper traps and suboccipital region  For 15 minutes    Therapeutic procedures:  None      Prognosis: Good    Progress towards Goals: Patient is making progress towards the goal     Response to Treatment:   Reduction in symptoms as reported by patient      Recommendations:    Instructions:ice 20 minutes every other hour as needed and stretch as instructed at visit    Follow-up:  Return to care in one week

## 2018-01-12 ENCOUNTER — OFFICE VISIT (OUTPATIENT)
Dept: FAMILY MEDICINE | Facility: CLINIC | Age: 33
End: 2018-01-12
Payer: COMMERCIAL

## 2018-01-12 VITALS
TEMPERATURE: 98.9 F | DIASTOLIC BLOOD PRESSURE: 78 MMHG | BODY MASS INDEX: 41.51 KG/M2 | SYSTOLIC BLOOD PRESSURE: 120 MMHG | HEART RATE: 80 BPM | WEIGHT: 249.13 LBS | HEIGHT: 65 IN

## 2018-01-12 DIAGNOSIS — G89.29 CHRONIC PAIN OF BOTH SHOULDERS: ICD-10-CM

## 2018-01-12 DIAGNOSIS — F41.1 GAD (GENERALIZED ANXIETY DISORDER): Primary | ICD-10-CM

## 2018-01-12 DIAGNOSIS — M25.512 CHRONIC PAIN OF BOTH SHOULDERS: ICD-10-CM

## 2018-01-12 DIAGNOSIS — G43.809 OTHER MIGRAINE WITHOUT STATUS MIGRAINOSUS, NOT INTRACTABLE: ICD-10-CM

## 2018-01-12 DIAGNOSIS — M54.2 CERVICALGIA: ICD-10-CM

## 2018-01-12 DIAGNOSIS — G47.00 INSOMNIA, UNSPECIFIED TYPE: ICD-10-CM

## 2018-01-12 DIAGNOSIS — G81.94 LEFT HEMIPLEGIA (H): ICD-10-CM

## 2018-01-12 DIAGNOSIS — R56.9 SEIZURE (H): ICD-10-CM

## 2018-01-12 DIAGNOSIS — M25.511 CHRONIC PAIN OF BOTH SHOULDERS: ICD-10-CM

## 2018-01-12 DIAGNOSIS — M54.12 CERVICAL RADICULOPATHY: ICD-10-CM

## 2018-01-12 PROCEDURE — 99215 OFFICE O/P EST HI 40 MIN: CPT | Mod: 25 | Performed by: PHYSICIAN ASSISTANT

## 2018-01-12 PROCEDURE — 20610 DRAIN/INJ JOINT/BURSA W/O US: CPT | Performed by: PHYSICIAN ASSISTANT

## 2018-01-12 RX ORDER — CLONAZEPAM 1 MG/1
TABLET ORAL
Qty: 30 TABLET | Refills: 2 | Status: SHIPPED | OUTPATIENT
Start: 2018-01-12 | End: 2018-08-09

## 2018-01-12 ASSESSMENT — PATIENT HEALTH QUESTIONNAIRE - PHQ9
5. POOR APPETITE OR OVEREATING: NEARLY EVERY DAY
SUM OF ALL RESPONSES TO PHQ QUESTIONS 1-9: 15

## 2018-01-12 ASSESSMENT — ANXIETY QUESTIONNAIRES
1. FEELING NERVOUS, ANXIOUS, OR ON EDGE: NEARLY EVERY DAY
5. BEING SO RESTLESS THAT IT IS HARD TO SIT STILL: MORE THAN HALF THE DAYS
2. NOT BEING ABLE TO STOP OR CONTROL WORRYING: MORE THAN HALF THE DAYS
6. BECOMING EASILY ANNOYED OR IRRITABLE: NEARLY EVERY DAY
GAD7 TOTAL SCORE: 18
7. FEELING AFRAID AS IF SOMETHING AWFUL MIGHT HAPPEN: NEARLY EVERY DAY
3. WORRYING TOO MUCH ABOUT DIFFERENT THINGS: MORE THAN HALF THE DAYS

## 2018-01-12 ASSESSMENT — PAIN SCALES - GENERAL: PAINLEVEL: MILD PAIN (3)

## 2018-01-12 NOTE — MR AVS SNAPSHOT
After Visit Summary   1/12/2018    Mary Shipman    MRN: [id number]           Patient Information     Date Of Birth          1985        Visit Information        Provider Department      1/12/2018 9:20 AM Miguel Hammer PA-C [facility]        Today's Diagnoses     CRUZ (generalized anxiety disorder)    -  1    Other migraine without status migrainosus, not intractable        Cervicalgia        Left hemiplegia (H)        Seizure (H)        Insomnia, unspecified type        Cervical radiculopathy          Care Instructions    1. MRI neck to evaluate bilateral shoulder weakness and pain and some left sided radicular symptoms   To schedule MRI:  [facility] Imaging Scheduling Phone Number: [phone]  Or   [facility] Imaging Scheduling Phone number: [phone]     [facility]   Discharged by : Kori ABDULLAHI Certified Medical Assistant (AAMA)   Paper scripts provided to patient : 1   If you have any questions regarding to your visit please contact your care team:     Team Silver              Clinic Hours Telephone Number     Dr. Bradly Garcia PA-C   7am-7pm  Monday - Thursday   7am-5pm  Fridays  [phone]   (Appointment scheduling available 24/7)     RN Line  [phone] option 2     Urgent Care - [place] [facility] [place] [facility] - 11am-9pm Monday-Friday Saturday-Sunday- 9am-5pm     [place] -   5pm-9pm Monday-Friday Saturday-Sunday- 9am-5pm    [phone] - [place]    [phone] - [place]     For a Price Quote for your services, please call our Consumer Price Line at [phone].     What options do I have for visits at the clinic other than the traditional office visit?     To expand how we care for you, many of our providers are utilizing electronic visits (e-visits) and telephone visits, when medically appropriate, for interactions with their patients rather  than a visit in the clinic. We also offer nurse visits for many medical concerns. Just like any other service, we will bill your insurance company for this type of visit based on time spent on the phone with your provider. Not all insurance companies cover these visits. Please check with your medical insurance if this type of visit is covered. You will be responsible for any charges that are not paid by your insurance.   E-visits via Aliveshoeshart: generally incur a $35.00 fee.     Telephone visits:   Time spent on the phone: *charged based on time that is spent on the phone in increments of 10 minutes. Estimated cost:   5-10 mins $30.00   11-20 mins. $59.00   21-30 mins. $85.00     Use Think Sky (secure email communication and access to your chart) to send your primary care provider a message or make an appointment. Ask someone on your Team how to sign up for Think Sky.     As always, Thank you for trusting us with your health care needs!      [facility]:    Scheduling Appointments  [place]  Call: [phone]    [facility]  Call: [phone]    [facility]  Call: [phone]    For Gastroenterology referrals   [facility]enterology   Clinics and Surgery Center, 4th Floor   [address] [place] [address]   Appointments: [phone]    WHERE TO GO FOR CARE?  Clinic    Make an appointment if you:       Are sick (cold, cough, flu, sore throat, earache or in pain).       Have a small injury (sprain, small cut, burn or broken bone).       Need a physical exam, Pap smear, vaccine or prescription refill.       Have questions about your health or medicines.    To reach us:      Call [phone] ([phone]). Open 24 hours every day. (For counseling services, call [phone].)    Log into Think Sky at [email or web address]. (Visit [email or web address] to create an account.) Hospital emergency room    An emergency  is a serious or life- threatening problem that must be treated right away.    Call 911 or get to the hospital if you have:      Very bad or sudden:            - Chest pain or pressure         - Bleeding         - Head or belly pain         - Dizziness or trouble seeing, walking or                          Speaking      Problems breathing      Blood in your vomit or you are coughing up blood      A major injury (knocked out, loss of a finger or limb, rape, broken bone protruding from skin)    A mental health crisis. (Or call the Mental Health Crisis line at 1-855.288.3079 or Suicide Prevention Hotline at 1-512.992.1228.)    Open 24 hours every day. You don't need an appointment.     Urgent care    Visit urgent care for sickness or small injuries when the clinic is closed. You don't need an appointment. To check hours or find an urgent care near you, visit [email or web address]. Online care    Get online care from [employer] for more than 70 common problems, like colds, allergies and infections. Open 24 hours every day at:   [email or web address]   Need help deciding?    For advice about where to be seen, you may call your clinic and ask to speak with a nurse. We're here for you 24 hours every day.         If you are deaf or hard of hearing, please let us know. We provide many free services including sign language interpreters, oral interpreters, TTYs, telephone amplifiers, note takers and written materials.               Follow-ups after your visit        Your next 10 appointments already scheduled     Jan 18, 2018  8:00 AM CST   [facility] Spine with Shirin Buckley PT   [facility] (LUIZA KOKO [facility])    [address]  [address]  [place] [id number] [phone]            Jan 18, 2018  8:45 AM CST   [facility] Follow Up with SYLVIA Jeronimo Chiro (LUIZA [place])    [address]  [place] [id number] [phone]              Future  "tests that were ordered for you today     Open Future Orders        Priority Expected Expires Ordered    MR Cervical Spine w/o Contrast Routine  1/12/2019 1/12/2018            Who to contact     If you have questions or need follow up information about today's clinic visit or your schedule please contact [facility] directly at [phone].  Normal or non-critical lab and imaging results will be communicated to you by MyChart, letter or phone within 4 business days after the clinic has received the results. If you do not hear from us within 7 days, please contact the clinic through [employer] Scamhart or phone. If you have a critical or abnormal lab result, we will notify you by phone as soon as possible.  Submit refill requests through [employer] or call your pharmacy and they will forward the refill request to us. Please allow 3 business days for your refill to be completed.          Additional Information About Your Visit        [employer] Information     [employer] gives you secure access to your electronic health record. If you see a primary care provider, you can also send messages to your care team and make appointments. If you have questions, please call your primary care clinic.  If you do not have a primary care provider, please call [phone] and they will assist you.        Care EveryWhere ID     This is your Care EveryWhere ID. This could be used by other organizations to access your [place] medical records  [id number]        Your Vitals Were     Pulse Temperature Height BMI (Body Mass Index)          80 98.9  F (37.2  C) (Oral) 5' 5\" (1.651 m) 41.46 kg/m2         Blood Pressure from Last 3 Encounters:   01/12/18 120/78   10/23/17 116/79   10/09/17 109/72    Weight from Last 3 Encounters:   01/12/18 249 lb 2 oz (113 kg)   10/23/17 240 lb (108.9 kg)   10/09/17 237 lb (107.5 kg)                 Where to get your medicines      Some of these will need a paper prescription and others can be bought over " the counter.  Ask your nurse if you have questions.     Bring a paper prescription for each of these medications     clonazePAM 1 MG tablet          Primary Care Provider Office Phone # Fax #    Miguel Hammer PA-C [phone]       [address] [facility] [address]        Equal Access to Services     JANA DUCKWORTH : Hadii aad ku hadasho Soomaali, waaxda luqadaha, qaybta kaalmada adeegyada, waxay prudencein hayaan ademirian vasquezmaria del rosariolindsey salcedo. So [facility] [phone].    ATENCIÓN: Si habla español, tiene a omer disposición servicios gratuitos de asistencia lingüística. Llame al [phone].    We comply with applicable federal civil rights laws and [place] laws. We do not discriminate on the basis of race, color, national origin, age, disability, sex, sexual orientation, or gender identity.            Thank you!     Thank you for choosing [facility]  for your care. Our goal is always to provide you with excellent care. Hearing back from our patients is one way we can continue to improve our services. Please take a few minutes to complete the written survey that you may receive in the mail after your visit with us. Thank you!             Your Updated Medication List - Protect others around you: Learn how to safely use, store and throw away your medicines at www.disposemymeds.org.          This list is accurate as of: 1/12/18 10:26 AM.  Always use your most recent med list.                   Brand Name Dispense Instructions for use Diagnosis    albuterol 108 (90 BASE) MCG/ACT Inhaler    PROAIR HFA/PROVENTIL HFA/VENTOLIN HFA    3 Inhaler    Inhale 2 puffs into the lungs every 6 hours as needed for shortness of breath / dyspnea or wheezing    Intermittent asthma, uncomplicated       amitriptyline 10 MG tablet    ELAVIL     10 mg        cholecalciferol 1000 UNIT tablet    vitamin D3    450 tablet    Take 5 daily (total 5000 IU)    Low vitamin D level       clonazePAM 1 MG tablet     klonoPIN    30 tablet    1 at bedtime daily    CRUZ (generalized anxiety disorder), Insomnia, unspecified type       cyclobenzaprine 10 MG tablet    FLEXERIL    90 tablet    Take 1 tablet (10 mg) by mouth 3 times daily as needed for muscle spasms    Acute pain of both shoulders, Back muscle spasm       fexofenadine-pseudoePHEDrine 180-240 MG per 24 hr tablet    ALLEGRA-D 24     Take 1 tablet by mouth daily        fluticasone 50 MCG/ACT spray    FLONASE     Spray 1-2 sprays into both nostrils daily as needed for rhinitis or allergies        ibuprofen 400-800 mg tablet    ADVIL,MOTRIN    30 tablet    Take 1-2 tablets by mouth every 6 hours as needed (cramping).    Abdominal pain       LANsoprazole 30 MG CR capsule    PREVACID    90 capsule    Take 1 capsule (30 mg) by mouth daily Take 30-60 minutes before a meal.    Gastroesophageal reflux disease without esophagitis       levalbuterol 1.25 MG/0.5ML Nebu neb solution    XOPENEX CONC     Take 1.25 mg by nebulization every 6 hours as needed for wheezing        metFORMIN 500 MG 24 hr tablet    GLUCOPHAGE-XR    180 tablet    Take 1 tablet (500 mg) by mouth 2 times daily (with meals)    Polycystic ovaries       mometasone-formoterol 200-5 MCG/ACT oral inhaler    DULERA    39 g    Inhale 2 puffs into the lungs 2 times daily    Intermittent asthma, uncomplicated       montelukast 10 MG tablet    SINGULAIR    90 tablet    Take 1 tablet (10 mg) by mouth At Bedtime    Intermittent asthma, uncomplicated       polyethylene glycol Packet    MIRALAX/GLYCOLAX     Take 17 g by mouth daily as needed for constipation        ranitidine 150 MG tablet    ZANTAC    60 tablet    Take 150 mg by mouth 2 times daily as needed        sertraline 100 MG tablet    ZOLOFT    180 tablet    2 tablets daily    Major depressive disorder, recurrent episode, moderate (H), Adjustment disorder with mixed anxiety and depressed mood       topiramate 50 MG tablet    TOPAMAX    180 tablet    Take 6 tablets (300  mg) by mouth At Bedtime    Seizure disorder (H), Migraine without aura and with status migrainosus, not intractable, Convulsions, unspecified convulsion type (H)

## 2018-01-12 NOTE — PROGRESS NOTES
"  SUBJECTIVE:   Mary Shipman is a 32 year old female who presents to clinic today for the following health issues:    Depression and Anxiety Follow-Up    Status since last visit: No change    Other associated symptoms:None    Complicating factors:     Significant life event: Yes-  \"Still about the same\"     Current substance abuse: None    PHQ-9 Score and MyChart F/U Questions 7/21/2017 8/31/2017 9/21/2017   Total Score 20 19 22   Q9: Suicide Ideation Not at all Not at all Not at all     CRUZ-7 SCORE 7/21/2017 8/31/2017 9/21/2017   Total Score - - -   Total Score 19 18 18       PHQ-9  English  PHQ-9   Any Language  GAD7  Suicide Assessment Five-step Evaluation and Treatment (SAFE-T)    Migraine Follow-Up    Headaches symptoms:  Worsened again    Frequency: \"daily again\"     Duration of headaches: had one for 3 days and last night had to have acupuncture to help it.  Has one currently that hasn't fully come on but feels like it wants to.    Able to do normal daily activities/work with migraines: No - not so much anymore    Rescue/Relief medication:none outside of Topamax and Ibuprofen              Effectiveness: no relief    Preventative medication: Topamax    Neurologic complications: started to have speech issues with the recent migraine along with eye twitching    In the past 4 weeks, how often have you gone to Urgent Care or the emergency room because of your headaches?  0      Amount of exercise or physical activity:     Problems taking medications regularly: Yes,  Was out of her klonopin    Medication side effects: none    Diet: regular (no restrictions)    Bilateral shoulder pain, stiffness, discomfort, radicular symptoms into the left arm. Inability to left and reach back. Feels tight anterior shoulder area.     Patient Active Problem List   Diagnosis     Esophageal reflux     Allergic rhinitis due to other allergen     Polycystic ovaries     Thyrotoxicosis     Urticaria     Obesity     Low back pain     " Intermittent asthma     HYPERLIPIDEMIA LDL GOAL <160     Irritable bowel syndrome with constipation     Migraine headache     Not immune to rubella     Major depressive disorder, recurrent episode, moderate (H)     Health Care Home     Health care home, active care coordination     Generalized anxiety disorder     Lump or mass in breast     Menometrorrhagia     Seizure (H)     Left hemiplegia (H)     Cervicalgia     Bilateral thoracic back pain, unspecified chronicity     Other migraine without status migrainosus, not intractable      Current Outpatient Prescriptions   Medication     clonazePAM (KLONOPIN) 1 MG tablet     sertraline (ZOLOFT) 100 MG tablet     topiramate (TOPAMAX) 50 MG tablet     amitriptyline (ELAVIL) 10 MG tablet     cholecalciferol (VITAMIN D) 1000 UNIT tablet     cyclobenzaprine (FLEXERIL) 10 MG tablet     fexofenadine-pseudoePHEDrine (ALLEGRA-D 24) 180-240 MG per 24 hr tablet     fluticasone (FLONASE) 50 MCG/ACT spray     polyethylene glycol (MIRALAX/GLYCOLAX) Packet     albuterol (PROAIR HFA, PROVENTIL HFA, VENTOLIN HFA) 108 (90 BASE) MCG/ACT inhaler     mometasone-formoterol (DULERA) 200-5 MCG/ACT oral inhaler     metFORMIN (GLUCOPHAGE-XR) 500 MG 24 hr tablet     LANsoprazole (PREVACID) 30 MG capsule     levalbuterol (XOPENEX CONC) 1.25 MG/0.5ML NEBU     montelukast (SINGULAIR) 10 MG tablet     ibuprofen (ADVIL,MOTRIN) 400-800 mg tablet     ranitidine (ZANTAC) 150 MG tablet     [DISCONTINUED] clonazePAM (KLONOPIN) 1 MG tablet     No current facility-administered medications for this visit.         Problem list and histories reviewed & adjusted, as indicated.  Additional history: as documented    Labs reviewed in EPIC    Reviewed and updated as needed this visit by clinical staff     Reviewed and updated as needed this visit by Provider         ROS:  Constitutional, HEENT, cardiovascular, pulmonary, gi and gu systems are negative, except as otherwise noted.      OBJECTIVE:   /78 (BP  "Location: Right arm, Cuff Size: Adult Large)  Pulse 80  Temp 98.9  F (37.2  C) (Oral)  Ht 5' 5\" (1.651 m)  Wt 249 lb 2 oz (113 kg)  BMI 41.46 kg/m2  Body mass index is 41.46 kg/(m^2).  GENERAL: healthy, alert and no distress  MS: Cervical spine muscle tenderness and spasms, no bony tenderness, ROM is stiff. The shoulder ROM is stiff with loss of ABduction and extension - loss of about 20%-30%. She has a negative neer / mild positive hawkin and empty can.   Psych: Appropriate appearance.  Alert and oriented times 3; coherent speech, normal   rate and volume, able to articulate logical thoughts, able   to abstract reason, no tangential thoughts, no hallucinations   or delusions.  Normal behavior.  Her affect is bright.    NEURO: CN II-XII intact    ASSESSMENT/PLAN:     (F41.1) CRUZ (generalized anxiety disorder)  (primary encounter diagnosis)  Comment:   Plan: clonazePAM (KLONOPIN) 1 MG tablet        Worse. She actually does fairly well on a single dosed Clonazepam 1 mg at bedtime. I think for now the benefits far outweigh the risks for her as she frequently has increased migraines and non specific neurologic symptoms since she's been off. Will refill. This prescription is given with a discussion of side effects, risks and proper use.  Instructions are given to follow up if not improving or symptoms change or worsen as discussed.     (G41.751) Other migraine without status migrainosus, not intractable  Comment:   Plan: As noted - is managed by neurology - will refer back to her neurologist     (M54.2) Cervicalgia  Comment:   Plan: MR Cervical Spine w/o Contrast        Question left radiculopathy - will MRI because of persistence     (G81.94) Left hemiplegia (H)  Comment:   Plan: Resolving mostly. We discussed this and her neurologic work ups. I advised that she get in with neurology as needed. Her symptoms have remained fairly quiet, she reports her neurologist feels this is an anxiety symptom. I do think that a " somatic / conversion cause is possible though I think a migraine variant is equally as likely. Reassurance given.     (R56.9) Seizure (H)  Comment:   Plan: Vs Neurologic migraine variant.     (G47.00) Insomnia, unspecified type  Comment:   Plan: clonazePAM (KLONOPIN) 1 MG tablet        As noted     (M54.12) Cervical radiculopathy  Comment:   Plan: MR Cervical Spine w/o Contrast        As noted     Bilateral Shoulder pain  Comment:  Probably tight muscles of the anterior pec and her shoulder girdle. I see some mild RTC findings on the left, but I think this is all tight due to her months being forward, squeezed, muscle tight, tense, anxious, etc. Continue PT. I reviewed options for massage and heat and consider injection left subacromial if she wished to see if that helps - she does request that.    Verbal PARQ for injection is discussed and after verbal and written consent are given, the area was prepped in a sterile fashion and  1cc of kenalog 40mg/mL + 4cc of 2% lidocaine is injected into the left subacromial space from a posterior approach.  The patient tolerated the procedure well and there were no complications.     I spent 40 minutes with Mary, over 50% of that time was spent counseling her today - a total of 30 minutes for counseling. 10 minutes was spent addressing her shoulder concerns and providing an injection. Follow up in a month or so, earlier if issues.     FRANSICO JOHNSON PA-C  [facility]

## 2018-01-12 NOTE — PATIENT INSTRUCTIONS
1. MRI neck to evaluate bilateral shoulder weakness and pain and some left sided radicular symptoms   To schedule MRI:  [facility] Imaging Scheduling Phone Number: [phone]  Or   [facility] Scheduling Phone number: [phone]     [facility]   Discharged by : Kori ABDULLAHI Certified Medical Assistant (AAMA)   Paper scripts provided to patient : 1   If you have any questions regarding to your visit please contact your care team:     Team Silver              Clinic Hours Telephone Number     Dr. Bradly Garcia PA-C   7am-7pm  Monday - Thursday   7am-5pm  Fridays  [phone]   (Appointment scheduling available 24/7)     RN Line  [phone] option 2     Urgent Care - [place] - 11am-9pm Monday-Friday Saturday-Sunday- 9am-5pm     [place] -   5pm-9pm Monday-Friday Saturday-Sunday- 9am-5pm    [phone] - [place]    [phone] - [place]     For a Price Quote for your services, please call our Consumer Price Line at [phone].     What options do I have for visits at the clinic other than the traditional office visit?     To expand how we care for you, many of our providers are utilizing electronic visits (e-visits) and telephone visits, when medically appropriate, for interactions with their patients rather than a visit in the clinic. We also offer nurse visits for many medical concerns. Just like any other service, we will bill your insurance company for this type of visit based on time spent on the phone with your provider. Not all insurance companies cover these visits. Please check with your medical insurance if this type of visit is covered. You will be responsible for any charges that are not paid by your insurance.   E-visits via farmaciamarket: generally incur a $35.00 fee.     Telephone visits:   Time spent on the phone: *charged based on time that is spent on the phone  in increments of 10 minutes. Estimated cost:   5-10 mins $30.00   11-20 mins. $59.00   21-30 mins. $85.00     Use [facility] (secure email communication and access to your chart) to send your primary care provider a message or make an appointment. Ask someone on your Team how to sign up for [facility].     As always, Thank you for trusting us with your health care needs!      [facility]:    Scheduling Appointments  [facility]  Call: [phone]    Niraj Young [facility]  Call: [phone]    [facility]  Call: [phone]    For Gastroenterology referrals   [facility]   Clinics and Surgery [place], 4th Floor   [address] [place] [address]   Appointments: [phone]    WHERE TO GO FOR CARE?  Clinic    Make an appointment if you:       Are sick (cold, cough, flu, sore throat, earache or in pain).       Have a small injury (sprain, small cut, burn or broken bone).       Need a physical exam, Pap smear, vaccine or prescription refill.       Have questions about your health or medicines.    To reach us:      Call [phone] ([phone]). Open 24 hours every day. (For counseling services, call [phone].)    Log into [facility] at [email or web address]. (Visit [email or web address] to create an account.) Hospital emergency room    An emergency is a serious or life- threatening problem that must be treated right away.    Call 288 or get to the hospital if you have:      Very bad or sudden:            - Chest pain or pressure         - Bleeding         - Head or belly pain         - Dizziness or trouble seeing, walking or                          Speaking      Problems breathing      Blood in your vomit or you are coughing up blood      A major injury (knocked out, loss of a finger or limb, rape, broken bone protruding from skin)    A mental health crisis. (Or call the Mental Health Crisis line at  1-547.624.8785 or Suicide Prevention Hotline at 1-711.269.3136.)    Open 24 hours every day. You don't need an appointment.     Urgent care    Visit urgent care for sickness or small injuries when the clinic is closed. You don't need an appointment. To check hours or find an urgent care near you, visit [email or web address]. Online care    Get online care from [facility] for more than 70 common problems, like colds, allergies and infections. Open 24 hours every day at:   [email or web address]   Need help deciding?    For advice about where to be seen, you may call your clinic and ask to speak with a nurse. We're here for you 24 hours every day.         If you are deaf or hard of hearing, please let us know. We provide many free services including sign language interpreters, oral interpreters, TTYs, telephone amplifiers, note takers and written materials.

## 2018-01-13 ASSESSMENT — ANXIETY QUESTIONNAIRES: GAD7 TOTAL SCORE: 18

## 2018-01-13 ASSESSMENT — ASTHMA QUESTIONNAIRES: ACT_TOTALSCORE: 22

## 2018-01-16 ENCOUNTER — MYC MEDICAL ADVICE (OUTPATIENT)
Dept: FAMILY MEDICINE | Facility: CLINIC | Age: 33
End: 2018-01-16

## 2018-01-18 ENCOUNTER — VIRTUAL VISIT (OUTPATIENT)
Dept: FAMILY MEDICINE | Facility: CLINIC | Age: 33
End: 2018-01-18
Payer: COMMERCIAL

## 2018-01-18 ENCOUNTER — TELEPHONE (OUTPATIENT)
Dept: PHYSICAL THERAPY | Facility: CLINIC | Age: 33
End: 2018-01-18

## 2018-01-18 DIAGNOSIS — M54.2 CERVICALGIA: ICD-10-CM

## 2018-01-18 DIAGNOSIS — M54.6 BILATERAL THORACIC BACK PAIN, UNSPECIFIED CHRONICITY: ICD-10-CM

## 2018-01-18 DIAGNOSIS — F41.0 PANIC ATTACK: Primary | ICD-10-CM

## 2018-01-18 DIAGNOSIS — F41.1 GENERALIZED ANXIETY DISORDER: ICD-10-CM

## 2018-01-18 DIAGNOSIS — G81.94 LEFT HEMIPLEGIA (H): ICD-10-CM

## 2018-01-18 DIAGNOSIS — R56.9 SEIZURE (H): ICD-10-CM

## 2018-01-18 NOTE — MR AVS SNAPSHOT
After Visit Summary   1/18/2018    Mary Shipman    MRN: [id number]           Patient Information     Date Of Birth          1985        Visit Information        Provider Department      1/18/2018 9:00 AM Miguel Hammer PA-C [facility] [place] [facility]        Today's Diagnoses     Panic attack    -  1    Seizure (H)        Generalized anxiety disorder        Left hemiplegia (H)           Follow-ups after your visit        Who to contact     If you have questions or need follow up information about today's clinic visit or your schedule please contact [employer] [employer] directly at [phone].  Normal or non-critical lab and imaging results will be communicated to you by BizeeBeehart, letter or phone within 4 business days after the clinic has received the results. If you do not hear from us within 7 days, please contact the clinic through [employer] or phone. If you have a critical or abnormal lab result, we will notify you by phone as soon as possible.  Submit refill requests through [employer] or call your pharmacy and they will forward the refill request to us. Please allow 3 business days for your refill to be completed.          Additional Information About Your Visit        MyChart Information     [facility] gives you secure access to your electronic health record. If you see a primary care provider, you can also send messages to your care team and make appointments. If you have questions, please call your primary care clinic.  If you do not have a primary care provider, please call [phone] and they will assist you.        Care EveryWhere ID     This is your Care EveryWhere ID. This could be used by other organizations to access your [place] medical records  [id number]         Blood Pressure from Last 3 Encounters:   01/12/18 120/78   10/23/17 116/79   10/09/17 109/72    Weight from Last 3 Encounters:   01/12/18 249 lb 2 oz (113 kg)   10/23/17 240 lb (108.9 kg)   10/09/17 237  lb (107.5 kg)              Today, you had the following     No orders found for display       Primary Care Provider Office Phone # Fax #    Miguel Hammer PA-C [phone] [address] [facility] [address]        Equal Access to Services     JANA DUCKWORTH : Hadii aad ku hadasho Soomaali, waaxda luqadaha, qaybta kaalmada adeegyada, waxay idiin hayaan adeeg pedromaria del rosariolindsey lamagdiel salcedo. So [facility] [phone].    ATENCIÓN: Si habla español, tiene a omer disposición servicios gratuitos de asistencia lingüística. Llame al [phone].    We comply with applicable federal civil rights laws and [place] laws. We do not discriminate on the basis of race, color, national origin, age, disability, sex, sexual orientation, or gender identity.            Thank you!     Thank you for choosing [facility]  for your care. Our goal is always to provide you with excellent care. Hearing back from our patients is one way we can continue to improve our services. Please take a few minutes to complete the written survey that you may receive in the mail after your visit with us. Thank you!             Your Updated Medication List - Protect others around you: Learn how to safely use, store and throw away your medicines at www.disposemymeds.org.          This list is accurate as of: 1/18/18 11:51 AM.  Always use your most recent med list.                   Brand Name Dispense Instructions for use Diagnosis    albuterol 108 (90 BASE) MCG/ACT Inhaler    PROAIR HFA/PROVENTIL HFA/VENTOLIN HFA    3 Inhaler    Inhale 2 puffs into the lungs every 6 hours as needed for shortness of breath / dyspnea or wheezing    Intermittent asthma, uncomplicated       amitriptyline 10 MG tablet    ELAVIL     10 mg        cholecalciferol 1000 UNIT tablet    vitamin D3    450 tablet    Take 5 daily (total 5000 IU)    Low vitamin D level       clonazePAM 1 MG tablet    klonoPIN    30 tablet    1 at bedtime daily    CRUZ  (generalized anxiety disorder), Insomnia, unspecified type       cyclobenzaprine 10 MG tablet    FLEXERIL    90 tablet    Take 1 tablet (10 mg) by mouth 3 times daily as needed for muscle spasms    Acute pain of both shoulders, Back muscle spasm       fexofenadine-pseudoePHEDrine 180-240 MG per 24 hr tablet    ALLEGRA-D 24     Take 1 tablet by mouth daily        fluticasone 50 MCG/ACT spray    FLONASE     Spray 1-2 sprays into both nostrils daily as needed for rhinitis or allergies        ibuprofen 400-800 mg tablet    ADVIL,MOTRIN    30 tablet    Take 1-2 tablets by mouth every 6 hours as needed (cramping).    Abdominal pain       LANsoprazole 30 MG CR capsule    PREVACID    90 capsule    Take 1 capsule (30 mg) by mouth daily Take 30-60 minutes before a meal.    Gastroesophageal reflux disease without esophagitis       levalbuterol 1.25 MG/0.5ML Nebu neb solution    XOPENEX CONC     Take 1.25 mg by nebulization every 6 hours as needed for wheezing        metFORMIN 500 MG 24 hr tablet    GLUCOPHAGE-XR    180 tablet    Take 1 tablet (500 mg) by mouth 2 times daily (with meals)    Polycystic ovaries       mometasone-formoterol 200-5 MCG/ACT oral inhaler    DULERA    39 g    Inhale 2 puffs into the lungs 2 times daily    Intermittent asthma, uncomplicated       montelukast 10 MG tablet    SINGULAIR    90 tablet    Take 1 tablet (10 mg) by mouth At Bedtime    Intermittent asthma, uncomplicated       polyethylene glycol Packet    MIRALAX/GLYCOLAX     Take 17 g by mouth daily as needed for constipation        ranitidine 150 MG tablet    ZANTAC    60 tablet    Take 150 mg by mouth 2 times daily as needed        sertraline 100 MG tablet    ZOLOFT    180 tablet    2 tablets daily    Major depressive disorder, recurrent episode, moderate (H), Adjustment disorder with mixed anxiety and depressed mood       topiramate 50 MG tablet    TOPAMAX    180 tablet    Take 6 tablets (300 mg) by mouth At Bedtime    Seizure disorder (H),  Migraine without aura and with status migrainosus, not intractable, Convulsions, unspecified convulsion type (H)

## 2018-01-18 NOTE — PROGRESS NOTES
"Mary Shipman is a 32 year old female who is being evaluated via a telephone visit.      The patient has been notified of following:     \"This telephone visit will be conducted via a call between you and your physician/provider. We have found that certain health care needs can be provided without the need for a physical exam.  This service lets us provide the care you need with a short phone conversation.  If a prescription is necessary we can send it directly to your pharmacy.  If lab work is needed we can place an order for that and you can then stop by our lab to have the test done at a later time.    We will bill your insurance company for this service.  Please check with your medical insurance if this type of visit is covered. You may be responsible for the cost of this type of visit if insurance coverage is denied.  The typical cost is $30 (10min), $59 (11-20min) and $85 (21-30min).  Most often these visits are shorter than 10 minutes.    If during the course of the call the physician/provider feels a telephone visit is not appropriate, you will not be charged for this service.\"       Consent has been obtained for this service by 2 care team members: yes. See the scanned image in the medical record.    Mary Shipman complains of  Follow Up For (Following up on neurologist visit from 1/15)      I have reviewed and updated the patient's Past Medical History, Social History, Family History and Medication List.    ALLERGIES  Levaquin [levofloxacin]; Acetaminophen; Hydrocodone; and Hydrocodone-acetaminophen    Silvia Vale CMA ([facility]) (MA signature)    Additional provider notes:   Mostly a reassurance visit. She was seen by neurology and still has ongoing neurologic symptoms and wanted to review with me.     Assessment/Plan:    ICD-10-CM    1. Panic attack F41.0    2. Seizure (H) R56.9    3. Generalized anxiety disorder F41.1    4. Left hemiplegia (H) G81.94         I have reviewed the note as documented above.  " This accurately captures the substance of my conversation with the patient. She was reassured and I know is in good hands with her current neurologist at [facility]. Will try to get records, I haven't received any lately.     Total time of call between patient and provider was 18 minutes     Miguel Hammer, IDAS, PA-C

## 2018-01-18 NOTE — TELEPHONE ENCOUNTER
Patient left voice mail stating she needs to cancel today's PT appointment.  She has been experiencing increased migraines with shoulder stretching.  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  Neurologist wants to do further testing before patient resumes PT.

## 2018-01-21 ENCOUNTER — MYC MEDICAL ADVICE (OUTPATIENT)
Dept: FAMILY MEDICINE | Facility: CLINIC | Age: 33
End: 2018-01-21

## 2018-01-21 DIAGNOSIS — N92.1 MENOMETRORRHAGIA: Primary | ICD-10-CM

## 2018-01-21 DIAGNOSIS — R53.83 OTHER FATIGUE: ICD-10-CM

## 2018-01-21 DIAGNOSIS — K58.1 IRRITABLE BOWEL SYNDROME WITH CONSTIPATION: ICD-10-CM

## 2018-01-25 ENCOUNTER — MYC MEDICAL ADVICE (OUTPATIENT)
Dept: FAMILY MEDICINE | Facility: CLINIC | Age: 33
End: 2018-01-25

## 2018-01-25 ENCOUNTER — E-VISIT (OUTPATIENT)
Dept: FAMILY MEDICINE | Facility: CLINIC | Age: 33
End: 2018-01-25
Payer: COMMERCIAL

## 2018-01-25 DIAGNOSIS — J01.01 ACUTE RECURRENT MAXILLARY SINUSITIS: Primary | ICD-10-CM

## 2018-01-25 PROCEDURE — 98969 ZZC NONPHYSICIAN ONLINE ASSESSMENT AND MANAGEMENT: CPT | Performed by: PHYSICIAN ASSISTANT

## 2018-01-25 RX ORDER — CEFDINIR 300 MG/1
300 CAPSULE ORAL 2 TIMES DAILY
Qty: 20 CAPSULE | Refills: 0 | Status: SHIPPED | OUTPATIENT
Start: 2018-01-25 | End: 2018-08-14

## 2018-01-25 NOTE — TELEPHONE ENCOUNTER
Info sent regarding virtual visit.  See [employer] message reply to patient.  Will await response/scheduling.    Vic Galan RN

## 2018-02-22 ENCOUNTER — OFFICE VISIT (OUTPATIENT)
Dept: OBGYN | Facility: CLINIC | Age: 33
End: 2018-02-22
Payer: COMMERCIAL

## 2018-02-22 VITALS
DIASTOLIC BLOOD PRESSURE: 74 MMHG | BODY MASS INDEX: 39.99 KG/M2 | TEMPERATURE: 98.3 F | WEIGHT: 240 LBS | HEIGHT: 65 IN | SYSTOLIC BLOOD PRESSURE: 102 MMHG

## 2018-02-22 DIAGNOSIS — N92.1 METRORRHAGIA: ICD-10-CM

## 2018-02-22 DIAGNOSIS — Z78.9 VEGAN DIET: ICD-10-CM

## 2018-02-22 DIAGNOSIS — E28.2 PCOS (POLYCYSTIC OVARIAN SYNDROME): ICD-10-CM

## 2018-02-22 DIAGNOSIS — Z00.00 ROUTINE GENERAL MEDICAL EXAMINATION AT A HEALTH CARE FACILITY: Primary | ICD-10-CM

## 2018-02-22 LAB
BETA HCG QUAL IFA URINE: NEGATIVE
CHOLEST SERPL-MCNC: 198 MG/DL
FSH SERPL-ACNC: 6.5 IU/L
HDLC SERPL-MCNC: 45 MG/DL
INSULIN SERPL-ACNC: 19.4 MU/L (ref 3–25)
LDLC SERPL CALC-MCNC: 132 MG/DL
LH SERPL-ACNC: 7.4 IU/L
NONHDLC SERPL-MCNC: 153 MG/DL
TRIGL SERPL-MCNC: 105 MG/DL
VIT B12 SERPL-MCNC: 583 PG/ML (ref 193–986)

## 2018-02-22 PROCEDURE — 36415 COLL VENOUS BLD VENIPUNCTURE: CPT | Performed by: OBSTETRICS & GYNECOLOGY

## 2018-02-22 PROCEDURE — 99395 PREV VISIT EST AGE 18-39: CPT | Performed by: OBSTETRICS & GYNECOLOGY

## 2018-02-22 PROCEDURE — 80061 LIPID PANEL: CPT | Performed by: OBSTETRICS & GYNECOLOGY

## 2018-02-22 PROCEDURE — 84403 ASSAY OF TOTAL TESTOSTERONE: CPT | Performed by: OBSTETRICS & GYNECOLOGY

## 2018-02-22 PROCEDURE — 83525 ASSAY OF INSULIN: CPT | Performed by: OBSTETRICS & GYNECOLOGY

## 2018-02-22 PROCEDURE — 83002 ASSAY OF GONADOTROPIN (LH): CPT | Performed by: OBSTETRICS & GYNECOLOGY

## 2018-02-22 PROCEDURE — 84270 ASSAY OF SEX HORMONE GLOBUL: CPT | Performed by: OBSTETRICS & GYNECOLOGY

## 2018-02-22 PROCEDURE — 84703 CHORIONIC GONADOTROPIN ASSAY: CPT | Performed by: OBSTETRICS & GYNECOLOGY

## 2018-02-22 PROCEDURE — 82607 VITAMIN B-12: CPT | Performed by: OBSTETRICS & GYNECOLOGY

## 2018-02-22 PROCEDURE — 83001 ASSAY OF GONADOTROPIN (FSH): CPT | Performed by: OBSTETRICS & GYNECOLOGY

## 2018-02-22 PROCEDURE — 87491 CHLMYD TRACH DNA AMP PROBE: CPT | Performed by: OBSTETRICS & GYNECOLOGY

## 2018-02-22 PROCEDURE — 87591 N.GONORRHOEAE DNA AMP PROB: CPT | Performed by: OBSTETRICS & GYNECOLOGY

## 2018-02-22 NOTE — NURSING NOTE
"Chief Complaint   Patient presents with     Physical     Heavy long period       Initial /74 (BP Location: Left arm, Patient Position: Sitting, Cuff Size: Adult Large)  Temp 98.3  F (36.8  C) (Oral)  Ht 5' 5\" (1.651 m)  Wt 240 lb (108.9 kg)  LMP 2018 (Approximate)  BMI 39.94 kg/m2 Estimated body mass index is 39.94 kg/(m^2) as calculated from the following:    Height as of this encounter: 5' 5\" (1.651 m).    Weight as of this encounter: 240 lb (108.9 kg).  BP completed using cuff size: large        The following HM Due: pap smear      The following patient reported/Care Every where data was sent to:  P ABSTRACT QUALITY INITIATIVES [[id number]]  na      patient has appointment for today    Wendi Ibrahim MA                "

## 2018-02-22 NOTE — PROGRESS NOTES
Mary is a 32 year old  female who presents for annual exam.     Menses are regular q 28-30 days, last period lasting long and heavy and normal lasting 7 days.  Menses flow: normal except her February cycle.  Patient's last menstrual period was 2018 (approximate).. Using none for contraception.  She is not currently considering pregnancy.  Normal monthly  Cycles always on the 25th of the month.  Came 16 and lasted normal 7 days and then stopped and one week later started up again and has not stopped since. Has been extremely heavy with clots. Was crampy but now nothing.  Has known PCOS.    Started a vegan diet one month ago after watching a documentary about that.   Besides routine health maintenance,  she would like to discuss recent history of seizure.   Had a grand mal seizure 2017.  Also wants to get checked out for her PCOS status.   Really wants to get on BC and has decided that she wants permanent sterilization.  For sure she is done having babies.  Has a special needs son.   BF who won't leave her alone and wants more kids.   Wt Readings from Last 4 Encounters:   18 240 lb (108.9 kg)   18 249 lb 2 oz (113 kg)   10/23/17 240 lb (108.9 kg)   10/09/17 237 lb (107.5 kg)      Lab Results   Component Value Date    PAP NIL 2014    PAP NIL 2012    PAP NIL 2010        GYNECOLOGIC HISTORY:  Menarche: 10  Age at first intercourse: 12 Number of lifetime partners: more than 5  Mary is sexually active with male partner(s) and is currently in monogamous relationship with declines question.    History sexually transmitted infections:No STD history and Chlamydia  STI testing offered?  Accepted  MADIE exposure: No  History of abnormal Pap smear: NO - age 30- 65 PAP every 3 years recommended  Family history of breast CA: Yes (Please explain): Maternal aunt  Family history of uterine/ovarian CA: Yes (Please explain): Sister    Family history of colon CA: Yes (Please  "explain): Mother    HEALTH MAINTENANCE:  Cholesterol: (  Cholesterol   Date Value Ref Range Status   2014 172 <200 mg/dL Final     Comment:     LDL Cholesterol is the primary guide to therapy.   The NCEP recommends further evaluation of: patients with cholesterol greater   than 200 mg/dL if additional risk factors are present, cholesterol greater   than   240 mg/dL, triglycerides greater than 150 mg/dL, or HDL less than 40 mg/dL.     2012 223 (H) 0 - 200 mg/dL Final     Comment:     LDL Cholesterol is the primary guide to therapy.   The NCEP recommends further evaluation of: patients with cholesterol greater   than 200 mg/dL if additional risk factors are present, cholesterol greater   than   240 mg/dL, triglycerides greater than 150 mg/dL, or HDL less than 40 mg/dL.    History of abnormal lipids: Yes  Mammo: yes . History of abnormal Mammo: YES.  Regular Self Breast Exams: No  Calcium/Vitamin D intake: source:  ?  Adequate? No  TSH: (  TSH   Date Value Ref Range Status   2016 0.55 0.40 - 4.00 mU/L Final    )  Pap; (  Lab Results   Component Value Date    PAP NIL 2014    PAP NIL 2012    PAP NIL 2010    )    HISTORY:  Obstetric History       T1      L1     SAB0   TAB0   Ectopic0   Multiple0   Live Births1       # Outcome Date GA Lbr Frank/2nd Weight Sex Delivery Anes PTL Lv   1 Term 12 37w2d 03:50 / 00:48 6 lb 13.7 oz (3.11 kg) M Vag-Spont EPI N MELISSA      Name: MEGAN ERICKSON      Apgar1:  8                Apgar5: 9        Past Medical History:   Diagnosis Date     Allergic rhinitis due to other allergen      Antepartum mild preeclampsia 2012     Asthma      Depressive disorder      Esophageal reflux      Frequent UTI     had a kidney infection also in the past     Gestational hypertension 2012     Helicobacter pylori (H. pylori)     treated     Hyperlipidemia      Irritable bowel syndrome      Liver disease     \"fatty liver\" resolved on own.     Lump " or mass in breast 11/30/2015     Lump or mass in breast      Lump or mass in breast      Mild preeclampsia 12/18/2012     Obesity      Polycystic ovaries      Thyrotoxicosis 5/9/2007     Past Surgical History:   Procedure Laterality Date     C APPENDECTOMY       C NONSPECIFIC PROCEDURE      nasal surgery      TONSILLECTOMY & ADENOIDECTOMY      surgery several times for this     Family History   Problem Relation Age of Onset     Gynecology Mother      ectopic pregnancy/endometriosis     Cancer - colorectal Mother      Congenital Anomalies Mother      kidney problems     Colon Cancer Mother      Hyperlipidemia Mother      Other Cancer Mother      Lung, Skin, Brain and Colon cancer     Gynecology Sister      ectopic pregnancy/endometriosis     Unknown/Adopted Sister      HEART DISEASE Father      Coronary Artery Disease Father      Psychotic Disorder Brother      Unknown/Adopted Brother      Unknown/Adopted Brother      Unknown/Adopted Brother      Unknown/Adopted Brother      Unknown/Adopted Brother      Unknown/Adopted Sister      CEREBROVASCULAR DISEASE Maternal Grandmother      Unknown/Adopted Maternal Grandfather      Unknown/Adopted Paternal Grandmother      Unknown/Adopted Paternal Grandfather       Other       Other       Other       Other       Other       Other      Social History     Social History     Marital status:      Spouse name: N/A     Number of children: 0     Years of education: N/A     Social History Main Topics     Smoking status: Never Smoker     Smokeless tobacco: Never Used     Alcohol use No     Drug use: No     Sexual activity: Yes     Partners: Male     Birth control/ protection: None     Other Topics Concern     Parent/Sibling W/ Cabg, Mi Or Angioplasty Before 65f 55m? No     Social History Narrative    Caffeine intake/servings daily - 0    Calcium intake/servings daily - 1-2    Exercise 0 times weekly - describe     Sunscreen used - No    Seatbelts used - Yes    Guns stored in the  home - No    Self Breast Exam - No    Pap test up to date -  Yes    Eye exam up to date -  Yes    Dental exam up to date -  Yes    DEXA scan up to date -  Not Applicable    Flex Sig/Colonoscopy up to date -  Yes    Mammography up to date -  Not Applicable    Immunizations reviewed and up to date - Yes    Abuse: Current or Past (Physical, Sexual or Emotional) - No    Do you feel safe in your environment - Yes    Do you cope well with stress - Yes    Do you suffer from insomnia - No    Last updated by: STEPHANIE SANDERS  5/21/2012                                       Current Outpatient Prescriptions:      cefdinir (OMNICEF) 300 MG capsule, Take 1 capsule (300 mg) by mouth 2 times daily, Disp: 20 capsule, Rfl: 0     clonazePAM (KLONOPIN) 1 MG tablet, 1 at bedtime daily, Disp: 30 tablet, Rfl: 2     sertraline (ZOLOFT) 100 MG tablet, 2 tablets daily, Disp: 180 tablet, Rfl: 0     topiramate (TOPAMAX) 50 MG tablet, Take 6 tablets (300 mg) by mouth At Bedtime, Disp: 180 tablet, Rfl: 5     amitriptyline (ELAVIL) 10 MG tablet, 10 mg, Disp: , Rfl: 11     cholecalciferol (VITAMIN D) 1000 UNIT tablet, Take 5 daily (total 5000 IU), Disp: 450 tablet, Rfl: 1     cyclobenzaprine (FLEXERIL) 10 MG tablet, Take 1 tablet (10 mg) by mouth 3 times daily as needed for muscle spasms, Disp: 90 tablet, Rfl: 1     fexofenadine-pseudoePHEDrine (ALLEGRA-D 24) 180-240 MG per 24 hr tablet, Take 1 tablet by mouth daily, Disp: , Rfl:      fluticasone (FLONASE) 50 MCG/ACT spray, Spray 1-2 sprays into both nostrils daily as needed for rhinitis or allergies, Disp: , Rfl:      polyethylene glycol (MIRALAX/GLYCOLAX) Packet, Take 17 g by mouth daily as needed for constipation, Disp: , Rfl:      albuterol (PROAIR HFA, PROVENTIL HFA, VENTOLIN HFA) 108 (90 BASE) MCG/ACT inhaler, Inhale 2 puffs into the lungs every 6 hours as needed for shortness of breath / dyspnea or wheezing, Disp: 3 Inhaler, Rfl: 0     mometasone-formoterol (DULERA) 200-5 MCG/ACT oral  inhaler, Inhale 2 puffs into the lungs 2 times daily, Disp: 39 g, Rfl: 0     metFORMIN (GLUCOPHAGE-XR) 500 MG 24 hr tablet, Take 1 tablet (500 mg) by mouth 2 times daily (with meals), Disp: 180 tablet, Rfl: 3     LANsoprazole (PREVACID) 30 MG capsule, Take 1 capsule (30 mg) by mouth daily Take 30-60 minutes before a meal., Disp: 90 capsule, Rfl: 0     levalbuterol (XOPENEX CONC) 1.25 MG/0.5ML NEBU, Take 1.25 mg by nebulization every 6 hours as needed for wheezing, Disp: , Rfl:      montelukast (SINGULAIR) 10 MG tablet, Take 1 tablet (10 mg) by mouth At Bedtime, Disp: 90 tablet, Rfl: 1     ibuprofen (ADVIL,MOTRIN) 400-800 mg tablet, Take 1-2 tablets by mouth every 6 hours as needed (cramping)., Disp: 30 tablet, Rfl: 0     ranitidine (ZANTAC) 150 MG tablet, Take 150 mg by mouth 2 times daily as needed , Disp: 60 tablet, Rfl: 1     Allergies   Allergen Reactions     Levaquin [Levofloxacin] Anaphylaxis     Acetaminophen      Uncertain - hives/anaphylaxis     Hydrocodone Hives     Vicodin     Hydrocodone-Acetaminophen Rash       Past medical, surgical, social and family history were reviewed and updated in EPIC.    ROS:   C:     NEGATIVE for fever, chills, change in weight  I:       NEGATIVE for worrisome rashes, moles or lesions  E:     NEGATIVE for vision changes or irritation  E/M: NEGATIVE for ear, mouth and throat problems  R:     NEGATIVE for significant cough or SOB  CV:   NEGATIVE for chest pain, palpitations or peripheral edema  GI:     NEGATIVE for nausea, abdominal pain, heartburn, or change in bowel habits  :   NEGATIVE for frequency, dysuria, hematuria, vaginal discharge, or irregular bleeding  M:     NEGATIVE for significant arthralgias or myalgia  N:      NEGATIVE for weakness, dizziness or paresthesias  E:      NEGATIVE for temperature intolerance, skin/hair changes  P:      NEGATIVE for changes in mood or affect.    EXAM:  /74 (BP Location: Left arm, Patient Position: Sitting, Cuff Size: Adult  "Large)  Temp 98.3  F (36.8  C) (Oral)  Ht 5' 5\" (1.651 m)  Wt 240 lb (108.9 kg)  LMP 02/08/2018 (Approximate)  BMI 39.94 kg/m2   BMI: Body mass index is 39.94 kg/(m^2).  Constitutional: healthy, alert and no distress  Head: Normocephalic. No masses, lesions, tenderness or abnormalities  Neck: Neck supple. Trachea midline. No adenopathy. Thyroid symmetric, normal size.   Cardiovascular: RRR.   Respiratory: Negative.   Breast: No masses, nodularity, asymmetry or nipple discharge bilaterally.  Gastrointestinal: Abdomen soft, non-tender, non-distended. No masses, organomegaly.  : deferred d/t heavy menses.   Musculoskeletal: extremities normal  Skin: no suspicious lesions or rashes, multiple tattoos   Psychiatric: Affect appropriate, cooperative,mentation appears normal.     COUNSELING:   Reviewed preventive health counseling, as reflected in patient instructions       Regular exercise       Healthy diet/nutrition       Alcohol Use       Contraception       Family planning       Osteoporosis Prevention/Bone Health       Safe sex practices/STD prevention       Her mother is dying from metastatic melanoma and has colon cancer     reports that she has never smoked. She has never used smokeless tobacco.    Body mass index is 39.94 kg/(m^2).    FRAX Risk Assessment    ASSESSMENT:  32 year old female with satisfactory annual exam  (Z00.00) Routine general medical examination at a health care facility  (primary encounter diagnosis)  Comment: normal exam   Plan: NEISSERIA GONORRHOEA PCR, CHLAMYDIA TRACHOMATIS        PCR, Lipid Profile         (N92.1) Metrorrhagia  Comment: discussed consider oral contraceptive pills vs progesterone.   Plan: Beta HCG Qual, Urine - [facility]         (XDF0178), progesterone (PROMETRIUM) 100 MG         capsule             (Z78.9) Vegan diet  Comment:  Reviewed diet, weight and exercise  Plan: Vitamin B12            (E28.2) PCOS (polycystic ovarian syndrome)  Comment:  Stable " without current symptoms   Plan: Insulin level, Testosterone Free and Total,         Follicle stimulating hormone, Lutropin          Gisela Gardner MD

## 2018-02-22 NOTE — MR AVS SNAPSHOT
"              After Visit Summary   2/22/2018    Mary Shipman    MRN: [id number]           Patient Information     Date Of Birth          1985        Visit Information        Provider Department      2/22/2018 8:00 AM Gisela Gardner [place] [facility] [place]'s Diagnoses     Routine general medical examination at a health care facility    -  1    Metrorrhagia        Vegan diet        PCOS (polycystic ovarian syndrome)           Follow-ups after your visit        Who to contact     If you have questions or need follow up information about today's clinic visit or your schedule please contact [facility] directly at [phone].  Normal or non-critical lab and imaging results will be communicated to you by MyChart, letter or phone within 4 business days after the clinic has received the results. If you do not hear from us within 7 days, please contact the clinic through [facility] or phone. If you have a critical or abnormal lab result, we will notify you by phone as soon as possible.  Submit refill requests through [employer] or call your pharmacy and they will forward the refill request to us. Please allow 3 business days for your refill to be completed.          Additional Information About Your Visit        MyChart Information     [employer] gives you secure access to your electronic health record. If you see a primary care provider, you can also send messages to your care team and make appointments. If you have questions, please call your primary care clinic.  If you do not have a primary care provider, please call [phone] and they will assist you.        Care EveryWhere ID     This is your Care EveryWhere ID. This could be used by other organizations to access your [place] medical records  [id number]        Your Vitals Were     Temperature Height Last Period BMI (Body Mass Index)          98.3  F (36.8  C) (Oral) 5' 5\" (1.651 m) 02/08/2018 (Approximate) 39.94 kg/m2  "        Blood Pressure from Last 3 Encounters:   02/22/18 102/74   01/12/18 120/78   10/23/17 116/79    Weight from Last 3 Encounters:   02/22/18 240 lb (108.9 kg)   01/12/18 249 lb 2 oz (113 kg)   10/23/17 240 lb (108.9 kg)              We Performed the Following     Beta HCG Qual, Urine - [facility] ([id number])     CHLAMYDIA TRACHOMATIS PCR     Follicle stimulating hormone     Insulin level     Lipid Profile     Lutropin     NEISSERIA GONORRHOEA PCR     Testosterone Free and Total     Vitamin B12          Today's Medication Changes          These changes are accurate as of 2/22/18 11:59 PM.  If you have any questions, ask your nurse or doctor.               Start taking these medicines.        Dose/Directions    progesterone 100 MG capsule   Commonly known as:  PROMETRIUM   Used for:  Metrorrhagia   Started by:  Gisela Gardner MD        Dose:  100 mg   Take 1 capsule (100 mg) by mouth daily   Quantity:  30 capsule   Refills:  0            Where to get your medicines      These medications were sent to [facility]pharm[facility] #[id number] - [facility] [address] [place] [address]  [address] [place] [address]     Phone:  [phone]     progesterone 100 MG capsule                Primary Care Provider Office Phone # Fax #    Miguel Hammer PA-C [phone]       [facility] [address]        Equal Access to Services     [facility]: Hadii lorraine saleh hadasho Soaileen, waaxda luqadaha, qaybta kaalmada adeegyada, germain rey . So [facility] [phone].    ATENCIÓN: Si habla español, tiene a omer disposición servicios gratuitos de asistencia lingüística. Llame al [phone].    We comply with applicable federal civil rights laws and [place] laws. We do not discriminate on the basis of race, color, national origin, age, disability, sex, sexual orientation, or gender identity.            Thank you!     Thank you for choosing [facility]  [place]  for your care. Our goal is always to provide you with excellent care. Hearing back from our patients is one way we can continue to improve our services. Please take a few minutes to complete the written survey that you may receive in the mail after your visit with us. Thank you!             Your Updated Medication List - Protect others around you: Learn how to safely use, store and throw away your medicines at www.disposemymeds.org.          This list is accurate as of 2/22/18 11:59 PM.  Always use your most recent med list.                   Brand Name Dispense Instructions for use Diagnosis    albuterol 108 (90 Base) MCG/ACT Inhaler    PROAIR HFA/PROVENTIL HFA/VENTOLIN HFA    3 Inhaler    Inhale 2 puffs into the lungs every 6 hours as needed for shortness of breath / dyspnea or wheezing    Intermittent asthma, uncomplicated       amitriptyline 10 MG tablet    ELAVIL     10 mg        cefdinir 300 MG capsule    OMNICEF    20 capsule    Take 1 capsule (300 mg) by mouth 2 times daily    Acute recurrent maxillary sinusitis       cholecalciferol 1000 UNIT tablet    vitamin D3    450 tablet    Take 5 daily (total 5000 IU)    Low vitamin D level       clonazePAM 1 MG tablet    klonoPIN    30 tablet    1 at bedtime daily    CRUZ (generalized anxiety disorder), Insomnia, unspecified type       cyclobenzaprine 10 MG tablet    FLEXERIL    90 tablet    Take 1 tablet (10 mg) by mouth 3 times daily as needed for muscle spasms    Acute pain of both shoulders, Back muscle spasm       fexofenadine-pseudoePHEDrine 180-240 MG per 24 hr tablet    ALLEGRA-D 24     Take 1 tablet by mouth daily        fluticasone 50 MCG/ACT spray    FLONASE     Spray 1-2 sprays into both nostrils daily as needed for rhinitis or allergies        ibuprofen 400-800 mg tablet    ADVIL,MOTRIN    30 tablet    Take 1-2 tablets by mouth every 6 hours as needed (cramping).    Abdominal pain       LANsoprazole 30 MG CR capsule    PREVACID    90 capsule     Take 1 capsule (30 mg) by mouth daily Take 30-60 minutes before a meal.    Gastroesophageal reflux disease without esophagitis       levalbuterol 1.25 MG/0.5ML Nebu neb solution    XOPENEX CONC     Take 1.25 mg by nebulization every 6 hours as needed for wheezing        metFORMIN 500 MG 24 hr tablet    GLUCOPHAGE-XR    180 tablet    Take 1 tablet (500 mg) by mouth 2 times daily (with meals)    Polycystic ovaries       mometasone-formoterol 200-5 MCG/ACT oral inhaler    DULERA    39 g    Inhale 2 puffs into the lungs 2 times daily    Intermittent asthma, uncomplicated       montelukast 10 MG tablet    SINGULAIR    90 tablet    Take 1 tablet (10 mg) by mouth At Bedtime    Intermittent asthma, uncomplicated       polyethylene glycol Packet    MIRALAX/GLYCOLAX     Take 17 g by mouth daily as needed for constipation        progesterone 100 MG capsule    PROMETRIUM    30 capsule    Take 1 capsule (100 mg) by mouth daily    Metrorrhagia       ranitidine 150 MG tablet    ZANTAC    60 tablet    Take 150 mg by mouth 2 times daily as needed        topiramate 50 MG tablet    TOPAMAX    180 tablet    Take 6 tablets (300 mg) by mouth At Bedtime    Seizure disorder (H), Migraine without aura and with status migrainosus, not intractable, Convulsions, unspecified convulsion type (H)

## 2018-02-23 LAB
C TRACH DNA SPEC QL NAA+PROBE: NEGATIVE
N GONORRHOEA DNA SPEC QL NAA+PROBE: NEGATIVE
SPECIMEN SOURCE: NORMAL
SPECIMEN SOURCE: NORMAL

## 2018-02-24 LAB
SHBG SERPL-SCNC: 57 NMOL/L (ref 30–135)
TESTOST FREE SERPL-MCNC: 0.28 NG/DL (ref 0.13–0.92)
TESTOST SERPL-MCNC: 23 NG/DL (ref 8–60)

## 2018-03-03 ENCOUNTER — MYC MEDICAL ADVICE (OUTPATIENT)
Dept: FAMILY MEDICINE | Facility: CLINIC | Age: 33
End: 2018-03-03

## 2018-03-03 DIAGNOSIS — F33.1 MAJOR DEPRESSIVE DISORDER, RECURRENT EPISODE, MODERATE (H): ICD-10-CM

## 2018-03-03 DIAGNOSIS — F43.23 ADJUSTMENT DISORDER WITH MIXED ANXIETY AND DEPRESSED MOOD: ICD-10-CM

## 2018-03-05 RX ORDER — SERTRALINE HYDROCHLORIDE 100 MG/1
TABLET, FILM COATED ORAL
Qty: 180 TABLET | Refills: 1 | Status: SHIPPED | OUTPATIENT
Start: 2018-03-05 | End: 2018-09-22

## 2018-03-05 RX ORDER — SERTRALINE HYDROCHLORIDE 100 MG/1
TABLET, FILM COATED ORAL
Qty: 180 TABLET | Refills: 0 | OUTPATIENT
Start: 2018-03-05

## 2018-03-05 NOTE — TELEPHONE ENCOUNTER
Requested Prescriptions   Pending Prescriptions Disp Refills     sertraline (ZOLOFT) 100 MG tablet [Pharmacy Med Name: SERTRALINE    MG TABLET]  Last Written Prescription Date:  11/16/2017  Last Fill Quantity: 180 tablet,  # refills: 0   Last office visit: 1/12/2018   previous visit found with prescribing provider:  NILS Hammer   Future Office Visit:     180 tablet 0     Sig: TAKE 2 TABLETS BY MOUTH EVERY DAY    SSRIs Protocol Passed    3/3/2018  9:03 AM  PHQ-9 SCORE 8/31/2017 9/21/2017 1/12/2018   Total Score - - -   Total Score MyChart - - -   Total Score 19 22 15     CRUZ-7 SCORE 8/31/2017 9/21/2017 1/12/2018   Total Score - - -   Total Score 18 18 18          Passed - Patient is age 18 or older       Passed - No active pregnancy on record       Passed - No positive pregnancy test in last 12 months

## 2018-03-05 NOTE — TELEPHONE ENCOUNTER
LOV- 18. Prescription approved per [facility] Refill Protocol.  Marni Sorto RN       Requested Prescriptions   Pending Prescriptions Disp Refills     sertraline (ZOLOFT) 100 MG tablet 180 tablet 0     Si tablets daily    SSRIs Protocol Passed    3/5/2018 10:18 AM       Passed - Patient is age 18 or older       Passed - No active pregnancy on record       Passed - No positive pregnancy test in last 12 months

## 2018-04-25 DIAGNOSIS — R56.9 CONVULSIONS, UNSPECIFIED CONVULSION TYPE (H): ICD-10-CM

## 2018-04-25 DIAGNOSIS — G43.001 MIGRAINE WITHOUT AURA AND WITH STATUS MIGRAINOSUS, NOT INTRACTABLE: ICD-10-CM

## 2018-04-25 DIAGNOSIS — G40.909 SEIZURE DISORDER (H): ICD-10-CM

## 2018-04-25 RX ORDER — TOPIRAMATE 50 MG/1
TABLET, FILM COATED ORAL
Qty: 180 TABLET | Refills: 5 | Status: SHIPPED | OUTPATIENT
Start: 2018-04-25 | End: 2019-08-30

## 2018-04-25 NOTE — TELEPHONE ENCOUNTER
"Requested Prescriptions   Pending Prescriptions Disp Refills     topiramate (TOPAMAX) 50 MG tablet [Pharmacy Med Name: TOPIRAMATE 50 MG TABLET]  Last Written Prescription Date:  10/6/2017  Last Fill Quantity: 180 tabs,  # refills: 5   Last office visit: No previous visit found with prescribing provider:  Harman   Future Office Visit:     180 tablet 5     Sig: TAKE 6 TABLETS (300 MG) BY MOUTH AT BEDTIME    Anti-Seizure Meds Protocol  Failed    4/25/2018 10:10 AM       Failed - Review Authorizing provider's last note.     Refer to last progress notes: confirm request is for original authorizing provider (cannot be through other providers).         Passed - Recent (12 mo) or future (30 days) visit within the authorizing provider's specialty    Patient had office visit in the last 12 months or has a visit in the next 30 days with authorizing provider or within the authorizing provider's specialty.  See \"Patient Info\" tab in inbasket, or \"Choose Columns\" in Meds & Orders section of the refill encounter.           Passed - Normal CBC on file in past 26 months    Recent Labs   Lab Test  03/28/17   0919   WBC  6.0   RBC  4.51   HGB  13.5   HCT  40.0   PLT  255            Passed - Normal ALT or AST on file in past 26 months    Recent Labs   Lab Test  03/27/17   1655   ALT  17     Recent Labs   Lab Test  03/27/17   1655   AST  11            Passed - Normal platelet count on file in past 26 months    Recent Labs   Lab Test  03/28/17   0919   PLT  255              Passed - No active pregnancy on record       Passed - No positive pregnancy test in last 12 months          "

## 2018-04-30 ENCOUNTER — TELEPHONE (OUTPATIENT)
Dept: PEDIATRICS | Facility: CLINIC | Age: 33
End: 2018-04-30

## 2018-04-30 NOTE — TELEPHONE ENCOUNTER
Reason for Call:  Other letter     Detailed comments: Patient is calling wanting to get a letter from Harman about her missing work today. She had said that she was out due to migraines. Please call to speak with patient about letter.     Phone Number Patient can be reached at: Home number on file [phone] (home)    Best Time: Anytime     Can we leave a detailed message on this number? YES    Call taken on 4/30/2018 at 3:52 PM by Regla Brody

## 2018-04-30 NOTE — LETTER
[address] [address]  [phone]          May 1, 2018    RE:  Mary Shipman                                                                                                                                                       [address] [facility] [address]            To whom it may concern:    Please excuse Mary from work for Monday, Tuesday and Wednesday of this week for an acute issue related to migraines.     Sincerely,        Miguel Hammer

## 2018-04-30 NOTE — TELEPHONE ENCOUNTER
Patient/family was instructed to return call to [facility] RN directly on the RN Call back line at [phone].     Need more information.     Joel Lorenzana RN

## 2018-05-01 NOTE — TELEPHONE ENCOUNTER
Reviewed:  1. These are concerning symptoms - I would advise she check in with her neurologist. I've tried to stress that with her in the past but often she comes to us first. Her neurologist is the best source of information regarding plans for her complex and uncertain neurologic issues. Seeking ED care is very reasonable.    2. I did a letter, it is in epic.    Thanks.  Miguel Hammer, MPAS, PA-C

## 2018-05-01 NOTE — TELEPHONE ENCOUNTER
"See below-patient requests a letter for work, she plans to try to return Thursday.     Called and spoke with patient. She states she feels like she felt right before her grand mal seizure. Her \"left side is real tingly. I've been wearing my glasses to wear for the light sensitivity. Migraine consistent since Sunday at 1 pm, it's really really bad. Slept yesterday from 2 pm yesterday to 7 am today. I'm nauseated and sick to my stomach. I've noticed short term memory loss and slurred speech yesterday. I've been working 80 hours every week so I think that has something to do with it. I ran out of her topamax and hadn't taken it for a week and a half but started it back up today.\"    I advised that patient go to ER immediately. She states that she does not have health insurance and she does not want to go to ER and get \"a crazy big bill\". I reiterated how important it is that she's seen, she may need medication adjustment, and her symptoms also sound stroke-like. She verbalized understanding yet still declined ER. She does have a friend with her at home, so she is not alone.    Will route to provider to advise.     Joel Lorenzana RN      "

## 2018-05-01 NOTE — TELEPHONE ENCOUNTER
Called and left detailed VM. Will keep open in case patient calls back. Printed letter and placed at .    Joel Lorenzana RN

## 2018-05-17 ENCOUNTER — E-VISIT (OUTPATIENT)
Dept: FAMILY MEDICINE | Facility: CLINIC | Age: 33
End: 2018-05-17
Payer: MEDICAID

## 2018-05-17 DIAGNOSIS — J01.01 ACUTE RECURRENT MAXILLARY SINUSITIS: Primary | ICD-10-CM

## 2018-05-17 PROCEDURE — 98969 ZZC NONPHYSICIAN ONLINE ASSESSMENT AND MANAGEMENT: CPT | Performed by: PHYSICIAN ASSISTANT

## 2018-05-17 RX ORDER — PREDNISONE 20 MG/1
40 TABLET ORAL DAILY
Qty: 10 TABLET | Refills: 0 | Status: SHIPPED | OUTPATIENT
Start: 2018-05-17 | End: 2018-05-22

## 2018-05-17 RX ORDER — CEFDINIR 300 MG/1
300 CAPSULE ORAL 2 TIMES DAILY
Qty: 28 CAPSULE | Refills: 0 | Status: SHIPPED | OUTPATIENT
Start: 2018-05-17 | End: 2021-03-16

## 2018-06-12 ENCOUNTER — TRANSFERRED RECORDS (OUTPATIENT)
Dept: HEALTH INFORMATION MANAGEMENT | Facility: CLINIC | Age: 33
End: 2018-06-12

## 2018-06-13 NOTE — TELEPHONE ENCOUNTER
Patient instructed to return to hospital lab for further testing.   Patient picking up envelope, ID verified and envelope handed to patient.    . Nicole Israel  Patient Representative

## 2018-08-07 ENCOUNTER — TRANSFERRED RECORDS (OUTPATIENT)
Dept: HEALTH INFORMATION MANAGEMENT | Facility: CLINIC | Age: 33
End: 2018-08-07

## 2018-08-08 ENCOUNTER — MYC MEDICAL ADVICE (OUTPATIENT)
Dept: FAMILY MEDICINE | Facility: CLINIC | Age: 33
End: 2018-08-08

## 2018-08-08 DIAGNOSIS — F41.1 GAD (GENERALIZED ANXIETY DISORDER): Primary | ICD-10-CM

## 2018-08-08 DIAGNOSIS — F41.0 PANIC ATTACK: ICD-10-CM

## 2018-08-08 DIAGNOSIS — G47.00 INSOMNIA, UNSPECIFIED TYPE: ICD-10-CM

## 2018-08-09 RX ORDER — CLONAZEPAM 1 MG/1
TABLET ORAL
Qty: 15 TABLET | Refills: 0 | Status: SHIPPED | OUTPATIENT
Start: 2018-08-09 | End: 2018-08-14

## 2018-08-09 NOTE — TELEPHONE ENCOUNTER
Prescription for Klonopin was faxed to [facility] [phone], left message for patient to call back and schedule appointment.  Opal Gao

## 2018-08-09 NOTE — TELEPHONE ENCOUNTER
See mychart. RX in clear box. Can fax. Please call her and get her on schedule for a visit phone or in person.  Thanks.  Mingo

## 2018-08-10 NOTE — TELEPHONE ENCOUNTER
Patient calls MEGHANA CLEMENS stating that she would like to schedule a phone visit.     Joel Lorenzana RN

## 2018-08-14 ENCOUNTER — VIRTUAL VISIT (OUTPATIENT)
Dept: FAMILY MEDICINE | Facility: CLINIC | Age: 33
End: 2018-08-14
Payer: COMMERCIAL

## 2018-08-14 DIAGNOSIS — F33.1 MODERATE EPISODE OF RECURRENT MAJOR DEPRESSIVE DISORDER (H): Primary | ICD-10-CM

## 2018-08-14 DIAGNOSIS — F41.1 GAD (GENERALIZED ANXIETY DISORDER): ICD-10-CM

## 2018-08-14 DIAGNOSIS — R79.89 LOW VITAMIN D LEVEL: ICD-10-CM

## 2018-08-14 DIAGNOSIS — G47.00 INSOMNIA, UNSPECIFIED TYPE: ICD-10-CM

## 2018-08-14 PROCEDURE — 98967 PH1 ASSMT&MGMT NQHP 11-20: CPT | Performed by: PHYSICIAN ASSISTANT

## 2018-08-14 RX ORDER — ARIPIPRAZOLE 5 MG/1
5 TABLET ORAL AT BEDTIME
Qty: 30 TABLET | Refills: 1 | Status: SHIPPED | OUTPATIENT
Start: 2018-08-14 | End: 2019-03-18

## 2018-08-14 RX ORDER — CLONAZEPAM 1 MG/1
TABLET ORAL
Qty: 30 TABLET | Refills: 1 | Status: SHIPPED | OUTPATIENT
Start: 2018-08-14 | End: 2018-09-28

## 2018-08-14 NOTE — MR AVS SNAPSHOT
After Visit Summary   8/14/2018    Mary Shipman    MRN: [id number]           Patient Information     Date Of Birth          1985        Visit Information        Provider Department      8/14/2018 11:00 AM Miguel Hammer PA-C [facility]        Today's Diagnoses     Moderate episode of recurrent major depressive disorder (H)    -  1    Low vitamin D level        CRUZ (generalized anxiety disorder)        Insomnia, unspecified type           Follow-ups after your visit        Your next 10 appointments already scheduled     Aug 22, 2018 10:30 AM CDT   (Arrive by 10:00 AM)   New Visit with L[facility]pez [facility] ([facility])    [address] [place] [id number] [phone]              Who to contact     If you have questions or need follow up information about today's clinic visit or your schedule please contact [facility] directly at [phone].  Normal or non-critical lab and imaging results will be communicated to you by Bushidohart, letter or phone within 4 business days after the clinic has received the results. If you do not hear from us within 7 days, please contact the clinic through Tivrat or phone. If you have a critical or abnormal lab result, we will notify you by phone as soon as possible.  Submit refill requests through [employer] or call your pharmacy and they will forward the refill request to us. Please allow 3 business days for your refill to be completed.          Additional Information About Your Visit        MyChart Information     [employer] gives you secure access to your electronic health record. If you see a primary care provider, you can also send messages to your care team and make appointments. If you have questions, please call your primary care clinic.  If you do not have a primary care provider, please call [phone] and they will assist you.        Care  EveryWhere ID     This is your Care EveryWhere ID. This could be used by other organizations to access your [place] medical records  [id number]         Blood Pressure from Last 3 Encounters:   02/22/18 102/74   01/12/18 120/78   10/23/17 116/79    Weight from Last 3 Encounters:   02/22/18 240 lb (108.9 kg)   01/12/18 249 lb 2 oz (113 kg)   10/23/17 240 lb (108.9 kg)              Today, you had the following     No orders found for display         Today's Medication Changes          These changes are accurate as of 8/14/18 11:35 AM.  If you have any questions, ask your nurse or doctor.               Start taking these medicines.        Dose/Directions    ARIPiprazole 5 MG tablet   Commonly known as:  ABILIFY   Used for:  Insomnia, unspecified type, Moderate episode of recurrent major depressive disorder (H), CRUZ (generalized anxiety disorder)   Started by:  Miguel Hammer PA-C        Dose:  5 mg   Take 1 tablet (5 mg) by mouth At Bedtime   Quantity:  30 tablet   Refills:  1         Stop taking these medicines if you haven't already. Please contact your care team if you have questions.     cefdinir 300 MG capsule   Commonly known as:  OMNICEF   Stopped by:  Miguel Hammer PA-C           cyclobenzaprine 10 MG tablet   Commonly known as:  FLEXERIL   Stopped by:  Miguel Hammer PA-C           metFORMIN 500 MG 24 hr tablet   Commonly known as:  GLUCOPHAGE-XR   Stopped by:  Miguel Hammer PA-C           progesterone 100 MG capsule   Commonly known as:  PROMETRIUM   Stopped by:  Miguel Hammer PA-C                Where to get your medicines      Some of these will need a paper prescription and others can be bought over the counter.  Ask your nurse if you have questions.     Bring a paper prescription for each of these medications     clonazePAM 1 MG tablet         Call your pharmacy to confirm that your medication is ready for pickup. It may take up to 24 hours for them to receive the  prescription. If the prescription is not ready within 3 business days, please contact your clinic or your provider.     We will let you know when these medications are ready. If you don't hear back within 3 business days, please contact us.     ARIPiprazole 5 MG tablet                Primary Care Provider Office Phone # Fax #    Miguel Hammer PA-C [phone]       [address] [facility] [address]        Equal Access to Services     JANA DUCKWORTH : Hadii aad ku hadasho Soomaali, waaxda luqadaha, qaybta kaalmada adeegyada, waxay idiin hayaan adeeg kharash lamagdiel . So [facility] [phone].    ATENCIÓN: Si marinela espallie, tiene a omre disposición servicios gratuitos de asistencia lingüística. [facility] [phone].    We comply with applicable federal civil rights laws and [place] laws. We do not discriminate on the basis of race, color, national origin, age, disability, sex, sexual orientation, or gender identity.            Thank you!     Thank you for choosing [facility]  for your care. Our goal is always to provide you with excellent care. Hearing back from our patients is one way we can continue to improve our services. Please take a few minutes to complete the written survey that you may receive in the mail after your visit with us. Thank you!             Your Updated Medication List - Protect others around you: Learn how to safely use, store and throw away your medicines at www.disposemymeds.org.          This list is accurate as of 8/14/18 11:35 AM.  Always use your most recent med list.                   Brand Name Dispense Instructions for use Diagnosis    albuterol 108 (90 Base) MCG/ACT inhaler    PROAIR HFA/PROVENTIL HFA/VENTOLIN HFA    3 Inhaler    Inhale 2 puffs into the lungs every 6 hours as needed for shortness of breath / dyspnea or wheezing    Intermittent asthma, uncomplicated       amitriptyline 10 MG tablet    ELAVIL     10 mg        ARIPiprazole 5 MG  tablet    ABILIFY    30 tablet    Take 1 tablet (5 mg) by mouth At Bedtime    Insomnia, unspecified type, Moderate episode of recurrent major depressive disorder (H), CRUZ (generalized anxiety disorder)       * cholecalciferol 1000 UNIT tablet    vitamin D3    450 tablet    Take 5 daily (total 5000 IU)    Low vitamin D level       * cholecalciferol 52028 units capsule    VITAMIN D3    4 capsule    Take 1 capsule (50,000 Units) by mouth once a week    Low vitamin D level       clonazePAM 1 MG tablet    klonoPIN    30 tablet    1 at bedtime daily    CRUZ (generalized anxiety disorder), Insomnia, unspecified type       fexofenadine-pseudoePHEDrine 180-240 MG per 24 hr tablet    ALLEGRA-D 24     Take 1 tablet by mouth daily        fluticasone 50 MCG/ACT spray    FLONASE     Spray 1-2 sprays into both nostrils daily as needed for rhinitis or allergies        ibuprofen 400-800 mg tablet    ADVIL,MOTRIN    30 tablet    Take 1-2 tablets by mouth every 6 hours as needed (cramping).    Abdominal pain       LANsoprazole 30 MG CR capsule    PREVACID    90 capsule    Take 1 capsule (30 mg) by mouth daily Take 30-60 minutes before a meal.    Gastroesophageal reflux disease without esophagitis       levalbuterol 1.25 MG/0.5ML Nebu neb solution    XOPENEX CONC     Take 1.25 mg by nebulization every 6 hours as needed for wheezing        mometasone-formoterol 200-5 MCG/ACT oral inhaler    DULERA    39 g    Inhale 2 puffs into the lungs 2 times daily    Intermittent asthma, uncomplicated       montelukast 10 MG tablet    SINGULAIR    90 tablet    Take 1 tablet (10 mg) by mouth At Bedtime    Intermittent asthma, uncomplicated       norgestimate-ethinyl estradiol 0.25-35 MG-MCG per tablet    ORTHO-CYCLEN, SPRINTEC    84 tablet    Take 1 tablet by mouth daily    Encounter for initial prescription of contraceptive pills       polyethylene glycol Packet    MIRALAX/GLYCOLAX     Take 17 g by mouth daily as needed for constipation         ranitidine 150 MG tablet    ZANTAC    60 tablet    Take 150 mg by mouth 2 times daily as needed        sertraline 100 MG tablet    ZOLOFT    180 tablet    2 tablets daily    Major depressive disorder, recurrent episode, moderate (H), Adjustment disorder with mixed anxiety and depressed mood       topiramate 50 MG tablet    TOPAMAX    180 tablet    TAKE 6 TABLETS (300 MG) BY MOUTH AT BEDTIME    Seizure disorder (H), Migraine without aura and with status migrainosus, not intractable, Convulsions, unspecified convulsion type (H)       * Notice:  This list has 2 medication(s) that are the same as other medications prescribed for you. Read the directions carefully, and ask your doctor or other care provider to review them with you.

## 2018-08-14 NOTE — PROGRESS NOTES
"Mary Shipman is a 33 year old female who is being evaluated via a telephone visit.      The patient has been notified of following:     \"This telephone visit will be conducted via a call between you and your physician/provider. We have found that certain health care needs can be provided without the need for a physical exam.  This service lets us provide the care you need with a short phone conversation.  If a prescription is necessary we can send it directly to your pharmacy.  If lab work is needed we can place an order for that and you can then stop by our lab to have the test done at a later time.    We will bill your insurance company for this service.  Please check with your medical insurance if this type of visit is covered. You may be responsible for the cost of this type of visit if insurance coverage is denied.  The typical cost is $30 (10min), $59(11-20min) and $85 (21-30min).  Most often these visits are shorter than 10 minutes.    If during the course of the call the physician/provider feels a telephone visit is not appropriate, you will not be charged for this service. \"     Consent has been obtained for this service by 1 care team member:yes. See the scanned image in the medical record.    Preferred patient phone number to be used for this call: [phone]    Mary Shipman complains of  Follow up neurology     Past Medical History:   Diagnosis Date     Allergic rhinitis due to other allergen      Antepartum mild preeclampsia 11/23/2012     Asthma      Depressive disorder      Esophageal reflux      Frequent UTI     had a kidney infection also in the past     Gestational hypertension 11/20/2012     Helicobacter pylori (H. pylori)     treated     Hyperlipidemia      Irritable bowel syndrome      Liver disease     \"fatty liver\" resolved on own.     Lump or mass in breast 11/30/2015     Lump or mass in breast      Lump or mass in breast      Mild preeclampsia 12/18/2012     Obesity      Polycystic " ovaries      Thyrotoxicosis 5/9/2007      Social History     Social History     Marital status:      Spouse name: N/A     Number of children: 0     Years of education: N/A     Occupational History     Not on file.     Social History Main Topics     Smoking status: Never Smoker     Smokeless tobacco: Never Used     Alcohol use No     Drug use: No     Sexual activity: Yes     Partners: Male     Birth control/ protection: None     Other Topics Concern     Parent/Sibling W/ Cabg, Mi Or Angioplasty Before 65f 55m? No     Social History Narrative    Caffeine intake/servings daily - 0    Calcium intake/servings daily - 1-2    Exercise 0 times weekly - describe     Sunscreen used - No    Seatbelts used - Yes    Guns stored in the home - No    Self Breast Exam - No    Pap test up to date -  Yes    Eye exam up to date -  Yes    Dental exam up to date -  Yes    DEXA scan up to date -  Not Applicable    Flex Sig/Colonoscopy up to date -  Yes    Mammography up to date -  Not Applicable    Immunizations reviewed and up to date - Yes    Abuse: Current or Past (Physical, Sexual or Emotional) - No    Do you feel safe in your environment - Yes    Do you cope well with stress - Yes    Do you suffer from insomnia - No    Last updated by: STEPHANIE SANDERS  5/21/2012                                     ALLERGIES  Levaquin [levofloxacin]; Acetaminophen; Hydrocodone; and Hydrocodone-acetaminophen    Opal Davis MA   (MA signature)    Additional provider notes:    1. Anxiety / depression - severe - reports that her mood is labile, she feels anxious, depressed, sad most of the time. Reports that she feels worthless. Her health has continued to be bad. She has left sided neurologic symptoms. She is seeing her neurologist for that. She reports that she's seeing her neurologist in a few weeks. She is planning on starting up on therapy again. She's on 200 mg sertraline which helps a little.  2. Low D - she has questions on her dose.  She thinks it is meant to be 50,000 units daily but she isn't sure  3. Panic / anxiety - using Clonazepam for this right now. She states her neuropsych person thinks she can / should use daily.     Assessment/Plan:  (F41.1) CRUZ (generalized anxiety disorder)  Comment:   Plan: Worse with some labile mood and depressive symptoms.   Reviewed - advised addition of a mood stabilizer. She has some significant lability to moods. I don't think she has bipolar, but she certainly has poorly controlled depression. This prescription is given with a discussion of side effects, risks and proper use.  Instructions are given to follow up if not improving or symptoms change or worsen as discussed.     Vitamin D - low - I agree higher doses for her are a good idea, I think 50,000 units weekly is adequate. She can review with her neurologist.     (G47.00) Insomnia, unspecified type  Comment:   Plan: As noted - sleep hygiene self cares, etc all advised       Total time spent on this phone visit with the patient = 15 minutes     I have reviewed the note as documented above.  This accurately captures the substance of my conversation with the patient.     I asked she see me in clinic for our next visit - she now showed 3 past visits.    RAQUEL Frank, PA-C     One KLONOPIN script faxed to [facility] pharmacy [phone]--Silvia Vale CMA ([facility])

## 2018-08-22 ENCOUNTER — OFFICE VISIT (OUTPATIENT)
Dept: BEHAVIORAL HEALTH | Facility: CLINIC | Age: 33
End: 2018-08-22
Payer: COMMERCIAL

## 2018-08-22 DIAGNOSIS — F41.1 GENERALIZED ANXIETY DISORDER: ICD-10-CM

## 2018-08-22 DIAGNOSIS — F33.1 MAJOR DEPRESSIVE DISORDER, RECURRENT EPISODE, MODERATE (H): Primary | ICD-10-CM

## 2018-08-22 PROCEDURE — 90791 PSYCH DIAGNOSTIC EVALUATION: CPT | Performed by: SOCIAL WORKER

## 2018-08-22 ASSESSMENT — ANXIETY QUESTIONNAIRES
IF YOU CHECKED OFF ANY PROBLEMS ON THIS QUESTIONNAIRE, HOW DIFFICULT HAVE THESE PROBLEMS MADE IT FOR YOU TO DO YOUR WORK, TAKE CARE OF THINGS AT HOME, OR GET ALONG WITH OTHER PEOPLE: SOMEWHAT DIFFICULT
5. BEING SO RESTLESS THAT IT IS HARD TO SIT STILL: SEVERAL DAYS
3. WORRYING TOO MUCH ABOUT DIFFERENT THINGS: SEVERAL DAYS
1. FEELING NERVOUS, ANXIOUS, OR ON EDGE: SEVERAL DAYS
7. FEELING AFRAID AS IF SOMETHING AWFUL MIGHT HAPPEN: SEVERAL DAYS
2. NOT BEING ABLE TO STOP OR CONTROL WORRYING: SEVERAL DAYS
6. BECOMING EASILY ANNOYED OR IRRITABLE: SEVERAL DAYS
GAD7 TOTAL SCORE: 7

## 2018-08-22 ASSESSMENT — PATIENT HEALTH QUESTIONNAIRE - PHQ9: 5. POOR APPETITE OR OVEREATING: SEVERAL DAYS

## 2018-08-22 NOTE — MR AVS SNAPSHOT
MRN:[id number]                      After Visit Summary   8/22/2018    Mary Shipman    MRN: [id number]           Visit Information        Provider Department      8/22/2018 10:30 AM López Mehta, [facility]      Your next 10 appointments already scheduled     Sep 06, 2018  9:30 AM CDT   Return Visit with López Mehta [facility] ([address] [id number] [phone]            Sep 20, 2018  9:30 AM CDT   [facility] [id number] [phone]              MyChart Information     [employer] gives you secure access to your electronic health record. If you see a primary care provider, you can also send messages to your care team and make appointments. If you have questions, please call your primary care clinic.  If you do not have a primary care provider, please call [phone] and they will assist you.        Care EveryWhere ID     This is your Care EveryWhere ID. This could be used by other organizations to access your [place] medical records  [id number]        Equal Access to Services     JANA DUCKWORTH : Lila besto Soaileen, waaxda luqadaha, qaybta kaalmada ademirianyada, germain salcedo. So [facility] [phone].    ATENCIÓN: Si habla español, tiene a omer disposición servicios gratuitos de asistencia lingüística. Llneelima al [phone].    We comply with applicable federal civil rights laws and [place] laws. We do not discriminate on the basis of race, color, national origin, age, disability, sex, sexual orientation, or gender identity.
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
## 2018-08-24 ASSESSMENT — PATIENT HEALTH QUESTIONNAIRE - PHQ9: SUM OF ALL RESPONSES TO PHQ QUESTIONS 1-9: 9

## 2018-08-24 ASSESSMENT — ANXIETY QUESTIONNAIRES: GAD7 TOTAL SCORE: 7

## 2018-09-06 ENCOUNTER — OFFICE VISIT (OUTPATIENT)
Dept: BEHAVIORAL HEALTH | Facility: CLINIC | Age: 33
End: 2018-09-06
Payer: COMMERCIAL

## 2018-09-06 DIAGNOSIS — F41.1 GENERALIZED ANXIETY DISORDER: Primary | ICD-10-CM

## 2018-09-06 PROCEDURE — 90834 PSYTX W PT 45 MINUTES: CPT | Performed by: SOCIAL WORKER

## 2018-09-06 NOTE — Clinical Note
"Mary came in today.  Has some concerns about sleep, wakes up with a cold sweat, and can;t get back to sleep, also has the \"shaky jitters.\"  She thinks that this started with the Abilify.  She's wondering what your thoughts are about this.  Does sound like the Abilify is helping with the mood swings.       "

## 2018-09-06 NOTE — PROGRESS NOTES
"                                             Progress Note    Client Name: Mary Shipman  Date: 9/6/2018          Service Type: Individual      Session Start Time: 930  Session End Time: 1015      Session Length: 38-52m     Session #: 2     Attendees: Client attended alone    Treatment Plan Last Reviewed: 9/6/2018   PHQ-9 / CRUZ-7 :      DATA      Progress Since Last Session (Related to Symptoms / Goals / Homework):   Symptoms: Stable    Homework: Did not complete      Episode of Care Goals: Satisfactory progress - CONTEMPLATION (Considering change and yet undecided); Intervened by assessing the negative and positive thinking (ambivalence) about behavior change     Current / Ongoing Stressors and Concerns:     Things have been \"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  She reports that she got mad at the counselor and said she was probably going to drop out.  Some challenges with her medicine; encouraged her to discuss this with her PCP.  I sent a message to her PCP today in session regarding the medication issue.                 Treatment Objective(s) Addressed in This Session:    Client will use identified behavioral and cognitive skills to challenge negative self talk 90% of the time.       Intervention:   CBT: -   Discussed cognitive restructuring and behavioral activation.  Explored the connection between thoughts, feelings, and actions by using examples relative to client's presenting concerns.  Explained major domains of symptoms (cognitive, emotional, somatic, behavioral, interpersonal) and importance of targeted and specific interventions to reduce symptoms of anxiety and depression.  Discussed role of " symptom monitoring in cognitive behavioral treatment.       ASSESSMENT: Current Emotional / Mental Status (status of significant symptoms):   Risk status (Self / Other harm or suicidal ideation)   Client denies current fears or concerns for personal safety.   Client denies current or recent suicidal ideation or behaviors.   Client denies current or recent homicidal ideation or behaviors.   Client denies current or recent self injurious behavior or ideation.   Client denies other safety concerns.   A safety and risk management plan has not been developed at this time, however client was given the after-hours number / 911 should there be a change in any of these risk factors.     Appearance:   Appropriate    Eye Contact:   Good    Psychomotor Behavior: Retarded (Slowed)    Attitude:   Cooperative    Orientation:   All   Speech    Rate / Production: Monotone  Slow     Volume:  Normal    Mood:    Depressed    Affect:    Blunted    Thought Content:  Clear    Thought Form:  Coherent  Logical    Insight:    Good      Medication Review:   No changes to current psychiatric medication(s)     Medication Compliance:   Yes     Changes in Health Issues:   None reported     Chemical Use Review:   Substance Use: Chemical use reviewed, no active concerns identified      Tobacco Use: No current tobacco use.       Collateral Reports Completed:   Not Applicable    PLAN: (Client Tasks / Therapist Tasks / Other)  1.  More supportive therapy and CBT at next meeting  2.  Client will call to schedule next appointment        BEHZAD Webb                                                         ________________________________________________________________________    Treatment Plan    Client's Name: Mary Shipman  YOB: 1985    Date: 5/19/2017     DSM5 Diagnoses: (Sustained by DSM5 Criteria Listed Above)  Diagnoses: 296.23 Major Depressive Disorder, Single Episode, Severe _  300.02 (F41.1) Generalized Anxiety  Disorder  Psychosocial & Contextual Factors: finances;  from ; stress with work  WHODAS 2.0 (12 item)  This questionnaire asks about difficulties due to health conditions. Health conditions  include  disease or illnesses, other health problems that may be short or long lasting,  injuries, mental health or emotional problems, and problems with alcohol or drugs.      Think back over the past 30 days and answer these questions, thinking about how much  difficulty you had doing the following activities. For each question, please [place] only  one response.     S1 Standing for long periods such as 30 minutes? None = 1   S2 Taking care of household responsibilities? Mild = 2   S3 Learning a new task, for example, learning how to get to a new place? None = 1   S4 How much of a problem do you have joining community activities (for example, festivals, Nondenominational or other activities) in the same way as anyone else can? Moderate = 3   S5 How much have you been emotionally affected by your health problems? Moderate = 3           In the past 30 days, how much difficulty did you have in:   S6 Concentrating on doing something for ten minutes? Mild = 2   S7 Walking a long distance such as a kilometer (or equivalent)? Mild = 2   S8 Washing your whole body? None = 1   S9 Getting dressed? None = 1   S10 Dealing with people you do not know? None = 1   S11 Maintaining a friendship? Mild = 2   S12 Your day to day work? Mild = 2      H1 Overall, in the past 30 days, how many days were these difficulties present? Record number of days 30   H2 In the past 30 days, for how many days were you totally unable to carry out your usual activities or work because of any health condition? Record number of days 0   H3 In the past 30 days, not counting the days that you were totally unable, for how many days did you cut back or reduce your usual activities or work because of any health condition? Record number of days 30       Referral /  Collaboration:  Referral to another professional/service is not indicated at this time..    Anticipated number of session or this episode of care: 12-18      MeasurableTreatment Goal(s) related to diagnosis / functional impairment(s)  Goal-Depression: Client will decrease depressed mood    I will know I've met my goal when I am less depressed.      Objective #A (Client Action)    Status: New - Date: 9/6/2018     Client will use identified behavioral and cognitive skills to challenge negative self talk 90% of the time.    Intervention(s)  Therapist will provide psychoeducation, behavioral activation, and cognitive restructuring.      Objective #B  Client will complete at least 10 minutes of self-regulation practice (e.g.: yoga, meditation, relaxation breathing, etc.) per day.    Status: New - Date: 9/6/2018     Intervention(s)  Therapist will provide psychoeducation, behavioral activation, and cognitive restructuring.      Objective #C  Client will exercise 30 minutes 36 times in the next 12 weeks.  Status: New - Date: 9/6/2018     Intervention(s)  Therapist will provide psychoeducation, behavioral activation, and cognitive restructuring.                Client has reviewed and agreed to the above plan.      Abad Torres, [facility]  9/6/2018

## 2018-09-06 NOTE — MR AVS SNAPSHOT
MRN:[id number]                      After Visit Summary   9/6/2018    Mary Shipman    MRN: [id number]           Visit Information        Provider Department      9/6/2018 9:30 AM López Mehta, [facility] Generic      Your next 10 appointments already scheduled     Sep 11, 2018 12:30 PM CDT   Return Visit with López Mehta [facility] ([address] [id number] [phone]            Sep 20, 2018  9:30 AM CDT   [facility]    [address] [place] [id number] [phone]              MyChart Information     [employer] gives you secure access to your electronic health record. If you see a primary care provider, you can also send messages to your care team and make appointments. If you have questions, please call your primary care clinic.  If you do not have a primary care provider, please call [phone] and they will assist you.        Care EveryWhere ID     This is your Care EveryWhere ID. This could be used by other organizations to access your [facility] medical records  [id number]        Equal Access to Services     JANA DUCKWORTH : Lila besto Soaileen, waaxda luqadaha, qaybta kaalmada adeimrianyada, germain salcedo. So [facility] [phone].    ATENCIÓN: Si habla español, tiene a omer disposición servicios gratuitos de asistencia lingüística. Llneelima al [phone].    We comply with applicable federal civil rights laws and [place] laws. We do not discriminate on the basis of race, color, national origin, age, disability, sex, sexual orientation, or gender identity.

## 2018-09-09 ENCOUNTER — TRANSFERRED RECORDS (OUTPATIENT)
Dept: HEALTH INFORMATION MANAGEMENT | Facility: CLINIC | Age: 33
End: 2018-09-09

## 2018-09-22 DIAGNOSIS — F33.1 MAJOR DEPRESSIVE DISORDER, RECURRENT EPISODE, MODERATE (H): ICD-10-CM

## 2018-09-22 DIAGNOSIS — F43.23 ADJUSTMENT DISORDER WITH MIXED ANXIETY AND DEPRESSED MOOD: ICD-10-CM

## 2018-09-24 NOTE — TELEPHONE ENCOUNTER
"Requested Prescriptions   Pending Prescriptions Disp Refills     sertraline (ZOLOFT) 100 MG tablet [Pharmacy Med Name: SERTRALINE  MG TABLET]  Last Written Prescription Date:  3/5/2018  Last Fill Quantity: 180 tablet,  # refills: 1   Last office visit: 2018 with prescribing provider:  NILS Hammer   Future Office Visit:   Next 5 appointments (look out 90 days)     Sep 25, 2018 12:20 PM CDT   [facility]    [address] [place] [id number]   [phone]            Sep 27, 2018 10:30 AM CDT   Return Visit with López Mehta [facility] ([address] [address]   [phone]                  180 tablet 1     Si TABLETS DAILY    SSRIs Protocol Failed    2018 12:01 PM       Failed - PHQ-9 score less than 5 in past 6 months    Please review last PHQ-9 score.   PHQ-9 SCORE 2017   Total Score - - -   Total Score MyChart - - -   Total Score 22 15 9     CRUZ-7 SCORE 2017   Total Score - - -   Total Score 18 18 7          Passed - Patient is age 18 or older       Passed - No active pregnancy on record       Passed - No positive pregnancy test in last 12 months       Passed - Recent (6 mo) or future (30 days) visit within the authorizing provider's specialty    Patient had office visit in the last 6 months or has a visit in the next 30 days with authorizing provider or within the authorizing provider's specialty.  See \"Patient Info\" tab in inbasket, or \"Choose Columns\" in Meds & Orders section of the refill encounter.              "

## 2018-09-25 ENCOUNTER — OFFICE VISIT (OUTPATIENT)
Dept: FAMILY MEDICINE | Facility: CLINIC | Age: 33
End: 2018-09-25
Payer: COMMERCIAL

## 2018-09-25 VITALS
WEIGHT: 280 LBS | HEART RATE: 84 BPM | DIASTOLIC BLOOD PRESSURE: 70 MMHG | TEMPERATURE: 98.2 F | BODY MASS INDEX: 46.65 KG/M2 | SYSTOLIC BLOOD PRESSURE: 124 MMHG | OXYGEN SATURATION: 97 % | HEIGHT: 65 IN

## 2018-09-25 DIAGNOSIS — G43.719 INTRACTABLE CHRONIC MIGRAINE WITHOUT AURA AND WITHOUT STATUS MIGRAINOSUS: ICD-10-CM

## 2018-09-25 DIAGNOSIS — R90.89 ABNORMAL BRAIN MRI: ICD-10-CM

## 2018-09-25 DIAGNOSIS — R79.89 LOW VITAMIN D LEVEL: ICD-10-CM

## 2018-09-25 DIAGNOSIS — F33.1 MAJOR DEPRESSIVE DISORDER, RECURRENT EPISODE, MODERATE (H): ICD-10-CM

## 2018-09-25 DIAGNOSIS — J45.20 INTERMITTENT ASTHMA, UNCOMPLICATED: ICD-10-CM

## 2018-09-25 DIAGNOSIS — F41.1 GENERALIZED ANXIETY DISORDER: ICD-10-CM

## 2018-09-25 DIAGNOSIS — D17.30 LIPOMA OF SKIN AND SUBCUTANEOUS TISSUE: ICD-10-CM

## 2018-09-25 DIAGNOSIS — J01.01 ACUTE RECURRENT MAXILLARY SINUSITIS: ICD-10-CM

## 2018-09-25 DIAGNOSIS — J45.901 EXACERBATION OF ASTHMA, UNSPECIFIED ASTHMA SEVERITY, UNSPECIFIED WHETHER PERSISTENT: Primary | ICD-10-CM

## 2018-09-25 DIAGNOSIS — G81.94 LEFT HEMIPLEGIA (H): ICD-10-CM

## 2018-09-25 PROCEDURE — 99214 OFFICE O/P EST MOD 30 MIN: CPT | Performed by: PHYSICIAN ASSISTANT

## 2018-09-25 RX ORDER — MONTELUKAST SODIUM 10 MG/1
10 TABLET ORAL AT BEDTIME
Qty: 90 TABLET | Refills: 1 | Status: SHIPPED | OUTPATIENT
Start: 2018-09-25 | End: 2020-04-02

## 2018-09-25 RX ORDER — ALBUTEROL SULFATE 90 UG/1
2 AEROSOL, METERED RESPIRATORY (INHALATION) EVERY 6 HOURS PRN
Qty: 3 INHALER | Refills: 0 | Status: SHIPPED | OUTPATIENT
Start: 2018-09-25 | End: 2018-12-03

## 2018-09-25 RX ORDER — PREDNISONE 20 MG/1
TABLET ORAL
Qty: 14 TABLET | Refills: 1 | Status: SHIPPED | OUTPATIENT
Start: 2018-09-25 | End: 2018-11-27

## 2018-09-25 RX ORDER — CEFDINIR 300 MG/1
300 CAPSULE ORAL 2 TIMES DAILY
Qty: 20 CAPSULE | Refills: 0 | Status: SHIPPED | OUTPATIENT
Start: 2018-09-25 | End: 2018-11-27

## 2018-09-25 NOTE — PROGRESS NOTES
"  SUBJECTIVE:   Mary Shipman is a 33 year old female who presents to clinic today for the following health issues:    Depression and Anxiety Follow-Up    Status since last visit: No change    Other associated symptoms:None    Complicating factors:     Significant life event: Yes-       Current substance abuse: None    Abilify \"helps but makes me jittery at night.\"     PHQ-9 9/21/2017 1/12/2018 8/22/2018   Total Score 22 15 9   Q9: Suicide Ideation Not at all Not at all Not at all     CRUZ-7 SCORE 9/21/2017 1/12/2018 8/22/2018   Total Score - - -   Total Score 18 18 7     PHQ-9  English  PHQ-9   Any Language  CRUZ-7  Suicide Assessment Five-step Evaluation and Treatment (SAFE-T)  Asthma Follow-Up    Was ACT completed today?    Yes    ACT Total Scores 9/25/2018   ACT TOTAL SCORE -   ASTHMA ER VISITS -   ASTHMA HOSPITALIZATIONS -   ACT TOTAL SCORE (Goal Greater than or Equal to 20) 7   In the past 12 months, how many times did you visit the emergency room for your asthma without being admitted to the hospital? 1   In the past 12 months, how many times were you hospitalized overnight because of your asthma? 0       Recent asthma triggers that patient is dealing with: upper respiratory infections, humidity and cold air      ED/UC Followup:    Facility:  [facility]  Date of visit: 9/9/18  Reason for visit: Bronchitis  Current Status: Doing worst- breathing worst       ENT Symptoms             Symptoms: cc Present Absent Comment   Fever/Chills   x    Fatigue  x     Muscle Aches   x    Eye Irritation  x     Sneezing   x    Nasal Saturnino/Drg  x     Sinus Pressure/Pain  x     Loss of smell  x     Dental pain  x     Sore Throat  x     Swollen Glands   x    Ear Pain/Fullness  x     Cough  x     Wheeze  x     Chest Pain  x     Shortness of breath  x     Rash   x    Other         Symptom duration:  3-4 weeks   Symptom severity:  moderate- severe   Treatments tried:  Zpak, prednisone   Contacts:  Son      Still " "having a lot of sinus pain. Cough but no wheeze.     She's frustrated today. Her Neurologist Dr. Molina suggested that some of her symptoms are likely related to depression and anxiety. She doesn't accept this suggestion. She wants to understand why she has seizures (or seizure like symptoms) and persistent migraines along with her \"white spots on MRI and abnormal lumbar puncture.\"     Lump - left chest wall - present for a while. More painful - we've looked in the past - likely Lipoma.     Problem list and histories reviewed & adjusted, as indicated.  Additional history: as documented    Labs reviewed in EPIC    Reviewed and updated as needed this visit by clinical staff  Tobacco  Allergies  Meds  Med Hx  Surg Hx  Fam Hx  Soc Hx      Reviewed and updated as needed this visit by Provider         ROS:  Constitutional, HEENT, cardiovascular, pulmonary, GI, , musculoskeletal, neuro, skin, endocrine and psych systems are negative, except as otherwise noted.    OBJECTIVE:     /70 (BP Location: Right arm, Patient Position: Sitting, Cuff Size: Adult Large)  Pulse 84  Temp 98.2  F (36.8  C) (Oral)  Ht 5' 5\" (1.651 m)  Wt 280 lb (127 kg)  LMP 09/18/2018  SpO2 97%  BMI 46.59 kg/m2  Body mass index is 46.59 kg/(m^2).  GENERAL: healthy, alert and no distress  HENT: ear canals and TM's normal, nose and mouth without ulcers or lesions  NECK: sinus tenderness, otherwise no adenopathy, no asymmetry, masses, or scars and thyroid normal to palpation  RESP: lungs clear to auscultation - no rales, rhonchi or wheezes  CV: regular rate and rhythm, normal S1 S2, no S3 or S4, no murmur, click or rub, no peripheral edema and peripheral pulses strong  MS: no gross musculoskeletal defects noted, no edema  SKIN: no suspicious lesions or rashes  NEURO: Normal strength and tone, mentation intact and speech normal  PSYCH: mentation appears normal, affect normal/bright    ASSESSMENT/PLAN:     (J45.901) Exacerbation of asthma, " unspecified asthma severity, unspecified whether persistent  (primary encounter diagnosis)  Comment:   Plan: cefdinir (OMNICEF) 300 MG capsule, predniSONE         (DELTASONE) 20 MG tablet        Mild. Improving. Steroid taper and antibiotics for sinusitis.    (J45.20) Intermittent asthma, uncomplicated  Comment:   Plan: albuterol (PROAIR HFA/PROVENTIL HFA/VENTOLIN         HFA) 108 (90 Base) MCG/ACT inhaler, montelukast        (SINGULAIR) 10 MG tablet, mometasone-formoterol        (DULERA) 200-5 MCG/ACT oral inhaler        As noted     (E55.9) Low vitamin D level  Comment:   Plan: cholecalciferol (VITAMIN D3) 25975 units         capsule        Refills     (J01.01) Acute recurrent maxillary sinusitis  Comment:   Plan: cefdinir (OMNICEF) 300 MG capsule, predniSONE         (DELTASONE) 20 MG tablet        As noted. This prescription is given with a discussion of side effects, risks and proper use.  Instructions are given to follow up if not improving or symptoms change or worsen as discussed.     (F41.1) Generalized anxiety disorder  Comment:   Plan: Worse. Dealing with a lot of anxiety and challenges related to health issues. Not doing well. Unable to work. Son's health is a struggle. Reviewed self cares. See below for further plan - is working with Luis Antonio Means - Therapist which is a continued good thing for her.    (F33.1) Major depressive disorder, recurrent episode, moderate (H)  Comment:   Plan: As noted     (D17.30) Lipoma of skin and subcutaneous tissue  Comment:   Plan: GENERAL SURG ADULT REFERRAL        Left abdominal wall - refer for general surgery eval    (G81.94) Left hemiplegia (H)  Comment:   Plan: NEUROLOGY ADULT REFERRAL        I encouraged her to stick with Dr. Molina. She's actually been one of the more supportive and thorough / committed neurologist's she's had. She has had good results from her work ups and I think Mary is simply frustrated / trying to pull away from her care. I coached her a bit  on some ways to talk to her neurologist to get to specific solutions / answers if that is her goal. At this time, Neurologically - she's doing okay.     (R90.89) Abnormal brain MRI  Comment:   Plan: NEUROLOGY ADULT REFERRAL        As noted-  Had neuropsych testing. They felt issues were multiple factors - probably some component of depression / anxiety with some chronic migraine.     (G43.719) Intractable chronic migraine without aura and without status migrainosus  Comment:   Plan: NEUROLOGY ADULT REFERRAL        As noted.    She will continue therapy.  I encouraged her to try Abilify again but at 1/2 a tablet in the daytime. Can work up on that dose. A mood stabilizer might help a little.  I wrote a note so she could drop from school for now.  Recheck in a few weeks to a month / PRN      FRANSICO JOHNSON PA-C  [facility]

## 2018-09-25 NOTE — LETTER
[facility]  [address] [id number] [phone]          September 25, 2018    RE:  Mary Shipman                                                                                                                                                       [address]   [facility] [address]            To whom it may concern:    Mary's health issues have reached a point of uncertainty (migraine and possible seizure disorder). This has reached a point of exacerbation making her unable to stay in school at this time until we sort out her health issues.    Medically, I've recommended that she drop out of school at this time. Please allow her to have refunds where appropriate for the aid she has received.       Sincerely,        Miguel Hmamer

## 2018-09-25 NOTE — MR AVS SNAPSHOT
After Visit Summary   9/25/2018    Mary Shipman    MRN: [id number]           Patient Information     Date Of Birth          1985        Visit Information        Provider Department      9/25/2018 12:20 PM Miguel Hamemr PA-C [facility]        Today's Diagnoses     Exacerbation of asthma, unspecified asthma severity, unspecified whether persistent    -  1    Intermittent asthma, uncomplicated        Low vitamin D level        Acute recurrent maxillary sinusitis        Generalized anxiety disorder        Major depressive disorder, recurrent episode, moderate (H)        Lipoma of skin and subcutaneous tissue        Left hemiplegia (H)        Abnormal brain MRI        Intractable chronic migraine without aura and without status migrainosus           Follow-ups after your visit        Additional Services     GENERAL SURG ADULT REFERRAL       Your provider has referred you to: [facility] [place] [facility]: [facility] [phone]   [email or web address]    Please be aware that coverage of these services is subject to the terms and limitations of your health insurance plan.  Call member services at your health plan with any benefit or coverage questions.      Please bring the following with you to your appointment:    (1) Any X-Rays, CTs or MRIs which have been performed.  Contact the facility where they were done to arrange for  prior to your scheduled appointment.   (2) List of current medications   (3) This referral request   (4) Any documents/labs given to you for this referral            NEUROLOGY ADULT REFERRAL       Your provider has referred you for the following:   Consult at [facility]: [facility] [phone]   [email or web address]    Please be aware that coverage of these services is subject to the terms and limitations of your health insurance plan.  Call member services at your health plan with any benefit  or coverage questions.      Please bring the following with you to your appointment:    (1) Any X-Rays, CTs or MRIs which have been performed.  Contact the facility where they were done to arrange for  prior to your scheduled appointment.    (2) List of current medications  (3) This referral request   (4) Any documents/labs given to you for this referral                  Your next 10 appointments already scheduled     Sep 27, 2018 10:30 AM CDT   Return Visit with López Mehta [facility] ([facility])    [address] [place] [id number] [phone]              Who to contact     If you have questions or need follow up information about today's clinic visit or your schedule please contact [facility] directly at [phone].  Normal or non-critical lab and imaging results will be communicated to you by MyChart, letter or phone within 4 business days after the clinic has received the results. If you do not hear from us within 7 days, please contact the clinic through MyChart or phone. If you have a critical or abnormal lab result, we will notify you by phone as soon as possible.  Submit refill requests through WhatSalon or call your pharmacy and they will forward the refill request to us. Please allow 3 business days for your refill to be completed.          Additional Information About Your Visit        [employer] Information     What[employer]on gives you secure access to your electronic health record. If you see a primary care provider, you can also send messages to your care team and make appointments. If you have questions, please call your primary care clinic.  If you do not have a primary care provider, please call [phone] and they will assist you.        Care EveryWhere ID     This is your Care EveryWhere ID. This could be used by other organizations to access your [place] medical records  [id number]        Your Vitals  "Were     Pulse Temperature Height Last Period Pulse Oximetry BMI (Body Mass Index)    84 98.2  F (36.8  C) (Oral) 5' 5\" (1.651 m) 09/18/2018 97% 46.59 kg/m2       Blood Pressure from Last 3 Encounters:   09/25/18 124/70   02/22/18 102/74   01/12/18 120/78    Weight from Last 3 Encounters:   09/25/18 280 lb (127 kg)   02/22/18 240 lb (108.9 kg)   01/12/18 249 lb 2 oz (113 kg)              We Performed the Following     GENERAL SURG ADULT REFERRAL     NEUROLOGY ADULT REFERRAL          Today's Medication Changes          These changes are accurate as of 9/25/18 12:58 PM.  If you have any questions, ask your nurse or doctor.               Start taking these medicines.        Dose/Directions    cefdinir 300 MG capsule   Commonly known as:  OMNICEF   Used for:  Exacerbation of asthma, unspecified asthma severity, unspecified whether persistent, Acute recurrent maxillary sinusitis   Started by:  Miguel Hammer PA-C        Dose:  300 mg   Take 1 capsule (300 mg) by mouth 2 times daily   Quantity:  20 capsule   Refills:  0       predniSONE 20 MG tablet   Commonly known as:  DELTASONE   Used for:  Exacerbation of asthma, unspecified asthma severity, unspecified whether persistent, Acute recurrent maxillary sinusitis   Started by:  Miguel Hammer PA-C        3 tablets daily for 3 days, 2 tablets for 2 days, 1 tablet for 1 day   Quantity:  14 tablet   Refills:  1            Where to get your medicines      These medications were sent to [facility]/pharmacy #[id number] - [facility], [place] - [address] [facility]  [address] [place] [address]     Phone:  [phone]     albuterol 108 (90 Base) MCG/ACT inhaler    cefdinir 300 MG capsule    cholecalciferol 07674 units capsule    mometasone-formoterol 200-5 MCG/ACT oral inhaler    montelukast 10 MG tablet    predniSONE 20 MG tablet                Primary Care Provider Office Phone # Fax #    Miguel Hammer PA-C [phone]       [address] [place] [address] " AMIRA  [facility] [address]        Equal Access to Services     JANA DUCKWORTH : Hadii aad ku hadcartersonia Soalejandroali, waaxda luqadaha, qaybta kaalmagermain easley. So [facility] [phone].    ATENCIÓN: Si habla español, tiene a omer disposición servicios gratuitos de asistencia lingüística. Roxame al [phone].    We comply with applicable federal civil rights laws and [place] laws. We do not discriminate on the basis of race, color, national origin, age, disability, sex, sexual orientation, or gender identity.            Thank you!     Thank you for choosing [facility]  for your care. Our goal is always to provide you with excellent care. Hearing back from our patients is one way we can continue to improve our services. Please take a few minutes to complete the written survey that you may receive in the mail after your visit with us. Thank you!             Your Updated Medication List - Protect others around you: Learn how to safely use, store and throw away your medicines at www.disposemymeds.org.          This list is accurate as of 9/25/18 12:58 PM.  Always use your most recent med list.                   Brand Name Dispense Instructions for use Diagnosis    albuterol 108 (90 Base) MCG/ACT inhaler    PROAIR HFA/PROVENTIL HFA/VENTOLIN HFA    3 Inhaler    Inhale 2 puffs into the lungs every 6 hours as needed for shortness of breath / dyspnea or wheezing    Intermittent asthma, uncomplicated       ARIPiprazole 5 MG tablet    ABILIFY    30 tablet    Take 1 tablet (5 mg) by mouth At Bedtime    Insomnia, unspecified type, Moderate episode of recurrent major depressive disorder (H), CRUZ (generalized anxiety disorder)       cefdinir 300 MG capsule    OMNICEF    20 capsule    Take 1 capsule (300 mg) by mouth 2 times daily    Exacerbation of asthma, unspecified asthma severity, unspecified whether persistent, Acute recurrent maxillary sinusitis       cholecalciferol 33672  units capsule    VITAMIN D3    4 capsule    Take 1 capsule (50,000 Units) by mouth once a week    Low vitamin D level       clonazePAM 1 MG tablet    klonoPIN    30 tablet    1 at bedtime daily    CRUZ (generalized anxiety disorder), Insomnia, unspecified type       fexofenadine-pseudoePHEDrine 180-240 MG per 24 hr tablet    ALLEGRA-D 24     Take 1 tablet by mouth daily        fluticasone 50 MCG/ACT spray    FLONASE     Spray 1-2 sprays into both nostrils daily as needed for rhinitis or allergies        ibuprofen 400-800 mg tablet    ADVIL,MOTRIN    30 tablet    Take 1-2 tablets by mouth every 6 hours as needed (cramping).    Abdominal pain       LANsoprazole 30 MG CR capsule    PREVACID    90 capsule    Take 1 capsule (30 mg) by mouth daily Take 30-60 minutes before a meal.    Gastroesophageal reflux disease without esophagitis       levalbuterol 1.25 MG/0.5ML Nebu neb solution    XOPENEX CONC     Take 1.25 mg by nebulization every 6 hours as needed for wheezing        mometasone-formoterol 200-5 MCG/ACT oral inhaler    DULERA    39 g    Inhale 2 puffs into the lungs 2 times daily    Intermittent asthma, uncomplicated       montelukast 10 MG tablet    SINGULAIR    90 tablet    Take 1 tablet (10 mg) by mouth At Bedtime    Intermittent asthma, uncomplicated       norgestimate-ethinyl estradiol 0.25-35 MG-MCG per tablet    ORTHO-CYCLEN, SPRINTEC    84 tablet    Take 1 tablet by mouth daily    Encounter for initial prescription of contraceptive pills       polyethylene glycol Packet    MIRALAX/GLYCOLAX     Take 17 g by mouth daily as needed for constipation        predniSONE 20 MG tablet    DELTASONE    14 tablet    3 tablets daily for 3 days, 2 tablets for 2 days, 1 tablet for 1 day    Exacerbation of asthma, unspecified asthma severity, unspecified whether persistent, Acute recurrent maxillary sinusitis       ranitidine 150 MG tablet    ZANTAC    60 tablet    Take 150 mg by mouth 2 times daily as needed         sertraline 100 MG tablet    ZOLOFT    180 tablet    2 tablets daily    Major depressive disorder, recurrent episode, moderate (H), Adjustment disorder with mixed anxiety and depressed mood       topiramate 50 MG tablet    TOPAMAX    180 tablet    TAKE 6 TABLETS (300 MG) BY MOUTH AT BEDTIME    Seizure disorder (H), Migraine without aura and with status migrainosus, not intractable, Convulsions, unspecified convulsion type (H)

## 2018-09-26 RX ORDER — SERTRALINE HYDROCHLORIDE 100 MG/1
TABLET, FILM COATED ORAL
Qty: 180 TABLET | Refills: 1 | Status: SHIPPED | OUTPATIENT
Start: 2018-09-26 | End: 2020-01-16

## 2018-09-26 ASSESSMENT — ASTHMA QUESTIONNAIRES: ACT_TOTALSCORE: 7

## 2018-09-26 NOTE — TELEPHONE ENCOUNTER
Reason for Call:  Other prescription     Detailed comments: The pharmacy called back for an update on the Setraline(ZOLOFT). Please call pharmacy about update.     Phone Number Patient can be reached at: Other phone number:  [phone]    Best Time: any    Can we leave a detailed message on this number? YES    Call taken on 9/26/2018 at 2:53 PM by Theo Morfin

## 2018-09-26 NOTE — TELEPHONE ENCOUNTER
Route refill request for sertraline to PCP due to failed protocol.  Patient seen by you yesterday in clinic.    Leilani Davis RN

## 2018-09-28 ENCOUNTER — MYC MEDICAL ADVICE (OUTPATIENT)
Dept: FAMILY MEDICINE | Facility: CLINIC | Age: 33
End: 2018-09-28

## 2018-09-28 DIAGNOSIS — F41.1 GAD (GENERALIZED ANXIETY DISORDER): Primary | ICD-10-CM

## 2018-09-28 RX ORDER — HYDROXYZINE HYDROCHLORIDE 25 MG/1
25-50 TABLET, FILM COATED ORAL EVERY 6 HOURS PRN
Qty: 60 TABLET | Refills: 1 | Status: SHIPPED | OUTPATIENT
Start: 2018-09-28 | End: 2019-03-18

## 2018-09-28 NOTE — LETTER
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     Sincerely,        Miguel Hammer

## 2018-09-28 NOTE — LETTER
[facility]  [address] [facility] [address]  [phone]          September 28, 2018    RE:  Mary Shipman                                                                                                                                                       [address]   [facility] [address]            To whom it may concern:    Mary Shipman is no longer on a benzodiazepine medication.     Sincerely,        Miguel Hammer

## 2018-09-28 NOTE — TELEPHONE ENCOUNTER
Route to PCP as high priority.  Patient calls the clinic a bit frantic, is wondering if she can discuss this with PCP today, as her job is not allowing her to work unless she is off of her benzodiazapine.  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  She states she has talked about this with PCP in the past and needs to figure something else out so that she can continue to work. She will also need a letter stating that she is off of this medication.    Leilani Davis RN

## 2018-09-28 NOTE — TELEPHONE ENCOUNTER
She  Can simply stop the med. The only other option that's a non benzo is Hydroxyzine. She's used that in the past - I can try that again but I don't recall how well it worked. I sent an RX.     Letter in Epic right now.  Thanks.  Mingo

## 2018-09-28 NOTE — TELEPHONE ENCOUNTER
"Patient stopped by to  letter. She states the letter needs to include \"ok to commercial drive now.\"    Please update letter and call patient when ready to be picked up.    Thanks!  Joel Oliveira      "

## 2018-09-28 NOTE — TELEPHONE ENCOUNTER
Called patient to let her know. She also requested a current med list, which I printed out and brought both that and the letter to .    Joel Lorenzana RN

## 2018-09-28 NOTE — TELEPHONE ENCOUNTER
"Route MyChart to PCP for review.  Is this something you can do a telephone visit for?  Last visit with you was on 9/25/18 with note pasted below.    \"(F41.1) Generalized anxiety disorder  Comment:   Plan: Worse. Dealing with a lot of anxiety and challenges related to health issues. Not doing well. Unable to work. Son's health is a struggle. Reviewed self cares. See below for further plan - is working with Luis Antonio Means - Therapist which is a continued good thing for her.     (F33.1) Major depressive disorder, recurrent episode, moderate (H)  Comment:   Plan: As noted\"      Leilani Davis RN    "

## 2018-09-28 NOTE — TELEPHONE ENCOUNTER
Letter walked to , documented in book, and placed in book. Spoke with patient to inform    Thanks!  Joel Oliveira

## 2018-11-27 ENCOUNTER — OFFICE VISIT (OUTPATIENT)
Dept: FAMILY MEDICINE | Facility: CLINIC | Age: 33
End: 2018-11-27
Payer: COMMERCIAL

## 2018-11-27 VITALS
WEIGHT: 283 LBS | OXYGEN SATURATION: 98 % | BODY MASS INDEX: 47.09 KG/M2 | SYSTOLIC BLOOD PRESSURE: 122 MMHG | DIASTOLIC BLOOD PRESSURE: 81 MMHG | TEMPERATURE: 98.2 F | HEART RATE: 78 BPM

## 2018-11-27 DIAGNOSIS — L50.9 URTICARIA: Primary | ICD-10-CM

## 2018-11-27 DIAGNOSIS — K52.9 GASTROENTERITIS: ICD-10-CM

## 2018-11-27 PROCEDURE — 99214 OFFICE O/P EST MOD 30 MIN: CPT | Performed by: PHYSICIAN ASSISTANT

## 2018-11-27 RX ORDER — PREDNISONE 20 MG/1
20 TABLET ORAL DAILY
Qty: 5 TABLET | Refills: 0 | Status: SHIPPED | OUTPATIENT
Start: 2018-11-27 | End: 2018-12-03

## 2018-11-27 RX ORDER — ONDANSETRON 4 MG/1
4 TABLET, FILM COATED ORAL EVERY 8 HOURS PRN
Qty: 18 TABLET | Refills: 0 | Status: SHIPPED | OUTPATIENT
Start: 2018-11-27 | End: 2019-01-14

## 2018-11-27 NOTE — MR AVS SNAPSHOT
After Visit Summary   11/27/2018    Mary Shipman    MRN: [id number]           Patient Information     Date Of Birth          1985        Visit Information        Provider Department      11/27/2018 4:40 PM Sheree Villa PA-C [facility]        Today's Diagnoses     Urticaria    -  1    Gastroenteritis           Follow-ups after your visit        Additional Services     ALLERGY/ASTHMA ADULT REFERRAL       Your provider has referred you to: [facility]: [facility] [phone]  [email or web address]    Please be aware that coverage of these services is subject to the terms and limitations of your health insurance plan.  Call member services at your health plan with any benefit or coverage questions.      Please bring the following with you to your appointment:    (1) Any X-Rays, CTs or MRIs which have been performed.  Contact the facility where they were done to arrange for  prior to your scheduled appointment.    (2) List of current medications  (3) This referral request   (4) Any documents/labs given to you for this referral                  Your next 10 appointments already scheduled     Dec 04, 2018  3:40 PM CST   SHORT with Miguel Hammer PA-C   [facility]    [address] [place] [address] [phone]              Who to contact     If you have questions or need follow up information about today's clinic visit or your schedule please contact [facility] directly at [phone].  Normal or non-critical lab and imaging results will be communicated to you by MyChart, letter or phone within 4 business days after the clinic has received the results. If you do not hear from us within 7 days, please contact the clinic through MyChart or phone. If you have a critical or abnormal lab result, we will notify you by phone as soon  as possible.  Submit refill requests through OpenROV or call your pharmacy and they will forward the refill request to us. Please allow 3 business days for your refill to be completed.          Additional Information About Your Visit        [employer] Information     OpenROV gives you secure access to your electronic health record. If you see a primary care provider, you can also send messages to your care team and make appointments. If you have questions, please call your primary care clinic.  If you do not have a primary care provider, please call [phone] and they will assist you.        Care EveryWhere ID     This is your Care EveryWhere ID. This could be used by other organizations to access your [facility] medical records  [id number]        Your Vitals Were     Pulse Temperature Last Period Pulse Oximetry BMI (Body Mass Index)       78 98.2  F (36.8  C) (Oral) 11/23/2018 98% 47.09 kg/m2        Blood Pressure from Last 3 Encounters:   11/27/18 122/81   09/25/18 124/70   02/22/18 102/74    Weight from Last 3 Encounters:   11/27/18 283 lb (128.4 kg)   09/25/18 280 lb (127 kg)   02/22/18 240 lb (108.9 kg)              We Performed the Following     ALLERGY/ASTHMA ADULT REFERRAL          Today's Medication Changes          These changes are accurate as of 11/27/18  5:29 PM.  If you have any questions, ask your nurse or doctor.               Start taking these medicines.        Dose/Directions    ondansetron 4 MG tablet   Commonly known as:  ZOFRAN   Used for:  Gastroenteritis   Started by:  Sheree Villa PA-C        Dose:  4 mg   Take 1 tablet (4 mg) by mouth every 8 hours as needed for nausea or vomiting   Quantity:  18 tablet   Refills:  0       predniSONE 20 MG tablet   Commonly known as:  DELTASONE   Used for:  Urticaria   Started by:  Sheree Villa PA-C        Dose:  20 mg   Take 1 tablet (20 mg) by mouth daily   Quantity:  5 tablet   Refills:  0            Where to get your medicines       These medications were sent to [facility]/pharmacy #[id number] - [facility] [place] - [address].  [address] [facility] [place] [address]     Phone:  [phone]     ondansetron 4 MG tablet    predniSONE 20 MG tablet                Primary Care Provider Office Phone # Fax #    Miguel Hammer PA-C [phone]       [address] [facility] [address]        Equal Access to Services     JANA DUCKWORTH : Hadii aad ku hadasho Soomaali, waaxda luqadaha, qaybta kaalmada adeegyada, waxay idiin hayaan adeeg kharash lamagdiel . So [facility] [phone].    ATENCIÓN: Si habla español, tiene a omer disposición servicios gratuitos de asistencia lingüística. [facility] [phone].    We comply with applicable federal civil rights laws and [place] laws. We do not discriminate on the basis of race, color, national origin, age, disability, sex, sexual orientation, or gender identity.            Thank you!     Thank you for choosing [facility]  for your care. Our goal is always to provide you with excellent care. Hearing back from our patients is one way we can continue to improve our services. Please take a few minutes to complete the written survey that you may receive in the mail after your visit with us. Thank you!             Your Updated Medication List - Protect others around you: Learn how to safely use, store and throw away your medicines at www.disposemymeds.org.          This list is accurate as of 11/27/18  5:29 PM.  Always use your most recent med list.                   Brand Name Dispense Instructions for use Diagnosis    albuterol 108 (90 Base) MCG/ACT inhaler    PROAIR HFA/PROVENTIL HFA/VENTOLIN HFA    3 Inhaler    Inhale 2 puffs into the lungs every 6 hours as needed for shortness of breath / dyspnea or wheezing    Intermittent asthma, uncomplicated       ARIPiprazole 5 MG tablet    ABILIFY    30 tablet    Take 1 tablet (5 mg) by mouth At Bedtime    Insomnia,  unspecified type, Moderate episode of recurrent major depressive disorder (H), CRUZ (generalized anxiety disorder)       fexofenadine-pseudoePHEDrine 180-240 MG per 24 hr tablet    ALLEGRA-D 24     Take 1 tablet by mouth daily        fluticasone 50 MCG/ACT nasal spray    FLONASE     Spray 1-2 sprays into both nostrils daily as needed for rhinitis or allergies        hydrOXYzine 25 MG tablet    ATARAX    60 tablet    Take 1-2 tablets (25-50 mg) by mouth every 6 hours as needed for anxiety    CRUZ (generalized anxiety disorder)       ibuprofen 400-800 mg tablet    ADVIL,MOTRIN    30 tablet    Take 1-2 tablets by mouth every 6 hours as needed (cramping).    Abdominal pain       LANsoprazole 30 MG DR capsule    PREVACID    90 capsule    Take 1 capsule (30 mg) by mouth daily Take 30-60 minutes before a meal.    Gastroesophageal reflux disease without esophagitis       levalbuterol 1.25 MG/0.5ML neb solution    XOPENEX CONC     Take 1.25 mg by nebulization every 6 hours as needed for wheezing        mometasone-formoterol 200-5 MCG/ACT inhaler    DULERA    39 g    Inhale 2 puffs into the lungs 2 times daily    Intermittent asthma, uncomplicated       montelukast 10 MG tablet    SINGULAIR    90 tablet    Take 1 tablet (10 mg) by mouth At Bedtime    Intermittent asthma, uncomplicated       ondansetron 4 MG tablet    ZOFRAN    18 tablet    Take 1 tablet (4 mg) by mouth every 8 hours as needed for nausea or vomiting    Gastroenteritis       polyethylene glycol packet    MIRALAX/GLYCOLAX     Take 17 g by mouth daily as needed for constipation        predniSONE 20 MG tablet    DELTASONE    5 tablet    Take 1 tablet (20 mg) by mouth daily    Urticaria       ranitidine 150 MG tablet    ZANTAC    60 tablet    Take 150 mg by mouth 2 times daily as needed        sertraline 100 MG tablet    ZOLOFT    180 tablet    2 TABLETS DAILY    Major depressive disorder, recurrent episode, moderate (H), Adjustment disorder with mixed anxiety and  depressed mood       topiramate 50 MG tablet    TOPAMAX    180 tablet    TAKE 6 TABLETS (300 MG) BY MOUTH AT BEDTIME    Seizure disorder (H), Migraine without aura and with status migrainosus, not intractable, Convulsions, unspecified convulsion type (H)       vitamin D3 03099 units capsule    CHOLECALCIFEROL    4 capsule    Take 1 capsule (50,000 Units) by mouth once a week    Low vitamin D level

## 2018-11-27 NOTE — PROGRESS NOTES
SUBJECTIVE:   Mary Shipman is a 33 year old female who presents to clinic today for the following health issues:        ABDOMINAL PAIN     Onset: 5 days     Description:   Character: Cramping and sore   Location: low abdominal   Radiation: None    Intensity: 3-8 /10    Progression of Symptoms:  improving    Accompanying Signs & Symptoms:  Fever/Chills?: YES- chills   Gas/Bloating: no   Nausea: YES  Vomitting: YES  Diarrhea?: YES no blood in stool.    Constipation:no   Dysuria or Hematuria: no    History:   Trauma: no   Previous similar pain: no    Previous tests done: none    Precipitating factors:   Does the pain change with:     Food: no      BM: no     Urination: no     Alleviating factors:  none    Therapies Tried and outcome: none     LMP:  LMP 11- ONLY FOR 2 DAYS    Family is fine.  Did eat out twice on Friday before symptoms started.      ENT Symptoms             Symptoms: cc Present Absent Comment   Fever/Chills  X  Chills    Fatigue  X     Muscle Aches   X    Eye Irritation  X  Burning and itchy    Sneezing  X     Nasal Saturnino/Drg  X     Sinus Pressure/Pain  X     Loss of smell   X    Dental pain   X    Sore Throat   X    Swollen Glands   X    Ear Pain/Fullness   X    Cough   X    Wheeze   X    Chest Pain   X    Shortness of breath   X    Rash   X    Other         Symptom duration:  1 month    Symptom severity:  moderate   Treatments tried:  allegra,flonase,singulair    Contacts:  none       Rash  Onset: 1 month     Description:   Location: arms,back,face,stomach,leg   Character: round, burning, red  Itching (Pruritis): YES    Progression of Symptoms:  Worsening, spreading     Accompanying Signs & Symptoms:  Fever: no   Body aches or joint pain: no   Sore throat symptoms: no   Recent cold symptoms: yes    History:   Previous similar rash: no     Precipitating factors:   Exposure to similar rash: no   New exposures: None   Recent travel: no     Alleviating factors:  None     Therapies Tried and  outcome: benadryl,hydrocortisone     Rash started first with itchy eyes and runny nose.    Did fumigate apartment about same time.    Has happened a few times-once from medicine, other time unsure.    Has seasonal allergies but hives are not related to that.    Has a bump on mouth too        Problem list and histories reviewed & adjusted, as indicated.  Additional history: as documented    Patient Active Problem List   Diagnosis     Esophageal reflux     Allergic rhinitis due to other allergen     Polycystic ovaries     Thyrotoxicosis     Urticaria     Obesity     Low back pain     Intermittent asthma     HYPERLIPIDEMIA LDL GOAL <160     Irritable bowel syndrome with constipation     Migraine headache     Not immune to rubella     Major depressive disorder, recurrent episode, moderate (H)     Health Care Home     Health care home, active care coordination     Generalized anxiety disorder     Lump or mass in breast     Menometrorrhagia     Seizure (H)     Left hemiplegia (H)     Cervicalgia     Bilateral thoracic back pain, unspecified chronicity     Other migraine without status migrainosus, not intractable     Past Surgical History:   Procedure Laterality Date     C APPENDECTOMY       C NONSPECIFIC PROCEDURE      nasal surgery      TONSILLECTOMY & ADENOIDECTOMY      surgery several times for this       Social History   Substance Use Topics     Smoking status: Never Smoker     Smokeless tobacco: Never Used     Alcohol use No     Family History   Problem Relation Age of Onset     Gynecology Mother      ectopic pregnancy/endometriosis     Cancer - colorectal Mother      Congenital Anomalies Mother      kidney problems     Colon Cancer Mother      Hyperlipidemia Mother      Other Cancer Mother      Lung, Skin, Brain and Colon cancer     Gynecology Sister      ectopic pregnancy/endometriosis     Unknown/Adopted Sister      Heart Disease Father      Coronary Artery Disease Father      Psychotic Disorder Brother       Unknown/Adopted Brother      Unknown/Adopted Brother      Unknown/Adopted Brother      Unknown/Adopted Brother      Unknown/Adopted Brother      Unknown/Adopted Sister      Cerebrovascular Disease Maternal Grandmother      Unknown/Adopted Maternal Grandfather      Unknown/Adopted Paternal Grandmother      Unknown/Adopted Paternal Grandfather       Other       Other       Other       Other       Other       Other            Reviewed and updated as needed this visit by clinical staff  Tobacco  Allergies  Meds       Reviewed and updated as needed this visit by Provider         ROS:  As above    OBJECTIVE:     /81  Pulse 78  Temp 98.2  F (36.8  C) (Oral)  Wt 283 lb (128.4 kg)  LMP 11/23/2018  SpO2 98%  BMI 47.09 kg/m2  Body mass index is 47.09 kg/(m^2).  GENERAL: alert, obese and appears uncomfortable   HENT: ear canals and TM's normal, oropharynx clear and oral mucous membranes moist  NECK: no adenopathy and no asymmetry, masses, or scars  RESP: lungs clear to auscultation - no rales, rhonchi or wheezes  CV: regular rates and rhythm, normal S1 S2, no S3 or S4 and no murmur, click or rub  ABDOMEN: tenderness mild generalized and bowel sounds normal, vomiting in clinic   SKIN: hives on upper abdomen, back, arms     Diagnostic Test Results:  none     ASSESSMENT/PLAN:       1. Urticaria  Unsure of cause.  Will start prednisone and have her see allergy.    - ALLERGY/ASTHMA ADULT REFERRAL  - predniSONE (DELTASONE) 20 MG tablet; Take 1 tablet (20 mg) by mouth daily  Dispense: 5 tablet; Refill: 0    2. Gastroenteritis  Likely food poisoning.  Zofran.  Discussed reasons to be seen again.      - ondansetron (ZOFRAN) 4 MG tablet; Take 1 tablet (4 mg) by mouth every 8 hours as needed for nausea or vomiting  Dispense: 18 tablet; Refill: 0    Follow up with PCP as scheduled for asthma.      Sheree Villa PA-C  [facility]

## 2018-11-29 ASSESSMENT — ASTHMA QUESTIONNAIRES: ACT_TOTALSCORE: 20

## 2018-12-03 ENCOUNTER — OFFICE VISIT (OUTPATIENT)
Dept: ALLERGY | Facility: CLINIC | Age: 33
End: 2018-12-03
Payer: COMMERCIAL

## 2018-12-03 VITALS
SYSTOLIC BLOOD PRESSURE: 135 MMHG | HEART RATE: 77 BPM | RESPIRATION RATE: 18 BRPM | DIASTOLIC BLOOD PRESSURE: 93 MMHG | TEMPERATURE: 97.4 F | BODY MASS INDEX: 46.66 KG/M2 | WEIGHT: 280.38 LBS | OXYGEN SATURATION: 97 %

## 2018-12-03 DIAGNOSIS — J45.40 ASTHMA, MODERATE PERSISTENT, POORLY-CONTROLLED: Primary | ICD-10-CM

## 2018-12-03 DIAGNOSIS — L50.9 URTICARIA: ICD-10-CM

## 2018-12-03 DIAGNOSIS — J31.0 NONALLERGIC RHINITIS: ICD-10-CM

## 2018-12-03 LAB
FEF 25/75: NORMAL
FEV-1: NORMAL
FEV1/FVC: NORMAL
FVC: NORMAL

## 2018-12-03 PROCEDURE — 94060 EVALUATION OF WHEEZING: CPT | Performed by: ALLERGY & IMMUNOLOGY

## 2018-12-03 PROCEDURE — 99244 OFF/OP CNSLTJ NEW/EST MOD 40: CPT | Mod: 25 | Performed by: ALLERGY & IMMUNOLOGY

## 2018-12-03 RX ORDER — GABAPENTIN 100 MG/1
CAPSULE ORAL
Refills: 5 | COMMUNITY
Start: 2018-09-14 | End: 2020-04-02

## 2018-12-03 RX ORDER — PREDNISONE 20 MG/1
TABLET ORAL
Qty: 11 TABLET | Refills: 0 | Status: SHIPPED | OUTPATIENT
Start: 2018-12-03 | End: 2019-01-14

## 2018-12-03 RX ORDER — ALBUTEROL SULFATE 90 UG/1
2 AEROSOL, METERED RESPIRATORY (INHALATION) EVERY 4 HOURS PRN
Qty: 1 INHALER | Refills: 2 | Status: SHIPPED | OUTPATIENT
Start: 2018-12-03 | End: 2020-03-17

## 2018-12-03 RX ORDER — CETIRIZINE HYDROCHLORIDE 10 MG/1
20 TABLET ORAL 2 TIMES DAILY
Qty: 120 TABLET | Refills: 11 | Status: SHIPPED | OUTPATIENT
Start: 2018-12-03 | End: 2020-04-02

## 2018-12-03 RX ORDER — CLONAZEPAM 0.5 MG/1
0.5 TABLET ORAL
COMMUNITY
Start: 2016-12-06 | End: 2019-08-30

## 2018-12-03 NOTE — PATIENT INSTRUCTIONS
If you have any questions regarding your allergies, asthma, or what we discussed during your visit today please call the allergy clinic or contact us via [facility].    Landen [facility]: [phone]      Follow-up in 1 month      1. Continue dulera 200/5mcg 2 puffs twice daily. Use with spacer, rinse mouth and brush your teeth afterwards    2. Continue singulair (montelukast) - 1 tablet at bedtime    3. Start the spiriva - 2 puffs once a day    4. Use your albuterol inhaler every 4 hours as needed for shortness of breath or wheezing    5. Take zyrtec (cetirizine) 20mg (2 tablets) twice a day    6. Take zantac (ranitidine) 150mg twice a day      Using an Inhaler with a Spacer  To control asthma, you need to use your medicines the right way. Some medicines are inhaled using a device called a metered-dose inhaler (MDI). Metered-dose inhalers deliver medicine with a fine spray. You may be asked to use a spacer (holding tube) with your inhaler. The spacer helps make sure all the medicine you need goes into your lungs.   Steps for using an inhaler with a spacer  Step 1:    Remove the caps from the inhaler and spacer.    Shake the inhaler well and attach the spacer. If the inhaler is being used for the first time or has not been used for a while, prime it as directed by the product maker.  Step 2:    Breathe out normally.    Put the spacer between your teeth. Close your lips tightly around it.    Keep your chin up.  Step 3:    Spray 1 puff into the spacer by pressing down on the inhaler.    Then breathe in through your mouth as slowly and deeply as you can. This should take about 5-10 seconds. If you breathe too quickly, you may hear a whistling sound in certain spacers.  Step 4:    Take the spacer out of your mouth.    Hold your breath for a count of 10.    Then hold your lips together and slowly breathe out through your mouth.          If you re prescribed more than 1 puff of medicine at a time, wait at  least 30 seconds between puffs. This number may be different for different medicines. Shake the inhaler again. Then repeat steps 2 to 4.   Date Last Reviewed: 10/1/2016    0347-4411 [employer]. [address] [place] [address], [place] [address]. All rights reserved. This information is not intended as a substitute for professional medical care. Always follow your healthcare professional's instructions.      Spiriva Respimat Inhaler  Medicines: Tiotropium ([id number])  What it does  The medicine relaxes the muscles around your airway and makes it easier to breathe.  Use every day to prevent symptoms, even if you feel well. Do not use for fast relief.     How to prepare inhaler for use  1. Wash and dry your hands well.  2. Hold the inhaler with the aqua-colored cap closed.  3. Press the safety catch on the side of the inhaler and pull off the clear base. (Do not touch the sharp tool in the inside of the base.)  4. Write the throw-away date on the label: three months from the first day of use.  5. Take the silver cartridge out of the box.  6. Insert the narrow end of cartridge into the inhaler and push against a firm surface to make sure it is inserted. A little bit of the cartridge ( 1/8 inch) will stick out of the bottom of the inhaler.  7. Put the clear base back on the inhaler.  8. Do not take the inhaler apart again after these steps.  Test spray (priming)  Before first use or if not used for more than 21 days: Do not spray into the eyes. This can cause serious eye problems.  1. Hold the inhaler with the aqua-colored cap pointing up.  2. Turn the clear base toward the arrows on the label until you hear a click (a half turn).  3. Open the cap. Point the inhaler toward the ground and push the dose release button. Then close the cap.  4. Repeat steps 1 to 3 until you see a white spray come out of the inhaler.  5. After you see the white spray, repeat steps 1 to 3, three more times. The inhaler is now  ready for use.  If the inhaler has not been used for three or more days: spray one puff toward the ground to prepare the inhaler for use.  How to use this inhaler  1. Wash and dry your hands well.  2. Sit up straight or stand.  3. Hold the inhaler with the aqua-colored cap closed and pointed up.  4. Turn the clear base in the direction of the arrows on the label until you hear a click (a half turn).  5. Open the cap.  6. Exhale (breathe out) fully through your mouth. Hold the inhaler so it is pointed at the back of your throat.  7. Place the mouthpiece between your lips. Make sure that your tongue is flat under the mouthpiece and does not block the opening.  8. Seal your lips around the mouthpiece.  9. Push the dose release button as you slowly take in a deep breath through your mouth for as long as you can.  10. Hold your breath for 10 seconds. If you cannot hold your breath for 10 seconds, then hold your breath for as long as you can.  11. Close the cap.  12. Repeat steps 3 to 10 to get the full dose (2 puffs equals one dose).  13. Store in a cool, dry place.  Cleaning  At least once a week, wipe the mouthpiece with a damp cloth or tissue. Include the metal part inside the mouthpiece. Do NOT take apart the inhaler.  How to tell if the inhaler is empty  This inhaler contains 30 doses or 60 puffs (2 puffs to a dose). The counter will be red when you have 7 days left. This is a reminder to get a refill.  Throw away the inhaler 3 months after you put it together or when empty.  If you have questions about the use of your inhaler, please ask your pharmacist or provider.  For more information and video demos, go to [email or web address].  For informational purposes only. Not to replace the advice of your health care provider.  Copyright   2015 [facility]. All rights reserved. [employer] 250473 - REV 11/16.

## 2018-12-03 NOTE — NURSING NOTE
The following nebulizer treatment was given:     MEDICATION: Albuterol Sulfate 2.5 mg  : [employer]  LOT #: [id number]   EXPIRATION DATE:  04/2020  NDC # 52450-891-10     The following medication was given:     MEDICATION:Cetirizine  ROUTE: PO  SITE: mouth  DOSE: 20 mg  LOT #: [id number]  :  [employer]  EXPIRATION DATE:  02/2020  NDC#: 2799-2497-93    The following medication was given:     MEDICATION:Ranitidine  ROUTE: PO  SITE: mouth  DOSE: 150 mg  LOT #: [id number]  :  [employer]  EXPIRATION DATE:  12/2019  NDC#: 96843-627-46-5    Carol Musa RN

## 2018-12-03 NOTE — MR AVS SNAPSHOT
After Visit Summary   12/3/2018    Mary Shipman    MRN: [id number]           Patient Information     Date Of Birth          1985        Visit Information        Provider Department      12/3/2018 1:40 PM Terry Cruz MD [facility]        Today's Diagnoses     Asthma, moderate persistent, poorly-controlled    -  1    Urticaria          Care Instructions    If you have any questions regarding your allergies, asthma, or what we discussed during your visit today please call the allergy clinic or contact us via HOLL[facility].    [facility]: [phone]      Follow-up in 1 month      1. Continue dulera 200/5mcg 2 puffs twice daily. Use with spacer, rinse mouth and brush your teeth afterwards    2. Continue singulair (montelukast) - 1 tablet at bedtime    3. Start the spiriva - 2 puffs once a day    4. Use your albuterol inhaler every 4 hours as needed for shortness of breath or wheezing    5. Take zyrtec (cetirizine) 20mg (2 tablets) twice a day    6. Take zantac (ranitidine) 150mg twice a day      Using an Inhaler with a Spacer  To control asthma, you need to use your medicines the right way. Some medicines are inhaled using a device called a metered-dose inhaler (MDI). Metered-dose inhalers deliver medicine with a fine spray. You may be asked to use a spacer (holding tube) with your inhaler. The spacer helps make sure all the medicine you need goes into your lungs.   Steps for using an inhaler with a spacer  Step 1:    Remove the caps from the inhaler and spacer.    Shake the inhaler well and attach the spacer. If the inhaler is being used for the first time or has not been used for a while, prime it as directed by the product maker.  Step 2:    Breathe out normally.    Put the spacer between your teeth. Close your lips tightly around it.    Keep your chin up.  Step 3:    Spray 1 puff into the spacer by pressing down on the inhaler.    Then breathe in through your  mouth as slowly and deeply as you can. This should take about 5-10 seconds. If you breathe too quickly, you may hear a whistling sound in certain spacers.  Step 4:    Take the spacer out of your mouth.    Hold your breath for a count of 10.    Then hold your lips together and slowly breathe out through your mouth.          If you re prescribed more than 1 puff of medicine at a time, wait at least 30 seconds between puffs. This number may be different for different medicines. Shake the inhaler again. Then repeat steps 2 to 4.   Date Last Reviewed: 10/1/2016    4016-5616 [employer]. [address] [place] [address], [place] [address]. All rights reserved. This information is not intended as a substitute for professional medical care. Always follow your healthcare professional's instructions.      Spiriva Respimat Inhaler  Medicines: Tiotropium ([id number])  What it does  The medicine relaxes the muscles around your airway and makes it easier to breathe.  Use every day to prevent symptoms, even if you feel well. Do not use for fast relief.     How to prepare inhaler for use  1. Wash and dry your hands well.  2. Hold the inhaler with the aqua-colored cap closed.  3. Press the safety catch on the side of the inhaler and pull off the clear base. (Do not touch the sharp tool in the inside of the base.)  4. Write the throw-away date on the label: three months from the first day of use.  5. Take the silver cartridge out of the box.  6. Insert the narrow end of cartridge into the inhaler and push against a firm surface to make sure it is inserted. A little bit of the cartridge ( 1/8 inch) will stick out of the bottom of the inhaler.  7. Put the clear base back on the inhaler.  8. Do not take the inhaler apart again after these steps.  Test spray (priming)  Before first use or if not used for more than 21 days: Do not spray into the eyes. This can cause serious eye problems.  1. Hold the inhaler with the  aqua-colored cap pointing up.  2. Turn the clear base toward the arrows on the label until you hear a click (a half turn).  3. Open the cap. Point the inhaler toward the ground and push the dose release button. Then close the cap.  4. Repeat steps 1 to 3 until you see a white spray come out of the inhaler.  5. After you see the white spray, repeat steps 1 to 3, three more times. The inhaler is now ready for use.  If the inhaler has not been used for three or more days: spray one puff toward the ground to prepare the inhaler for use.  How to use this inhaler  1. Wash and dry your hands well.  2. Sit up straight or stand.  3. Hold the inhaler with the aqua-colored cap closed and pointed up.  4. Turn the clear base in the direction of the arrows on the label until you hear a click (a half turn).  5. Open the cap.  6. Exhale (breathe out) fully through your mouth. Hold the inhaler so it is pointed at the back of your throat.  7. Place the mouthpiece between your lips. Make sure that your tongue is flat under the mouthpiece and does not block the opening.  8. Seal your lips around the mouthpiece.  9. Push the dose release button as you slowly take in a deep breath through your mouth for as long as you can.  10. Hold your breath for 10 seconds. If you cannot hold your breath for 10 seconds, then hold your breath for as long as you can.  11. Close the cap.  12. Repeat steps 3 to 10 to get the full dose (2 puffs equals one dose).  13. Store in a cool, dry place.  Cleaning  At least once a week, wipe the mouthpiece with a damp cloth or tissue. Include the metal part inside the mouthpiece. Do NOT take apart the inhaler.  How to tell if the inhaler is empty  This inhaler contains 30 doses or 60 puffs (2 puffs to a dose). The counter will be red when you have 7 days left. This is a reminder to get a refill.  Throw away the inhaler 3 months after you put it together or when empty.  If you have questions about the use of your  inhaler, please ask your pharmacist or provider.  For more information and video demos, go to www.[facility]pan[facility].org/inhaler.  For informational purposes only. Not to replace the advice of your health care provider.  Copyright   2015 [facility]. All rights reserved. "[employer] 395874 - REV 11/16.            Follow-ups after your visit        Your next 10 appointments already scheduled     Dec 04, 2018  3:40 PM CST   SHORT with Miguel Hammer PA-C   [facility]    [address] [place] [id number] [phone]              Who to contact     If you have questions or need follow up information about today's clinic visit or your schedule please contact [facility] directly at [phone].  Normal or non-critical lab and imaging results will be communicated to you by MyChart, letter or phone within 4 business days after the clinic has received the results. If you do not hear from us within 7 days, please contact the clinic through MyChart or phone. If you have a critical or abnormal lab result, we will notify you by phone as soon as possible.  Submit refill requests through [employer] or call your pharmacy and they will forward the refill request to us. Please allow 3 business days for your refill to be completed.          Additional Information About Your Visit        [employer] Information     [employer] gives you secure access to your electronic health record. If you see a primary care provider, you can also send messages to your care team and make appointments. If you have questions, please call your primary care clinic.  If you do not have a primary care provider, please call [phone] and they will assist you.        Care EveryWhere ID     This is your Care EveryWhere ID. This could be used by other organizations to access your [facility] medical records  [id number]        Your Vitals Were     Pulse Temperature  Respirations Last Period Pulse Oximetry BMI (Body Mass Index)    77 97.4  F (36.3  C) (Oral) 18 11/23/2018 97% 46.66 kg/m2       Blood Pressure from Last 3 Encounters:   12/03/18 (!) 135/93   11/27/18 122/81   09/25/18 124/70    Weight from Last 3 Encounters:   12/03/18 127.2 kg (280 lb 6 oz)   11/27/18 128.4 kg (283 lb)   09/25/18 127 kg (280 lb)              We Performed the Following     Spirometry, Breathing Capacity          Today's Medication Changes          These changes are accurate as of 12/3/18  3:17 PM.  If you have any questions, ask your nurse or doctor.               Start taking these medicines.        Dose/Directions    cetirizine 10 MG tablet   Commonly known as:  zyrTEC   Used for:  Urticaria   Started by:  Terry Cruz MD        Dose:  20 mg   Take 2 tablets (20 mg) by mouth 2 times daily   Quantity:  120 tablet   Refills:  11       tiotropium 2.5 MCG/ACT inhaler   Commonly known as:  SPIRIVA RESPIMAT   Used for:  Asthma, moderate persistent, poorly-controlled   Started by:  Terry Cruz MD        Dose:  2 puff   Inhale 2 puffs into the lungs daily   Quantity:  4 g   Refills:  1         These medicines have changed or have updated prescriptions.        Dose/Directions    albuterol 108 (90 Base) MCG/ACT inhaler   Commonly known as:  PROAIR HFA/PROVENTIL HFA/VENTOLIN HFA   This may have changed:  when to take this   Used for:  Asthma, moderate persistent, poorly-controlled   Changed by:  Terry Cruz MD        Dose:  2 puff   Inhale 2 puffs into the lungs every 4 hours as needed for shortness of breath / dyspnea or wheezing   Quantity:  1 Inhaler   Refills:  2       predniSONE 20 MG tablet   Commonly known as:  DELTASONE   This may have changed:    - how much to take  - how to take this  - when to take this  - additional instructions   Used for:  Asthma, moderate persistent, poorly-controlled   Changed by:  Terry Cruz MD        Take 20mg twice daily x 3 days then 20mg daily x 5 days then stop    Quantity:  11 tablet   Refills:  0       ranitidine 150 MG tablet   Commonly known as:  ZANTAC   This may have changed:    - when to take this  - reasons to take this   Used for:  Urticaria   Changed by:  Terry Cruz MD        Dose:  150 mg   Take 1 tablet (150 mg) by mouth 2 times daily   Quantity:  60 tablet   Refills:  5            Where to get your medicines      These medications were sent to [facility]/pharmacy #[id number] - [place], [place] - [address].  [address] [place] [address]     Phone:  [phone]     albuterol 108 (90 Base) MCG/ACT inhaler    cetirizine 10 MG tablet    mometasone-formoterol 200-5 MCG/ACT inhaler    predniSONE 20 MG tablet    ranitidine 150 MG tablet    tiotropium 2.5 MCG/ACT inhaler                Primary Care Provider Office Phone # Fax #    Miguel Hammer PA-C [phone]       [facility] [address]        Equal Access to Services     [facility]: Hadii aad ku hadasho Soomaali, waaxda luqadaha, qaybta kaalmada adeegyada, waxay idiin hayaabrook rey . So [facility] [phone].    ATENCIÓN: Si habla español, tiene a omer disposición servicios gratuitos de asistencia lingüística. [facility] [phone].    We comply with applicable federal civil rights laws and [place] laws. We do not discriminate on the basis of race, color, national origin, age, disability, sex, sexual orientation, or gender identity.            Thank you!     Thank you for choosing [facility]  for your care. Our goal is always to provide you with excellent care. Hearing back from our patients is one way we can continue to improve our services. Please take a few minutes to complete the written survey that you may receive in the mail after your visit with us. Thank you!             Your Updated Medication List - Protect others around you: Learn how to safely use, store and throw away your medicines at www.disposemymeds.org.           This list is accurate as of 12/3/18  3:17 PM.  Always use your most recent med list.                   Brand Name Dispense Instructions for use Diagnosis    albuterol 108 (90 Base) MCG/ACT inhaler    PROAIR HFA/PROVENTIL HFA/VENTOLIN HFA    1 Inhaler    Inhale 2 puffs into the lungs every 4 hours as needed for shortness of breath / dyspnea or wheezing    Asthma, moderate persistent, poorly-controlled       ARIPiprazole 5 MG tablet    ABILIFY    30 tablet    Take 1 tablet (5 mg) by mouth At Bedtime    Insomnia, unspecified type, Moderate episode of recurrent major depressive disorder (H), CRUZ (generalized anxiety disorder)       cetirizine 10 MG tablet    zyrTEC    120 tablet    Take 2 tablets (20 mg) by mouth 2 times daily    Urticaria       clonazePAM 0.5 MG tablet    klonoPIN     Take 0.5 mg by mouth        fexofenadine-pseudoePHEDrine 180-240 MG 24 hr tablet    ALLEGRA-D 24     Take 1 tablet by mouth daily        fluticasone 50 MCG/ACT nasal spray    FLONASE     Spray 1-2 sprays into both nostrils daily as needed for rhinitis or allergies        gabapentin 100 MG capsule    NEURONTIN     TAKE 1 CAPSULE BY ORAL ROUTE 3 TIMES PER DAY        hydrOXYzine 25 MG tablet    ATARAX    60 tablet    Take 1-2 tablets (25-50 mg) by mouth every 6 hours as needed for anxiety    CRUZ (generalized anxiety disorder)       ibuprofen 400-800 mg tablet    ADVIL,MOTRIN    30 tablet    Take 1-2 tablets by mouth every 6 hours as needed (cramping).    Abdominal pain       LANsoprazole 30 MG DR capsule    PREVACID    90 capsule    Take 1 capsule (30 mg) by mouth daily Take 30-60 minutes before a meal.    Gastroesophageal reflux disease without esophagitis       levalbuterol 1.25 MG/0.5ML neb solution    XOPENEX CONC     Take 1.25 mg by nebulization every 6 hours as needed for wheezing        mometasone-formoterol 200-5 MCG/ACT inhaler    DULERA    1 Inhaler    Inhale 2 puffs into the lungs 2 times daily    Asthma, moderate persistent,  poorly-controlled       montelukast 10 MG tablet    SINGULAIR    90 tablet    Take 1 tablet (10 mg) by mouth At Bedtime    Intermittent asthma, uncomplicated       ondansetron 4 MG tablet    ZOFRAN    18 tablet    Take 1 tablet (4 mg) by mouth every 8 hours as needed for nausea or vomiting    Gastroenteritis       polyethylene glycol packet    MIRALAX/GLYCOLAX     Take 17 g by mouth daily as needed for constipation        predniSONE 20 MG tablet    DELTASONE    11 tablet    Take 20mg twice daily x 3 days then 20mg daily x 5 days then stop    Asthma, moderate persistent, poorly-controlled       ranitidine 150 MG tablet    ZANTAC    60 tablet    Take 1 tablet (150 mg) by mouth 2 times daily    Urticaria       sertraline 100 MG tablet    ZOLOFT    180 tablet    2 TABLETS DAILY    Major depressive disorder, recurrent episode, moderate (H), Adjustment disorder with mixed anxiety and depressed mood       tiotropium 2.5 MCG/ACT inhaler    SPIRIVA RESPIMAT    4 g    Inhale 2 puffs into the lungs daily    Asthma, moderate persistent, poorly-controlled       topiramate 50 MG tablet    TOPAMAX    180 tablet    TAKE 6 TABLETS (300 MG) BY MOUTH AT BEDTIME    Seizure disorder (H), Migraine without aura and with status migrainosus, not intractable, Convulsions, unspecified convulsion type (H)       vitamin D3 34005 units capsule    CHOLECALCIFEROL    4 capsule    Take 1 capsule (50,000 Units) by mouth once a week    Low vitamin D level

## 2018-12-03 NOTE — PROGRESS NOTES
Dear Sheree Villa PA-C,    Thank you for referring your patient Mary Shipman to the Allergy/Immunology Clinic. Mary Shipman was seen in the Allergy Clinic at [facility]. The following are my recommendations regarding her Moderate Persistent Asthma, Nonallergic Rhinitis and Urticaria    1. Begin prednisone 20mg twice daily x 3 days, then 20mg daily x 5 days  2. Continue dulera 200/5mcg, 2 puffs twice daily  3. Continue montelukast 10mg daily  4. Begin spiriva respimat, 2.5mcg, 2 puffs daily  5. Use albuterol inhaler, 2-4 puffs every 4 hours as needed  6. Optichamber given in clinic and appropriate inhaler and spacer technique reviewed  7. Begin cetirizine 20mg twice daily  8. Begin ranitidine 150mg twice daily  9. Continue Fexofenadine/pseudoephedrine - 1 tablet daily as needed  10. Continue fluticasone nasal spray, 2 sprays in each nostril daily  11. Patient to follow up with Primary Care provider regarding elevated blood pressure.  12. Follow-up in 1 month      Mary Shipman is a 33 year old White female being seen today at the request of Sheree Villa in consultation for a rash. She reports that the rash has been present on her abdomen, back, neck, and arms for the past month. She was seen in clinic last week and started on prednisone. Prior to starting the steroids she was taking benadryl at night to relieve her symptoms and sometimes every 4-6 hours throughout the day. Mary states that she first developed hives 3 or 4 years ago and was seen in the ED for an allergic reaction. At the time she was taking levaquin for a sinus infection and also taking Valorie-Lesage. She was subsequently evaluated in the allergy clinic at [facility] and her symptoms were felt to be due to a reaction to aspirin. Mary reports that she has a similar reaction in 2017 while traveling in [place]. She was seen in urgent care for the rash and was prescribed prednisone. She complains of itching and has  been unable to sleep due to her symptoms. She is concerned that the rash is being caused by an allergic reaction. She has not had any changes in her environment though she reports that her apartment was fumigated for roaches in the past month. Last year she had been following the Whole 30 diet and then switched to a plant based diet. In November she resumed her usual diet. Mary has no history of acute IgE mediated symptoms after eating any specific foods. She has not identified any triggers associated with her symptoms. Her rash initially resolved with the prednisone but returned once she stopped this medication.    In the last month Mary reports that her asthma has been poorly controlled. She has been using albuterol daily for wheezing and shortness of breath. She is currently prescribed dulera and singulair and has been on these medications for many years. Mary states she was diagnosed with asthma at age 5. She has never been hospitalized but has had several ED visits. She was last seen in the ED for asthma 6 months ago and has had numerous courses of oral prednisone in the past 2 years. Her triggers include URIs, exercise, cold air, perfumes/chemicals, and dust. Mary has been followed by allergists in the [facility] system for many years. She was last seen by Dr. Javed in 2016. At that visit he suspected that her respiratory symptoms were multifactorial and primarily due to uncontrolled GERD and vocal cord dysfunction. Her spirometry and FeNO had been normal and he referred her to speech therapy for further evaluation though she did not follow through with this.    Mary has a long standing history of chronic sinus issues. She has had 3 sinus surgergies in the past but continues to report symptoms of sneezing, rhinorrhea, and nasal congestion.     Outside Laboratory Evaluation:  12/8/10 - Negative ANCA testing  1/13/11 - IgG 1000, IgA 78, and IgM 135  4/20/11 - IgE 12.1, Aspergillus IgG negative,  "Negative specific IgE to dust mite and molds    Past Medical History:   Diagnosis Date     Allergic rhinitis due to other allergen      Antepartum mild preeclampsia 11/23/2012     Asthma      Depressive disorder      Esophageal reflux      Frequent UTI     had a kidney infection also in the past     Gestational hypertension 11/20/2012     Helicobacter pylori (H. pylori)     treated     Hyperlipidemia      Irritable bowel syndrome      Liver disease     \"fatty liver\" resolved on own.     Lump or mass in breast 11/30/2015     Lump or mass in breast      Lump or mass in breast      Mild preeclampsia 12/18/2012     Obesity      Polycystic ovaries      Thyrotoxicosis 5/9/2007     Family History   Problem Relation Age of Onset     Gynecology Mother      ectopic pregnancy/endometriosis     Cancer - colorectal Mother      Congenital Anomalies Mother      kidney problems     Colon Cancer Mother      Hyperlipidemia Mother      Other Cancer Mother      Lung, Skin, Brain and Colon cancer     Gynecology Sister      ectopic pregnancy/endometriosis     Unknown/Adopted Sister      Heart Disease Father      Coronary Artery Disease Father      Psychotic Disorder Brother      Unknown/Adopted Brother      Unknown/Adopted Brother      Unknown/Adopted Brother      Unknown/Adopted Brother      Unknown/Adopted Brother      Unknown/Adopted Sister      Cerebrovascular Disease Maternal Grandmother      Unknown/Adopted Maternal Grandfather      Unknown/Adopted Paternal Grandmother      Unknown/Adopted Paternal Grandfather       Other       Other       Other       Other       Other       Other      Past Surgical History:   Procedure Laterality Date     C APPENDECTOMY       C NONSPECIFIC PROCEDURE      nasal surgery      TONSILLECTOMY & ADENOIDECTOMY      surgery several times for this       ENVIRONMENTAL HISTORY: [place]. The home is heated with a radiant heat. They does not have central air " conditioning. The patient's bedroom is furnished with carpeting in bedroom.  Pets inside the house include 1 cat(s). There is history of cockroach or mice infestation. There is/are 0 smokers in the house.  The house does not have a damp basement.     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 She has missed 0 days of school/work due to hives. She lives with her son.      REVIEW OF SYSTEMS:  General: negative for weight gain. negative for weight loss. negative for changes in sleep.   Eyes: positive  for itching. positive  for redness. positive  for tearing/watering. negative for vision changes  Ears: negative for fullness. negative for hearing loss. negative for dizziness.   Nose: negative for snoring.negative for changes in smell. negative for drainage.   Throat: negative for hoarseness. negative for sore throat. negative for trouble swallowing.   Lungs: positive  for cough. positive  for shortness of breath.positive  for wheezing. negative for sputum production.   Cardiovascular: negative for chest pain. negative for swelling of ankles. negative for fast or irregular heartbeat.   Gastrointestinal: negative for nausea. positive  for heartburn. negative for acid reflux.   Musculoskeletal: negative for joint pain. negative for joint stiffness. negative for joint swelling.   Neurologic: negative for seizures. negative for fainting. negative for weakness.   Psychiatric: negative for changes in mood. negative for anxiety.   Endocrine: negative for cold intolerance. negative for heat intolerance. negative for tremors.   Hematologic: negative for easy bruising. negative for easy bleeding.  Integumentary: positive  for rash. negative for scaling. negative for nail changes.       Current Outpatient Prescriptions:      albuterol (PROAIR HFA/PROVENTIL HFA/VENTOLIN HFA) 108 (90 Base) MCG/ACT inhaler, Inhale 2 puffs into the lungs every 6 hours as needed for shortness of breath / dyspnea or wheezing, Disp: 3 Inhaler,  Rfl: 0     ARIPiprazole (ABILIFY) 5 MG tablet, Take 1 tablet (5 mg) by mouth At Bedtime, Disp: 30 tablet, Rfl: 1     cholecalciferol (VITAMIN D3) 11013 units capsule, Take 1 capsule (50,000 Units) by mouth once a week, Disp: 4 capsule, Rfl: 1     fexofenadine-pseudoePHEDrine (ALLEGRA-D 24) 180-240 MG per 24 hr tablet, Take 1 tablet by mouth daily, Disp: , Rfl:      fluticasone (FLONASE) 50 MCG/ACT spray, Spray 1-2 sprays into both nostrils daily as needed for rhinitis or allergies, Disp: , Rfl:      hydrOXYzine (ATARAX) 25 MG tablet, Take 1-2 tablets (25-50 mg) by mouth every 6 hours as needed for anxiety, Disp: 60 tablet, Rfl: 1     ibuprofen (ADVIL,MOTRIN) 400-800 mg tablet, Take 1-2 tablets by mouth every 6 hours as needed (cramping)., Disp: 30 tablet, Rfl: 0     LANsoprazole (PREVACID) 30 MG capsule, Take 1 capsule (30 mg) by mouth daily Take 30-60 minutes before a meal., Disp: 90 capsule, Rfl: 0     levalbuterol (XOPENEX CONC) 1.25 MG/0.5ML NEBU, Take 1.25 mg by nebulization every 6 hours as needed for wheezing, Disp: , Rfl:      mometasone-formoterol (DULERA) 200-5 MCG/ACT oral inhaler, Inhale 2 puffs into the lungs 2 times daily, Disp: 39 g, Rfl: 0     montelukast (SINGULAIR) 10 MG tablet, Take 1 tablet (10 mg) by mouth At Bedtime, Disp: 90 tablet, Rfl: 1     ondansetron (ZOFRAN) 4 MG tablet, Take 1 tablet (4 mg) by mouth every 8 hours as needed for nausea or vomiting, Disp: 18 tablet, Rfl: 0     polyethylene glycol (MIRALAX/GLYCOLAX) Packet, Take 17 g by mouth daily as needed for constipation, Disp: , Rfl:      predniSONE (DELTASONE) 20 MG tablet, Take 1 tablet (20 mg) by mouth daily, Disp: 5 tablet, Rfl: 0     ranitidine (ZANTAC) 150 MG tablet, Take 150 mg by mouth 2 times daily as needed , Disp: 60 tablet, Rfl: 1     sertraline (ZOLOFT) 100 MG tablet, 2 TABLETS DAILY, Disp: 180 tablet, Rfl: 1     topiramate (TOPAMAX) 50 MG tablet, TAKE 6 TABLETS (300 MG) BY MOUTH AT BEDTIME, Disp: 180 tablet, Rfl:  5  Immunization History   Administered Date(s) Administered     Influenza (IIV3) PF 01/11/2013     Influenza Vaccine IM 3yrs+ 4 Valent IIV4 10/16/2013     MMR 06/10/2008, 12/23/2012     TD (ADULT, 7+) 02/01/2008     Tdap (Adacel,Boostrix) 03/13/2008     Allergies   Allergen Reactions     Levaquin [Levofloxacin] Anaphylaxis     Acetaminophen      Uncertain - hives/anaphylaxis     Hydrocodone Hives     Vicodin     Hydrocodone-Acetaminophen Rash         EXAM:   BP (!) 135/93  Pulse 77  Temp 97.4  F (36.3  C) (Oral)  Resp 18  Wt 127.2 kg (280 lb 6 oz)  LMP 11/23/2018  SpO2 97%  BMI 46.66 kg/m2  GENERAL APPEARANCE: alert, cooperative and not in distress  SKIN: erythematous, round, mildly raised lesions approximately nickel to quarter in size on upper extremities and back  HEAD: atraumatic, normocephalic  EYES: lids and lashes normal, conjunctivae and sclerae clear, pupils equal, round, reactive to light, EOM full and intact  ENT: no scars or lesions, nasal exam showed no discharge, swelling or lesions noted, otoscopy showed external auditory canals clear, tympanic membranes normal, tongue midline and normal, soft palate, uvula, and tonsils normal  NECK: no asymmetry, masses, or scars, supple without significant adenopathy  LUNGS: unlabored respirations, no intercostal retractions or accessory muscle use, clear to auscultation without rales or wheezes  HEART: regular rate and rhythm without murmurs and normal S1 and S2  MUSCULOSKELETAL: no musculoskeletal defects are noted  NEURO: no focal deficits noted  PSYCH: does not appear depressed or anxious    WORKUP: Spirometry  SPIROMETRY  initial FVC 2.49L (64% of predicted); after bronchodilator 3.62L (46% change)   initial FEV1 2.04L (64% of predicted); after bronchodilator 3.21L (57% change)  initial FEV1/FVC 82%; after bronchodilator 88%  initial FEF 25%-75% 2.20L/s (65% of predicted); after bronchodilator 4.34L/s (98% change)    These values are consistent with mild  obstruction with restrictive ventilatory defect. There was significant improvement after bronchodilator administration though she did not have optimal technique on post-bronchodilator studies.    Asthma Control Test (ACT) total score: 20 on 11/28/18    ASSESSMENT/PLAN:  Mary Shipman is a 33 year old female here for evaluation of allergies. She has had symptoms of urticaria over the past month with reported occurrences of similar symptoms over the past 3-4 years. She has not had daily symptoms for > 6 weeks. Prednisone has historically worked to relieve her symptoms. Mary was counseled that urticaria often worsens once steroids have been discontinued and we discussed addition of antihistamines.    Mary also has asthma and chronic rhinitis symptoms that have been poorly controlled. Previous evaluation for allergic triggers has been negative. Spirometry obtained today revealed airflow obstruction with significant reversibility. Her previous allergist suggested that her respiratory symptoms may be multi-factorial and in part due to uncontrolled GERD and vocal cord dysfunction. Given the abnormalities noted on spirometry we will continue with additional medication management for her asthma and consider evaluation with speech therapy should her symptoms remain uncontrolled.    1. Begin prednisone 20mg twice daily x 3 days, then 20mg daily x 5 days  2. Continue dulera 200/5mcg, 2 puffs twice daily  3. Continue montelukast 10mg daily  4. Begin spiriva respimat, 2.5mcg, 2 puffs daily  5. Use albuterol inhaler, 2-4 puffs every 4 hours as needed  6. Optichamber given in clinic and appropriate inhaler and spacer technique reviewed  7. Begin cetirizine 20mg twice daily  8. Begin ranitidine 150mg twice daily  9. Continue Fexofenadine/pseudoephedrine - 1 tablet daily as needed  10. Continue fluticasone nasal spray, 2 sprays in each nostril daily  11. Patient to follow up with Primary Care provider regarding elevated blood  pressure.  12. Follow-up in 1 month      Terry Cruz MD  Allergy/Immunology  [facility] and [place], [place]      Chart documentation done in part with Dragon Voice Recognition Software. Although reviewed after completion, some word and grammatical errors may remain.

## 2018-12-03 NOTE — LETTER
My Asthma Action Plan  Name: Mary Shipman   YOB: 1985  Date: 12/3/2018   My doctor: Terry Cruz MD   My clinic: [facility]        My Control Medicine: Mometasone + formoterol (Dulera) -  200/5 mcg 2 puffs twice daily  Montelukast (Singulair) -  10 mg 1 tablet daily  Tiotropium (Spiriva) -  Respimat 2.5 mcg 2 puffs daily  My Rescue Medicine: Albuterol (Proair/Ventolin/Proventil) inhaler 2-4 puffs every 4 hours as needed  Levalbuterol (Xopenex) nebulizer solution 1 vial every 6 hours as needed   My Asthma Severity: moderate persistent  Avoid your asthma triggers: upper respiratory infections, strong odors and fumes, exercise or sports, cold air and dust  upper respiratory infections  humidity  cold air            GREEN ZONE   Good Control    I feel good    No cough or wheeze    Can work, sleep and play without asthma symptoms       Take your asthma control medicine every day.     1. If exercise triggers your asthma, take your rescue medication    15 minutes before exercise or sports, and    During exercise if you have asthma symptoms  2. Spacer to use with inhaler: If you have a spacer, make sure to use it with your inhaler             YELLOW ZONE Getting Worse  I have ANY of these:    I do not feel good    Cough or wheeze    Chest feels tight    Wake up at night   1. Keep taking your Green Zone medications  2. Start taking your rescue medicine:    every 20 minutes for up to 1 hour. Then every 4 hours for 24-48 hours.  3. If you stay in the Yellow Zone for more than 12-24 hours, contact your doctor.             RED ZONE Medical Alert - Get Help  I have ANY of these:    I feel awful    Medicine is not helping    Breathing getting harder    Trouble walking or talking    Nose opens wide to breathe       1. Take your rescue medicine NOW  2. If your provider has prescribed an oral steroid medicine, start taking it NOW  3. Call your doctor NOW  4. If you are still in the Red Zone after 20  minutes and you have not reached your doctor:    Take your rescue medicine again and    Call 911 or go to the emergency room right away    See your regular doctor within 2 weeks of an Emergency Room or Urgent Care visit for follow-up treatment.          Annual Reminders:  Meet with Asthma Educator,  Flu Shot in the Fall, consider Pneumonia Vaccination for patients with asthma (aged 19 and older).    Pharmacy:    [facility] [place], [place] - [address] [id number] - [place], [place] - [address].                      Asthma Triggers  How To Control Things That Make Your Asthma Worse    Triggers are things that make your asthma worse.  Look at the list below to help you find your triggers and what you can do about them.  You can help prevent asthma flare-ups by staying away from your triggers.      Trigger                                                          What you can do   Cigarette Smoke  Tobacco smoke can make asthma worse. Do not allow smoking in your home, car or around you.  Be sure no one smokes at a child s day care or school.  If you smoke, ask your health care provider for ways to help you quit.  Ask family members to quit too.  Ask your health care provider for a referral to Quit Plan to help you quit smoking, or call 9-704-133-PLAN.     Colds, Flu, Bronchitis  These are common triggers of asthma. Wash your hands often.  Don t touch your eyes, nose or mouth.  Get a flu shot every year.     Dust Mites  These are tiny bugs that live in cloth or carpet. They are too small to see. Wash sheets and blankets in hot water every week.   Encase pillows and mattress in dust mite proof covers.  Avoid having carpet if you can. If you have carpet, vacuum weekly.   Use a dust mask and HEPA vacuum.   Pollen and Outdoor Mold  Some people are allergic to trees, grass, or weed pollen, or molds. Try to keep your windows closed.  Limit time out doors when pollen  count is high.   Ask you health care provider about taking medicine during allergy season.     Animal Dander  Some people are allergic to skin flakes, urine or saliva from pets with fur or feathers. Keep pets with fur or feathers out of your home.    If you can t keep the pet outdoors, then keep the pet out of your bedroom.  Keep the bedroom door closed.  Keep pets off cloth furniture and away from stuffed toys.     Mice, Rats, and Cockroaches  Some people are allergic to the waste from these pests.   Cover food and garbage.  Clean up spills and food crumbs.  Store grease in the refrigerator.   Keep food out of the bedroom.   Indoor Mold  This can be a trigger if your home has high moisture. Fix leaking faucets, pipes, or other sources of water.   Clean moldy surfaces.  Dehumidify basement if it is damp and smelly.   Smoke, Strong Odors, and Sprays  These can reduce air quality. Stay away from strong odors and sprays, such as perfume, powder, hair spray, paints, smoke incense, paint, cleaning products, candles and new carpet.   Exercise or Sports  Some people with asthma have this trigger. Be active!  Ask your doctor about taking medicine before sports or exercise to prevent symptoms.    Warm up for 5-10 minutes before and after sports or exercise.     Other Triggers of Asthma  Cold air:  Cover your nose and mouth with a scarf.  Sometimes laughing or crying can be a trigger.  Some medicines and food can trigger asthma.

## 2018-12-03 NOTE — NURSING NOTE
Writer demonstrated how to use an HFA inhaler with a spacer for patient.  Patient instructed to shake the inhaler, empty lungs, put the inhaler spacer in mouth, push down on the inhaler and breathe in at the same time and then hold breath for 10 seconds.  RN informed patient that if an audible whistle/hum is heard from spacer, they are to slow their breathing.  Patient advised to wait 30 seconds-1 minute between puffs.  Patient instructed on how to clean the MDI inhaler, take the medication canister out, wash it in warm soapy water, rinse it and then let it dry over night.  RN advised pt to refer to handout with spacer for cleaning instructions.  Patient informed the inhaler needs to be washed once a week or when he notices a powder buildup.  Patient verbalized understanding.       Carol Musa RN

## 2018-12-03 NOTE — Clinical Note
12/3/2018         RE: Mary Shipman  [address]  [place] [address]        Dear Colleague,    Thank you for referring your patient, Mary Shipman, to the [facility]. Please see a copy of my visit note below.    Dear Sheree Villa PA-C,    Thank you for referring your patient Mary Shipman to the Allergy/Immunology Clinic. Mary Shipman was seen in the Allergy Clinic at [facility]. The following are my recommendations regarding her {Allergydiagnoses:556337}    1. Begin prednisone 20mg twice daily x 3 days, then 20mg daily x 5 days  2. Continue dulera 200/5mcg, 2 puffs twice daily  3. Continue montelukast 10mg daily  4. Begin spiriva respimat, 2.5mcg, 2 puffs daily  5. Use albuterol inhaler, 2-4 puffs every 4 hours as needed  6. Optichamber given in clinic and appropriate inhaler and spacer technique reviewed  7. Begin cetirizine 20mg twice daily  8. Begin ranitidine 150mg twice daily  9. Continue Fexofenadine/pseudoephedrine - 1 tablet daily as needed  10. Continue fluticasone nasal spray, 2 sprays in each nostril daily  11. Patient to follow up with Primary Care provider regarding elevated blood pressure.  12. Follow-up in 1 month      Mary Shipman is a 33 year old White female being seen today at the request of Sheree Villa in consultation for     Has had a rash x 1 month - abdomen, back, arms, neck    Sinus headache, wheezing, rhinorrhea, nausea. Felt her GI symptoms were possibly food poisoning but that the hives were an allergic reaction to something. She has been continuing her usual medications and was prescribed prednisone. As soon as she went off of the prednisone the rash returned. Went to urgent care yesterday - prescribed 40mg of prednisone for 2 days until she would see her PCP. Has been waking up at night scratching and wheezing. She states this is the 3rd time she has had a rash like this in her life.    1st reaction she had hives  "with anaphylaxis about 3-4 years ago and was taken to the ED. Does not recall what she was treated with. Summer of 2017 had a similar reaction as she is having currently while she was in [place]. Went to urgent care and was prescribed prednisone until she returned home    Using albuterol daily while she is using hives. Hasn't used xopenex nebs in the last 5 months. Diagnosed with asthma around age 5, never hospitalized but has had ED visits for asthma. Last ED visit was 6 months ago. On dulera and singulair for years. Triggers - activity, perfumes/chemicals, dust, URIs,cold air,    History of recurrent sinusitis, has had surgery x 3      Past Medical History:   Diagnosis Date     Allergic rhinitis due to other allergen      Antepartum mild preeclampsia 11/23/2012     Asthma      Depressive disorder      Esophageal reflux      Frequent UTI     had a kidney infection also in the past     Gestational hypertension 11/20/2012     Helicobacter pylori (H. pylori)     treated     Hyperlipidemia      Irritable bowel syndrome      Liver disease     \"fatty liver\" resolved on own.     Lump or mass in breast 11/30/2015     Lump or mass in breast      Lump or mass in breast      Mild preeclampsia 12/18/2012     Obesity      Polycystic ovaries      Thyrotoxicosis 5/9/2007     Family History   Problem Relation Age of Onset     Gynecology Mother      ectopic pregnancy/endometriosis     Cancer - colorectal Mother      Congenital Anomalies Mother      kidney problems     Colon Cancer Mother      Hyperlipidemia Mother      Other Cancer Mother      Lung, Skin, Brain and Colon cancer     Gynecology Sister      ectopic pregnancy/endometriosis     Unknown/Adopted Sister      Heart Disease Father      Coronary Artery Disease Father      Psychotic Disorder Brother      Unknown/Adopted Brother      Unknown/Adopted Brother      Unknown/Adopted Brother      Unknown/Adopted Brother      Unknown/Adopted Brother      Unknown/Adopted Sister      " Cerebrovascular Disease Maternal Grandmother      Unknown/Adopted Maternal Grandfather      Unknown/Adopted Paternal Grandmother      Unknown/Adopted Paternal Grandfather       Other       Other       Other       Other       Other       Other      Past Surgical History:   Procedure Laterality Date     C APPENDECTOMY       C NONSPECIFIC PROCEDURE      nasal surgery      TONSILLECTOMY & ADENOIDECTOMY      surgery several times for this       ENVIRONMENTAL HISTORY: [place]. The home is heated with a radiant heat. They does not have central air conditioning. The patient's bedroom is furnished with carpeting in bedroom.  Pets inside the house include 1 cat(s). There is history of cockroach or mice infestation. There is/are 0 smokers in the house.  The house does not have a damp basement.     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 She has missed 0 days of school/work due to hives. She lives with her son.      REVIEW OF SYSTEMS:  General: negative for weight gain. negative for weight loss. negative for changes in sleep.   Eyes: positive  for itching. positive  for redness. positive  for tearing/watering. negative for vision changes  Ears: negative for fullness. negative for hearing loss. negative for dizziness.   Nose: negative for snoring.negative for changes in smell. negative for drainage.   Throat: negative for hoarseness. negative for sore throat. negative for trouble swallowing.   Lungs: positive  for cough. positive  for shortness of breath.positive  for wheezing. negative for sputum production.   Cardiovascular: negative for chest pain. negative for swelling of ankles. negative for fast or irregular heartbeat.   Gastrointestinal: negative for nausea. positive  for heartburn. negative for acid reflux.   Musculoskeletal: negative for joint pain. negative for joint stiffness. negative for joint swelling.   Neurologic: negative for seizures. negative for  fainting. negative for weakness.   Psychiatric: negative for changes in mood. negative for anxiety.   Endocrine: negative for cold intolerance. negative for heat intolerance. negative for tremors.   Hematologic: negative for easy bruising. negative for easy bleeding.  Integumentary: positive  for rash. negative for scaling. negative for nail changes.       Current Outpatient Prescriptions:      albuterol (PROAIR HFA/PROVENTIL HFA/VENTOLIN HFA) 108 (90 Base) MCG/ACT inhaler, Inhale 2 puffs into the lungs every 6 hours as needed for shortness of breath / dyspnea or wheezing, Disp: 3 Inhaler, Rfl: 0     ARIPiprazole (ABILIFY) 5 MG tablet, Take 1 tablet (5 mg) by mouth At Bedtime, Disp: 30 tablet, Rfl: 1     cholecalciferol (VITAMIN D3) 70226 units capsule, Take 1 capsule (50,000 Units) by mouth once a week, Disp: 4 capsule, Rfl: 1     fexofenadine-pseudoePHEDrine (ALLEGRA-D 24) 180-240 MG per 24 hr tablet, Take 1 tablet by mouth daily, Disp: , Rfl:      fluticasone (FLONASE) 50 MCG/ACT spray, Spray 1-2 sprays into both nostrils daily as needed for rhinitis or allergies, Disp: , Rfl:      hydrOXYzine (ATARAX) 25 MG tablet, Take 1-2 tablets (25-50 mg) by mouth every 6 hours as needed for anxiety, Disp: 60 tablet, Rfl: 1     ibuprofen (ADVIL,MOTRIN) 400-800 mg tablet, Take 1-2 tablets by mouth every 6 hours as needed (cramping)., Disp: 30 tablet, Rfl: 0     LANsoprazole (PREVACID) 30 MG capsule, Take 1 capsule (30 mg) by mouth daily Take 30-60 minutes before a meal., Disp: 90 capsule, Rfl: 0     levalbuterol (XOPENEX CONC) 1.25 MG/0.5ML NEBU, Take 1.25 mg by nebulization every 6 hours as needed for wheezing, Disp: , Rfl:      mometasone-formoterol (DULERA) 200-5 MCG/ACT oral inhaler, Inhale 2 puffs into the lungs 2 times daily, Disp: 39 g, Rfl: 0     montelukast (SINGULAIR) 10 MG tablet, Take 1 tablet (10 mg) by mouth At Bedtime, Disp: 90 tablet, Rfl: 1     ondansetron (ZOFRAN) 4 MG tablet, Take 1 tablet (4 mg) by mouth  every 8 hours as needed for nausea or vomiting, Disp: 18 tablet, Rfl: 0     polyethylene glycol (MIRALAX/GLYCOLAX) Packet, Take 17 g by mouth daily as needed for constipation, Disp: , Rfl:      predniSONE (DELTASONE) 20 MG tablet, Take 1 tablet (20 mg) by mouth daily, Disp: 5 tablet, Rfl: 0     ranitidine (ZANTAC) 150 MG tablet, Take 150 mg by mouth 2 times daily as needed , Disp: 60 tablet, Rfl: 1     sertraline (ZOLOFT) 100 MG tablet, 2 TABLETS DAILY, Disp: 180 tablet, Rfl: 1     topiramate (TOPAMAX) 50 MG tablet, TAKE 6 TABLETS (300 MG) BY MOUTH AT BEDTIME, Disp: 180 tablet, Rfl: 5  Immunization History   Administered Date(s) Administered     Influenza (IIV3) PF 01/11/2013     Influenza Vaccine IM 3yrs+ 4 Valent IIV4 10/16/2013     MMR 06/10/2008, 12/23/2012     TD (ADULT, 7+) 02/01/2008     Tdap (Adacel,Boostrix) 03/13/2008     Allergies   Allergen Reactions     Levaquin [Levofloxacin] Anaphylaxis     Acetaminophen      Uncertain - hives/anaphylaxis     Hydrocodone Hives     Vicodin     Hydrocodone-Acetaminophen Rash         EXAM:   BP (!) 135/93  Pulse 77  Temp 97.4  F (36.3  C) (Oral)  Resp 18  Wt 127.2 kg (280 lb 6 oz)  LMP 11/23/2018  SpO2 97%  BMI 46.66 kg/m2  GENERAL APPEARANCE: alert, cooperative and not in distress  SKIN: erythematous, round, mildly raised lesions approximately nickel to quarter in size on upper extremities and back  HEAD: atraumatic, normocephalic  EYES: lids and lashes normal, conjunctivae and sclerae clear, pupils equal, round, reactive to light, EOM full and intact  ENT: no scars or lesions, nasal exam showed no discharge, swelling or lesions noted, otoscopy showed external auditory canals clear, tympanic membranes normal, tongue midline and normal, soft palate, uvula, and tonsils normal  NECK: no asymmetry, masses, or scars, supple without significant adenopathy  LUNGS: unlabored respirations, no intercostal retractions or accessory muscle use, clear to auscultation without  rales or wheezes  HEART: regular rate and rhythm without murmurs and normal S1 and S2  MUSCULOSKELETAL: no musculoskeletal defects are noted  NEURO: no focal deficits noted  PSYCH: does not appear depressed or anxious    WORKUP: Spirometry  SPIROMETRY  initial FVC 2.49L (64% of predicted); after bronchodilator 3.62L (46% change)   initial FEV1 2.04L (64% of predicted); after bronchodilator 3.21L (57% change)  initial FEV1/FVC 82%; after bronchodilator 88%  initial FEF 25%-75% 2.20L/s (65% of predicted); after bronchodilator 4.34L/s (98% change)    These values are consistent with mild obstruction with restrictive ventilatory defect. There was significant improvement after bronchodilator administration though she did not have optimal technique on post-bronchodilator studies.    Asthma Control Test (ACT) total score: 20 on 11/28/18    ASSESSMENT/PLAN:  Mary Shipman is a 33 year old female ***    1. Begin prednisone 20mg twice daily x 3 days, then 20mg daily x 5 days  2. Continue dulera 200/5mcg, 2 puffs twice daily  3. Continue montelukast 10mg daily  4. Begin spiriva respimat, 2.5mcg, 2 puffs daily  5. Use albuterol inhaler, 2-4 puffs every 4 hours as needed  6. Optichamber given in clinic and appropriate inhaler and spacer technique reviewed  7. Begin cetirizine 20mg twice daily  8. Begin ranitidine 150mg twice daily  9. Continue Fexofenadine/pseudoephedrine - 1 tablet daily as needed  10. Continue fluticasone nasal spray, 2 sprays in each nostril daily  11. Patient to follow up with Primary Care provider regarding elevated blood pressure.  12. Follow-up in 1 month      Terry Cruz MD  Allergy/Immunology  [place], [place]      Chart documentation done in part with Dragon Voice Recognition Software. Although reviewed after completion, some word and grammatical errors may remain.      Again, thank you for allowing me to participate in the care of your patient.        Sincerely,        Terry  MD Anthony

## 2018-12-04 ENCOUNTER — OFFICE VISIT (OUTPATIENT)
Dept: FAMILY MEDICINE | Facility: CLINIC | Age: 33
End: 2018-12-04
Payer: COMMERCIAL

## 2018-12-04 VITALS
BODY MASS INDEX: 47.36 KG/M2 | WEIGHT: 284.6 LBS | TEMPERATURE: 98.5 F | DIASTOLIC BLOOD PRESSURE: 70 MMHG | SYSTOLIC BLOOD PRESSURE: 114 MMHG | HEART RATE: 74 BPM

## 2018-12-04 DIAGNOSIS — L50.9 HIVES: Primary | ICD-10-CM

## 2018-12-04 DIAGNOSIS — G43.809 OTHER MIGRAINE WITHOUT STATUS MIGRAINOSUS, NOT INTRACTABLE: ICD-10-CM

## 2018-12-04 DIAGNOSIS — G81.94 LEFT HEMIPLEGIA (H): ICD-10-CM

## 2018-12-04 PROCEDURE — 99213 OFFICE O/P EST LOW 20 MIN: CPT | Performed by: PHYSICIAN ASSISTANT

## 2018-12-04 NOTE — MR AVS SNAPSHOT
After Visit Summary   12/4/2018    Mary Shipman    MRN: [id number]           Patient Information     Date Of Birth          1985        Visit Information        Provider Department      12/4/2018 3:40 PM Miguel Hammer PA-C [facility]        Today's Diagnoses     Hives    -  1    Left hemiplegia (H)        Other migraine without status migrainosus, not intractable           Follow-ups after your visit        Your next 10 appointments already scheduled     Jan 14, 2019  3:20 PM CST   Return Visit with Terry Cruz MD   [facility]    [address] [place] [address] [phone]              Who to contact     If you have questions or need follow up information about today's clinic visit or your schedule please contact [facility] directly at [phone].  Normal or non-critical lab and imaging results will be communicated to you by MyChart, letter or phone within 4 business days after the clinic has received the results. If you do not hear from us within 7 days, please contact the clinic through Photomedexhart or phone. If you have a critical or abnormal lab result, we will notify you by phone as soon as possible.  Submit refill requests through Viajala or call your pharmacy and they will forward the refill request to us. Please allow 3 business days for your refill to be completed.          Additional Information About Your Visit        MyChart Information     [employer] gives you secure access to your electronic health record. If you see a primary care provider, you can also send messages to your care team and make appointments. If you have questions, please call your primary care clinic.  If you do not have a primary care provider, please call [phone] and they will assist you.        Care EveryWhere ID     This is your Care EveryWhere ID. This could be used by other organizations to access your  [place] medical records  [id number]        Your Vitals Were     Pulse Temperature Last Period BMI (Body Mass Index)          74 98.5  F (36.9  C) (Oral) 11/23/2018 47.36 kg/m2         Blood Pressure from Last 3 Encounters:   12/04/18 114/70   12/03/18 (!) 135/93   11/27/18 122/81    Weight from Last 3 Encounters:   12/04/18 284 lb 9.6 oz (129.1 kg)   12/03/18 280 lb 6 oz (127.2 kg)   11/27/18 283 lb (128.4 kg)              Today, you had the following     No orders found for display       Primary Care Provider Office Phone # Fax #    Miguel Hammer PA-C [phone]       [address] [facility] [address]        Equal Access to Services     JANA DUCKWORTH : Hadii aad delfino hadasho Soomaali, waaxda luqadaha, qaybta kaalmada adeegyada, germain carrero haychante rey . So [facility] [phone].    ATENCIÓN: Si habla español, tiene a omer disposición servicios gratuitos de asistencia lingüística. Miguel al [phone].    We comply with applicable federal civil rights laws and [place] laws. We do not discriminate on the basis of race, color, national origin, age, disability, sex, sexual orientation, or gender identity.            Thank you!     Thank you for choosing [facility]  for your care. Our goal is always to provide you with excellent care. Hearing back from our patients is one way we can continue to improve our services. Please take a few minutes to complete the written survey that you may receive in the mail after your visit with us. Thank you!             Your Updated Medication List - Protect others around you: Learn how to safely use, store and throw away your medicines at www.disposemymeds.org.          This list is accurate as of 12/4/18  4:33 PM.  Always use your most recent med list.                   Brand Name Dispense Instructions for use Diagnosis    albuterol 108 (90 Base) MCG/ACT inhaler    PROAIR HFA/PROVENTIL HFA/VENTOLIN HFA    1 Inhaler     Inhale 2 puffs into the lungs every 4 hours as needed for shortness of breath / dyspnea or wheezing    Asthma, moderate persistent, poorly-controlled       ARIPiprazole 5 MG tablet    ABILIFY    30 tablet    Take 1 tablet (5 mg) by mouth At Bedtime    Insomnia, unspecified type, Moderate episode of recurrent major depressive disorder (H), CRUZ (generalized anxiety disorder)       cetirizine 10 MG tablet    zyrTEC    120 tablet    Take 2 tablets (20 mg) by mouth 2 times daily    Urticaria       clonazePAM 0.5 MG tablet    klonoPIN     Take 0.5 mg by mouth        fexofenadine-pseudoePHEDrine 180-240 MG 24 hr tablet    ALLEGRA-D 24     Take 1 tablet by mouth daily        fluticasone 50 MCG/ACT nasal spray    FLONASE     Spray 1-2 sprays into both nostrils daily as needed for rhinitis or allergies        gabapentin 100 MG capsule    NEURONTIN     TAKE 1 CAPSULE BY ORAL ROUTE 3 TIMES PER DAY        hydrOXYzine 25 MG tablet    ATARAX    60 tablet    Take 1-2 tablets (25-50 mg) by mouth every 6 hours as needed for anxiety    CRUZ (generalized anxiety disorder)       ibuprofen 400-800 mg tablet    ADVIL,MOTRIN    30 tablet    Take 1-2 tablets by mouth every 6 hours as needed (cramping).    Abdominal pain       LANsoprazole 30 MG DR capsule    PREVACID    90 capsule    Take 1 capsule (30 mg) by mouth daily Take 30-60 minutes before a meal.    Gastroesophageal reflux disease without esophagitis       levalbuterol 1.25 MG/0.5ML neb solution    XOPENEX CONC     Take 1.25 mg by nebulization every 6 hours as needed for wheezing        mometasone-formoterol 200-5 MCG/ACT inhaler    DULERA    1 Inhaler    Inhale 2 puffs into the lungs 2 times daily    Asthma, moderate persistent, poorly-controlled       montelukast 10 MG tablet    SINGULAIR    90 tablet    Take 1 tablet (10 mg) by mouth At Bedtime    Intermittent asthma, uncomplicated       ondansetron 4 MG tablet    ZOFRAN    18 tablet    Take 1 tablet (4 mg) by mouth every 8 hours  as needed for nausea or vomiting    Gastroenteritis       polyethylene glycol packet    MIRALAX/GLYCOLAX     Take 17 g by mouth daily as needed for constipation        predniSONE 20 MG tablet    DELTASONE    11 tablet    Take 20mg twice daily x 3 days then 20mg daily x 5 days then stop    Asthma, moderate persistent, poorly-controlled       ranitidine 150 MG tablet    ZANTAC    60 tablet    Take 1 tablet (150 mg) by mouth 2 times daily    Urticaria       sertraline 100 MG tablet    ZOLOFT    180 tablet    2 TABLETS DAILY    Major depressive disorder, recurrent episode, moderate (H), Adjustment disorder with mixed anxiety and depressed mood       tiotropium 2.5 MCG/ACT inhaler    SPIRIVA RESPIMAT    4 g    Inhale 2 puffs into the lungs daily    Asthma, moderate persistent, poorly-controlled       topiramate 50 MG tablet    TOPAMAX    180 tablet    TAKE 6 TABLETS (300 MG) BY MOUTH AT BEDTIME    Seizure disorder (H), Migraine without aura and with status migrainosus, not intractable, Convulsions, unspecified convulsion type (H)       vitamin D3 67979 units capsule    CHOLECALCIFEROL    4 capsule    Take 1 capsule (50,000 Units) by mouth once a week    Low vitamin D level

## 2018-12-10 ENCOUNTER — MYC MEDICAL ADVICE (OUTPATIENT)
Dept: ALLERGY | Facility: CLINIC | Age: 33
End: 2018-12-10

## 2018-12-10 DIAGNOSIS — L50.9 URTICARIA: Primary | ICD-10-CM

## 2018-12-11 NOTE — TELEPHONE ENCOUNTER
[employer] message sent regarding scheduling follow-up visit and having lab drawn.    Carol Musa RN

## 2018-12-11 NOTE — TELEPHONE ENCOUNTER
Pictures reviewed. Lab orders placed yesterday for patient. She can have these drawn at any [place] lab. She will also need a follow-up visit to discuss other medication options.

## 2019-01-07 DIAGNOSIS — L50.9 URTICARIA: ICD-10-CM

## 2019-01-07 PROCEDURE — 36415 COLL VENOUS BLD VENIPUNCTURE: CPT | Performed by: ALLERGY & IMMUNOLOGY

## 2019-01-07 PROCEDURE — 82955 ASSAY OF G6PD ENZYME: CPT | Mod: 90 | Performed by: ALLERGY & IMMUNOLOGY

## 2019-01-07 PROCEDURE — 84443 ASSAY THYROID STIM HORMONE: CPT | Performed by: ALLERGY & IMMUNOLOGY

## 2019-01-07 PROCEDURE — 99000 SPECIMEN HANDLING OFFICE-LAB: CPT | Performed by: ALLERGY & IMMUNOLOGY

## 2019-01-08 LAB — TSH SERPL DL<=0.005 MIU/L-ACNC: 1.03 MU/L (ref 0.4–4)

## 2019-01-10 LAB — G6PD RBC-CCNC: 13.8 U/G HB (ref 9.9–16.6)

## 2019-01-14 ENCOUNTER — OFFICE VISIT (OUTPATIENT)
Dept: ALLERGY | Facility: CLINIC | Age: 34
End: 2019-01-14
Payer: COMMERCIAL

## 2019-01-14 ENCOUNTER — MEDICAL CORRESPONDENCE (OUTPATIENT)
Dept: HEALTH INFORMATION MANAGEMENT | Facility: CLINIC | Age: 34
End: 2019-01-14

## 2019-01-14 VITALS
WEIGHT: 289.2 LBS | RESPIRATION RATE: 16 BRPM | SYSTOLIC BLOOD PRESSURE: 132 MMHG | HEART RATE: 76 BPM | BODY MASS INDEX: 48.13 KG/M2 | DIASTOLIC BLOOD PRESSURE: 85 MMHG | OXYGEN SATURATION: 98 %

## 2019-01-14 DIAGNOSIS — J45.40 ASTHMA, MODERATE PERSISTENT, POORLY-CONTROLLED: Primary | ICD-10-CM

## 2019-01-14 DIAGNOSIS — L50.8 CHRONIC URTICARIA: ICD-10-CM

## 2019-01-14 LAB
BASOPHILS # BLD AUTO: 0 10E9/L (ref 0–0.2)
BASOPHILS NFR BLD AUTO: 0.3 %
DIFFERENTIAL METHOD BLD: NORMAL
EOSINOPHIL # BLD AUTO: 0.5 10E9/L (ref 0–0.7)
EOSINOPHIL NFR BLD AUTO: 5.2 %
ERYTHROCYTE [DISTWIDTH] IN BLOOD BY AUTOMATED COUNT: 13.1 % (ref 10–15)
FEF 25/75: NORMAL
FEV-1: NORMAL
FEV1/FVC: NORMAL
FVC: NORMAL
HCT VFR BLD AUTO: 41.7 % (ref 35–47)
HGB BLD-MCNC: 14.1 G/DL (ref 11.7–15.7)
LYMPHOCYTES # BLD AUTO: 3.1 10E9/L (ref 0.8–5.3)
LYMPHOCYTES NFR BLD AUTO: 32.7 %
MCH RBC QN AUTO: 29.7 PG (ref 26.5–33)
MCHC RBC AUTO-ENTMCNC: 33.8 G/DL (ref 31.5–36.5)
MCV RBC AUTO: 88 FL (ref 78–100)
MONOCYTES # BLD AUTO: 0.7 10E9/L (ref 0–1.3)
MONOCYTES NFR BLD AUTO: 7.5 %
NEUTROPHILS # BLD AUTO: 5.2 10E9/L (ref 1.6–8.3)
NEUTROPHILS NFR BLD AUTO: 54.3 %
PLATELET # BLD AUTO: 348 10E9/L (ref 150–450)
RBC # BLD AUTO: 4.75 10E12/L (ref 3.8–5.2)
WBC # BLD AUTO: 9.5 10E9/L (ref 4–11)

## 2019-01-14 PROCEDURE — 85025 COMPLETE CBC W/AUTO DIFF WBC: CPT | Performed by: ALLERGY & IMMUNOLOGY

## 2019-01-14 PROCEDURE — 36415 COLL VENOUS BLD VENIPUNCTURE: CPT | Performed by: ALLERGY & IMMUNOLOGY

## 2019-01-14 PROCEDURE — 94010 BREATHING CAPACITY TEST: CPT | Performed by: ALLERGY & IMMUNOLOGY

## 2019-01-14 PROCEDURE — 80076 HEPATIC FUNCTION PANEL: CPT | Performed by: ALLERGY & IMMUNOLOGY

## 2019-01-14 PROCEDURE — 99214 OFFICE O/P EST MOD 30 MIN: CPT | Mod: 25 | Performed by: ALLERGY & IMMUNOLOGY

## 2019-01-14 RX ORDER — DAPSONE 25 MG/1
25 TABLET ORAL DAILY
Qty: 90 TABLET | Refills: 3 | Status: CANCELLED | OUTPATIENT
Start: 2019-01-14 | End: 2020-01-14

## 2019-01-14 RX ORDER — EPINEPHRINE 0.3 MG/.3ML
0.3 INJECTION SUBCUTANEOUS PRN
Qty: 0.6 ML | Refills: 1 | Status: SHIPPED | OUTPATIENT
Start: 2019-01-14 | End: 2020-01-14

## 2019-01-14 RX ORDER — TRIAMCINOLONE ACETONIDE 0.25 MG/G
CREAM TOPICAL
Qty: 80 G | Refills: 1 | Status: SHIPPED | OUTPATIENT
Start: 2019-01-14 | End: 2019-08-30

## 2019-01-14 RX ORDER — DAPSONE 100 MG/1
100 TABLET ORAL DAILY
Qty: 30 TABLET | Refills: 3 | Status: SHIPPED | OUTPATIENT
Start: 2019-01-14 | End: 2019-08-30

## 2019-01-14 NOTE — LETTER
1/14/2019         RE: Mary Shipman  [address]  [facility] [place] [address]        Dear Colleague,    Thank you for referring your patient, Mary Shipman, to the [facility]. Please see a copy of my visit note below.    Mary Shipman was seen in the Allergy Clinic at [facility]. The following are my recommendations regarding her Moderate Persistent Asthma and Chronic Urticaria    1. Resume dulera 200/5mcg, 2 puffs twice daily  2. Continue spiriva, 2 puffs daily  3. Continue montelukast 10mg daily  4. Use albuterol HFA, 2-4 puffs every 4 hours as needed  5. Continue cetirizine 20mg twice daily  6. Continue ranitidine 150mg twice daily  7. Begin dapsone 100mg daily  8. Will check CBC and hepatic function panel today  9. Plan to initiate prior authorization process for xolair 300mg every 4 weeks  10. Follow-up in 1-2 months      Mary Shipman is a 33 year old American female who is seen today for follow-up of asthma and chronic urticaria. She states that she has continued to have daily hives since her last visit. Her symptoms have not improved despite treatment with high dose cetirizine, ranitidine, montelukast, and additional diphenhydramine. Mary reports that prednisone has not been helpful in relieving her symptoms and she does not wish to be on steroids for a prolonged period of time.    Since her last visit Mary has continued to have daily asthma symptoms. She understood the spiriva was to be in place of the dulera and has not resumed her dulera. She continues to use albuterol daily and has been waking up at night twice per week with symptoms of cough and wheezing.      Past Medical History:   Diagnosis Date     Allergic rhinitis due to other allergen      Antepartum mild preeclampsia 11/23/2012     Asthma      Depressive disorder      Esophageal reflux      Frequent UTI     had a kidney infection also in the past     Gestational hypertension 11/20/2012      "Helicobacter pylori (H. pylori)     treated     Hyperlipidemia      Irritable bowel syndrome      Liver disease     \"fatty liver\" resolved on own.     Lump or mass in breast 11/30/2015     Lump or mass in breast      Lump or mass in breast      Mild preeclampsia 12/18/2012     Obesity      Polycystic ovaries      Thyrotoxicosis 5/9/2007       REVIEW OF SYSTEMS:  General: negative for weight gain. negative for weight loss. negative for changes in sleep.   Eyes: negative for itching. negative for redness. negative for tearing/watering. negative for vision changes  Ears: negative for fullness. negative for hearing loss. negative for dizziness.   Nose: negative for snoring.negative for changes in smell. negative for drainage.   Throat: negative for hoarseness. negative for sore throat. negative for trouble swallowing.   Lungs: positive  for cough. positive  for shortness of breath.positive  for wheezing. negative for sputum production.   Cardiovascular: negative for chest pain. negative for swelling of ankles. negative for fast or irregular heartbeat.   Gastrointestinal: negative for nausea. negative for heartburn. negative for acid reflux.   Musculoskeletal: negative for joint pain. negative for joint stiffness. negative for joint swelling.   Neurologic: negative for seizures. negative for fainting. negative for weakness.   Psychiatric: negative for changes in mood. negative for anxiety.   Endocrine: negative for cold intolerance. negative for heat intolerance. negative for tremors.   Hematologic: negative for easy bruising. negative for easy bleeding.  Integumentary: positive  for rash. negative for scaling. negative for nail changes.       Current Outpatient Medications:      albuterol (PROAIR HFA/PROVENTIL HFA/VENTOLIN HFA) 108 (90 Base) MCG/ACT inhaler, Inhale 2 puffs into the lungs every 4 hours as needed for shortness of breath / dyspnea or wheezing, Disp: 1 Inhaler, Rfl: 2     ARIPiprazole (ABILIFY) 5 MG tablet, " Take 1 tablet (5 mg) by mouth At Bedtime, Disp: 30 tablet, Rfl: 1     cetirizine (ZYRTEC) 10 MG tablet, Take 2 tablets (20 mg) by mouth 2 times daily, Disp: 120 tablet, Rfl: 11     cholecalciferol (VITAMIN D3) 37722 units capsule, Take 1 capsule (50,000 Units) by mouth once a week, Disp: 4 capsule, Rfl: 1     clonazePAM (KLONOPIN) 0.5 MG tablet, Take 0.5 mg by mouth, Disp: , Rfl:      fexofenadine-pseudoePHEDrine (ALLEGRA-D 24) 180-240 MG per 24 hr tablet, Take 1 tablet by mouth daily, Disp: , Rfl:      fluticasone (FLONASE) 50 MCG/ACT spray, Spray 1-2 sprays into both nostrils daily as needed for rhinitis or allergies, Disp: , Rfl:      gabapentin (NEURONTIN) 100 MG capsule, TAKE 1 CAPSULE BY ORAL ROUTE 3 TIMES PER DAY, Disp: , Rfl: 5     hydrOXYzine (ATARAX) 25 MG tablet, Take 1-2 tablets (25-50 mg) by mouth every 6 hours as needed for anxiety, Disp: 60 tablet, Rfl: 1     ibuprofen (ADVIL,MOTRIN) 400-800 mg tablet, Take 1-2 tablets by mouth every 6 hours as needed (cramping)., Disp: 30 tablet, Rfl: 0     LANsoprazole (PREVACID) 30 MG capsule, Take 1 capsule (30 mg) by mouth daily Take 30-60 minutes before a meal., Disp: 90 capsule, Rfl: 0     levalbuterol (XOPENEX CONC) 1.25 MG/0.5ML NEBU, Take 1.25 mg by nebulization every 6 hours as needed for wheezing, Disp: , Rfl:      montelukast (SINGULAIR) 10 MG tablet, Take 1 tablet (10 mg) by mouth At Bedtime, Disp: 90 tablet, Rfl: 1     polyethylene glycol (MIRALAX/GLYCOLAX) Packet, Take 17 g by mouth daily as needed for constipation, Disp: , Rfl:      ranitidine (ZANTAC) 150 MG tablet, Take 1 tablet (150 mg) by mouth 2 times daily, Disp: 60 tablet, Rfl: 5     sertraline (ZOLOFT) 100 MG tablet, 2 TABLETS DAILY, Disp: 180 tablet, Rfl: 1     tiotropium (SPIRIVA RESPIMAT) 2.5 MCG/ACT inhaler, Inhale 2 puffs into the lungs daily, Disp: 4 g, Rfl: 1     topiramate (TOPAMAX) 50 MG tablet, TAKE 6 TABLETS (300 MG) BY MOUTH AT BEDTIME, Disp: 180 tablet, Rfl: 5      mometasone-formoterol (DULERA) 200-5 MCG/ACT inhaler, Inhale 2 puffs into the lungs 2 times daily (Patient not taking: Reported on 1/14/2019), Disp: 1 Inhaler, Rfl: 1    EXAM:   /85 (BP Location: Right arm, Patient Position: Sitting, Cuff Size: Adult Regular)   Pulse 76   Resp 16   Wt 131.2 kg (289 lb 3.2 oz)   SpO2 98%   BMI 48.13 kg/m     GENERAL APPEARANCE: alert, cooperative and not in distress  SKIN: diffuse urticarial lesions on upper extremities and trunk  HEAD: atraumatic, normocephalic  ENT: no scars or lesions, tongue midline and normal, soft palate, uvula, and tonsils normal  NECK: no asymmetry, masses, or scars, supple without significant adenopathy  LUNGS: unlabored respirations, no intercostal retractions or accessory muscle use, clear to auscultation without rales or wheezes  HEART: regular rate and rhythm without murmurs and normal S1 and S2  MUSCULOSKELETAL: no musculoskeletal defects are noted  NEURO: no focal deficits noted  PSYCH: does not appear depressed or anxious      WORKUP:  Spirometry    SPIROMETRY (best effort)       FVC 3.29L (85% of predicted).     FEV1 2.63L (82% of predicted).     FEV1/FVC 80%     FEF 25%-75%  2.64L/s (79% of predicted).    Spirometry could not be fully interpreted as patient did not have consistently reproducible efforts.    Asthma Control Test (ACT) total score: 14       ASSESSMENT/PLAN:  Mary Shipman is a 33 year old female here for follow-up of asthma and urticaria. She has continued to have daily symptoms of urticaria without relief from her current medications. She was counseled regarding additional options for medication management and the potential side effects and likelihood of efficacy of each option. Xolair is most likely to be effective in treating chronic urticaria and she was counseled regarding the time commitment, auto injectable epinephrine device policy, risks, including possible cardiovascular events, and benefits and she wishes to  proceed.    Mary was counseled regarding the importance of asthma control. Her medication regimen including maintenance vs rescue medications was reviewed. She was advised to resume dulera in addition to the spiriva and return for follow-up.    1. Resume dulera 200/5mcg, 2 puffs twice daily  2. Continue spiriva, 2 puffs daily  3. Continue montelukast 10mg daily  4. Use albuterol HFA, 2-4 puffs every 4 hours as needed  5. Continue cetirizine 20mg twice daily  6. Continue ranitidine 150mg twice daily  7. Begin dapsone 100mg daily  8. Will check CBC and hepatic function panel today  9. Plan to initiate prior authorization process for xolair 300mg every 4 weeks  10. Follow-up in 1-2 months      Terry Cruz MD  Allergy/Immunology  [facility] and [place], [place]      Chart documentation done in part with Dragon Voice Recognition Software. Although reviewed after completion, some word and grammatical errors may remain.    Again, thank you for allowing me to participate in the care of your patient.        Sincerely,        Terry Cruz MD

## 2019-01-14 NOTE — NURSING NOTE
Writer demonstrated how to use an EpiPen auto-injector.  Patient instructed to form a fist around the auto-injector, remove blue safety release by pulling straight up, then firmly push orange tip against outer thigh so it clicks, holding for 3 seconds.  Patient advised that once used, needle will not be exposed, as orange tip extends.  Patient advised to call 911 or go to emergency department after epi-pen use for further monitoring.       Writer demonstrated how to use the AdrenClick epinephrine auto-injector.  Patient was instructed to remove auto-injector from casing.  Patient instructed to form a fist around the auto-injector, remove caps labeled 1 and then 2 (never placing finger/thumb over ), then firmly push red tip against outer thigh, holding approximately 10 seconds.  Patient advised that once used, needle WILL be exposed.  Patient is to properly dispose of needle in sharps container and not regular trash can.  Patient advised to call 911 or go to emergency department after epi-pen use for further monitoring.       RN reviewed Anaphylaxis Action Plan with patient. Educated on the symptoms and treatment of anaphylaxis. Went through the different ways that a reaction can present, and the body systems that it can affect. Patient verbalized understanding.     Carol Musa RN

## 2019-01-15 ENCOUNTER — TELEPHONE (OUTPATIENT)
Dept: ALLERGY | Facility: CLINIC | Age: 34
End: 2019-01-15

## 2019-01-15 DIAGNOSIS — Z51.81 ENCOUNTER FOR THERAPEUTIC DRUG MONITORING: ICD-10-CM

## 2019-01-15 DIAGNOSIS — L50.8 CHRONIC URTICARIA: Primary | ICD-10-CM

## 2019-01-15 LAB
ALBUMIN SERPL-MCNC: 3.7 G/DL (ref 3.4–5)
ALP SERPL-CCNC: 73 U/L (ref 40–150)
ALT SERPL W P-5'-P-CCNC: 21 U/L (ref 0–50)
AST SERPL W P-5'-P-CCNC: 19 U/L (ref 0–45)
BILIRUB DIRECT SERPL-MCNC: <0.1 MG/DL (ref 0–0.2)
BILIRUB SERPL-MCNC: 0.3 MG/DL (ref 0.2–1.3)
PROT SERPL-MCNC: 7.5 G/DL (ref 6.8–8.8)

## 2019-01-15 ASSESSMENT — ASTHMA QUESTIONNAIRES: ACT_TOTALSCORE: 14

## 2019-01-15 NOTE — TELEPHONE ENCOUNTER
Benefits investigation forms filled out and faxed to [employer]. Will await benefit investigation.     Carol Musa RN

## 2019-01-17 ENCOUNTER — TELEPHONE (OUTPATIENT)
Dept: ALLERGY | Facility: CLINIC | Age: 34
End: 2019-01-17

## 2019-01-17 DIAGNOSIS — L50.9 URTICARIA: ICD-10-CM

## 2019-01-17 RX ORDER — CETIRIZINE HYDROCHLORIDE 10 MG/1
20 TABLET ORAL 2 TIMES DAILY
Qty: 120 TABLET | Refills: 11 | Status: CANCELLED | OUTPATIENT
Start: 2019-01-17

## 2019-01-17 NOTE — TELEPHONE ENCOUNTER
Called and spoke with pharmacy, medication is not covered by patient's insurance.     Prior Authorization Retail Medication Request    Medication/Dose: cetirizine (ZYRTEC) 10 MG tablet  ICD code (if different than what is on RX):  cetirizine (ZYRTEC) 10 MG tablet  Previously Tried and Failed:    Rationale:      Insurance Name:  BUCK MILLER [[id number]]  Insurance ID:        Pharmacy Information (if different than what is on RX)  Name:  [facility]  Phone:  [phone]

## 2019-01-21 NOTE — TELEPHONE ENCOUNTER
Received fax from [phone] reporting that patient's insurance plan does not speak to 3rd parties.     Faxed prior authorization form to [facility] at [phone].    Carol Musa RN

## 2019-01-22 ENCOUNTER — MYC MEDICAL ADVICE (OUTPATIENT)
Dept: ALLERGY | Facility: CLINIC | Age: 34
End: 2019-01-22

## 2019-01-22 DIAGNOSIS — L50.1 CHRONIC IDIOPATHIC URTICARIA: Primary | ICD-10-CM

## 2019-01-22 DIAGNOSIS — J45.40 ASTHMA, MODERATE PERSISTENT, POORLY-CONTROLLED: ICD-10-CM

## 2019-01-22 DIAGNOSIS — L50.1 CHRONIC IDIOPATHIC URTICARIA: ICD-10-CM

## 2019-01-22 NOTE — TELEPHONE ENCOUNTER
Patient was approved for Xolair Pre-filled Syringes through [facility]. Approval number is [id number]. Approval is valid from 01/21/2019 through 01/21/2020. A prior authorization was required for this medication. Medication is being used to treat Chronic Idiopathic Urticaria    Medication will be obtained by Aidan [employer] Bill, and an ABN IS required before each injection.. This patient will be receiving Xolair 300 mg every 28 days. Standing orders have been placed. All paperwork has been sent to scanning.    Patient was notified of the approval. Appointments have been scheduled, the patient's first appointment is scheduled for 01/29/2019. Patient information was added to the IT Patient log. Xolair (pre-filled syringes) was ordered and should arrive at the clinic by 01/28/2019.     Carol Musa RN

## 2019-01-23 NOTE — TELEPHONE ENCOUNTER
Central Prior Authorization Team   Phone: [phone]      PA NOT NEEDED    Medication: cetirizine (ZYRTEC) 10 MG tablet-PA NOT NEEDED  Insurance Company: [employer] - Phone [phone] Fax [phone]  Pharmacy Filling the Rx: [id number] - [facility], [address].  Filling Pharmacy Phone: [phone]  Filling Pharmacy Fax:    Start Date: 1/23/2019    Started PA on CMM and got a response of drug is covered.  Called and spoke with Caryn at [employer], she is showing the pharmacy is getting a DUR rejection.  They will need to call the help desk to get an override code. Help Desk [phone].  Called and informed pharmacy.

## 2019-01-23 NOTE — TELEPHONE ENCOUNTER
Per chart review, patient has read [email or web address] message. Will leave open temporarily and wait for patient to respond.      Melyssa Jefferson CMA

## 2019-01-24 ENCOUNTER — MYC REFILL (OUTPATIENT)
Dept: FAMILY MEDICINE | Facility: CLINIC | Age: 34
End: 2019-01-24

## 2019-01-24 DIAGNOSIS — F43.23 ADJUSTMENT DISORDER WITH MIXED ANXIETY AND DEPRESSED MOOD: ICD-10-CM

## 2019-01-24 DIAGNOSIS — F33.1 MAJOR DEPRESSIVE DISORDER, RECURRENT EPISODE, MODERATE (H): ICD-10-CM

## 2019-01-24 RX ORDER — SERTRALINE HYDROCHLORIDE 100 MG/1
200 TABLET, FILM COATED ORAL DAILY
Qty: 180 TABLET | Refills: 1 | Status: CANCELLED | OUTPATIENT
Start: 2019-01-24

## 2019-01-24 NOTE — TELEPHONE ENCOUNTER
"Requested Prescriptions   Pending Prescriptions Disp Refills     sertraline (ZOLOFT) 100 MG tablet  Last Written Prescription Date:  9/26/2018  Last Fill Quantity: 180 tablet,  # refills: 1   Last office visit: 12/4/2018 with prescribing provider:  NILS Hammer   Future Office Visit:   Next 5 appointments (look out 90 days)    Jan 29, 2019  2:30 PM CST  Nurse Only with [facility]  [facility] [address] [facility] [id number] [phone]          180 tablet 1     Sig: Take 2 tablets (200 mg) by mouth daily    SSRIs Protocol Failed - 1/24/2019  4:07 PM       Failed - PHQ-9 score less than 5 in past 6 months    Please review last PHQ-9 score.     PHQ-9 SCORE 9/21/2017 1/12/2018 8/22/2018   PHQ-9 Total Score - - -   PHQ-9 Total Score MyChart - - -   PHQ-9 Total Score 22 15 9     CRUZ-7 SCORE 9/21/2017 1/12/2018 8/22/2018   Total Score - - -   Total Score 18 18 7          Passed - Medication is active on med list       Passed - Patient is age 18 or older       Passed - No active pregnancy on record       Passed - No positive pregnancy test in last 12 months       Passed - Recent (6 mo) or future (30 days) visit within the authorizing provider's specialty    Patient had office visit in the last 6 months or has a visit in the next 30 days with authorizing provider or within the authorizing provider's specialty.  See \"Patient Info\" tab in inbasket, or \"Choose Columns\" in Meds & Orders section of the refill encounter.              "

## 2019-01-24 NOTE — TELEPHONE ENCOUNTER
Called patient hand scheduled first Xolair injection for 1/29. Reminded patient that she will need to bring her Epi-Pen with her to the appointment or she will not receive the injection. She is also aware that she will be required to wait in clinic for 2 hours after the injection is given for the first 3 injections. Patient verbalized understanding and has no further questions. She did report that she currently on a Z-Chad and prednisone which she will be finished with on Sunday (1/27).    Carol Musa RN

## 2019-01-25 NOTE — TELEPHONE ENCOUNTER
180 tabs with 1 refill was sent by Mingo Hammer 9/26/18.    I called pharmacy who confirms she has refills left (300 tablets).    Mychart response routed to patient advising she call pharmacy directly for this.    Patsy Gaines RN  [facility]

## 2019-03-18 ENCOUNTER — VIRTUAL VISIT (OUTPATIENT)
Dept: FAMILY MEDICINE | Facility: CLINIC | Age: 34
End: 2019-03-18
Payer: COMMERCIAL

## 2019-03-18 DIAGNOSIS — F41.0 PANIC ATTACK: ICD-10-CM

## 2019-03-18 DIAGNOSIS — F41.1 GAD (GENERALIZED ANXIETY DISORDER): Primary | ICD-10-CM

## 2019-03-18 DIAGNOSIS — K21.9 GASTROESOPHAGEAL REFLUX DISEASE WITHOUT ESOPHAGITIS: ICD-10-CM

## 2019-03-18 PROCEDURE — 99441 ZZC PHYSICIAN TELEPHONE EVALUATION 5-10 MIN: CPT | Performed by: PHYSICIAN ASSISTANT

## 2019-03-18 RX ORDER — PANTOPRAZOLE SODIUM 40 MG/1
40 TABLET, DELAYED RELEASE ORAL DAILY
Qty: 90 TABLET | Refills: 0 | Status: SHIPPED | OUTPATIENT
Start: 2019-03-18 | End: 2020-01-22

## 2019-03-18 RX ORDER — BUSPIRONE HYDROCHLORIDE 15 MG/1
TABLET ORAL
Qty: 60 TABLET | Refills: 1 | Status: SHIPPED | OUTPATIENT
Start: 2019-03-18 | End: 2019-07-09

## 2019-03-18 ASSESSMENT — ANXIETY QUESTIONNAIRES
2. NOT BEING ABLE TO STOP OR CONTROL WORRYING: NEARLY EVERY DAY
GAD7 TOTAL SCORE: 21
3. WORRYING TOO MUCH ABOUT DIFFERENT THINGS: NEARLY EVERY DAY
7. FEELING AFRAID AS IF SOMETHING AWFUL MIGHT HAPPEN: NEARLY EVERY DAY
IF YOU CHECKED OFF ANY PROBLEMS ON THIS QUESTIONNAIRE, HOW DIFFICULT HAVE THESE PROBLEMS MADE IT FOR YOU TO DO YOUR WORK, TAKE CARE OF THINGS AT HOME, OR GET ALONG WITH OTHER PEOPLE: VERY DIFFICULT
5. BEING SO RESTLESS THAT IT IS HARD TO SIT STILL: NEARLY EVERY DAY
1. FEELING NERVOUS, ANXIOUS, OR ON EDGE: NEARLY EVERY DAY
6. BECOMING EASILY ANNOYED OR IRRITABLE: NEARLY EVERY DAY

## 2019-03-18 ASSESSMENT — PATIENT HEALTH QUESTIONNAIRE - PHQ9
5. POOR APPETITE OR OVEREATING: NEARLY EVERY DAY
SUM OF ALL RESPONSES TO PHQ QUESTIONS 1-9: 21

## 2019-03-18 NOTE — PROGRESS NOTES
"Mary Shipman is a 34 year old female who is being evaluated via a telephone visit.      The patient has been notified of following:     \"This telephone visit will be conducted via a call between you and your physician/provider. We have found that certain health care needs can be provided without the need for a physical exam.  This service lets us provide the care you need with a short phone conversation.  If a prescription is necessary we can send it directly to your pharmacy.  If lab work is needed we can place an order for that and you can then stop by our lab to have the test done at a later time.    We will bill your insurance company for this service.  Please check with your medical insurance if this type of visit is covered. You may be responsible for the cost of this type of visit if insurance coverage is denied.  The typical cost is $30 (10min), $59(11-20min) and $85 (21-30min).  Most often these visits are shorter than 10 minutes.    If during the course of the call the physician/provider feels a telephone visit is not appropriate, you will not be charged for this service. \"     Consent has been obtained for this service by 1 care team member:yes. See the scanned image in the medical record.    Preferred patient phone number to be used for this call: [phone]     Mary Shipman complains of  anxiety    Past Medical History:   Diagnosis Date     Allergic rhinitis due to other allergen      Antepartum mild preeclampsia 11/23/2012     Asthma      Depressive disorder      Esophageal reflux      Frequent UTI     had a kidney infection also in the past     Gestational hypertension 11/20/2012     Helicobacter pylori (H. pylori)     treated     Hyperlipidemia      Irritable bowel syndrome      Liver disease     \"fatty liver\" resolved on own.     Lump or mass in breast 11/30/2015     Lump or mass in breast      Lump or mass in breast      Mild preeclampsia 12/18/2012     Obesity      Polycystic ovaries      " Thyrotoxicosis 5/9/2007      Social History     Socioeconomic History     Marital status:      Spouse name: Not on file     Number of children: 0     Years of education: Not on file     Highest education level: Not on file   Occupational History     Not on file   Social Needs     Financial resource strain: Not on file     Food insecurity:     Worry: Not on file     Inability: Not on file     Transportation needs:     Medical: Not on file     Non-medical: Not on file   Tobacco Use     Smoking status: Never Smoker     Smokeless tobacco: Never Used   Substance and Sexual Activity     Alcohol use: No     Alcohol/week: 0.0 oz     Drug use: No     Sexual activity: Yes     Partners: Male     Birth control/protection: None   Lifestyle     Physical activity:     Days per week: Not on file     Minutes per session: Not on file     Stress: Not on file   Relationships     Social connections:     Talks on phone: Not on file     Gets together: Not on file     Attends Rastafari service: Not on file     Active member of club or organization: Not on file     Attends meetings of clubs or organizations: Not on file     Relationship status: Not on file     Intimate partner violence:     Fear of current or ex partner: Not on file     Emotionally abused: Not on file     Physically abused: Not on file     Forced sexual activity: Not on file   Other Topics Concern     Parent/sibling w/ CABG, MI or angioplasty before 65F 55M? No   Social History Narrative    Caffeine intake/servings daily - 0    Calcium intake/servings daily - 1-2    Exercise 0 times weekly - describe     Sunscreen used - No    Seatbelts used - Yes    Guns stored in the home - No    Self Breast Exam - No    Pap test up to date -  Yes    Eye exam up to date -  Yes    Dental exam up to date -  Yes    DEXA scan up to date -  Not Applicable    Flex Sig/Colonoscopy up to date -  Yes    Mammography up to date -  Not Applicable    Immunizations reviewed and up to date - Yes  "   Abuse: Current or Past (Physical, Sexual or Emotional) - No    Do you feel safe in your environment - Yes    Do you cope well with stress - Yes    Do you suffer from insomnia - No    Last updated by: STEPHANIE ZOEY TOMMY  5/21/2012                                 ALLERGIES  Levaquin [levofloxacin]; Acetaminophen; Hydrocodone; and Hydrocodone-acetaminophen        Kori Jeff, Certified Medical Assistant (AAMA)  (MA signature)    Additional provider notes:  Anxiety is worse. Feels tense, anxious, unwell. Unable to relax. Worrying a lot. Her life is in chaos, finances are bad, still dealing with her ex, her son's health, she quit her other job \"just wasn't liking it\". She tends to avoid / distract and not deal with things.    She is fortunately seeing a therapist. This is working well. She likes her new therapist.     Assessment/Plan:  No diagnosis found.    ICD-10-CM    1. CRUZ (generalized anxiety disorder) F41.1 busPIRone (BUSPAR) 15 MG tablet   2. Panic attack F41.0 busPIRone (BUSPAR) 15 MG tablet   3. Gastroesophageal reflux disease without esophagitis K21.9 pantoprazole (PROTONIX) 40 MG EC tablet      Reviewed options - this is a recurrent theme for Mary. She will tend to mentally fall apart and go back to bad habits, poor self cares, I recommended continue with therapist, work on management of self cares - advised that she could consider medication addition - will start Buspar. This prescription is given with a discussion of side effects, risks and proper use.  Instructions are given to follow up if not improving or symptoms change or worsen as discussed.     Reports her reflux is worse - is going to be seeing gastro - will start Protonix. This prescription is given with a discussion of side effects, risks and proper use.  Instructions are given to follow up if not improving or symptoms change or worsen as discussed.     Follow up in a few weeks to recheck.     Total time spent on this phone visit with the " patient = 7 minutes     I have reviewed the note as documented above.  This accurately captures the substance of my conversation with the patient.    RAQUEL Frank, PA-C

## 2019-03-19 ASSESSMENT — ANXIETY QUESTIONNAIRES: GAD7 TOTAL SCORE: 21

## 2019-03-21 DIAGNOSIS — J45.40 ASTHMA, MODERATE PERSISTENT, POORLY-CONTROLLED: ICD-10-CM

## 2019-03-21 NOTE — TELEPHONE ENCOUNTER
"Requested Prescriptions   Pending Prescriptions Disp Refills     tiotropium (SPIRIVA RESPIMAT) 2.5 MCG/ACT inhaler 4 g 1     Sig: Inhale 2 puffs into the lungs daily    Asthma Maintenance Inhalers - Anticholinergics Failed - 3/21/2019 10:01 AM       Failed - Asthma control assessment score within normal limits in last 6 months    Please review ACT score.          Passed - Patient is age 12 years or older       Passed - Medication is active on med list       Passed - Recent (6 mo) or future (30 days) visit within the authorizing provider's specialty    Patient had office visit in the last 6 months or has a visit in the next 30 days with authorizing provider or within the authorizing provider's specialty.  See \"Patient Info\" tab in inbasket, or \"Choose Columns\" in Meds & Orders section of the refill encounter.            Routing refill request to provider for review/approval because:  Last ACT = 14 on 01/14/2019    Carol Musa RN          "

## 2019-03-22 NOTE — TELEPHONE ENCOUNTER
Left detailed message for patient (ok for message in chart). Scheduling number left in voice mail.      Melyssa Jefferson CMA

## 2019-04-26 ENCOUNTER — MYC MEDICAL ADVICE (OUTPATIENT)
Dept: FAMILY MEDICINE | Facility: CLINIC | Age: 34
End: 2019-04-26

## 2019-04-26 DIAGNOSIS — F41.1 GAD (GENERALIZED ANXIETY DISORDER): ICD-10-CM

## 2019-04-26 DIAGNOSIS — F41.0 PANIC ATTACK: ICD-10-CM

## 2019-04-29 NOTE — TELEPHONE ENCOUNTER
Can we check our pharmacy to see if they have any on hand?     If she never started it - that's fine too - if she's doing better, we could just keep her off for now.    Thanks.  Mingo

## 2019-04-29 NOTE — TELEPHONE ENCOUNTER
[facility] [place] pharmacy called and they do have enough in stock to get the patient started and maintained on Buspar.    Called patient to relay that our pharmacy could fill this for her if she would like to pick it up here. Patient stated that would be great and she can  the medication after work tomorrow or Wednesday.     Called pharmacy back to transfer Rx to [facility] [place].     Judy Evans RN

## 2019-04-29 NOTE — TELEPHONE ENCOUNTER
Buspar on indefinite backorder. Can we change rx?     Routing to Miguel Hammer to review and advise.    Kaitlin Quinones RN

## 2019-06-27 ENCOUNTER — MYC MEDICAL ADVICE (OUTPATIENT)
Dept: ALLERGY | Facility: CLINIC | Age: 34
End: 2019-06-27

## 2019-07-03 ENCOUNTER — TELEPHONE (OUTPATIENT)
Dept: FAMILY MEDICINE | Facility: CLINIC | Age: 34
End: 2019-07-03

## 2019-07-03 DIAGNOSIS — G47.00 INSOMNIA, UNSPECIFIED TYPE: ICD-10-CM

## 2019-07-03 NOTE — TELEPHONE ENCOUNTER
Reason for Call:  Medication or medication refill:    Do you use a [facility]?  Name of the pharmacy and phone number for the current request:       [facility] #[id number] - [facility], [place] - [address].      Name of the medication requested: Trazadone    Other request: Patient states is out of this medication and hasnt been sleeping at night because she is out. She would like to have a refill sent over to her pharmacy.     Can we leave a detailed message on this number? YES    Phone number patient can be reached at: Cell number on file:    Telephone Information:   Mobile [phone]       Best Time: any    Call taken on 7/3/2019 at 3:26 PM by Maribell Irizarry

## 2019-07-03 NOTE — TELEPHONE ENCOUNTER
Reason for Call:  Other call back    Detailed comments: Patient called in stating she had gone to the urgent care for high blood pressure and was informed to let Mingo know that she has been having higher blood pressure before she comes in to see him. She also states she recently failed a drug test due to one the medications that she is prescribed to take. She is requesting a letter from Mingo showing what medications she is taking and that is the cause of the failed test. Please call with any questions.     Phone Number Patient can be reached at: Cell number on file:    Telephone Information:   Mobile [phone]       Best Time: any    Can we leave a detailed message on this number? YES    Call taken on 7/3/2019 at 3:28 PM by Maribell Irizarry

## 2019-07-03 NOTE — TELEPHONE ENCOUNTER
"Routing refill request for trazodone to PCP, as not on current medication list. Per chart review history, med last prescribed by you on 10/17/16 for insomnia, then was discontinued as a \"medication reconciliation clean up\" by a pharmacist on 3/27/17. Patient had been hospitalized for seizure around that time.  Discharge list includes trazodone.  Patient was started on Keppra at that time with note \"If still feeling side effects of sedation/sleepiness, then consider decreasing or stopping topiramate and/or trazodone which can worsen these side effects.\"  Med pended. Last visit was 12/4/18.  Patient has upcoming visit with you next Tuesday for UC f/u to high BP.    Spoke with patient, who states she has not taken trazodone for awhile, was taking melatonin, but that just isn't doing it anymore, and she has not been sleeping well for a few days, would like to get Rx for trazodone again.    Leilani Davis RN    "

## 2019-07-03 NOTE — TELEPHONE ENCOUNTER
Returned call to patient regarding this. I instructed that she should wait until she sees PCP to address letter re: failed drug test. She is currently scheduled for 7/12/19 for f/u to UC visit for elevated BP, states it was like 180/100. I assisted with moving appt up to 7/9/19. Instructed she should have BP checked at home or pharmacy in the meantime, notify us if elevated or if sx of HTN, such as chest pain, headaches, ringing in ears, blurred vision, etc.  Understanding voiced by patient.    Leilani Davis RN

## 2019-07-05 RX ORDER — TRAZODONE HYDROCHLORIDE 50 MG/1
TABLET, FILM COATED ORAL
Qty: 120 TABLET | Refills: 2 | Status: SHIPPED | OUTPATIENT
Start: 2019-07-05 | End: 2020-04-02

## 2019-07-09 ENCOUNTER — OFFICE VISIT (OUTPATIENT)
Dept: FAMILY MEDICINE | Facility: CLINIC | Age: 34
End: 2019-07-09
Payer: COMMERCIAL

## 2019-07-09 VITALS
DIASTOLIC BLOOD PRESSURE: 84 MMHG | HEART RATE: 84 BPM | SYSTOLIC BLOOD PRESSURE: 124 MMHG | BODY MASS INDEX: 46.33 KG/M2 | WEIGHT: 278.4 LBS | TEMPERATURE: 98.4 F

## 2019-07-09 DIAGNOSIS — R19.00 ABDOMINAL MASS, UNSPECIFIED ABDOMINAL LOCATION: ICD-10-CM

## 2019-07-09 DIAGNOSIS — F43.23 ADJUSTMENT DISORDER WITH MIXED ANXIETY AND DEPRESSED MOOD: ICD-10-CM

## 2019-07-09 DIAGNOSIS — F33.1 MAJOR DEPRESSIVE DISORDER, RECURRENT EPISODE, MODERATE (H): ICD-10-CM

## 2019-07-09 DIAGNOSIS — R03.0 ELEVATED BLOOD PRESSURE READING WITHOUT DIAGNOSIS OF HYPERTENSION: Primary | ICD-10-CM

## 2019-07-09 DIAGNOSIS — F41.1 GAD (GENERALIZED ANXIETY DISORDER): ICD-10-CM

## 2019-07-09 DIAGNOSIS — R82.90 ABNORMAL URINALYSIS: ICD-10-CM

## 2019-07-09 DIAGNOSIS — K58.1 IRRITABLE BOWEL SYNDROME WITH CONSTIPATION: ICD-10-CM

## 2019-07-09 DIAGNOSIS — R30.0 DYSURIA: ICD-10-CM

## 2019-07-09 DIAGNOSIS — F41.0 PANIC ATTACK: ICD-10-CM

## 2019-07-09 LAB
ALBUMIN UR-MCNC: ABNORMAL MG/DL
APPEARANCE UR: CLEAR
BACTERIA #/AREA URNS HPF: ABNORMAL /HPF
BILIRUB UR QL STRIP: NEGATIVE
COLOR UR AUTO: YELLOW
GLUCOSE UR STRIP-MCNC: NEGATIVE MG/DL
HGB UR QL STRIP: ABNORMAL
KETONES UR STRIP-MCNC: NEGATIVE MG/DL
LEUKOCYTE ESTERASE UR QL STRIP: ABNORMAL
NITRATE UR QL: NEGATIVE
NON-SQ EPI CELLS #/AREA URNS LPF: ABNORMAL /LPF
PH UR STRIP: 6 PH (ref 5–7)
RBC #/AREA URNS AUTO: ABNORMAL /HPF
SOURCE: ABNORMAL
SP GR UR STRIP: 1.02 (ref 1–1.03)
UROBILINOGEN UR STRIP-ACNC: 0.2 EU/DL (ref 0.2–1)
WBC #/AREA URNS AUTO: ABNORMAL /HPF

## 2019-07-09 PROCEDURE — 81001 URINALYSIS AUTO W/SCOPE: CPT | Performed by: PHYSICIAN ASSISTANT

## 2019-07-09 PROCEDURE — 99214 OFFICE O/P EST MOD 30 MIN: CPT | Performed by: PHYSICIAN ASSISTANT

## 2019-07-09 PROCEDURE — 87086 URINE CULTURE/COLONY COUNT: CPT | Performed by: PHYSICIAN ASSISTANT

## 2019-07-09 RX ORDER — BUSPIRONE HYDROCHLORIDE 15 MG/1
TABLET ORAL
Qty: 60 TABLET | Refills: 2 | Status: SHIPPED | OUTPATIENT
Start: 2019-07-09 | End: 2020-01-24

## 2019-07-09 NOTE — PROGRESS NOTES
Subjective     Mary Shipman is a 34 year old female who presents to clinic today for the following health issues:    HPI   ED/UC Followup:    Facility:  Patient does not remember name of urgent care, but that it was located in [facility].   Date of visit: 06/28/2019  Reason for visit: High blood pressure  Current Status: Patient says she has been getting checked at pharmacy and readings have been [id number]     Hypertension Follow-up      Do you check your blood pressure regularly outside of the clinic? Yes     Are you following a low salt diet? No    Are your blood pressures ever more than 140 on the top number (systolic) OR more   than 90 on the bottom number (diastolic), for example 140/90? Yes    Amount of exercise or physical activity: None    Problems taking medications regularly: No    Medication side effects: none    Diet: regular (no restrictions)    Patient needs letter for urine drug testing fail, patient says it is due to antidepressant medication. Told she had a positive finding. Thinks it was sertraline or gabapentin that they found in her urine. Both of which she is on.     Reports that her anxiety is at an all time high. She's had frequent bouts of anxiety that result in severe physical symptoms of anxiety, panic and worry.  She reports that this has flared her irritable bowel syndrome, is having a lot of diarrhea and loose stools.  She has had frequent issues with anxiety in the past but this time it feels worse than she can imagine due to a lot of life stressors and challenges she is facing with custody battles, bankruptcy, financial challenges and work.    She reports that she has a little bit of dysuria and urgency, she was treated with Bactrim but was not able to complete because it caused some diarrhea side effects.      Patient Active Problem List   Diagnosis     Esophageal reflux     Allergic rhinitis due to other allergen     Polycystic ovaries     Thyrotoxicosis     Urticaria      Obesity     Low back pain     Intermittent asthma     HYPERLIPIDEMIA LDL GOAL <160     Irritable bowel syndrome with constipation     Migraine headache     Not immune to rubella     Major depressive disorder, recurrent episode, moderate (H)     Health Care Home     Health care home, active care coordination     Generalized anxiety disorder     Lump or mass in breast     Menometrorrhagia     Seizure (H)     Left hemiplegia (H)     Cervicalgia     Bilateral thoracic back pain, unspecified chronicity     Other migraine without status migrainosus, not intractable     Past Surgical History:   Procedure Laterality Date     C APPENDECTOMY       C NONSPECIFIC PROCEDURE      nasal surgery      TONSILLECTOMY & ADENOIDECTOMY      surgery several times for this       Social History     Tobacco Use     Smoking status: Never Smoker     Smokeless tobacco: Never Used   Substance Use Topics     Alcohol use: No     Alcohol/week: 0.0 oz     Family History   Problem Relation Age of Onset     Gynecology Mother         ectopic pregnancy/endometriosis     Cancer - colorectal Mother      Congenital Anomalies Mother         kidney problems     Colon Cancer Mother      Hyperlipidemia Mother      Other Cancer Mother         Lung, Skin, Brain and Colon cancer     Gynecology Sister         ectopic pregnancy/endometriosis     Unknown/Adopted Sister      Heart Disease Father      Coronary Artery Disease Father      Psychotic Disorder Brother      Unknown/Adopted Brother      Unknown/Adopted Brother      Unknown/Adopted Brother      Unknown/Adopted Brother      Unknown/Adopted Brother      Unknown/Adopted Sister      Cerebrovascular Disease Maternal Grandmother      Unknown/Adopted Maternal Grandfather      Unknown/Adopted Paternal Grandmother      Unknown/Adopted Paternal Grandfather       () Other       () Other       () Other       () Other       () Other       () Other            Reviewed and updated as needed this visit by Provider          Review of Systems   ROS COMP: Constitutional, HEENT, cardiovascular, pulmonary, GI, , musculoskeletal, neuro, skin, endocrine and psych systems are negative, except as otherwise noted.      Objective    /84 (BP Location: Right arm, Patient Position: Chair, Cuff Size: Adult Large)   Pulse 84   Temp 98.4  F (36.9  C) (Oral)   Wt 126.3 kg (278 lb 6.4 oz)   LMP 06/24/2019 (Exact Date)   Breastfeeding? No   BMI 46.33 kg/m    Body mass index is 46.33 kg/m .  Physical Exam   GENERAL: healthy, alert and no distress  Psych: Appropriate appearance.  Alert and oriented times 3; coherent speech, normal   rate and volume, able to articulate logical thoughts, able   to abstract reason, no tangential thoughts, no hallucinations   or delusions.  Normal behavior.  Her affect is bright.      Diagnostic Test Results:  Labs reviewed in Epic        Assessment & Plan     (R03.0) Elevated blood pressure reading without diagnosis of hypertension  (primary encounter diagnosis)  Comment:   Plan: Urine Microscopic        This is probably brought on by stress.  She reports that when she is particularly stressed and anxious her blood pressure seems to go up.  I recommended that she monitor her blood pressures weekly and let me know if they continue to remain elevated.  Today her blood pressure is good.    (K58.1) Irritable bowel syndrome   Comment:   Plan: Reassurance given.  Her IBS frequently flares when she has a lot of stress.  She acknowledges that this is probably the reason.    (R30.0) Dysuria  Comment:   Plan: *UA reflex to Microscopic and Culture ([facility] (except [place] and         [place])          We will check a urine today and get back to her on results.    (F41.1) CRUZ (generalized anxiety disorder)  Comment:   Plan: busPIRone (BUSPAR) 15 MG tablet        I explained that her symptoms of anxiety and panic are consistent with past flares she has had when she has had struggles with  her life.  We have not seemed to get very far on improving her ability to manage the stressors.  She really needs some extensive therapy, probably DBT and CBT.  I suggested that she look into an intensive outpatient program and she agrees to do that.  We will also start buspirone and see if that helps with her anxiety, however I explained that no medication can fix the challenges that are a difficult life.    (F41.0) Panic attack  Comment:   Plan: busPIRone (BUSPAR) 15 MG tablet        As noted above.    (F33.1) Major depressive disorder, recurrent episode, moderate (H)  Comment:   Plan: busPIRone (BUSPAR) 15 MG tablet        As noted above    (F43.23) Adjustment disorder with mixed anxiety and depressed mood  Comment:   Plan: busPIRone (BUSPAR) 15 MG tablet        As noted above    (R19.00) Abdominal mass, unspecified abdominal location  Comment:   Plan: GENERAL SURG ADULT REFERRAL        We did not discuss this in great detail, in the past she has had a mass in the left side of her abdomen that has felt like a lipoma.  She thinks that it is larger asked to have the surgical referral reprinted for her.  Placed a new referral.    (R82.90) Abnormal urinalysis  Comment:   Plan: Urine Culture Aerobic Bacterial        Her urine did not show an obvious infection, I will culture this and get back to her with results.      No follow-ups on file.    FRANSICO JOHNSON PA-C  [facility]

## 2019-07-09 NOTE — PATIENT INSTRUCTIONS
1. [phone] for checking with [facility]'s Intensive Outpatient Program

## 2019-07-09 NOTE — LETTER
[facility]  [address] [place] [id number]  [phone]        July 9, 2019    RE:  aMry BROUSSARD Umberto                                                                                                                                                       [address]   [facility] [address]        To whom it may concern:    Mary is on the following medications that will show up positive on a urine drug screen.  Gabapentin  Sertraline  Trazodone  Topiramate     Sincerely,        Miguel Hammer

## 2019-07-10 LAB
BACTERIA SPEC CULT: NORMAL
BACTERIA SPEC CULT: NORMAL
SPECIMEN SOURCE: NORMAL

## 2019-08-16 ENCOUNTER — TELEPHONE (OUTPATIENT)
Dept: FAMILY MEDICINE | Facility: CLINIC | Age: 34
End: 2019-08-16

## 2019-08-16 NOTE — LETTER
August 16, 2019      Mary Shipman  [address]  [facility] [address]        Dear Mary,    I care about your health and have reviewed your health plan. I have reviewed your medical conditions, medication list, and lab results and am making recommendations based on this review, to better manage your health.    You are in particular need of attention regarding:  -Asthma  -Cervical Cancer Screening  -Wellness (Physical) Visit     I am recommending that you:  -schedule a WELLNESS (Physical) APPOINTMENT with me.   (If you go elsewhere for Wellness visits then please disregard this reminder.)  -schedule a PAP SMEAR EXAM which is due.  Please disregard this reminder if you have had this exam elsewhere within the last year.  It would be helpful for us to have a copy of your recent pap smear report in our file so that we can best coordinate your care.   -Complete and return the attached ASTHMA CONTROL TEST.  If your total score is 19 or less or you have been to the ER or urgent care for your asthma, then please schedule an asthma followup appointment.    Here is a list of Health Maintenance topics that are due now or due soon:  Health Maintenance Due   Topic Date Due     PAP Smear  04/09/2017     Diptheria Tetanus Pertussis (DTAP/TDAP/TD) Vaccine (3 - Td) 03/13/2018     Preventive Care Visit  02/22/2019     Asthma Control Test  07/14/2019       Please call us at [phone] (or use [employer]) to address the above recommendations.     Thank you for trusting [facility] and we appreciate the opportunity to serve you.  We look forward to supporting your healthcare needs in the future.    Healthy Regards,    Miguel Hammer PA-C/pv

## 2019-08-16 NOTE — TELEPHONE ENCOUNTER
Panel Management Review      Patient has the following on her problem list:     Depression / Dysthymia review    Measure:  Needs PHQ-9 score of 4 or less during index window.  Administer PHQ-9 and if score is 5 or more, send encounter to provider for next steps.    5 - 7 month window range: 8/18-10/18/19    PHQ-9 SCORE 1/12/2018 8/22/2018 3/18/2019   PHQ-9 Total Score - - -   PHQ-9 Total Score MyChart - - -   PHQ-9 Total Score 15 9 21       If PHQ-9 recheck is 5 or more, route to provider for next steps.    Patient is due for: None    Asthma review     ACT Total Scores 1/14/2019   ACT TOTAL SCORE -   ASTHMA ER VISITS -   ASTHMA HOSPITALIZATIONS -   ACT TOTAL SCORE (Goal Greater than or Equal to 20) 14   In the past 12 months, how many times did you visit the emergency room for your asthma without being admitted to the hospital? 0   In the past 12 months, how many times were you hospitalized overnight because of your asthma? 0      1. Is Asthma diagnosis on the Problem List? Yes    2. Is Asthma listed on Health Maintenance? Yes    3. Patient is due for: ACT      IVD   ASA: Not taking aspirin    Last LDL:    Lab Results   Component Value Date    CHOL 198 02/22/2018     Lab Results   Component Value Date    HDL 45 02/22/2018     Lab Results   Component Value Date     02/22/2018     Lab Results   Component Value Date    TRIG 105 02/22/2018        Lab Results   Component Value Date    CHOLHDLRATIO 5.4 05/01/2014        Is the patient on a Statin? NO   Is the patient on Aspirin? NO                    Last three blood pressure readings:  BP Readings from Last 3 Encounters:   07/09/19 124/84   01/14/19 132/85   12/04/18 114/70        Tobacco History:     History   Smoking Status     Never Smoker   Smokeless Tobacco     Never Used         Composite cancer screening  Chart review shows that this patient is due/due soon for the following Pap Smear  Summary:    Patient is due/failing the following:   ACT, PAP and  PHYSICAL    Action needed:   Patient needs office visit for Physical and Pap. and Patient needs to do ACT.    Type of outreach:    Sent letter. and Copy of ACT mailed to patient, will reach out in 5 days.    Questions for provider review:    None                                                                                                                                    Chance Rendon MA     Chart routed to Care Team.

## 2019-08-30 ENCOUNTER — OFFICE VISIT (OUTPATIENT)
Dept: FAMILY MEDICINE | Facility: CLINIC | Age: 34
End: 2019-08-30
Payer: COMMERCIAL

## 2019-08-30 VITALS
DIASTOLIC BLOOD PRESSURE: 74 MMHG | HEIGHT: 65 IN | TEMPERATURE: 98.6 F | WEIGHT: 283 LBS | HEART RATE: 71 BPM | OXYGEN SATURATION: 95 % | BODY MASS INDEX: 47.15 KG/M2 | SYSTOLIC BLOOD PRESSURE: 130 MMHG

## 2019-08-30 DIAGNOSIS — R42 DIZZINESS: Primary | ICD-10-CM

## 2019-08-30 DIAGNOSIS — E28.2 POLYCYSTIC OVARIES: ICD-10-CM

## 2019-08-30 DIAGNOSIS — R56.9 SEIZURE (H): ICD-10-CM

## 2019-08-30 DIAGNOSIS — R60.0 LOWER EXTREMITY EDEMA: ICD-10-CM

## 2019-08-30 DIAGNOSIS — J45.40 MODERATE PERSISTENT ASTHMA WITHOUT COMPLICATION: ICD-10-CM

## 2019-08-30 PROBLEM — E66.01 MORBID OBESITY (H): Status: ACTIVE | Noted: 2019-08-30

## 2019-08-30 LAB
ANION GAP SERPL CALCULATED.3IONS-SCNC: 6 MMOL/L (ref 3–14)
BASOPHILS # BLD AUTO: 0 10E9/L (ref 0–0.2)
BASOPHILS NFR BLD AUTO: 0.4 %
BUN SERPL-MCNC: 7 MG/DL (ref 7–30)
CALCIUM SERPL-MCNC: 9 MG/DL (ref 8.5–10.1)
CHLORIDE SERPL-SCNC: 112 MMOL/L (ref 94–109)
CO2 SERPL-SCNC: 23 MMOL/L (ref 20–32)
CREAT SERPL-MCNC: 0.64 MG/DL (ref 0.52–1.04)
DIFFERENTIAL METHOD BLD: NORMAL
EOSINOPHIL # BLD AUTO: 0.5 10E9/L (ref 0–0.7)
EOSINOPHIL NFR BLD AUTO: 6.7 %
ERYTHROCYTE [DISTWIDTH] IN BLOOD BY AUTOMATED COUNT: 13.1 % (ref 10–15)
GFR SERPL CREATININE-BSD FRML MDRD: >90 ML/MIN/{1.73_M2}
GLUCOSE SERPL-MCNC: 93 MG/DL (ref 70–99)
HCG UR QL: NEGATIVE
HCT VFR BLD AUTO: 41.4 % (ref 35–47)
HGB BLD-MCNC: 13.9 G/DL (ref 11.7–15.7)
LYMPHOCYTES # BLD AUTO: 2.1 10E9/L (ref 0.8–5.3)
LYMPHOCYTES NFR BLD AUTO: 28.7 %
MCH RBC QN AUTO: 29.6 PG (ref 26.5–33)
MCHC RBC AUTO-ENTMCNC: 33.6 G/DL (ref 31.5–36.5)
MCV RBC AUTO: 88 FL (ref 78–100)
MONOCYTES # BLD AUTO: 0.6 10E9/L (ref 0–1.3)
MONOCYTES NFR BLD AUTO: 8.7 %
NEUTROPHILS # BLD AUTO: 4.1 10E9/L (ref 1.6–8.3)
NEUTROPHILS NFR BLD AUTO: 55.5 %
PLATELET # BLD AUTO: 299 10E9/L (ref 150–450)
POTASSIUM SERPL-SCNC: 4 MMOL/L (ref 3.4–5.3)
RBC # BLD AUTO: 4.69 10E12/L (ref 3.8–5.2)
SODIUM SERPL-SCNC: 141 MMOL/L (ref 133–144)
WBC # BLD AUTO: 7.4 10E9/L (ref 4–11)

## 2019-08-30 PROCEDURE — 85025 COMPLETE CBC W/AUTO DIFF WBC: CPT | Performed by: NURSE PRACTITIONER

## 2019-08-30 PROCEDURE — 99214 OFFICE O/P EST MOD 30 MIN: CPT | Performed by: NURSE PRACTITIONER

## 2019-08-30 PROCEDURE — 81025 URINE PREGNANCY TEST: CPT | Performed by: NURSE PRACTITIONER

## 2019-08-30 PROCEDURE — 36415 COLL VENOUS BLD VENIPUNCTURE: CPT | Performed by: NURSE PRACTITIONER

## 2019-08-30 PROCEDURE — 80048 BASIC METABOLIC PNL TOTAL CA: CPT | Performed by: NURSE PRACTITIONER

## 2019-08-30 RX ORDER — ONDANSETRON 4 MG/1
4 TABLET, FILM COATED ORAL EVERY 8 HOURS PRN
Qty: 20 TABLET | Refills: 0 | Status: SHIPPED | OUTPATIENT
Start: 2019-08-30 | End: 2020-01-24

## 2019-08-30 ASSESSMENT — MIFFLIN-ST. JEOR: SCORE: 1974.56

## 2019-08-30 NOTE — PROGRESS NOTES
Subjective     Mary Shipman is a 34 year old female who presents to clinic today for the following health issues:    HPI     Swelling is her largest concern.     Acute Illness   Acute illness concerns: dizziness, ankle swelling, nausea  Onset: one week     Fever: no     Chills/Sweats: no     Headache (location?): no     Sinus Pressure:no    Conjunctivitis:  no    Ear Pain: no    Rhinorrhea: no    Congestion: no    Sore Throat: no  Dizziness: YES, feels like vertigo, had this many years ago. She will get spots in front of eyes at times   Swelling: YES, ankles bilaterally. She reports issues with swelling when she was pregnant and one other time but it has never been a chronic reoccurring issue   Cough: no    Wheeze: no    Decreased Appetite: YES, but this fluzuates not hungry during the day but is at night     Nausea: YES    Vomiting: YES, a couple times. Over the weekend, on Saturday at 1am she woke up to vomiting. Yesterday she was also vomiting.     Diarrhea:  YES- last weekend along with vomiting     Dysuria/Freq.: no     Fatigue/Achiness: YES,fatigued but she has slowed down on caffeine use     Sick/Strep Exposure: no      Reports a constellation of symptoms including dizziness, nausea, bilateral lower extremity swelling, she also missed her periods last 2 months she is not on any contraception.  She sees OB/GYN has a follow-up on September 9.  She denies fevers, chills, syncopal episodes, no shortness of breath.  She is been eating and hydrating well.   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  She was followed by neurology they found no known etiology for her seizure other than stress.  She hasn't had a seizure in over a year. She's off neuroleptics.     Asthma Follow-Up    Was ACT completed today?    Yes    ACT Total Scores 1/14/2019   ACT TOTAL SCORE -   ASTHMA ER VISITS -   ASTHMA HOSPITALIZATIONS -   ACT TOTAL SCORE (Goal Greater than or Equal to 20) 14   In the past 12 months,  how many times did you visit the emergency room for your asthma without being admitted to the hospital? 0   In the past 12 months, how many times were you hospitalized overnight because of your asthma? 0       How many days per week do you miss taking your asthma controller medication?  0    Please describe any recent triggers for your asthma: None    Have you had any Emergency Room Visits, Urgent Care Visits, or Hospital Admissions since your last office visit?  No        Patient Active Problem List   Diagnosis     Esophageal reflux     Allergic rhinitis due to other allergen     Polycystic ovaries     Thyrotoxicosis     Urticaria     Obesity     Low back pain     Intermittent asthma     HYPERLIPIDEMIA LDL GOAL <160     Irritable bowel syndrome with constipation     Migraine headache     Not immune to rubella     Major depressive disorder, recurrent episode, moderate (H)     Health Care Home     Health care home, active care coordination     Generalized anxiety disorder     Lump or mass in breast     Menometrorrhagia     Seizure (H)     Left hemiplegia (H)     Cervicalgia     Bilateral thoracic back pain, unspecified chronicity     Other migraine without status migrainosus, not intractable     Morbid obesity (H)     Past Surgical History:   Procedure Laterality Date     C APPENDECTOMY       C NONSPECIFIC PROCEDURE      nasal surgery      TONSILLECTOMY & ADENOIDECTOMY      surgery several times for this       Social History     Tobacco Use     Smoking status: Never Smoker     Smokeless tobacco: Never Used   Substance Use Topics     Alcohol use: No     Alcohol/week: 0.0 oz     Family History   Problem Relation Age of Onset     Gynecology Mother         ectopic pregnancy/endometriosis     Cancer - colorectal Mother      Congenital Anomalies Mother         kidney problems     Colon Cancer Mother      Hyperlipidemia Mother      Other Cancer Mother         Lung, Skin, Brain and Colon cancer     Gynecology Sister          ectopic pregnancy/endometriosis     Unknown/Adopted Sister      Heart Disease Father      Coronary Artery Disease Father      Psychotic Disorder Brother      Unknown/Adopted Brother      Unknown/Adopted Brother      Unknown/Adopted Brother      Unknown/Adopted Brother      Unknown/Adopted Brother      Unknown/Adopted Sister      Cerebrovascular Disease Maternal Grandmother      Unknown/Adopted Maternal Grandfather      Unknown/Adopted Paternal Grandmother      Unknown/Adopted Paternal Grandfather       () Other       () Other       () Other       () Other       () Other       () Other            Reviewed and updated as needed this visit by Provider         Review of Systems   ROS COMP: Constitutional, HEENT, cardiovascular, pulmonary, GI, , musculoskeletal, neuro, skin, endocrine and psych systems are negative, except as otherwise noted.      Objective    There were no vitals taken for this visit.  There is no height or weight on file to calculate BMI.  Physical Exam   GENERAL: healthy, alert and no distress  EYES: Eyes grossly normal to inspection, PERRL and conjunctivae and sclerae normal  HENT: ear canals and TM's normal, nose and mouth without ulcers or lesions  NECK: no adenopathy, no asymmetry, masses, or scars and thyroid normal to palpation  RESP: lungs clear to auscultation - no rales, rhonchi or wheezes  CV: regular rate and rhythm, normal S1 S2, no S3 or S4, no murmur, click or rub, no peripheral edema and peripheral pulses strong  ABDOMEN: soft, nontender, no hepatosplenomegaly, no masses and bowel sounds normal  MS: no gross musculoskeletal defects noted, she has larger extremities but no appreciable pitting edema.  SKIN: no suspicious lesions or rashes  NEURO: Normal strength and tone, sensory exam grossly normal, mentation intact, cranial nerves 2-12 intact and DTR's normal and symmetric +2  PSYCH: mentation appears normal, affect normal/bright    Diagnostic Test Results:  Labs reviewed in  Epic  Results for orders placed or performed in visit on 08/30/19 (from the past 24 hour(s))   HCG Qual, Urine ([id number])   Result Value Ref [facility]    HCG Qual Urine Negative NEG^Negative   CBC with platelets and differential   Result Value Ref [facility]    WBC 7.4 4.0 - 11.0 10e9/L    RBC Count 4.69 3.8 - 5.2 10e12/L    Hemoglobin 13.9 11.7 - 15.7 g/dL    Hematocrit 41.4 35.0 - 47.0 %    MCV 88 78 - 100 fl    MCH 29.6 26.5 - 33.0 pg    MCHC 33.6 31.5 - 36.5 g/dL    RDW 13.1 10.0 - 15.0 %    Platelet Count 299 150 - 450 10e9/L    % Neutrophils 55.5 %    % Lymphocytes 28.7 %    % Monocytes 8.7 %    % Eosinophils 6.7 %    % Basophils 0.4 %    Absolute Neutrophil 4.1 1.6 - 8.3 10e9/L    Absolute Lymphocytes 2.1 0.8 - 5.3 10e9/L    Absolute Monocytes 0.6 0.0 - 1.3 10e9/L    Absolute Eosinophils 0.5 0.0 - 0.7 10e9/L    Absolute Basophils 0.0 0.0 - 0.2 10e9/L    Diff Method Automated Method      *Note: Due to a large number of results and/or encounters for the requested time period, some results have not been displayed. A complete set of results can be found in Results Review.           Assessment & Plan     (R56.9) Seizure (H)  Comment: Her neuro exam is normal she was sent to lab to rule out pregnancy, anemia, electrolyte imbalance.  hCG is negative and CBC returned normal.  BMP pending.  Symptoms are consistent with vertigo given moderate persistent asthma I do not recommend meclizine, trial Zofran.  She is aware of signs or symptoms of when to return or seek urgent care.      (R60.0) Lower extremity edema  Comment: Dependent edema  Plan: order for DME      (J45.40) Moderate persistent asthma without complication  Comment: Needs improvement aware of triggers  Plan: Medications refilled  (E28.2) Polycystic ovaries,   Comment: Followed by OB has follow-up September 9 irregular periods  Plan: Recommend she discuss contraception    BMI:   Estimated body mass index is 47.09 kg/m  as calculated from the following:    Height as of  "this encounter: 1.651 m (5' 5\").    Weight as of this encounter: 128.4 kg (283 lb).   Weight management plan: Discussed healthy diet and exercise guidelines        No follow-ups on file.    GLENNA Parker [facility]    "

## 2019-08-31 ASSESSMENT — ASTHMA QUESTIONNAIRES: ACT_TOTALSCORE: 17

## 2019-10-01 DIAGNOSIS — F41.1 GAD (GENERALIZED ANXIETY DISORDER): ICD-10-CM

## 2019-10-01 NOTE — TELEPHONE ENCOUNTER
"Requested Prescriptions   Pending Prescriptions Disp Refills     hydrOXYzine (ATARAX) 25 MG tablet [Pharmacy Med Name: HYDROXYZINE HCL 25 MG TABLET]  DISCONTINUED        Last Written Prescription Date:  9/28/2018  Last Fill Quantity: 60 tablet,   # refills: 1  Last Office Visit: 7/9/2019  w/ NILS Hammer    Future Office visit:    Next 5 appointments (look out 90 days)    Oct 16, 2019  4:15 PM CDT  Office Visit with Carmelita Talavera [place]  [facility] [place] [address] [place] [id number] [phone]           Routing refill request to provider for review/approval because:  Drug not active on patient's medication list   60 tablet 1     Sig: TAKE 1-2 TABLETS (25-50 MG) BY MOUTH EVERY 6 HOURS AS NEEDED FOR ANXIETY       Antihistamines Protocol Failed - 10/1/2019 10:32 AM        Failed - Medication is active on med list        Passed - Recent (12 mo) or future (30 days) visit within the authorizing provider's specialty     Patient has had an office visit with the authorizing provider or a provider within the authorizing providers department within the previous 12 mos or has a future within next 30 days. See \"Patient Info\" tab in inbasket, or \"Choose Columns\" in Meds & Orders section of the refill encounter.              Passed - Patient is age 3 or older     Apply age and/or weight-based dosing for peds patients age 3 and older.    Forward request to provider for patients under the age of 3.            "

## 2019-10-03 RX ORDER — HYDROXYZINE HYDROCHLORIDE 25 MG/1
25-50 TABLET, FILM COATED ORAL EVERY 6 HOURS PRN
Qty: 60 TABLET | Refills: 5 | Status: SHIPPED | OUTPATIENT
Start: 2019-10-03 | End: 2020-04-02

## 2019-10-16 ENCOUNTER — TELEPHONE (OUTPATIENT)
Dept: OBGYN | Facility: CLINIC | Age: 34
End: 2019-10-16

## 2019-10-16 NOTE — TELEPHONE ENCOUNTER
Patient requesting metformin from Dr. Talavera. Not currently on her med list, has had it from her in the past.

## 2019-11-06 ENCOUNTER — HEALTH MAINTENANCE LETTER (OUTPATIENT)
Age: 34
End: 2019-11-06

## 2019-11-18 ENCOUNTER — OFFICE VISIT (OUTPATIENT)
Dept: OBGYN | Facility: CLINIC | Age: 34
End: 2019-11-18
Payer: COMMERCIAL

## 2019-11-18 VITALS
HEIGHT: 65 IN | TEMPERATURE: 99.1 F | DIASTOLIC BLOOD PRESSURE: 88 MMHG | SYSTOLIC BLOOD PRESSURE: 106 MMHG | HEART RATE: 90 BPM | WEIGHT: 285.9 LBS | BODY MASS INDEX: 47.63 KG/M2 | OXYGEN SATURATION: 98 %

## 2019-11-18 DIAGNOSIS — N92.1 METRORRHAGIA: ICD-10-CM

## 2019-11-18 DIAGNOSIS — Z12.4 SCREENING FOR CERVICAL CANCER: ICD-10-CM

## 2019-11-18 DIAGNOSIS — R22.2 CHEST WALL MASS: ICD-10-CM

## 2019-11-18 DIAGNOSIS — Z11.3 SCREEN FOR STD (SEXUALLY TRANSMITTED DISEASE): ICD-10-CM

## 2019-11-18 DIAGNOSIS — E55.9 VITAMIN D DEFICIENCY: ICD-10-CM

## 2019-11-18 DIAGNOSIS — Z12.39 SCREENING FOR BREAST CANCER: ICD-10-CM

## 2019-11-18 DIAGNOSIS — E66.01 MORBID OBESITY (H): ICD-10-CM

## 2019-11-18 DIAGNOSIS — Z01.419 ENCOUNTER FOR GYNECOLOGICAL EXAMINATION WITHOUT ABNORMAL FINDING: Primary | ICD-10-CM

## 2019-11-18 DIAGNOSIS — E28.2 PCOS (POLYCYSTIC OVARIAN SYNDROME): ICD-10-CM

## 2019-11-18 DIAGNOSIS — B37.31 YEAST INFECTION OF THE VAGINA: ICD-10-CM

## 2019-11-18 LAB — HCG UR QL: NEGATIVE

## 2019-11-18 PROCEDURE — 81025 URINE PREGNANCY TEST: CPT | Performed by: OBSTETRICS & GYNECOLOGY

## 2019-11-18 PROCEDURE — 87210 SMEAR WET MOUNT SALINE/INK: CPT | Performed by: OBSTETRICS & GYNECOLOGY

## 2019-11-18 PROCEDURE — 87591 N.GONORRHOEAE DNA AMP PROB: CPT | Performed by: OBSTETRICS & GYNECOLOGY

## 2019-11-18 PROCEDURE — 99213 OFFICE O/P EST LOW 20 MIN: CPT | Mod: 25 | Performed by: OBSTETRICS & GYNECOLOGY

## 2019-11-18 PROCEDURE — G0145 SCR C/V CYTO,THINLAYER,RESCR: HCPCS | Performed by: OBSTETRICS & GYNECOLOGY

## 2019-11-18 PROCEDURE — 87491 CHLMYD TRACH DNA AMP PROBE: CPT | Performed by: OBSTETRICS & GYNECOLOGY

## 2019-11-18 PROCEDURE — 99395 PREV VISIT EST AGE 18-39: CPT | Performed by: OBSTETRICS & GYNECOLOGY

## 2019-11-18 ASSESSMENT — MIFFLIN-ST. JEOR: SCORE: 1987.71

## 2019-11-18 NOTE — LETTER
December 2, 2019    Mary Shipman  [address]  [facility] [address]    Dear ,  This letter is regarding your recent Pap smear (cervical cancer screening) and Human Papillomavirus (HPV) test.  We are happy to inform you that your Pap smear result is normal. Cervical cancer is closely linked with certain types of HPV. Your results showed no evidence of high-risk HPV.  We recommend you have your next PAP smear in 3 years.  You will still need to return to the clinic every year for an annual exam and other preventive tests.  If you have additional questions regarding this result, please call our registered nurse, Alma at [phone].  Sincerely,    Carmelita Talavera MD/makayla

## 2019-11-18 NOTE — PROGRESS NOTES
"Chief Complaint   Patient presents with     Physical     Abdominal Pain     Gyn Exam       Initial /88 (BP Location: Right arm, Patient Position: Sitting, Cuff Size: Adult Large)   Pulse 90   Temp 99.1  F (37.3  C) (Oral)   Ht 1.651 m (5' 5\")   Wt 129.7 kg (285 lb 14.4 oz)   LMP 10/20/2019   SpO2 98%   Breastfeeding No   BMI 47.58 kg/m   Estimated body mass index is 47.58 kg/m  as calculated from the following:    Height as of this encounter: 1.651 m (5' 5\").    Weight as of this encounter: 129.7 kg (285 lb 14.4 oz).  BP completed using cuff size: large    Questioned patient about current smoking habits.  Pt. has never smoked.          The following HM Due: pap smear      The following patient reported/Care Every where data was sent to:  P ABSTRACT QUALITY INITIATIVES [54721]  n/a      patient has appointment for today and orders have been placed     Mary is a 34 year old  female who presents for annual exam. Here with her SO.  They have been together for 4 yrs. She has multiple issues today.      Menses are irregular and crampy and painful lasting 5 days.  Menses flow: normal, heavy and with clots.  Patient's last menstrual period was 10/20/2019.. Using none for contraception.  She is not currently considering pregnancy. Thinks she can't get pregnant.   New problem is very irregular and heavy periods.   Besides routine health maintenance,  she would like to discuss PCOS. Has long h/o this.  Recent accelerated weight gain with Body mass index is 47.58 kg/m .   Poor eating habits, no exercise, stress, single mom, using CBD.  Very stressed with life circumstances and has not cared for her own health.   Since 2018 periods have been very irregular -- about 1-2 yrs now.   Had ETOP in 2016.  Started noticing facial hair for about one yr.  Insulin levels have been high.    CBD smokes vapes, developing an addiction to this.   Wt Readings from Last 4 Encounters:   19 130.2 kg (287 lb) "   19 129.7 kg (285 lb 14.4 oz)   19 128.4 kg (283 lb)   19 126.3 kg (278 lb 6.4 oz)      GYNECOLOGIC HISTORY:  Menarche: 10  Age at first intercourse: 12 Number of lifetime partners: more than 6  Mary is sexually active with male partner(s) and is currently in monogamous relationship with boyfriend.    History sexually transmitted infections:Chlamydia  STI testing offered?  Accepted  MADIE exposure: No  History of abnormal Pap smear: NO - age 30- 65 PAP every 3 years recommended  Family history of breast CA: Yes (Please explain): maternal aunt  Family history of uterine/ovarian CA: Yes (Please explain): sister    Family history of colon CA: Yes (Please explain): mother    HEALTH MAINTENANCE:  Cholesterol: (  Cholesterol   Date Value Ref Range Status   2018 198 <200 mg/dL Final   2014 172 <200 mg/dL Final     Comment:     LDL Cholesterol is the primary guide to therapy.   The NCEP recommends further evaluation of: patients with cholesterol greater   than 200 mg/dL if additional risk factors are present, cholesterol greater   than   240 mg/dL, triglycerides greater than 150 mg/dL, or HDL less than 40 mg/dL.      History of abnormal lipids: Yes  Mammo: 10/29/2015 . History of abnormal Mammo: YES.  Regular Self Breast Exams: No  Calcium/Vitamin D intake: source:  none Adequate? No  TSH: (  TSH   Date Value Ref Range Status   2019 1.03 0.40 - 4.00 mU/L Final    )  Pap; (  Lab Results   Component Value Date    PAP NIL 2014    PAP NIL 2012    PAP NIL 2010    )    HISTORY:  OB History    Para Term  AB Living   2 1 1 0 1 1   SAB TAB Ectopic Multiple Live Births   0 1 0 0 1      # Outcome Date GA Lbr Frank/2nd Weight Sex Delivery Anes PTL Lv   2 Term 12 37w2d 03:50 / 00:48 3.11 kg (6 lb 13.7 oz) M Vag-Spont EPI N MELISSA      Birth Comments: Mother induced for pre-ecclamsia, required manual cervical dilation, hemorrhage during labor.  Juma had hypoglycemia  "at birth.      Name: MEGAN ERICKSON      Apgar1: 8  Apgar5: 9   1 TAB      TAB        Past Medical History:   Diagnosis Date     Allergic rhinitis due to other allergen      Antepartum mild preeclampsia 11/23/2012     Asthma      Depressive disorder      Esophageal reflux      Frequent UTI     had a kidney infection also in the past     Gestational hypertension 11/20/2012     Helicobacter pylori (H. pylori)     treated     Hyperlipidemia      Irritable bowel syndrome      Liver disease     \"fatty liver\" resolved on own.     Lump or mass in breast 11/30/2015     Lump or mass in breast      Lump or mass in breast      Mild preeclampsia 12/18/2012     Obesity      Polycystic ovaries      Thyrotoxicosis 5/9/2007     Past Surgical History:   Procedure Laterality Date     C APPENDECTOMY       C NONSPECIFIC PROCEDURE      nasal surgery      TONSILLECTOMY & ADENOIDECTOMY      surgery several times for this     Family History   Problem Relation Age of Onset     Gynecology Mother         ectopic pregnancy/endometriosis     Cancer - colorectal Mother      Congenital Anomalies Mother         kidney problems     Colon Cancer Mother      Hyperlipidemia Mother      Other Cancer Mother         Lung, Skin, Brain and Colon cancer     Asthma Mother      Arthritis Mother      Hypertension Mother      Chronic Obstructive Pulmonary Disease Mother      Breast Cancer Mother      Uterine Cancer Mother      Kidney Disease Mother      GERD Mother      Gynecology Sister         ectopic pregnancy/endometriosis     Unknown/Adopted Sister      Ovarian Cancer Sister      Heart Disease Father      Coronary Artery Disease Father      Heart Failure Father      Hyperlipidemia Father      Psychotic Disorder Brother      Unknown/Adopted Brother      Unknown/Adopted Brother      Unknown/Adopted Brother      Unknown/Adopted Brother      Unknown/Adopted Brother      Unknown/Adopted Sister      Cerebrovascular Disease Maternal Grandmother      " Hyperlipidemia Maternal Grandmother      GERD Maternal Grandmother      Unknown/Adopted Maternal Grandfather      Unknown/Adopted Paternal Grandmother      Unknown/Adopted Paternal Grandfather      Social History     Socioeconomic History     Marital status:      Spouse name: Not on file     Number of children: 0     Years of education: Not on file     Highest education level: Not on file   Occupational History     Not on file   Social Needs     Financial resource strain: Not on file     Food insecurity:     Worry: Not on file     Inability: Not on file     Transportation needs:     Medical: Not on file     Non-medical: Not on file   Tobacco Use     Smoking status: Never Smoker     Smokeless tobacco: Never Used   Substance and Sexual Activity     Alcohol use: No     Alcohol/week: 0.0 standard drinks     Drug use: No     Sexual activity: Yes     Partners: Male     Birth control/protection: None   Lifestyle     Physical activity:     Days per week: Not on file     Minutes per session: Not on file     Stress: Not on file   Relationships     Social connections:     Talks on phone: Not on file     Gets together: Not on file     Attends Baptism service: Not on file     Active member of club or organization: Not on file     Attends meetings of clubs or organizations: Not on file     Relationship status: Not on file     Intimate partner violence:     Fear of current or ex partner: Not on file     Emotionally abused: Not on file     Physically abused: Not on file     Forced sexual activity: Not on file   Other Topics Concern     Parent/sibling w/ CABG, MI or angioplasty before 65F 55M? No   Social History Narrative    Caffeine intake/servings daily - 0    Calcium intake/servings daily - 1-2    Exercise 0 times weekly - describe     Sunscreen used - No    Seatbelts used - Yes    Guns stored in the home - No    Self Breast Exam - No    Pap test up to date -  Yes    Eye exam up to date -  Yes    Dental exam up to date  -  Yes    DEXA scan up to date -  Not Applicable    Flex Sig/Colonoscopy up to date -  Yes    Mammography up to date -  Not Applicable    Immunizations reviewed and up to date - Yes    Abuse: Current or Past (Physical, Sexual or Emotional) - No    Do you feel safe in your environment - Yes    Do you cope well with stress - Yes    Do you suffer from insomnia - No    Last updated by: STEPHANIE SANDERS  5/21/2012                                   Current Outpatient Medications:      albuterol (PROAIR HFA/PROVENTIL HFA/VENTOLIN HFA) 108 (90 Base) MCG/ACT inhaler, Inhale 2 puffs into the lungs every 4 hours as needed for shortness of breath / dyspnea or wheezing, Disp: 1 Inhaler, Rfl: 2     busPIRone (BUSPAR) 15 MG tablet, 1/2 tab twice daily x 2 weeks, then 1 tab twice daily, Disp: 60 tablet, Rfl: 2     cetirizine (ZYRTEC) 10 MG tablet, Take 2 tablets (20 mg) by mouth 2 times daily, Disp: 120 tablet, Rfl: 11     EPINEPHrine (EPIPEN/ADRENACLICK/OR ANY BX GENERIC EQUIV) 0.3 MG/0.3ML injection 2-pack, Inject 0.3 mLs (0.3 mg) into the muscle as needed for anaphylaxis, Disp: 0.6 mL, Rfl: 1     fexofenadine-pseudoePHEDrine (ALLEGRA-D 24) 180-240 MG per 24 hr tablet, Take 1 tablet by mouth daily, Disp: , Rfl:      fluticasone (FLONASE) 50 MCG/ACT spray, Spray 1-2 sprays into both nostrils daily as needed for rhinitis or allergies, Disp: , Rfl:      gabapentin (NEURONTIN) 100 MG capsule, TAKE 1 CAPSULE BY ORAL ROUTE 3 TIMES PER DAY, Disp: , Rfl: 5     hydrOXYzine (ATARAX) 25 MG tablet, TAKE 1-2 TABLETS (25-50 MG) BY MOUTH EVERY 6 HOURS AS NEEDED FOR ANXIETY, Disp: 60 tablet, Rfl: 5     ibuprofen (ADVIL,MOTRIN) 400-800 mg tablet, Take 1-2 tablets by mouth every 6 hours as needed (cramping)., Disp: 30 tablet, Rfl: 0     levalbuterol (XOPENEX CONC) 1.25 MG/0.5ML NEBU, Take 1.25 mg by nebulization every 6 hours as needed for wheezing, Disp: , Rfl:      mometasone-formoterol (DULERA) 200-5 MCG/ACT inhaler, Inhale 2 puffs into the  lungs 2 times daily, Disp: 1 Inhaler, Rfl: 1     montelukast (SINGULAIR) 10 MG tablet, Take 1 tablet (10 mg) by mouth At Bedtime, Disp: 90 tablet, Rfl: 1     ondansetron (ZOFRAN) 4 MG tablet, Take 1 tablet (4 mg) by mouth every 8 hours as needed for nausea, Disp: 20 tablet, Rfl: 0     order for DME, Equipment being ordered: Compression stockings, Disp: 2 Device, Rfl: 0     pantoprazole (PROTONIX) 40 MG EC tablet, Take 1 tablet (40 mg) by mouth daily, Disp: 90 tablet, Rfl: 0     ranitidine (ZANTAC) 150 MG tablet, Take 1 tablet (150 mg) by mouth 2 times daily, Disp: 60 tablet, Rfl: 5     sertraline (ZOLOFT) 100 MG tablet, 2 TABLETS DAILY, Disp: 180 tablet, Rfl: 1     tiotropium (SPIRIVA RESPIMAT) 2.5 MCG/ACT inhaler, Inhale 2 puffs into the lungs daily Needs appointment with Dr. Cruz for further refills, Disp: 4 g, Rfl: 0     traZODone (DESYREL) 50 MG tablet, 2 to 3 at bedtime for insomnia, Disp: 120 tablet, Rfl: 2     Allergies   Allergen Reactions     Levaquin [Levofloxacin] Anaphylaxis     Acetaminophen      Uncertain - hives/anaphylaxis     Hydrocodone Hives     Vicodin     Hydrocodone-Acetaminophen Rash       Past medical, surgical, social and family history were reviewed and updated in EPIC.    ROS:   C:     NEGATIVE for fever, chills, + change in weight  I:       NEGATIVE for worrisome rashes, moles or lesions  E:     NEGATIVE for vision changes or irritation  E/M: NEGATIVE for ear, mouth and throat problems  R:     NEGATIVE for significant cough or SOB  CV:   NEGATIVE for chest pain, palpitations or peripheral edema  GI:     NEGATIVE for nausea, abdominal pain, heartburn, or change in bowel habits  :   NEGATIVE for frequency, dysuria, hematuria, vaginal discharge, ++ irregular bleeding ++ period problems  M:     NEGATIVE for significant arthralgias or myalgia, new chest wall lumps.   N:      NEGATIVE for weakness, dizziness or paresthesias  E:      NEGATIVE for temperature intolerance, skin/hair  "changes  P:     negative for changes in mood or affect.       EXAM:  /88 (BP Location: Right arm, Patient Position: Sitting, Cuff Size: Adult Large)   Pulse 90   Temp 99.1  F (37.3  C) (Oral)   Ht 1.651 m (5' 5\")   Wt 129.7 kg (285 lb 14.4 oz)   LMP 10/20/2019   SpO2 98%   Breastfeeding No   BMI 47.58 kg/m     BMI: Body mass index is 47.58 kg/m .  Constitutional: healthy, alert and no distress  Head: Normocephalic. No masses, lesions, tenderness or abnormalities  Neck: Neck supple. Trachea midline. No adenopathy. Thyroid symmetric, normal size.   Cardiovascular: RRR.   Respiratory: Negative.   Breast: No nodularity, asymmetry or nipple discharge bilaterally.  Gastrointestinal: Abdomen soft, non-tender, non-distended. No masses, organomegaly.  : BME limited by body habitus   Vulva:  No external lesions, normal female hair distribution, no inguinal adenopathy.    Urethra:  Midline, non-tender, well supported, no discharge  Vagina:  Moist, pink, +  abnormal discharge,  No lesions  Uterus:  Seems Normal size, mildly tender   Ovaries:  No masses appreciated, non-tender   Rectal Exam: deferred  Musculoskeletal: extremities normal, multiple 1-2 cm lumps on the left chest wall just below the breast in a cluster, nontender and no erythema   Skin: no suspicious lesions or rashes  Psychiatric: Affect appropriate, cooperative,mentation appears normal.     COUNSELING:   Reviewed preventive health counseling, as reflected in patient instructions       Regular exercise       Healthy diet/nutrition       Alcohol Use       Contraception       Family planning       Osteoporosis Prevention/Bone Health       Safe sex practices/STD prevention      PCOS testing and treatment   reports that she has never smoked. She has never used smokeless tobacco.    Body mass index is 47.58 kg/m .  Weight management plan: Discussed healthy diet and exercise guidelines Specific weight management program called weight watchers " discussed  FRAX Risk Assessment    ASSESSMENT:  34 year old female with satisfactory annual exam  (Z01.419) Encounter for gynecological examination without abnormal finding  (primary encounter diagnosis)  Comment:  See issues listed below  Plan: Lipid Profile, Comprehensive metabolic panel,         Hemoglobin, CBC with platelets, Wet prep       discussed contraception and need to use consistently    (E28.2) PCOS (polycystic ovarian syndrome)  Comment:possibly worsening with weight gain, irregular cycles and hair growth   Plan: Glucose, Insulin level, Follicle stimulating         hormone, Testosterone Free and Total, Lutropin,        Hemoglobin A1c, DHEA sulfate come back fasting    In addition to the routine basic preventive physical exam and assessment, 15 additional minutes with > 50 % counseling  were spent with the patient discussing: PCOS, uncontrolled weight gain, chest wall lumps, abnormal bleeding and obesity.       (Z12.4) Screening for cervical cancer  Comment:    Plan: HPV High Risk Types DNA Cervical, Pap imaged         thin layer screen with HPV - recommended age 30        - 65 years (select HPV order below)            (Z12.39) Screening for breast cancer  Comment: discussed screening  Plan: declined for now.     (N92.1) Metrorrhagia  Comment:    Plan: PCOS    (E66.01) Morbid obesity (H)  Comment:  Poor lifestyle, stress  Plan: TSH with free T4 reflex            (E55.9) Vitamin D deficiency  Comment:    Plan: Vitamin D Deficiency          (R22.2) Chest wall mass  Comment:  discussed need to follow thru with referral that she was given  Plan: patient avoiding due to fear, agreed to get checked.     (Z32.00) Encounter for pregnancy test  Comment:   Plan: HCG Qual, Urine ([id number])       (Z11.3) Screen for STD (sexually transmitted disease)  Comment:  Declined labs for this  Plan: NEISSERIA GONORRHOEA PCR, CHLAMYDIA TRACHOMATIS        PCR        Vaginal yeast infection today --> will use monistat.      Magnesium threonate  For muscle relaxation and mood  Weight loss  Fasting Labs  Surgery consult for chest wall mass  EFM precautions     Carmelita Talavera MD

## 2019-11-19 LAB
SPECIMEN SOURCE: ABNORMAL
WET PREP SPEC: ABNORMAL

## 2019-11-20 DIAGNOSIS — E66.01 MORBID OBESITY (H): ICD-10-CM

## 2019-11-20 DIAGNOSIS — E28.2 PCOS (POLYCYSTIC OVARIAN SYNDROME): ICD-10-CM

## 2019-11-20 DIAGNOSIS — E55.9 VITAMIN D DEFICIENCY: ICD-10-CM

## 2019-11-20 DIAGNOSIS — Z01.419 ENCOUNTER FOR GYNECOLOGICAL EXAMINATION WITHOUT ABNORMAL FINDING: ICD-10-CM

## 2019-11-20 DIAGNOSIS — Z12.4 SCREENING FOR CERVICAL CANCER: ICD-10-CM

## 2019-11-20 LAB
ALBUMIN SERPL-MCNC: 3.8 G/DL (ref 3.4–5)
ALP SERPL-CCNC: 78 U/L (ref 40–150)
ALT SERPL W P-5'-P-CCNC: 27 U/L (ref 0–50)
ANION GAP SERPL CALCULATED.3IONS-SCNC: 5 MMOL/L (ref 3–14)
AST SERPL W P-5'-P-CCNC: 15 U/L (ref 0–45)
BILIRUB SERPL-MCNC: 0.4 MG/DL (ref 0.2–1.3)
BUN SERPL-MCNC: 10 MG/DL (ref 7–30)
C TRACH DNA SPEC QL NAA+PROBE: NEGATIVE
CALCIUM SERPL-MCNC: 9.1 MG/DL (ref 8.5–10.1)
CHLORIDE SERPL-SCNC: 108 MMOL/L (ref 94–109)
CHOLEST SERPL-MCNC: 215 MG/DL
CO2 SERPL-SCNC: 26 MMOL/L (ref 20–32)
CREAT SERPL-MCNC: 0.65 MG/DL (ref 0.52–1.04)
DEPRECATED CALCIDIOL+CALCIFEROL SERPL-MC: 22 UG/L (ref 20–75)
ERYTHROCYTE [DISTWIDTH] IN BLOOD BY AUTOMATED COUNT: 13.2 % (ref 10–15)
FSH SERPL-ACNC: 2.8 IU/L
GFR SERPL CREATININE-BSD FRML MDRD: >90 ML/MIN/{1.73_M2}
GLUCOSE SERPL-MCNC: 95 MG/DL (ref 70–99)
HBA1C MFR BLD: 5.1 % (ref 0–5.6)
HCT VFR BLD AUTO: 43.7 % (ref 35–47)
HDLC SERPL-MCNC: 37 MG/DL
HGB BLD-MCNC: 14.5 G/DL (ref 11.7–15.7)
INSULIN SERPL-ACNC: 29.8 MU/L (ref 3–25)
LDLC SERPL CALC-MCNC: 145 MG/DL
LH SERPL-ACNC: 6.2 IU/L
MCH RBC QN AUTO: 30.1 PG (ref 26.5–33)
MCHC RBC AUTO-ENTMCNC: 33.2 G/DL (ref 31.5–36.5)
MCV RBC AUTO: 91 FL (ref 78–100)
N GONORRHOEA DNA SPEC QL NAA+PROBE: NEGATIVE
NONHDLC SERPL-MCNC: 178 MG/DL
PLATELET # BLD AUTO: 315 10E9/L (ref 150–450)
POTASSIUM SERPL-SCNC: 4.1 MMOL/L (ref 3.4–5.3)
PROT SERPL-MCNC: 7.4 G/DL (ref 6.8–8.8)
RBC # BLD AUTO: 4.82 10E12/L (ref 3.8–5.2)
SODIUM SERPL-SCNC: 139 MMOL/L (ref 133–144)
SPECIMEN SOURCE: NORMAL
SPECIMEN SOURCE: NORMAL
TRIGL SERPL-MCNC: 164 MG/DL
TSH SERPL DL<=0.005 MIU/L-ACNC: 0.54 MU/L (ref 0.4–4)
WBC # BLD AUTO: 6.7 10E9/L (ref 4–11)

## 2019-11-20 PROCEDURE — 84403 ASSAY OF TOTAL TESTOSTERONE: CPT | Performed by: OBSTETRICS & GYNECOLOGY

## 2019-11-20 PROCEDURE — 83001 ASSAY OF GONADOTROPIN (FSH): CPT | Performed by: OBSTETRICS & GYNECOLOGY

## 2019-11-20 PROCEDURE — 82627 DEHYDROEPIANDROSTERONE: CPT | Performed by: OBSTETRICS & GYNECOLOGY

## 2019-11-20 PROCEDURE — 82306 VITAMIN D 25 HYDROXY: CPT | Performed by: OBSTETRICS & GYNECOLOGY

## 2019-11-20 PROCEDURE — 83036 HEMOGLOBIN GLYCOSYLATED A1C: CPT | Performed by: OBSTETRICS & GYNECOLOGY

## 2019-11-20 PROCEDURE — 80053 COMPREHEN METABOLIC PANEL: CPT | Performed by: OBSTETRICS & GYNECOLOGY

## 2019-11-20 PROCEDURE — 84270 ASSAY OF SEX HORMONE GLOBUL: CPT | Performed by: OBSTETRICS & GYNECOLOGY

## 2019-11-20 PROCEDURE — 83525 ASSAY OF INSULIN: CPT | Performed by: OBSTETRICS & GYNECOLOGY

## 2019-11-20 PROCEDURE — 87624 HPV HI-RISK TYP POOLED RSLT: CPT | Performed by: OBSTETRICS & GYNECOLOGY

## 2019-11-20 PROCEDURE — 84443 ASSAY THYROID STIM HORMONE: CPT | Performed by: OBSTETRICS & GYNECOLOGY

## 2019-11-20 PROCEDURE — 36415 COLL VENOUS BLD VENIPUNCTURE: CPT | Performed by: OBSTETRICS & GYNECOLOGY

## 2019-11-20 PROCEDURE — 80061 LIPID PANEL: CPT | Performed by: OBSTETRICS & GYNECOLOGY

## 2019-11-20 PROCEDURE — 85027 COMPLETE CBC AUTOMATED: CPT | Performed by: OBSTETRICS & GYNECOLOGY

## 2019-11-20 PROCEDURE — 83002 ASSAY OF GONADOTROPIN (LH): CPT | Performed by: OBSTETRICS & GYNECOLOGY

## 2019-11-21 LAB — DHEA-S SERPL-MCNC: 57 UG/DL (ref 35–430)

## 2019-11-22 LAB
SHBG SERPL-SCNC: 25 NMOL/L (ref 30–135)
TESTOST FREE SERPL-MCNC: 0.56 NG/DL (ref 0.13–0.92)
TESTOST SERPL-MCNC: 27 NG/DL (ref 8–60)

## 2019-11-25 ENCOUNTER — OFFICE VISIT (OUTPATIENT)
Dept: SURGERY | Facility: CLINIC | Age: 34
End: 2019-11-25
Payer: COMMERCIAL

## 2019-11-25 VITALS
HEART RATE: 89 BPM | WEIGHT: 287 LBS | SYSTOLIC BLOOD PRESSURE: 118 MMHG | BODY MASS INDEX: 47.76 KG/M2 | TEMPERATURE: 98.2 F | DIASTOLIC BLOOD PRESSURE: 75 MMHG

## 2019-11-25 DIAGNOSIS — R07.89 CHEST WALL PAIN: Primary | ICD-10-CM

## 2019-11-25 PROCEDURE — 99204 OFFICE O/P NEW MOD 45 MIN: CPT | Performed by: SURGERY

## 2019-11-25 ASSESSMENT — PAIN SCALES - GENERAL: PAINLEVEL: NO PAIN (0)

## 2019-11-25 NOTE — PROGRESS NOTES
"I was asked to see Mary Shipman regarding possible left trunk mass by FRANSICO JOHNSON PA-C    CC: Mass    HPI:  Patient is a 34 year old femalewith complaints of pain associated with her left trunk mass. In 2014 she noticed the lump it has gotten bigger and started hurting about 2017. Pain radiates to the back at times.     Review Of Systems    General: negative for fever or chills  Skin: negative except mass noted above  Ears/Nose/Throat: negative for, epistaxis, bleeding gums  Respiratory: No shortness of breath, dyspnea on exertion, cough, or hemoptysis  Cardiovascular: negative for and chest pain  Gastrointestinal: negative for, nausea, vomiting and abdominal pain  Genitourinary: negative for and frequency  Neurologic: negative for and local weakness  Hematologic/Lymphatic/Immunologic: negative for, bleeding disorder and frequent infections  Endocrine: negative for, diabetes, polydipsia and polyuria    Past Medical History:   Diagnosis Date     Allergic rhinitis due to other allergen      Antepartum mild preeclampsia 11/23/2012     Asthma      Depressive disorder      Esophageal reflux      Frequent UTI     had a kidney infection also in the past     Gestational hypertension 11/20/2012     Helicobacter pylori (H. pylori)     treated     Hyperlipidemia      Irritable bowel syndrome      Liver disease     \"fatty liver\" resolved on own.     Lump or mass in breast 11/30/2015     Lump or mass in breast      Lump or mass in breast      Mild preeclampsia 12/18/2012     Obesity      Polycystic ovaries      Thyrotoxicosis 5/9/2007       Past Surgical History:   Procedure Laterality Date     C APPENDECTOMY       C NONSPECIFIC PROCEDURE      nasal surgery      TONSILLECTOMY & ADENOIDECTOMY      surgery several times for this       Social History     Socioeconomic History     Marital status:      Spouse name: Not on file     Number of children: 0     Years of education: Not on file     Highest education level: " Not on file   Occupational History     Not on file   Social Needs     Financial resource strain: Not on file     Food insecurity:     Worry: Not on file     Inability: Not on file     Transportation needs:     Medical: Not on file     Non-medical: Not on file   Tobacco Use     Smoking status: Never Smoker     Smokeless tobacco: Never Used   Substance and Sexual Activity     Alcohol use: No     Alcohol/week: 0.0 standard drinks     Drug use: No     Sexual activity: Yes     Partners: Male     Birth control/protection: None   Lifestyle     Physical activity:     Days per week: Not on file     Minutes per session: Not on file     Stress: Not on file   Relationships     Social connections:     Talks on phone: Not on file     Gets together: Not on file     Attends Buddhism service: Not on file     Active member of club or organization: Not on file     Attends meetings of clubs or organizations: Not on file     Relationship status: Not on file     Intimate partner violence:     Fear of current or ex partner: Not on file     Emotionally abused: Not on file     Physically abused: Not on file     Forced sexual activity: Not on file   Other Topics Concern     Parent/sibling w/ CABG, MI or angioplasty before 65F 55M? No   Social History Narrative    Caffeine intake/servings daily - 0    Calcium intake/servings daily - 1-2    Exercise 0 times weekly - describe     Sunscreen used - No    Seatbelts used - Yes    Guns stored in the home - No    Self Breast Exam - No    Pap test up to date -  Yes    Eye exam up to date -  Yes    Dental exam up to date -  Yes    DEXA scan up to date -  Not Applicable    Flex Sig/Colonoscopy up to date -  Yes    Mammography up to date -  Not Applicable    Immunizations reviewed and up to date - Yes    Abuse: Current or Past (Physical, Sexual or Emotional) - No    Do you feel safe in your environment - Yes    Do you cope well with stress - Yes    Do you suffer from insomnia - No    Last updated by:  STEPHANIE SANDERS  5/21/2012                                   Current Outpatient Medications   Medication Sig Dispense Refill     albuterol (PROAIR HFA/PROVENTIL HFA/VENTOLIN HFA) 108 (90 Base) MCG/ACT inhaler Inhale 2 puffs into the lungs every 4 hours as needed for shortness of breath / dyspnea or wheezing 1 Inhaler 2     busPIRone (BUSPAR) 15 MG tablet 1/2 tab twice daily x 2 weeks, then 1 tab twice daily 60 tablet 2     cetirizine (ZYRTEC) 10 MG tablet Take 2 tablets (20 mg) by mouth 2 times daily 120 tablet 11     EPINEPHrine (EPIPEN/ADRENACLICK/OR ANY BX GENERIC EQUIV) 0.3 MG/0.3ML injection 2-pack Inject 0.3 mLs (0.3 mg) into the muscle as needed for anaphylaxis 0.6 mL 1     fexofenadine-pseudoePHEDrine (ALLEGRA-D 24) 180-240 MG per 24 hr tablet Take 1 tablet by mouth daily       fluticasone (FLONASE) 50 MCG/ACT spray Spray 1-2 sprays into both nostrils daily as needed for rhinitis or allergies       gabapentin (NEURONTIN) 100 MG capsule TAKE 1 CAPSULE BY ORAL ROUTE 3 TIMES PER DAY  5     hydrOXYzine (ATARAX) 25 MG tablet TAKE 1-2 TABLETS (25-50 MG) BY MOUTH EVERY 6 HOURS AS NEEDED FOR ANXIETY 60 tablet 5     ibuprofen (ADVIL,MOTRIN) 400-800 mg tablet Take 1-2 tablets by mouth every 6 hours as needed (cramping). 30 tablet 0     levalbuterol (XOPENEX CONC) 1.25 MG/0.5ML NEBU Take 1.25 mg by nebulization every 6 hours as needed for wheezing       mometasone-formoterol (DULERA) 200-5 MCG/ACT inhaler Inhale 2 puffs into the lungs 2 times daily 1 Inhaler 1     montelukast (SINGULAIR) 10 MG tablet Take 1 tablet (10 mg) by mouth At Bedtime 90 tablet 1     ondansetron (ZOFRAN) 4 MG tablet Take 1 tablet (4 mg) by mouth every 8 hours as needed for nausea 20 tablet 0     order for DME Equipment being ordered: Compression stockings 2 Device 0     pantoprazole (PROTONIX) 40 MG EC tablet Take 1 tablet (40 mg) by mouth daily 90 tablet 0     ranitidine (ZANTAC) 150 MG tablet Take 1 tablet (150 mg) by mouth 2 times daily 60  tablet 5     sertraline (ZOLOFT) 100 MG tablet 2 TABLETS DAILY 180 tablet 1     tiotropium (SPIRIVA RESPIMAT) 2.5 MCG/ACT inhaler Inhale 2 puffs into the lungs daily Needs appointment with Dr. Cruz for further refills 4 g 0     traZODone (DESYREL) 50 MG tablet 2 to 3 at bedtime for insomnia 120 tablet 2     drospirenone-ethinyl estradiol (STACY) 3-0.03 MG tablet Take 1 tablet by mouth daily 28 tablet 11     vitamin D3 (CHOLECALCIFEROL) 2000 units (50 mcg) tablet Take 1 tablet (2,000 Units) by mouth daily 100 tablet 3       Physical exam:   /75   Pulse 89   Temp 98.2  F (36.8  C) (Oral)   Wt 130.2 kg (287 lb)   BMI 47.76 kg/m      Exam:  Constitutional: healthy, alert and no distress  Eyes: Pupils round and equal, no icterus   ENT: Mucous membranes moist  Respiratory:  Non-labored respiration  Musculoskeletal: No gross deformity  Neurologic: No gross focal deficits  Psychiatric: mentation appears normal and affect normal/bright  Hematologic/Lymphatic/Immunologic: No bruising noted    Skin: She has some fullness over the left lower chest wall. I did not appreciate a discrete mass. There was tenderness over what felt like a rib, this was maximal at the left anterior chest wall. Pressure on posterior rib on left leads to pain anteriorly      Assessment: Pain and tenderness over left chest wall. Mass is a possibility and so I will do an US to see if the fulness is associated with a discrete mass. I feel costochondritis is also a possibility. Advised ibuprofen for the pain. Will await US results if no mass seen will get a CT versus an MRI    Bernard Mccartney, DO

## 2019-11-25 NOTE — LETTER
"    11/25/2019         RE: Mary Shipman  [address]  [facility] [address]        Dear Colleague,    Thank you for referring your patient, Mary Shipman, to the [facility]. Please see a copy of my visit note below.    I was asked to see Mary Shipman regarding possible left trunk mass by FRANSICO JOHNSON PA-C    CC: Mass    HPI:  Patient is a 34 year old femalewith complaints of pain associated with her left trunk mass. In 2014 she noticed the lump it has gotten bigger and started hurting about 2017. Pain radiates to the back at times.     Review Of Systems    General: negative for fever or chills  Skin: negative except mass noted above  Ears/Nose/Throat: negative for, epistaxis, bleeding gums  Respiratory: No shortness of breath, dyspnea on exertion, cough, or hemoptysis  Cardiovascular: negative for and chest pain  Gastrointestinal: negative for, nausea, vomiting and abdominal pain  Genitourinary: negative for and frequency  Neurologic: negative for and local weakness  Hematologic/Lymphatic/Immunologic: negative for, bleeding disorder and frequent infections  Endocrine: negative for, diabetes, polydipsia and polyuria    Past Medical History:   Diagnosis Date     Allergic rhinitis due to other allergen      Antepartum mild preeclampsia 11/23/2012     Asthma      Depressive disorder      Esophageal reflux      Frequent UTI     had a kidney infection also in the past     Gestational hypertension 11/20/2012     Helicobacter pylori (H. pylori)     treated     Hyperlipidemia      Irritable bowel syndrome      Liver disease     \"fatty liver\" resolved on own.     Lump or mass in breast 11/30/2015     Lump or mass in breast      Lump or mass in breast      Mild preeclampsia 12/18/2012     Obesity      Polycystic ovaries      Thyrotoxicosis 5/9/2007       Past Surgical History:   Procedure Laterality Date     C APPENDECTOMY       C NONSPECIFIC PROCEDURE      nasal surgery      TONSILLECTOMY & " ADENOIDECTOMY      surgery several times for this       Social History     Socioeconomic History     Marital status:      Spouse name: Not on file     Number of children: 0     Years of education: Not on file     Highest education level: Not on file   Occupational History     Not on file   Social Needs     Financial resource strain: Not on file     Food insecurity:     Worry: Not on file     Inability: Not on file     Transportation needs:     Medical: Not on file     Non-medical: Not on file   Tobacco Use     Smoking status: Never Smoker     Smokeless tobacco: Never Used   Substance and Sexual Activity     Alcohol use: No     Alcohol/week: 0.0 standard drinks     Drug use: No     Sexual activity: Yes     Partners: Male     Birth control/protection: None   Lifestyle     Physical activity:     Days per week: Not on file     Minutes per session: Not on file     Stress: Not on file   Relationships     Social connections:     Talks on phone: Not on file     Gets together: Not on file     Attends Sikh service: Not on file     Active member of club or organization: Not on file     Attends meetings of clubs or organizations: Not on file     Relationship status: Not on file     Intimate partner violence:     Fear of current or ex partner: Not on file     Emotionally abused: Not on file     Physically abused: Not on file     Forced sexual activity: Not on file   Other Topics Concern     Parent/sibling w/ CABG, MI or angioplasty before 65F 55M? No   Social History Narrative    Caffeine intake/servings daily - 0    Calcium intake/servings daily - 1-2    Exercise 0 times weekly - describe     Sunscreen used - No    Seatbelts used - Yes    Guns stored in the home - No    Self Breast Exam - No    Pap test up to date -  Yes    Eye exam up to date -  Yes    Dental exam up to date -  Yes    DEXA scan up to date -  Not Applicable    Flex Sig/Colonoscopy up to date -  Yes    Mammography up to date -  Not Applicable     Immunizations reviewed and up to date - Yes    Abuse: Current or Past (Physical, Sexual or Emotional) - No    Do you feel safe in your environment - Yes    Do you cope well with stress - Yes    Do you suffer from insomnia - No    Last updated by: STEPHANIE SANDERS  5/21/2012                                   Current Outpatient Medications   Medication Sig Dispense Refill     albuterol (PROAIR HFA/PROVENTIL HFA/VENTOLIN HFA) 108 (90 Base) MCG/ACT inhaler Inhale 2 puffs into the lungs every 4 hours as needed for shortness of breath / dyspnea or wheezing 1 Inhaler 2     busPIRone (BUSPAR) 15 MG tablet 1/2 tab twice daily x 2 weeks, then 1 tab twice daily 60 tablet 2     cetirizine (ZYRTEC) 10 MG tablet Take 2 tablets (20 mg) by mouth 2 times daily 120 tablet 11     EPINEPHrine (EPIPEN/ADRENACLICK/OR ANY BX GENERIC EQUIV) 0.3 MG/0.3ML injection 2-pack Inject 0.3 mLs (0.3 mg) into the muscle as needed for anaphylaxis 0.6 mL 1     fexofenadine-pseudoePHEDrine (ALLEGRA-D 24) 180-240 MG per 24 hr tablet Take 1 tablet by mouth daily       fluticasone (FLONASE) 50 MCG/ACT spray Spray 1-2 sprays into both nostrils daily as needed for rhinitis or allergies       gabapentin (NEURONTIN) 100 MG capsule TAKE 1 CAPSULE BY ORAL ROUTE 3 TIMES PER DAY  5     hydrOXYzine (ATARAX) 25 MG tablet TAKE 1-2 TABLETS (25-50 MG) BY MOUTH EVERY 6 HOURS AS NEEDED FOR ANXIETY 60 tablet 5     ibuprofen (ADVIL,MOTRIN) 400-800 mg tablet Take 1-2 tablets by mouth every 6 hours as needed (cramping). 30 tablet 0     levalbuterol (XOPENEX CONC) 1.25 MG/0.5ML NEBU Take 1.25 mg by nebulization every 6 hours as needed for wheezing       mometasone-formoterol (DULERA) 200-5 MCG/ACT inhaler Inhale 2 puffs into the lungs 2 times daily 1 Inhaler 1     montelukast (SINGULAIR) 10 MG tablet Take 1 tablet (10 mg) by mouth At Bedtime 90 tablet 1     ondansetron (ZOFRAN) 4 MG tablet Take 1 tablet (4 mg) by mouth every 8 hours as needed for nausea 20 tablet 0     order  for DME Equipment being ordered: Compression stockings 2 Device 0     pantoprazole (PROTONIX) 40 MG EC tablet Take 1 tablet (40 mg) by mouth daily 90 tablet 0     ranitidine (ZANTAC) 150 MG tablet Take 1 tablet (150 mg) by mouth 2 times daily 60 tablet 5     sertraline (ZOLOFT) 100 MG tablet 2 TABLETS DAILY 180 tablet 1     tiotropium (SPIRIVA RESPIMAT) 2.5 MCG/ACT inhaler Inhale 2 puffs into the lungs daily Needs appointment with Dr. Cruz for further refills 4 g 0     traZODone (DESYREL) 50 MG tablet 2 to 3 at bedtime for insomnia 120 tablet 2     drospirenone-ethinyl estradiol (STACY) 3-0.03 MG tablet Take 1 tablet by mouth daily 28 tablet 11     vitamin D3 (CHOLECALCIFEROL) 2000 units (50 mcg) tablet Take 1 tablet (2,000 Units) by mouth daily 100 tablet 3       Physical exam:   /75   Pulse 89   Temp 98.2  F (36.8  C) (Oral)   Wt 130.2 kg (287 lb)   BMI 47.76 kg/m       Exam:  Constitutional: healthy, alert and no distress  Eyes: Pupils round and equal, no icterus   ENT: Mucous membranes moist  Respiratory:  Non-labored respiration  Musculoskeletal: No gross deformity  Neurologic: No gross focal deficits  Psychiatric: mentation appears normal and affect normal/bright  Hematologic/Lymphatic/Immunologic: No bruising noted    Skin: She has some fullness over the left lower chest wall. I did not appreciate a discrete mass. There was tenderness over what felt like a rib, this was maximal at the left anterior chest wall. Pressure on posterior rib on left leads to pain anteriorly      Assessment: Pain and tenderness over left chest wall. Mass is a possibility and so I will do an US to see if the fulness is associated with a discrete mass. I feel costochondritis is also a possibility. Advised ibuprofen for the pain. Will await US results if no mass seen will get a CT versus an MRI    Bernard Mccartney, DO    Again, thank you for allowing me to participate in the care of your patient.         Sincerely,        Bernard Mccartney, DO

## 2019-11-26 LAB
COPATH REPORT: NORMAL
PAP: NORMAL

## 2019-11-27 RX ORDER — CHOLECALCIFEROL (VITAMIN D3) 50 MCG
2000 TABLET ORAL DAILY
Qty: 100 TABLET | Refills: 3 | Status: SHIPPED | OUTPATIENT
Start: 2019-11-27 | End: 2020-04-02

## 2019-11-27 RX ORDER — DROSPIRENONE AND ETHINYL ESTRADIOL 0.03MG-3MG
1 KIT ORAL DAILY
Qty: 28 TABLET | Refills: 11 | Status: SHIPPED | OUTPATIENT
Start: 2019-11-27 | End: 2020-03-11

## 2019-11-28 LAB
FINAL DIAGNOSIS: NORMAL
HPV HR 12 DNA CVX QL NAA+PROBE: NEGATIVE
HPV16 DNA SPEC QL NAA+PROBE: NEGATIVE
HPV18 DNA SPEC QL NAA+PROBE: NEGATIVE
SPECIMEN DESCRIPTION: NORMAL
SPECIMEN SOURCE CVX/VAG CYTO: NORMAL

## 2020-01-14 ENCOUNTER — MYC REFILL (OUTPATIENT)
Dept: FAMILY MEDICINE | Facility: CLINIC | Age: 35
End: 2020-01-14

## 2020-01-14 DIAGNOSIS — F33.1 MAJOR DEPRESSIVE DISORDER, RECURRENT EPISODE, MODERATE (H): ICD-10-CM

## 2020-01-14 DIAGNOSIS — F43.23 ADJUSTMENT DISORDER WITH MIXED ANXIETY AND DEPRESSED MOOD: ICD-10-CM

## 2020-01-14 RX ORDER — SERTRALINE HYDROCHLORIDE 100 MG/1
200 TABLET, FILM COATED ORAL DAILY
Qty: 180 TABLET | Refills: 1 | Status: CANCELLED | OUTPATIENT
Start: 2020-01-14

## 2020-01-14 NOTE — TELEPHONE ENCOUNTER
"Requested Prescriptions   Pending Prescriptions Disp Refills     sertraline (ZOLOFT) 100 MG tablet [Pharmacy Med Name: SERTRALINE  MG TABLET]  Last Written Prescription Date:  9/26/2018  Last Fill Quantity: 180 tablet,  # refills: 1   Last office visit: 7/9/2019 with prescribing provider:  NILS Hammer   Future Office Visit:   60 tablet 5     Sig: TAKE 2 TABLETS DAILY       SSRIs Protocol Failed - 1/14/2020  2:22 PM        Failed - PHQ-9 score less than 5 in past 6 months     Please review last PHQ-9 score.   PHQ-9 SCORE 1/12/2018 8/22/2018 3/18/2019   PHQ-9 Total Score - - -   PHQ-9 Total Score MyChart - - -   PHQ-9 Total Score 15 9 21     CRUZ-7 SCORE 1/12/2018 8/22/2018 3/18/2019   Total Score - - -   Total Score 18 7 21             Passed - Medication is active on med list        Passed - Patient is age 18 or older        Passed - No active pregnancy on record        Passed - No positive pregnancy test in last 12 months        Passed - Recent (6 mo) or future (30 days) visit within the authorizing provider's specialty     Patient had office visit in the last 6 months or has a visit in the next 30 days with authorizing provider or within the authorizing provider's specialty.  See \"Patient Info\" tab in inbasket, or \"Choose Columns\" in Meds & Orders section of the refill encounter.            "

## 2020-01-14 NOTE — TELEPHONE ENCOUNTER
"Requested Prescriptions   Pending Prescriptions Disp Refills     sertraline (ZOLOFT) 100 MG tablet  Last Written Prescription Date:  9/26/2018  Last Fill Quantity: 180 tablet,  # refills: 1   Last office visit: 7/9/2019 with prescribing provider:  NILS Hammer   Future Office Visit:   180 tablet 1     Sig: Take 2 tablets (200 mg) by mouth daily       SSRIs Protocol Failed - 1/14/2020  3:06 PM        Failed - PHQ-9 score less than 5 in past 6 months     Please review last PHQ-9 score.   PHQ-9 SCORE 1/12/2018 8/22/2018 3/18/2019   PHQ-9 Total Score - - -   PHQ-9 Total Score MyChart - - -   PHQ-9 Total Score 15 9 21     CRUZ-7 SCORE 1/12/2018 8/22/2018 3/18/2019   Total Score - - -   Total Score 18 7 21                 Passed - Medication is active on med list        Passed - Patient is age 18 or older        Passed - No active pregnancy on record        Passed - No positive pregnancy test in last 12 months        Passed - Recent (6 mo) or future (30 days) visit within the authorizing provider's specialty     Patient had office visit in the last 6 months or has a visit in the next 30 days with authorizing provider or within the authorizing provider's specialty.  See \"Patient Info\" tab in inbasket, or \"Choose Columns\" in Meds & Orders section of the refill encounter.            "

## 2020-01-16 RX ORDER — SERTRALINE HYDROCHLORIDE 100 MG/1
TABLET, FILM COATED ORAL
Qty: 180 TABLET | Refills: 1 | Status: SHIPPED | OUTPATIENT
Start: 2020-01-16 | End: 2020-04-02

## 2020-01-16 NOTE — TELEPHONE ENCOUNTER
Routing refill request to provider for review/approval because:  PHQ-9 score:    PHQ-9 SCORE 3/18/2019   PHQ-9 Total Score -   PHQ-9 Total Score MyChart -   PHQ-9 Total Score 21           Evi Cantu RN, BSN, PHN  [facility]: [place]

## 2020-01-22 ENCOUNTER — TELEPHONE (OUTPATIENT)
Dept: OBGYN | Facility: CLINIC | Age: 35
End: 2020-01-22

## 2020-01-22 ENCOUNTER — OFFICE VISIT (OUTPATIENT)
Dept: FAMILY MEDICINE | Facility: CLINIC | Age: 35
End: 2020-01-22
Payer: COMMERCIAL

## 2020-01-22 VITALS
HEART RATE: 84 BPM | BODY MASS INDEX: 46.32 KG/M2 | OXYGEN SATURATION: 97 % | HEIGHT: 65 IN | RESPIRATION RATE: 18 BRPM | DIASTOLIC BLOOD PRESSURE: 79 MMHG | SYSTOLIC BLOOD PRESSURE: 124 MMHG | TEMPERATURE: 98.4 F | WEIGHT: 278 LBS

## 2020-01-22 DIAGNOSIS — Z32.01 PREGNANCY TEST POSITIVE: ICD-10-CM

## 2020-01-22 DIAGNOSIS — R42 VERTIGO: Primary | ICD-10-CM

## 2020-01-22 DIAGNOSIS — K21.9 GASTROESOPHAGEAL REFLUX DISEASE, ESOPHAGITIS PRESENCE NOT SPECIFIED: ICD-10-CM

## 2020-01-22 DIAGNOSIS — Z86.69 H/O TONIC-CLONIC SEIZURES: ICD-10-CM

## 2020-01-22 DIAGNOSIS — G43.809 OTHER MIGRAINE WITHOUT STATUS MIGRAINOSUS, NOT INTRACTABLE: ICD-10-CM

## 2020-01-22 LAB
ANION GAP SERPL CALCULATED.3IONS-SCNC: 8 MMOL/L (ref 3–14)
B-HCG SERPL-ACNC: 6526 IU/L (ref 0–5)
BUN SERPL-MCNC: 4 MG/DL (ref 7–30)
CALCIUM SERPL-MCNC: 9.1 MG/DL (ref 8.5–10.1)
CHLORIDE SERPL-SCNC: 111 MMOL/L (ref 94–109)
CO2 SERPL-SCNC: 19 MMOL/L (ref 20–32)
CREAT SERPL-MCNC: 0.56 MG/DL (ref 0.52–1.04)
ERYTHROCYTE [DISTWIDTH] IN BLOOD BY AUTOMATED COUNT: 13.5 % (ref 10–15)
GFR SERPL CREATININE-BSD FRML MDRD: >90 ML/MIN/{1.73_M2}
GLUCOSE SERPL-MCNC: 99 MG/DL (ref 70–99)
HCG UR QL: POSITIVE
HCT VFR BLD AUTO: 42.6 % (ref 35–47)
HGB BLD-MCNC: 14.5 G/DL (ref 11.7–15.7)
MCH RBC QN AUTO: 29.8 PG (ref 26.5–33)
MCHC RBC AUTO-ENTMCNC: 34 G/DL (ref 31.5–36.5)
MCV RBC AUTO: 88 FL (ref 78–100)
PLATELET # BLD AUTO: 266 10E9/L (ref 150–450)
POTASSIUM SERPL-SCNC: 3.7 MMOL/L (ref 3.4–5.3)
RBC # BLD AUTO: 4.86 10E12/L (ref 3.8–5.2)
SODIUM SERPL-SCNC: 138 MMOL/L (ref 133–144)
WBC # BLD AUTO: 9 10E9/L (ref 4–11)

## 2020-01-22 PROCEDURE — 80048 BASIC METABOLIC PNL TOTAL CA: CPT | Performed by: PHYSICIAN ASSISTANT

## 2020-01-22 PROCEDURE — 96372 THER/PROPH/DIAG INJ SC/IM: CPT | Performed by: PHYSICIAN ASSISTANT

## 2020-01-22 PROCEDURE — 36415 COLL VENOUS BLD VENIPUNCTURE: CPT | Performed by: PHYSICIAN ASSISTANT

## 2020-01-22 PROCEDURE — 99214 OFFICE O/P EST MOD 30 MIN: CPT | Mod: 25 | Performed by: PHYSICIAN ASSISTANT

## 2020-01-22 PROCEDURE — 85027 COMPLETE CBC AUTOMATED: CPT | Performed by: PHYSICIAN ASSISTANT

## 2020-01-22 PROCEDURE — 81025 URINE PREGNANCY TEST: CPT | Performed by: PHYSICIAN ASSISTANT

## 2020-01-22 PROCEDURE — 84702 CHORIONIC GONADOTROPIN TEST: CPT | Performed by: PHYSICIAN ASSISTANT

## 2020-01-22 RX ORDER — KETOROLAC TROMETHAMINE 30 MG/ML
30 INJECTION, SOLUTION INTRAMUSCULAR; INTRAVENOUS ONCE
Status: COMPLETED | OUTPATIENT
Start: 2020-01-22 | End: 2020-01-22

## 2020-01-22 RX ORDER — ESOMEPRAZOLE MAGNESIUM 40 MG/1
40 CAPSULE, DELAYED RELEASE ORAL
Qty: 30 CAPSULE | Refills: 3 | Status: SHIPPED | OUTPATIENT
Start: 2020-01-22 | End: 2020-01-22

## 2020-01-22 RX ORDER — MECLIZINE HYDROCHLORIDE 25 MG/1
25 TABLET ORAL 3 TIMES DAILY PRN
Qty: 45 TABLET | Refills: 0 | Status: SHIPPED | OUTPATIENT
Start: 2020-01-22 | End: 2020-01-24

## 2020-01-22 RX ORDER — TOPIRAMATE 50 MG/1
50 TABLET, FILM COATED ORAL 2 TIMES DAILY
Qty: 90 TABLET | Refills: 1 | Status: SHIPPED | OUTPATIENT
Start: 2020-01-22 | End: 2020-01-22

## 2020-01-22 RX ADMIN — KETOROLAC TROMETHAMINE 30 MG: 30 INJECTION, SOLUTION INTRAMUSCULAR; INTRAVENOUS at 09:32

## 2020-01-22 ASSESSMENT — MIFFLIN-ST. JEOR: SCORE: 1951.88

## 2020-01-22 ASSESSMENT — PAIN SCALES - GENERAL: PAINLEVEL: NO PAIN (0)

## 2020-01-22 NOTE — TELEPHONE ENCOUNTER
Patient calling regarding + pregnancy test at  today. Found out she was pregnant - thinks LMP was sometime the end of November. Missed a couple OCPs and periods were a little weird. Beta hcg at  was 6526. They recommended and US and establishing care with an OBGYN at their clinic, but patient wanted to come to [facility] and see RR. Patient stated that she has a lot of medical conditions that RR recommended she NOT get pregnant, so patient is concerned and would lie to meet with RR ASAP. Informed patient that I will send a message to her for further recommendations - are you okay seeing her this Friday on your call day just to talk to her about things? Do you want her to repeat quant or just get US? Please advise and we can call her back and help her get things set up. Thanks!  Carol Simms RN

## 2020-01-22 NOTE — PROGRESS NOTES
"Subjective     Mary Shipman is a 34 year old female who presents to clinic today for the following health issues:    HPI   Acute Illness   Acute illness concerns: Dizziness,   Onset: 1 month     Fever: no    Chills/Sweats: YES- sweats after vomiting     Headache (location?): YES    Sinus Pressure:no    Conjunctivitis:  no    Ear Pain: no    Rhinorrhea: no    Congestion: no    Sore Throat: no     Cough: no    Wheeze: no    Decreased Appetite: YES    Nausea: YES    Vomiting: YES    Diarrhea:  YES    Dysuria/Freq.: no    Fatigue/Achiness: YES    Sick/Strep Exposure: no     Therapies Tried and outcome: zofran     Started on new years leydi, patient and her son both developed stomach flu symptoms-diarrhea, vomiting that lasted for 48 hours then resolved. However,  since then patient continues to feel nauseous, no appetite. Patient reports that dizziness has gotten worse after stomach flu and she vomits from vertigo. Patient feels that room is moving when she gets up from bed, or drives. Her GERD is \"terrible\"  --patient states that after vomiting, her face turns beet red and starts sweating a bunch   -patient's significant other informed her that last night she started shivering for about 30 seconds and went away, patient states that she did have a seizure in the past around 2017, but not sure what that was due to.  Patient used to be on anticonvulsing medications, but off them now.   -patient states that she has been feeling very weak, feels like she is going to lose consciousness from time to time    -patient lost about 10 pounds since this started     Patient used to take Topamax for migraine but also stopped it and wants to get back on it.  Patient reports that 6 months stopped all her medications and then realized that her metal health, migraine, GERD were all worsening, so she restarted Zoloft, Gabapentin, Pantoprazole, Zantac and Buspar. She also uses asthma medications on as needed basis. The only medication " "that patient reports she was taking was oral contraception, but she stopped it 4 weeks ago to 'renew the cycle\", patient has been waiting for her next period to restart oral contraception again. Patient has PCOS and sometimes does not get periods for a few month. In October her GYN started her on oral contraception , then November 20th patient had heavy menstruation that lasted for 7 days, patient continued to be on oral contraception since then, however in the middle of December she missed a dose and then decided to stop oral contraception all together, but had unprotected sex twice since then.      LMP was about 6 weeks ago.     Patient reports that she has a bad migraine today and she  been taking Ibuprofen 600 mg every 6-8 hours as needed  in the last few weeks.  No Nsaid today due to can't take any oral medications due to nausea and she is allergic to Tylenol , so would like a pain medication injection if possible today.     Patient Active Problem List   Diagnosis     Esophageal reflux     Allergic rhinitis due to other allergen     Polycystic ovaries     Thyrotoxicosis     Urticaria     Obesity     Low back pain     Intermittent asthma     HYPERLIPIDEMIA LDL GOAL <160     Irritable bowel syndrome with constipation     Migraine headache     Not immune to rubella     Major depressive disorder, recurrent episode, moderate (H)     Health Care Home     Health care home, active care coordination     Generalized anxiety disorder     Lump or mass in breast     Menometrorrhagia     Seizure (H)     Left hemiplegia (H)     Cervicalgia     Bilateral thoracic back pain, unspecified chronicity     Other migraine without status migrainosus, not intractable     Morbid obesity (H)     Moderate persistent asthma without complication     Past Surgical History:   Procedure Laterality Date     C APPENDECTOMY       C NONSPECIFIC PROCEDURE      nasal surgery      TONSILLECTOMY & ADENOIDECTOMY      surgery several times for this     "   Social History     Tobacco Use     Smoking status: Never Smoker     Smokeless tobacco: Never Used   Substance Use Topics     Alcohol use: No     Alcohol/week: 0.0 standard drinks     Family History   Problem Relation Age of Onset     Gynecology Mother         ectopic pregnancy/endometriosis     Cancer - colorectal Mother      Congenital Anomalies Mother         kidney problems     Colon Cancer Mother      Hyperlipidemia Mother      Other Cancer Mother         Lung, Skin, Brain and Colon cancer     Asthma Mother      Arthritis Mother      Hypertension Mother      Chronic Obstructive Pulmonary Disease Mother      Breast Cancer Mother      Uterine Cancer Mother      Kidney Disease Mother      GERD Mother      Gynecology Sister         ectopic pregnancy/endometriosis     Unknown/Adopted Sister      Ovarian Cancer Sister      Heart Disease Father      Coronary Artery Disease Father      Heart Failure Father      Hyperlipidemia Father      Psychotic Disorder Brother      Unknown/Adopted Brother      Unknown/Adopted Brother      Unknown/Adopted Brother      Unknown/Adopted Brother      Unknown/Adopted Brother      Unknown/Adopted Sister      Cerebrovascular Disease Maternal Grandmother      Hyperlipidemia Maternal Grandmother      GERD Maternal Grandmother      Unknown/Adopted Maternal Grandfather      Unknown/Adopted Paternal Grandmother      Unknown/Adopted Paternal Grandfather          Current Outpatient Medications   Medication Sig Dispense Refill     albuterol (PROAIR HFA/PROVENTIL HFA/VENTOLIN HFA) 108 (90 Base) MCG/ACT inhaler Inhale 2 puffs into the lungs every 4 hours as needed for shortness of breath / dyspnea or wheezing 1 Inhaler 2     busPIRone (BUSPAR) 15 MG tablet 1/2 tab twice daily x 2 weeks, then 1 tab twice daily 60 tablet 2     cetirizine (ZYRTEC) 10 MG tablet Take 2 tablets (20 mg) by mouth 2 times daily 120 tablet 11     drospirenone-ethinyl estradiol (STACY) 3-0.03 MG tablet Take 1 tablet by mouth  daily 28 tablet 11     fexofenadine-pseudoePHEDrine (ALLEGRA-D 24) 180-240 MG per 24 hr tablet Take 1 tablet by mouth daily       fluticasone (FLONASE) 50 MCG/ACT spray Spray 1-2 sprays into both nostrils daily as needed for rhinitis or allergies       gabapentin (NEURONTIN) 100 MG capsule TAKE 1 CAPSULE BY ORAL ROUTE 3 TIMES PER DAY  5     hydrOXYzine (ATARAX) 25 MG tablet TAKE 1-2 TABLETS (25-50 MG) BY MOUTH EVERY 6 HOURS AS NEEDED FOR ANXIETY 60 tablet 5     ibuprofen (ADVIL,MOTRIN) 400-800 mg tablet Take 1-2 tablets by mouth every 6 hours as needed (cramping). 30 tablet 0     levalbuterol (XOPENEX CONC) 1.25 MG/0.5ML NEBU Take 1.25 mg by nebulization every 6 hours as needed for wheezing       meclizine (ANTIVERT) 25 MG tablet Take 1 tablet (25 mg) by mouth 3 times daily as needed for dizziness 45 tablet 0     mometasone-formoterol (DULERA) 200-5 MCG/ACT inhaler Inhale 2 puffs into the lungs 2 times daily 1 Inhaler 1     montelukast (SINGULAIR) 10 MG tablet Take 1 tablet (10 mg) by mouth At Bedtime 90 tablet 1     ondansetron (ZOFRAN) 4 MG tablet Take 1 tablet (4 mg) by mouth every 8 hours as needed for nausea 20 tablet 0     order for DME Equipment being ordered: Compression stockings 2 Device 0     ranitidine (ZANTAC) 150 MG tablet Take 1 tablet (150 mg) by mouth 2 times daily 60 tablet 5     sertraline (ZOLOFT) 100 MG tablet TAKE 2 TABLETS DAILY 180 tablet 1     tiotropium (SPIRIVA RESPIMAT) 2.5 MCG/ACT inhaler Inhale 2 puffs into the lungs daily Needs appointment with Dr. Cruz for further refills 4 g 0     traZODone (DESYREL) 50 MG tablet 2 to 3 at bedtime for insomnia 120 tablet 2     vitamin D3 (CHOLECALCIFEROL) 2000 units (50 mcg) tablet Take 1 tablet (2,000 Units) by mouth daily 100 tablet 3     Allergies   Allergen Reactions     Acetaminophen Anaphylaxis     Uncertain - hives/anaphylaxis     Levofloxacin Anaphylaxis     Hydrocodone Hives and Rash     Vicodin     Hydrocodone-Acetaminophen Rash     Feels  "like throat swelling, was given after Tonsillectomy         Reviewed and updated as needed this visit by Provider  Tobacco  Allergies  Meds  Problems  Med Hx  Surg Hx  Fam Hx         Review of Systems   ROS COMP: Constitutional, HEENT, cardiovascular, pulmonary, gi and gu systems are negative, except as otherwise noted.      Objective    /79 (BP Location: Right arm, Patient Position: Chair, Cuff Size: Adult Large)   Pulse 84   Temp 98.4  F (36.9  C) (Oral)   Resp 18   Ht 1.651 m (5' 5\")   Wt 126.1 kg (278 lb)   SpO2 97%   BMI 46.26 kg/m    Body mass index is 46.26 kg/m .     Physical Exam   GENERAL: healthy, alert and no distress  EYES: Eyes grossly normal to inspection, PERRL and conjunctivae and sclerae normal  HENT: ear canals and TM's normal, nose and mouth without ulcers or lesions  NECK: no adenopathy, no asymmetry, masses, or scars and thyroid normal to palpation  RESP: lungs clear to auscultation - no rales, rhonchi or wheezes  CV: regular rate and rhythm, normal S1 S2, no S3 or S4, no murmur, click or rub, no peripheral edema and peripheral pulses strong  ABDOMEN: soft, nontender, no hepatosplenomegaly, no masses and bowel sounds normal  MS: no gross musculoskeletal defects noted, no edema  NEURO: Normal strength and tone, sensory exam grossly normal, mentation intact, oriented times 3, speech normal, cranial nerves 2-12 intact and DTR's normal and symmetric +2    Diagnostic Test Results:  Results for orders placed or performed in visit on 01/22/20 (from the past 24 hour(s))   CBC with platelets   Result Value Ref Range    WBC 9.0 4.0 - 11.0 10e9/L    RBC Count 4.86 3.8 - 5.2 10e12/L    Hemoglobin 14.5 11.7 - 15.7 g/dL    Hematocrit 42.6 35.0 - 47.0 %    MCV 88 78 - 100 fl    MCH 29.8 26.5 - 33.0 pg    MCHC 34.0 31.5 - 36.5 g/dL    RDW 13.5 10.0 - 15.0 %    Platelet Count 266 150 - 450 10e9/L   Basic metabolic panel  (Ca, Cl, CO2, Creat, Gluc, K, Na, BUN)   Result Value Ref Range    " Sodium 138 133 - 144 mmol/L    Potassium 3.7 3.4 - 5.3 mmol/L    Chloride 111 (H) 94 - 109 mmol/L    Carbon Dioxide 19 (L) 20 - 32 mmol/L    Anion Gap 8 3 - 14 mmol/L    Glucose 99 70 - 99 mg/dL    Urea Nitrogen 4 (L) 7 - 30 mg/dL    Creatinine 0.56 0.52 - 1.04 mg/dL    GFR Estimate >90 >60 mL/min/[1.73_m2]    GFR Estimate If Black >90 >60 mL/min/[1.73_m2]    Calcium 9.1 8.5 - 10.1 mg/dL   HCG Qual, Urine ([id number])   Result Value Ref Range    HCG Qual Urine Positive (A) NEG^Negative   HCG Quantitative Pregnancy, Blood ([id number])   Result Value Ref Range    HCG Quantitative Serum 6,526 (H) 0 - 5 IU/L     *Note: Due to a large number of results and/or encounters for the requested time period, some results have not been displayed. A complete set of results can be found in Results Review.           Assessment & Plan       ICD-10-CM    1. Vertigo R42 meclizine (ANTIVERT) 25 MG tablet     CBC with platelets     HCG Qual, Urine ([id number])     Basic metabolic panel  (Ca, Cl, CO2, Creat, Gluc, K, Na, BUN)     HCG Quantitative Pregnancy, Blood ([id number])   2. Other migraine without status migrainosus, not intractable G43.809 ketorolac (TORADOL) injection 30 mg     DISCONTINUED: topiramate (TOPAMAX) 50 MG tablet   3. H/O tonic-clonic seizures Z86.69 NEUROLOGY ADULT REFERRAL   4. Gastroesophageal reflux disease, esophagitis presence not specified K21.9 DISCONTINUED: esomeprazole (NEXIUM) 40 MG DR capsule   5. Pregnancy test positive Z32.01 US OB <14 Weeks w Transvaginal Single     patient was asked to get labs done before medication administration, but she refused, she prefers to be treated and go home to sleep off the migraine.   patient refused to wait for lab results today.   1. Appear to be BPV  Neuro exam is normal.   Start Meclizine 25 mg three times a day as needed  2. Toradol 30 mg IM  Patient tolerated well  One time injection. Patient requested medication before lab came back and she did not want to wait for the results.    3. Patient needs to follow up with neurology    Patient was called with the results:   hcg urine was positive. HCG quant is 6525-early pregnancy    Patient was given 1 dose of Toradol, however, she can't receive this medication in the future now that she is pregnant, patient reported understanding. She report that Toradol took away her migraine, but she understands that she can't receive it anymore while she is pregnant.   she also needs to stop all other oral NSAIDS that she has been taking. Patient needs to see OBGYN to discuss what she can do for pain due to migraine.  Patient needs to taper down on sertraline start taking 150 mg daily for 3 days then  100 mg, then 50 mg daily for 3 days, then 25 mg daily for 3 days   Patient can't restart Topamax due to pregnancy  Stop pantoprazole and can't start Nexium  Patient may take Tums for GERD    Patient should come in for pelvic US to see exact dates. Patient reported that she is not sure what she wants to do in regard to pregnancy at this time. Patient was upset that she is pregnant.   Patient was also offered to follow up in clinic to go over the details of medication changes or see OBGYN and she has agreed. Patient sees GYN for her PCOS and most likely will go there, otherwise referral to Dr. Louise was also placed.     Patient's phone got disconnected before I finish instructions about scheduling US . I have tried multiple times to call her back, but phone is shut off and mailbox is full.     Reached patient at 1:46 pm  Please call [facility] at [phone] to schedule your appointment for pelvic US.      Return in about 2 days (around 1/24/2020).    Mariah Arndt PA-C  [facility]

## 2020-01-22 NOTE — NURSING NOTE
The following medication was given at 9:32am     MEDICATION: Ketorolac Tromethamine 30 mg/mL) (Toradol)  ROUTE: IM  SITE: Arm - Right  DOSE: 1ML   LOT #: [id number]  :[employer]    EXPIRATION DATE:  July/2021  NDC#: 30275-410-24    The medication has been administered and the patient was instructed to wait 20 minutes before leaving the building in the event of an allergic reaction.    TOM Garcia MA

## 2020-01-22 NOTE — PATIENT INSTRUCTIONS
Meclizine 25 mg three times a day as needed for vertigo and nausea    Nexium 40 mg every morning on empty stomach    Follow up with neurology as soon as possible.   Restart Topamax for migraine prevention  Follow up in 5 days or sooner if not better or worse

## 2020-01-23 NOTE — TELEPHONE ENCOUNTER
Please order an US,  Can be at [facility] or [facility].  Yes, I can try to see her if not too busy with rounds tomorrow.  Please just call me in the AM but let's try to get the US first so I know it is a viable pg.   RR

## 2020-01-24 ENCOUNTER — ANCILLARY PROCEDURE (OUTPATIENT)
Dept: ULTRASOUND IMAGING | Facility: CLINIC | Age: 35
End: 2020-01-24
Attending: PHYSICIAN ASSISTANT
Payer: COMMERCIAL

## 2020-01-24 ENCOUNTER — OFFICE VISIT (OUTPATIENT)
Dept: OBGYN | Facility: CLINIC | Age: 35
End: 2020-01-24
Payer: COMMERCIAL

## 2020-01-24 ENCOUNTER — TELEPHONE (OUTPATIENT)
Dept: OBGYN | Facility: CLINIC | Age: 35
End: 2020-01-24

## 2020-01-24 VITALS
HEIGHT: 65 IN | OXYGEN SATURATION: 98 % | TEMPERATURE: 98.8 F | HEART RATE: 123 BPM | WEIGHT: 279.5 LBS | BODY MASS INDEX: 46.57 KG/M2

## 2020-01-24 DIAGNOSIS — F32.A DEPRESSION, UNSPECIFIED DEPRESSION TYPE: ICD-10-CM

## 2020-01-24 DIAGNOSIS — Z65.9 OTHER SOCIAL STRESSOR: ICD-10-CM

## 2020-01-24 DIAGNOSIS — Z32.01 PREGNANCY TEST POSITIVE: ICD-10-CM

## 2020-01-24 DIAGNOSIS — R22.2 LUMP IN CHEST: ICD-10-CM

## 2020-01-24 DIAGNOSIS — E66.01 MORBID OBESITY (H): ICD-10-CM

## 2020-01-24 DIAGNOSIS — Z34.90 PREGNANCY AT EARLY STAGE: Primary | ICD-10-CM

## 2020-01-24 PROCEDURE — 76801 OB US < 14 WKS SINGLE FETUS: CPT

## 2020-01-24 PROCEDURE — 99214 OFFICE O/P EST MOD 30 MIN: CPT | Performed by: OBSTETRICS & GYNECOLOGY

## 2020-01-24 PROCEDURE — 76817 TRANSVAGINAL US OBSTETRIC: CPT

## 2020-01-24 ASSESSMENT — MIFFLIN-ST. JEOR: SCORE: 1958.68

## 2020-01-24 NOTE — TELEPHONE ENCOUNTER
TC to patient. Aware orders are in chart for both ultrasounds. Number given to call Monday morning to schedule as she would like to have them done at [place].   Rekha Jenkins RN-BSN

## 2020-01-24 NOTE — PROGRESS NOTES
OBSTETRIC FIRST TRIMESTER WITH TRANSVAGINAL IMAGING   2020 1:33 PM     HISTORY: Pregnancy test positive.     TECHNIQUE: Transabdominal and transvaginal imaging were performed.   Transvaginal imaging was performed to better evaluate the uterus and  gestational sac.     COMPARISON:  None.     FINDINGS:    Estimated gestational age by current ultrasound measurement: 6 weeks 0  days.  Estimated date of delivery based on this ultrasound: 2020.  Crown-rump length: 0.3 cm.   Embryonic cardiac activity: 98 bpm.   Gestational sac shape: Unremarkable  Yolk sac: Present.  Subchorionic hemorrhage: None.     Right ovary: Unremarkable.  Left ovary: Unremarkable.  Adnexal mass: None.  Free pelvic fluid: None.                                                                      IMPRESSION: Single, live, intrauterine gestation measures 6 weeks 0  days.         HPI:  Mary Shipman is a 34 year old female  here for a consultation regarding: early pregnancy.   Patient was taking oral contraceptive pills.  Was finishing the first pack of pills.  Reports that her LMP was not normal.  Was lighter than usual.  Was not trying to get pregnant and broke up with her boyfriend of 4 yrs right around the time she found out she was pregnant.   She was initially surprised but now is accepting and actually happy.  Has decided to go thru with the pregnancy as a single mom.   Her FOB has 4 other children and has been cheating on her, so she is not looking to repair that relationship.   She is raising her 7 yr old son alone.    She had an US to confirm dates and viability this morning with results as noted above.  Patient's last menstrual period was 2019.      Patient reports that she has been very sick for several weeks starting with a virus that she and her son had.   Now she is feeling very dizzy and vomiting at times.   Has lost several pounds over the past few weeks.   Was seen in the ED at [facility] 4 days ago for IV  "fluid hydration.       On no meds right now at all.  Was told to stop all meds when she found out she is pregnant.   Thinks she is ok off her zoloft for the time being but might need to go on it again as the pregnancy progresses.  She was taking CBD daily for several months prior to her getting pregnant.  Just stopped it within the past week.     She is not sleeping well.  Had been on atarax, zoloft, zantac, vitamin D and was taking neurontin 100 mg about twice a week and asthma and allergy meds prn.         Past GYN history:   Lab Results   Component Value Date    PAP NIL 11/18/2019    PAP NIL 04/09/2014    PAP NIL 03/14/2012           Past Medical History:   Diagnosis Date     Allergic rhinitis due to other allergen      Antepartum mild preeclampsia 11/23/2012     Asthma      Depressive disorder      Esophageal reflux      Frequent UTI     had a kidney infection also in the past     Gestational hypertension 11/20/2012     Helicobacter pylori (H. pylori)     treated     Hyperlipidemia      Irritable bowel syndrome      Liver disease     \"fatty liver\" resolved on own.     Lump or mass in breast 11/30/2015     Lump or mass in breast      Lump or mass in breast      Mild preeclampsia 12/18/2012     Obesity      Polycystic ovaries      Thyrotoxicosis 5/9/2007       Past Surgical History:   Procedure Laterality Date     C APPENDECTOMY       C NONSPECIFIC PROCEDURE      nasal surgery      TONSILLECTOMY & ADENOIDECTOMY      surgery several times for this       Family History   Problem Relation Age of Onset     Gynecology Mother         ectopic pregnancy/endometriosis     Cancer - colorectal Mother      Congenital Anomalies Mother         kidney problems     Colon Cancer Mother      Hyperlipidemia Mother      Other Cancer Mother         Lung, Skin, Brain and Colon cancer     Asthma Mother      Arthritis Mother      Hypertension Mother      Chronic Obstructive Pulmonary Disease Mother      Breast Cancer Mother      Uterine " Cancer Mother      Kidney Disease Mother      GERD Mother      Gynecology Sister         ectopic pregnancy/endometriosis     Unknown/Adopted Sister      Ovarian Cancer Sister      Heart Disease Father      Coronary Artery Disease Father      Heart Failure Father      Hyperlipidemia Father      Psychotic Disorder Brother      Unknown/Adopted Brother      Unknown/Adopted Brother      Unknown/Adopted Brother      Unknown/Adopted Brother      Unknown/Adopted Brother      Unknown/Adopted Sister      Cerebrovascular Disease Maternal Grandmother      Hyperlipidemia Maternal Grandmother      GERD Maternal Grandmother      Unknown/Adopted Maternal Grandfather      Unknown/Adopted Paternal Grandmother      Unknown/Adopted Paternal Grandfather          Medications:    Current Outpatient Medications:      albuterol (PROAIR HFA/PROVENTIL HFA/VENTOLIN HFA) 108 (90 Base) MCG/ACT inhaler, Inhale 2 puffs into the lungs every 4 hours as needed for shortness of breath / dyspnea or wheezing (Patient not taking: Reported on 1/24/2020), Disp: 1 Inhaler, Rfl: 2     cetirizine (ZYRTEC) 10 MG tablet, Take 2 tablets (20 mg) by mouth 2 times daily (Patient not taking: Reported on 1/24/2020), Disp: 120 tablet, Rfl: 11     drospirenone-ethinyl estradiol (STACY) 3-0.03 MG tablet, Take 1 tablet by mouth daily (Patient not taking: Reported on 1/24/2020), Disp: 28 tablet, Rfl: 11     fexofenadine-pseudoePHEDrine (ALLEGRA-D 24) 180-240 MG per 24 hr tablet, Take 1 tablet by mouth daily, Disp: , Rfl:      fluticasone (FLONASE) 50 MCG/ACT spray, Spray 1-2 sprays into both nostrils daily as needed for rhinitis or allergies, Disp: , Rfl:      gabapentin (NEURONTIN) 100 MG capsule, TAKE 1 CAPSULE BY ORAL ROUTE 3 TIMES PER DAY, Disp: , Rfl: 5     hydrOXYzine (ATARAX) 25 MG tablet, TAKE 1-2 TABLETS (25-50 MG) BY MOUTH EVERY 6 HOURS AS NEEDED FOR ANXIETY (Patient not taking: Reported on 1/24/2020), Disp: 60 tablet, Rfl: 5     levalbuterol (XOPENEX CONC)  "1.25 MG/0.5ML NEBU, Take 1.25 mg by nebulization every 6 hours as needed for wheezing, Disp: , Rfl:      mometasone-formoterol (DULERA) 200-5 MCG/ACT inhaler, Inhale 2 puffs into the lungs 2 times daily (Patient not taking: Reported on 1/24/2020), Disp: 1 Inhaler, Rfl: 1     montelukast (SINGULAIR) 10 MG tablet, Take 1 tablet (10 mg) by mouth At Bedtime (Patient not taking: Reported on 1/24/2020), Disp: 90 tablet, Rfl: 1     ranitidine (ZANTAC) 150 MG tablet, Take 1 tablet (150 mg) by mouth 2 times daily (Patient not taking: Reported on 1/24/2020), Disp: 60 tablet, Rfl: 5     sertraline (ZOLOFT) 100 MG tablet, TAKE 2 TABLETS DAILY (Patient not taking: Reported on 1/24/2020), Disp: 180 tablet, Rfl: 1     tiotropium (SPIRIVA RESPIMAT) 2.5 MCG/ACT inhaler, Inhale 2 puffs into the lungs daily Needs appointment with Dr. Cruz for further refills (Patient not taking: Reported on 1/24/2020), Disp: 4 g, Rfl: 0     traZODone (DESYREL) 50 MG tablet, 2 to 3 at bedtime for insomnia (Patient not taking: Reported on 1/24/2020), Disp: 120 tablet, Rfl: 2     vitamin D3 (CHOLECALCIFEROL) 2000 units (50 mcg) tablet, Take 1 tablet (2,000 Units) by mouth daily (Patient not taking: Reported on 1/24/2020), Disp: 100 tablet, Rfl: 3    Allergies:  Acetaminophen; Levofloxacin; Hydrocodone; and Hydrocodone-acetaminophen    ROS:  She denies abnormal vaginal bleeding, discharge or unusual pelvic pain, no dysuria, frequency or hematuria.    EXAM:  /84   Vitals:    01/24/20 1411   Pulse: 123   Temp: 98.8  F (37.1  C)   TempSrc: Oral   SpO2: 98%   Weight: 126.8 kg (279 lb 8 oz)   Height: 1.651 m (5' 5\")     General - pleasant female in no acute distress.  Neurological - alert and oriented X 3  Psychiatric - normal mood and affect, but emotional and teary at times    No other physical examination was performed today as we spent over 50% of today's 25 minute visit in face-to-face discussion and counseling about US findings and medications in " pregnancy, along with other issues noted in the assessment.    ASSESSMENT/PLAN:  Encounter Diagnoses   Name Primary?     Pregnancy at early stage Yes     Lump in chest      Morbid obesity (H)      Depression, unspecified depression type      Other social stressor     US shows low fetal heart rate but also just at 6 wks.   discussed need to follow closely to monitor for viability with repeat US in one week  Reviewed meds   Patient will get US of chest wall masses  Reviewed social situation and emotional support given  Referral for nutrition consult.     Orders Placed This Encounter   Procedures     US OB < 14 Weeks Single     US Chest Pleural Effusion Imaging     NUTRITION REFERRAL        Carmelita Talavera MD

## 2020-01-31 ENCOUNTER — ANCILLARY PROCEDURE (OUTPATIENT)
Dept: ULTRASOUND IMAGING | Facility: CLINIC | Age: 35
End: 2020-01-31
Attending: OBSTETRICS & GYNECOLOGY
Payer: COMMERCIAL

## 2020-01-31 DIAGNOSIS — Z34.90 PREGNANCY AT EARLY STAGE: ICD-10-CM

## 2020-01-31 PROCEDURE — 76801 OB US < 14 WKS SINGLE FETUS: CPT

## 2020-02-06 DIAGNOSIS — Z34.90 EARLY STAGE OF PREGNANCY: Primary | ICD-10-CM

## 2020-02-08 ENCOUNTER — ANCILLARY PROCEDURE (OUTPATIENT)
Dept: ULTRASOUND IMAGING | Facility: CLINIC | Age: 35
End: 2020-02-08
Attending: OBSTETRICS & GYNECOLOGY
Payer: COMMERCIAL

## 2020-02-08 DIAGNOSIS — Z34.90 EARLY STAGE OF PREGNANCY: ICD-10-CM

## 2020-02-08 PROCEDURE — 76817 TRANSVAGINAL US OBSTETRIC: CPT

## 2020-02-10 ENCOUNTER — OFFICE VISIT (OUTPATIENT)
Dept: FAMILY MEDICINE | Facility: CLINIC | Age: 35
End: 2020-02-10
Payer: COMMERCIAL

## 2020-02-10 VITALS
TEMPERATURE: 97.9 F | SYSTOLIC BLOOD PRESSURE: 108 MMHG | HEART RATE: 82 BPM | WEIGHT: 274 LBS | DIASTOLIC BLOOD PRESSURE: 77 MMHG | OXYGEN SATURATION: 100 % | BODY MASS INDEX: 45.6 KG/M2

## 2020-02-10 DIAGNOSIS — Z33.1 PREGNANT STATE, INCIDENTAL: ICD-10-CM

## 2020-02-10 DIAGNOSIS — J11.1 INFLUENZA-LIKE ILLNESS: ICD-10-CM

## 2020-02-10 DIAGNOSIS — R50.9 FEVER, UNSPECIFIED FEVER CAUSE: Primary | ICD-10-CM

## 2020-02-10 LAB
DEPRECATED S PYO AG THROAT QL EIA: NORMAL
FLUAV+FLUBV AG SPEC QL: NEGATIVE
FLUAV+FLUBV AG SPEC QL: NEGATIVE
SPECIMEN SOURCE: NORMAL
SPECIMEN SOURCE: NORMAL

## 2020-02-10 PROCEDURE — 87081 CULTURE SCREEN ONLY: CPT | Performed by: PHYSICIAN ASSISTANT

## 2020-02-10 PROCEDURE — 99213 OFFICE O/P EST LOW 20 MIN: CPT | Performed by: PHYSICIAN ASSISTANT

## 2020-02-10 PROCEDURE — 87804 INFLUENZA ASSAY W/OPTIC: CPT | Performed by: PHYSICIAN ASSISTANT

## 2020-02-10 PROCEDURE — 87880 STREP A ASSAY W/OPTIC: CPT | Performed by: PHYSICIAN ASSISTANT

## 2020-02-10 RX ORDER — OSELTAMIVIR PHOSPHATE 75 MG/1
75 CAPSULE ORAL DAILY
Qty: 7 CAPSULE | Refills: 0 | Status: SHIPPED | OUTPATIENT
Start: 2020-02-10 | End: 2020-03-16

## 2020-02-10 NOTE — PROGRESS NOTES
Subjective     Mary Shipman is a 34 year old female who presents to clinic today for the following health issues:    HPI   ENT Symptoms-PREGNANT 9 WEEKS              Symptoms: cc Present Absent Comment   Fever/Chills  x  Chills    Fatigue  x     Muscle Aches  x     Eye Irritation   x    Sneezing   x    Nasal Saturnino/Drg  x     Sinus Pressure/Pain  x  Headache    Loss of smell   x    Dental pain   x    Sore Throat  x  With coughing    Swollen Glands  x  With coughing    Ear Pain/Fullness  x  R ear    Cough  x     Wheeze  x     Chest Pain   x    Shortness of breath  x     Rash   x    Other         Symptom duration:  since last night    Symptom severity:  moderate    Treatments tried: None    Contacts:   Son positive with strep,influenza    Worse symptoms is cough and headache  She is pregnant         Patient Active Problem List   Diagnosis     Esophageal reflux     Allergic rhinitis due to other allergen     Polycystic ovaries     Thyrotoxicosis     Urticaria     Obesity     Low back pain     Intermittent asthma     HYPERLIPIDEMIA LDL GOAL <160     Irritable bowel syndrome with constipation     Migraine headache     Not immune to rubella     Major depressive disorder, recurrent episode, moderate (H)     Health Care Home     Health care home, active care coordination     Generalized anxiety disorder     Lump or mass in breast     Menometrorrhagia     Seizure (H)     Left hemiplegia (H)     Cervicalgia     Bilateral thoracic back pain, unspecified chronicity     Other migraine without status migrainosus, not intractable     Morbid obesity (H)     Moderate persistent asthma without complication     Past Surgical History:   Procedure Laterality Date     C APPENDECTOMY       C NONSPECIFIC PROCEDURE      nasal surgery      TONSILLECTOMY & ADENOIDECTOMY      surgery several times for this       Social History     Tobacco Use     Smoking status: Never Smoker     Smokeless tobacco: Never Used   Substance Use Topics     Alcohol  use: No     Alcohol/week: 0.0 standard drinks     Family History   Problem Relation Age of Onset     Gynecology Mother         ectopic pregnancy/endometriosis     Cancer - colorectal Mother      Congenital Anomalies Mother         kidney problems     Colon Cancer Mother      Hyperlipidemia Mother      Other Cancer Mother         Lung, Skin, Brain and Colon cancer     Asthma Mother      Arthritis Mother      Hypertension Mother      Chronic Obstructive Pulmonary Disease Mother      Breast Cancer Mother      Uterine Cancer Mother      Kidney Disease Mother      GERD Mother      Gynecology Sister         ectopic pregnancy/endometriosis     Unknown/Adopted Sister      Ovarian Cancer Sister      Heart Disease Father      Coronary Artery Disease Father      Heart Failure Father      Hyperlipidemia Father      Psychotic Disorder Brother      Unknown/Adopted Brother      Unknown/Adopted Brother      Unknown/Adopted Brother      Unknown/Adopted Brother      Unknown/Adopted Brother      Unknown/Adopted Sister      Cerebrovascular Disease Maternal Grandmother      Hyperlipidemia Maternal Grandmother      GERD Maternal Grandmother      Unknown/Adopted Maternal Grandfather      Unknown/Adopted Paternal Grandmother      Unknown/Adopted Paternal Grandfather              Reviewed and updated as needed this visit by Provider         Review of Systems   As above       Objective    /77   Pulse 82   Temp 97.9  F (36.6  C) (Oral)   Wt 124.3 kg (274 lb)   LMP 12/01/2019   SpO2 100%   BMI 45.60 kg/m    Body mass index is 45.6 kg/m .  Physical Exam  Constitutional:       Appearance: She is obese.   HENT:      Right Ear: Ear canal and external ear normal. Tympanic membrane is scarred.      Left Ear: Tympanic membrane, ear canal and external ear normal.      Mouth/Throat:      Mouth: Mucous membranes are moist.      Pharynx: No oropharyngeal exudate or posterior oropharyngeal erythema.   Cardiovascular:      Rate and Rhythm:  Normal rate and regular rhythm.   Pulmonary:      Effort: Pulmonary effort is normal.      Breath sounds: Normal breath sounds.   Lymphadenopathy:      Cervical: No cervical adenopathy.   Neurological:      Mental Status: She is alert.            Diagnostic Test Results:  Results for orders placed or performed in visit on 02/10/20 (from the past 24 hour(s))   Strep, Rapid Screen   Result Value Ref Range    Specimen Description Throat     Rapid Strep A Screen       NEGATIVE: No Group A streptococcal antigen detected by immunoassay, await culture report.   Influenza A/B antigen   Result Value Ref Range    Influenza A/B Agn Specimen Nasal     Influenza A Negative NEG^Negative    Influenza B Negative NEG^Negative     *Note: Due to a large number of results and/or encounters for the requested time period, some results have not been displayed. A complete set of results can be found in Results Review.           Assessment & Plan       ICD-10-CM    1. Fever, unspecified fever cause R50.9 Strep, Rapid Screen     Influenza A/B antigen     Beta strep group A culture     oseltamivir (TAMIFLU) 75 MG capsule   2. Influenza-like illness R69 oseltamivir (TAMIFLU) 75 MG capsule   3. Pregnant state, incidental Z33.1 oseltamivir (TAMIFLU) 75 MG capsule          Given her son was dx with influenza and she is high risk with pregnancy will start tamiflu.  Continue symptomatic treatment.  return to clinic if not improving.  She had an anaphylactic reaction while taking tylenol containing product and an antibiotics .  She would like to try tylenol again for headache.  She has epi pen and her fiance will be home.  Told her most likely from antibiotics and could try but did inform her of the risk.        Return in about 1 week (around 2/17/2020) for if not improving.    Sheree Villa PA-C  [facility]

## 2020-02-11 LAB
BACTERIA SPEC CULT: NORMAL
SPECIMEN SOURCE: NORMAL

## 2020-02-11 ASSESSMENT — ASTHMA QUESTIONNAIRES: ACT_TOTALSCORE: 21

## 2020-02-25 ENCOUNTER — AMBULATORY - HEALTHEAST (OUTPATIENT)
Dept: MATERNAL FETAL MEDICINE | Facility: HOSPITAL | Age: 35
End: 2020-02-25

## 2020-02-25 DIAGNOSIS — O26.90 PREGNANCY, ANTEPARTUM, COMPLICATIONS: ICD-10-CM

## 2020-02-28 ENCOUNTER — TELEPHONE (OUTPATIENT)
Dept: OBGYN | Facility: CLINIC | Age: 35
End: 2020-02-28

## 2020-02-28 NOTE — TELEPHONE ENCOUNTER
Patient is 11w0d, c/o possible yeast infection. Vaginal area itchy and has cottage cheese discharge.  Pt wondering if there is anything safe to use during pregnancy. Advised using Monistat 7 OTC and if symptoms did not improve, to call Monday to schedule appt to r/o yeast vs BV. Pt stated understanding.   Rekha Jenkins, RN-BSN

## 2020-03-02 ENCOUNTER — AMBULATORY - HEALTHEAST (OUTPATIENT)
Dept: MATERNAL FETAL MEDICINE | Facility: HOSPITAL | Age: 35
End: 2020-03-02

## 2020-03-04 ENCOUNTER — RECORDS - HEALTHEAST (OUTPATIENT)
Dept: ADMINISTRATIVE | Facility: OTHER | Age: 35
End: 2020-03-04

## 2020-03-04 ENCOUNTER — OFFICE VISIT - HEALTHEAST (OUTPATIENT)
Dept: MATERNAL FETAL MEDICINE | Facility: HOSPITAL | Age: 35
End: 2020-03-04

## 2020-03-04 ENCOUNTER — RECORDS - HEALTHEAST (OUTPATIENT)
Dept: ULTRASOUND IMAGING | Facility: HOSPITAL | Age: 35
End: 2020-03-04

## 2020-03-04 DIAGNOSIS — O26.90 PREGNANCY RELATED CONDITIONS, UNSPECIFIED, UNSPECIFIED TRIMESTER: ICD-10-CM

## 2020-03-04 DIAGNOSIS — O26.90 PREGNANCY, ANTEPARTUM, COMPLICATIONS: ICD-10-CM

## 2020-03-04 DIAGNOSIS — O99.210 OBESITY AFFECTING PREGNANCY, ANTEPARTUM: ICD-10-CM

## 2020-03-04 DIAGNOSIS — O09.529: ICD-10-CM

## 2020-03-04 DIAGNOSIS — O09.521 SUPERVISION OF ELDERLY MULTIGRAVIDA IN FIRST TRIMESTER: ICD-10-CM

## 2020-03-11 ENCOUNTER — PRENATAL OFFICE VISIT (OUTPATIENT)
Dept: NURSING | Facility: CLINIC | Age: 35
End: 2020-03-11
Payer: COMMERCIAL

## 2020-03-11 DIAGNOSIS — O09.529 AMA (ADVANCED MATERNAL AGE) MULTIGRAVIDA 35+: Primary | ICD-10-CM

## 2020-03-11 LAB
ABO + RH BLD: NORMAL
ABO + RH BLD: NORMAL
ALBUMIN UR-MCNC: NEGATIVE MG/DL
APPEARANCE UR: CLEAR
BILIRUB UR QL STRIP: NEGATIVE
BLD GP AB SCN SERPL QL: NORMAL
BLOOD BANK CMNT PATIENT-IMP: NORMAL
COLOR UR AUTO: YELLOW
CREAT UR-MCNC: 159 MG/DL
GLUCOSE UR STRIP-MCNC: NEGATIVE MG/DL
HBA1C MFR BLD: 5.2 % (ref 0–5.6)
HGB UR QL STRIP: NEGATIVE
KETONES UR STRIP-MCNC: NEGATIVE MG/DL
LEUKOCYTE ESTERASE UR QL STRIP: NEGATIVE
NITRATE UR QL: NEGATIVE
PH UR STRIP: 6 PH (ref 5–7)
PROT UR-MCNC: 0.11 G/L
PROT/CREAT 24H UR: 0.07 G/G CR (ref 0–0.2)
SOURCE: NORMAL
SP GR UR STRIP: 1.02 (ref 1–1.03)
SPECIMEN EXP DATE BLD: NORMAL
UROBILINOGEN UR STRIP-ACNC: 0.2 EU/DL (ref 0.2–1)

## 2020-03-11 PROCEDURE — 86850 RBC ANTIBODY SCREEN: CPT | Performed by: OBSTETRICS & GYNECOLOGY

## 2020-03-11 PROCEDURE — 87340 HEPATITIS B SURFACE AG IA: CPT | Performed by: OBSTETRICS & GYNECOLOGY

## 2020-03-11 PROCEDURE — 86762 RUBELLA ANTIBODY: CPT | Performed by: OBSTETRICS & GYNECOLOGY

## 2020-03-11 PROCEDURE — 82306 VITAMIN D 25 HYDROXY: CPT | Performed by: OBSTETRICS & GYNECOLOGY

## 2020-03-11 PROCEDURE — 84156 ASSAY OF PROTEIN URINE: CPT | Performed by: OBSTETRICS & GYNECOLOGY

## 2020-03-11 PROCEDURE — 82565 ASSAY OF CREATININE: CPT | Performed by: OBSTETRICS & GYNECOLOGY

## 2020-03-11 PROCEDURE — 99207 ZZC NO CHARGE NURSE ONLY: CPT

## 2020-03-11 PROCEDURE — 87389 HIV-1 AG W/HIV-1&-2 AB AG IA: CPT | Performed by: OBSTETRICS & GYNECOLOGY

## 2020-03-11 PROCEDURE — 36415 COLL VENOUS BLD VENIPUNCTURE: CPT | Performed by: OBSTETRICS & GYNECOLOGY

## 2020-03-11 PROCEDURE — 87086 URINE CULTURE/COLONY COUNT: CPT | Performed by: OBSTETRICS & GYNECOLOGY

## 2020-03-11 PROCEDURE — 86900 BLOOD TYPING SEROLOGIC ABO: CPT | Performed by: OBSTETRICS & GYNECOLOGY

## 2020-03-11 PROCEDURE — 86901 BLOOD TYPING SEROLOGIC RH(D): CPT | Performed by: OBSTETRICS & GYNECOLOGY

## 2020-03-11 PROCEDURE — 84450 TRANSFERASE (AST) (SGOT): CPT | Performed by: OBSTETRICS & GYNECOLOGY

## 2020-03-11 PROCEDURE — 86780 TREPONEMA PALLIDUM: CPT | Performed by: OBSTETRICS & GYNECOLOGY

## 2020-03-11 PROCEDURE — 83036 HEMOGLOBIN GLYCOSYLATED A1C: CPT | Performed by: OBSTETRICS & GYNECOLOGY

## 2020-03-11 PROCEDURE — 81003 URINALYSIS AUTO W/O SCOPE: CPT | Performed by: OBSTETRICS & GYNECOLOGY

## 2020-03-11 PROCEDURE — 84460 ALANINE AMINO (ALT) (SGPT): CPT | Performed by: OBSTETRICS & GYNECOLOGY

## 2020-03-11 RX ORDER — PANTOPRAZOLE SODIUM 40 MG/1
40 TABLET, DELAYED RELEASE ORAL DAILY
Refills: 0 | COMMUNITY
Start: 2019-04-17 | End: 2020-04-02

## 2020-03-11 RX ORDER — MECLIZINE HYDROCHLORIDE 25 MG/1
TABLET ORAL
COMMUNITY
Start: 2020-01-22 | End: 2020-06-04

## 2020-03-11 RX ORDER — BUSPIRONE HYDROCHLORIDE 15 MG/1
TABLET ORAL
COMMUNITY
Start: 2019-08-17 | End: 2020-04-02

## 2020-03-11 RX ORDER — ONDANSETRON 4 MG/1
TABLET, FILM COATED ORAL
COMMUNITY
Start: 2019-10-01 | End: 2020-04-02

## 2020-03-11 SDOH — SOCIAL STABILITY: SOCIAL INSECURITY: WITHIN THE LAST YEAR, HAVE YOU BEEN HUMILIATED OR EMOTIONALLY ABUSED IN OTHER WAYS BY YOUR PARTNER OR EX-PARTNER?: NO

## 2020-03-11 SDOH — SOCIAL STABILITY: SOCIAL INSECURITY
WITHIN THE LAST YEAR, HAVE TO BEEN RAPED OR FORCED TO HAVE ANY KIND OF SEXUAL ACTIVITY BY YOUR PARTNER OR EX-PARTNER?: NO

## 2020-03-11 SDOH — SOCIAL STABILITY: SOCIAL INSECURITY
WITHIN THE LAST YEAR, HAVE YOU BEEN KICKED, HIT, SLAPPED, OR OTHERWISE PHYSICALLY HURT BY YOUR PARTNER OR EX-PARTNER?: NO

## 2020-03-11 SDOH — SOCIAL STABILITY: SOCIAL INSECURITY: WITHIN THE LAST YEAR, HAVE YOU BEEN AFRAID OF YOUR PARTNER OR EX-PARTNER?: NO

## 2020-03-11 SDOH — HEALTH STABILITY: MENTAL HEALTH
STRESS IS WHEN SOMEONE FEELS TENSE, NERVOUS, ANXIOUS, OR CAN'T SLEEP AT NIGHT BECAUSE THEIR MIND IS TROUBLED. HOW STRESSED ARE YOU?: NOT AT ALL

## 2020-03-11 NOTE — PROGRESS NOTES
Important Information for Provider:     Patient presents for new ob teaching and labs, third pregnancy. Patient was seen at [facility] in [place] 3/04/2020 [place] 9/16/2020. Handouts reviewed and given. History of preeclampsia, labs drawn today with NOB labs( A1C, Vitamin D). Has NOB with Dr Talavera 3/16/2020 . Discussed diet and exercise. Patient discontinued her depression medication, having a difficult time. Will address with Dr Talavera 3/06/2020        Prenatal OB Questionnaire  Patient supplied answers from flow sheet for:  Prenatal OB Questionnaire.  Past Medical History  Diabetes?: No  Hypertension : history of preeclampsia  Heart disease, mitral valve prolapse or rheumatic fever?: No  An autoimmune disease such as lupus or rheumatoid arthritis?: No  Kidney disease or urinary tract infection?: No  Epilepsy, seizures or spells?: No  Migraine headaches?: No  A stroke or loss of function or sensation?: No  Any other neurological problems?: No  Have you ever been treated for depression?: Yes  Are you having problems with crying spells or loss of self-esteem?: No  Have you ever required psychiatric care?: No  Have you ever had hepatitis, liver disease or jaundice?: fatty liver  Have you been treated for blood clots in your veins, deep vein thrombosis, inflammation in the veins, thrombosis, phlebitis, pulmonary embolism or varicosities?: No  Have you had excessive bleeding after surgery or dental work?: No  Do you bleed more than other women after a cut or scratch?: No  Do you have a history of anemia?: No  Have you ever had thyroid problems or taken thyroid medication?: No   Do you have any endocrine problems?: No  Have you ever been in a major accident or suffered serious trauma?: No  Within the last year, has anyone hit, slapped, kicked or otherwise hurt you?: No  In the last year, has anyone forced you to have sex when you didn't want to?: No    Past Medical History 2   Have you ever received a blood  transfusion?: No  Would you refuse a blood transfusion if a doctor judged it to be medically necessary?: No   If you answered Yes, would you rather die than receive a blood transfusion?: No  If you answered Yes, is this for Taoism reasons?: No  Does anyone in your home smoke?: No  Do you use tobacco products?: No  Do you drink beer, wine or hard liquor?: No  Do you use any of the following: marijuana, speed, cocaine, heroin, hallucinogens or other drugs?: No   Is your blood type Rh negative?: No  Have you ever had abnormal antibodies in your blood?: No  Have you ever had asthma?: (!) Yes  Have you ever had tuberculosis?: No    Allergies: Dust Mites, Aspartame, Ethanol, Venlafaxine, Hydrochloride, Sertraline: (!) Yes  Have you had any breast problems?:No   Do you have any allergies to drugs or over-the-counter medications?: (!) Yes    Have you ever ?: (!) Yes  Have you had any gynecological surgical procedures such as cervical conization, a LEEP procedure, laser treatment, cryosurgery of the cervix or a dilation and curettage, etc?: No  Have you ever had any other surgical procedures?: (!) Yes  Have you been hospitalized for a nonsurgical reason excluding normal delivery?: No  Have you ever had any anesthetic complications?: No  Have you ever had an abnormal pap smear?: No    Past Medical History (Continued)  Do you have a history of abnormalities of the uterus?: No  Did your mother take MADIE or any other hormones when she was pregnant with you?: No  Did it take you more than a year to become pregnant?: No  Have you ever been evaluated or treated for infertility?: No  Is there a history of medical problems in your family, which you feel may be important to this pregnancy?: No  Do you have any other problems we have not asked about which you feel may be important to this pregnancy?: No    Symptoms since last menstrual period  Do you have any of the following symptoms: abdominal pain, blood in stools or  urine, chest pain, shortness of breath, coughing or vomiting up blood, your heart racing or skipping beats, nausea and vomiting, pain on urination or vaginal discharge or bleed: (!) Yes  Will the patient be 35 years old or older at the time of delivery?: (!) Yes    Has the patient, baby's father or anyone in either family had:  Thalassemia (Italian, Greek, Mediterranean or  background only) and an MCV result less than 80?: No  Neural tube defect such as meningomyelocele, spina bifida or anencephaly?: No  Congenital heart defect?: No  Down's Syndrome?: No  Tejas-Sachs disease (Yarsani, Cajun, Georgian-[place])?: No  Sickle cell disease or trait ()?: No  Hemophilia or other inherited problems of blood?: No  Muscular dystrophy?: No  Cystic fibrosis?: No  Paterson's chorea?: No  Mental retardation/autism?: No  If yes, was the person tested for fragile X?: No  Any other inherited genetic or chromosomal disorder?: No  Maternal metabolic disorder (e.g Insulin-dependent diabetes, PKU)?: No  A child with birth defects not listed above?: No  Recurrent pregnancy loss or stillbirth?: No   Has the patient had any medications/street drugs/alcohol since her last menstrual period?: No  Does the patient or baby's father have any other genetic risks?: No    Infection History   Do you object to being tested for Hepatitis B?: No  Do you object to being tested for HIV?: No   Do you feel that you are at high risk for coming in contact with the AIDS virus?: No  Have you ever been treated for tuberculosis?: No  Have you ever had a positive skin test for tuberculosis?: No  Do you live with someone who has tuberculosis?: No  Have you ever been exposed to tuberculosis?: No  Do you have genital herpes?: No  Does your partner have genital herpes?: No  Have you ever had gonorrhea, chlamydia, syphilis, venereal warts, trichomoniasis, pelvic inflammatory disease or any other sexually transmitted disease?: 2016 lillie  Do you know if  you are a genital group B streptococcus carrier?: No  Have you had chicken pox/varicella?: No   Have you been vaccinated against chicken Pox?: (!) Yes  Have you had any other infectious diseases?: No      Allergies as of 3/11/2020:    Allergies as of 03/11/2020 - Reviewed 03/11/2020   Allergen Reaction Noted     Acetaminophen Anaphylaxis 12/12/2014     Levofloxacin Anaphylaxis 11/05/2014     Hydrocodone Hives and Rash 12/03/2004     Hydrocodone-acetaminophen Rash 05/23/2006       Current medications are:  Current Outpatient Medications   Medication Sig Dispense Refill     albuterol (PROAIR HFA/PROVENTIL HFA/VENTOLIN HFA) 108 (90 Base) MCG/ACT inhaler Inhale 2 puffs into the lungs every 4 hours as needed for shortness of breath / dyspnea or wheezing (Patient not taking: Reported on 2/10/2020) 1 Inhaler 2     busPIRone (BUSPAR) 15 MG tablet        cetirizine (ZYRTEC) 10 MG tablet Take 2 tablets (20 mg) by mouth 2 times daily (Patient not taking: Reported on 1/24/2020) 120 tablet 11     fexofenadine-pseudoePHEDrine (ALLEGRA-D 24) 180-240 MG per 24 hr tablet Take 1 tablet by mouth daily       fluticasone (FLONASE) 50 MCG/ACT spray Spray 1-2 sprays into both nostrils daily as needed for rhinitis or allergies       gabapentin (NEURONTIN) 100 MG capsule TAKE 1 CAPSULE BY ORAL ROUTE 3 TIMES PER DAY  5     hydrOXYzine (ATARAX) 25 MG tablet TAKE 1-2 TABLETS (25-50 MG) BY MOUTH EVERY 6 HOURS AS NEEDED FOR ANXIETY (Patient not taking: Reported on 1/24/2020) 60 tablet 5     levalbuterol (XOPENEX CONC) 1.25 MG/0.5ML NEBU Take 1.25 mg by nebulization every 6 hours as needed for wheezing       meclizine (ANTIVERT) 25 MG tablet        mometasone-formoterol (DULERA) 200-5 MCG/ACT inhaler Inhale 2 puffs into the lungs 2 times daily (Patient not taking: Reported on 1/24/2020) 1 Inhaler 1     montelukast (SINGULAIR) 10 MG tablet Take 1 tablet (10 mg) by mouth At Bedtime (Patient not taking: Reported on 1/24/2020) 90 tablet 1      ondansetron (ZOFRAN) 4 MG tablet        pantoprazole (PROTONIX) 40 MG EC tablet Take 40 mg by mouth daily  0     sertraline (ZOLOFT) 100 MG tablet TAKE 2 TABLETS DAILY (Patient not taking: Reported on 1/24/2020) 180 tablet 1     tiotropium (SPIRIVA RESPIMAT) 2.5 MCG/ACT inhaler Inhale 2 puffs into the lungs daily Needs appointment with Dr. Cruz for further refills (Patient not taking: Reported on 1/24/2020) 4 g 0     traZODone (DESYREL) 50 MG tablet 2 to 3 at bedtime for insomnia (Patient not taking: Reported on 1/24/2020) 120 tablet 2     vitamin D3 (CHOLECALCIFEROL) 2000 units (50 mcg) tablet Take 1 tablet (2,000 Units) by mouth daily 100 tablet 3         Early ultrasound screening tool:    Does patient have irregular periods?  No  Did patient use hormonal birth control in the three months prior to positive urine pregnancy test? No  Is the patient breastfeeding?  No  Is the patient 10 weeks or greater at time of education visit?  Yes

## 2020-03-12 ENCOUNTER — COMMUNICATION - HEALTHEAST (OUTPATIENT)
Dept: MATERNAL FETAL MEDICINE | Facility: HOSPITAL | Age: 35
End: 2020-03-12

## 2020-03-12 VITALS
HEART RATE: 80 BPM | HEIGHT: 65 IN | WEIGHT: 273 LBS | DIASTOLIC BLOOD PRESSURE: 79 MMHG | BODY MASS INDEX: 45.48 KG/M2 | SYSTOLIC BLOOD PRESSURE: 127 MMHG | TEMPERATURE: 98 F

## 2020-03-12 DIAGNOSIS — J45.40 ASTHMA, MODERATE PERSISTENT, POORLY-CONTROLLED: ICD-10-CM

## 2020-03-12 LAB
ALT SERPL W P-5'-P-CCNC: 39 U/L (ref 0–50)
AST SERPL W P-5'-P-CCNC: 16 U/L (ref 0–45)
BACTERIA SPEC CULT: NORMAL
CREAT SERPL-MCNC: 0.51 MG/DL (ref 0.52–1.04)
DEPRECATED CALCIDIOL+CALCIFEROL SERPL-MC: 24 UG/L (ref 20–75)
GFR SERPL CREATININE-BSD FRML MDRD: >90 ML/MIN/{1.73_M2}
HBV SURFACE AG SERPL QL IA: NONREACTIVE
HIV 1+2 AB+HIV1 P24 AG SERPL QL IA: NONREACTIVE
RUBV IGG SERPL IA-ACNC: 8 IU/ML
SPECIMEN SOURCE: NORMAL
T PALLIDUM AB SER QL: NONREACTIVE
TRISOMY 21,18,13 - HE HISTORICAL: NORMAL

## 2020-03-12 ASSESSMENT — MIFFLIN-ST. JEOR: SCORE: 1924.2

## 2020-03-12 NOTE — TELEPHONE ENCOUNTER
"Requested Prescriptions   Pending Prescriptions Disp Refills     DULERA 200-5 MCG/ACT inhaler [Pharmacy Med Name: DULERA 200 MCG/5 MCG INHALER]  Last Written Prescription Date:  12/3/2018  Last Fill Quantity: 1 inhaler,  # refills: 1   Last office visit: 1/14/2019 with prescribing provider:  YENI Cruz   Future Office Visit:   Next 5 appointments (look out 90 days)    Mar 16, 2020  4:00 PM CDT  New Prenatal with Carmelita Talavera MD  [facility] ([address] [address] [phone]        13 Inhaler 2     Sig: TAKE 2 PUFFS BY MOUTH TWICE A DAY       Long-Acting Beta Agonist Inhalers Protocol  Failed - 3/12/2020 11:31 AM        Failed - Order for Serevent, Striverdi, or Foradil and pt has steroid inhaler        Passed - Patient is age 12 or older        Passed - Asthma control assessment score within normal limits in last 6 months     Please review ACT score.   ACT Total Scores 1/14/2019 8/30/2019 2/10/2020   ACT TOTAL SCORE - - -   ASTHMA ER VISITS - - -   ASTHMA HOSPITALIZATIONS - - -   ACT TOTAL SCORE (Goal Greater than or Equal to 20) 14 17 21   In the past 12 months, how many times did you visit the emergency room for your asthma without being admitted to the hospital? 0 0 0   In the past 12 months, how many times were you hospitalized overnight because of your asthma? 0 0 0             Passed - Medication is active on med list        Passed - Recent (6 mo) or future (30 days) visit within the authorizing provider's specialty     Patient had office visit in the last 6 months or has a visit in the next 30 days with authorizing provider or within the authorizing provider's specialty.  See \"Patient Info\" tab in inbasket, or \"Choose Columns\" in Meds & Orders section of the refill encounter.           Inhaled Steroids Protocol Passed - 3/12/2020 11:31 AM        Passed - Patient is age 12 or older        Passed - Asthma control assessment " "score within normal limits in last 6 months     Please review ACT score.           Passed - Medication is active on med list        Passed - Recent (6 mo) or future (30 days) visit within the authorizing provider's specialty     Patient had office visit in the last 6 months or has a visit in the next 30 days with authorizing provider or within the authorizing provider's specialty.  See \"Patient Info\" tab in inbasket, or \"Choose Columns\" in Meds & Orders section of the refill encounter.                       VENTOLIN  (90 Base) MCG/ACT inhaler [Pharmacy Med Name: VENTOLIN HFA 90 MCG INHALER]  Last Written Prescription Date:  12/3/2018  Last Fill Quantity: 1 inhaler,  # refills: 2   Last office visit: 1/14/2019 with prescribing provider:  YENI Cruz   Future Office Visit:   Next 5 appointments (look out 90 days)    Mar 16, 2020  4:00 PM CDT  New Prenatal with Carmelita Talavera MD  [facility] [place] [address] [phone]        18 Inhaler 2     Sig: INHALE 2 PUFFS INTO THE LUNGS EVERY 4 HOURS AS NEEDED FOR SHORTNESS OF BREATH / DYSPNEA OR WHEEZING       Asthma Maintenance Inhalers - Anticholinergics Passed - 3/12/2020 11:31 AM        Passed - Patient is age 12 years or older        Passed - Asthma control assessment score within normal limits in last 6 months     Please review ACT score.   ACT Total Scores 1/14/2019 8/30/2019 2/10/2020   ACT TOTAL SCORE - - -   ASTHMA ER VISITS - - -   ASTHMA HOSPITALIZATIONS - - -   ACT TOTAL SCORE (Goal Greater than or Equal to 20) 14 17 21   In the past 12 months, how many times did you visit the emergency room for your asthma without being admitted to the hospital? 0 0 0   In the past 12 months, how many times were you hospitalized overnight because of your asthma? 0 0 0             Passed - Medication is active on med list        Passed - Recent (6 mo) or future (30 days) visit within " "the authorizing provider's specialty     Patient had office visit in the last 6 months or has a visit in the next 30 days with authorizing provider or within the authorizing provider's specialty.  See \"Patient Info\" tab in inbasket, or \"Choose Columns\" in Meds & Orders section of the refill encounter.           Short-Acting Beta Agonist Inhalers Protocol  Passed - 3/12/2020 11:31 AM        Passed - Patient is age 12 or older        Passed - Asthma control assessment score within normal limits in last 6 months     Please review ACT score.           Passed - Medication is active on med list        Passed - Recent (6 mo) or future (30 days) visit within the authorizing provider's specialty     Patient had office visit in the last 6 months or has a visit in the next 30 days with authorizing provider or within the authorizing provider's specialty.  See \"Patient Info\" tab in inbasket, or \"Choose Columns\" in Meds & Orders section of the refill encounter.               "

## 2020-03-13 ENCOUNTER — TELEPHONE (OUTPATIENT)
Dept: OBGYN | Facility: CLINIC | Age: 35
End: 2020-03-13

## 2020-03-13 NOTE — TELEPHONE ENCOUNTER
Patient calling regarding lab results. Please review and advise. Pt concerned about her kidneys. Discussed with patient her labs looked normal to me but would have Dr Talavera review and advise. Thanks.   Rekha Jenkins RN-BSN

## 2020-03-13 NOTE — TELEPHONE ENCOUNTER
Per RR, her kidneys are fine and everything is okay. Will go over labs at her upcoming appt. Will send to [place] to call patient back with this information.  Carol Simms RN

## 2020-03-16 ENCOUNTER — PRENATAL OFFICE VISIT (OUTPATIENT)
Dept: OBGYN | Facility: CLINIC | Age: 35
End: 2020-03-16
Payer: COMMERCIAL

## 2020-03-16 VITALS
HEIGHT: 65 IN | OXYGEN SATURATION: 98 % | WEIGHT: 272.3 LBS | SYSTOLIC BLOOD PRESSURE: 127 MMHG | HEART RATE: 84 BPM | DIASTOLIC BLOOD PRESSURE: 75 MMHG | TEMPERATURE: 98.5 F | BODY MASS INDEX: 45.37 KG/M2

## 2020-03-16 DIAGNOSIS — O09.529 HIGH-RISK PREGNANCY, ELDERLY MULTIGRAVIDA, UNSPECIFIED TRIMESTER: Primary | ICD-10-CM

## 2020-03-16 DIAGNOSIS — Z11.3 SCREEN FOR STD (SEXUALLY TRANSMITTED DISEASE): ICD-10-CM

## 2020-03-16 DIAGNOSIS — E55.9 VITAMIN D DEFICIENCY: ICD-10-CM

## 2020-03-16 DIAGNOSIS — R56.9 SEIZURE (H): ICD-10-CM

## 2020-03-16 DIAGNOSIS — F33.1 MAJOR DEPRESSIVE DISORDER, RECURRENT EPISODE, MODERATE (H): ICD-10-CM

## 2020-03-16 DIAGNOSIS — B96.89 BV (BACTERIAL VAGINOSIS): ICD-10-CM

## 2020-03-16 DIAGNOSIS — Z23 NEED FOR PROPHYLACTIC VACCINATION AND INOCULATION AGAINST INFLUENZA: ICD-10-CM

## 2020-03-16 DIAGNOSIS — N76.0 BV (BACTERIAL VAGINOSIS): ICD-10-CM

## 2020-03-16 PROBLEM — G81.94 LEFT HEMIPLEGIA (H): Status: RESOLVED | Noted: 2017-05-11 | Resolved: 2020-03-16

## 2020-03-16 LAB
SPECIMEN SOURCE: ABNORMAL
WET PREP SPEC: ABNORMAL

## 2020-03-16 PROCEDURE — 90471 IMMUNIZATION ADMIN: CPT | Performed by: OBSTETRICS & GYNECOLOGY

## 2020-03-16 PROCEDURE — 99207 ZZC FIRST OB VISIT: CPT | Performed by: OBSTETRICS & GYNECOLOGY

## 2020-03-16 PROCEDURE — 87591 N.GONORRHOEAE DNA AMP PROB: CPT | Performed by: OBSTETRICS & GYNECOLOGY

## 2020-03-16 PROCEDURE — 87491 CHLMYD TRACH DNA AMP PROBE: CPT | Performed by: OBSTETRICS & GYNECOLOGY

## 2020-03-16 PROCEDURE — 90686 IIV4 VACC NO PRSV 0.5 ML IM: CPT | Performed by: OBSTETRICS & GYNECOLOGY

## 2020-03-16 PROCEDURE — 87210 SMEAR WET MOUNT SALINE/INK: CPT | Performed by: OBSTETRICS & GYNECOLOGY

## 2020-03-16 RX ORDER — METRONIDAZOLE 7.5 MG/G
1 GEL VAGINAL AT BEDTIME
Qty: 35 G | Refills: 0 | Status: SHIPPED | OUTPATIENT
Start: 2020-03-16 | End: 2020-04-02

## 2020-03-16 ASSESSMENT — PATIENT HEALTH QUESTIONNAIRE - PHQ9
SUM OF ALL RESPONSES TO PHQ QUESTIONS 1-9: 13
5. POOR APPETITE OR OVEREATING: MORE THAN HALF THE DAYS

## 2020-03-16 ASSESSMENT — ANXIETY QUESTIONNAIRES
7. FEELING AFRAID AS IF SOMETHING AWFUL MIGHT HAPPEN: MORE THAN HALF THE DAYS
IF YOU CHECKED OFF ANY PROBLEMS ON THIS QUESTIONNAIRE, HOW DIFFICULT HAVE THESE PROBLEMS MADE IT FOR YOU TO DO YOUR WORK, TAKE CARE OF THINGS AT HOME, OR GET ALONG WITH OTHER PEOPLE: SOMEWHAT DIFFICULT
2. NOT BEING ABLE TO STOP OR CONTROL WORRYING: MORE THAN HALF THE DAYS
GAD7 TOTAL SCORE: 13
1. FEELING NERVOUS, ANXIOUS, OR ON EDGE: MORE THAN HALF THE DAYS
5. BEING SO RESTLESS THAT IT IS HARD TO SIT STILL: SEVERAL DAYS
3. WORRYING TOO MUCH ABOUT DIFFERENT THINGS: MORE THAN HALF THE DAYS
6. BECOMING EASILY ANNOYED OR IRRITABLE: MORE THAN HALF THE DAYS

## 2020-03-16 ASSESSMENT — MIFFLIN-ST. JEOR: SCORE: 1921.02

## 2020-03-16 NOTE — PROGRESS NOTES
"Chief Complaint   Patient presents with     Prenatal Care     13+5.       Initial /75 (BP Location: Left arm, Patient Position: Sitting, Cuff Size: Adult Large)   Pulse 84   Temp 98.5  F (36.9  C) (Oral)   Ht 1.651 m (5' 5\")   Wt 123.5 kg (272 lb 4.8 oz)   LMP 2019   SpO2 98%   BMI 45.31 kg/m   Estimated body mass index is 45.31 kg/m  as calculated from the following:    Height as of this encounter: 1.651 m (5' 5\").    Weight as of this encounter: 123.5 kg (272 lb 4.8 oz).  BP completed using cuff size: large    Questioned patient about current smoking habits.  Pt. has never smoked.          The following HM Due: NONE      The following patient reported/Care Every where data was sent to:  P ABSTRACT QUALITY INITIATIVES [70156]  n/a      n/a and patient has appointment for today     Clinic Administered Medication Documentation      Injectable Medication Documentation    Patient was given flu. Prior to medication administration, verified patients identity using patient s name and date of birth. Please see MAR and medication order for additional information. Patient instructed to remain in clinic for 15 minutes.      Was entire vial of medication used? Yes  Vial/Syringe: Syringe  Expiration Date:  2020  Was this medication supplied by the patient? No       SUBJECTIVE:   Mary Shipman is a  35 year old female  @ 13w5d by 6 wk US  who presents for a new Ob visit.  Estimated Date of Delivery: Sep 18, 2020   Pregnancy was not intended but she is happy, also overwhelmed d/t difficult social situation and unstable finances.      History since LMP:  Nausea, fatigue and emotionally labile  H/o seizures in the recent past but none in the past year or so.  Currently not on antidepressants by choice, stopped her meds.     POBHx:  OB History    Para Term  AB Living   3 1 1 0 1 1   SAB TAB Ectopic Multiple Live Births   0 1 0 0 1      # Outcome Date GA Lbr Frank/2nd Weight Sex " "Delivery Anes PTL Lv   3 Current            2 TAB 2017     TAB      1 Term 12/20/12 37w2d 03:50 / 00:48 3.11 kg (6 lb 13.7 oz) M Vag-Spont EPI N MELISSA      Birth Comments: Mother induced for pre-ecclamsia, required manual cervical dilation, hemorrhage during labor.  Juma had hypoglycemia at birth.      Name: MEGAN ERICKSON      Apgar1: 8  Apgar5: 9          Patient Active Problem List   Diagnosis     Esophageal reflux     Allergic rhinitis due to other allergen     Polycystic ovaries     Thyrotoxicosis     Urticaria     Obesity     Low back pain     Intermittent asthma     HYPERLIPIDEMIA LDL GOAL <160     Irritable bowel syndrome with constipation     Migraine headache     Not immune to rubella     Major depressive disorder, recurrent episode, moderate (H)     Health Care Home     Health care home, active care coordination     Generalized anxiety disorder     Lump or mass in breast     Menometrorrhagia     Seizure (H)     Cervicalgia     Bilateral thoracic back pain, unspecified chronicity     Other migraine without status migrainosus, not intractable     Morbid obesity (H)     Moderate persistent asthma without complication       Past Medical History:   Diagnosis Date     Allergic rhinitis due to other allergen      Antepartum mild preeclampsia 11/23/2012     Asthma      Depressive disorder      Esophageal reflux      Frequent UTI     had a kidney infection also in the past     Gestational hypertension 11/20/2012     Helicobacter pylori (H. pylori)     treated     Hyperlipidemia      Irritable bowel syndrome      Liver disease     \"fatty liver\" resolved on own.     Lump or mass in breast 11/30/2015     Lump or mass in breast      Lump or mass in breast      Mild preeclampsia 12/18/2012     Obesity      Polycystic ovaries      Thyrotoxicosis 5/9/2007       Past Surgical History:   Procedure Laterality Date     C APPENDECTOMY       C NONSPECIFIC PROCEDURE      nasal surgery      TONSILLECTOMY & ADENOIDECTOMY      " "surgery several times for this        Current Outpatient Medications   Medication     fexofenadine-pseudoePHEDrine (ALLEGRA-D 24) 180-240 MG per 24 hr tablet     metroNIDAZOLE (METROGEL-VAGINAL) 0.75 % vaginal gel     Prenatal Vit-Fe Fumarate-FA (PRENATAL VITAMIN PO)     sertraline (ZOLOFT) 50 MG tablet     albuterol (PROAIR HFA/PROVENTIL HFA/VENTOLIN HFA) 108 (90 Base) MCG/ACT inhaler     amoxicillin (AMOXIL) 500 MG capsule     busPIRone (BUSPAR) 15 MG tablet     cetirizine (ZYRTEC) 10 MG tablet     fluticasone (FLONASE) 50 MCG/ACT nasal spray     gabapentin (NEURONTIN) 100 MG capsule     hydrOXYzine (ATARAX) 25 MG tablet     levalbuterol (XOPENEX CONC) 1.25 MG/0.5ML NEBU     meclizine (ANTIVERT) 25 MG tablet     mometasone-formoterol (DULERA) 200-5 MCG/ACT inhaler     montelukast (SINGULAIR) 10 MG tablet     ondansetron (ZOFRAN) 4 MG tablet     pantoprazole (PROTONIX) 40 MG EC tablet     sertraline (ZOLOFT) 100 MG tablet     tiotropium (SPIRIVA RESPIMAT) 2.5 MCG/ACT inhaler     traZODone (DESYREL) 50 MG tablet     vitamin D3 (CHOLECALCIFEROL) 2000 units (50 mcg) tablet     No current facility-administered medications for this visit.        Allergies   Allergen Reactions     Acetaminophen Anaphylaxis     Uncertain - hives/anaphylaxis     Levofloxacin Anaphylaxis     Hydrocodone Hives and Rash     Vicodin     Hydrocodone-Acetaminophen Rash     Feels like throat swelling, was given after Tonsillectomy         EXAM:  /75 (BP Location: Left arm, Patient Position: Sitting, Cuff Size: Adult Large)   Pulse 84   Temp 98.5  F (36.9  C) (Oral)   Ht 1.651 m (5' 5\")   Wt 123.5 kg (272 lb 4.8 oz)   LMP 12/01/2019   SpO2 98%   BMI 45.31 kg/m      GENERAL: WDWN WF in NAD  HEENT: no abnormalities  NECK: without thyromegaly or adenopathy  BACK: without CVAT or paraspinal tenderness  CHEST: clear to auscultation  CV: RRR without murmur  BREASTS: no palpable masses or adenopathy, no asymmetry or skin dimpling  ABDOMEN: " S, NT, no palpable masses or hepatosplenomegaly  MUSCULOSKELETAL: no obvious abnormalities.  NEUROLOGICAL: normal strength, sensation, mental status  PSYCH: normal affect, appropriate  PELVIC: EG - normal adult female.  BUS - within normal limits.  Vagina - well rugated, + discharge.  Cervix - no lesions, no CMT.  Uterus - hard to palpate size and nontender.  Adnexae - no masses or tenderness.  RV - deferred.    FHT's present with doptone    PHQ 8/22/2018 3/18/2019 3/16/2020   PHQ-9 Total Score 9 21 13   Q9: Thoughts of better off dead/self-harm past 2 weeks Not at all Not at all Not at all     CRUZ-7 SCORE 8/22/2018 3/18/2019 3/16/2020   Total Score - - -   Total Score 7 21 13             ASSESSMENT/ PLAN:  IUP @ 13w5d by 6 wk US with Estimated Date of Delivery: Sep 18, 2020     Encounter Diagnoses   Name Primary?     High-risk pregnancy, elderly multigravida, unspecified trimester Yes     Need for prophylactic vaccination and inoculation against influenza      Screen for STD (sexually transmitted disease)      Major depressive disorder, recurrent episode, moderate (H)      Seizure (H)      BV (bacterial vaginosis)      Vitamin D deficiency         Discussed routine prenatal care and first trimester screen testing.    She has already done  the first trimester screen.      Patient was counselled on healthy lifestyle habits and all questions answered.    Orders Placed This Encounter   Procedures     INFLUENZA VACCINE IM > 6 MONTHS VALENT IIV4 [06010]     Vaccine Administration, Initial [35945]       rx given for vitamin D, metrogel and zoloft.   Flu shot today.  discussed healthy lifestyle, management of depression, nutrition and weight during pregnancy.   New Ob labs reviewed today.    Return to Clinic in 4 weeks or sooner if problems arise.    Carmelita Talavera MD

## 2020-03-17 ENCOUNTER — E-VISIT (OUTPATIENT)
Dept: FAMILY MEDICINE | Facility: CLINIC | Age: 35
End: 2020-03-17
Payer: COMMERCIAL

## 2020-03-17 DIAGNOSIS — J34.89 SINUS PRESSURE: Primary | ICD-10-CM

## 2020-03-17 PROCEDURE — 99421 OL DIG E/M SVC 5-10 MIN: CPT | Performed by: PHYSICIAN ASSISTANT

## 2020-03-17 RX ORDER — ALBUTEROL SULFATE 90 UG/1
2 AEROSOL, METERED RESPIRATORY (INHALATION) EVERY 4 HOURS PRN
Qty: 1 INHALER | Refills: 1 | Status: SHIPPED | OUTPATIENT
Start: 2020-03-17 | End: 2020-04-02

## 2020-03-17 RX ORDER — AMOXICILLIN 500 MG/1
1000 CAPSULE ORAL 2 TIMES DAILY
Qty: 40 CAPSULE | Refills: 0 | Status: SHIPPED | OUTPATIENT
Start: 2020-03-17 | End: 2020-04-02

## 2020-03-17 RX ORDER — MOMETASONE FUROATE AND FORMOTEROL FUMARATE DIHYDRATE 200; 5 UG/1; UG/1
2 AEROSOL RESPIRATORY (INHALATION) 2 TIMES DAILY
Qty: 1 INHALER | Refills: 0 | Status: SHIPPED | OUTPATIENT
Start: 2020-03-17 | End: 2021-03-16

## 2020-03-17 RX ORDER — FLUTICASONE PROPIONATE 50 MCG
1-2 SPRAY, SUSPENSION (ML) NASAL DAILY PRN
Qty: 16 G | Refills: 2 | Status: SHIPPED | OUTPATIENT
Start: 2020-03-17 | End: 2021-12-13

## 2020-03-17 ASSESSMENT — ANXIETY QUESTIONNAIRES: GAD7 TOTAL SCORE: 13

## 2020-03-17 NOTE — TELEPHONE ENCOUNTER
Rx filled for 30 day supply of Dulera. Patient will need to have a follow-up visit for additional refills.

## 2020-03-17 NOTE — TELEPHONE ENCOUNTER
Patient notified of need for follow up prior to additional refills being prescribed. Advised that there was a 30 day supply available at her pharmacy at this time.  Stefani Dominguez MA

## 2020-03-17 NOTE — PATIENT INSTRUCTIONS
Allergic Rhinitis  Allergic rhinitis is an allergic reaction that affects the nose, and often the eyes. It s often known as nasal allergies. Nasal allergies are often due to things in the environment that are breathed in. Depending what you are sensitive to, nasal allergies may occur only during certain seasons. Or they may occur year round. Common indoor allergens include house dust mites, mold, cockroaches, and pet dander. Outdoor allergens include pollen from trees, grasses, and weeds.   Symptoms include a drippy, stuffy, and itchy nose. They also include sneezing and red and itchy eyes. You may feel tired more often. Severe allergies may also affect your breathing and trigger a condition called asthma.   Tests can be done to see what allergens are affecting you. You may be referred to an allergy specialist for testing and further evaluation.  Home care  Your healthcare provider may prescribe medicines to help relieve allergy symptoms. These may include oral medicines, nasal sprays, or eye drops.  Ask your provider for advice on how to avoid substances that you are allergic to. Below are a few tips for each type of allergen.  Pet dander:    Do not have pets with fur and feathers.    If you can't avoid having a pet, keep it out of your bedroom and off upholstered furniture.  Pollen:    When pollen counts are high, keep windows of your car and home closed. If possible, use an air conditioner instead.    Wear a filter mask when mowing or doing yard work.  House dust mites:    Wash bedding every week in warm water and detergent and dry on a hot setting.    Cover the mattress, box spring, and pillows with allergy covers.     If possible, sleep in a room with no carpet, curtains, or upholstered furniture.  Cockroaches:    Store food in sealed containers.    Remove garbage from the home promptly.    Fix water leaks  Mold:    Keep humidity low by using a dehumidifier or air conditioner. Keep the dehumidifier and air  conditioner clean and free of mold.    Clean moldy areas with bleach and water.  In general:    Vacuum once or twice a week. If possible, use a vacuum with a high-efficiency particulate air (HEPA) filter.    Do not smoke. Avoid cigarette smoke. Cigarette smoke is an irritant that can make symptoms worse.  Follow-up care  Follow up as advised by the healthcare provider or our staff. If you were referred to an allergy specialist, make this appointment promptly.  When to seek medical advice  Call your healthcare provider right away if the following occur:    Coughing or wheezing    Fever of 100.4 F (38 C) or higher, or as directed by your healthcare provider    Raised red bumps (hives)    Continuing symptoms, new symptoms, or worsening symptoms  Call 911 if you have:    Trouble breathing    Severe swelling of the face or severe itching of the eyes or mouth  Date Last Reviewed: 3/1/2017    9844-0872 The "[employer]. [address] [place] [id number]. All rights reserved. This information is not intended as a substitute for professional medical care. Always follow your healthcare professional's instructions.

## 2020-03-17 NOTE — TELEPHONE ENCOUNTER
Patient reported not taking at last allergy visit.  Appears Allergy has been prescribing.  Will forward to them.  RAQUEL Frank, PAMeliC

## 2020-03-18 ENCOUNTER — TELEPHONE (OUTPATIENT)
Dept: OBGYN | Facility: CLINIC | Age: 35
End: 2020-03-18

## 2020-03-18 NOTE — TELEPHONE ENCOUNTER
Patient is 14w0d, c/o allergy symptoms. Yesterday took first dose of Zoloft at 5pm. Had been off of it for 2 months, prior to that had been on it for 8 years. After taking it, fell asleep pretty easy and no problem. Woke up couple of hours later and the entire room was spinning around her and started puking. Still feeling dizzy this morning, no appetite, nauseous. Wondering if this is a side effect of the medicine. Has tried some crackers and sprite and that has been ok. Advised pt to continue rest, pushing fluids. Patient asking what Dr Talavera would recommend. Routing to RR. Please advise.   Rekha Jenkins, RN-BSN

## 2020-03-18 NOTE — TELEPHONE ENCOUNTER
Please have her hold off the dose for tonight.  See if symptoms persist like stomach flu or if no further symptoms. Need to sort out if this is really a drug reaction.   If symptoms completely resolve without ongoing vomiting or diarrhea would hold the dose for a few days and then consider restarting at 1/2 tablet.

## 2020-03-18 NOTE — TELEPHONE ENCOUNTER
TC to patient. Discussed message below. Pt stated understanding. She will call back with any further questions or concerns.   Rekha Jenkins RN-BSN

## 2020-03-22 RX ORDER — CHOLECALCIFEROL (VITAMIN D3) 125 MCG
1 CAPSULE ORAL DAILY
Qty: 100 CAPSULE | Refills: 11 | Status: SHIPPED | OUTPATIENT
Start: 2020-03-22 | End: 2020-07-14

## 2020-03-31 ENCOUNTER — VIRTUAL VISIT (OUTPATIENT)
Dept: OBGYN | Facility: CLINIC | Age: 35
End: 2020-03-31
Payer: COMMERCIAL

## 2020-03-31 DIAGNOSIS — F33.1 MAJOR DEPRESSIVE DISORDER, RECURRENT EPISODE, MODERATE (H): ICD-10-CM

## 2020-03-31 DIAGNOSIS — E55.9 VITAMIN D DEFICIENCY: ICD-10-CM

## 2020-03-31 DIAGNOSIS — O09.529 HIGH-RISK PREGNANCY, ELDERLY MULTIGRAVIDA, UNSPECIFIED TRIMESTER: Primary | ICD-10-CM

## 2020-03-31 PROCEDURE — 99207 ZZC PRENATAL VISIT: CPT | Performed by: OBSTETRICS & GYNECOLOGY

## 2020-03-31 NOTE — PROGRESS NOTES
"Mary Shipman is a 35 year old female who is being evaluated via a billable telephone visit.      The patient has been notified of following:     \"This telephone visit will be conducted via a call between you and your physician/provider. We have found that certain health care needs can be provided without the need for a physical exam.  This service lets us provide the care you need with a short phone conversation.  If a prescription is necessary we can send it directly to your pharmacy.  If lab work is needed we can place an order for that and you can then stop by our lab to have the test done at a later time.    If during the course of the call the physician/provider feels a telephone visit is not appropriate, you will not be charged for this service.\"     Patient has given verbal consent for Telephone visit?  Yes    Mary Shipman complains of    Chief Complaint   Patient presents with     Prenatal Care     15+4.       I have reviewed and updated the patient's Past Medical History, Social History, Family History and Medication List.    ALLERGIES  Acetaminophen; Levofloxacin; Hydrocodone; and Hydrocodone-acetaminophen       15w4d  Patient reports severe reaction to zoloft.  She had been on that for many years in the past and at last visit was given a new prescription but within 2 hours of taking it, woke up from sleep with severe nausea and dizziness.  Patient stopped it and symptoms resolved completely.  Still struggling with depression.  Her mother is in [place] but she has decided not to go be with her mother due to increased risk of COVID.  She lives with her son and FOB.  FOB is working at [employer] and patient is afraid of him bringing COVID home.  discussed COVID precautions.  Patient encouraged to do an evisit with Mingo Hammer who she has been seeing for years to get on a different med for her depression.  She is not suicidal, just mostly scared and overwhelmed.  Declined Quad screen d/t does not want to " come in.  Discussed COVID pandemic and changes made to routine prenatal care including intermittent phone visits in order to avoid exposure. Reviewed need to take iron supplementation.   COVID instructions sent to her AVS and she was encouraged to read through that as well.      We discussed COVID restrictions, precautions and healthy lifestyle to decrease her anxiety and optimize her physical and emotional health during this time.  RR

## 2020-03-31 NOTE — PROGRESS NOTES
"Mary Shipman is a 35 year old female who is being evaluated via a billable telephone visit.      The patient has been notified of following:     \"This telephone visit will be conducted via a call between you and your physician/provider. We have found that certain health care needs can be provided without the need for a physical exam.  This service lets us provide the care you need with a short phone conversation.  If a prescription is necessary we can send it directly to your pharmacy.  If lab work is needed we can place an order for that and you can then stop by our lab to have the test done at a later time.    If during the course of the call the physician/provider feels a telephone visit is not appropriate, you will not be charged for this service.\"     Patient has given verbal consent for Telephone visit?  Yes    Mary Shipman complains of    Chief Complaint   Patient presents with     Prenatal Care     15+4.       I have reviewed and updated the patient's Past Medical History, Social History, Family History and Medication List.    ALLERGIES  Acetaminophen; Levofloxacin; Hydrocodone; and Hydrocodone-acetaminophen     15w4d  Patient reports severe reaction to zoloft.  She had been on that for many years in the past and at last visit was given a new prescription but within 2 hours of taking it, woke up from sleep with severe nausea and dizziness.  Patient stopped it and symptoms resolved completely.  Still struggling with depression.  Her mother is in [place] but she has decided not to go be with her mother due to increased risk of COVID.  She lives with her son and FOB.  FOB is working at [employer] [facility] and patient is afraid of him bringing COVID home.  discussed COVID precautions.  Patient encouraged to do an evisit with Mingo Hammer who she has been seeing for years to get on a different med for her depression.  She is not suicidal, just mostly scared and overwhelmed.  Declined Quad screen d/t does not want to come " in.  Discussed COVID pandemic and changes made to routine prenatal care including intermittent phone visits in order to avoid exposure. Reviewed need to take iron supplementation.   COVID instructions sent to her AVS and she was encouraged to read through that as well.      We discussed COVID restrictions, precautions and healthy lifestyle to decrease her anxiety and optimize her physical and emotional health during this time.  RR

## 2020-04-02 ENCOUNTER — VIRTUAL VISIT (OUTPATIENT)
Dept: FAMILY MEDICINE | Facility: CLINIC | Age: 35
End: 2020-04-02
Payer: COMMERCIAL

## 2020-04-02 DIAGNOSIS — F33.1 MAJOR DEPRESSIVE DISORDER, RECURRENT EPISODE, MODERATE (H): Primary | ICD-10-CM

## 2020-04-02 PROCEDURE — 99442 ZZC PHYSICIAN TELEPHONE EVALUATION 11-20 MIN: CPT | Performed by: PHYSICIAN ASSISTANT

## 2020-04-02 RX ORDER — SERTRALINE HYDROCHLORIDE 25 MG/1
TABLET, FILM COATED ORAL
Qty: 60 TABLET | Refills: 0 | Status: ON HOLD | OUTPATIENT
Start: 2020-04-02 | End: 2020-09-12

## 2020-04-02 NOTE — PROGRESS NOTES
"Subjective     Mary Shipman is a 35 year old female who is being evaluated via a billable telephone visit.      The patient has been notified of following:     \"This telephone visit will be conducted via a call between you and your physician/provider. We have found that certain health care needs can be provided without the need for a physical exam.  This service lets us provide the care you need with a short phone conversation.  If a prescription is necessary we can send it directly to your pharmacy.  If lab work is needed we can place an order for that and you can then stop by our lab to have the test done at a later time.    If during the course of the call the physician/provider feels a telephone visit is not appropriate, you will not be charged for this service.\"     Patient has given verbal consent for Telephone visit?  Yes    Mary Shipman complains of   Chief Complaint   Patient presents with     Recheck Medication     zoloft off during pregnancy, patient was started on lose dose by OB and did have a reaction       ALLERGIES  Acetaminophen; Levofloxacin; Hydrocodone; and Hydrocodone-acetaminophen    Medication Followup of Zoloft 50 mg     due to pregnancy, OB/GYN lowered zoloft 100 mg to zoloft 50mg, but patient had reaction and stopped the medication and was told to follow up with PCP.       Taking Medication as prescribed: NO, only took one dose, stopped taking on 03/16/2020    Side Effects:  Woke up with dizziness, vomiting and disorientation after starting lower dose    Medication Helping Symptoms:  NO        Patient Active Problem List   Diagnosis     Esophageal reflux     Allergic rhinitis due to other allergen     Polycystic ovaries     Thyrotoxicosis     Urticaria     Obesity     Low back pain     Intermittent asthma     HYPERLIPIDEMIA LDL GOAL <160     Irritable bowel syndrome with constipation     Migraine headache     Not immune to rubella     Major depressive disorder, recurrent episode, " moderate (H)     Health Care Home     Health care home, active care coordination     Generalized anxiety disorder     Lump or mass in breast     Menometrorrhagia     Seizure (H)     Cervicalgia     Bilateral thoracic back pain, unspecified chronicity     Other migraine without status migrainosus, not intractable     Morbid obesity (H)     Moderate persistent asthma without complication     Past Surgical History:   Procedure Laterality Date     C APPENDECTOMY       C NONSPECIFIC PROCEDURE      nasal surgery      TONSILLECTOMY & ADENOIDECTOMY      surgery several times for this       Social History     Tobacco Use     Smoking status: Never Smoker     Smokeless tobacco: Never Used   Substance Use Topics     Alcohol use: No     Alcohol/week: 0.0 standard drinks     Family History   Problem Relation Age of Onset     Gynecology Mother         ectopic pregnancy/endometriosis     Cancer - colorectal Mother      Congenital Anomalies Mother         kidney problems     Colon Cancer Mother      Hyperlipidemia Mother      Other Cancer Mother         Lung, Skin, Brain and Colon cancer     Asthma Mother      Arthritis Mother      Hypertension Mother      Chronic Obstructive Pulmonary Disease Mother      Breast Cancer Mother      Uterine Cancer Mother      Kidney Disease Mother      GERD Mother      Gynecology Sister         ectopic pregnancy/endometriosis     Unknown/Adopted Sister      Ovarian Cancer Sister      Heart Disease Father      Coronary Artery Disease Father      Heart Failure Father      Hyperlipidemia Father      Psychotic Disorder Brother      Unknown/Adopted Brother      Unknown/Adopted Brother      Unknown/Adopted Brother      Unknown/Adopted Brother      Unknown/Adopted Brother      Unknown/Adopted Sister      Cerebrovascular Disease Maternal Grandmother      Hyperlipidemia Maternal Grandmother      GERD Maternal Grandmother      Unknown/Adopted Maternal Grandfather      Unknown/Adopted Paternal Grandmother       Unknown/Adopted Paternal Grandfather            Reviewed and updated as needed this visit by Provider         Review of Systems   ROS COMP: Constitutional, HEENT, cardiovascular, pulmonary, gi and gu systems are negative, except as otherwise noted.       Objective   Speech is clear. Answers are appropriate.     Diagnostic Test Results:  Labs reviewed in Epic        Assessment/Plan:  1. Major depressive disorder, recurrent episode, moderate (H)  Mood worse lately due to pregnancy. Otherwise motivated to feel better. Might meditate. Might consider therapy. Wants to exercise a little.   Reviewed options - wants to restart the sertraline. Wants to start slow - had nausea when tried to restart it last time.   - sertraline (ZOLOFT) 25 MG tablet; 1/2 tab daily for 3-5 days, then 1 tab for 3-5 days, then 2 tabs  Dispense: 60 tablet; Refill: 0    Follow up in 10 days or so update me via [employer].     Due to Covid-19 we are not seeing patients in the clinic. I reviewed my patient's current health plan and deemed at this time that a face to face visit risk outweighs the benefits and therefore we had this phone visit. I encouraged Mary to contact me for a face to face visit if issues arise and we can manage that accordingly.      No follow-ups on file.      Phone call duration:  13 minutes    RAQUEL Frank, PAMeliC

## 2020-04-05 NOTE — PATIENT INSTRUCTIONS
The best way to prevent an infection is to avoid being exposed to the virus. We recommend that you wash your hands frequently and avoid touching your eyes, nose or mouth.  Practice social distancing by staying home as much as possible, avoiding gatherings and minimize trips for essentials. You should especially avoid any contact with people who are sick. It is possible that people who have the virus have little or no symptoms which is why social distancing is so important to avoid spreading the virus. Clean frequently touched surfaces daily. If you do start to have symptoms such as cough, fever or mild shortness of breath, you should stay home for at least 14 days.  If your symptoms are worrisome or severe or you are scheduled during this time to have a clinic visit you should call us at [phone].    Due to anticipated shortage of blood products we recommend all pregnant women take an iron supplement (ferrous gluconate or ferrous sulfate) in addition to a prenatal vitamin.     The hospital has strict visitor restrictions at this time for your safety. Please be aware that visitor restrictions are subject to change. You are allowed to have one healthy adult visitor during your hospital stay, it must be the same person the entire time and they must stay with you at the hospital the entire time (they are not allowed to come and go).     For information about Covid-19 and pregnancy we look to the center for disease control, the CDC. You can look at this information online at:   https://www.cdc.gov/coronavirus/2019-ncov/hcp/pregnant-women-faq.html

## 2020-04-20 ENCOUNTER — PRENATAL OFFICE VISIT (OUTPATIENT)
Dept: OBGYN | Facility: CLINIC | Age: 35
End: 2020-04-20
Payer: COMMERCIAL

## 2020-04-20 DIAGNOSIS — K59.00 CONSTIPATION IN PREGNANCY IN SECOND TRIMESTER: ICD-10-CM

## 2020-04-20 DIAGNOSIS — F33.1 MAJOR DEPRESSIVE DISORDER, RECURRENT EPISODE, MODERATE (H): ICD-10-CM

## 2020-04-20 DIAGNOSIS — R12 HEARTBURN: ICD-10-CM

## 2020-04-20 DIAGNOSIS — O09.529 HIGH-RISK PREGNANCY, ELDERLY MULTIGRAVIDA, UNSPECIFIED TRIMESTER: Primary | ICD-10-CM

## 2020-04-20 DIAGNOSIS — O99.612 CONSTIPATION IN PREGNANCY IN SECOND TRIMESTER: ICD-10-CM

## 2020-04-20 PROCEDURE — 99207 ZZC PRENATAL VISIT: CPT | Performed by: OBSTETRICS & GYNECOLOGY

## 2020-04-20 RX ORDER — ALBUTEROL SULFATE 90 UG/1
AEROSOL, METERED RESPIRATORY (INHALATION)
COMMUNITY
Start: 2020-03-17 | End: 2021-04-29

## 2020-04-20 RX ORDER — BISACODYL 10 MG
10 SUPPOSITORY, RECTAL RECTAL DAILY PRN
Qty: 30 SUPPOSITORY | Refills: 11 | Status: SHIPPED | OUTPATIENT
Start: 2020-04-20 | End: 2020-08-05

## 2020-04-20 NOTE — PROGRESS NOTES
18w3d   telephone encounter ~   complains of really bad heart burn and constipation. Both of these things are making her nauseated.   Takes TUMS which helps some. Last BM was at least a week ago if not more.  Patient given rx for omeprazole and dulcolax supp.  Might need an enema to get things started.   Feels comfortable with that. Prenatal vitamins are contributing to the constipation so will stop those an go with the children's gummies.   Has US at 20 wks and clinic visit then as well.  Virtual visit with her primary doc was done and she re-started the zoloft at a very low dose.  Tolerating 25 mg daily of that and plans to go up after a couple weeks prn.  Her son's  just passed away so she has been very emotional.  Was very close to her.    Overall feels like her weight is stable. discussed GCT at 24-28 wks.  Reviewed need to go outside daily for exercise and emotional health. Has only been getting out twice a week.    Felt slight FM but not daily yet.  Patient reassured ok at this point. 21 min visit.  RR

## 2020-04-30 ENCOUNTER — PRENATAL OFFICE VISIT (OUTPATIENT)
Dept: OBGYN | Facility: CLINIC | Age: 35
End: 2020-04-30
Payer: COMMERCIAL

## 2020-04-30 ENCOUNTER — NURSE TRIAGE (OUTPATIENT)
Dept: NURSING | Facility: CLINIC | Age: 35
End: 2020-04-30

## 2020-04-30 ENCOUNTER — TELEPHONE (OUTPATIENT)
Dept: OBGYN | Facility: CLINIC | Age: 35
End: 2020-04-30

## 2020-04-30 VITALS — WEIGHT: 269.7 LBS | DIASTOLIC BLOOD PRESSURE: 76 MMHG | SYSTOLIC BLOOD PRESSURE: 126 MMHG | BODY MASS INDEX: 44.88 KG/M2

## 2020-04-30 DIAGNOSIS — O09.522 MULTIGRAVIDA OF ADVANCED MATERNAL AGE IN SECOND TRIMESTER: Primary | ICD-10-CM

## 2020-04-30 PROCEDURE — 99207 ZZC PRENATAL VISIT: CPT | Performed by: OBSTETRICS & GYNECOLOGY
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
## 2020-05-07 ENCOUNTER — PRENATAL OFFICE VISIT (OUTPATIENT)
Dept: OBGYN | Facility: CLINIC | Age: 35
End: 2020-05-07
Payer: COMMERCIAL

## 2020-05-07 ENCOUNTER — HOSPITAL ENCOUNTER (OUTPATIENT)
Dept: ULTRASOUND IMAGING | Facility: CLINIC | Age: 35
End: 2020-05-07
Attending: OBSTETRICS & GYNECOLOGY
Payer: COMMERCIAL

## 2020-05-07 ENCOUNTER — OFFICE VISIT (OUTPATIENT)
Dept: MATERNAL FETAL MEDICINE | Facility: CLINIC | Age: 35
End: 2020-05-07
Attending: OBSTETRICS & GYNECOLOGY
Payer: COMMERCIAL

## 2020-05-07 VITALS
HEIGHT: 65 IN | DIASTOLIC BLOOD PRESSURE: 84 MMHG | BODY MASS INDEX: 44.58 KG/M2 | WEIGHT: 267.6 LBS | TEMPERATURE: 98.8 F | SYSTOLIC BLOOD PRESSURE: 138 MMHG

## 2020-05-07 DIAGNOSIS — O99.212 MATERNAL OBESITY SYNDROME IN SECOND TRIMESTER: Primary | ICD-10-CM

## 2020-05-07 DIAGNOSIS — F33.1 MAJOR DEPRESSIVE DISORDER, RECURRENT EPISODE, MODERATE (H): ICD-10-CM

## 2020-05-07 DIAGNOSIS — O09.522 MULTIGRAVIDA OF ADVANCED MATERNAL AGE IN SECOND TRIMESTER: Primary | ICD-10-CM

## 2020-05-07 DIAGNOSIS — O26.90 PREGNANCY RELATED CONDITION, ANTEPARTUM: ICD-10-CM

## 2020-05-07 PROCEDURE — 99207 ZZC PRENATAL VISIT: CPT | Performed by: OBSTETRICS & GYNECOLOGY

## 2020-05-07 PROCEDURE — 76811 OB US DETAILED SNGL FETUS: CPT

## 2020-05-07 ASSESSMENT — MIFFLIN-ST. JEOR: SCORE: 1899.71

## 2020-05-07 NOTE — Clinical Note
Please schedule patient with me in one month for a clinic visit.  Looks like on the new schedule I will be at RD on 6/4.  When schedule comes out please call patient and get her scheduled.  We need to do the GCT then.
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
## 2020-05-07 NOTE — PROGRESS NOTES
Please refer to ultrasound report under 'Imaging' Studies of 'Chart Review' tabs.    Aryan Ren M.D.

## 2020-06-04 ENCOUNTER — PRENATAL OFFICE VISIT (OUTPATIENT)
Dept: OBGYN | Facility: CLINIC | Age: 35
End: 2020-06-04
Payer: COMMERCIAL

## 2020-06-04 VITALS
WEIGHT: 270.9 LBS | DIASTOLIC BLOOD PRESSURE: 93 MMHG | HEIGHT: 65 IN | BODY MASS INDEX: 45.13 KG/M2 | TEMPERATURE: 98.9 F | HEART RATE: 95 BPM | SYSTOLIC BLOOD PRESSURE: 140 MMHG

## 2020-06-04 DIAGNOSIS — O09.522 MULTIGRAVIDA OF ADVANCED MATERNAL AGE IN SECOND TRIMESTER: Primary | ICD-10-CM

## 2020-06-04 DIAGNOSIS — F33.1 MAJOR DEPRESSIVE DISORDER, RECURRENT EPISODE, MODERATE (H): ICD-10-CM

## 2020-06-04 DIAGNOSIS — O16.2 ELEVATED BLOOD PRESSURE AFFECTING PREGNANCY IN SECOND TRIMESTER, ANTEPARTUM: ICD-10-CM

## 2020-06-04 DIAGNOSIS — E55.9 VITAMIN D DEFICIENCY: ICD-10-CM

## 2020-06-04 LAB
CREAT UR-MCNC: 90 MG/DL
GLUCOSE 1H P 50 G GLC PO SERPL-MCNC: 154 MG/DL (ref 60–129)
HGB BLD-MCNC: 12.7 G/DL (ref 11.7–15.7)
PROT UR-MCNC: 0.12 G/L
PROT/CREAT 24H UR: 0.13 G/G CR (ref 0–0.2)

## 2020-06-04 PROCEDURE — 82950 GLUCOSE TEST: CPT | Performed by: OBSTETRICS & GYNECOLOGY

## 2020-06-04 PROCEDURE — 84156 ASSAY OF PROTEIN URINE: CPT | Performed by: OBSTETRICS & GYNECOLOGY

## 2020-06-04 PROCEDURE — 36415 COLL VENOUS BLD VENIPUNCTURE: CPT | Performed by: OBSTETRICS & GYNECOLOGY

## 2020-06-04 PROCEDURE — 99207 ZZC PRENATAL VISIT: CPT | Performed by: OBSTETRICS & GYNECOLOGY

## 2020-06-04 PROCEDURE — 00000218 ZZHCL STATISTIC OBHBG - HEMOGLOBIN: Performed by: OBSTETRICS & GYNECOLOGY

## 2020-06-04 ASSESSMENT — PATIENT HEALTH QUESTIONNAIRE - PHQ9
SUM OF ALL RESPONSES TO PHQ QUESTIONS 1-9: 16
5. POOR APPETITE OR OVEREATING: NEARLY EVERY DAY

## 2020-06-04 ASSESSMENT — ANXIETY QUESTIONNAIRES
3. WORRYING TOO MUCH ABOUT DIFFERENT THINGS: MORE THAN HALF THE DAYS
2. NOT BEING ABLE TO STOP OR CONTROL WORRYING: MORE THAN HALF THE DAYS
GAD7 TOTAL SCORE: 16
5. BEING SO RESTLESS THAT IT IS HARD TO SIT STILL: NOT AT ALL
7. FEELING AFRAID AS IF SOMETHING AWFUL MIGHT HAPPEN: NEARLY EVERY DAY
6. BECOMING EASILY ANNOYED OR IRRITABLE: NEARLY EVERY DAY
1. FEELING NERVOUS, ANXIOUS, OR ON EDGE: NEARLY EVERY DAY

## 2020-06-04 ASSESSMENT — MIFFLIN-ST. JEOR: SCORE: 1914.67
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
## 2020-06-05 ASSESSMENT — ANXIETY QUESTIONNAIRES: GAD7 TOTAL SCORE: 16

## 2020-06-08 ENCOUNTER — TELEPHONE (OUTPATIENT)
Dept: OBGYN | Facility: CLINIC | Age: 35
End: 2020-06-08
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
## 2020-06-08 NOTE — TELEPHONE ENCOUNTER
Let me know when her covid test comes back.   If it comes back negative, she should keep the appt because we need to do her GTT at the same appt.   Thanks. RR

## 2020-06-16 ENCOUNTER — ALLIED HEALTH/NURSE VISIT (OUTPATIENT)
Dept: NURSING | Facility: CLINIC | Age: 35
End: 2020-06-16
Payer: COMMERCIAL

## 2020-06-16 VITALS — HEART RATE: 88 BPM | DIASTOLIC BLOOD PRESSURE: 76 MMHG | SYSTOLIC BLOOD PRESSURE: 116 MMHG

## 2020-06-16 DIAGNOSIS — O16.2 ELEVATED BLOOD PRESSURE AFFECTING PREGNANCY IN SECOND TRIMESTER, ANTEPARTUM: ICD-10-CM

## 2020-06-16 DIAGNOSIS — E55.9 VITAMIN D DEFICIENCY: ICD-10-CM

## 2020-06-16 DIAGNOSIS — R03.0 ELEVATED BLOOD PRESSURE READING WITHOUT DIAGNOSIS OF HYPERTENSION: Primary | ICD-10-CM

## 2020-06-16 LAB
ALT SERPL W P-5'-P-CCNC: 27 U/L (ref 0–50)
AST SERPL W P-5'-P-CCNC: 16 U/L (ref 0–45)
ERYTHROCYTE [DISTWIDTH] IN BLOOD BY AUTOMATED COUNT: 13 % (ref 10–15)
GLUCOSE 1H P 100 G GLC PO SERPL-MCNC: 135 MG/DL (ref 60–179)
GLUCOSE 2H P 100 G GLC PO SERPL-MCNC: 140 MG/DL (ref 60–154)
GLUCOSE 3H P 100 G GLC PO SERPL-MCNC: 102 MG/DL (ref 60–139)
GLUCOSE P FAST SERPL-MCNC: 87 MG/DL (ref 60–94)
HCT VFR BLD AUTO: 37.8 % (ref 35–47)
HGB BLD-MCNC: 12.7 G/DL (ref 11.7–15.7)
MCH RBC QN AUTO: 30.8 PG (ref 26.5–33)
MCHC RBC AUTO-ENTMCNC: 33.6 G/DL (ref 31.5–36.5)
MCV RBC AUTO: 92 FL (ref 78–100)
PLATELET # BLD AUTO: 271 10E9/L (ref 150–450)
RBC # BLD AUTO: 4.13 10E12/L (ref 3.8–5.2)
WBC # BLD AUTO: 10.3 10E9/L (ref 4–11)

## 2020-06-16 PROCEDURE — 82306 VITAMIN D 25 HYDROXY: CPT | Performed by: OBSTETRICS & GYNECOLOGY

## 2020-06-16 PROCEDURE — 82951 GLUCOSE TOLERANCE TEST (GTT): CPT | Performed by: OBSTETRICS & GYNECOLOGY

## 2020-06-16 PROCEDURE — 85027 COMPLETE CBC AUTOMATED: CPT | Performed by: OBSTETRICS & GYNECOLOGY

## 2020-06-16 PROCEDURE — 84450 TRANSFERASE (AST) (SGOT): CPT | Performed by: OBSTETRICS & GYNECOLOGY

## 2020-06-16 PROCEDURE — 82952 GTT-ADDED SAMPLES: CPT | Performed by: OBSTETRICS & GYNECOLOGY

## 2020-06-16 PROCEDURE — 84460 ALANINE AMINO (ALT) (SGPT): CPT | Performed by: OBSTETRICS & GYNECOLOGY

## 2020-06-16 PROCEDURE — 36415 COLL VENOUS BLD VENIPUNCTURE: CPT | Performed by: OBSTETRICS & GYNECOLOGY

## 2020-06-17 ENCOUNTER — HOSPITAL ENCOUNTER (OUTPATIENT)
Facility: CLINIC | Age: 35
End: 2020-06-17
Admitting: OBSTETRICS & GYNECOLOGY
Payer: COMMERCIAL

## 2020-06-17 LAB — DEPRECATED CALCIDIOL+CALCIFEROL SERPL-MC: 35 UG/L (ref 20–75)

## 2020-06-22 ENCOUNTER — PRENATAL OFFICE VISIT (OUTPATIENT)
Dept: OBGYN | Facility: CLINIC | Age: 35
End: 2020-06-22
Payer: COMMERCIAL

## 2020-06-22 VITALS
HEIGHT: 65 IN | DIASTOLIC BLOOD PRESSURE: 88 MMHG | TEMPERATURE: 98.8 F | WEIGHT: 269.9 LBS | SYSTOLIC BLOOD PRESSURE: 138 MMHG | BODY MASS INDEX: 44.97 KG/M2 | HEART RATE: 92 BPM

## 2020-06-22 DIAGNOSIS — Z23 NEED FOR TDAP VACCINATION: ICD-10-CM

## 2020-06-22 DIAGNOSIS — B96.89 BV (BACTERIAL VAGINOSIS): ICD-10-CM

## 2020-06-22 DIAGNOSIS — N89.8 VAGINAL DISCHARGE: ICD-10-CM

## 2020-06-22 DIAGNOSIS — O09.522 MULTIGRAVIDA OF ADVANCED MATERNAL AGE IN SECOND TRIMESTER: Primary | ICD-10-CM

## 2020-06-22 DIAGNOSIS — N76.0 BV (BACTERIAL VAGINOSIS): ICD-10-CM

## 2020-06-22 LAB
SPECIMEN SOURCE: ABNORMAL
WET PREP SPEC: ABNORMAL

## 2020-06-22 PROCEDURE — 87591 N.GONORRHOEAE DNA AMP PROB: CPT | Performed by: OBSTETRICS & GYNECOLOGY

## 2020-06-22 PROCEDURE — 90471 IMMUNIZATION ADMIN: CPT | Performed by: OBSTETRICS & GYNECOLOGY

## 2020-06-22 PROCEDURE — 99213 OFFICE O/P EST LOW 20 MIN: CPT | Mod: 25 | Performed by: OBSTETRICS & GYNECOLOGY

## 2020-06-22 PROCEDURE — 90715 TDAP VACCINE 7 YRS/> IM: CPT | Performed by: OBSTETRICS & GYNECOLOGY

## 2020-06-22 PROCEDURE — 87210 SMEAR WET MOUNT SALINE/INK: CPT | Performed by: OBSTETRICS & GYNECOLOGY

## 2020-06-22 PROCEDURE — 87491 CHLMYD TRACH DNA AMP PROBE: CPT | Performed by: OBSTETRICS & GYNECOLOGY

## 2020-06-22 RX ORDER — NYSTATIN AND TRIAMCINOLONE ACETONIDE 100000; 1 [USP'U]/G; MG/G
OINTMENT TOPICAL 2 TIMES DAILY
Qty: 60 G | Refills: 0 | Status: SHIPPED | OUTPATIENT
Start: 2020-06-22 | End: 2020-08-05

## 2020-06-22 RX ORDER — METRONIDAZOLE 7.5 MG/G
1 GEL VAGINAL DAILY
Qty: 35 G | Refills: 0 | Status: SHIPPED | OUTPATIENT
Start: 2020-06-22 | End: 2020-07-14

## 2020-06-22 ASSESSMENT — MIFFLIN-ST. JEOR: SCORE: 1910.14

## 2020-06-22 NOTE — PROGRESS NOTES
Clinic Administered Medication Documentation      Injectable Medication Documentation    Patient was given Tdap. Prior to medication administration, verified patients identity using patient s name and date of birth. Please see MAR and medication order for additional information. Patient instructed to remain in clinic for 15 minutes.      Was entire vial of medication used? Yes  Vial/Syringe: Syringe  Expiration Date:  11/13/2021  Was this medication supplied by the patient? No
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
## 2020-07-02 ENCOUNTER — HOSPITAL ENCOUNTER (OUTPATIENT)
Dept: ULTRASOUND IMAGING | Facility: CLINIC | Age: 35
End: 2020-07-02
Attending: OBSTETRICS & GYNECOLOGY
Payer: COMMERCIAL

## 2020-07-02 ENCOUNTER — OFFICE VISIT (OUTPATIENT)
Dept: MATERNAL FETAL MEDICINE | Facility: CLINIC | Age: 35
End: 2020-07-02
Attending: OBSTETRICS & GYNECOLOGY
Payer: COMMERCIAL

## 2020-07-02 DIAGNOSIS — O99.212 MATERNAL OBESITY SYNDROME IN SECOND TRIMESTER: Primary | ICD-10-CM

## 2020-07-02 PROCEDURE — 76816 OB US FOLLOW-UP PER FETUS: CPT

## 2020-07-06 ENCOUNTER — TELEPHONE (OUTPATIENT)
Dept: OBGYN | Facility: CLINIC | Age: 35
End: 2020-07-06

## 2020-07-06 NOTE — TELEPHONE ENCOUNTER
Patient is 29w3d, c/o pre-term labor. Has been having diarrhea really bad and cramps in lower back and period like cramps since 4am. Baby not as active as she normally is. No vaginal bleeding or LOF. Did not take a lot of fluids in this weekend and urine is darker. Discussed possible dehydration. Not having diarrhea any more but feels like she has to go to the bathroom so goes and nothing comes out. Routing to on-call. Would you advise L&D for eval and IV fluids?   Rekha Jenkins, RN-BSN

## 2020-07-06 NOTE — TELEPHONE ENCOUNTER
I suspect her symptoms are related to the diarrhea and dehydration.     Now that the diarrhea is improved, is her cramping improved as well?  If so, she can monitor and hydrate.  If not she can either attempt to hydrate at home and if cramping improves, monitor.  Or if she feels too uncomfortable at home and feels this is labor, she needs to come to L&D.    Thanks  THUY ZAMORA MD

## 2020-07-06 NOTE — TELEPHONE ENCOUNTER
TC to patient.  Discussed message below. Since speaking on the phone earlier, she has drank 3 bottles of water and some gatorade and has felt a lot better. Cramping has improved. Pt will continue to push fluids and monitor. If symptoms worsen or she is too uncomfortable, she will give us a call back to go to L&D for eval.   Rekha Jenkins, MEGHANA-BSN

## 2020-07-14 ENCOUNTER — PRENATAL OFFICE VISIT (OUTPATIENT)
Dept: OBGYN | Facility: CLINIC | Age: 35
End: 2020-07-14
Payer: COMMERCIAL

## 2020-07-14 VITALS
TEMPERATURE: 98.9 F | BODY MASS INDEX: 45.52 KG/M2 | DIASTOLIC BLOOD PRESSURE: 77 MMHG | HEART RATE: 98 BPM | SYSTOLIC BLOOD PRESSURE: 112 MMHG | HEIGHT: 65 IN | WEIGHT: 273.2 LBS

## 2020-07-14 DIAGNOSIS — M54.41 ACUTE RIGHT-SIDED LOW BACK PAIN WITH RIGHT-SIDED SCIATICA: ICD-10-CM

## 2020-07-14 DIAGNOSIS — O09.522 MULTIGRAVIDA OF ADVANCED MATERNAL AGE IN SECOND TRIMESTER: Primary | ICD-10-CM

## 2020-07-14 PROCEDURE — 99207 ZZC PRENATAL VISIT: CPT | Performed by: OBSTETRICS & GYNECOLOGY

## 2020-07-14 ASSESSMENT — MIFFLIN-ST. JEOR: SCORE: 1925.11

## 2020-07-14 NOTE — PROGRESS NOTES
30w4d  Still feeling vaginal irritation.  Used metrogel for 5 days. Felt better for a few days and now symptomatic again. Will try another 7 days.   Baby is moving well.  Reviewed MFM US which showed growth at 80+ % and AC at 99%. Discussed this is suggestive of diabetic baby. Reviewed her diet and patient has been eating a lot of watermelon and drinking lemonade.   discussed cutting back on sugars and more food with lower glycemic index. Overall weight gain is below expected. discussed risk with large babies including possible  delivery.   Additional concern: Patient is complaining of right sided sciatic pain that travels down her leg.  Has not improved with any maneuvers at home.  Will refer to physical therapy. RR

## 2020-08-05 ENCOUNTER — HOSPITAL ENCOUNTER (OUTPATIENT)
Dept: ULTRASOUND IMAGING | Facility: CLINIC | Age: 35
End: 2020-08-05
Attending: OBSTETRICS & GYNECOLOGY
Payer: COMMERCIAL

## 2020-08-05 ENCOUNTER — PRENATAL OFFICE VISIT (OUTPATIENT)
Dept: OBGYN | Facility: CLINIC | Age: 35
End: 2020-08-05
Payer: COMMERCIAL

## 2020-08-05 ENCOUNTER — OFFICE VISIT (OUTPATIENT)
Dept: MATERNAL FETAL MEDICINE | Facility: CLINIC | Age: 35
End: 2020-08-05
Attending: OBSTETRICS & GYNECOLOGY
Payer: COMMERCIAL

## 2020-08-05 VITALS
WEIGHT: 268 LBS | HEART RATE: 88 BPM | BODY MASS INDEX: 44.6 KG/M2 | TEMPERATURE: 98.1 F | SYSTOLIC BLOOD PRESSURE: 120 MMHG | DIASTOLIC BLOOD PRESSURE: 84 MMHG

## 2020-08-05 DIAGNOSIS — N89.8 VAGINAL DISCHARGE: ICD-10-CM

## 2020-08-05 DIAGNOSIS — O36.8390 ANTEPARTUM FETAL TACHYCARDIA AFFECTING CARE OF MOTHER: ICD-10-CM

## 2020-08-05 DIAGNOSIS — O99.212 MATERNAL OBESITY SYNDROME IN SECOND TRIMESTER: ICD-10-CM

## 2020-08-05 DIAGNOSIS — O99.210 OBESITY AFFECTING PREGNANCY, ANTEPARTUM: Primary | ICD-10-CM

## 2020-08-05 DIAGNOSIS — O09.522 MULTIGRAVIDA OF ADVANCED MATERNAL AGE IN SECOND TRIMESTER: Primary | ICD-10-CM

## 2020-08-05 LAB
SPECIMEN SOURCE: ABNORMAL
WET PREP SPEC: ABNORMAL

## 2020-08-05 PROCEDURE — 99213 OFFICE O/P EST LOW 20 MIN: CPT | Performed by: OBSTETRICS & GYNECOLOGY

## 2020-08-05 PROCEDURE — 59025 FETAL NON-STRESS TEST: CPT | Mod: ZF | Performed by: OBSTETRICS & GYNECOLOGY

## 2020-08-05 PROCEDURE — 87210 SMEAR WET MOUNT SALINE/INK: CPT | Performed by: OBSTETRICS & GYNECOLOGY

## 2020-08-05 PROCEDURE — 87491 CHLMYD TRACH DNA AMP PROBE: CPT | Performed by: OBSTETRICS & GYNECOLOGY

## 2020-08-05 PROCEDURE — 87591 N.GONORRHOEAE DNA AMP PROB: CPT | Performed by: OBSTETRICS & GYNECOLOGY

## 2020-08-05 PROCEDURE — 76816 OB US FOLLOW-UP PER FETUS: CPT

## 2020-08-05 NOTE — NURSING NOTE
NST Performed due to fetal tachycardia during US.   reviewed efm tracing. See NST Doc Flowsheet tab. Patient discharged ambulatory.  Tegan Hayes RN

## 2020-08-05 NOTE — PROGRESS NOTES
33w5d  complains of vaginal discharge and irritation.  Has been having some contractions intermittently.  Couple days ago ctx's woke her up at night.  They resolved with time and rest.   No bleeding, LOF. Struggling with heartburn. Some constipation.   Baby is moving well.   Pelvic:  EG - normal adult female.  BUS - within normal limits.  Vagina - well rugated, scant discharge.  Cervix closed today on exam.   Wet prep: + clue cells. A/P:  bacterial vaginosis, rx for metrogel    Recommend daily iron.  Has MFM US today. RR

## 2020-08-05 NOTE — PROGRESS NOTES
"Please see \"Imaging\" tab under \"Chart Review\" for details of today's US.    Yeimy Ontiveros, DO    "

## 2020-08-21 ENCOUNTER — AMBULATORY - HEALTHEAST (OUTPATIENT)
Dept: MATERNAL FETAL MEDICINE | Facility: HOSPITAL | Age: 35
End: 2020-08-21

## 2020-08-21 ENCOUNTER — OFFICE VISIT (OUTPATIENT)
Dept: MATERNAL FETAL MEDICINE | Facility: CLINIC | Age: 35
End: 2020-08-21
Attending: OBSTETRICS & GYNECOLOGY
Payer: COMMERCIAL

## 2020-08-21 ENCOUNTER — HOSPITAL ENCOUNTER (OUTPATIENT)
Dept: ULTRASOUND IMAGING | Facility: CLINIC | Age: 35
End: 2020-08-21
Attending: OBSTETRICS & GYNECOLOGY
Payer: COMMERCIAL

## 2020-08-21 DIAGNOSIS — O99.210 OBESITY AFFECTING PREGNANCY, ANTEPARTUM: ICD-10-CM

## 2020-08-21 DIAGNOSIS — O26.90 PREGNANCY, ANTEPARTUM, COMPLICATIONS: ICD-10-CM

## 2020-08-21 DIAGNOSIS — O99.210 OBESITY AFFECTING PREGNANCY, ANTEPARTUM: Primary | ICD-10-CM

## 2020-08-21 PROCEDURE — 76819 FETAL BIOPHYS PROFIL W/O NST: CPT

## 2020-08-21 NOTE — PROGRESS NOTES
Please see the imaging tab for details of the ultrasound performed today.    Karely Mckenzie MD  Specialist in Maternal-Fetal Medicine

## 2020-08-25 ENCOUNTER — PRENATAL OFFICE VISIT (OUTPATIENT)
Dept: OBGYN | Facility: CLINIC | Age: 35
End: 2020-08-25
Payer: COMMERCIAL

## 2020-08-25 VITALS
HEART RATE: 70 BPM | SYSTOLIC BLOOD PRESSURE: 114 MMHG | BODY MASS INDEX: 44.6 KG/M2 | DIASTOLIC BLOOD PRESSURE: 80 MMHG | TEMPERATURE: 98.5 F | WEIGHT: 268 LBS

## 2020-08-25 DIAGNOSIS — O09.522 MULTIGRAVIDA OF ADVANCED MATERNAL AGE IN SECOND TRIMESTER: Primary | ICD-10-CM

## 2020-08-25 LAB
ALT SERPL W P-5'-P-CCNC: 35 U/L (ref 0–50)
AST SERPL W P-5'-P-CCNC: 23 U/L (ref 0–45)
ERYTHROCYTE [DISTWIDTH] IN BLOOD BY AUTOMATED COUNT: 13 % (ref 10–15)
HCT VFR BLD AUTO: 38.4 % (ref 35–47)
HGB BLD-MCNC: 12.7 G/DL (ref 11.7–15.7)
MCH RBC QN AUTO: 30 PG (ref 26.5–33)
MCHC RBC AUTO-ENTMCNC: 33.1 G/DL (ref 31.5–36.5)
MCV RBC AUTO: 91 FL (ref 78–100)
PLATELET # BLD AUTO: 284 10E9/L (ref 150–450)
RBC # BLD AUTO: 4.24 10E12/L (ref 3.8–5.2)
WBC # BLD AUTO: 8.1 10E9/L (ref 4–11)

## 2020-08-25 PROCEDURE — 87186 SC STD MICRODIL/AGAR DIL: CPT | Performed by: OBSTETRICS & GYNECOLOGY

## 2020-08-25 PROCEDURE — 87653 STREP B DNA AMP PROBE: CPT | Performed by: OBSTETRICS & GYNECOLOGY

## 2020-08-25 PROCEDURE — 99207 ZZC PRENATAL VISIT: CPT | Performed by: OBSTETRICS & GYNECOLOGY

## 2020-08-25 PROCEDURE — 84460 ALANINE AMINO (ALT) (SGPT): CPT | Performed by: OBSTETRICS & GYNECOLOGY

## 2020-08-25 PROCEDURE — 36415 COLL VENOUS BLD VENIPUNCTURE: CPT | Performed by: OBSTETRICS & GYNECOLOGY

## 2020-08-25 PROCEDURE — 85027 COMPLETE CBC AUTOMATED: CPT | Performed by: OBSTETRICS & GYNECOLOGY

## 2020-08-25 PROCEDURE — 84450 TRANSFERASE (AST) (SGOT): CPT | Performed by: OBSTETRICS & GYNECOLOGY

## 2020-08-25 NOTE — PROGRESS NOTES
36w4d  No complaints.  Normal FM.  Has noted intermittent contractions, felt mostly in her back. Patient was induced last pregnancy at 37 wks for preE.   GBS and labs today.  discussed induction of labor which at this point would only be at 39 wks unless BP goes high. Reviewed last [facility] US:  ---------------------------------------------------------------------------------------------------------  1) Intrauterine pregnancy at 33 5/7 weeks gestational age.  2) Visualized fetal anatomy appears normal, but limited due to advanced gestational age.  3) Growth parameters and estimated fetal weight were consistent with borderline large for gestational age. The EFW is at the 89%ile. The AC is >99%ile and measuring 5  weeks ahead.  4) The amniotic fluid volume appeared normal.  5) Fetal tachycardia was noted throughout the ultrasound therefore an NST was performed.

## 2020-08-26 ENCOUNTER — RECORDS - HEALTHEAST (OUTPATIENT)
Dept: ULTRASOUND IMAGING | Facility: HOSPITAL | Age: 35
End: 2020-08-26

## 2020-08-26 ENCOUNTER — RECORDS - HEALTHEAST (OUTPATIENT)
Dept: ADMINISTRATIVE | Facility: OTHER | Age: 35
End: 2020-08-26

## 2020-08-26 ENCOUNTER — OFFICE VISIT - HEALTHEAST (OUTPATIENT)
Dept: MATERNAL FETAL MEDICINE | Facility: HOSPITAL | Age: 35
End: 2020-08-26

## 2020-08-26 DIAGNOSIS — O99.210 OBESITY AFFECTING PREGNANCY, ANTEPARTUM: ICD-10-CM

## 2020-08-26 DIAGNOSIS — O26.90 PREGNANCY RELATED CONDITIONS, UNSPECIFIED, UNSPECIFIED TRIMESTER: ICD-10-CM

## 2020-08-26 LAB
GP B STREP DNA SPEC QL NAA+PROBE: POSITIVE
SPECIMEN SOURCE: ABNORMAL

## 2020-08-29 LAB
BACTERIA SPEC CULT: ABNORMAL
SPECIMEN SOURCE: ABNORMAL

## 2020-08-30 ENCOUNTER — NURSE TRIAGE (OUTPATIENT)
Dept: NURSING | Facility: CLINIC | Age: 35
End: 2020-08-30

## 2020-08-30 NOTE — TELEPHONE ENCOUNTER
"Caller called regarding the message she got this morning from her OB/GYN provider (Carmelita Hanna) via my chart about being positive for Guillain-Midkiff-Syndrome (GBS).  Caller requested to have the on-call provider (OB/GYN) for Dr. Talavera's team paged.  States she will like to know how this will  affect her and the baby.  This triage nurse paged the on-call provider (Sadaf) at 12.26 pm, provider was re-paged at 12.55 pm when on-call provider did not respond to the first page,  (On-call ) provider called back at 12.56 pm, said\" I am busy now in Labor and Delivery, will call her back when I am free, this is not an emergency\".  Caller was called back and was updated    Cherie Phan RN    Reason for Disposition    [1] Caller requesting NON-URGENT health information AND [2] PCP's office is the best resource     About new diagnosis of Guillain-Midkiff-Syndrome    Additional Information    Negative: [1] Caller is not with the adult (patient) AND [2] reporting urgent symptoms    Negative: Lab result questions    Negative: Medication questions    Negative: Caller can't be reached by phone    Negative: Caller has already spoken to PCP or another triager    Negative: Requesting regular office appointment    Negative: RN needs further essential information from caller in order to complete triage    Protocols used: INFORMATION ONLY CALL-A-AH      "

## 2020-09-01 ENCOUNTER — PRENATAL OFFICE VISIT (OUTPATIENT)
Dept: OBGYN | Facility: CLINIC | Age: 35
End: 2020-09-01
Payer: COMMERCIAL

## 2020-09-01 VITALS
HEART RATE: 87 BPM | WEIGHT: 271 LBS | DIASTOLIC BLOOD PRESSURE: 88 MMHG | SYSTOLIC BLOOD PRESSURE: 118 MMHG | BODY MASS INDEX: 45.1 KG/M2 | OXYGEN SATURATION: 96 %

## 2020-09-01 DIAGNOSIS — O36.8130 DECREASED FETAL MOVEMENTS IN THIRD TRIMESTER, SINGLE OR UNSPECIFIED FETUS: ICD-10-CM

## 2020-09-01 DIAGNOSIS — O16.2 ELEVATED BLOOD PRESSURE AFFECTING PREGNANCY IN SECOND TRIMESTER, ANTEPARTUM: ICD-10-CM

## 2020-09-01 DIAGNOSIS — O09.522 MULTIGRAVIDA OF ADVANCED MATERNAL AGE IN SECOND TRIMESTER: Primary | ICD-10-CM

## 2020-09-01 LAB
CREAT UR-MCNC: 115 MG/DL
PROT UR-MCNC: 0.09 G/L
PROT/CREAT 24H UR: 0.08 G/G CR (ref 0–0.2)

## 2020-09-01 PROCEDURE — 99207 ZZC PRENATAL VISIT: CPT | Performed by: OBSTETRICS & GYNECOLOGY

## 2020-09-01 PROCEDURE — 84156 ASSAY OF PROTEIN URINE: CPT | Performed by: OBSTETRICS & GYNECOLOGY

## 2020-09-01 PROCEDURE — 59025 FETAL NON-STRESS TEST: CPT | Performed by: OBSTETRICS & GYNECOLOGY

## 2020-09-01 NOTE — PROGRESS NOTES
37w4d  Baby is not moving quite as much.  Feeling dizzy and nauseated, really bad heartburn.  Probably is dehydrated.  Has been vomiting at times. Omeprazole is not helping much.  Worried about postpartum blues. Taking zoloft 25 mg right now. Feels like she would be better if she could go up but can't tolerate side effects while pregnant. Doing virtual therapy on zoom with Sammi Longo. This helps a lot.   May need to increase zoloft after delivery.   discussed positive GBS.  Will get urine protein test today. reviewed weight today.  discussed baby is large based on previous growth US and might need a  section.  Patient hoping to avoid a  section. Likely will induce around 39 wks  If cervix is favorable.   NST was performed today for decreased fetal movement. It was reactive.   EFM:   140 baseline, moderate variablility, + accels, no decels, Category I    Patient was reassured.  RR

## 2020-09-02 ENCOUNTER — OFFICE VISIT (OUTPATIENT)
Dept: MATERNAL FETAL MEDICINE | Facility: CLINIC | Age: 35
End: 2020-09-02
Attending: OBSTETRICS & GYNECOLOGY
Payer: COMMERCIAL

## 2020-09-02 ENCOUNTER — HOSPITAL ENCOUNTER (OUTPATIENT)
Dept: ULTRASOUND IMAGING | Facility: CLINIC | Age: 35
End: 2020-09-02
Attending: OBSTETRICS & GYNECOLOGY
Payer: COMMERCIAL

## 2020-09-02 ENCOUNTER — TELEPHONE (OUTPATIENT)
Dept: OBGYN | Facility: CLINIC | Age: 35
End: 2020-09-02

## 2020-09-02 DIAGNOSIS — O99.210 OBESITY AFFECTING PREGNANCY, ANTEPARTUM: Primary | ICD-10-CM

## 2020-09-02 DIAGNOSIS — O99.210 OBESITY AFFECTING PREGNANCY, ANTEPARTUM: ICD-10-CM

## 2020-09-02 PROCEDURE — 76819 FETAL BIOPHYS PROFIL W/O NST: CPT | Performed by: OBSTETRICS & GYNECOLOGY

## 2020-09-02 PROCEDURE — 76816 OB US FOLLOW-UP PER FETUS: CPT

## 2020-09-02 NOTE — PROGRESS NOTES
"Please see \"Imaging\" tab under \"Chart Review\" for details of today's US at the [facility].    Reid Rodriguez MD  Maternal-Fetal Medicine      "

## 2020-09-02 NOTE — TELEPHONE ENCOUNTER
Patient was seen for a prenatal visit with you yesterday. She had discussed with you her issues (since Monday) of dizziness, nausea, and vomiting. States she gets dizzy and that makes her feel sick and vomit. Patient states she fell asleep last night and woke up around midnight feeling dizzy and off balance. This made her feel sick and she vomited a few times. This lasted until about 3 am and then she started to feel better. She is feeling better so far this morning. Is going to [facility] for an appointment today at 10:00. She is wondering if you have any insight into what she could do to help with this. Please advise. Rekha Shah RN

## 2020-09-05 ENCOUNTER — HOSPITAL ENCOUNTER (OUTPATIENT)
Facility: CLINIC | Age: 35
Discharge: HOME | End: 2020-09-05
Attending: OBSTETRICS & GYNECOLOGY | Admitting: OBSTETRICS & GYNECOLOGY
Payer: COMMERCIAL

## 2020-09-05 VITALS — SYSTOLIC BLOOD PRESSURE: 120 MMHG | RESPIRATION RATE: 18 BRPM | DIASTOLIC BLOOD PRESSURE: 76 MMHG | TEMPERATURE: 98.7 F

## 2020-09-05 DIAGNOSIS — Z36.89 ENCOUNTER FOR TRIAGE IN PREGNANT PATIENT: ICD-10-CM

## 2020-09-05 DIAGNOSIS — N76.0 BV (BACTERIAL VAGINOSIS): Primary | ICD-10-CM

## 2020-09-05 DIAGNOSIS — B96.89 BV (BACTERIAL VAGINOSIS): Primary | ICD-10-CM

## 2020-09-05 LAB
ALBUMIN UR-MCNC: NEGATIVE MG/DL
APPEARANCE UR: ABNORMAL
BILIRUB UR QL STRIP: NEGATIVE
COLOR UR AUTO: YELLOW
GLUCOSE UR STRIP-MCNC: NEGATIVE MG/DL
HGB UR QL STRIP: NEGATIVE
KETONES UR STRIP-MCNC: NEGATIVE MG/DL
LEUKOCYTE ESTERASE UR QL STRIP: ABNORMAL
MUCOUS THREADS #/AREA URNS LPF: PRESENT /LPF
NITRATE UR QL: NEGATIVE
PH UR STRIP: 6.5 PH (ref 5–7)
RBC #/AREA URNS AUTO: <1 /HPF (ref 0–2)
RUPTURE OF FETAL MEMBRANES BY ROM PLUS: NEGATIVE
SOURCE: ABNORMAL
SP GR UR STRIP: 1.01 (ref 1–1.03)
SPECIMEN SOURCE: ABNORMAL
SQUAMOUS #/AREA URNS AUTO: 12 /HPF (ref 0–1)
UROBILINOGEN UR STRIP-MCNC: NORMAL MG/DL (ref 0–2)
WBC #/AREA URNS AUTO: 4 /HPF (ref 0–5)
WET PREP SPEC: ABNORMAL

## 2020-09-05 PROCEDURE — 87086 URINE CULTURE/COLONY COUNT: CPT | Performed by: OBSTETRICS & GYNECOLOGY

## 2020-09-05 PROCEDURE — 87591 N.GONORRHOEAE DNA AMP PROB: CPT | Performed by: STUDENT IN AN ORGANIZED HEALTH CARE EDUCATION/TRAINING PROGRAM

## 2020-09-05 PROCEDURE — 84112 EVAL AMNIOTIC FLUID PROTEIN: CPT | Performed by: OBSTETRICS & GYNECOLOGY

## 2020-09-05 PROCEDURE — G0463 HOSPITAL OUTPT CLINIC VISIT: HCPCS

## 2020-09-05 PROCEDURE — 87491 CHLMYD TRACH DNA AMP PROBE: CPT | Performed by: STUDENT IN AN ORGANIZED HEALTH CARE EDUCATION/TRAINING PROGRAM

## 2020-09-05 PROCEDURE — 25000128 H RX IP 250 OP 636: Performed by: STUDENT IN AN ORGANIZED HEALTH CARE EDUCATION/TRAINING PROGRAM

## 2020-09-05 PROCEDURE — 81001 URINALYSIS AUTO W/SCOPE: CPT | Performed by: OBSTETRICS & GYNECOLOGY

## 2020-09-05 PROCEDURE — 87210 SMEAR WET MOUNT SALINE/INK: CPT | Performed by: STUDENT IN AN ORGANIZED HEALTH CARE EDUCATION/TRAINING PROGRAM

## 2020-09-05 RX ORDER — METRONIDAZOLE 500 MG/1
500 TABLET ORAL 2 TIMES DAILY
Qty: 14 TABLET | Refills: 0 | Status: ON HOLD | OUTPATIENT
Start: 2020-09-05 | End: 2020-09-12

## 2020-09-05 RX ORDER — METRONIDAZOLE 500 MG/1
500 TABLET ORAL 2 TIMES DAILY
Qty: 14 TABLET | Refills: 0 | Status: SHIPPED | OUTPATIENT
Start: 2020-09-05 | End: 2020-09-05

## 2020-09-05 RX ORDER — ONDANSETRON 4 MG/1
4 TABLET, ORALLY DISINTEGRATING ORAL EVERY 6 HOURS PRN
Status: DISCONTINUED | OUTPATIENT
Start: 2020-09-05 | End: 2020-09-05 | Stop reason: HOSPADM

## 2020-09-05 RX ADMIN — ONDANSETRON 4 MG: 4 TABLET, ORALLY DISINTEGRATING ORAL at 19:03

## 2020-09-05 NOTE — PROGRESS NOTES
[facility]   Intensive Care Unit Attending Daily Note    Name: Nabila Browning (Baby 1)  Parents: Patriica Rodriguez and Anant Browning  Date of Birth/Admission: 2019  7:14 PM      History of Present Illness    600 gm, appropriate for gestational age, 24w4d, twin A, female infant born by  due to PROM and  labor. The infant was then brought to the NICU for further evaluation, monitoring and management of prematurity, RDS and possible sepsis.Failure requiring high frequency oscillatory ventilation intially. S/p 3 doses of Curosurf initially.  Extubated to LUCIA CPAP on 2/3; came off CPAP on 3/10/17.    Patient Active Problem List   Diagnosis     Extreme prematurity, birth weight 600 grams, 24w4d gestational age     Respiratory distress syndrome in      Breech delivery, fetus 1     Dichorionic diamniotic twin gestation      difficulty in feeding at breast      respiratory failure - requiring mechanical ventilation     Need for observation and evaluation of  for sepsis     Hyperbilirubinemia,      Candida rash of groin and neck      Interval History   No acute concerns overnight. Bottle feeding more frequently and doing fairly well.     Assessment & Plan    Overall Status:  4 month old  ELBW twin A female infant, now at 42w0d PMA with BPD and other issues related to prematurity as below.  This patient, whose weight is < 5000 grams, is no longer critically ill. She still requires gavage feeds and CR monitoring.    FEN:    Vitals:    05/10/17 0000 17 0000 17   Weight: 3.95 kg (8 lb 11.3 oz) 3.965 kg (8 lb 11.9 oz) 4 kg (8 lb 13.1 oz)     117 cc and 85 kcal/kg/day    Malnutrition. Poor  linear growth is now improving. Appropriate I/O.   Off electrolyte supplements on 2017.    Continue:  - TF goal to 135 ml/kg/day - mild fluid restriction due to BPD.   -  [facility]  OB Triage Note    CC: Leaking of fluid    HPI: Mary Shipman is a 35 year old  at 38w1d by 6w0d US who presents with leaking of fluid. She reports recurrent small-volume leakage of clear fluid since yesterday, which is odorless and thin. The leaking seems to occur randomly throughout the day. She denies any dysuria, urinary frequency, or urgency. She has also been having strong abdominal cramps since this morning. She denies any vaginal bleeding or other abnormal discharge prior to the leakage. Reports normal fetal movement.    Obstetric Complications  1. Suspected LGA fetus, EFW 3856g (91%) on  US  2. Obesity, BMI 45  3. GBS positive  4. Thickened fetal nuchal fold  5. Depression/CRUZ, on Zoloft  6. Asthma  7. GERD  8. Hx alcohol abuse, currently sober  9. Rubella equivocal  10. Failed GCT, passed GTT  11. Migraine  12. History of preeclampsia without severe features    O:  Patient Vitals for the past 24 hrs:   BP Temp Temp src Resp   20 1743 126/87 98.7  F (37.1  C) Oral 16     Gen: Well-appearing, NAD  CV: RRR, no murmurs  Pulm: CTAB, no wheezes  Abd: Soft, gravid, non-tender  Ext: 1+ LE edema b/l    Spec: Scant thick yellow discharge from cervical os, no pooling  Cervix: 1-2/50/-3 over two checks, 1 hour apart    FHT: , mod megha, accels present, no decels  [place]: 1-2 ctx in 10 mins    Labs:  ROM+ negative    UA RESULTS:  Lab Test 20  1820   COLOR Yellow   APPEARANCE Slightly Cloudy   URINEGLC Negative   URINEBILI Negative   URINEKETONE Negative   SG 1.011   UBLD Negative   URINEPH 6.5   PROTEIN Negative   UROBILINOGEN  --    NITRITE Negative   LEUKEST Moderate*   RBCU <1   WBCU 4     UCx pending  Wet prep with few clue cells  GC/CT pending    A/P:  Mary Shipman is a 35 year old  at 38w1d by 6w0d US here with vaginal discharge.    #Vaginal discharge:  - No evidence of ROM on clinical exam or ROM+. Given symptoms, exam, and wet prep, will treat  po/gavage feeds of MBM/sHMF 22 + 3.5 LP. (Changed to 22 kcal and 3.5 of LP on 5/3 due to rapid weight gain)  - Working on breast and bottle feeding. Took 43% po of the 135 cc/kg/day.   - Swallow study during the week of 5/15.  - Cue-based feeding starting 5/10  - On Vit D supplementation   - GERD precautions. On Zantac (started 5/4 per OT recommendation)  - Monitor fluid status, feeding tolerance and overall growth.     Osteopenia of Prematurity: Moderate, improving slighlty. 690 -> 660 (4/24).  - Continue fortification and OT.   - Monitoring AP every other week - next check 5/22.  - Vitamin D levels normal  - Normal Ca and Phos on 5/8  - Will check Ca and Phos  Lab Results   Component Value Date    ALKPHOS 720 2017     Endocrine: Concerns for both hypothyroidism and CAH on 14 do repeat NMS, but nl on final 30 do screen.  Endocrine service consulted   Thyroid anomalies felt to be due to prematurity and stress.  Also, mild clitoromegaly - pelvic ultrasound on 2/8 - normal uterus seen.   Repeat 17-OHP 2/27: 217  - plan to recheck 17OHP 5/12.  If > 100, needs f/u     Respiratory/Apnea: Chronic respiratory failure d/t BPD.   - Weaned to RA on 5/6  - Continue routine CR monitoring.     Cardiovascular:  Stable - good perfusion and BP.  No murmur.  Occassional systolic hypertension.   4/28 Echo: Unchanged. Tiny PDA (l to r, no run off), PFO. No RVH.   - Repeat echo monthly while on oxygen (next ~ 5/31)    Occasional elevated SBP.   Mostly 80-90s with occasional > 100    Monitor      ID:  No current signs of systemic infection.    Hx of possible cysts in MELISSA of lung - trach culture sent 1/22 to evaluate for staph, cysts resolved as of 1/24 - trach aspirate is growing Raoultella planticola and CONS - ?colonizers as infant is not ill. Did not intitally treat.   Increased support needs of 1/30 so resent ETT culture and gram stain, BC/UC on 1/30. Trach culture with Raoultella planticola and CONS . CRP low.   S/p 7 day  presumptively for BV with metronidazole 500mg BID x7d  - Encouraged follow up in clinic as scheduled on 9/8    #FWB: - Category I FHT, reactive and reassuring    Patient discussed with Dr. Anastasia Ortiz MD  Ob/Gyn Resident, PGY-3  09/05/20 6:43 PM     Kyra Oconnor MD       course of Vanco and Gent (ended 2/5)  2/7 New infectious work-up due to spells. Unable to obtain cath urine. On Vanc/gent. CRP < 2.9. Off abx.   Ureaplasma culture-NGTD and PCR is negative   3/27 uCMV (given small OFC): negative  Nasal secretions noted 2017, viral panel negative.    Hematology: Anemia of prematurity/phlebotomy.  PRBCs last on 2/27. Ferritin 81 (4/24)  No results for input(s): HGB in the last 168 hours.   - Monitor serial hemoglobin every other week Monday, next on 5/8-99  - continue Fe supplementation per dietician's recs.     CNS:  Final repeat HUS at 36 wks PMA with continued evolution of cerebellar hemorrhage. No PVL. Normal ventricle size.  Initial OFC at ~10%ile with good interval growth.   - Sacral dimple- US 5/12.    ROP:  Last exam on 4/17/17 - Zone 3, stage 1, BE  - f/u 3-4 weeks ~ 5/17    HCM:  First and F/U NBS at 30 days both normal. 14 do screen as described above in endo section.      - Obtain hearing/carseat screens PTD.  - Continue input from OT.  - Will need long term f/u of hip exam with u/s, due to breech presentation, US at 6 weeks PMA.   - Continue standard NICU cares and family education plan.    Immunizations  Up to date.   Immunization History   Administered Date(s) Administered     DTAP/HEPB/POLIO, INACTIVATED <7Y (PEDIARIX) 2017     HIB 2017     Hepatitis B 2017     Pneumococcal (PCV 13) 2017         Medications   Current Facility-Administered Medications   Medication     ranitidine (ZANTAC) 15 MG/ML syrup 7.5 mg     cholecalciferol (vitamin D/D-VI-SOL) liquid 200 Units     ferrous sulfate (JERRI-IN-SOL) oral drops 12 mg     mineral oil external liquid     tetracaine (PONTOCAINE) 0.5 % ophthalmic solution 1 drop     cyclopentolate-phenylephrine (CYCLOMYDRYL) 0.2-1 % ophthalmic solution 1 drop     breast milk for bar code scanning verification 1 Bottle     glycerin (laxative) Suppository 0.125 suppository     sucrose (SWEET-EASE) solution 0.1-2 mL       Physical Exam   GENERAL: NAD, female infant.  RESPIRATORY: Chest CTA with equal breath sounds, no retractions.   CV: RRR, no murmur, strong/sym pulses in UE/LE, good perfusion.   ABDOMEN: soft, +BS, no HSM.   CNS: Tone appropriate for GA. AFOF. MAEE.   Rest of exam unchanged.       Communication  Parents: Particia Rodriguez and Anant Browning. [place]  Updated daily by the team, after rounds.   2 older half-sibs that are 14 and 19.  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     PCPs:   Infant PCP: MYESHA MCNULTY   Maternal OB PCP: Arianna Orozco CNM  MFM:Dr. Tristan & Dr. Valles ([facility])  Delivering Provider: Dr. Valles  All updated via EPIC on 5/5/17.     Health Care Team:  Patient discussed with the care team on rounds. A/P, imaging studies, laboratory data, medications and family situation reviewed.  Esperanza Norris MD, MD

## 2020-09-05 NOTE — PLAN OF CARE
Mary arrived to the birthplace at 1724. She noticed a small amount of leaking yesterday and today, which has a strong odor. She stated her stomach feels hard with menstrual like cramps the hour before she arrived to the Birthplace, about 4 times an hour. Baby has been active. She also noticed a headache and heartburn which has been common for her. She denies upper epigastric pain, visual disturbances. Reflexes are WNL, no clonus. /79.  EFM applied. Baby with moderate variability and accels. Contractions are every 4-5 minutes apart, palpate mild  Notified Dr. Ortiz of patient's arrival and negative amnisure. Dr. Ortiz assessed pt, cervix is 1-2/50--3.  Wet prep and urine sent to the lab. Plan is to recheck pt after lab results are back.

## 2020-09-06 NOTE — PROGRESS NOTES
"Data: Patient presented to the Birthplace at 1724.   Reason for maternal/fetal assessment per patient is Rule Out ROM. Patient is a . Prenatal record reviewed.      OB History    Para Term  AB Living   3 1 1 0 1 1   SAB TAB Ectopic Multiple Live Births   0 1 0 0 1      # Outcome Date GA Lbr Frank/2nd Weight Sex Delivery Anes PTL Lv   3 Current            2 TAB 2017     TAB      1 Term 12 37w2d 03:50 / 00:48 3.11 kg (6 lb 13.7 oz) M Vag-Spont EPI N MELISSA      Birth Comments: Mother induced for pre-ecclamsia, required manual cervical dilation, hemorrhage during labor.  Juma had hypoglycemia at birth.      Name: MEGAN ERICKSON      Apgar1: 8  Apgar5: 9      Medical History:   Past Medical History:   Diagnosis Date     Allergic rhinitis due to other allergen      Antepartum mild preeclampsia 2012     Asthma      Depressive disorder      Esophageal reflux      Frequent UTI     had a kidney infection also in the past     Gestational hypertension 2012     Helicobacter pylori (H. pylori)     treated     Hyperlipidemia      Irritable bowel syndrome      Liver disease     \"fatty liver\" resolved on own.     Lump or mass in breast 2015     Mild preeclampsia 2012     Obesity      Polycystic ovaries      Thyrotoxicosis 2007   . Gestational Age 38w1d. VSS. Cervix: dilated to 1.  Fetal movement present. Patient denies pelvic pressure, UTI symptoms, GI problems, bloody show, vaginal bleeding, edema, headache, visual disturbances, epigastric or URQ pain. Unsure if ROM, c/o vaginal leaking that occurred yesterday and off/on today. Support person present.  Action: Verbal consent for EFM. Triage assessment completed. EFM applied for fetal well-being. Uterine assessment done via TOCO. Fetal assessment: Presumed adequate fetal oxygenation documented (see flow record). SVE unchanged from clinic and unchanged from 1 hour recheck. Wet prep positive for clue cells. Patient education " pamphlets given on fetal movement counts and when to call provider. Patient instructed to report change in fetal movement, vaginal leaking of fluid or bleeding, abdominal pain, or any concerns related to the pregnancy to her nurse/physician.   Response: Dr. Espitia informed of lab results. Plan per provider is discharge pt to home. Patient verbalized understanding of education and verbalized agreement with plan. Discharged ambulatory at 1945.

## 2020-09-06 NOTE — DISCHARGE INSTRUCTIONS
Discharge Instruction for Undelivered Patients      You were seen for: Labor Assessment  We Consulted: Dr Espitia and Dr Oconnor  You had (Test or Medicine): Fetal Monitoring, SVE, Labwork     Diet:   Drink 8 to 12 glasses of liquids (milk, juice, water) every day.     Activity:  Call your doctor or nurse midwife if your baby is moving less than usual.     Call your provider if you notice:  Swelling in your face or increased swelling in your hands or legs.  Headaches that are not relieved by Tylenol (acetaminophen).  Changes in your vision (blurring: seeing spots or stars.)  Nausea (sick to your stomach) and vomiting (throwing up).   Weight gain of 5 pounds or more per week.  Heartburn that doesn't go away.  Signs of bladder infection: pain when you urinate (use the toilet), need to go more often and more urgently.  The bag of madsen (rupture of membranes) breaks, or you notice leaking in your underwear.  Bright red blood in your underwear.  Abdominal (lower belly) or stomach pain.  Second (plus) baby: Contractions (tightening) less than 10 minutes apart and getting stronger.  Increase or change in vaginal discharge (note the color and amount)    Follow-up:  As scheduled in the clinic

## 2020-09-07 LAB
BACTERIA SPEC CULT: NORMAL
Lab: NORMAL
SPECIMEN SOURCE: NORMAL

## 2020-09-08 ENCOUNTER — PRENATAL OFFICE VISIT (OUTPATIENT)
Dept: OBGYN | Facility: CLINIC | Age: 35
End: 2020-09-08
Payer: COMMERCIAL

## 2020-09-08 VITALS
WEIGHT: 275 LBS | BODY MASS INDEX: 45.82 KG/M2 | HEIGHT: 65 IN | SYSTOLIC BLOOD PRESSURE: 125 MMHG | TEMPERATURE: 98.7 F | DIASTOLIC BLOOD PRESSURE: 82 MMHG | HEART RATE: 106 BPM

## 2020-09-08 DIAGNOSIS — O09.522 MULTIGRAVIDA OF ADVANCED MATERNAL AGE IN SECOND TRIMESTER: Primary | ICD-10-CM

## 2020-09-08 DIAGNOSIS — O09.522 MULTIGRAVIDA OF ADVANCED MATERNAL AGE IN SECOND TRIMESTER: ICD-10-CM

## 2020-09-08 PROCEDURE — 99207 ZZC PRENATAL VISIT: CPT | Performed by: OBSTETRICS & GYNECOLOGY

## 2020-09-08 PROCEDURE — U0003 INFECTIOUS AGENT DETECTION BY NUCLEIC ACID (DNA OR RNA); SEVERE ACUTE RESPIRATORY SYNDROME CORONAVIRUS 2 (SARS-COV-2) (CORONAVIRUS DISEASE [COVID-19]), AMPLIFIED PROBE TECHNIQUE, MAKING USE OF HIGH THROUGHPUT TECHNOLOGIES AS DESCRIBED BY CMS-2020-01-R: HCPCS | Performed by: OBSTETRICS & GYNECOLOGY

## 2020-09-08 ASSESSMENT — MIFFLIN-ST. JEOR: SCORE: 1933.27

## 2020-09-08 NOTE — PROGRESS NOTES
38w4d  Was seen on labor and delivery a few days ago for r/o SROM.  Patient was jerad but that all stopped.  Baby is moving well.   MFM US last week showed growth at 91% and the AC was >95%.  Cervix is favorable today and patient would like to get IOL set up for 39 wks.    Scheduled with labor and delivery.  GBS positive.  RR

## 2020-09-09 ENCOUNTER — OFFICE VISIT (OUTPATIENT)
Dept: MATERNAL FETAL MEDICINE | Facility: CLINIC | Age: 35
End: 2020-09-09
Attending: OBSTETRICS & GYNECOLOGY
Payer: COMMERCIAL

## 2020-09-09 ENCOUNTER — TELEPHONE (OUTPATIENT)
Dept: OBGYN | Facility: CLINIC | Age: 35
End: 2020-09-09

## 2020-09-09 ENCOUNTER — HOSPITAL ENCOUNTER (OUTPATIENT)
Dept: ULTRASOUND IMAGING | Facility: CLINIC | Age: 35
End: 2020-09-09
Attending: OBSTETRICS & GYNECOLOGY
Payer: COMMERCIAL

## 2020-09-09 DIAGNOSIS — O99.210 OBESITY AFFECTING PREGNANCY, ANTEPARTUM: ICD-10-CM

## 2020-09-09 DIAGNOSIS — O99.210 OBESITY AFFECTING PREGNANCY, ANTEPARTUM: Primary | ICD-10-CM

## 2020-09-09 LAB
SARS-COV-2 RNA SPEC QL NAA+PROBE: NOT DETECTED
SPECIMEN SOURCE: NORMAL

## 2020-09-09 PROCEDURE — 76819 FETAL BIOPHYS PROFIL W/O NST: CPT

## 2020-09-09 NOTE — TELEPHONE ENCOUNTER
Mary is 38w5d.  She is having pain, doesn't feel like contractions, but she is uncomfortable and thinks she lost her mucus plug.  Since L&D is on red status, can you call her to evaluate and then decide what you think she should do?  Cervix was favorable when it was checked in clinic yesterday.  Geovanna Magana RN

## 2020-09-09 NOTE — TELEPHONE ENCOUNTER
Discussed with Dr. Dudley.  She advised that pt rest and hydrate at home for a few hours to see how she feels after that time.  If pt needs to go to hospital emergently, she should make plans to go to a different hospital.  Discussed [facility] is close so she could go there if needed.  Mary will plan on resting and will see how things go.  Geovanna Magana, RN

## 2020-09-10 ENCOUNTER — NURSE TRIAGE (OUTPATIENT)
Dept: NURSING | Facility: CLINIC | Age: 35
End: 2020-09-10

## 2020-09-10 ENCOUNTER — ANESTHESIA EVENT (OUTPATIENT)
Dept: OBGYN | Facility: CLINIC | Age: 35
End: 2020-09-10
Payer: COMMERCIAL

## 2020-09-10 ENCOUNTER — HOSPITAL ENCOUNTER (INPATIENT)
Facility: CLINIC | Age: 35
LOS: 2 days | Discharge: HOME | End: 2020-09-12
Attending: OBSTETRICS & GYNECOLOGY | Admitting: OBSTETRICS & GYNECOLOGY
Payer: COMMERCIAL

## 2020-09-10 ENCOUNTER — ANESTHESIA (OUTPATIENT)
Dept: OBGYN | Facility: CLINIC | Age: 35
End: 2020-09-10
Payer: COMMERCIAL

## 2020-09-10 PROBLEM — O47.9 UTERINE CONTRACTIONS DURING PREGNANCY: Status: ACTIVE | Noted: 2020-09-10

## 2020-09-10 LAB
ABO + RH BLD: NORMAL
ABO + RH BLD: NORMAL
ALT SERPL W P-5'-P-CCNC: 24 U/L (ref 0–50)
AST SERPL W P-5'-P-CCNC: 20 U/L (ref 0–45)
BASOPHILS # BLD AUTO: 0 10E9/L (ref 0–0.2)
BASOPHILS NFR BLD AUTO: 0.1 %
BLD GP AB SCN SERPL QL: NORMAL
BLOOD BANK CMNT PATIENT-IMP: NORMAL
CREAT SERPL-MCNC: 0.56 MG/DL (ref 0.52–1.04)
CREAT UR-MCNC: 51 MG/DL
DIFFERENTIAL METHOD BLD: NORMAL
EOSINOPHIL # BLD AUTO: 0.1 10E9/L (ref 0–0.7)
EOSINOPHIL NFR BLD AUTO: 0.7 %
ERYTHROCYTE [DISTWIDTH] IN BLOOD BY AUTOMATED COUNT: 12.9 % (ref 10–15)
GFR SERPL CREATININE-BSD FRML MDRD: >90 ML/MIN/{1.73_M2}
HCT VFR BLD AUTO: 36.6 % (ref 35–47)
HGB BLD-MCNC: 12.2 G/DL (ref 11.7–15.7)
IMM GRANULOCYTES # BLD: 0 10E9/L (ref 0–0.4)
IMM GRANULOCYTES NFR BLD: 0.2 %
LYMPHOCYTES # BLD AUTO: 1.2 10E9/L (ref 0.8–5.3)
LYMPHOCYTES NFR BLD AUTO: 13.2 %
MCH RBC QN AUTO: 30.5 PG (ref 26.5–33)
MCHC RBC AUTO-ENTMCNC: 33.3 G/DL (ref 31.5–36.5)
MCV RBC AUTO: 92 FL (ref 78–100)
MONOCYTES # BLD AUTO: 0.5 10E9/L (ref 0–1.3)
MONOCYTES NFR BLD AUTO: 5.7 %
NEUTROPHILS # BLD AUTO: 7.4 10E9/L (ref 1.6–8.3)
NEUTROPHILS NFR BLD AUTO: 80.1 %
NRBC # BLD AUTO: 0 10*3/UL
NRBC BLD AUTO-RTO: 0 /100
PLATELET # BLD AUTO: 281 10E9/L (ref 150–450)
PROT UR-MCNC: 0.11 G/L
PROT/CREAT 24H UR: 0.22 G/G CR (ref 0–0.2)
RBC # BLD AUTO: 4 10E12/L (ref 3.8–5.2)
SPECIMEN EXP DATE BLD: NORMAL
WBC # BLD AUTO: 9.2 10E9/L (ref 4–11)

## 2020-09-10 PROCEDURE — 84450 TRANSFERASE (AST) (SGOT): CPT | Performed by: STUDENT IN AN ORGANIZED HEALTH CARE EDUCATION/TRAINING PROGRAM

## 2020-09-10 PROCEDURE — 85025 COMPLETE CBC W/AUTO DIFF WBC: CPT | Performed by: STUDENT IN AN ORGANIZED HEALTH CARE EDUCATION/TRAINING PROGRAM

## 2020-09-10 PROCEDURE — 86850 RBC ANTIBODY SCREEN: CPT | Performed by: STUDENT IN AN ORGANIZED HEALTH CARE EDUCATION/TRAINING PROGRAM

## 2020-09-10 PROCEDURE — 25000132 ZZH RX MED GY IP 250 OP 250 PS 637: Performed by: STUDENT IN AN ORGANIZED HEALTH CARE EDUCATION/TRAINING PROGRAM

## 2020-09-10 PROCEDURE — 84156 ASSAY OF PROTEIN URINE: CPT | Performed by: STUDENT IN AN ORGANIZED HEALTH CARE EDUCATION/TRAINING PROGRAM

## 2020-09-10 PROCEDURE — 86900 BLOOD TYPING SEROLOGIC ABO: CPT | Performed by: STUDENT IN AN ORGANIZED HEALTH CARE EDUCATION/TRAINING PROGRAM

## 2020-09-10 PROCEDURE — 25000128 H RX IP 250 OP 636: Performed by: ANESTHESIOLOGY

## 2020-09-10 PROCEDURE — 40000257 ZZH STATISTIC CONSULT NO CHARGE VASC ACCESS

## 2020-09-10 PROCEDURE — 25000125 ZZHC RX 250: Performed by: ANESTHESIOLOGY

## 2020-09-10 PROCEDURE — 86901 BLOOD TYPING SEROLOGIC RH(D): CPT | Performed by: STUDENT IN AN ORGANIZED HEALTH CARE EDUCATION/TRAINING PROGRAM

## 2020-09-10 PROCEDURE — 25000128 H RX IP 250 OP 636: Performed by: STUDENT IN AN ORGANIZED HEALTH CARE EDUCATION/TRAINING PROGRAM

## 2020-09-10 PROCEDURE — 84460 ALANINE AMINO (ALT) (SGPT): CPT | Performed by: STUDENT IN AN ORGANIZED HEALTH CARE EDUCATION/TRAINING PROGRAM

## 2020-09-10 PROCEDURE — G0463 HOSPITAL OUTPT CLINIC VISIT: HCPCS

## 2020-09-10 PROCEDURE — 86780 TREPONEMA PALLIDUM: CPT | Performed by: STUDENT IN AN ORGANIZED HEALTH CARE EDUCATION/TRAINING PROGRAM

## 2020-09-10 PROCEDURE — 3E0R3BZ INTRODUCTION OF ANESTHETIC AGENT INTO SPINAL CANAL, PERCUTANEOUS APPROACH: ICD-10-PCS | Performed by: ANESTHESIOLOGY

## 2020-09-10 PROCEDURE — 82565 ASSAY OF CREATININE: CPT | Performed by: STUDENT IN AN ORGANIZED HEALTH CARE EDUCATION/TRAINING PROGRAM

## 2020-09-10 PROCEDURE — 12000001 ZZH R&B MED SURG/OB UMMC

## 2020-09-10 PROCEDURE — 40000556 ZZH STATISTIC PERIPHERAL IV START W US GUIDANCE

## 2020-09-10 PROCEDURE — 00HU33Z INSERTION OF INFUSION DEVICE INTO SPINAL CANAL, PERCUTANEOUS APPROACH: ICD-10-PCS | Performed by: ANESTHESIOLOGY

## 2020-09-10 PROCEDURE — 25800030 ZZH RX IP 258 OP 636: Performed by: STUDENT IN AN ORGANIZED HEALTH CARE EDUCATION/TRAINING PROGRAM

## 2020-09-10 PROCEDURE — 10907ZC DRAINAGE OF AMNIOTIC FLUID, THERAPEUTIC FROM PRODUCTS OF CONCEPTION, VIA NATURAL OR ARTIFICIAL OPENING: ICD-10-PCS | Performed by: OBSTETRICS & GYNECOLOGY

## 2020-09-10 RX ORDER — PENICILLIN G POTASSIUM 5000000 [IU]/1
5 INJECTION, POWDER, FOR SOLUTION INTRAMUSCULAR; INTRAVENOUS ONCE
Status: COMPLETED | OUTPATIENT
Start: 2020-09-10 | End: 2020-09-10

## 2020-09-10 RX ORDER — OXYTOCIN 10 [USP'U]/ML
INJECTION, SOLUTION INTRAMUSCULAR; INTRAVENOUS
Status: DISCONTINUED
Start: 2020-09-10 | End: 2020-09-11 | Stop reason: HOSPADM

## 2020-09-10 RX ORDER — LIDOCAINE HYDROCHLORIDE AND EPINEPHRINE 15; 5 MG/ML; UG/ML
INJECTION, SOLUTION EPIDURAL PRN
OUTPATIENT
Start: 2020-09-10

## 2020-09-10 RX ORDER — METRONIDAZOLE 500 MG/1
500 TABLET ORAL 2 TIMES DAILY
Status: DISCONTINUED | OUTPATIENT
Start: 2020-09-10 | End: 2020-09-12 | Stop reason: HOSPADM

## 2020-09-10 RX ORDER — OXYTOCIN/0.9 % SODIUM CHLORIDE 30/500 ML
PLASTIC BAG, INJECTION (ML) INTRAVENOUS
Status: DISCONTINUED
Start: 2020-09-10 | End: 2020-09-11 | Stop reason: HOSPADM

## 2020-09-10 RX ORDER — HYDROXYZINE HYDROCHLORIDE 50 MG/1
50 TABLET, FILM COATED ORAL ONCE
Status: COMPLETED | OUTPATIENT
Start: 2020-09-10 | End: 2020-09-10

## 2020-09-10 RX ORDER — METRONIDAZOLE 500 MG/1
500 TABLET ORAL 2 TIMES DAILY
Status: DISCONTINUED | OUTPATIENT
Start: 2020-09-10 | End: 2020-09-10

## 2020-09-10 RX ORDER — OXYCODONE AND ACETAMINOPHEN 5; 325 MG/1; MG/1
1 TABLET ORAL
Status: DISCONTINUED | OUTPATIENT
Start: 2020-09-10 | End: 2020-09-11

## 2020-09-10 RX ORDER — NALOXONE HYDROCHLORIDE 0.4 MG/ML
.1-.4 INJECTION, SOLUTION INTRAMUSCULAR; INTRAVENOUS; SUBCUTANEOUS
Status: DISCONTINUED | OUTPATIENT
Start: 2020-09-10 | End: 2020-09-11

## 2020-09-10 RX ORDER — SODIUM CHLORIDE, SODIUM LACTATE, POTASSIUM CHLORIDE, CALCIUM CHLORIDE 600; 310; 30; 20 MG/100ML; MG/100ML; MG/100ML; MG/100ML
INJECTION, SOLUTION INTRAVENOUS CONTINUOUS
Status: DISCONTINUED | OUTPATIENT
Start: 2020-09-10 | End: 2020-09-11

## 2020-09-10 RX ORDER — OXYTOCIN 10 [USP'U]/ML
10 INJECTION, SOLUTION INTRAMUSCULAR; INTRAVENOUS
Status: DISCONTINUED | OUTPATIENT
Start: 2020-09-10 | End: 2020-09-11

## 2020-09-10 RX ORDER — IBUPROFEN 800 MG/1
800 TABLET, FILM COATED ORAL
Status: COMPLETED | OUTPATIENT
Start: 2020-09-10 | End: 2020-09-11

## 2020-09-10 RX ORDER — MISOPROSTOL 200 UG/1
TABLET ORAL
Status: DISCONTINUED
Start: 2020-09-10 | End: 2020-09-11 | Stop reason: HOSPADM

## 2020-09-10 RX ORDER — OXYTOCIN/0.9 % SODIUM CHLORIDE 30/500 ML
100-340 PLASTIC BAG, INJECTION (ML) INTRAVENOUS CONTINUOUS PRN
Status: COMPLETED | OUTPATIENT
Start: 2020-09-10 | End: 2020-09-11

## 2020-09-10 RX ORDER — EPHEDRINE SULFATE 50 MG/ML
5 INJECTION, SOLUTION INTRAMUSCULAR; INTRAVENOUS; SUBCUTANEOUS
Status: DISCONTINUED | OUTPATIENT
Start: 2020-09-10 | End: 2020-09-11

## 2020-09-10 RX ORDER — SODIUM CHLORIDE, SODIUM LACTATE, POTASSIUM CHLORIDE, CALCIUM CHLORIDE 600; 310; 30; 20 MG/100ML; MG/100ML; MG/100ML; MG/100ML
INJECTION, SOLUTION INTRAVENOUS
Status: DISCONTINUED
Start: 2020-09-10 | End: 2020-09-10 | Stop reason: HOSPADM

## 2020-09-10 RX ORDER — FENTANYL CITRATE 50 UG/ML
50-100 INJECTION, SOLUTION INTRAMUSCULAR; INTRAVENOUS
Status: COMPLETED | OUTPATIENT
Start: 2020-09-10 | End: 2020-09-10

## 2020-09-10 RX ORDER — ONDANSETRON 2 MG/ML
4 INJECTION INTRAMUSCULAR; INTRAVENOUS EVERY 6 HOURS PRN
Status: DISCONTINUED | OUTPATIENT
Start: 2020-09-10 | End: 2020-09-11

## 2020-09-10 RX ORDER — CARBOPROST TROMETHAMINE 250 UG/ML
250 INJECTION, SOLUTION INTRAMUSCULAR
Status: DISCONTINUED | OUTPATIENT
Start: 2020-09-10 | End: 2020-09-11

## 2020-09-10 RX ORDER — MORPHINE SULFATE 10 MG/ML
10 INJECTION, SOLUTION INTRAMUSCULAR; INTRAVENOUS ONCE
Status: COMPLETED | OUTPATIENT
Start: 2020-09-10 | End: 2020-09-10

## 2020-09-10 RX ORDER — SERTRALINE HYDROCHLORIDE 25 MG/1
25 TABLET, FILM COATED ORAL DAILY
Status: DISCONTINUED | OUTPATIENT
Start: 2020-09-10 | End: 2020-09-12 | Stop reason: HOSPADM

## 2020-09-10 RX ORDER — LIDOCAINE HYDROCHLORIDE 10 MG/ML
INJECTION, SOLUTION EPIDURAL; INFILTRATION; INTRACAUDAL; PERINEURAL
Status: DISCONTINUED
Start: 2020-09-10 | End: 2020-09-11 | Stop reason: HOSPADM

## 2020-09-10 RX ORDER — NALBUPHINE HYDROCHLORIDE 20 MG/ML
2.5-5 INJECTION, SOLUTION INTRAMUSCULAR; INTRAVENOUS; SUBCUTANEOUS EVERY 6 HOURS PRN
Status: DISCONTINUED | OUTPATIENT
Start: 2020-09-10 | End: 2020-09-11

## 2020-09-10 RX ORDER — METHYLERGONOVINE MALEATE 0.2 MG/ML
200 INJECTION INTRAVENOUS
Status: DISCONTINUED | OUTPATIENT
Start: 2020-09-10 | End: 2020-09-11

## 2020-09-10 RX ADMIN — Medication 10 ML/HR: at 22:44

## 2020-09-10 RX ADMIN — METRONIDAZOLE 500 MG: 500 TABLET ORAL at 15:01

## 2020-09-10 RX ADMIN — FENTANYL CITRATE 100 MCG: 50 INJECTION, SOLUTION INTRAMUSCULAR; INTRAVENOUS at 15:12

## 2020-09-10 RX ADMIN — Medication: at 23:09

## 2020-09-10 RX ADMIN — Medication 2.5 MILLION UNITS: at 23:15

## 2020-09-10 RX ADMIN — SODIUM CHLORIDE, POTASSIUM CHLORIDE, SODIUM LACTATE AND CALCIUM CHLORIDE: 600; 310; 30; 20 INJECTION, SOLUTION INTRAVENOUS at 13:00

## 2020-09-10 RX ADMIN — Medication 5 MG: at 23:04

## 2020-09-10 RX ADMIN — ONDANSETRON 4 MG: 2 INJECTION INTRAMUSCULAR; INTRAVENOUS at 23:34

## 2020-09-10 RX ADMIN — METRONIDAZOLE 500 MG: 500 TABLET ORAL at 23:15

## 2020-09-10 RX ADMIN — SODIUM CHLORIDE, POTASSIUM CHLORIDE, SODIUM LACTATE AND CALCIUM CHLORIDE 1000 ML: 600; 310; 30; 20 INJECTION, SOLUTION INTRAVENOUS at 22:00

## 2020-09-10 RX ADMIN — Medication 5 MG: at 23:00

## 2020-09-10 RX ADMIN — LIDOCAINE HYDROCHLORIDE,EPINEPHRINE BITARTRATE 3 ML: 15; .005 INJECTION, SOLUTION EPIDURAL; INFILTRATION; INTRACAUDAL; PERINEURAL at 22:44

## 2020-09-10 RX ADMIN — OMEPRAZOLE 20 MG: 20 CAPSULE, DELAYED RELEASE ORAL at 15:01

## 2020-09-10 RX ADMIN — PENICILLIN G POTASSIUM 5 MILLION UNITS: 5000000 POWDER, FOR SOLUTION INTRAMUSCULAR; INTRAPLEURAL; INTRATHECAL; INTRAVENOUS at 18:55

## 2020-09-10 RX ADMIN — MORPHINE SULFATE 10 MG: 10 INJECTION INTRAVENOUS at 18:41

## 2020-09-10 RX ADMIN — HYDROXYZINE HYDROCHLORIDE 50 MG: 50 TABLET, FILM COATED ORAL at 18:42

## 2020-09-10 NOTE — PROVIDER NOTIFICATION
09/10/20 1630   Provider Notification   Provider Name/Title Dr. Ortiz   Method of Notification At Bedside   Request Evaluate in Person   Notification Reason Labor Status;Uterine Activity;Pain   MD was asked to come to bedside, to discuss pain management plan and labor status with patient.  Per patient, uc's still feel very uncomfortable, and dose of Fentanyl helped but has worn off after 1 hour. MD reviewing pain management options, and patient requesting to try Morphine/Vistaril prior to getting an epidural. MD also notified that Margarita recently applied after EFM difficult to use when patient is out of bed, or repositioning frequently. Patient would like to sit on birthing ball until dinner arrives, then have pain medication after she eats. Plan per MD after discussion, patient may have a break for 30 minutes from monitoring, and saline lock IV, and will place order for MS/Vistaril.  Patient verbalizing agreement with plan.

## 2020-09-10 NOTE — PROGRESS NOTES
[facility]  Labor Progress Note    Late entry due to patient care.    Subjective:  Patient back in bed due to increased contraction pain, amenable to cervical exam to assess progress.    Objective:   Patient Vitals for the past 4 hrs:   BP Temp Temp src Resp   09/10/20 1508 135/81 98.2  F (36.8  C) Oral 18     SVE: 3/90/-1  Membranes: intact    FHT: Baseline 145, moderate variability, accelerations present, no decelerations  [place]: 1-3 contractions in 10 minutes    Assessment/Plan:  Mary Shipman is a 35 year old  at 38w6d by 6w0d US, here for latent labor with planned induction on .    Labor:  - Expectant management for now, planning augmentation with pitocin starting at 0000 given that patient will be at 39w0d tomorrow  - Pain management: Desires eventual epidural for analgesia, will give morphine and Vistaril for now for longer-lasting pain control while retaining ability for movement    Asthma  -Continue PTA dulera   -Avoid hemabate in case of PPH      Depression/CRUZ  -PTA zoloft      Rubella equivocal  -MMR PP     BV   -Continue BV with metronidazole 500mg BID until      Hx preE w/o SF  -Normal BP here with one mild range, HELLP labs normal and UPC 0.22  -No s/sxs of pree  -Continue to monitor     Fetal well-being:  - Category I FHT although difficult to trace due to maternal habitus. Reactive and reassuring  - Cephalic by BSUS. EFW 9lb  - Continue EFM and Brusly    Karely Ortiz MD  Ob/Gyn Resident, PGY-3  09/10/20 6:41 PM

## 2020-09-10 NOTE — H&P
L&D History and Physical   September 10, 2020   Mary Erickson  [id number]      HPI: Mary Erickson is a 35 year old  at 38w6d by 6w0d US who presents with contractions.     She states that she is feeling well today, but that over night she reports she was uncomfortable with contractions, which are increasing in strength and frequency. They are about every 5-10min. She tried hydrating, does not take tylenol. She has not been able to sleep since 0100. She states yesterday her mucous plug came out, but no LOF or VB.  She denies headache, vision changes, chest pain, shortness of breath, fever, chills, nausea, vomiting or other systemic complaints. She denies vaginal bleeding or loss of fluid and is feeling normal fetal movement.      Her pregnancy is notable for:  -Suspected LGA fetus, EFW 3856g (91%) on  US   -Obesity, BMI 45  -GBS positive  -Thickened fetal nuchal fold  -Depression/CRUZ, on Zoloft  -Asthma  -GERD  -Hx alcohol abuse, currently sober  -Rubella equivocal  -Failed GCT, passed GTT  -Migraine  -History of preeclampsia without severe features    ROS: No headaches, vision changes, nausea, vomiting, fevers, chills, chest pain, SOB,  abdominal pain, constipation, diarrhea, dysuria, changes in vaginal discharge or edema in extremities noted.     OBHX:   OB History    Para Term  AB Living   3 1 1 0 1 1   SAB TAB Ectopic Multiple Live Births   0 1 0 0 1      # Outcome Date GA Lbr Frank/2nd Weight Sex Delivery Anes PTL Lv   3 Current            2 TAB 2017     TAB      1 Term 12 37w2d 03:50 / 00:48 3.11 kg (6 lb 13.7 oz) M Vag-Spont EPI N MELISSA      Birth Comments: Mother induced for pre-ecclamsia, required manual cervical dilation, hemorrhage during labor.  Juma had hypoglycemia at birth.      Name: MEGAN ERICKSON      Apgar1: 8  Apgar5: 9       Past Medical History:   Diagnosis Date     Allergic rhinitis due to other allergen      Antepartum mild preeclampsia 2012     Asthma   "    Depressive disorder      Esophageal reflux      Frequent UTI     had a kidney infection also in the past     Gestational hypertension 11/20/2012     Helicobacter pylori (H. pylori)     treated     Hyperlipidemia      Irritable bowel syndrome      Liver disease     \"fatty liver\" resolved on own.     Lump or mass in breast 11/30/2015     Mild preeclampsia 12/18/2012     Obesity      Polycystic ovaries      Thyrotoxicosis 5/9/2007       Past Surgical History:   Procedure Laterality Date     C APPENDECTOMY       C NONSPECIFIC PROCEDURE      nasal surgery      TONSILLECTOMY & ADENOIDECTOMY      surgery several times for this       Medications:   No current facility-administered medications on file prior to encounter.   albuterol (PROAIR HFA/PROVENTIL HFA/VENTOLIN HFA) 108 (90 Base) MCG/ACT inhaler,   fexofenadine-pseudoePHEDrine (ALLEGRA-D 24) 180-240 MG per 24 hr tablet, Take 1 tablet by mouth daily  fluticasone (FLONASE) 50 MCG/ACT nasal spray, Spray 1-2 sprays into both nostrils daily as needed for rhinitis or allergies  metroNIDAZOLE (FLAGYL) 500 MG tablet, Take 1 tablet (500 mg) by mouth 2 times daily  mometasone-formoterol (DULERA) 200-5 MCG/ACT inhaler, Inhale 2 puffs into the lungs 2 times daily Needs appointment for additional refills  omeprazole (PRILOSEC) 20 MG DR capsule, Take 1 capsule (20 mg) by mouth daily  sertraline (ZOLOFT) 25 MG tablet, 1/2 tab daily for 3-5 days, then 1 tab for 3-5 days, then 2 tabs  Pediatric Multiple Vitamins (CHILDRENS MULTI-VITAMINS OR),   sertraline (ZOLOFT) 50 MG tablet, Take 1 tablet (50 mg) by mouth daily May increase dose to 100 mg daily after 10 days if no improvement in symptoms. (Patient not taking: Reported on 9/8/2020)        Allergies   Allergen Reactions     Acetaminophen Anaphylaxis     Uncertain - hives/anaphylaxis     Levofloxacin Anaphylaxis     Hydrocodone Hives and Rash     Vicodin     Hydrocodone-Acetaminophen Rash     Feels like throat swelling, was given " after Tonsillectomy       Family History   Problem Relation Age of Onset     Gynecology Mother         ectopic pregnancy/endometriosis     Cancer - colorectal Mother      Congenital Anomalies Mother         kidney problems     Colon Cancer Mother      Hyperlipidemia Mother      Other Cancer Mother         Lung, Skin, Brain and Colon cancer     Asthma Mother      Arthritis Mother      Hypertension Mother      Chronic Obstructive Pulmonary Disease Mother      Breast Cancer Mother      Uterine Cancer Mother      Kidney Disease Mother      GERD Mother      Gynecology Sister         ectopic pregnancy/endometriosis     Unknown/Adopted Sister      Ovarian Cancer Sister      Heart Disease Father      Coronary Artery Disease Father      Heart Failure Father      Hyperlipidemia Father      Psychotic Disorder Brother      Unknown/Adopted Brother      Unknown/Adopted Brother      Unknown/Adopted Brother      Unknown/Adopted Brother      Unknown/Adopted Brother      Unknown/Adopted Sister      Cerebrovascular Disease Maternal Grandmother      Hyperlipidemia Maternal Grandmother      GERD Maternal Grandmother      Unknown/Adopted Maternal Grandfather      Unknown/Adopted Paternal Grandmother      Unknown/Adopted Paternal Grandfather        SocialHX:   Social History     Tobacco Use     Smoking status: Never Smoker     Smokeless tobacco: Never Used   Substance Use Topics     Alcohol use: No     Alcohol/week: 0.0 standard drinks     Drug use: No       ROS: 10-point ROS negative except as indicated in HPI.    Physical Exam:  Vitals:    09/10/20 0700 09/10/20 0745   BP: (!) 143/71 118/70   Resp: 18 18   Temp: 98.2  F (36.8  C) 98.1  F (36.7  C)   TempSrc: Oral Oral     General: alert, oriented female, resting in bed in NAD  CV: regular rate and rhythm   Lungs: clear bilaterally, no crackles or wheezes.   Abdomen: soft, gravid, non-tender, EFW 9 #.  Extremities: bilateral lower extremities non-tender with trace edema    SVE: 1.5/80/-2  > recheck /-2  Membranes: Intact    Presentation: Cephalic by BSUS    FHT: baseline 145, moderate variability, accelerations present, occasional variable decelerations with rapid return to baseline and moderate variability   [place]: 2-5contractions in ten minutes    Prenatal ultrasounds:  Most recent US :  Cardiac activity present.  bpm.  Fetal movements visualized.  Presentation cephalic.  Placenta Posterior.  Umbilical cord previously studied.       Prenatal Labs:   Lab Results   Component Value Date    ABO O 2020    RH Pos 2020    AS Neg 2020    HEPBANG Nonreactive 2020    CHPCRT Negative 2020    GCPCRT Negative 2020    TREPAB Negative 2016    RUBELLAABIGG 9 2012    HGB 12.7 2020    HIV Negative 2012       GBS Status:   Lab Results   Component Value Date    GBS Positive (A) 2020       Lab Results   Component Value Date    PAP NIL 2019       Labs:   Patient Vitals for the past 24 hrs:   BP Temp Temp src Resp   09/10/20 0745 118/70 98.1  F (36.7  C) Oral 18   09/10/20 0700 (!) 143/71 98.2  F (36.8  C) Oral 18       Assessment: 35 year old  at 38w6d by 6w0d US, here for rule out labor. Posterior placenta. BMI 45.76. EFW 9#.   Pregnancy also complicated by: Suspected LGA fetus, EFW 3856g (91%) on  US, Obesity, GBS positive,Thickened fetal nuchal fold, Depression/CRUZ, on Zoloft, Asthma, GERD, Hx alcohol abuse, currently sober, Rubella equivocal, Failed GCT, passed GTT, Migraine, History of preeclampsia without severe features    Plan:    #Latent labor  -Patient's cervix made change over the two hours she was observed in triage; she strongly desires to stay, lives one hour away. She is aware we are not able to do anything to augment her labor until midnight   -Expectant management  -After midnight at 39w can place velasquez/miso/AROM (after tx w PCN for GBS +)   -Pain control: desires epidural when more active       #Asthma  -Continue PTA dulera   -Avoid hemabate in case of PPH     #Depression/CRUZ  -PTA zoloft     #Rubella equivocal  -MMR PP    #BV   -Continue BV with metronidazole 500mg BID until 9/12    #Hx preE w/o SF  -NT to LMR here, HELLP labs ordered   -No s/sxs of pree  -Continue to monitor     #PNC  -Rh positive, GCT Failed, passed GTT.   -GBS positive.    -Placenta: posterior     #Suspected LGA fetus   -EFW 3856g (91%) on 9/2 US  -Risk of shoulder dystocia reviewed.   -Discussed possibility of nerve injury to fetus resulting in possible temporary or permanent paralysis; discussed higher likelihood of higher degree maternal lacerations and possibility of fecal/bladder incontinence with those tears. Patient is understanding of the risks and she would still like to proceed with a vaginal delivery   -Extra personal in the room, break down bed, patient aware to listen to instruction carefully     #Fetal Well Being  -Category I FHT overall, intermittently cat II for variable decels. Reassured by moderate variability and accels.  -Continue EFM.     #Postpartum planning  -Plans:   --Feeding: plans bottle  --Contraception: not discussed        Patient care plan discussed under supervision of Dr. Woodson.    WAI Casey MD  OBGYN Resident, PGY-2  PGY-2 Pager: [phone]  09/10/2020 8:47 AM

## 2020-09-10 NOTE — PROVIDER NOTIFICATION
09/10/20 0840   Provider Notification   Provider Name/Title Dr. Yu   Method of Notification Electronic Page   Request Evaluate in Person   Notification Reason Patient Arrived   G2 notified that patient arrived for R/O labor, with mild to moderate uc's q 2-6 min, EFM Cat 1.  Denies LOF, has has scant bloody show.  Will await Provider response for plan.

## 2020-09-10 NOTE — TELEPHONE ENCOUNTER
"36 y/o female who is 38w 6d and lost mucous plug, placed on bedrest, contractions returned around 1 AM and then dissipated, now contractions back at 4AM <10 minutes apart but at this time it is less than 1 hour. She also mentioned a feeling of possible rupture of membranes, RN instructed per  protocol to lie down for 15 minutes and then get up to check for water breaking or not. She will call back if contractions continue to the 1 hour eric and/or water is truly broken.    Planning to deliver at [place], OB Sadaf Montesinos RN - [place] [facility]  9/10/2020  4:50AM    Additional Information    Negative: Passed out (i.e., lost consciousness, collapsed and was not responding)    Negative: Shock suspected (e.g., cold/pale/clammy skin, too weak to stand, low BP, rapid pulse)    Negative: Difficult to awaken or acting confused (e.g., disoriented, slurred speech)    Negative: [1] SEVERE abdominal pain (e.g., excruciating) AND [2] constant AND [3] present > 1 hour    Negative: Severe bleeding (e.g., continuous red blood from vagina, or large blood clots)    Negative: Umbilical cord hanging out of the vagina (shiny, white, curled appearance, \"like telephone cord\")    Negative: Uncontrollable urge to push (i.e., feels like baby is coming out now)    Negative: Can see baby    Negative: Sounds like a life-threatening emergency to the triager    Negative: [1] First baby (primipara) AND [2] contractions < 6 minutes apart  AND [3] present 2 hours    Negative: [1] History of prior delivery (multipara) AND [2] contractions < 10 minutes apart AND [3] present 1 hour    Negative: [1] History of rapid prior delivery AND [2] contractions < 10 minutes apart    Negative: [1] Leakage of fluid from vagina AND [2] green or brown in color    Negative: [1] Leakage of fluid from vagina AND [2] leakage started > 4 hours ago    Negative: Vaginal bleeding or spotting    Negative: Baby moving less today (e.g., kick count < 5 in " 1 hour or < 10 in 2 hours)    Negative: Severe headache or headache that won't go away    Negative: New blurred vision or vision changes    Negative: MODERATE-SEVERE abdominal pain    Negative: Fever > 100.4 F (38.0 C)    Negative: [1] Leakage of fluid from vagina AND [2] leakage started < 4 hours ago    Negative: Patient sounds very sick or weak to the triager    [1] History of prior delivery (multipara) AND [2] contractions > 10 minutes, or for < 1 hour    Negative: [1] First baby (primipara) AND [2] contractions > 5 minutes apart, or for < 2 hours    Protocols used: PREGNANCY - LABOR-A-

## 2020-09-10 NOTE — PROGRESS NOTES
[facility]  Labor Progress Note    Late entry due to patient care.    Subjective:  Patient feeling stronger contractions, getting more uncomfortable.     Objective:   Patient Vitals for the past 4 hrs:   BP Temp Temp src Resp   09/10/20 1508 135/81 98.2  F (36.8  C) Oral 18     SVE: Not rechecked at this time  Membranes: intact    FHT: Baseline 145, moderate variability, accelerations present, no decelerations  [place]: 3 contractions in 10 minutes    Assessment/Plan:  Mary Shipman is a 35 year old  at 38w6d by 6w0d US, here for latent labor with planned induction on .    Labor:  - Expectant management for now, planning augmentation with pitocin starting at 0000 given that patient will be at 39w0d tomorrow  - Pain management: Desires eventual epidural for analgesia, though not ready for this now. Planning to sit on birthing ball until dinner, then reevaluate    Asthma  -Continue PTA dulera   -Avoid hemabate in case of PPH      Depression/CRUZ  -PTA zoloft      Rubella equivocal  -MMR PP     BV   -Continue BV with metronidazole 500mg BID until      Hx preE w/o SF  -Normal BP here with one mild range, HELLP labs normal and UPC 0.22  -No s/sxs of pree  -Continue to monitor     Fetal well-being:  - Category I FHT although difficult to trace due to maternal habitus. Reactive and reassuring  - Cephalic by BSUS. EFW 9lb  - Continue EFM and [place]    Karely Ortiz MD  Ob/Gyn Resident, PGY-3  09/10/20 2:38 PM

## 2020-09-10 NOTE — PROVIDER NOTIFICATION
09/10/20 1104   Provider Notification   Provider Name/Title Dr. Khan   Method of Notification In Department   Request Evaluate - Remote   Notification Reason Uterine Activity;Status Update   MD reviewing fetal and uterine tracing, Cat 1, plan per MD that patient may have a break from monitors while sitting on birthing ball and frequent patient repositioning.  EFM and toco off for now.

## 2020-09-10 NOTE — PROVIDER NOTIFICATION
09/10/20 1130   Provider Notification   Provider Name/Title Dr. Mariano   Method of Notification At Bedside   Request Evaluate in Person   Notification Reason Labor Status;SVE   MD performing SVE, no change. Plan that patient may remain off of EFM and toco monitoring for now, and will start IV. Patient states agreement with plan.

## 2020-09-10 NOTE — PROGRESS NOTES
Pt arrived to unit at 0641 reporting ctx that have intensified since 0400, reports ctx coming every 10 minutes. . Denies rupture of membranes, but reports some bloody show and mucous discharge. EFM applied. VS obtained, mild range pressure x1, afebrile. Triage assessment complete. Pt appears uncomfortable with ctx, having to breath through, but able to relax and rest in between. Pt reports she is scheduled for IOL tomorrow AM. Report given to MEGHANA Young.

## 2020-09-10 NOTE — PROVIDER NOTIFICATION
"   09/10/20 0920   Provider Notification   Provider Name/Title Dr. Mariano   Method of Notification At Bedside   Request Evaluate in Person   Notification Reason Patient Arrived;Uterine Activity     [facility]   Reason for maternal/fetal assessment per patient is Rule Out Labor  .  Patient is a . Prenatal record reviewed.      OB History    Para Term  AB Living   3 1 1 0 1 1   SAB TAB Ectopic Multiple Live Births   0 1 0 0 1      # Outcome Date GA Lbr Frank/2nd Weight Sex Delivery Anes PTL Lv   3 Current            2 TAB 2017     TAB      1 Term 12 37w2d 03:50 / 00:48 3.11 kg (6 lb 13.7 oz) M Vag-Spont EPI N MELISSA      Birth Comments: Mother induced for pre-ecclamsia, required manual cervical dilation, hemorrhage during labor.  Juma had hypoglycemia at birth.      Name: MEGAN ERICKSON      Apgar1: 8  Apgar5: 9   . Medical history:   Past Medical History:   Diagnosis Date     Allergic rhinitis due to other allergen      Antepartum mild preeclampsia 2012     Asthma      Depressive disorder      Esophageal reflux      Frequent UTI     had a kidney infection also in the past     Gestational hypertension 2012     Helicobacter pylori (H. pylori)     treated     Hyperlipidemia      Irritable bowel syndrome      Liver disease     \"fatty liver\" resolved on own.     Lump or mass in breast 2015     Mild preeclampsia 2012     Obesity      Polycystic ovaries      Thyrotoxicosis 2007   . Gestational Age 38w6d. VSS. Fetal movement present. Patient denies backache,  pelvic pressure, UTI symptoms, GI problems, edema, headache, visual disturbances, epigastric or URQ pain, abdominal pain, rupture of membranes. Patient reports occ bloody show and having stronger, irregular uc's since 0400 this morning. Support person Fred present.  Action: Verbal consent for EFM. Triage assessment completed. EFM applied for fetal assessment. Uterine assessment " shows ucs q2-6 min, palpating as mild. Fetal assessment: Presumed adequate fetal oxygenation documented (see flow record).   Response:  at bedside and notified of above information. Provider performing SVE: 1.5/80/-2, and Cephalic per bedside U/S.  Patient verbalized agreement with plan for IOL and PCN for positive GBS. Patient oriented to room and call light.

## 2020-09-10 NOTE — TELEPHONE ENCOUNTER
"Told to call back with contractions at 10 minutes apart. Water hasn't broke. Pregnant 38 wk, 6 days, induced tomorrow at 8 a.m.  She has contractions 10 minutes apart for more than one hour. She is due to be induced tomorrow. I reported to the charge nurse at Labor and Delivery at [place]. The patient's  will be driving her in for evaluation now.  Darlyn Rodriguez RN  [facility]      Reason for Disposition    [1] History of prior delivery (multipara) AND [2] contractions < 10 minutes apart AND [3] present 1 hour    Additional Information    Negative: Passed out (i.e., lost consciousness, collapsed and was not responding)    Negative: Shock suspected (e.g., cold/pale/clammy skin, too weak to stand, low BP, rapid pulse)    Negative: Difficult to awaken or acting confused (e.g., disoriented, slurred speech)    Negative: [1] SEVERE abdominal pain (e.g., excruciating) AND [2] constant AND [3] present > 1 hour    Negative: Severe bleeding (e.g., continuous red blood from vagina, or large blood clots)    Negative: Umbilical cord hanging out of the vagina (shiny, white, curled appearance, \"like telephone cord\")    Negative: Uncontrollable urge to push (i.e., feels like baby is coming out now)    Negative: Can see baby    Negative: Sounds like a life-threatening emergency to the triager    Negative: Pregnant < 37 weeks (i.e., )    Negative: [1] Uncertain delivery date AND [2] possibly pregnant < 37 weeks (i.e., )    Negative: [1] First baby (primipara) AND [2] contractions < 6 minutes apart  AND [3] present 2 hours    Protocols used: PREGNANCY - LABOR-A-AH      "

## 2020-09-11 LAB — T PALLIDUM AB SER QL: NONREACTIVE

## 2020-09-11 PROCEDURE — 25000132 ZZH RX MED GY IP 250 OP 250 PS 637: Performed by: STUDENT IN AN ORGANIZED HEALTH CARE EDUCATION/TRAINING PROGRAM

## 2020-09-11 PROCEDURE — 72200001 ZZH LABOR CARE VAGINAL DELIVERY SINGLE

## 2020-09-11 PROCEDURE — 59400 OBSTETRICAL CARE: CPT | Mod: GC | Performed by: OBSTETRICS & GYNECOLOGY

## 2020-09-11 PROCEDURE — 25800030 ZZH RX IP 258 OP 636: Performed by: STUDENT IN AN ORGANIZED HEALTH CARE EDUCATION/TRAINING PROGRAM

## 2020-09-11 PROCEDURE — 12000001 ZZH R&B MED SURG/OB UMMC

## 2020-09-11 PROCEDURE — 25000125 ZZHC RX 250: Performed by: STUDENT IN AN ORGANIZED HEALTH CARE EDUCATION/TRAINING PROGRAM

## 2020-09-11 PROCEDURE — 25000128 H RX IP 250 OP 636: Performed by: STUDENT IN AN ORGANIZED HEALTH CARE EDUCATION/TRAINING PROGRAM

## 2020-09-11 RX ORDER — MISOPROSTOL 200 UG/1
800 TABLET ORAL
Status: DISCONTINUED | OUTPATIENT
Start: 2020-09-11 | End: 2020-09-12 | Stop reason: HOSPADM

## 2020-09-11 RX ORDER — OXYTOCIN 10 [USP'U]/ML
10 INJECTION, SOLUTION INTRAMUSCULAR; INTRAVENOUS
Status: DISCONTINUED | OUTPATIENT
Start: 2020-09-11 | End: 2020-09-12 | Stop reason: HOSPADM

## 2020-09-11 RX ORDER — METHYLERGONOVINE MALEATE 0.2 MG/ML
200 INJECTION INTRAVENOUS
Status: DISCONTINUED | OUTPATIENT
Start: 2020-09-11 | End: 2020-09-12 | Stop reason: HOSPADM

## 2020-09-11 RX ORDER — DOCUSATE SODIUM 100 MG/1
100 CAPSULE, LIQUID FILLED ORAL 2 TIMES DAILY
Status: DISCONTINUED | OUTPATIENT
Start: 2020-09-11 | End: 2020-09-12 | Stop reason: HOSPADM

## 2020-09-11 RX ORDER — MODIFIED LANOLIN
OINTMENT (GRAM) TOPICAL
Status: DISCONTINUED | OUTPATIENT
Start: 2020-09-11 | End: 2020-09-12 | Stop reason: HOSPADM

## 2020-09-11 RX ORDER — SODIUM CHLORIDE, SODIUM LACTATE, POTASSIUM CHLORIDE, CALCIUM CHLORIDE 600; 310; 30; 20 MG/100ML; MG/100ML; MG/100ML; MG/100ML
INJECTION, SOLUTION INTRAVENOUS CONTINUOUS
Status: DISCONTINUED | OUTPATIENT
Start: 2020-09-11 | End: 2020-09-11

## 2020-09-11 RX ORDER — OXYTOCIN/0.9 % SODIUM CHLORIDE 30/500 ML
1-24 PLASTIC BAG, INJECTION (ML) INTRAVENOUS CONTINUOUS
Status: DISCONTINUED | OUTPATIENT
Start: 2020-09-11 | End: 2020-09-11

## 2020-09-11 RX ORDER — LIDOCAINE 40 MG/G
CREAM TOPICAL
Status: DISCONTINUED | OUTPATIENT
Start: 2020-09-11 | End: 2020-09-11

## 2020-09-11 RX ORDER — CALCIUM CARBONATE 500 MG/1
500 TABLET, CHEWABLE ORAL DAILY PRN
Status: DISCONTINUED | OUTPATIENT
Start: 2020-09-11 | End: 2020-09-12 | Stop reason: HOSPADM

## 2020-09-11 RX ORDER — OXYTOCIN/0.9 % SODIUM CHLORIDE 30/500 ML
340 PLASTIC BAG, INJECTION (ML) INTRAVENOUS CONTINUOUS PRN
Status: DISCONTINUED | OUTPATIENT
Start: 2020-09-11 | End: 2020-09-12 | Stop reason: HOSPADM

## 2020-09-11 RX ORDER — FAMOTIDINE 10 MG
10 TABLET ORAL 2 TIMES DAILY
Status: DISCONTINUED | OUTPATIENT
Start: 2020-09-11 | End: 2020-09-12 | Stop reason: HOSPADM

## 2020-09-11 RX ORDER — IBUPROFEN 600 MG/1
600 TABLET, FILM COATED ORAL EVERY 6 HOURS PRN
Status: DISCONTINUED | OUTPATIENT
Start: 2020-09-11 | End: 2020-09-12 | Stop reason: HOSPADM

## 2020-09-11 RX ORDER — HYDROCORTISONE 2.5 %
CREAM (GRAM) TOPICAL 3 TIMES DAILY PRN
Status: DISCONTINUED | OUTPATIENT
Start: 2020-09-11 | End: 2020-09-12 | Stop reason: HOSPADM

## 2020-09-11 RX ORDER — TERBUTALINE SULFATE 1 MG/ML
0.25 INJECTION, SOLUTION SUBCUTANEOUS
Status: DISCONTINUED | OUTPATIENT
Start: 2020-09-11 | End: 2020-09-11

## 2020-09-11 RX ORDER — BISACODYL 10 MG
10 SUPPOSITORY, RECTAL RECTAL DAILY PRN
Status: DISCONTINUED | OUTPATIENT
Start: 2020-09-13 | End: 2020-09-12 | Stop reason: HOSPADM

## 2020-09-11 RX ORDER — NALOXONE HYDROCHLORIDE 0.4 MG/ML
.1-.4 INJECTION, SOLUTION INTRAMUSCULAR; INTRAVENOUS; SUBCUTANEOUS
Status: DISCONTINUED | OUTPATIENT
Start: 2020-09-11 | End: 2020-09-12 | Stop reason: HOSPADM

## 2020-09-11 RX ORDER — OXYTOCIN/0.9 % SODIUM CHLORIDE 30/500 ML
100 PLASTIC BAG, INJECTION (ML) INTRAVENOUS CONTINUOUS
Status: DISCONTINUED | OUTPATIENT
Start: 2020-09-11 | End: 2020-09-12 | Stop reason: HOSPADM

## 2020-09-11 RX ADMIN — Medication 2 MILLI-UNITS/MIN: at 00:30

## 2020-09-11 RX ADMIN — DOCUSATE SODIUM 100 MG: 100 CAPSULE, LIQUID FILLED ORAL at 20:04

## 2020-09-11 RX ADMIN — Medication 340 ML/HR: at 04:04

## 2020-09-11 RX ADMIN — SODIUM CHLORIDE, POTASSIUM CHLORIDE, SODIUM LACTATE AND CALCIUM CHLORIDE 500 ML: 600; 310; 30; 20 INJECTION, SOLUTION INTRAVENOUS at 03:51

## 2020-09-11 RX ADMIN — Medication 5 MG: at 21:49

## 2020-09-11 RX ADMIN — IBUPROFEN 800 MG: 800 TABLET ORAL at 04:53

## 2020-09-11 RX ADMIN — SERTRALINE HYDROCHLORIDE 25 MG: 25 TABLET ORAL at 20:04

## 2020-09-11 RX ADMIN — IBUPROFEN 600 MG: 600 TABLET ORAL at 13:02

## 2020-09-11 RX ADMIN — FAMOTIDINE 10 MG: 10 TABLET, FILM COATED ORAL at 21:49

## 2020-09-11 RX ADMIN — Medication 2.5 MILLION UNITS: at 03:21

## 2020-09-11 RX ADMIN — IBUPROFEN 600 MG: 600 TABLET ORAL at 20:04

## 2020-09-11 RX ADMIN — METRONIDAZOLE 500 MG: 500 TABLET ORAL at 08:40

## 2020-09-11 NOTE — PROGRESS NOTES
Mary comfortable with epidural and very tired. Oxytocin started @ 0000. AROM declined at this time to allow for rest.     Plan to allow sleep until 0130 unless Mary is unable to rest. Will assess bladder status and readiness for AROM.

## 2020-09-11 NOTE — PROVIDER NOTIFICATION
09/10/20 1920   Provider Notification   Provider Name/Title Dr. Ortiz   Method of Notification At Bedside   Request Evaluate in Person   Notification Reason Labor Status;SVE   MD at bedside to check SVE: 3/90/-1.  Plan to have patient rest in bed and monitor EFM closely, after patient was up in room ad elaine with isidoro monitor signal interrupted frequently. Plan re-apply EFM and toco.

## 2020-09-11 NOTE — PLAN OF CARE
VSS. Uterine cramping well controlled with Ibuprofen. Pt formula feeding infant on cue. Pt tolerating regular diet and voiding without difficulty. Postpartum checks WNL. Pt bonding well with infant. Significant other present at bedside.

## 2020-09-11 NOTE — CONSULTS
[facility]  MATERNAL CHILD HEALTH   INITIAL PSYCHOSOCIAL ASSESSMENT     DATA:     Presenting Information: Mom is a  who delivered an infant female on 2020 at 39w0d. SW was consulted for psychosocial assessment.     Living Situation: Parents are April, who live together in [facility] [place] [facility]) along with Mary's 7-year-old son.     Social Support: Mary stated that her main support is Lissa. They have been together for approximately 7 years but have known each other since they were in trade school together many years ago. Mary's family lives in [place] where she previously lived. She and Lissa have social support from Lissa' family: aunts and cousins. Lissa has four children (youngest is starting second grade and oldest is a freshman). They often spend time with Lissa and Mary at their home.     Mary endorsed having a few close friends that she confides in as well.     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 She stated that she really enjoys her work and is able to work from home due to current pandemic. Lissa works full-time at [employer].     Insurance: Per chart review, Mary is covered under [employer] and plans to add baby to the plan.     Source of Financial Support: Parental employment      Mental Health History: Mary endorsed current and history of anxiety and depression. She stated that it is being well managed and she is on Zoloft. She stated that she is on 25mg but wishes to increase the dose a little. She previously took 200mg but it made her feel nauseous and dizzy so she reduced. She stated that she is going to reassess her symptoms at her 6 week postpartum visit with her OB. Mary stated that she has a very good relationship with her therapist who she has seen for quite a few years. She is going to continue this relationship and she feels it  really helps her. Mary stated that her depression spiked when she gave birth to her son who is now 8. She felt very anxious, irritable, sad, and cried a lot. Mary stated that she thinks it had a lot to do with the relationship she was in at the time with her son's dad. They are now . Mary informed that her coping mechanisms largely include talking to someone when she feels down; hence therapy is very important in her life. She has no current concerns regarding her mood and knows what to watch for and how to obtain help.     History of Postpartum Mood Disorders: Mary experienced postpartum depression 8 years ago after the birth of her son. She sought medication and therapy which she stated was very helpful.     Chemical Health History: not assessed. No SW concerns.     Legal/Child Protection Involvement: not assessed. No SW concerns.     INTERVENTION:       Chart review    Collaboration with team: bedside RN    Conducted Psychosocial Assessment    Introduction to Maternal Child Health SW role and scope of practice    Validated emotions and provided supportive listening    Provided psychoeducation on  mood and anxiety disorder    ASSESSMENT:     Coping: Mary appears to be coping very well so far in her postpartum recovery. She was happily engaged with SW at time of visit today. She easily shared her story and history of her course with anxiety and depression. Lissa easily engaged with SW as well. The couple appear to be very supportive to one another.     Assessment of parental risk for PMAD: High risk due to history of postpartum depression.     Resiliency Factors & Strengths: Mary is well versed in mental health. She is currently seeing a therapist and taking prescribed Zoloft. She has a social support network she confides in.     PLAN:     SW will continue to follow for supportive intervention.    VICKY Neely [facility]  - Maternal Child Health   Phone:  [phone]

## 2020-09-11 NOTE — PLAN OF CARE
VSS, Patient coping with uc's, see flow sheet for FHR and contraction pattern. Will continue to monitor and will notify provider is there is a change in status. Anticipate .

## 2020-09-11 NOTE — PROGRESS NOTES
Dr. Parks at bedside for SVE and AROM. Variable decel post AROM. Cephalic presentation and no cord felt per Dr. Parks. Reposition to L side with fetal recovery to baseline. Early deceleration noted as MD was leaving. Strip reviewed with MD. Plan to notify with any changes or difficulty tracing FHT.

## 2020-09-11 NOTE — PLAN OF CARE
Mary resting after epidural placement, comfortable and not feeling ctx. SVE 0000 3/-2 reveals no change. Pitocin started 0030. AROM 0305 w/ SVE of 490/0. Recurrent decels after AROM. Complete 0348.     0401 of viable female . APGARs 8,9, NICU team at delivery for initial assessment.    Perineum intact, no repair indicated. QBL 242mL, FF. Plan to formula feed per Mary's preference. Mary bonding well with , feeding skin to skin.     Epidural wearing off as Mary rests. Stable postpartum. Plan to reassess mobility before transfer to [facility].

## 2020-09-11 NOTE — ANESTHESIA PROCEDURE NOTES
Epidural Procedure Note  Staff -   Anesthesiologist:  Abdulaziz Donahue MD  Resident/Fellow: Jaylene Perez MD    Performed By: resident  Procedure performed by resident/CRNA in presence of a teaching physician.          Location: OB     Procedure start time:  9/10/2020 10:31 PM   Pre-procedure checklist:   patient identified, IV checked, site marked, risks and benefits discussed, informed consent, monitors and equipment checked, pre-op evaluation, at physician/surgeon's request and post-op pain management      Correct Patient: Yes      Correct Position: Yes      Correct Site: Yes      Correct Procedure: Yes      Correct Laterality:  Yes    Site Marked:  Yes  Procedure:     Procedure:  Epidural catheter    ASA:  3    Diagnosis:  Labor    Position:  Sitting    Sterile Prep: chloraprep      Insertion site:  L2-3    Local skin infiltration:  1% lidocaine    amount (mL):  3    Approach:  Midline    Needle gauge (G):  17    Needle Length (in):  3.5    Block Needle Type:  Touhy    Injection Technique:  LORT saline    MINAL at (cm):  7    Attempts:  1    Redirects:  0    Catheter gauge (G):  19    Catheter threaded easily: Yes      Threaded to cm at skin:  11    Threaded in epidural space (cm):  4    Paresthesias:  No    Aspiration negative for Heme or CSF: Yes      Test dose (mL):  3     Local anesthetic:  Lidocaine 1.5% w/ 1:200,000 epinephrine    Test dose time:  22:44    Test dose negative for signs of intravascular, subdural or intrathecal injection: Yes

## 2020-09-11 NOTE — PROGRESS NOTES
[facility]  Labor Progress Note    S:  Patient comfortable with epidural. Would like to rest prior to interventions like AROM.    O:   Patient Vitals for the past 4 hrs:   BP Temp Temp src Pulse Resp SpO2   09/10/20 2353 127/69 98.4  F (36.9  C) Oral -- 22 --   09/10/20 2343 105/56 -- -- -- 24 95 %   09/10/20 2338 102/56 -- -- -- 24 96 %   09/10/20 2332 110/56 -- -- -- 24 96 %   09/10/20 2325 122/72 -- -- -- -- 97 %   09/10/20 2320 129/68 -- -- -- -- 96 %   09/10/20 2315 123/69 -- -- -- -- 97 %   09/10/20 2307 119/68 -- -- -- -- --   09/10/20 2306 117/72 -- -- -- -- --   09/10/20 2303 97/61 -- -- -- -- 98 %   09/10/20 2302 99/67 -- -- -- -- --   09/10/20 2300 (!) 82/52 -- -- -- -- --   09/10/20 2259 (!) 84/52 -- -- -- -- --   09/10/20 2258 93/53 -- -- -- -- 97 %   09/10/20 2256 110/59 -- -- -- -- --   09/10/20 2254 105/61 -- -- -- -- --   09/10/20 2252 110/68 -- -- -- -- --   09/10/20 2248 129/79 -- -- -- -- 100 %   09/10/20 2246 133/67 -- -- -- -- --     SVE: 3/90/-2, bulging bag of water, head ballotable    FHT: Baseline 150, moderate variability, + accelerations, - decelerations  [place]: 2-3 contractions in 10 minutes    A/P:  Ms. Mary Shipman is a 35 year old  at 38w6d admitted in latent labor. Will begin induction after midnight when patient is 39wks. Pregnancy complicated by asthma, depression/CRUZ, rubella equivocal, BV, hx of pre-E w/o SF. Still in latent labor. Will start pitocin at this time given that she hasn't changed her cervix and AROM once fetal head is well applied    # Labor:   - SVE 1.5/80/-2 (0915)> 2/80/-2 (1150)> 3/90/-1 (1730, 2345). Comfortable with epidural  FWB: - Reactive and reassuring FHT  PNC: - Rh pos, Rubella equivocal (MMR postpartum), GBS positive (penicillin started at 1855), GCT passed, infectious labs wnl    # BV   -Continue BV with metronidazole 500mg BID until      # Hx preE w/o SF  -Normal BP here with one mild range, pre-E labs normal  and UPC 0.22  -No s/sxs of pre-E  -Continue to monitor BP    # Asthma  -Continue PTA dulera   -Avoid hemabate in case of PPH      # Depression/CRUZ  -PTA vivienne Parks MD  OB/GYN PGY-2  09/11/20 12:06 AM

## 2020-09-11 NOTE — DISCHARGE SUMMARY
VAGINAL DELIVERY DISCHARGE SUMMARY    Mary Shipman  : 1985  MRN: [id number]    Admit date: 9/10/2020  Discharge date: 2020    Admit Dx:   - 35 year old  at 39w0d   -Suspected LGA fetus, EFW 3856g (91%) on  US   -Obesity, BMI 45  -GBS positive  -Thickened fetal nuchal fold  -Depression/CRUZ, on Zoloft  -Asthma  -GERD  -Hx alcohol abuse, currently sober  -Rubella equivocal  -Failed GCT, passed GTT  -Migraine  -History of preeclampsia without severe features  -History of PPH    Discharge Dx:   - Same as above, s/p     Procedures:  - Spontaneous vaginal delivery  - Epidural analgesia    Admit HPI:  Mary Shipman is a 35 year old now  who presented at 38w6d in latent labor desiring admission for pain control and lives far away. Please see her admit H&P for full details of her PMH, PSH, Meds, Allergies and exam on admit.    Hospital course:  Her IOL began with pitocin as patient was 3/-1. She was augmented with AROM with clear fluids. Epidural was administered for pain control. Labor course was uncomplicated.  She progressed to complete, pushed with good maternal effort, and had a spontaneous vaginal delivery of a viable female infant in TIMBO position. Nuchal cord x1 was noted.  Apgars of 8 and 9 with weight of 3800 grams.  The cord was double clamped after 30 seconds and cut due to NICU request for evaluation at the warmer.  A cord segment and cord blood were obtained.  30U of IV pitocin was started. The placenta was then delivered using gentle traction and suprapubic pressure.  The uterus was noted to be firm after fundal massage.  The perineum was assessed for lacerations revealing no lacerations. Total QBL was 113 mL. The placenta appeared intact with a 3V umbilical cord.  Dr. Woodson was present for the entire procedure.     Her postpartum course was uncomplicated. She had one mild range blood pressure but did not meet criteria for any hypertensive disorder. HELLP labs were  normal and UPC was 0.22. On PPD#1, she was meeting all of her postpartum goals and deemed stable for discharge. She was voiding without difficulty, tolerating a regular diet without nausea and vomiting, her pain was well controlled on oral pain medicines and her lochia was appropriate. Her hemoglobin prior to delivery was 12.2 and after delivery was 10.7. Her Rh status was pos, and Rhogam was not indicated. She was rubella equivocal and a vaccine was indicated.    Discharge Medications:     Review of your medicines      UNREVIEWED medicines. Ask your doctor about these medicines      Dose / Directions   albuterol 108 (90 Base) MCG/ACT inhaler  Commonly known as:  PROAIR HFA/PROVENTIL HFA/VENTOLIN HFA      Refills:  0     CHILDRENS MULTI-VITAMINS OR      Refills:  0     Dulera 200-5 MCG/ACT inhaler  Used for:  Asthma, moderate persistent, poorly-controlled  Generic drug:  mometasone-formoterol      Dose:  2 puff  Inhale 2 puffs into the lungs 2 times daily Needs appointment for additional refills  Quantity:  1 Inhaler  Refills:  0     fexofenadine-pseudoePHEDrine 180-240 MG 24 hr tablet  Commonly known as:  ALLEGRA-D 24      Dose:  1 tablet  Take 1 tablet by mouth daily  Refills:  0     fluticasone 50 MCG/ACT nasal spray  Commonly known as:  FLONASE  Used for:  Sinus pressure      Dose:  1-2 spray  Spray 1-2 sprays into both nostrils daily as needed for rhinitis or allergies  Quantity:  16 g  Refills:  2     metroNIDAZOLE 500 MG tablet  Commonly known as:  FLAGYL  Used for:  BV (bacterial vaginosis), Encounter for triage in pregnant patient      Dose:  500 mg  Take 1 tablet (500 mg) by mouth 2 times daily  Quantity:  14 tablet  Refills:  0     omeprazole 20 MG DR capsule  Commonly known as:  priLOSEC  Used for:  Heartburn      Dose:  20 mg  Take 1 capsule (20 mg) by mouth daily  Quantity:  60 capsule  Refills:  4     * sertraline 50 MG tablet  Commonly known as:  ZOLOFT  Used for:  Major depressive disorder,  recurrent episode, moderate (H)      Dose:  50 mg  Take 1 tablet (50 mg) by mouth daily May increase dose to 100 mg daily after 10 days if no improvement in symptoms.  Quantity:  60 tablet  Refills:  11     * sertraline 25 MG tablet  Commonly known as:  ZOLOFT  Used for:  Major depressive disorder, recurrent episode, moderate (H)      1/2 tab daily for 3-5 days, then 1 tab for 3-5 days, then 2 tabs  Quantity:  60 tablet  Refills:  0         * This list has 2 medication(s) that are the same as other medications prescribed for you. Read the directions carefully, and ask your doctor or other care provider to review them with you.                Discharge/Disposition:  Mary Shipman was discharged to home in stable condition with the following instructions/medications:  1) Call for temperature > 100.4, bright red vaginal bleeding >1 pad an hour x 2 hours, foul smelling vaginal discharge, pain not controlled by usual oral pain meds, persistent nausea and vomiting not controlled on medications  2) She desired IUD at 6w postpartum for contraception.  3) For feeding she decided to bottle feed.  4) She was instructed to follow-up with her primary OB in 6 weeks for a routine postpartum visit.    Karely Ortiz MD  Ob/Gyn Resident, PGY-3  Pager: [phone]  09/12/20 2:01 PM

## 2020-09-11 NOTE — ANESTHESIA PREPROCEDURE EVALUATION
Anesthesia Pre-Procedure Evaluation    Patient: Mary Shipman   MRN:     [id number] Gender:   female   Age:    35 year old :      1985        Preoperative Diagnosis: * No pre-op diagnosis entered *   * No procedures listed *     LABS:  CBC:   Lab Results   Component Value Date    WBC 9.2 09/10/2020    WBC 8.1 2020    HGB 12.2 09/10/2020    HGB 12.7 2020    HCT 36.6 09/10/2020    HCT 38.4 2020     09/10/2020     2020     BMP:   Lab Results   Component Value Date     2020     2019    POTASSIUM 3.7 2020    POTASSIUM 4.1 2019    CHLORIDE 111 (H) 2020    CHLORIDE 108 2019    CO2 19 (L) 2020    CO2 26 2019    BUN 4 (L) 2020    BUN 10 2019    CR 0.56 09/10/2020    CR 0.51 (L) 2020    GLC 99 2020    GLC 95 2019     COAGS:   Lab Results   Component Value Date    PTT 30 2012    INR 1.08 2017    FIBR 443 (H) 2012     POC:   Lab Results   Component Value Date    HCG Positive (A) 2020    HCGS Negative 2017     OTHER:   Lab Results   Component Value Date    A1C 5.2 2020    JOHN 9.1 2020    ALBUMIN 3.8 2019    PROTTOTAL 7.4 2019    ALT 24 09/10/2020    AST 20 09/10/2020    ALKPHOS 78 2019    BILITOTAL 0.4 2019    LIPASE 103 2009    AMYLASE 56 2009    TSH 0.54 2019    T4 1.09 2009    CRP <2.9 2016    SED 10 10/13/2011        Preop Vitals    BP Readings from Last 3 Encounters:   09/10/20 133/83   20 125/82   20 120/76    Pulse Readings from Last 3 Encounters:   20 106   20 87   20 70      Resp Readings from Last 3 Encounters:   09/10/20 16   20 18   20 18    SpO2 Readings from Last 3 Encounters:   20 96%   20 98%   02/10/20 100%      Temp Readings from Last 1 Encounters:   09/10/20 36.8  C (98.2  F) (Oral)    Ht Readings from Last 1 Encounters:  "  09/08/20 1.651 m (5' 5\")      Wt Readings from Last 1 Encounters:   09/08/20 124.7 kg (275 lb)    Estimated body mass index is 45.76 kg/m  as calculated from the following:    Height as of 9/8/20: 1.651 m (5' 5\").    Weight as of 9/8/20: 124.7 kg (275 lb).     LDA:  Peripheral IV 09/10/20 Right Lower forearm (Active)   Site Assessment WDL 09/10/20 2000   Line Status Infusing 09/10/20 2000   Phlebitis Scale 0-->no symptoms 09/10/20 2000   Infiltration Scale 0 09/10/20 2000   Number of days: 0       Intrathecal/Epidural Catheter 09/10/20 (Active)   Number of days: 0        Past Medical History:   Diagnosis Date     Allergic rhinitis due to other allergen      Antepartum mild preeclampsia 11/23/2012     Asthma      Depressive disorder      Esophageal reflux      Frequent UTI     had a kidney infection also in the past     Gestational hypertension 11/20/2012     Helicobacter pylori (H. pylori)     treated     Hyperlipidemia      Irritable bowel syndrome      Liver disease     \"fatty liver\" resolved on own.     Lump or mass in breast 11/30/2015     Mild preeclampsia 12/18/2012     Obesity      Polycystic ovaries      Thyrotoxicosis 5/9/2007      Past Surgical History:   Procedure Laterality Date     C APPENDECTOMY       C NONSPECIFIC PROCEDURE      nasal surgery      TONSILLECTOMY & ADENOIDECTOMY      surgery several times for this      Allergies   Allergen Reactions     Acetaminophen Anaphylaxis     Uncertain - hives/anaphylaxis     Levofloxacin Anaphylaxis     Hydrocodone Hives and Rash     Vicodin     Hydrocodone-Acetaminophen Rash     Feels like throat swelling, was given after Tonsillectomy        Anesthesia Evaluation     .             ROS/MED HX    ENT/Pulmonary:     (+)asthma , . .    Neurologic:       Cardiovascular:         METS/Exercise Tolerance:     Hematologic:         Musculoskeletal:         GI/Hepatic:     (+) GERD       Renal/Genitourinary:         Endo:         Psychiatric:         Infectious " Disease:         Malignancy:         Other:                     JDONALDOG FV AN PHYSICAL EXAM    YULIETG FV AN PLAN NO PONV RULE          Cl Craft MD

## 2020-09-11 NOTE — PROVIDER NOTIFICATION
Mary felt vaginal fluid loss. Large amount of clear, slightly yellow tinged fluid with no odor noted on chux pads @ 0151. Large amount of blood tinged mucus visible at vaginal introitus as well. Straight cath performed @ 0200 for 100mL of light yellow urine, last void before epidural at 2155 and cath delayed for Mary to rest.    Check of chux pad @ 0240 revealed little fluid. Mary is feeling stronger contractions and gave herself a dose with the PCEA.       Dr. Sanchez paged the following @ 0230:    Straight cath only 100mL, will continue to evaluate fluid loss. States ctx are stronger/feeling mild pelvic pressure.

## 2020-09-11 NOTE — PROGRESS NOTES
Possible fetal tachycardia 6 min immediately prior to delivery. FHT broken and < 10min of HR above 160, so unable to determine if baseline changed.

## 2020-09-11 NOTE — PROVIDER NOTIFICATION
Dr. Sanchez in department. Discussed past loss of fluid and current scant fluid. Decision to come to bedside to evaluate membrane status via SVE.

## 2020-09-11 NOTE — PROVIDER NOTIFICATION
09/10/20 1841   Provider Notification   Provider Name/Title Dr. Ortiz   Method of Notification In Department   Request Evaluate - Remote   Notification Reason Status Update   MD reviewing fetal and uterine tracing, plan per MD to give Morphine and Vistaril now, and start IV PCN for +GBS.

## 2020-09-11 NOTE — PROGRESS NOTES
[facility]  Labor Progress Note    S:  Patient reports that contractions have improved in intensity though still uncomfortable. Would like to hold off on epidural as long as possible to preserve mobility.     O:   Patient Vitals for the past 4 hrs:   BP Temp Temp src Resp   09/10/20 2000 133/83 98.2  F (36.8  C) Oral 16     SVE: deferred    FHT: Baseline 145, moderate variability, + accelerations, - decelerations  [place]: 3 contractions in 10 minutes    A/P:  Ms. Mary Shipman is a 35 year old  at 38w6d admitted in latent labor. Will begin induction after midnight when patient is 39wks. Pregnancy complicated by asthma, depression/CRUZ, rubella equivocal, BV, hx of pre-E w/o SF. Labor progressing as expected.    # Labor:   - SVE 1.5/80/-2 (0915)> 2/80/-2 (1150)> 3/90/-1 (1730). Planning on epidural.   FWB: - Reactive and reassuring FHT  PNC: - Rh pos, Rubella equivocal (MMR postpartum), GBS positive (penicillin started at 1855), GCT passed, infectious labs wnl    # BV   -Continue BV with metronidazole 500mg BID until      # Hx preE w/o SF  -Normal BP here with one mild range, pre-E labs normal and UPC 0.22  -No s/sxs of pre-E  -Continue to monitor BP    # Asthma  -Continue PTA dulera   -Avoid hemabate in case of PPH      # Depression/CRUZ  -PTA zoloft     Fabian Parks MD  OB/GYN PGY-2  09/10/20 8:33 PM

## 2020-09-11 NOTE — PLAN OF CARE
Labor Shift Note  Data: Contraction frequency q 2-6 minutes and irregular, palpate moderate. Fetal assessment category one.  Leaking small amounts of bloody show, membranes intact.  Signs and symptoms of infection absent.  Blood pressures WNL. Signs and symptoms of pre-eclampsia: absent. Support person Markas present.  Interventions: Continue uterine/fetal assessment continuous. Vital Signs per order set. Comfort measures position changes, morphine and vistaril, support person.  Medicated for Epidural at 2245.  Plan: Anticipate . Provide labor/coping assistance as needed by patient and support person.  Observe for and notify care provider of indications of progressing labor, need for pain medications,  or signs of fetal/maternal compromise.

## 2020-09-11 NOTE — PROGRESS NOTES
[facility]  Labor Progress Note    S:  Patient comfortable with epidural. Patient thought her water may have broken as she soaked through 2 ortega pads around 0230. Patient is uncomfortable with contractions but is not in acute pain    O:   Patient Vitals for the past 4 hrs:   BP Temp Temp src Pulse Resp SpO2   20 0215 122/81 98.5  F (36.9  C) Oral -- -- 96 %   20 0151 -- -- -- -- -- 96 %   20 0100 112/55 -- -- -- 22 94 %   20 0000 -- -- -- -- -- 96 %   09/10/20 2353 127/69 98.4  F (36.9  C) Oral -- 22 --   09/10/20 2343 105/56 -- -- -- 24 95 %   09/10/20 2338 102/56 -- -- -- 24 96 %   09/10/20 2332 110/56 -- -- -- 24 96 %     SVE: 4/90/0, AROM, head well applied to cervix    FHT: Baseline 155, moderate variability, + accelerations, 1 early deceleration right after AROM  [place]: 4-5 contractions in 10 minutes    A/P:  Ms. Mary Shipman is a 35 year old  at 38w6d admitted in latent labor. Pregnancy complicated by asthma, depression/CRUZ, rubella equivocal, BV, hx of pre-E w/o SF. Still in latent labor. Induction for labor began at 0000 with administration of IV pitocin and AROM was performed at 0300. Patient thought her bag broke around 0230 but was found to be intact during SVE. Patient most likely emptied her bladder due to the decreased sensation with the epidural.  Baby's head is well applied to the cervix and after rupture is at station 0. Will wait for 4 hours to reperform SVE unless patient notifies us of increased pressure and possible advancement of labor.    # Labor:   - SVE 1.5/80/-2 (0915)> 2/80/-2 (1150)> 3/90/-1 (1730, 2345)> 4/90/0 (0300). Comfortable with epidural  FWB: - Reactive and reassuring. FHT.heart tracing was lost briefly due to movement of baby after AROM but was then reestablished after about 3 minutes.  PNC: - Rh pos, Rubella equivocal (MMR postpartum), GBS positive (penicillin started at 1855), GCT passed, infectious labs wnl    #  BV   -Continue BV with metronidazole 500mg BID until 9/12     # Hx preE w/o SF  -Normal BP here with one mild range, pre-E labs normal and UPC 0.22  -No s/sxs of pre-E  -Continue to monitor BP    # Asthma  -Continue PTA dulera   -Avoid hemabate in case of PPH      # Depression/CRUZ  -PTA zoloft     Peewee Denney on 9/11/2020 at 3:40 AM    Fabian Parks MD  OB/GYN PGY-2  09/11/20 4:54 AM

## 2020-09-11 NOTE — PROGRESS NOTES
FHT Review    150/min to mod/+ accels, occ variable and early decel.  Cat 2  [place]:  q2-5min.    Patient comfortable with epidural.    Per resident provider, cervix unchanged.  Head not well applied.  Plan to augment with pitocin.    Kori Woodson MD

## 2020-09-11 NOTE — PLAN OF CARE
Data: Mary Shipman transferred to [facility] via wheelchair at 0800. Baby transferred via parent's arms.  Action: Receiving unit notified of transfer: Yes. Patient and family notified of room change. Report given to [facility] at 0745. Belongings sent to receiving unit. Accompanied by Registered Nurse. Oriented patient to surroundings. Call light within reach. ID bands double-checked with receiving RN.  Response: Patient tolerated transfer and is stable.

## 2020-09-12 VITALS
RESPIRATION RATE: 18 BRPM | SYSTOLIC BLOOD PRESSURE: 126 MMHG | TEMPERATURE: 98.4 F | DIASTOLIC BLOOD PRESSURE: 79 MMHG | OXYGEN SATURATION: 97 % | HEART RATE: 91 BPM

## 2020-09-12 LAB — HGB BLD-MCNC: 10.7 G/DL (ref 11.7–15.7)

## 2020-09-12 PROCEDURE — 36415 COLL VENOUS BLD VENIPUNCTURE: CPT | Performed by: STUDENT IN AN ORGANIZED HEALTH CARE EDUCATION/TRAINING PROGRAM

## 2020-09-12 PROCEDURE — 85018 HEMOGLOBIN: CPT | Performed by: STUDENT IN AN ORGANIZED HEALTH CARE EDUCATION/TRAINING PROGRAM

## 2020-09-12 PROCEDURE — 25000132 ZZH RX MED GY IP 250 OP 250 PS 637: Performed by: STUDENT IN AN ORGANIZED HEALTH CARE EDUCATION/TRAINING PROGRAM

## 2020-09-12 RX ORDER — IBUPROFEN 600 MG/1
600 TABLET, FILM COATED ORAL EVERY 6 HOURS PRN
Qty: 60 TABLET | Refills: 0 | Status: SHIPPED | OUTPATIENT
Start: 2020-09-12 | End: 2022-01-27

## 2020-09-12 RX ORDER — AMOXICILLIN 250 MG
1 CAPSULE ORAL DAILY
Qty: 100 TABLET | Refills: 0 | Status: SHIPPED | OUTPATIENT
Start: 2020-09-12 | End: 2020-10-28

## 2020-09-12 RX ADMIN — DOCUSATE SODIUM 100 MG: 100 CAPSULE, LIQUID FILLED ORAL at 09:43

## 2020-09-12 RX ADMIN — FAMOTIDINE 10 MG: 10 TABLET, FILM COATED ORAL at 09:43

## 2020-09-12 RX ADMIN — METRONIDAZOLE 500 MG: 500 TABLET ORAL at 01:03

## 2020-09-12 RX ADMIN — METRONIDAZOLE 500 MG: 500 TABLET ORAL at 09:43

## 2020-09-12 RX ADMIN — IBUPROFEN 600 MG: 600 TABLET ORAL at 04:35

## 2020-09-12 NOTE — PROGRESS NOTES
Post Partum Progress Note  PPD#1  This is a [facility] pt and I accidentally rounded on her.  D/W patient.  She was understanding of situation.     Subjective:  She is resting comfortably in bed this morning. Pain is improving and well controlled on current medication regimen. She is tolerating PO intake. Lochia present and minimal.  She is voiding without difficulty. She has passed flatus. She is ambulating without dizziness or difficulty.  She denies headache, changes in vision, nausea/vomiting, chest pain, shortness of breath, RUQ pain, or worsening edema.  Plans to bottle feed.    Objective:  Vitals:    20 0923 20 1731 20 0059   BP: 121/74 119/74 132/72 126/83   Pulse: 100 93 92 89   Resp: 22 18 18 18   Temp: 98.2  F (36.8  C) 98  F (36.7  C) 98.8  F (37.1  C) 97.9  F (36.6  C)   TempSrc: Oral Oral Oral Oral   SpO2: 97%          General: NAD, resting comfortably  CV: Regular rate, well perfused.   Pulm: Normal respiratory effort.  Abd: Soft, non-tender, non-distended. Fundus is firm and 1 cm below the umbilicus.   Ext: No lower extremity edema bilaterally. No calf tenderness.  Hemoglobin   Date Value Ref Range Status   2020 10.7 (L) 11.7 - 15.7 g/dL Final   ]    Assessment/Plan:  Mary Shipman is a 35 year old  female who is PPD# 1 s/p vaginal birth.      - PNC: Rh pos. Rubella equivocal. MMR ordered.  - Pain: controlled on oral medications  - Heme: Hgb 10.7  If <10 will discharge home with iron.  - GI: continue anti-emetics and stool softeners as needed.  - :Voiding spontaneously .  - Infant: Stable in room  - Feeding: Bottle feeding.  - BC: Plans on IUD at 6 weeks, then TL  -Depression/anxiety on Zoloft 25 mg.  Discharge to home on PPD#1, today    Stephie Nogueira MD  OB/GYN PGY-1  20 7:13 AM    I saw and evaluated the patient. I agree with the   findings and the plan of care as documented in the resident's note as updated by me.  MD Charlette

## 2020-09-12 NOTE — PROVIDER NOTIFICATION
09/11/20 2000   Provider Notification   Provider Name/Title [place] LEDBETTER   Method of Notification Electronic Page   Request Evaluate-Remote   Notification Reason Medication Request       Patient is requesting melatonin 5mg. States she takes it at home.

## 2020-09-12 NOTE — PROGRESS NOTES
I saw Mary Shipman and Dr. Domínguez had seen her, agree with progress note.  Ok for discharge.  Instructions given.  Thinking she would like to do have nexplanon placed at pp visit.  Discussed.      THUY ZAMORA MD

## 2020-09-12 NOTE — PROGRESS NOTES
Patient arrived to [facility] unit via wheelchair at 0800,with belongings, accompanied by spouse/ significant other, with infant in arms. Received report from Emperatriz Harry RN and checked bands. Unit and room orientation completd. Call light given and within arms reach; no concerns present at this time. Continue with plan of care.

## 2020-09-12 NOTE — PLAN OF CARE
Stable and discharge to home today with baby. Discharge education and instructions reviewed and questions were addressed.

## 2020-09-14 ENCOUNTER — TELEPHONE (OUTPATIENT)
Dept: OBGYN | Facility: CLINIC | Age: 35
End: 2020-09-14

## 2020-09-14 NOTE — LETTER
[address] [id number] [phone]      September 14, 2020      Mary Shipman  [address] [place] [id number]              To Whom It May Concern:    Mary Shipman has been seen in our clinic for prenatal care.  She started labor and was at the hospital on Thursday, September 10 and delivered on the 11th.  She needed help from Aris Ray during this time.  Please excuse him from work on these days.  If you have any questions, you can contact my office by calling [phone].    Sincerely,    Carmelita Talavera MD

## 2020-09-14 NOTE — TELEPHONE ENCOUNTER
Pt needs a letter that states that she was at the hospital on Thursday and Friday for delivery.  Letter done and in pt's chart.  She will print at home.  Pt also had questions regarding milk production.  She is not breastfeeding.  Advices ice packs to breasts to decrease production, a well fitting (but not too tight) bra, ibuprofen.  Geovanna Magana RN

## 2020-09-16 ENCOUNTER — TELEPHONE (OUTPATIENT)
Dept: OBGYN | Facility: CLINIC | Age: 35
End: 2020-09-16

## 2020-09-16 NOTE — TELEPHONE ENCOUNTER
Please call her back and ask her to try resting more today.  We should fit her into clinic within the next day or so.  Can come today if there are any openings.  I think she needs to rest more, teresa if her feet are swollen.   Carmelita Talavera MD

## 2020-09-16 NOTE — TELEPHONE ENCOUNTER
TC to pt.  Scheduled her for tomorrow at [place] with Dr. Cantu, but advised that if any of her symptoms get worse between now and then, she should not wait until her appointment to be seen.  Pt verbalized understanding.  Geovanna Magana RN

## 2020-09-16 NOTE — TELEPHONE ENCOUNTER
Home Health RN is doing a home visit with Mary.  BP today was 144/96 (initally 142/94), NG yesterday that has resolved, some edema in legs but is not pitting.  It gets worse as the day goes on.  Do you want her to come to clinic for a re-check at all this week?  Or do you want her to be seen today?  Geovanna Magana, RN

## 2020-09-17 ENCOUNTER — OFFICE VISIT (OUTPATIENT)
Dept: OBGYN | Facility: CLINIC | Age: 35
End: 2020-09-17
Payer: COMMERCIAL

## 2020-09-17 VITALS
SYSTOLIC BLOOD PRESSURE: 141 MMHG | WEIGHT: 258.2 LBS | HEIGHT: 65 IN | BODY MASS INDEX: 43.02 KG/M2 | DIASTOLIC BLOOD PRESSURE: 90 MMHG | HEART RATE: 74 BPM

## 2020-09-17 DIAGNOSIS — F33.1 MAJOR DEPRESSIVE DISORDER, RECURRENT EPISODE, MODERATE (H): Primary | ICD-10-CM

## 2020-09-17 PROCEDURE — 99213 OFFICE O/P EST LOW 20 MIN: CPT | Performed by: OBSTETRICS & GYNECOLOGY

## 2020-09-17 RX ORDER — SERTRALINE HYDROCHLORIDE 100 MG/1
100 TABLET, FILM COATED ORAL DAILY
Qty: 90 TABLET | Refills: 3 | Status: SHIPPED | OUTPATIENT
Start: 2020-09-17 | End: 2022-02-14 | Stop reason: DRUGHIGH

## 2020-09-17 ASSESSMENT — ANXIETY QUESTIONNAIRES
5. BEING SO RESTLESS THAT IT IS HARD TO SIT STILL: NEARLY EVERY DAY
7. FEELING AFRAID AS IF SOMETHING AWFUL MIGHT HAPPEN: NEARLY EVERY DAY
2. NOT BEING ABLE TO STOP OR CONTROL WORRYING: NEARLY EVERY DAY
GAD7 TOTAL SCORE: 21
1. FEELING NERVOUS, ANXIOUS, OR ON EDGE: NEARLY EVERY DAY
6. BECOMING EASILY ANNOYED OR IRRITABLE: NEARLY EVERY DAY
3. WORRYING TOO MUCH ABOUT DIFFERENT THINGS: NEARLY EVERY DAY

## 2020-09-17 ASSESSMENT — PATIENT HEALTH QUESTIONNAIRE - PHQ9
5. POOR APPETITE OR OVEREATING: NEARLY EVERY DAY
SUM OF ALL RESPONSES TO PHQ QUESTIONS 1-9: 20

## 2020-09-17 ASSESSMENT — PAIN SCALES - GENERAL: PAINLEVEL: NO PAIN (0)

## 2020-09-17 ASSESSMENT — MIFFLIN-ST. JEOR: SCORE: 1857.07

## 2020-09-17 NOTE — PROGRESS NOTES
"S:  Mary presents for BP and mood check.   Baby failed hearing test and has a heart murmur.   Baby born last Friday. Anxiety started Monday.    Can't sleep.   Always crying.   No appetite.   Had postpartum mood disorders with prior pregnancies.   On a much lower dose of zoloft than prior to pregnancy. Took klonopin with previous PP depression/anxiety.   Her mom is helping her with baby.     BP was elevated at home.  No headache, vision changes, RUQ pain.  Did have a mild range blood pressure while admitted.       Past Medical History:   Diagnosis Date     Allergic rhinitis due to other allergen      Antepartum mild preeclampsia 11/23/2012     Asthma      Depressive disorder      Esophageal reflux      Frequent UTI     had a kidney infection also in the past     Gestational hypertension 11/20/2012     Helicobacter pylori (H. pylori)     treated     Hyperlipidemia      Irritable bowel syndrome      Liver disease     \"fatty liver\" resolved on own.     Lump or mass in breast 11/30/2015     Mild preeclampsia 12/18/2012     Obesity      Polycystic ovaries      Thyrotoxicosis 5/9/2007     Past Surgical History:   Procedure Laterality Date     C APPENDECTOMY       C NONSPECIFIC PROCEDURE      nasal surgery      TONSILLECTOMY & ADENOIDECTOMY      surgery several times for this     Family History   Problem Relation Age of Onset     Gynecology Mother         ectopic pregnancy/endometriosis     Cancer - colorectal Mother      Congenital Anomalies Mother         kidney problems     Colon Cancer Mother      Hyperlipidemia Mother      Other Cancer Mother         Lung, Skin, Brain and Colon cancer     Asthma Mother      Arthritis Mother      Hypertension Mother      Chronic Obstructive Pulmonary Disease Mother      Breast Cancer Mother      Uterine Cancer Mother      Kidney Disease Mother      GERD Mother      Gynecology Sister         ectopic pregnancy/endometriosis     Unknown/Adopted Sister      Ovarian Cancer Sister      " "Heart Disease Father      Coronary Artery Disease Father      Heart Failure Father      Hyperlipidemia Father      Psychotic Disorder Brother      Unknown/Adopted Brother      Unknown/Adopted Brother      Unknown/Adopted Brother      Unknown/Adopted Brother      Unknown/Adopted Brother      Unknown/Adopted Sister      Cerebrovascular Disease Maternal Grandmother      Hyperlipidemia Maternal Grandmother      GERD Maternal Grandmother      Unknown/Adopted Maternal Grandfather      Unknown/Adopted Paternal Grandmother      Unknown/Adopted Paternal Grandfather      Current Outpatient Medications   Medication     albuterol (PROAIR HFA/PROVENTIL HFA/VENTOLIN HFA) 108 (90 Base) MCG/ACT inhaler     fexofenadine-pseudoePHEDrine (ALLEGRA-D 24) 180-240 MG per 24 hr tablet     fluticasone (FLONASE) 50 MCG/ACT nasal spray     ibuprofen (ADVIL/MOTRIN) 600 MG tablet     mometasone-formoterol (DULERA) 200-5 MCG/ACT inhaler     omeprazole (PRILOSEC) 20 MG DR capsule     Pediatric Multiple Vitamins (CHILDRENS MULTI-VITAMINS OR)     senna-docusate (SENOKOT-S/PERICOLACE) 8.6-50 MG tablet     sertraline (ZOLOFT) 100 MG tablet     hydrOXYzine (ATARAX) 25 MG tablet     SUMAtriptan (IMITREX) 50 MG tablet     No current facility-administered medications for this visit.      Facility-Administered Medications Ordered in Other Visits   Medication     lidocaine-EPINEPHrine 1.5 %-[id number] injection        Allergies   Allergen Reactions     Acetaminophen Anaphylaxis     Uncertain - hives/anaphylaxis     Levofloxacin Anaphylaxis     Hydrocodone Hives and Rash     Vicodin     Hydrocodone-Acetaminophen Rash     Feels like throat swelling, was given after Tonsillectomy     O:  Vitals:    09/17/20 1558 09/17/20 1601   BP: (!) 138/90 (!) 141/90   BP Location: Left arm    Patient Position: Sitting    Cuff Size: Adult Large    Pulse: 74    Weight: 117.1 kg (258 lb 3.2 oz)    Height: 1.651 m (5' 5\")      Gen: well appearing, crying    CRUZ-7 SCORE " 3/16/2020 2020 2020   Total Score - - -   Total Score 13 16 21       PHQ 3/16/2020 2020 2020   PHQ-9 Total Score 13 16 20   Q9: Thoughts of better off dead/self-harm past 2 weeks Not at all Not at all Not at all     A/P:  35 year old  with   (F33.1) Major depressive disorder, recurrent episode, moderate (H)  (primary encounter diagnosis)  Comment:   Plan: sertraline (ZOLOFT) 100 MG tablet        Discussed increasing zoloft from 50 to 100 mg.   Recommended re-establishing with her PCP for management of her more complicated depression and anxiety.   She has a virtual visit scheduled in a few days.     (O14.95) Pre-eclampsia in postpartum period  Comment:   Plan: Discussed that she meets blood pressure criteria but has no apparent severe features at this time.   BP does not need medication currently   She has appointment with me in another week for BP check, if persistent we will repeat labs then.     Greater than 50% of this 20 minute appointment was spent in counseling on above issues.

## 2020-09-18 ENCOUNTER — VIRTUAL VISIT (OUTPATIENT)
Dept: FAMILY MEDICINE | Facility: CLINIC | Age: 35
End: 2020-09-18
Payer: COMMERCIAL

## 2020-09-18 ENCOUNTER — MYC MEDICAL ADVICE (OUTPATIENT)
Dept: FAMILY MEDICINE | Facility: CLINIC | Age: 35
End: 2020-09-18

## 2020-09-18 DIAGNOSIS — G43.809 OTHER MIGRAINE WITHOUT STATUS MIGRAINOSUS, NOT INTRACTABLE: ICD-10-CM

## 2020-09-18 DIAGNOSIS — F41.8 POSTPARTUM ANXIETY: Primary | ICD-10-CM

## 2020-09-18 PROCEDURE — 99214 OFFICE O/P EST MOD 30 MIN: CPT | Mod: 95 | Performed by: PHYSICIAN ASSISTANT

## 2020-09-18 RX ORDER — HYDROXYZINE HYDROCHLORIDE 25 MG/1
25-50 TABLET, FILM COATED ORAL 3 TIMES DAILY PRN
Qty: 45 TABLET | Refills: 2 | Status: SHIPPED | OUTPATIENT
Start: 2020-09-18 | End: 2021-12-13

## 2020-09-18 RX ORDER — CLONAZEPAM 0.5 MG/1
0.5 TABLET ORAL DAILY PRN
Qty: 21 TABLET | Refills: 0 | Status: SHIPPED | OUTPATIENT
Start: 2020-09-18 | End: 2021-03-16

## 2020-09-18 RX ORDER — SUMATRIPTAN 50 MG/1
50 TABLET, FILM COATED ORAL
Qty: 12 TABLET | Refills: 0 | Status: SHIPPED | OUTPATIENT
Start: 2020-09-18 | End: 2021-04-29

## 2020-09-18 ASSESSMENT — ANXIETY QUESTIONNAIRES: GAD7 TOTAL SCORE: 21

## 2020-09-18 NOTE — PROGRESS NOTES
"Mary Shipman is a 35 year old female who is being evaluated via a billable telephone visit.      The patient has been notified of following:     \"This telephone visit will be conducted via a call between you and your physician/provider. We have found that certain health care needs can be provided without the need for a physical exam.  This service lets us provide the care you need with a short phone conversation.  If a prescription is necessary we can send it directly to your pharmacy.  If lab work is needed we can place an order for that and you can then stop by our lab to have the test done at a later time.    Telephone visits are billed at different rates depending on your insurance coverage. During this emergency period, for some insurers they may be billed the same as an in-person visit. Please reach out to your insurance provider with any questions.    If during the course of the call the physician/provider feels a telephone visit is not appropriate, you will not be charged for this service.\"    Patient has given verbal consent for Telephone visit?  Yes    What phone number would you like to be contacted at? [phone]    How would you like to obtain your AVS? Mario Bassett     Mary Shipman is a 35 year old female who presents via phone visit today for the following health issues:    HPI    Depression and Anxiety Follow-Up    How are you doing with your depression since your last visit? Worsened, patient says she is having horrible post partum depression.     How are you doing with your anxiety since your last visit?  Worsened    Are you having other symptoms that might be associated with depression or anxiety? Yes:  overthinking, not being able to sleep, feeling anxious, not eating or drinking, everything horrible is going to happen, patient cannot stop crying    Have you had a significant life event? OTHER: Recent Birth of daughter     Do you have any concerns with your use of alcohol or " other drugs? No     Post partum. Struggling with anxiety. Just got back on sertraline. Is doing therapy right now. She reports no self harm thoughts or thoughts to harm others or her child. Mood is labile. Some depression, but mostly feeling very panicked a lot of the time.    Similar issues after last pregnancy.    Long standing history of mood challenges needing some CBT / DBT type treatments, medications, therapy, etc.      Social History     Tobacco Use     Smoking status: Never Smoker     Smokeless tobacco: Never Used   Substance Use Topics     Alcohol use: No     Alcohol/week: 0.0 standard drinks     Drug use: No     PHQ 3/16/2020 6/4/2020 9/17/2020   PHQ-9 Total Score 13 16 20   Q9: Thoughts of better off dead/self-harm past 2 weeks Not at all Not at all Not at all     CRUZ-7 SCORE 3/16/2020 6/4/2020 9/17/2020   Total Score - - -   Total Score 13 16 21         Suicide Assessment Five-step Evaluation and Treatment (SAFE-T)    Migraine     Since your last clinic visit, how have your headaches changed?  Worsened    How often are you getting headaches or migraines? Everyday for the past week     Are you able to do normal daily activities when you have a migraine? No, today was difficult    Are you taking rescue/relief medications? (Select all that apply) ibuprofen (Advil, Motrin) sometimes takes edge off but does not do a lot.     How helpful is your rescue/relief medication? None    Are you taking any medications to prevent migraines? (Select all that apply)  No    In the past 4 weeks, how often have you gone to urgent care or the emergency room because of your headaches?  0    Patient had BP checked by home nurse and it was high. Patient did go to OB clinic to recheck BP and it was 140/94, patient was told it could be due to anxiety.       How many servings of fruits and vegetables do you eat daily?  0-1    On average, how many sweetened beverages do you drink each day (Examples: soda, juice, sweet tea, etc.  Do  NOT count diet or artificially sweetened beverages)?   0    How many days per week do you exercise enough to make your heart beat faster? 3 or less    How many minutes a day do you exercise enough to make your heart beat faster? 9 or less    How many days per week do you miss taking your medication? 0           Review of Systems   Constitutional, HEENT, cardiovascular, pulmonary, gi and gu systems are negative, except as otherwise noted.       Objective            healthy, alert and no distress  PSYCH: Alert and oriented times 3; coherent speech, normal   rate and volume, able to articulate logical thoughts, able   to abstract reason, no tangential thoughts, no hallucinations   or delusions  Her affect is normal  RESP: No cough, no audible wheezing, able to talk in full sentences  Remainder of exam unable to be completed due to telephone visits        Assessment/Plan:    Assessment & Plan     Postpartum anxiety  Severe.   Not sleeping. Feels panicked all the time.  Shaking, anxious, tearful on the phone.  Trial Hydroxyzine preferred, can take scheduled if tolerated.  Clonazepam limited / short term use.  This prescription is given with a discussion of side effects, risks and proper use.  Instructions are given to follow up if not improving or symptoms change or worsen as discussed.   Follow up with therapy and with me in 1 week. Sooner if issues.  - clonazePAM (KLONOPIN) 0.5 MG tablet; Take 1 tablet (0.5 mg) by mouth daily as needed for anxiety  - hydrOXYzine (ATARAX) 25 MG tablet; Take 1-2 tablets (25-50 mg) by mouth 3 times daily as needed for anxiety    Other migraine without status migrainosus, not intractable  Acute. No Triptan at home. Can try. If not improved, contact me. Is not breast feeding. This prescription is given with a discussion of side effects, risks and proper use.  Instructions are given to follow up if not improving or symptoms change or worsen as discussed.   - SUMAtriptan (IMITREX) 50 MG  "tablet; Take 1 tablet (50 mg) by mouth at onset of headache for migraine May repeat in 2 hours. Max 4 tablets/24 hours.     BMI:   Estimated body mass index is 42.97 kg/m  as calculated from the following:    Height as of 9/17/20: 1.651 m (5' 5\").    Weight as of 9/17/20: 117.1 kg (258 lb 3.2 oz).       No follow-ups on file.    FRANSICO JOHNSON PA-C  [facility]    Phone call duration:  15 minutes                "

## 2020-09-18 NOTE — TELEPHONE ENCOUNTER
Patient had daughter on Friday. Baby was born with heart murmur and failed hearing test. She states her Post Partum Depression started on Monday, OB increased Zoloft to 100 mg yesterday.  Patient is not breast feeding.   Patient mother is with her to help for another 2 weeks.    Patient started therapy yesterday with weekly visits.    She has migraine today with N/V as well x 2. She did take a 600mg Ibuprofen that helped take the edge off.  Anxiety is very high and is concerned with migraine and higher BP as well as medical condition of baby.      Laurie Carlisle RN

## 2020-09-25 ENCOUNTER — VIRTUAL VISIT (OUTPATIENT)
Dept: FAMILY MEDICINE | Facility: CLINIC | Age: 35
End: 2020-09-25
Payer: COMMERCIAL

## 2020-09-25 DIAGNOSIS — F41.8 POSTPARTUM ANXIETY: Primary | ICD-10-CM

## 2020-09-25 PROCEDURE — 99213 OFFICE O/P EST LOW 20 MIN: CPT | Mod: 95 | Performed by: PHYSICIAN ASSISTANT

## 2020-09-25 PROCEDURE — 96127 BRIEF EMOTIONAL/BEHAV ASSMT: CPT | Performed by: PHYSICIAN ASSISTANT

## 2020-09-25 ASSESSMENT — ANXIETY QUESTIONNAIRES
5. BEING SO RESTLESS THAT IT IS HARD TO SIT STILL: MORE THAN HALF THE DAYS
6. BECOMING EASILY ANNOYED OR IRRITABLE: MORE THAN HALF THE DAYS
3. WORRYING TOO MUCH ABOUT DIFFERENT THINGS: MORE THAN HALF THE DAYS
7. FEELING AFRAID AS IF SOMETHING AWFUL MIGHT HAPPEN: NEARLY EVERY DAY
2. NOT BEING ABLE TO STOP OR CONTROL WORRYING: MORE THAN HALF THE DAYS
GAD7 TOTAL SCORE: 15
1. FEELING NERVOUS, ANXIOUS, OR ON EDGE: MORE THAN HALF THE DAYS

## 2020-09-25 ASSESSMENT — PATIENT HEALTH QUESTIONNAIRE - PHQ9
SUM OF ALL RESPONSES TO PHQ QUESTIONS 1-9: 19
5. POOR APPETITE OR OVEREATING: MORE THAN HALF THE DAYS

## 2020-09-25 NOTE — PROGRESS NOTES
"Mary Shipman is a 35 year old female who is being evaluated via a billable telephone visit.      The patient has been notified of following:     \"This telephone visit will be conducted via a call between you and your physician/provider. We have found that certain health care needs can be provided without the need for a physical exam.  This service lets us provide the care you need with a short phone conversation.  If a prescription is necessary we can send it directly to your pharmacy.  If lab work is needed we can place an order for that and you can then stop by our lab to have the test done at a later time.    Telephone visits are billed at different rates depending on your insurance coverage. During this emergency period, for some insurers they may be billed the same as an in-person visit.  Please reach out to your insurance provider with any questions.    If during the course of the call the physician/provider feels a telephone visit is not appropriate, you will not be charged for this service.\"    Patient has given verbal consent for Telephone visit?  Yes    What phone number would you like to be contacted at? [phone]    How would you like to obtain your AVS? Crystalt    Danyelle     Mary Shipman is a 35 year old female who presents via phone visit today for the following health issues:    HPI     Depression and Anxiety Follow-Up    How are you doing with your depression since your last visit? Improved     How are you doing with your anxiety since your last visit?  Improved     Are you having other symptoms that might be associated with depression or anxiety? Yes:  a few issues which include sleep, see PHQ9 CRUZ scores    Have you had a significant life event? No     Do you have any concerns with your use of alcohol or other drugs? No     Feeling a little more stable. Having less anxiety. Mood improving. Denies major concerns.     Social History     Tobacco Use     Smoking status: Never Smoker     " Smokeless tobacco: Never Used   Substance Use Topics     Alcohol use: No     Alcohol/week: 0.0 standard drinks     Drug use: No     PHQ 6/4/2020 9/17/2020 9/25/2020   PHQ-9 Total Score 16 20 19   Q9: Thoughts of better off dead/self-harm past 2 weeks Not at all Not at all Not at all     CRUZ-7 SCORE 6/4/2020 9/17/2020 9/25/2020   Total Score - - -   Total Score 16 21 15     Last PHQ-9 9/25/2020   1.  Little interest or pleasure in doing things 3   2.  Feeling down, depressed, or hopeless 3   3.  Trouble falling or staying asleep, or sleeping too much 2   4.  Feeling tired or having little energy 3   5.  Poor appetite or overeating 2   6.  Feeling bad about yourself 2   7.  Trouble concentrating 2   8.  Moving slowly or restless 2   Q9: Thoughts of better off dead/self-harm past 2 weeks 0   PHQ-9 Total Score 19   Difficulty at work, home, or with people -     CRUZ-7  9/25/2020   1. Feeling nervous, anxious, or on edge 2   2. Not being able to stop or control worrying 2   3. Worrying too much about different things 2   4. Trouble relaxing 2   5. Being so restless that it is hard to sit still 2   6. Becoming easily annoyed or irritable 2   7. Feeling afraid, as if something awful might happen 3   CRUZ-7 Total Score 15   If you checked any problems, how difficult have they made it for you to do your work, take care of things at home, or get along with other people? -       Suicide Assessment Five-step Evaluation and Treatment (SAFE-T)    Asthma Follow-Up    Was ACT completed today?  No      Do you have a cough?  No    Are you experiencing any wheezing in your chest?  No    Do you have any shortness of breath?  No     How often are you using a short-acting (rescue) inhaler or nebulizer, such as Albuterol?  Never    How many days per week do you miss taking your asthma controller medication?  0    Please describe any recent triggers for your asthma: None    Have you had any Emergency Room Visits, Urgent Care Visits, or  Hospital Admissions since your last office visit?  No       Patient Active Problem List   Diagnosis     Esophageal reflux     Allergic rhinitis due to other allergen     Polycystic ovaries     Thyrotoxicosis     Urticaria     Obesity     Low back pain     Intermittent asthma     HYPERLIPIDEMIA LDL GOAL <160     Irritable bowel syndrome with constipation     Migraine headache     Not immune to rubella     Major depressive disorder, recurrent episode, moderate (H)     Health Care Home     Health care home, active care coordination     Generalized anxiety disorder     Lump or mass in breast     Menometrorrhagia     Seizure (H)     Cervicalgia     Bilateral thoracic back pain, unspecified chronicity     Other migraine without status migrainosus, not intractable     Morbid obesity (H)     Moderate persistent asthma without complication     Need for Tdap vaccination     Encounter for triage in pregnant patient     Uterine contractions during pregnancy      (normal spontaneous vaginal delivery)     Postpartum anxiety        Review of Systems   Constitutional, HEENT, cardiovascular, pulmonary, gi and gu systems are negative, except as otherwise noted.       Objective          Vitals:  No vitals were obtained today due to virtual visit.    healthy, alert and no distress  PSYCH: Alert and oriented times 3; coherent speech, normal   rate and volume, able to articulate logical thoughts, able   to abstract reason, no tangential thoughts, no hallucinations   or delusions  Her affect is normal  RESP: No cough, no audible wheezing, able to talk in full sentences  Remainder of exam unable to be completed due to telephone visits          Assessment/Plan:    Assessment & Plan     ICD-10-CM    1. Postpartum anxiety  O99.345     F41.8       Improving. No thoughts of self harm or harm to her child.  Mom is helping.  Is not working yet.   She denies other major concerns.  Will continue with therapy.  Follow up in a week or so.     "  BMI:   Estimated body mass index is 42.97 kg/m  as calculated from the following:    Height as of 9/17/20: 1.651 m (5' 5\").    Weight as of 9/17/20: 117.1 kg (258 lb 3.2 oz).     No follow-ups on file.    FRANSICO JOHNSON PA-C  [facility]    Phone call duration:  9 minutes                "

## 2020-09-26 ASSESSMENT — ANXIETY QUESTIONNAIRES: GAD7 TOTAL SCORE: 15

## 2020-09-29 ENCOUNTER — OFFICE VISIT (OUTPATIENT)
Dept: OBGYN | Facility: CLINIC | Age: 35
End: 2020-09-29
Payer: COMMERCIAL

## 2020-09-29 VITALS — BODY MASS INDEX: 42.97 KG/M2 | DIASTOLIC BLOOD PRESSURE: 98 MMHG | SYSTOLIC BLOOD PRESSURE: 140 MMHG | HEIGHT: 65 IN

## 2020-09-29 DIAGNOSIS — F33.1 MAJOR DEPRESSIVE DISORDER, RECURRENT EPISODE, MODERATE (H): ICD-10-CM

## 2020-09-29 DIAGNOSIS — Z30.41 ORAL CONTRACEPTIVE PILL SURVEILLANCE: ICD-10-CM

## 2020-09-29 LAB
ERYTHROCYTE [DISTWIDTH] IN BLOOD BY AUTOMATED COUNT: 12.9 % (ref 10–15)
HCT VFR BLD AUTO: 41.1 % (ref 35–47)
HGB BLD-MCNC: 13.5 G/DL (ref 11.7–15.7)
MCH RBC QN AUTO: 29.8 PG (ref 26.5–33)
MCHC RBC AUTO-ENTMCNC: 32.8 G/DL (ref 31.5–36.5)
MCV RBC AUTO: 91 FL (ref 78–100)
PLATELET # BLD AUTO: 326 10E9/L (ref 150–450)
RBC # BLD AUTO: 4.53 10E12/L (ref 3.8–5.2)
WBC # BLD AUTO: 6.4 10E9/L (ref 4–11)

## 2020-09-29 PROCEDURE — 36415 COLL VENOUS BLD VENIPUNCTURE: CPT | Performed by: OBSTETRICS & GYNECOLOGY

## 2020-09-29 PROCEDURE — 80053 COMPREHEN METABOLIC PANEL: CPT | Performed by: OBSTETRICS & GYNECOLOGY

## 2020-09-29 PROCEDURE — 99213 OFFICE O/P EST LOW 20 MIN: CPT | Mod: 24 | Performed by: OBSTETRICS & GYNECOLOGY

## 2020-09-29 PROCEDURE — 85027 COMPLETE CBC AUTOMATED: CPT | Performed by: OBSTETRICS & GYNECOLOGY

## 2020-09-29 RX ORDER — ACETAMINOPHEN AND CODEINE PHOSPHATE 120; 12 MG/5ML; MG/5ML
0.35 SOLUTION ORAL DAILY
Qty: 84 TABLET | Refills: 3 | Status: SHIPPED | OUTPATIENT
Start: 2020-09-29 | End: 2021-03-16

## 2020-09-29 ASSESSMENT — PAIN SCALES - GENERAL: PAINLEVEL: SEVERE PAIN (6)

## 2020-09-29 NOTE — PROGRESS NOTES
"S:  Mary presents for f/u of blood pressure.   Still getting headaches. Takes ibuprofen every day.   Doing better on zoloft. Hydroxizine is helping too.   Planning nexplanon. Had sex already. Had some cramping after. Used a condom.    O:  Vitals:    20 1542 20 1545   BP: (!) 142/96 (!) 140/98   BP Location: Right arm Right arm   Patient Position: Sitting Sitting   Cuff Size: Adult Large Adult Large   Height: 1.651 m (5' 5\")      Gen: well appearing  Abd: soft, NT, no RUQ tenderness to palpation  Psych: affect bright.     A/P:  35 year old  with   (O14.95) Pre-eclampsia in postpartum period  (primary encounter diagnosis)  Comment:   Plan: CBC with platelets, Comprehensive metabolic         panel (BMP + Alb, Alk Phos, ALT, AST, Total.         Bili, TP)            (Z30.41) Oral contraceptive pill surveillance  Comment:   Plan: norethindrone (MICRONOR) 0.35 MG tablet        Cautioned that she could conceive already since not breastfeeding.   Planning nexplanon but wants mood to be better first.     (F33.1) Major depressive disorder, recurrent episode, moderate (H)  Comment:   Plan: Improving on treatment plan. Continue current meds.       "

## 2020-09-30 LAB
ALBUMIN SERPL-MCNC: 3.5 G/DL (ref 3.4–5)
ALP SERPL-CCNC: 125 U/L (ref 40–150)
ALT SERPL W P-5'-P-CCNC: 24 U/L (ref 0–50)
ANION GAP SERPL CALCULATED.3IONS-SCNC: 10 MMOL/L (ref 3–14)
AST SERPL W P-5'-P-CCNC: 12 U/L (ref 0–45)
BILIRUB SERPL-MCNC: 0.2 MG/DL (ref 0.2–1.3)
BUN SERPL-MCNC: 15 MG/DL (ref 7–30)
CALCIUM SERPL-MCNC: 9.6 MG/DL (ref 8.5–10.1)
CHLORIDE SERPL-SCNC: 108 MMOL/L (ref 94–109)
CO2 SERPL-SCNC: 22 MMOL/L (ref 20–32)
CREAT SERPL-MCNC: 0.8 MG/DL (ref 0.52–1.04)
GFR SERPL CREATININE-BSD FRML MDRD: >90 ML/MIN/{1.73_M2}
GLUCOSE SERPL-MCNC: 76 MG/DL (ref 70–99)
POTASSIUM SERPL-SCNC: 4 MMOL/L (ref 3.4–5.3)
PROT SERPL-MCNC: 7.3 G/DL (ref 6.8–8.8)
SODIUM SERPL-SCNC: 140 MMOL/L (ref 133–144)

## 2020-10-02 ENCOUNTER — VIRTUAL VISIT (OUTPATIENT)
Dept: FAMILY MEDICINE | Facility: CLINIC | Age: 35
End: 2020-10-02
Payer: COMMERCIAL

## 2020-10-02 DIAGNOSIS — F33.1 MAJOR DEPRESSIVE DISORDER, RECURRENT EPISODE, MODERATE (H): Primary | ICD-10-CM

## 2020-10-02 DIAGNOSIS — G43.809 OTHER MIGRAINE WITHOUT STATUS MIGRAINOSUS, NOT INTRACTABLE: ICD-10-CM

## 2020-10-02 DIAGNOSIS — I10 HYPERTENSION GOAL BP (BLOOD PRESSURE) < 140/90: ICD-10-CM

## 2020-10-02 PROCEDURE — 99213 OFFICE O/P EST LOW 20 MIN: CPT | Mod: 95 | Performed by: PHYSICIAN ASSISTANT

## 2020-10-02 PROCEDURE — 96127 BRIEF EMOTIONAL/BEHAV ASSMT: CPT | Performed by: PHYSICIAN ASSISTANT

## 2020-10-02 ASSESSMENT — ANXIETY QUESTIONNAIRES
3. WORRYING TOO MUCH ABOUT DIFFERENT THINGS: MORE THAN HALF THE DAYS
2. NOT BEING ABLE TO STOP OR CONTROL WORRYING: MORE THAN HALF THE DAYS
7. FEELING AFRAID AS IF SOMETHING AWFUL MIGHT HAPPEN: MORE THAN HALF THE DAYS
6. BECOMING EASILY ANNOYED OR IRRITABLE: NEARLY EVERY DAY
IF YOU CHECKED OFF ANY PROBLEMS ON THIS QUESTIONNAIRE, HOW DIFFICULT HAVE THESE PROBLEMS MADE IT FOR YOU TO DO YOUR WORK, TAKE CARE OF THINGS AT HOME, OR GET ALONG WITH OTHER PEOPLE: SOMEWHAT DIFFICULT
1. FEELING NERVOUS, ANXIOUS, OR ON EDGE: NEARLY EVERY DAY
5. BEING SO RESTLESS THAT IT IS HARD TO SIT STILL: MORE THAN HALF THE DAYS
GAD7 TOTAL SCORE: 16

## 2020-10-02 NOTE — PROGRESS NOTES
"Mary Shipman is a 35 year old female who is being evaluated via a billable telephone visit.      The patient has been notified of following:     \"This telephone visit will be conducted via a call between you and your physician/provider. We have found that certain health care needs can be provided without the need for a physical exam.  This service lets us provide the care you need with a short phone conversation.  If a prescription is necessary we can send it directly to your pharmacy.  If lab work is needed we can place an order for that and you can then stop by our lab to have the test done at a later time.    Telephone visits are billed at different rates depending on your insurance coverage. During this emergency period, for some insurers they may be billed the same as an in-person visit.  Please reach out to your insurance provider with any questions.    If during the course of the call the physician/provider feels a telephone visit is not appropriate, you will not be charged for this service.\"    Patient has given verbal consent for Telephone visit?  Yes    What phone number would you like to be contacted at? [phone]    How would you like to obtain your AVS? Crystalt    Danyelle     Mary Shipman is a 35 year old female who presents via phone visit today for the following health issues:    HPI     Depression and Anxiety Follow-Up    How are you doing with your depression since your last visit? No change    How are you doing with your anxiety since your last visit?  No change    Are you having other symptoms that might be associated with depression or anxiety? Yes:  Anxious, crying, eating too much or not eating, sleep problems    Have you had a significant life event? OTHER: Recent birth of a baby     Do you have any concerns with your use of alcohol or other drugs? No     Doing a little better.  Mood is stabilizing   Might increase zoloft to 150 mg on her own.     Social History     Tobacco Use     " Smoking status: Never Smoker     Smokeless tobacco: Never Used   Substance Use Topics     Alcohol use: No     Alcohol/week: 0.0 standard drinks     Drug use: No     PHQ 9/17/2020 9/25/2020 10/2/2020   PHQ-9 Total Score 20 19 19   Q9: Thoughts of better off dead/self-harm past 2 weeks Not at all Not at all Not at all     CRUZ-7 SCORE 9/17/2020 9/25/2020 10/2/2020   Total Score - - -   Total Score 21 15 16       Suicide Assessment Five-step Evaluation and Treatment (SAFE-T)      How many servings of fruits and vegetables do you eat daily?  0-1    On average, how many sweetened beverages do you drink each day (Examples: soda, juice, sweet tea, etc.  Do NOT count diet or artificially sweetened beverages)?   1 soda    How many days per week do you exercise enough to make your heart beat faster? 3 or less    How many minutes a day do you exercise enough to make your heart beat faster? 9 or less    How many days per week do you miss taking your medication? 0         Review of Systems   Constitutional, HEENT, cardiovascular, pulmonary, gi and gu systems are negative, except as otherwise noted.       Objective          Vitals:  No vitals were obtained today due to virtual visit.    healthy, alert and no distress  PSYCH: Alert and oriented times 3; coherent speech, normal   rate and volume, able to articulate logical thoughts, able   to abstract reason, no tangential thoughts, no hallucinations   or delusions  Her affect is normal  RESP: No cough, no audible wheezing, able to talk in full sentences  Remainder of exam unable to be completed due to telephone visits          Assessment/Plan:    Assessment & Plan     Major depressive disorder, recurrent episode, moderate (H)  Stabilizing. Mild improvement.     Hypertension goal BP (blood pressure) < 140/90  BP elevated at both OB follow ups. She is to monitor and check back in with me in 2 weeks.     Other migraine without status migrainosus, not intractable  Ongoing. She declines  any med changes / daily meds, etc           No follow-ups on file.    FRANSICO JOHNSON PA-C  [facility]    Phone call duration:  7 minutes - Follow up via phone in 2 weeks.    RAQUEL Frank, PAMeliC

## 2020-10-03 ASSESSMENT — ANXIETY QUESTIONNAIRES: GAD7 TOTAL SCORE: 16

## 2020-10-15 ENCOUNTER — TELEPHONE (OUTPATIENT)
Dept: OBGYN | Facility: CLINIC | Age: 35
End: 2020-10-15

## 2020-10-15 NOTE — TELEPHONE ENCOUNTER
Patient delivered on 9/11/20. She was seen in clinic on 9/29/20. She at that point was having light spotting every other day. Patient reported already having sex at her last appointment so was prescribed Micronor. Patient started the pill on 9/30. Starting 1 week ago, patient states bleeding got very heavy. It has continued and she is having to wear large super pads. States bleeding is a little better during the day, but very heavy at night and having to change her pad every 1-2 hours. Patient was advised on when she should present to the ED. Patient wanted me to check with you to see if you had any thoughts or recommendations. I could schedule her for a remote visit with Dr. Talavera if you would prefer. Feeling well over all. Denies dizziness, SOB, feels occasional weakness. Please advise. Rekha Shah RN

## 2020-10-16 ENCOUNTER — VIRTUAL VISIT (OUTPATIENT)
Dept: FAMILY MEDICINE | Facility: CLINIC | Age: 35
End: 2020-10-16
Payer: COMMERCIAL

## 2020-10-16 ENCOUNTER — VIRTUAL VISIT (OUTPATIENT)
Dept: OBGYN | Facility: CLINIC | Age: 35
End: 2020-10-16
Payer: COMMERCIAL

## 2020-10-16 DIAGNOSIS — Z30.011 ENCOUNTER FOR INITIAL PRESCRIPTION OF CONTRACEPTIVE PILLS: Primary | ICD-10-CM

## 2020-10-16 DIAGNOSIS — F41.8 POSTPARTUM ANXIETY: Primary | ICD-10-CM

## 2020-10-16 DIAGNOSIS — N92.6 IRREGULAR BLEEDING: ICD-10-CM

## 2020-10-16 PROCEDURE — 99213 OFFICE O/P EST LOW 20 MIN: CPT | Mod: 95 | Performed by: OBSTETRICS & GYNECOLOGY

## 2020-10-16 PROCEDURE — 99213 OFFICE O/P EST LOW 20 MIN: CPT | Mod: 95 | Performed by: PHYSICIAN ASSISTANT

## 2020-10-16 RX ORDER — NORETHINDRONE ACETATE AND ETHINYL ESTRADIOL .02; 1 MG/1; MG/1
1 TABLET ORAL DAILY
Qty: 28 TABLET | Refills: 11 | Status: SHIPPED | OUTPATIENT
Start: 2020-10-16 | End: 2021-03-16

## 2020-10-16 NOTE — PROGRESS NOTES
"Mary Shipman is a 35 year old female who is being evaluated via a billable telephone visit.      The patient has been notified of following:     \"This telephone visit will be conducted via a call between you and your physician/provider. We have found that certain health care needs can be provided without the need for a physical exam.  This service lets us provide the care you need with a short phone conversation.  If a prescription is necessary we can send it directly to your pharmacy.  If lab work is needed we can place an order for that and you can then stop by our lab to have the test done at a later time.    Telephone visits are billed at different rates depending on your insurance coverage. During this emergency period, for some insurers they may be billed the same as an in-person visit.  Please reach out to your insurance provider with any questions.    If during the course of the call the physician/provider feels a telephone visit is not appropriate, you will not be charged for this service.\"    Patient has given verbal consent for Telephone visit?  Yes    What phone number would you like to be contacted at? [phone]      Phone call duration: 18 minutes    Carmelita Talavera MD    Started oral contraceptive pills on 9/30 then started bleeding on 10/7   Still bleeding now. Day time seems normal but at night time the flow is very heavy. Will bleed through her clothes and sheets at night   Will take it with her zoloft at night the same time every day.   Bleeding 9 days now.     Mood is up and down.  Increased the zoloft to 100 mg   Also started klonopin and hydroxizine   Mood is stabilizing.  Will still cry at times because she feels overwhelmed.   Overall mood is better.  Baby sleeps pretty well and that is helping Mary to get some sleep.     BP Readings from Last 6 Encounters:   10/28/20 137/79   09/29/20 (!) 140/98   09/17/20 (!) 141/90   09/12/20 126/79   09/08/20 125/82   09/05/20 120/76         A: "    Irregular bleeding on micronor  No loner breast feeding and last BP WNL   Depression, recent increase in her zoloft, waiting to see if that helps.     P:  discussed hormonal options, patient would like to try oral contraceptive pills, will go with low dose estrogen.   Patient has f/u appt with me in 2 wks.      Carmelita Talavera MD

## 2020-10-16 NOTE — PROGRESS NOTES
"Mary Shipman is a 35 year old female who is being evaluated via a billable telephone visit.      The patient has been notified of following:     \"This telephone visit will be conducted via a call between you and your physician/provider. We have found that certain health care needs can be provided without the need for a physical exam.  This service lets us provide the care you need with a short phone conversation.  If a prescription is necessary we can send it directly to your pharmacy.  If lab work is needed we can place an order for that and you can then stop by our lab to have the test done at a later time.    Telephone visits are billed at different rates depending on your insurance coverage. During this emergency period, for some insurers they may be billed the same as an in-person visit.  Please reach out to your insurance provider with any questions.    If during the course of the call the physician/provider feels a telephone visit is not appropriate, you will not be charged for this service.\"    Patient has given verbal consent for Telephone visit?  Yes    What phone number would you like to be contacted at? [phone]    How would you like to obtain your AVS? Mario Bassett     Mary Shipman is a 35 year old female who presents via phone visit today for the following health issues:    HPI     Depression Followup    How are you doing with your depression since your last visit? No change, about the same    Are you having other symptoms that might be associated with depression? Yes:  Patient feels overwhelmed and some days she just cailin, anxiety has improved, only three days where it was hard to manage    Have you had a significant life event?  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   Yes:  anxious    Do you have any concerns with your use of alcohol or other drugs? No    Mood is better. Still feels overwhelmed, but it is " "improving. Less anxiety. Still feeling depressed. \"Crying spells.\" She's still very anxious at times. Is very protective of her daughter. She did this with her son also. She is doing therapy. Slowly getting better.     Social History     Tobacco Use     Smoking status: Never Smoker     Smokeless tobacco: Never Used   Substance Use Topics     Alcohol use: No     Alcohol/week: 0.0 standard drinks     Drug use: No     PHQ 9/17/2020 9/25/2020 10/2/2020   PHQ-9 Total Score 20 19 19   Q9: Thoughts of better off dead/self-harm past 2 weeks Not at all Not at all Not at all     CRUZ-7 SCORE 9/17/2020 9/25/2020 10/2/2020   Total Score - - -   Total Score 21 15 16       Suicide Assessment Five-step Evaluation and Treatment (SAFE-T)      How many servings of fruits and vegetables do you eat daily?  0-1    On average, how many sweetened beverages do you drink each day (Examples: soda, juice, sweet tea, etc.  Do NOT count diet or artificially sweetened beverages)?   Soda, 4-5 a day    How many days per week do you exercise enough to make your heart beat faster? 3 or less    How many minutes a day do you exercise enough to make your heart beat faster? 9 or less    How many days per week do you miss taking your medication? 0      Review of Systems   Constitutional, HEENT, cardiovascular, pulmonary, GI, , musculoskeletal, neuro, skin, endocrine and psych systems are negative, except as otherwise noted.       Objective          Vitals:  No vitals were obtained today due to virtual visit.    healthy, alert and no distress  PSYCH: Alert and oriented times 3; coherent speech, normal   rate and volume, able to articulate logical thoughts, able   to abstract reason, no tangential thoughts, no hallucinations   or delusions  Her affect is normal  RESP: No cough, no audible wheezing, able to talk in full sentences  Remainder of exam unable to be completed due to telephone visits            Assessment/Plan:    Assessment & Plan "     Postpartum anxiety  Improving. Anxiety improving. Depressive symptoms persisting. She wants to increase her sertraline to 150 and then will update me.       No follow-ups on file.    GABI CAMILO-C  [facility]    Phone call duration:  14 minutes    RAQUEL Frank, PA-C

## 2020-10-27 PROBLEM — Z23 NEED FOR TDAP VACCINATION: Status: RESOLVED | Noted: 2020-06-22 | Resolved: 2020-10-27

## 2020-10-28 ENCOUNTER — PRENATAL OFFICE VISIT (OUTPATIENT)
Dept: OBGYN | Facility: CLINIC | Age: 35
End: 2020-10-28
Payer: COMMERCIAL

## 2020-10-28 VITALS
HEIGHT: 65 IN | HEART RATE: 63 BPM | WEIGHT: 264.4 LBS | BODY MASS INDEX: 44.05 KG/M2 | TEMPERATURE: 98.3 F | SYSTOLIC BLOOD PRESSURE: 137 MMHG | DIASTOLIC BLOOD PRESSURE: 79 MMHG

## 2020-10-28 DIAGNOSIS — F33.1 MAJOR DEPRESSIVE DISORDER, RECURRENT EPISODE, MODERATE (H): ICD-10-CM

## 2020-10-28 DIAGNOSIS — Z30.09 GENERAL COUNSELING FOR PRESCRIPTION OF ORAL CONTRACEPTIVES: ICD-10-CM

## 2020-10-28 DIAGNOSIS — F41.1 GENERALIZED ANXIETY DISORDER: ICD-10-CM

## 2020-10-28 PROCEDURE — 99207 PR POST PARTUM EXAM: CPT | Performed by: OBSTETRICS & GYNECOLOGY

## 2020-10-28 RX ORDER — NORETHINDRONE ACETATE AND ETHINYL ESTRADIOL .02; 1 MG/1; MG/1
1 TABLET ORAL DAILY
Qty: 84 TABLET | Refills: 3 | Status: SHIPPED | OUTPATIENT
Start: 2020-10-28 | End: 2021-03-16

## 2020-10-28 ASSESSMENT — PATIENT HEALTH QUESTIONNAIRE - PHQ9: SUM OF ALL RESPONSES TO PHQ QUESTIONS 1-9: 10

## 2020-10-28 ASSESSMENT — MIFFLIN-ST. JEOR: SCORE: 1885.19

## 2020-10-28 NOTE — PROGRESS NOTES
"Chief Complaint   Patient presents with     Post Partum Exam       Initial /79 (BP Location: Left arm, Patient Position: Sitting, Cuff Size: Adult Regular)   Pulse 63   Temp 98.3  F (36.8  C) (Oral)   Ht 1.651 m (5' 5\")   Wt 119.9 kg (264 lb 6.4 oz)   LMP 10/19/2020   Breastfeeding No   BMI 44.00 kg/m   Estimated body mass index is 44 kg/m  as calculated from the following:    Height as of this encounter: 1.651 m (5' 5\").    Weight as of this encounter: 119.9 kg (264 lb 6.4 oz).  BP completed using cuff size: regular on forearm    Questioned patient about current smoking habits.  Pt. has never smoked.          The following HM Due: NONE      The following patient reported/Care Every where data was sent to:  P ABSTRACT QUALITY INITIATIVES [70478]  n/a      n/a and patient has appointment for today          SUBJECTIVE:  Mary Shipman is a 35 year old female  here for a postpartum visit.  She had a  on  delivering a healthy baby girl weighing 8 lbs 6 oz at term.      Bleeding stopped when she doubled up on the micronor pills.  She is almost done with that pack. We had her on the progesterone only pill due to elevated BP's postpartum.  BP today is improved so will switch to regular aura after one more month of  progesterone pills. Not breast feeding.    Wt Readings from Last 4 Encounters:   10/28/20 119.9 kg (264 lb 6.4 oz)   20 117.1 kg (258 lb 3.2 oz)   20 124.7 kg (275 lb)   20 122.9 kg (271 lb)      Has been drinking more soda and thinks that is why her weight is up.     delivery complications:  No  breast feeding:  No  bladder problems:  No  bowel problems/hemorrhoids:  No  episiotomy/laceration/incision healed? Yes  vaginal flow:  Already cycling   contraception:  oral contraceptive  emotional adjustment:  doing well, feels like her mood is improving.  Getting a routine down at home with her 2 children and her job. She is working with her primary provider for " "this  back to work:  Already working from home     [facility] Follow-up to PHQ 9/25/2020 10/2/2020 10/28/2020   PHQ-9 9. Suicide Ideation past 2 weeks Not at all Not at all Not at all     PHQ 9/25/2020 10/2/2020 10/28/2020   PHQ-9 Total Score 19 19 10   Q9: Thoughts of better off dead/self-harm past 2 weeks Not at all Not at all Not at all     CRUZ-7 SCORE 9/17/2020 9/25/2020 10/2/2020   Total Score - - -   Total Score 21 15 16             Lab Results   Component Value Date    PAP NIL 11/18/2019    PAP NIL 04/09/2014    PAP NIL 03/14/2012        OBJECTIVE:  Blood pressure 137/79, pulse 63, temperature 98.3  F (36.8  C), temperature source Oral, height 1.651 m (5' 5\"), weight 119.9 kg (264 lb 6.4 oz), last menstrual period 10/19/2020, not currently breastfeeding.   General - pleasant female in no acute distress.  Breast - no nodularity, asymmetry or nipple discharge bilaterally.  Abdomen - soft, nontender, nondistended, no hepatosplenomegaly.  Pelvic - EG: normal adult female, BUS: within normal limits, Vagina: well rugated, no discharge, Cervix: no lesions or CMT, Uterus: firm, normal sized and nontender, Adnexae: no masses or tenderness.  Rectovaginal - deferred.    ASSESSMENT:  normal postpartum exam    Encounter Diagnoses   Name Primary?     Routine postpartum follow-up Yes     General counseling for prescription of oral contraceptives      Major depressive disorder, recurrent episode, moderate (H)      Generalized anxiety disorder         PLAN:  Discussed birth control will switch to regular pill after one more month on micronor  May resume normal activities without restrictions  Pap smear was not done today      Patient encouraged to continue with regular schedule at home with her children and work.  Mood improving and will continue to follow with [facility] for mood issues.     Carmelita Talavera MD     "

## 2020-11-29 ENCOUNTER — HEALTH MAINTENANCE LETTER (OUTPATIENT)
Age: 35
End: 2020-11-29

## 2020-12-09 ENCOUNTER — TELEPHONE (OUTPATIENT)
Dept: OBGYN | Facility: CLINIC | Age: 35
End: 2020-12-09

## 2020-12-09 DIAGNOSIS — Z30.09 GENERAL COUNSELING FOR PRESCRIPTION OF ORAL CONTRACEPTIVES: Primary | ICD-10-CM

## 2020-12-09 NOTE — TELEPHONE ENCOUNTER
Pt started microgestin 2 weeks ago.  She is having daily cramping like a period, brown spotting, diarrhea, and worse mood swings.  Do you think she should switch to something else or continue to continue with the new pill?  Geovanna Magana RN

## 2020-12-15 NOTE — TELEPHONE ENCOUNTER
TC to patient. She has continued taking the microgestin and has her last pill of the pack tonight. Advised finishing. She was on OCP when she got pregnant with her daughter last December 2019. Looking at her meds, patient was on Belle. Pt states that she was on OCP years ago to treat her PCOS. At that time did not have the side effects she's having now. Reviewing chart, pt was on Norgestim-Eth Estrad Triphasic (TRINESSA, 28,) 0.18/0.215/0.25 MG-35 MCG TABS 9/2011.    Med and pharmacy teed up. Ok to send rx?   Rekha Jenkins, MEGHANA-BSN

## 2020-12-15 NOTE — TELEPHONE ENCOUNTER
Have her try to  finish out this pill pack.  See if she can remember what pill she was on in the past -- yrs ago that worked for her.   I think she has been on a pill previously.   Or maybe you can dig into her chart a bit and see  If she has that documented in any of my previous notes.

## 2020-12-17 RX ORDER — NORGESTIMATE AND ETHINYL ESTRADIOL 7DAYSX3 28
1 KIT ORAL DAILY
Qty: 84 TABLET | Refills: 3 | Status: SHIPPED | OUTPATIENT
Start: 2020-12-17 | End: 2021-10-18

## 2021-02-22 ENCOUNTER — TELEPHONE (OUTPATIENT)
Dept: OBGYN | Facility: CLINIC | Age: 36
End: 2021-02-22

## 2021-02-22 DIAGNOSIS — Z72.51 UNPROTECTED SEXUAL INTERCOURSE: Primary | ICD-10-CM

## 2021-02-22 RX ORDER — LEVONORGESTREL 1.5 MG/1
1.5 TABLET ORAL ONCE
Qty: 1 TABLET | Refills: 0 | Status: SHIPPED | OUTPATIENT
Start: 2021-02-22 | End: 2021-03-16
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
## 2021-03-16 ENCOUNTER — VIRTUAL VISIT (OUTPATIENT)
Dept: FAMILY MEDICINE | Facility: CLINIC | Age: 36
End: 2021-03-16
Payer: COMMERCIAL

## 2021-03-16 DIAGNOSIS — J01.01 ACUTE RECURRENT MAXILLARY SINUSITIS: ICD-10-CM

## 2021-03-16 DIAGNOSIS — J01.00 ACUTE NON-RECURRENT MAXILLARY SINUSITIS: ICD-10-CM

## 2021-03-16 DIAGNOSIS — J45.41 MODERATE PERSISTENT ASTHMA WITH (ACUTE) EXACERBATION: Primary | ICD-10-CM

## 2021-03-16 DIAGNOSIS — J45.40 ASTHMA, MODERATE PERSISTENT, POORLY-CONTROLLED: ICD-10-CM

## 2021-03-16 DIAGNOSIS — R22.2 CHEST MASS: ICD-10-CM

## 2021-03-16 PROCEDURE — 99214 OFFICE O/P EST MOD 30 MIN: CPT | Mod: 95 | Performed by: PHYSICIAN ASSISTANT

## 2021-03-16 RX ORDER — ALBUTEROL SULFATE 90 UG/1
2 AEROSOL, METERED RESPIRATORY (INHALATION) EVERY 6 HOURS
Qty: 18 G | Refills: 2 | Status: SHIPPED | OUTPATIENT
Start: 2021-03-16 | End: 2021-06-02

## 2021-03-16 RX ORDER — MOMETASONE FUROATE AND FORMOTEROL FUMARATE DIHYDRATE 200; 5 UG/1; UG/1
2 AEROSOL RESPIRATORY (INHALATION) 2 TIMES DAILY
Qty: 13 G | Refills: 2 | Status: SHIPPED | OUTPATIENT
Start: 2021-03-16 | End: 2022-04-19

## 2021-03-16 RX ORDER — PREDNISONE 20 MG/1
TABLET ORAL
Qty: 20 TABLET | Refills: 0 | Status: SHIPPED | OUTPATIENT
Start: 2021-03-16 | End: 2021-04-29

## 2021-03-16 RX ORDER — ALBUTEROL SULFATE 1.25 MG/3ML
1.25 SOLUTION RESPIRATORY (INHALATION) EVERY 6 HOURS PRN
Qty: 100 ML | Refills: 1 | Status: SHIPPED | OUTPATIENT
Start: 2021-03-16 | End: 2021-11-04

## 2021-03-16 RX ORDER — CEFDINIR 300 MG/1
300 CAPSULE ORAL 2 TIMES DAILY
Qty: 28 CAPSULE | Refills: 0 | Status: SHIPPED | OUTPATIENT
Start: 2021-03-16 | End: 2021-04-29

## 2021-03-16 NOTE — PATIENT INSTRUCTIONS
To Schedule Imaging   [facility] Scheduling Phone Number: [phone]  Or   [facility] Scheduling Phone number: [phone]  Or  [facility] Scheduling Phone number: [phone]

## 2021-03-16 NOTE — PROGRESS NOTES
Mary is a 36 year old who is being evaluated via a billable video visit.      How would you like to obtain your AVS? MyChart  If the video visit is dropped, the invitation should be resent by: Text to cell phone: [phone]  Will anyone else be joining your video visit? No    Video Start Time: 3:00 PM     Assessment & Plan     Moderate persistent asthma with (acute) exacerbation  Acute exacerbation  Will treat  Start inhalers again  This prescription is given with a discussion of side effects, risks and proper use.  Instructions are given to follow up if not improving or symptoms change or worsen as discussed.   - albuterol (PROAIR HFA/PROVENTIL HFA/VENTOLIN HFA) 108 (90 Base) MCG/ACT inhaler; Inhale 2 puffs into the lungs every 6 hours  - albuterol (ACCUNEB) 1.25 MG/3ML neb solution; Take 1 vial (1.25 mg) by nebulization every 6 hours as needed for shortness of breath / dyspnea or wheezing    Acute non-recurrent maxillary sinusitis  Will treat  This prescription is given with a discussion of side effects, risks and proper use.  Instructions are given to follow up if not improving or symptoms change or worsen as discussed.   - predniSONE (DELTASONE) 20 MG tablet; Take 3 tabs (60 mg) by mouth daily x 3 days, 2 tabs (40 mg) daily x 3 days, 1 tab (20 mg) daily x 3 days, then 1/2 tab (10 mg) x 3 days.    Acute recurrent maxillary sinusitis  - cefdinir (OMNICEF) 300 MG capsule; Take 1 capsule (300 mg) by mouth 2 times daily    Asthma, moderate persistent, poorly-controlled  Will restart.   - mometasone-formoterol (DULERA) 200-5 MCG/ACT inhaler; Inhale 2 puffs into the lungs 2 times daily Needs appointment for additional refills    Chest mass  Left side. Causing some pain. See previous notes with me and see surgeon's notes. They recommended a CT scan. Will order.   - CT Chest w/o Contrast; Future    No follow-ups on file.    FRANSICO JOHNSON PA-C  [facility]    Danyelle Hernandez is a 36  year old who presents for the following health issues     HPI     Asthma Follow-Up    Was ACT completed today?  No      Do you have a cough?  YES, with some mucus    Are you experiencing any wheezing in your chest?  YES, chest tightness, sick for 4 days     Do you have any shortness of breath?  No     How often are you using a short-acting (rescue) inhaler or nebulizer, such as Albuterol?  Patient does not have any medications to help with asthma    How many days per week do you miss taking your asthma controller medication?  I do not have an asthma controller medication    Please describe any recent triggers for your asthma: not sure, maybe because of kids, daughter was sick with runny nose, Daughter is 6 months old    Have you had any Emergency Room Visits, Urgent Care Visits, or Hospital Admissions since your last office visit?  Yes  Number of ER or Urgent Care visits for asthma: once 6 months ago    Sinus headache, not feeling good      How many servings of fruits and vegetables do you eat daily?  0-1    On average, how many sweetened beverages do you drink each day (Examples: soda, juice, sweet tea, etc.  Do NOT count diet or artificially sweetened beverages)?   Too much    How many days per week do you exercise enough to make your heart beat faster? 3 or less    How many minutes a day do you exercise enough to make your heart beat faster? 9 or less  How many days per week do you miss taking your medication? Missed due to no refills    What makes it hard for you to take your medications?  not having refills    Concern - lump  Onset: ongoing for years  Description: mass on left side of rib cage, discussed about it years ago, went to surgeon and got imaging done. Needs referral for new imaging, pain is radiating to back.   Intensity: unbearable at times,   Progression of Symptoms:  worsening  Accompanying Signs & Symptoms: achy pain  Previous history of similar problem: yes  Precipitating factors:        Worsened  by: taking a deep breath will cause pain where the lump is  Alleviating factors:        Improved by: nothing  Therapies tried and outcome: ibuprofen not helping      Review of Systems   Constitutional, HEENT, cardiovascular, pulmonary, GI, , musculoskeletal, neuro, skin, endocrine and psych systems are negative, except as otherwise noted.      Objective           Vitals:  No vitals were obtained today due to virtual visit.    Physical Exam   GENERAL: Healthy, alert and no distress  EYES: Eyes grossly normal to inspection.  No discharge or erythema, or obvious scleral/conjunctival abnormalities.  RESP: No audible wheeze, cough, or visible cyanosis.  No visible retractions or increased work of breathing.    SKIN: Visible skin clear. No significant rash, abnormal pigmentation or lesions.  NEURO: Cranial nerves grossly intact.  Mentation and speech appropriate for age.  PSYCH: Mentation appears normal, affect normal/bright, judgement and insight intact, normal speech and appearance well-groomed.            Video-Visit Details    Type of service:  Video Visit    Video End Time:320     Originating Location (pt. Location): Home    Distant Location (provider location):  [facility]     Platform used for Video Visit: HandMinder

## 2021-04-01 ENCOUNTER — E-VISIT (OUTPATIENT)
Dept: OBGYN | Facility: CLINIC | Age: 36
End: 2021-04-01
Payer: COMMERCIAL

## 2021-04-01 DIAGNOSIS — N76.0 VAGINITIS AND VULVOVAGINITIS: Primary | ICD-10-CM

## 2021-04-01 PROCEDURE — 99421 OL DIG E/M SVC 5-10 MIN: CPT | Performed by: OBSTETRICS & GYNECOLOGY

## 2021-04-06 RX ORDER — FLUCONAZOLE 150 MG/1
150 TABLET ORAL
Qty: 2 TABLET | Refills: 0 | Status: SHIPPED | OUTPATIENT
Start: 2021-04-06 | End: 2021-04-10

## 2021-04-29 ENCOUNTER — VIRTUAL VISIT (OUTPATIENT)
Dept: FAMILY MEDICINE | Facility: CLINIC | Age: 36
End: 2021-04-29
Payer: COMMERCIAL

## 2021-04-29 DIAGNOSIS — R05.9 COUGH: Primary | ICD-10-CM

## 2021-04-29 DIAGNOSIS — J45.41 MODERATE PERSISTENT ASTHMA WITH (ACUTE) EXACERBATION: ICD-10-CM

## 2021-04-29 DIAGNOSIS — J06.9 VIRAL URI: ICD-10-CM

## 2021-04-29 DIAGNOSIS — J34.89 SINUS PAIN: ICD-10-CM

## 2021-04-29 PROCEDURE — 99214 OFFICE O/P EST MOD 30 MIN: CPT | Mod: 95 | Performed by: PHYSICIAN ASSISTANT

## 2021-04-29 RX ORDER — CEFDINIR 300 MG/1
300 CAPSULE ORAL 2 TIMES DAILY
Qty: 28 CAPSULE | Refills: 0 | Status: SHIPPED | OUTPATIENT
Start: 2021-04-29 | End: 2021-05-13

## 2021-04-29 RX ORDER — PREDNISONE 20 MG/1
TABLET ORAL
Qty: 20 TABLET | Refills: 0 | Status: SHIPPED | OUTPATIENT
Start: 2021-04-29 | End: 2021-05-25

## 2021-04-29 RX ORDER — FLUCONAZOLE 150 MG/1
150 TABLET ORAL ONCE
Qty: 1 TABLET | Refills: 5 | Status: SHIPPED | OUTPATIENT
Start: 2021-04-29 | End: 2021-04-29

## 2021-04-29 RX ORDER — CODEINE PHOSPHATE AND GUAIFENESIN 10; 100 MG/5ML; MG/5ML
1-2 SOLUTION ORAL EVERY 6 HOURS PRN
Qty: 120 ML | Refills: 0 | Status: SHIPPED | OUTPATIENT
Start: 2021-04-29 | End: 2021-05-25

## 2021-04-29 NOTE — PROGRESS NOTES
Mary is a 36 year old who is being evaluated via a billable video visit.      How would you like to obtain your AVS? MyChart  If the video visit is dropped, the invitation should be resent by: Text to cell phone: [phone]  Will anyone else be joining your video visit? No    Video Start Time: 140 AM     Assessment & Plan     Cough  2 weeks. Will treat - wheezing, sounds typical for her usual asthma exacerbation. This prescription is given with a discussion of side effects, risks and proper use.  Instructions are given to follow up if not improving or symptoms change or worsen as discussed.   - predniSONE (DELTASONE) 20 MG tablet; Take 3 tabs by mouth daily x 3 days, then 2 tabs daily x 3 days, then 1 tab daily x 3 days, then 1/2 tab daily x 3 days.  - guaiFENesin-codeine (ROBITUSSIN AC) 100-10 MG/5ML solution; Take 5-10 mLs by mouth every 6 hours as needed for cough    Viral URI  Will treat as noted  - predniSONE (DELTASONE) 20 MG tablet; Take 3 tabs by mouth daily x 3 days, then 2 tabs daily x 3 days, then 1 tab daily x 3 days, then 1/2 tab daily x 3 days.    Moderate persistent asthma with (acute) exacerbation  Needs a new nebulizer. Treat steroids. Has some sinus pain. May have a recurrent sinus infection. This prescription is given with a discussion of side effects, risks and proper use.  Instructions are given to follow up if not improving or symptoms change or worsen as discussed.   - Nebulizer and Supplies Order for DME - ONLY FOR DME  - predniSONE (DELTASONE) 20 MG tablet; Take 3 tabs by mouth daily x 3 days, then 2 tabs daily x 3 days, then 1 tab daily x 3 days, then 1/2 tab daily x 3 days.  - cefdinir (OMNICEF) 300 MG capsule; Take 1 capsule (300 mg) by mouth 2 times daily for 14 days  - fluconazole (DIFLUCAN) 150 MG tablet; Take 1 tablet (150 mg) by mouth once for 1 dose    Sinus pain  As noted   - cefdinir (OMNICEF) 300 MG capsule; Take 1 capsule (300 mg) by mouth 2 times daily for 14 days  -  "fluconazole (DIFLUCAN) 150 MG tablet; Take 1 tablet (150 mg) by mouth once for 1 dose     BMI:   Estimated body mass index is 44 kg/m  as calculated from the following:    Height as of 10/28/20: 1.651 m (5' 5\").    Weight as of 10/28/20: 119.9 kg (264 lb 6.4 oz).         Return in about 1 week (around 5/6/2021) for Update me Via My Chart - No Charge.    [facility] CAMILO [facility] is a 36 year old who presents for the following health issues     HPI     Acute Illness  Acute illness concerns:  It first started last Thursday, it was hard to swallow, feeling stuffed up, sneezing, itchy eyes, lots of pressure, and no appetite. This past monday and tuesday felt that it was improving a bit but now coughing, wheezing, and asthma is acting up. Negative covid test this past sunday  Onset/Duration: 1 week ago,   Symptoms:  Fever: no  Chills/Sweats: YES, night sweats even before being sick  Headache (location?): YES  Sinus Pressure: YES  Conjunctivitis:  no  Ear Pain: no   Rhinorrhea: no  Congestion: YES  Sore Throat: no  Cough: YES  Wheeze: YES, yesterday was bad, used rescue inhaler 8 times, nothing was helping, took old prednisone rx.   Decreased Appetite: YES  Nausea: no  Vomiting: only if coughing really hard  Diarrhea: no  Dysuria/Freq.: no  Dysuria or Hematuria: no  Fatigue/Achiness: no  Sick/Strep Exposure: YES, she was the first one sick and now her son is sick.   Therapies tried and outcome: inhaler, neb, allergra D, flonase, old prednisone rx.   Review of Systems   Constitutional, HEENT, cardiovascular, pulmonary, GI, , musculoskeletal, neuro, skin, endocrine and psych systems are negative, except as otherwise noted.      Objective           Vitals:  No vitals were obtained today due to virtual visit.    Physical Exam   GENERAL: Healthy, alert and no distress  EYES: Eyes grossly normal to inspection.  No discharge or erythema, or obvious scleral/conjunctival " abnormalities.  RESP: Dry frequent cough. No audible wheeze or visible cyanosis.  No visible retractions or increased work of breathing.    SKIN: Visible skin clear. No significant rash, abnormal pigmentation or lesions.  NEURO: Cranial nerves grossly intact.  Mentation and speech appropriate for age.  PSYCH: Mentation appears normal, affect normal/bright, judgement and insight intact, normal speech and appearance well-groomed.            Video-Visit Details    Type of service:  Video Visit    Video End Time: 2:00     Originating Location (pt. Location): Home    Distant Location (provider location):  [facility]     Platform used for Video Visit: Tapstream

## 2021-05-25 ENCOUNTER — VIRTUAL VISIT (OUTPATIENT)
Dept: FAMILY MEDICINE | Facility: CLINIC | Age: 36
End: 2021-05-25
Payer: COMMERCIAL

## 2021-05-25 DIAGNOSIS — F33.1 MAJOR DEPRESSIVE DISORDER, RECURRENT EPISODE, MODERATE (H): Primary | ICD-10-CM

## 2021-05-25 DIAGNOSIS — G47.00 INSOMNIA, UNSPECIFIED TYPE: ICD-10-CM

## 2021-05-25 DIAGNOSIS — E66.01 MORBID OBESITY (H): ICD-10-CM

## 2021-05-25 DIAGNOSIS — F41.1 GAD (GENERALIZED ANXIETY DISORDER): ICD-10-CM

## 2021-05-25 DIAGNOSIS — G43.809 OTHER MIGRAINE WITHOUT STATUS MIGRAINOSUS, NOT INTRACTABLE: ICD-10-CM

## 2021-05-25 PROBLEM — R56.9 SEIZURE (H): Status: RESOLVED | Noted: 2017-03-28 | Resolved: 2021-05-25

## 2021-05-25 PROCEDURE — 99214 OFFICE O/P EST MOD 30 MIN: CPT | Mod: 95 | Performed by: PHYSICIAN ASSISTANT

## 2021-05-25 RX ORDER — TRAZODONE HYDROCHLORIDE 50 MG/1
50-150 TABLET, FILM COATED ORAL AT BEDTIME
Qty: 90 TABLET | Refills: 3 | Status: SHIPPED | OUTPATIENT
Start: 2021-05-25 | End: 2024-01-29

## 2021-05-25 RX ORDER — TOPIRAMATE 25 MG/1
TABLET, FILM COATED ORAL
Qty: 120 TABLET | Refills: 0 | Status: SHIPPED | OUTPATIENT
Start: 2021-05-25 | End: 2021-06-28

## 2021-05-25 ASSESSMENT — PATIENT HEALTH QUESTIONNAIRE - PHQ9
SUM OF ALL RESPONSES TO PHQ QUESTIONS 1-9: 10
5. POOR APPETITE OR OVEREATING: NEARLY EVERY DAY

## 2021-05-25 ASSESSMENT — ANXIETY QUESTIONNAIRES
1. FEELING NERVOUS, ANXIOUS, OR ON EDGE: NEARLY EVERY DAY
3. WORRYING TOO MUCH ABOUT DIFFERENT THINGS: MORE THAN HALF THE DAYS
GAD7 TOTAL SCORE: 16
7. FEELING AFRAID AS IF SOMETHING AWFUL MIGHT HAPPEN: MORE THAN HALF THE DAYS
IF YOU CHECKED OFF ANY PROBLEMS ON THIS QUESTIONNAIRE, HOW DIFFICULT HAVE THESE PROBLEMS MADE IT FOR YOU TO DO YOUR WORK, TAKE CARE OF THINGS AT HOME, OR GET ALONG WITH OTHER PEOPLE: VERY DIFFICULT
6. BECOMING EASILY ANNOYED OR IRRITABLE: MORE THAN HALF THE DAYS
5. BEING SO RESTLESS THAT IT IS HARD TO SIT STILL: MORE THAN HALF THE DAYS
2. NOT BEING ABLE TO STOP OR CONTROL WORRYING: MORE THAN HALF THE DAYS

## 2021-05-25 NOTE — PROGRESS NOTES
Mary is a 36 year old who is being evaluated via a billable video visit.      How would you like to obtain your AVS? MyChart  If the video visit is dropped, the invitation should be resent by:   Will anyone else be joining your video visit? No    Video Start Time: 155    Assessment & Plan     Major depressive disorder, recurrent episode, moderate (H)  A lot worse  Working a lot of hours  Seeing a good therapist  Will continue with therapy  Follow up in a week, earlier if issues  Seeing psychiatry in a few weeks   Was on Topamax and mood was less labile. Will restart, see below  Continue therapy, work on sleep.      CRUZ (generalized anxiety disorder)  As noted. Self cares, boundaries, lifestyle changes, new job, etc     Other migraine without status migrainosus, not intractable  A lot worse. Induced by stress. Will restart Topamax.     - topiramate (TOPAMAX) 25 MG tablet; 1 at bedtime for a week, then 2 at bedtime for a week, then 3 at bedtime for a week, then 4 at bedtime    Morbid obesity (H)  Did better with weight on Topamax. Can restart.   - topiramate (TOPAMAX) 25 MG tablet; 1 at bedtime for a week, then 2 at bedtime for a week, then 3 at bedtime for a week, then 4 at bedtime    Insomnia, unspecified type  Restart. Not sleeping well. Hopefully improved sleep will support her mood.   - traZODone (DESYREL) 50 MG tablet; Take 1-3 tablets ( mg) by mouth At Bedtime    No follow-ups on file.    ROBERT CAMILO [facility] [place]    Subjective   Mary is a 36 year old who presents for the following health issues     HPI      Depression and Anxiety Follow-Up    How are you doing with your depression since your last visit? slightly worsened, has more problem with anxiety.    How are you doing with your anxiety since your last visit?  Worsened exteremly     Are you having other symptoms that might be associated with depression or anxiety? extreme migraine, unable to sleep, not eating,  extreme mood swings now    Have you had a significant life event? Pandemic life, looking for a job due to being laid off for a while now(pandemic)    Do you have any concerns with your use of alcohol or other drugs? No     Worse. Working long hours. Stress from family, pandemic, loss of aunt and grandmother. Employer is making her work long hours. Having anxiety, not sleeping at all. Mood is labile.     Social History     Tobacco Use     Smoking status: Never Smoker     Smokeless tobacco: Never Used   Substance Use Topics     Alcohol use: No     Alcohol/week: 0.0 standard drinks     Drug use: No     PHQ 9/25/2020 10/2/2020 10/28/2020   PHQ-9 Total Score 19 19 10   Q9: Thoughts of better off dead/self-harm past 2 weeks Not at all Not at all Not at all     CRUZ-7 SCORE 9/17/2020 9/25/2020 10/2/2020   Total Score - - -   Total Score 21 15 16     Stopped taking her medication in April. Has now restarted Zoloft at 50 mg per day. Has been taking this for the past week and a half and will increase this after 2 weeks.     Taken a turn for the worst. Sees outside therapist Sammi in [place]. She suggested she sees Psych for medication management . Will be seeing  [facility] in [place] on June 28th.       Would like trazodone for sleep     Review of Systems   Constitutional, HEENT, cardiovascular, pulmonary, GI, , musculoskeletal, neuro, skin, endocrine and psych systems are negative, except as otherwise noted.      Objective           Vitals:  No vitals were obtained today due to virtual visit.    Physical Exam   GENERAL: Healthy, alert and no distress  PSYCH: Psych: Appropriate appearance.  Alert and oriented times 3; coherent speech, normal   rate and volume, able to articulate logical thoughts, able   to abstract reason, no tangential thoughts, no hallucinations   or delusions.  Normal behavior.  Her affect is bright.  Tearful at times         Video-Visit Details    Type of service:   Video Visit    Video End Time:225    Originating Location (pt. Location): Home    Distant Location (provider location):  [facility] [place]     Platform used for Video Visit: Dilan

## 2021-05-26 ENCOUNTER — TELEPHONE (OUTPATIENT)
Dept: FAMILY MEDICINE | Facility: CLINIC | Age: 36
End: 2021-05-26

## 2021-05-26 ASSESSMENT — ANXIETY QUESTIONNAIRES: GAD7 TOTAL SCORE: 16

## 2021-05-26 NOTE — TELEPHONE ENCOUNTER
Miguel Hammer PA-C P Ne Team [facility]             Please call her to set up a follow up with me virtually in 1 to 2 weeks

## 2021-05-26 NOTE — TELEPHONE ENCOUNTER
LMOM for patient to call back main line to schedule an appointment for follow up in 1-2 weeks.    Thank you,  Naina BRADEN  [facility]  Team [place]onika Coordinator

## 2021-05-27 NOTE — TELEPHONE ENCOUNTER
Called patient and left a voicemail message to call the clinic and schedule a Virtual follow up in 1-2 weeks appointment.      Yolanda Glover

## 2021-06-02 DIAGNOSIS — J45.41 MODERATE PERSISTENT ASTHMA WITH (ACUTE) EXACERBATION: ICD-10-CM

## 2021-06-02 RX ORDER — ALBUTEROL SULFATE 90 UG/1
2 AEROSOL, METERED RESPIRATORY (INHALATION) EVERY 6 HOURS
Qty: 18 G | Refills: 2 | Status: SHIPPED | OUTPATIENT
Start: 2021-06-02 | End: 2022-06-07

## 2021-06-02 NOTE — TELEPHONE ENCOUNTER
Routing refill request to provider for review/approval because:  *Asthma control assessment score within normal limits in last 6 months    ACT Total Scores 1/14/2019 8/30/2019 2/10/2020   ACT TOTAL SCORE - - -   ASTHMA ER VISITS - - -   ASTHMA HOSPITALIZATIONS - - -   ACT TOTAL SCORE (Goal Greater than or Equal to 20) 14 17 21   In the past 12 months, how many times did you visit the emergency room for your asthma without being admitted to the hospital? 0 0 0   In the past 12 months, how many times were you hospitalized overnight because of your asthma? 0 0 0     Sosa Woods, RN

## 2021-06-06 NOTE — TELEPHONE ENCOUNTER
Called and discussed normal NIPT results with Mary. Results indicate NO ANEUPLOIDY DETECTED for chromosomes 21, 18, 13, or sex chromosomes (XX). Sex was disclosed per patient's wishes.     This puts her current pregnancy at low risk for Down syndrome, trisomy 18, trisomy 13 and sex chromosome abnormalities. This test is reported to have the following sensitivities: Down syndrome- 99%, trisomy 18- 98%, and trisomy 13- 98%. Although these results are reassuring, this does not replace a standard chromosome analysis from a chorionic villus sampling or amniocentesis.     While this result is reassuring, it does not rule out all possible genetic conditions. There is not currently one single genetic test available that can assess for all possible genetic conditions.    Level II ultrasound is recommended, given AMA.     MSAFP is the appropriate second trimester screening test for open neural tube defects; the maternal quad screen is not recommended.    Her results will be faxed to her primary OB to review    Jaye Vo MS, [facility]  Licensed Genetic Counselor   [facility]  Maternal Fetal Medicine  [email or web address]  [email or web address]  Office: [phone]  Pager [phone]  [place]'s Office: [phone]  M: [phone]   Fax: [phone]

## 2021-06-06 NOTE — PROGRESS NOTES
"Please see \"Imaging\" tab under Chart Review for full report.  This ultrasound was performed in the [facility], and may be located under Care Everywhere.    Simran Abraham MD  Maternal Fetal Medicine    "

## 2021-06-06 NOTE — PROGRESS NOTES
[facility]  Genetic Counseling Consult    Patient: Mary Shipman YOB: 1985   Date of Service: 2020      Mary Shipman was seen at [facility] [place] for genetic consultation to discuss the options for screening and testing for fetal chromosome abnormalities. The indication for genetic counseling is advanced maternal age. She was unaccompanied to this visit.        Impression/Plan:   1.  Mary had an ultrasound and blood draw for NIPT (Innatal test through [facility] [employer]).  Results are expected within 7-10 days, and will be available in [facility].  We will contact her to discuss the results, and a copy will be forwarded to the office of the referring OB provider.     2.  Maternal serum AFP (single marker screen) is recommended after 15 weeks to screen for open neural tube defects. A quad screen should not be performed.    3.  An 18-20 week comprehensive ultrasound is standard of care for all women 35 or older at delivery.    Pregnancy History:   /Parity:    Age at Delivery: 35 y.o.  ÓSCAR: 2020, by Ultrasound  Gestational Age: 11w5d    No significant complications or exposures were reported in the current pregnancy.    Mary reports she stopped taking zoloft when she fount out she was pregnant. She is starting to notice the difference by not taking it. I encouraged her to speak with her OB provider about this medicaiton.    Mary aguilar pregnancy history is significant for one elective termination and one full term pregnancy at 37w2d with an induction for preeclampsia     The father of the pregnancy, Fred, is not involved in the pregnancy. However, Fred and Mary are living together until their lease ends in July. Mary is frustrated by the lack of his involvement but is trying to be positive about single parenting.    Medical History:   Mary aguilar reported medical history is not expected  to impact pregnancy management or risks to fetal development.    She reports history of one grand mal seizure in 2017. She has been off medication since 2018 and doing well.        Family History:   A three-generation pedigree was obtained, and is scanned under the  Media  tab.   The following significant findings were reported by Mary:    Family history of intellectual disability    The father of the pregnancy, Fred, 36, has intellectual disability. He was receiving social security for some time for disability but is now working. Per Mary's description, it seems that he struggles with executive functioning.     Fred has four children from two previous relationships. Two daughters from one relationship include one healthy daughter and one daughter with developmental delays and possible dwarfism. From a different relationship, one son has ADHD and autism spectrum disorders and the daughter has some level of learning delays.    Mary reports that Fred' remaining family history is unremarkable.       Mary has a son, age 7, from a previous relationship. He has a diagnosis of developmental delay and some sensory issues. He was born with a hole in the heart at 37w2d but it closed within 48 hours. We discussed there was no concern for a heart defect for her son and the remaining family history is negative. He also has a history of bilateral hearing loss and resulting speech delay which seems to be connected with recurrent untreated ear infections.       Mary also has two paternal half-siblings (children of her father and her maternal aunt) that have intellectual disability. These two males have a full sister that is healthy    Intellectual disability (ID) is a neurodevelopmental disorder that affects approximately one percent of the population. It is characterized by deficits in intellectual and adaptive functioning with varying severity presenting before the age of 18. ID can be a broad spectrum and  have multiple etiologies. The etiologies can include genetic and environmental factors. Often times ID is also associated with other medical conditions such as heart defects, cerebral palsy, endocrine abnormalities, hearing and/or vision loss, and sleep disorders.     Genetic causes of intellectual disability may be defined as a particular syndrome or genetic factor. In this case, a review of family history and genetic testing of parents or siblings can determine if this genetic change was hereditary or sporadic (de kimberly). In a case of ID that is considered de kimberly the chance of recurrence is low. Therefore, a family history of such ID would not increase risks for relatives. However, if a case of ID is considered hereditary, recurrence risks would depend on the type of inheritance which includes autosomal dominant, autosomal recessive, X-linked, and mitochondrial. In some novel cases genetic testing is unable to determine a cause and family history may be the most useful tool to understand its inheritance.       If a family history includes an individual with ID but unknown diagnosis it is difficult to provide an accurate risk assessment. If the individual has other health conditions or features it can help narrow down the possibility. In addition, if the name of the diagnosis is unknown but the family history predicts a typical mode of inheritance the risk can be estimated. If the family history is otherwise negative, a de kimberly cause is most likely. Therefore, this would not increase risks for other family members. However, the risk cannot be eliminated. If more information is collected it would be reasonable to revisit this family history to provide a more accurate risk assessment.       Remaining family history    Mary's mother was diagnosed with colon cancer in her early 50s. Per Mary's description, it seems it metastasized to other organs such as her lungs. She is doing well. Discussing this family  history in detail was beyond our scoop due to time constraints. However, I did encouraged her to review this family history with her provider as their may be a recommendation for an early colonoscopy.     Otherwise, the reported family history is negative for multiple miscarriages, stillbirths, birth defects, mental retardation, known genetic conditions, and consanguinity.       Carrier Screening:   The patient reports that she is of  ancestry:     Cystic fibrosis is an autosomal recessive genetic condition that occurs with increased frequency in individuals of  ancestry and carrier screening for this condition is available.  In addition,  screening in the [facility] includes cystic fibrosis.    The patient reports that the father of the pregnancy has  ancestry:      We reviewed the clinical features, autosomal recessive inheritance, and options for carrier screening and  screening for hemoglobinopathies.      Expanded carrier screening for mutations in a large panel of genes associated with autosomal recessive conditions including cystic fibrosis, spinal muscular atrophy, and others, is now available.      Carrier screening was beyond the scope of our discussion today due to time constraints        Risk Assessment for Chromosome Conditions:   We explained that the risk for fetal chromosome abnormalities increases with maternal age. We discussed specific features of common chromosome abnormalities, including Down syndrome, trisomy 13, trisomy 18, and sex chromosome trisomies.      At age 35 at midtrimester, the risk to have a baby with Down syndrome is 1 in 168.     At age 35 at midtrimester, the risk to have a baby with any chromosome abnormality is 1 in 82.     Testing Options:   We discussed the following options:   Non-invasive Prenatal Testing (NIPT)    Maternal plasma cell-free DNA testing; first trimester ultrasound with nuchal translucency and nasal bone  assessment is recommended, when appropriate    Screens for fetal trisomy 21, trisomy 13, trisomy 18, and sex chromosome aneuploidy    Cannot screen for open neural tube defects; maternal serum AFP after 15 weeks is recommended      Genetic Amniocentesis    Invasive procedure typically performed in the second trimester by which amniotic fluid is obtained for the purpose of chromosome analysis and/or other prenatal genetic analysis    Diagnostic results; >99% sensitivity for fetal chromosome abnormalities    AFAFP measurement tests for open neural tube defects      Comprehensive (Level II) ultrasound: Detailed ultrasound performed between 18-22 weeks gestation to screen for major birth  defects and markers for aneuploidy.    We reviewed the benefits and limitations of this testing.  Screening tests provide a risk assessment specific to the pregnancy for certain fetal chromosome abnormalities, but cannot definitively diagnose or exclude a fetal chromosome abnormality.  Follow-up genetic counseling and consideration of diagnostic testing is recommended with any abnormal screening result.     Diagnostic tests carry inherent risks- including risk of miscarriage- that require careful consideration.  These tests can detect fetal chromosome abnormalities with greater than 99% certainty.  Results can be compromised by maternal cell contamination or mosaicism, and are limited by the resolution of cytogenetic G-banding technology.  There is no screening nor diagnostic test that can detect all forms of birth defects or mental disability.     It was a pleasure to be involved with Mary aguilar [facility]. Face-to-face time of the meeting was 45 minutes.    Jaye Vo MS, [facility]  Licensed Genetic Counselor   [facility]  Maternal Fetal Medicine  [email or web address]  [email or web address]  Office: [phone]  Pager [phone]  [facility] Office: [phone]  M: [phone]   Fax: [phone]

## 2021-06-10 NOTE — PROGRESS NOTES
"Please see \"Imaging\" tab under Chart Review for full report.  This ultrasound was performed in the [facility], and may be located under Care Everywhere.    Simran Abraham MD  Maternal Fetal Medicine    "

## 2021-07-25 ENCOUNTER — TRANSFERRED RECORDS (OUTPATIENT)
Dept: HEALTH INFORMATION MANAGEMENT | Facility: CLINIC | Age: 36
End: 2021-07-25

## 2021-07-27 ENCOUNTER — E-VISIT (OUTPATIENT)
Dept: FAMILY MEDICINE | Facility: CLINIC | Age: 36
End: 2021-07-27
Payer: COMMERCIAL

## 2021-07-27 DIAGNOSIS — J06.9 VIRAL URI: Primary | ICD-10-CM

## 2021-07-27 PROCEDURE — 98970 NQHP OL DIG ASSMT&MGMT 5-10: CPT | Performed by: PHYSICIAN ASSISTANT

## 2021-07-29 DIAGNOSIS — G43.809 OTHER MIGRAINE WITHOUT STATUS MIGRAINOSUS, NOT INTRACTABLE: ICD-10-CM

## 2021-07-29 DIAGNOSIS — E66.01 MORBID OBESITY (H): ICD-10-CM

## 2021-07-30 RX ORDER — TOPIRAMATE 100 MG/1
100 TABLET, FILM COATED ORAL AT BEDTIME
Qty: 90 TABLET | Refills: 1 | Status: SHIPPED | OUTPATIENT
Start: 2021-07-30 | End: 2021-12-13

## 2021-07-30 NOTE — TELEPHONE ENCOUNTER
Routing refill request to provider for review/approval because:  Needs Provider review      Pending Prescriptions:                       Disp   Refills    topiramate (TOPAMAX) 25 MG tablet [Pharmac*120 ta*0        Sig: TAKE 4 TABLETS(100 MG) BY MOUTH AT BEDTIME        Laurie Carlisle RN

## 2021-08-06 ENCOUNTER — VIRTUAL VISIT (OUTPATIENT)
Dept: FAMILY MEDICINE | Facility: CLINIC | Age: 36
End: 2021-08-06
Payer: COMMERCIAL

## 2021-08-06 DIAGNOSIS — R51.9 NONINTRACTABLE HEADACHE, UNSPECIFIED CHRONICITY PATTERN, UNSPECIFIED HEADACHE TYPE: ICD-10-CM

## 2021-08-06 DIAGNOSIS — R03.0 ELEVATED BP WITHOUT DIAGNOSIS OF HYPERTENSION: Primary | ICD-10-CM

## 2021-08-06 PROCEDURE — 99213 OFFICE O/P EST LOW 20 MIN: CPT | Mod: 95 | Performed by: PHYSICIAN ASSISTANT

## 2021-08-06 RX ORDER — AMLODIPINE BESYLATE 5 MG/1
5 TABLET ORAL DAILY
Qty: 90 TABLET | Refills: 0 | Status: SHIPPED | OUTPATIENT
Start: 2021-08-06 | End: 2021-11-03

## 2021-08-06 NOTE — PROGRESS NOTES
Mary is a 36 year old who is being evaluated via a billable video visit.      How would you like to obtain your AVS? MyChart  If the video visit is dropped, the invitation should be resent by: Text to cell phone: [phone]  Will anyone else be joining your video visit? No    Video Start Time: 1240     Assessment & Plan     Elevated BP without diagnosis of hypertension  Elevated persistently. A lot of anxiety around this. Creating worse headaches.  Recommend start BP med that may help headaches  This prescription is given with a discussion of side effects, risks and proper use.  Instructions are given to follow up if not improving or symptoms change or worsen as discussed.   Recheck in a few weeks.   - Miscellaneous Order for DME - ONLY FOR DME  - amLODIPine (NORVASC) 5 MG tablet; Take 1 tablet (5 mg) by mouth daily    Nonintractable headache, unspecified chronicity pattern, unspecified headache type  As noted Above. Migraines are chronic. No new symptoms, no focal neurologic symptoms. Monitoring advised.   - amLODIPine (NORVASC) 5 MG tablet; Take 1 tablet (5 mg) by mouth daily    No follow-ups on file.    FRANSICO JOHNSON PA-C  [facility]    Danyelle Hernandez is a 36 year old who presents for the following health issues     HPI       Patient is concerned about high blood pressure readings.    They have been reading high since pregnancy and a few times after pregnancy.   BP: 150/96, 150/102.    Patient is having migraines, dizziness, and nausea.   Patient would like a prescription for BP machine for home for monitoring. Migraines are similar to past migraines just more frequent. NO focal symptoms or new symptoms.    Patient Active Problem List   Diagnosis     Esophageal reflux     Allergic rhinitis due to other allergen     Polycystic ovaries     Thyrotoxicosis     Urticaria     Obesity     Low back pain     Intermittent asthma     HYPERLIPIDEMIA LDL GOAL <160     Irritable bowel  syndrome with constipation     Migraine headache     Not immune to rubella     Major depressive disorder, recurrent episode, moderate (H)     Health Care Home     Health care home, active care coordination     CRUZ (generalized anxiety disorder)     Lump or mass in breast     Menometrorrhagia     Cervicalgia     Bilateral thoracic back pain, unspecified chronicity     Other migraine without status migrainosus, not intractable     Morbid obesity (H)     Moderate persistent asthma without complication     Encounter for triage in pregnant patient     Uterine contractions during pregnancy      (normal spontaneous vaginal delivery)     Postpartum anxiety      Current Outpatient Medications   Medication     albuterol (ACCUNEB) 1.25 MG/3ML neb solution     albuterol (PROAIR HFA/PROVENTIL HFA/VENTOLIN HFA) 108 (90 Base) MCG/ACT inhaler     amLODIPine (NORVASC) 5 MG tablet     fexofenadine-pseudoePHEDrine (ALLEGRA-D 24) 180-240 MG per 24 hr tablet     fluticasone (FLONASE) 50 MCG/ACT nasal spray     hydrOXYzine (ATARAX) 25 MG tablet     ibuprofen (ADVIL/MOTRIN) 600 MG tablet     mometasone-formoterol (DULERA) 200-5 MCG/ACT inhaler     norgestim-eth estrad triphasic (ORTHO TRI-CYCLEN) 0.18/0.215/0.25 MG-35 MCG tablet     omeprazole (PRILOSEC) 20 MG DR capsule     sertraline (ZOLOFT) 100 MG tablet     topiramate (TOPAMAX) 100 MG tablet     traZODone (DESYREL) 50 MG tablet     No current facility-administered medications for this visit.     Facility-Administered Medications Ordered in Other Visits   Medication     lidocaine-EPINEPHrine 1.5 %-1:550400 injection        Review of Systems   Constitutional, HEENT, cardiovascular, pulmonary, GI, , musculoskeletal, neuro, skin, endocrine and psych systems are negative, except as otherwise noted.      Objective           Vitals:  No vitals were obtained today due to virtual visit.    Physical Exam   GENERAL: Healthy, alert and no distress  EYES: Eyes grossly normal to inspection.   No discharge or erythema, or obvious scleral/conjunctival abnormalities.  RESP: No audible wheeze, cough, or visible cyanosis.  No visible retractions or increased work of breathing.    SKIN: Visible skin clear. No significant rash, abnormal pigmentation or lesions.  NEURO: Cranial nerves grossly intact.  Mentation and speech appropriate for age.  PSYCH: Mentation appears normal, affect normal/bright, judgement and insight intact, normal speech and appearance well-groomed.            Video-Visit Details    Type of service:  Video Visit    Video End Time:100 PM    Originating Location (pt. Location): Home    Distant Location (provider location):  [facility]     Platform used for Video Visit: Box[employer]

## 2021-09-19 ENCOUNTER — HEALTH MAINTENANCE LETTER (OUTPATIENT)
Age: 36
End: 2021-09-19

## 2021-10-08 ENCOUNTER — E-VISIT (OUTPATIENT)
Dept: FAMILY MEDICINE | Facility: CLINIC | Age: 36
End: 2021-10-08
Payer: COMMERCIAL

## 2021-10-08 DIAGNOSIS — J06.9 VIRAL URI: ICD-10-CM

## 2021-10-08 DIAGNOSIS — J45.41 MODERATE PERSISTENT ASTHMA WITH (ACUTE) EXACERBATION: ICD-10-CM

## 2021-10-08 DIAGNOSIS — R05.9 COUGH: ICD-10-CM

## 2021-10-08 PROCEDURE — 99421 OL DIG E/M SVC 5-10 MIN: CPT | Performed by: PHYSICIAN ASSISTANT

## 2021-10-08 RX ORDER — PREDNISONE 20 MG/1
TABLET ORAL
Qty: 20 TABLET | Refills: 0 | Status: SHIPPED | OUTPATIENT
Start: 2021-10-08 | End: 2021-12-13

## 2021-10-08 RX ORDER — CODEINE PHOSPHATE AND GUAIFENESIN 10; 100 MG/5ML; MG/5ML
1-2 SOLUTION ORAL EVERY 4 HOURS PRN
Qty: 120 ML | Refills: 0 | Status: SHIPPED | OUTPATIENT
Start: 2021-10-08 | End: 2021-12-13

## 2021-10-12 ENCOUNTER — VIRTUAL VISIT (OUTPATIENT)
Dept: FAMILY MEDICINE | Facility: CLINIC | Age: 36
End: 2021-10-12
Payer: COMMERCIAL

## 2021-10-12 DIAGNOSIS — J40 BRONCHITIS: ICD-10-CM

## 2021-10-12 DIAGNOSIS — J18.9 PNEUMONIA DUE TO INFECTIOUS ORGANISM, UNSPECIFIED LATERALITY, UNSPECIFIED PART OF LUNG: ICD-10-CM

## 2021-10-12 DIAGNOSIS — R05.9 COUGH: Primary | ICD-10-CM

## 2021-10-12 PROCEDURE — 99213 OFFICE O/P EST LOW 20 MIN: CPT | Mod: 95 | Performed by: PHYSICIAN ASSISTANT

## 2021-10-12 RX ORDER — BENZONATATE 200 MG/1
200 CAPSULE ORAL 3 TIMES DAILY PRN
Qty: 30 CAPSULE | Refills: 5 | Status: SHIPPED | OUTPATIENT
Start: 2021-10-12 | End: 2021-11-04

## 2021-10-12 NOTE — PROGRESS NOTES
Mary is a 36 year old who is being evaluated via a billable video visit.      How would you like to obtain your AVS? MyChart  If the video visit is dropped, the invitation should be resent by: Text to cell phone: [phone]  Will anyone else be joining your video visit? No    Video Start Time: 315 PM       ICD-10-CM    1. Cough  R05.9 benzonatate (TESSALON) 200 MG capsule   2. Pneumonia due to infectious organism, unspecified laterality, unspecified part of lung  J18.9    3. Bronchitis  J40 benzonatate (TESSALON) 200 MG capsule      X ray at the ER was negative  Is on day 3 of steroids and things are improving  Has a tight cough. Covid test negative. Nebs are helpful. Denies shortness of breath unless she's exerting a lot  No fevers.  Will monitor. Treating cough. Continue cough syrup.  Warning symptoms of worsening condition discussed and patient shows good understanding.     Subjective   Mary is a 36 year old who presents for the following health issues     HPI     ED/UC Followup:    Facility:  [facility]  Date of visit: 10/11/21  Reason for visit: Chest pain and cough  Current Status: Patient is feeling a bit better than yesterday. Patient still has a horrible cough and having coughing fits at night.       Patient Active Problem List   Diagnosis     Esophageal reflux     Allergic rhinitis due to other allergen     Polycystic ovaries     Thyrotoxicosis     Urticaria     Obesity     Low back pain     Intermittent asthma     HYPERLIPIDEMIA LDL GOAL <160     Irritable bowel syndrome with constipation     Migraine headache     Not immune to rubella     Major depressive disorder, recurrent episode, moderate (H)     Health Care Home     Health care home, active care coordination     CRUZ (generalized anxiety disorder)     Lump or mass in breast     Menometrorrhagia     Cervicalgia     Bilateral thoracic back pain, unspecified chronicity     Other migraine without status  migrainosus, not intractable     Morbid obesity (H)     Moderate persistent asthma without complication     Encounter for triage in pregnant patient     Uterine contractions during pregnancy      (normal spontaneous vaginal delivery)     Postpartum anxiety      Current Outpatient Medications   Medication     albuterol (ACCUNEB) 1.25 MG/3ML neb solution     albuterol (PROAIR HFA/PROVENTIL HFA/VENTOLIN HFA) 108 (90 Base) MCG/ACT inhaler     amLODIPine (NORVASC) 5 MG tablet     benzonatate (TESSALON) 200 MG capsule     fexofenadine-pseudoePHEDrine (ALLEGRA-D 24) 180-240 MG per 24 hr tablet     fluticasone (FLONASE) 50 MCG/ACT nasal spray     guaiFENesin-codeine (ROBITUSSIN AC) 100-10 MG/5ML solution     hydrOXYzine (ATARAX) 25 MG tablet     ibuprofen (ADVIL/MOTRIN) 600 MG tablet     mometasone-formoterol (DULERA) 200-5 MCG/ACT inhaler     norgestim-eth estrad triphasic (ORTHO TRI-CYCLEN) 0.18/0.215/0.25 MG-35 MCG tablet     omeprazole (PRILOSEC) 20 MG DR capsule     predniSONE (DELTASONE) 20 MG tablet     sertraline (ZOLOFT) 100 MG tablet     topiramate (TOPAMAX) 100 MG tablet     traZODone (DESYREL) 50 MG tablet     No current facility-administered medications for this visit.     Facility-Administered Medications Ordered in Other Visits   Medication     lidocaine-EPINEPHrine 1.5 %-1:793588 injection        Review of Systems   Constitutional, HEENT, cardiovascular, pulmonary, gi and gu systems are negative, except as otherwise noted.      Objective           Vitals:  No vitals were obtained today due to virtual visit.    Physical Exam   GENERAL: Healthy, alert and no distress  EYES: Eyes grossly normal to inspection.  No discharge or erythema, or obvious scleral/conjunctival abnormalities.  RESP: No audible wheeze, cough, or visible cyanosis.  No visible retractions or increased work of breathing.    SKIN: Visible skin clear. No significant rash, abnormal pigmentation or lesions.  NEURO: Cranial nerves grossly  intact.  Mentation and speech appropriate for age.  PSYCH: Mentation appears normal, affect normal/bright, judgement and insight intact, normal speech and appearance well-groomed.            Video-Visit Details    Type of service:  Video Visit    Video End Time:325    Originating Location (pt. Location): Home    Distant Location (provider location):  [facility]     Platform used for Video Visit: "[employer]"

## 2021-10-18 ENCOUNTER — MYC MEDICAL ADVICE (OUTPATIENT)
Dept: OBGYN | Facility: CLINIC | Age: 36
End: 2021-10-18

## 2021-10-18 DIAGNOSIS — Z30.09 GENERAL COUNSELING FOR PRESCRIPTION OF ORAL CONTRACEPTIVES: ICD-10-CM

## 2021-10-18 RX ORDER — NORGESTIMATE AND ETHINYL ESTRADIOL 7DAYSX3 28
1 KIT ORAL DAILY
Qty: 84 TABLET | Refills: 0 | Status: SHIPPED | OUTPATIENT
Start: 2021-10-18 | End: 2021-12-30

## 2021-10-18 NOTE — TELEPHONE ENCOUNTER
Pharmacy sent refill request for OCP.  Pt will schedule annual exam, last OV 10/28/20.  Refill sent per protocol.  Geovanna Magana RN

## 2021-10-27 ENCOUNTER — MYC MEDICAL ADVICE (OUTPATIENT)
Dept: FAMILY MEDICINE | Facility: CLINIC | Age: 36
End: 2021-10-27

## 2021-10-27 ENCOUNTER — NURSE TRIAGE (OUTPATIENT)
Dept: NURSING | Facility: CLINIC | Age: 36
End: 2021-10-27

## 2021-10-27 NOTE — TELEPHONE ENCOUNTER
Will hold open to make sure response was read. If not read within 1 hour, will call patient.     Evi Cantu RN, BSN, PHN  [facility]: [place]
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
## 2021-10-27 NOTE — TELEPHONE ENCOUNTER
Patient calls in regards to symptoms mentioned in Mychart message. She did not yet read RN response.     RN relayed message, advised to go to ED immediately. She states she is scared of hospitals due to COVID right now, asks if can go to  instead. RN advised that ED is most appropriate for her symptoms. She has someone that can drive her. She states she is at UC right now with her daughter, then will go to ED.     RN urged ED immediately.     Patient verbalized understanding and in agreement with plan of care.     Evi Cantu, MEGHANA, BSN, PHN  [facility]: [place]

## 2021-11-03 DIAGNOSIS — J32.9 CHRONIC SINUSITIS, UNSPECIFIED LOCATION: ICD-10-CM

## 2021-11-03 DIAGNOSIS — R51.9 NONINTRACTABLE HEADACHE, UNSPECIFIED CHRONICITY PATTERN, UNSPECIFIED HEADACHE TYPE: ICD-10-CM

## 2021-11-03 DIAGNOSIS — R03.0 ELEVATED BP WITHOUT DIAGNOSIS OF HYPERTENSION: ICD-10-CM

## 2021-11-03 RX ORDER — AMLODIPINE BESYLATE 5 MG/1
TABLET ORAL
Qty: 90 TABLET | Refills: 1 | Status: SHIPPED | OUTPATIENT
Start: 2021-11-03 | End: 2021-12-13

## 2021-11-03 NOTE — TELEPHONE ENCOUNTER
Routing refill request to provider for review/approval because:  Labs out of range:    Creatinine   Date Value Ref Range Status   09/29/2020 0.80 0.52 - 1.04 mg/dL Final     BP under 140/90 in past 12 months    **Scheduled for [facility] visit 11/4/21    Sosa Woods RN

## 2021-11-04 ENCOUNTER — OFFICE VISIT (OUTPATIENT)
Dept: FAMILY MEDICINE | Facility: CLINIC | Age: 36
End: 2021-11-04
Payer: COMMERCIAL

## 2021-11-04 ENCOUNTER — APPOINTMENT (OUTPATIENT)
Dept: FAMILY MEDICINE | Facility: CLINIC | Age: 36
End: 2021-11-04
Payer: COMMERCIAL

## 2021-11-04 VITALS
WEIGHT: 275.6 LBS | OXYGEN SATURATION: 100 % | DIASTOLIC BLOOD PRESSURE: 80 MMHG | HEART RATE: 79 BPM | HEIGHT: 65 IN | TEMPERATURE: 98.6 F | SYSTOLIC BLOOD PRESSURE: 128 MMHG | RESPIRATION RATE: 12 BRPM | BODY MASS INDEX: 45.92 KG/M2

## 2021-11-04 DIAGNOSIS — S06.0X1D CONCUSSION WITH LOSS OF CONSCIOUSNESS OF 30 MINUTES OR LESS, SUBSEQUENT ENCOUNTER: ICD-10-CM

## 2021-11-04 DIAGNOSIS — J45.41 MODERATE PERSISTENT ASTHMA WITH (ACUTE) EXACERBATION: ICD-10-CM

## 2021-11-04 DIAGNOSIS — G44.309 POST-TRAUMATIC HEADACHE, NOT INTRACTABLE, UNSPECIFIED CHRONICITY PATTERN: ICD-10-CM

## 2021-11-04 DIAGNOSIS — J34.89 SINUS PAIN: ICD-10-CM
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
## 2021-11-04 PROCEDURE — 99214 OFFICE O/P EST MOD 30 MIN: CPT | Performed by: PHYSICIAN ASSISTANT

## 2021-11-04 RX ORDER — AZITHROMYCIN 250 MG/1
TABLET, FILM COATED ORAL
Qty: 6 TABLET | Refills: 0 | Status: SHIPPED | OUTPATIENT
Start: 2021-11-04 | End: 2021-11-09

## 2021-11-04 RX ORDER — ALBUTEROL SULFATE 1.25 MG/3ML
1.25 SOLUTION RESPIRATORY (INHALATION) EVERY 6 HOURS PRN
Qty: 100 ML | Refills: 1 | Status: SHIPPED | OUTPATIENT
Start: 2021-11-04

## 2021-11-04 ASSESSMENT — PAIN SCALES - GENERAL: PAINLEVEL: SEVERE PAIN (6)

## 2021-11-04 ASSESSMENT — MIFFLIN-ST. JEOR: SCORE: 1930.99
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
## 2021-11-05 ENCOUNTER — PATIENT OUTREACH (OUTPATIENT)
Dept: CARE COORDINATION | Facility: CLINIC | Age: 36
End: 2021-11-05
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
## 2021-11-08 ENCOUNTER — PATIENT OUTREACH (OUTPATIENT)
Dept: NURSING | Facility: CLINIC | Age: 36
End: 2021-11-08
Payer: COMMERCIAL

## 2021-11-08 DIAGNOSIS — S06.0X1D CONCUSSION WITH LOSS OF CONSCIOUSNESS OF 30 MINUTES OR LESS, SUBSEQUENT ENCOUNTER: ICD-10-CM
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
## 2021-11-08 DIAGNOSIS — G44.309 POST-TRAUMATIC HEADACHE, NOT INTRACTABLE, UNSPECIFIED CHRONICITY PATTERN: ICD-10-CM

## 2021-11-08 SDOH — HEALTH STABILITY: PHYSICAL HEALTH: ON AVERAGE, HOW MANY MINUTES DO YOU ENGAGE IN EXERCISE AT THIS LEVEL?: 30 MIN

## 2021-11-08 SDOH — HEALTH STABILITY: PHYSICAL HEALTH: ON AVERAGE, HOW MANY DAYS PER WEEK DO YOU ENGAGE IN MODERATE TO STRENUOUS EXERCISE (LIKE A BRISK WALK)?: 5 DAYS

## 2021-11-08 SDOH — ECONOMIC STABILITY: TRANSPORTATION INSECURITY
IN THE PAST 12 MONTHS, HAS LACK OF TRANSPORTATION KEPT YOU FROM MEETINGS, WORK, OR FROM GETTING THINGS NEEDED FOR DAILY LIVING?: NO

## 2021-11-08 SDOH — ECONOMIC STABILITY: TRANSPORTATION INSECURITY
IN THE PAST 12 MONTHS, HAS THE LACK OF TRANSPORTATION KEPT YOU FROM MEDICAL APPOINTMENTS OR FROM GETTING MEDICATIONS?: NO

## 2021-11-08 ASSESSMENT — SOCIAL DETERMINANTS OF HEALTH (SDOH)
HOW OFTEN DO YOU ATTEND CHURCH OR RELIGIOUS SERVICES?: NEVER
IN A TYPICAL WEEK, HOW MANY TIMES DO YOU TALK ON THE PHONE WITH FAMILY, FRIENDS, OR NEIGHBORS?: MORE THAN THREE TIMES A WEEK
DO YOU BELONG TO ANY CLUBS OR ORGANIZATIONS SUCH AS CHURCH GROUPS UNIONS, FRATERNAL OR ATHLETIC GROUPS, OR SCHOOL GROUPS?: NO
HOW OFTEN DO YOU GET TOGETHER WITH FRIENDS OR RELATIVES?: MORE THAN THREE TIMES A WEEK
HOW OFTEN DO YOU ATTENT MEETINGS OF THE CLUB OR ORGANIZATION YOU BELONG TO?: NEVER
ARE YOU MARRIED, WIDOWED, DIVORCED, SEPARATED, NEVER MARRIED, OR LIVING WITH A PARTNER?: PATIENT DECLINED
HOW HARD IS IT FOR YOU TO PAY FOR THE VERY BASICS LIKE FOOD, HOUSING, MEDICAL CARE, AND HEATING?: VERY HARD

## 2021-11-08 ASSESSMENT — LIFESTYLE VARIABLES: HOW OFTEN DO YOU HAVE A DRINK CONTAINING ALCOHOL: NEVER
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
## 2021-11-08 NOTE — LETTER
[facility] CARE COORDINATION  [address] [facility] [facility] [place] [address]    November 8, 2021    Mary Shipman  [address]  [place] [address]      Dear Mary,    I am a clinic care coordinator who works with FRANSICO JOHNSON PA-C at [facility]. {ccoutreach:[id number]} Below is a description of clinic care coordination and how I can further assist you.      The clinic care coordination team is made up of a registered nurse,  and community health worker who understand the health care system. The goal of clinic care coordination is to help you manage your health and improve access to the health care system in the most efficient manner. The team can assist you in meeting your health care goals by providing education, coordinating services, strengthening the communication among your providers and supporting you with any resource needs.    Please feel free to contact {CC ME CHW:[id number]} at *** with any questions or concerns. We are focused on providing you with the highest-quality healthcare experience possible and that all starts with you.     Sincerely,     ***    Enclosed: {ccenclosed:[id number]}
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
## 2021-11-08 NOTE — LETTER
[facility] CARE COORDINATION  [address] [facility] [address]    November 8, 2021    Mary Shipman  [address]  [place] [address]      Dear Mary,    I am a clinic care coordinator who works with FRANSICO JOHNSON PA-C at [facility]. Below is a description of clinic care coordination and how I can further assist you.      The clinic care coordination team is made up of a registered nurse,  and community health worker who understand the health care system. The goal of clinic care coordination is to help you manage your health and improve access to the health care system in the most efficient manner. The team can assist you in meeting your health care goals by providing education, coordinating services, strengthening the communication among your providers and supporting you with any resource needs.    Please feel free to contact the Community Health Worker, Jana, at [phone] with any questions or concerns. We are focused on providing you with the highest-quality healthcare experience possible and that all starts with you.     Sincerely,     Olga Arizmendi, MANOLO, MSW Clinic   [facility]  Care Coordination  [facility]   [email or web address]  Office: [phone]  Employed by [facility] [employer]       Enclosed: I have enclosed a copy of the Patient Centered Plan of Care. This has helpful information and goals that we have talked about. Please keep this in an easy to access place to use as needed.

## 2021-11-19 ENCOUNTER — VIRTUAL VISIT (OUTPATIENT)
Dept: FAMILY MEDICINE | Facility: CLINIC | Age: 36
End: 2021-11-19
Payer: COMMERCIAL

## 2021-11-19 DIAGNOSIS — S06.9X1D TRAUMATIC BRAIN INJURY, WITH LOSS OF CONSCIOUSNESS OF 30 MINUTES OR LESS, SUBSEQUENT ENCOUNTER: ICD-10-CM
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
## 2021-11-19 PROCEDURE — 99214 OFFICE O/P EST MOD 30 MIN: CPT | Mod: 95 | Performed by: PHYSICIAN ASSISTANT

## 2021-11-19 RX ORDER — BACLOFEN 10 MG/1
10-20 TABLET ORAL 3 TIMES DAILY
Qty: 90 TABLET | Refills: 1 | Status: SHIPPED | OUTPATIENT
Start: 2021-11-19 | End: 2022-12-12
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
## 2021-12-10 ENCOUNTER — TELEPHONE (OUTPATIENT)
Dept: FAMILY MEDICINE | Facility: CLINIC | Age: 36
End: 2021-12-10
Payer: COMMERCIAL

## 2021-12-10 NOTE — TELEPHONE ENCOUNTER
Called and spoke with pt. Pt was a previous pt to [facility]. Notified pt that Mingo Hammer no longer works here.  Pt was not aware of this. Notified pt that no other provider is familiar with her and if she is having BP issues we would need to see her to assess her BP and determine treatment if needed. We could also assess her ear's and place an ENT referral if needed. Pt verbalized an understanding and scheduled to see Dr. Mayorga on Monday 12/13/21.    Sosa Woods RN

## 2021-12-10 NOTE — TELEPHONE ENCOUNTER
Reason for Call:  Other     Detailed comments:  Patient is going to Neuro physical therapy and her therapist noticed that her blood pressure is becoming too elevated when she is doing therapy.  Physical therapist wanted patient to let her PCP know that.  Also patient had a sinus infection and then patient started having severe pain in ears and she was treated for an ear infection.  After taking the antibiotic she had ear loss. Now patient would like to get a referral for an ENT. Please call patient back to discuss blood pressure and referral    Phone Number Patient can be reached at: Home number on file [phone] (home)    Best Time: any    Can we leave a detailed message on this number? YES    Call taken on 12/10/2021 at 1:15 PM by Yolanda Glover

## 2021-12-13 ENCOUNTER — OFFICE VISIT (OUTPATIENT)
Dept: FAMILY MEDICINE | Facility: CLINIC | Age: 36
End: 2021-12-13
Payer: COMMERCIAL

## 2021-12-13 VITALS
BODY MASS INDEX: 45.32 KG/M2 | OXYGEN SATURATION: 98 % | TEMPERATURE: 98.6 F | HEART RATE: 71 BPM | HEIGHT: 65 IN | WEIGHT: 272 LBS | DIASTOLIC BLOOD PRESSURE: 91 MMHG | SYSTOLIC BLOOD PRESSURE: 140 MMHG

## 2021-12-13 DIAGNOSIS — H65.91 OME (OTITIS MEDIA WITH EFFUSION), RIGHT: Primary | ICD-10-CM

## 2021-12-13 DIAGNOSIS — I10 ESSENTIAL HYPERTENSION: ICD-10-CM

## 2021-12-13 DIAGNOSIS — J34.89 SINUS PRESSURE: ICD-10-CM

## 2021-12-13 PROBLEM — F41.8 POSTPARTUM ANXIETY: Status: RESOLVED | Noted: 2020-09-18 | Resolved: 2021-12-13

## 2021-12-13 PROBLEM — M54.2 CERVICALGIA: Status: RESOLVED | Noted: 2017-10-23 | Resolved: 2021-12-13

## 2021-12-13 PROBLEM — Z36.89 ENCOUNTER FOR TRIAGE IN PREGNANT PATIENT: Status: RESOLVED | Noted: 2020-09-05 | Resolved: 2021-12-13

## 2021-12-13 PROBLEM — G43.809 OTHER MIGRAINE WITHOUT STATUS MIGRAINOSUS, NOT INTRACTABLE: Status: RESOLVED | Noted: 2018-01-12 | Resolved: 2021-12-13

## 2021-12-13 PROBLEM — O47.9 UTERINE CONTRACTIONS DURING PREGNANCY: Status: RESOLVED | Noted: 2020-09-10 | Resolved: 2021-12-13

## 2021-12-13 PROBLEM — S06.9XAA MILD TRAUMATIC BRAIN INJURY (H): Status: ACTIVE | Noted: 2021-11-04

## 2021-12-13 PROCEDURE — 99214 OFFICE O/P EST MOD 30 MIN: CPT | Performed by: FAMILY MEDICINE

## 2021-12-13 RX ORDER — AMLODIPINE BESYLATE 10 MG/1
10 TABLET ORAL DAILY
Qty: 30 TABLET | Refills: 1 | Status: SHIPPED | OUTPATIENT
Start: 2021-12-13 | End: 2022-02-15

## 2021-12-13 RX ORDER — CLONAZEPAM 0.5 MG/1
0.25 TABLET ORAL
COMMUNITY
Start: 2021-12-06 | End: 2024-01-29

## 2021-12-13 RX ORDER — FLUTICASONE PROPIONATE 50 MCG
1-2 SPRAY, SUSPENSION (ML) NASAL DAILY PRN
Qty: 16 G | Refills: 2 | Status: SHIPPED | OUTPATIENT
Start: 2021-12-13 | End: 2022-02-14

## 2021-12-13 RX ORDER — NEOMYCIN SULFATE, POLYMYXIN B SULFATE AND HYDROCORTISONE 10; 3.5; 1 MG/ML; MG/ML; [USP'U]/ML
3 SUSPENSION/ DROPS AURICULAR (OTIC)
COMMUNITY
Start: 2021-12-07 | End: 2021-12-14

## 2021-12-13 RX ORDER — AMOXICILLIN 875 MG
TABLET ORAL
COMMUNITY
Start: 2021-08-20 | End: 2021-12-30

## 2021-12-13 ASSESSMENT — MIFFLIN-ST. JEOR: SCORE: 1914.66

## 2021-12-13 NOTE — PROGRESS NOTES
Assessment & Plan     OME (otitis media with effusion), right  Will the patient start Flonase for 2 or 3 days and if she does not see improvement she could see ear nose and throat for hearing loss  - Otolaryngology Referral; Future    Essential hypertension  Uncontrolled.  Will increase amlodipine from 5 to 10 mg daily and recheck in a month  - amLODIPine (NORVASC) 10 MG tablet; Take 1 tablet (10 mg) by mouth daily    Sinus pressure  She is on the last day of antibiotics today.  She does not need any more  - fluticasone (FLONASE) 50 MCG/ACT nasal spray; Spray 1-2 sprays into both nostrils daily as needed for rhinitis or allergies    30 minutes spent on the date of the encounter doing chart review, history and exam, documentation and further activities per the note         Return in about 4 weeks (around 1/10/2022) for BP Recheck.    June Mayorga [facility]    Danyelle Hernandez is a 36 year old who presents for the following health issues  accompanied by her son.    HPI     Hypertension Follow-up      Do you check your blood pressure regularly outside of the clinic? No has wrist machine. Never really uses her machine.    Are you following a low salt diet? No    Are your blood pressures ever more than 140 on the top number (systolic) OR more   than 90 on the bottom number (diastolic), for example 140/90?   Norvasc 5 mg daily.      High BP reading with therapy. They used Cuff machine.       ED/ Followup:    Facility:  [facility]  Date of visit: 12/07/2021  Reason for visit: Sinus infection   Current Status:     She was diagnosed with otitis externa and otitis media and put on Augmentin and most Cortisporin drops    Today is her last day of antibiotics.  She has nasal congestion in the am only now.       Started with sinus infection. Lingering Sx not really.   Sinus pressure in the morning  Besides blowing her nose.  Zero hearing on right ear     Son under the weather,  "runny nose.    Psych- Told her to stop the Topamax. Last took this 5 months ago.       Review of Systems   Constitutional, HEENT, cardiovascular, pulmonary, gi and gu systems are negative, except as otherwise noted.      Objective    BP (!) 140/91   Pulse 71   Temp 98.6  F (37  C) (Oral)   Ht 1.651 m (5' 5\")   Wt 123.4 kg (272 lb)   SpO2 98%   BMI 45.26 kg/m    Body mass index is 45.26 kg/m .  Physical Exam   GENERAL: healthy, alert and no distress  HENT: normal cephalic/atraumatic, right ear: clear effusion and bulging membrane, left ear: normal: no effusions, no erythema, normal landmarks, nasal mucosa edematous , oropharynx clear and oral mucous membranes moist  NECK: no adenopathy, no asymmetry, masses, or scars and thyroid normal to palpation  RESP: lungs clear to auscultation - no rales, rhonchi or wheezes  CV: regular rate and rhythm, normal S1 S2, no S3 or S4, no murmur, click or rub, no peripheral edema and peripheral pulses strong  PSYCH: mentation appears normal, affect normal/bright            "

## 2021-12-20 ENCOUNTER — PATIENT OUTREACH (OUTPATIENT)
Dept: FAMILY MEDICINE | Facility: CLINIC | Age: 36
End: 2021-12-20
Payer: COMMERCIAL

## 2021-12-20 NOTE — PROGRESS NOTES
Clinic Care Coordination Contact    Community Health Worker Follow Up    The CHW spoke with the patient briefly due to the patient was in her therapy appointment. The patient asked if the CHW could call back tomorrow morning. The CHW agreed to this plan.     No other goals or health Maintenance was discussed today.  The CHW will try back in 1 business day.     Jana Negron  Community Health Worker   [facility]  Care Coordination  [facility] [place], [place], [facility]  [email or web address]   Office: [phone]

## 2021-12-22 ENCOUNTER — PATIENT OUTREACH (OUTPATIENT)
Dept: CARE COORDINATION | Facility: CLINIC | Age: 36
End: 2021-12-22
Payer: COMMERCIAL

## 2021-12-22 NOTE — PROGRESS NOTES
Clinic Care Coordination Contact    Situation: Patient chart reviewed by care coordinator.    Background: Patient is enrolled     Assessment: CHW attempting to reach patient for update     Plan/Recommendations: SW will review in 1-2 weeks     MANOLO Byrd, MSW   [facility]  Care Coordination  [facility]  [phone]  12/22/2021 4:06 PM

## 2021-12-30 ENCOUNTER — PATIENT OUTREACH (OUTPATIENT)
Dept: CARE COORDINATION | Facility: CLINIC | Age: 36
End: 2021-12-30

## 2021-12-30 ENCOUNTER — OFFICE VISIT (OUTPATIENT)
Dept: OBGYN | Facility: CLINIC | Age: 36
End: 2021-12-30
Payer: COMMERCIAL

## 2021-12-30 VITALS
HEIGHT: 65 IN | HEART RATE: 75 BPM | SYSTOLIC BLOOD PRESSURE: 126 MMHG | BODY MASS INDEX: 46.27 KG/M2 | WEIGHT: 277.7 LBS | DIASTOLIC BLOOD PRESSURE: 80 MMHG

## 2021-12-30 DIAGNOSIS — Z30.09 GENERAL COUNSELING FOR PRESCRIPTION OF ORAL CONTRACEPTIVES: ICD-10-CM

## 2021-12-30 DIAGNOSIS — S06.9X9S MILD TRAUMATIC BRAIN INJURY WITH LOSS OF CONSCIOUSNESS, SEQUELA (H): ICD-10-CM

## 2021-12-30 DIAGNOSIS — E66.01 MORBID OBESITY (H): ICD-10-CM

## 2021-12-30 DIAGNOSIS — E28.2 POLYCYSTIC OVARIES: ICD-10-CM

## 2021-12-30 DIAGNOSIS — Z00.00 ROUTINE GENERAL MEDICAL EXAMINATION AT A HEALTH CARE FACILITY: Primary | ICD-10-CM

## 2021-12-30 PROCEDURE — 99395 PREV VISIT EST AGE 18-39: CPT | Performed by: OBSTETRICS & GYNECOLOGY

## 2021-12-30 RX ORDER — NORGESTIMATE AND ETHINYL ESTRADIOL 7DAYSX3 28
1 KIT ORAL DAILY
Qty: 84 TABLET | Refills: 3 | Status: SHIPPED | OUTPATIENT
Start: 2021-12-30 | End: 2022-12-02

## 2021-12-30 ASSESSMENT — MIFFLIN-ST. JEOR: SCORE: 1940.52

## 2021-12-30 NOTE — PROGRESS NOTES
Clinic Care Coordination Contact  [facility]/Voicemail       Clinical Data: CHW Outreach  Outreach attempted x 1. Left message on patient's voicemail with call back information and requested return call.    Plan: CHW will try to reach patient again in 3-5 business days.    Jana Negron  Community Health Worker   [facility]  Care Coordination  [place], [place], [place], [place], [facility]  [email or web address]   Office: [phone]
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
## 2022-01-04 ENCOUNTER — PATIENT OUTREACH (OUTPATIENT)
Dept: CARE COORDINATION | Facility: CLINIC | Age: 37
End: 2022-01-04
Payer: COMMERCIAL

## 2022-01-04 NOTE — PROGRESS NOTES
Clinic Care Coordination Contact    Situation: Patient chart reviewed by care coordinator.    Background: Patient is enrolled     Assessment: CHW attempting to reach patient for update     Plan/Recommendations: SW will review in 1-2 weeks     MANOLO Byrd, MSW   [facility]  Care Coordination  [facility]  [phone]  1/4/2022 1:26 PM

## 2022-01-10 NOTE — PROGRESS NOTES
Clinic Care Coordination Contact  [facility]/Voicemail       Clinical Data: CHW Outreach  Outreach attempted x 2. The patients mailbox has not yet been set up yet.     Plan: CHW will send unable to contact letter with care coordinator contact information via [employer]. CHW will try to reach patient again in 3-5 business days.    Jana Negron  Community Health Worker   [facility]  Care Coordination  [place], [place], [place], [place], [facility]  [email or web address]   Office: [phone]

## 2022-01-18 ENCOUNTER — PATIENT OUTREACH (OUTPATIENT)
Dept: CARE COORDINATION | Facility: CLINIC | Age: 37
End: 2022-01-18
Payer: COMMERCIAL

## 2022-01-18 NOTE — PROGRESS NOTES
Clinic Care Coordination Contact    Situation: Patient chart reviewed by care coordinator.    Background: Patient is enrolled     Assessment: CHW has recently sent a magnify360 message to connect with patient     Plan/Recommendations: SW will review in 1-2 weeks     MANOLO Byrd, MSW   [facility]  Care Coordination  [facility]  [phone]  1/18/2022 5:57 PM

## 2022-01-20 ENCOUNTER — PATIENT OUTREACH (OUTPATIENT)
Dept: CARE COORDINATION | Facility: CLINIC | Age: 37
End: 2022-01-20
Payer: COMMERCIAL
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
## 2022-01-26 ENCOUNTER — PATIENT OUTREACH (OUTPATIENT)
Dept: CARE COORDINATION | Facility: CLINIC | Age: 37
End: 2022-01-26
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
## 2022-01-27 ENCOUNTER — E-VISIT (OUTPATIENT)
Dept: FAMILY MEDICINE | Facility: CLINIC | Age: 37
End: 2022-01-27
Payer: COMMERCIAL

## 2022-01-27 DIAGNOSIS — R09.81 NASAL CONGESTION: Primary | ICD-10-CM

## 2022-01-27 DIAGNOSIS — J32.9 CHRONIC SINUSITIS, UNSPECIFIED LOCATION: ICD-10-CM

## 2022-01-27 PROCEDURE — 99207 PR NO CHARGE LOS: CPT | Performed by: FAMILY MEDICINE

## 2022-02-01 ENCOUNTER — TELEPHONE (OUTPATIENT)
Dept: FAMILY MEDICINE | Facility: CLINIC | Age: 37
End: 2022-02-01
Payer: COMMERCIAL

## 2022-02-01 DIAGNOSIS — G43.809 OTHER MIGRAINE WITHOUT STATUS MIGRAINOSUS, NOT INTRACTABLE: ICD-10-CM

## 2022-02-01 DIAGNOSIS — E66.01 MORBID OBESITY (H): ICD-10-CM

## 2022-02-01 RX ORDER — TOPIRAMATE 100 MG/1
100 TABLET, FILM COATED ORAL AT BEDTIME
Qty: 90 TABLET | Refills: 1 | OUTPATIENT
Start: 2022-02-01

## 2022-02-01 NOTE — TELEPHONE ENCOUNTER
David # [id number] faxed a Refill Request for    topiramate (TOPAMAX) 100 MG tablet  DISCONTINUED        Last Written Prescription Date:  7/30/2021  Last Fill Quantity: 90 tablet,   # refills: 1  Last Office Visit: 11/19/21 demetris/ NILS Hammer    Future Office visit:       Routing refill request to provider for review/approval because:  Drug not active on patient's medication list

## 2022-02-14 ENCOUNTER — VIRTUAL VISIT (OUTPATIENT)
Dept: FAMILY MEDICINE | Facility: CLINIC | Age: 37
End: 2022-02-14
Payer: COMMERCIAL

## 2022-02-14 DIAGNOSIS — S06.9X9S MILD TRAUMATIC BRAIN INJURY WITH LOSS OF CONSCIOUSNESS, SEQUELA (H): ICD-10-CM

## 2022-02-14 DIAGNOSIS — F33.1 MAJOR DEPRESSIVE DISORDER, RECURRENT EPISODE, MODERATE (H): ICD-10-CM

## 2022-02-14 DIAGNOSIS — E66.01 MORBID OBESITY (H): ICD-10-CM

## 2022-02-14 DIAGNOSIS — Z76.89 ENCOUNTER TO ESTABLISH CARE: Primary | ICD-10-CM

## 2022-02-14 DIAGNOSIS — R19.7 DIARRHEA, UNSPECIFIED TYPE: ICD-10-CM

## 2022-02-14 DIAGNOSIS — F41.1 GAD (GENERALIZED ANXIETY DISORDER): ICD-10-CM

## 2022-02-14 DIAGNOSIS — R19.5 DARK STOOLS: ICD-10-CM

## 2022-02-14 DIAGNOSIS — I10 ESSENTIAL HYPERTENSION: ICD-10-CM

## 2022-02-14 PROCEDURE — 99214 OFFICE O/P EST MOD 30 MIN: CPT | Mod: 95 | Performed by: NURSE PRACTITIONER

## 2022-02-14 RX ORDER — TRIAMCINOLONE ACETONIDE 55 UG/1
2 SPRAY, METERED NASAL DAILY
COMMUNITY
End: 2024-01-29

## 2022-02-14 ASSESSMENT — PATIENT HEALTH QUESTIONNAIRE - PHQ9
SUM OF ALL RESPONSES TO PHQ QUESTIONS 1-9: 15
SUM OF ALL RESPONSES TO PHQ QUESTIONS 1-9: 15
10. IF YOU CHECKED OFF ANY PROBLEMS, HOW DIFFICULT HAVE THESE PROBLEMS MADE IT FOR YOU TO DO YOUR WORK, TAKE CARE OF THINGS AT HOME, OR GET ALONG WITH OTHER PEOPLE: VERY DIFFICULT

## 2022-02-14 NOTE — PROGRESS NOTES
Printed and faxed DME for the blood pressure cuff to [facility], [phone], right fax confirmed at 3:22 pm today, 2/14/2022. Placed in 's copy basket.  Evangelina Arita [facility]  2nd Floor  Primary Care
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
## 2022-02-15 ENCOUNTER — LAB (OUTPATIENT)
Dept: LAB | Facility: CLINIC | Age: 37
End: 2022-02-15
Payer: COMMERCIAL

## 2022-02-15 DIAGNOSIS — I10 ESSENTIAL HYPERTENSION: ICD-10-CM

## 2022-02-15 DIAGNOSIS — E66.01 MORBID OBESITY (H): ICD-10-CM

## 2022-02-15 DIAGNOSIS — R19.5 DARK STOOLS: ICD-10-CM

## 2022-02-15 DIAGNOSIS — R19.7 DIARRHEA, UNSPECIFIED TYPE: ICD-10-CM

## 2022-02-15 DIAGNOSIS — Z00.00 ROUTINE GENERAL MEDICAL EXAMINATION AT A HEALTH CARE FACILITY: ICD-10-CM

## 2022-02-15 LAB
ALBUMIN SERPL-MCNC: 3.3 G/DL (ref 3.4–5)
ALP SERPL-CCNC: 78 U/L (ref 40–150)
ALT SERPL W P-5'-P-CCNC: 33 U/L (ref 0–50)
ANION GAP SERPL CALCULATED.3IONS-SCNC: 4 MMOL/L (ref 3–14)
AST SERPL W P-5'-P-CCNC: 14 U/L (ref 0–45)
BILIRUB SERPL-MCNC: 0.2 MG/DL (ref 0.2–1.3)
BUN SERPL-MCNC: 10 MG/DL (ref 7–30)
CALCIUM SERPL-MCNC: 8.7 MG/DL (ref 8.5–10.1)
CHLORIDE BLD-SCNC: 109 MMOL/L (ref 94–109)
CHOLEST SERPL-MCNC: 163 MG/DL
CO2 SERPL-SCNC: 25 MMOL/L (ref 20–32)
CREAT SERPL-MCNC: 0.57 MG/DL (ref 0.52–1.04)
CREAT UR-MCNC: 170 MG/DL
ERYTHROCYTE [DISTWIDTH] IN BLOOD BY AUTOMATED COUNT: 13 % (ref 10–15)
FASTING STATUS PATIENT QL REPORTED: YES
GFR SERPL CREATININE-BSD FRML MDRD: >90 ML/MIN/1.73M2
GLUCOSE BLD-MCNC: 91 MG/DL (ref 70–99)
HBA1C MFR BLD: 5.2 % (ref 0–5.6)
HCT VFR BLD AUTO: 38.3 % (ref 35–47)
HDLC SERPL-MCNC: 37 MG/DL
HGB BLD-MCNC: 12.6 G/DL (ref 11.7–15.7)
INSULIN SERPL-ACNC: 19.4 MU/L (ref 3–25)
LDLC SERPL CALC-MCNC: 93 MG/DL
MCH RBC QN AUTO: 29.4 PG (ref 26.5–33)
MCHC RBC AUTO-ENTMCNC: 32.9 G/DL (ref 31.5–36.5)
MCV RBC AUTO: 89 FL (ref 78–100)
MICROALBUMIN UR-MCNC: 10 MG/L
MICROALBUMIN/CREAT UR: 5.88 MG/G CR (ref 0–25)
NONHDLC SERPL-MCNC: 126 MG/DL
PLATELET # BLD AUTO: 333 10E3/UL (ref 150–450)
POTASSIUM BLD-SCNC: 3.6 MMOL/L (ref 3.4–5.3)
PROT SERPL-MCNC: 6.9 G/DL (ref 6.8–8.8)
RBC # BLD AUTO: 4.29 10E6/UL (ref 3.8–5.2)
SODIUM SERPL-SCNC: 138 MMOL/L (ref 133–144)
TRIGL SERPL-MCNC: 164 MG/DL
VIT B12 SERPL-MCNC: 234 PG/ML (ref 193–986)
WBC # BLD AUTO: 7.1 10E3/UL (ref 4–11)

## 2022-02-15 PROCEDURE — 82607 VITAMIN B-12: CPT

## 2022-02-15 PROCEDURE — 80061 LIPID PANEL: CPT

## 2022-02-15 PROCEDURE — 83036 HEMOGLOBIN GLYCOSYLATED A1C: CPT

## 2022-02-15 PROCEDURE — 99000 SPECIMEN HANDLING OFFICE-LAB: CPT

## 2022-02-15 PROCEDURE — 80053 COMPREHEN METABOLIC PANEL: CPT

## 2022-02-15 PROCEDURE — 85027 COMPLETE CBC AUTOMATED: CPT

## 2022-02-15 PROCEDURE — 82043 UR ALBUMIN QUANTITATIVE: CPT

## 2022-02-15 PROCEDURE — 83735 ASSAY OF MAGNESIUM: CPT | Mod: 90

## 2022-02-15 PROCEDURE — 83525 ASSAY OF INSULIN: CPT

## 2022-02-15 PROCEDURE — 36415 COLL VENOUS BLD VENIPUNCTURE: CPT

## 2022-02-15 ASSESSMENT — PATIENT HEALTH QUESTIONNAIRE - PHQ9: SUM OF ALL RESPONSES TO PHQ QUESTIONS 1-9: 15

## 2022-02-18 LAB — MAGNESIUM RBC-SCNC: 5.1 MG/DL

## 2022-02-24 ENCOUNTER — PATIENT OUTREACH (OUTPATIENT)
Dept: CARE COORDINATION | Facility: CLINIC | Age: 37
End: 2022-02-24
Payer: COMMERCIAL
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
## 2022-03-01 ENCOUNTER — PATIENT OUTREACH (OUTPATIENT)
Dept: CARE COORDINATION | Facility: CLINIC | Age: 37
End: 2022-03-01
Payer: COMMERCIAL
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
## 2022-03-16 ENCOUNTER — VIRTUAL VISIT (OUTPATIENT)
Dept: URGENT CARE | Facility: CLINIC | Age: 37
End: 2022-03-16
Payer: COMMERCIAL

## 2022-03-16 DIAGNOSIS — J01.90 SUBACUTE SINUSITIS, UNSPECIFIED LOCATION: Primary | ICD-10-CM

## 2022-03-16 DIAGNOSIS — J45.41 MODERATE PERSISTENT ASTHMA WITH EXACERBATION: ICD-10-CM

## 2022-03-16 PROCEDURE — 99214 OFFICE O/P EST MOD 30 MIN: CPT | Mod: 95

## 2022-03-16 RX ORDER — PREDNISONE 10 MG/1
TABLET ORAL
Qty: 30 TABLET | Refills: 0 | Status: SHIPPED | OUTPATIENT
Start: 2022-03-16 | End: 2022-04-19

## 2022-03-16 RX ORDER — LAMOTRIGINE 25 MG/1
25 TABLET ORAL DAILY
COMMUNITY
Start: 2022-03-09 | End: 2022-12-12

## 2022-03-16 RX ORDER — DOXYCYCLINE HYCLATE 100 MG
100 TABLET ORAL 2 TIMES DAILY
Qty: 20 TABLET | Refills: 0 | Status: SHIPPED | OUTPATIENT
Start: 2022-03-16 | End: 2022-03-26

## 2022-03-16 RX ORDER — AMLODIPINE BESYLATE 5 MG/1
TABLET ORAL
COMMUNITY
Start: 2022-02-02 | End: 2022-04-19

## 2022-03-16 RX ORDER — SERTRALINE HYDROCHLORIDE 100 MG/1
TABLET, FILM COATED ORAL
COMMUNITY
Start: 2022-03-09 | End: 2022-04-19

## 2022-03-16 RX ORDER — NAPROXEN 500 MG/1
TABLET ORAL
COMMUNITY
Start: 2022-03-10 | End: 2024-01-29

## 2022-03-16 NOTE — PROGRESS NOTES
"Assessment & Plan   Pt seen via video visit.    Start time : 1232  End time. : 1248  PT location: home   Platform: [facility]     Subacute sinusitis, unspecified location  Pt has failed augmentin x 14 days.    She has allergy to Levaquin.    Will change to doxycycline.   Push fluids, rest and ibuprofen or tylenol for comfort.    Continue allergy and asthma medication.  Long taper of prednisone.  She states she normally starts at 60 mg but it makes her very jittery and feels \"High\".  Will start at 40 mg, likely have same effects with fewer side effects.         Follow-up with her ENT for persistent or worsening sx.      Virtual Urgent Care  [facility] VIRTUAL URGENT CARE    Danyelle Hernandez is a 37 year old female who presents to clinic today for the following health issues:  Chief Complaint   Patient presents with     Sinus Problem     HPI   treated with abx for a sinus infection by her ENT  With augmentin x 2 courses of 7 days each.  She has known allergies and asthma.  Allergies treated with nasal steroid, nasacort, and antihistamine. Most recently worsening facial pain, pressure, thick nasal discharge and congestion.  She has known chronic sinusitis.  Sometimes needs oral steroids.  She has a sinus surgery pending which will likely be done in the summer.   No fevers or chills.    Asthma worsening needing her inhaler more frequently. No chest pain.  Mild SOB. On dulara. Normally asthma is well controlled.  Has done a covid 19 test negative.  Has been vaccinated.  Has reached out to her primary ENT who is in surgery today.      Review of Systems  Constitutional, HEENT, cardiovascular, pulmonary, gi and gu systems are negative, except as otherwise noted.      Objective    There were no vitals taken for this visit.  Physical Exam   nad appears well  Able to talk in full sentances, no tachypnea or dyspnea.    No facial swelling or erythema noted.            "

## 2022-03-23 NOTE — ADDENDUM NOTE
Encounter addended by: Mitzy Horner OT on: 4/11/2017 11:42 PM<BR>     Actions taken: Sign clinical note, Flowsheet accepted [facility] CONSULT NOTE    Emerald Cash  : 1974  PCP: No Pcp    Consults    Reason for Consultation  Chest pain    History of Present Illness  Emerald Cash is a 47 year oldfemale with no prior medical hx who has not seen a doctor in many years who presents with chest pressure at rest occurring intermittently since . It got particularly worse last night so she came to ED where trop was noted to be 9176. CP has improved to persists since admit. Associated with SOB and some mild diaphoresis. No nausea.       PMH  History reviewed. No pertinent past medical history.  None    PSH  History reviewed. No pertinent surgical history.    FH  History reviewed. No pertinent family history.    SH  Social History     Tobacco Use   • Smoking status: Never Smoker   • Smokeless tobacco: Never Used   Substance Use Topics   • Alcohol use: Not Currently       ALL  ALLERGIES:  No Known Allergies    MEDS  Prior to Admission medications    Not on File     Current Facility-Administered Medications   Medication Dose Route Frequency Provider Last Rate Last Admin   • sodium chloride 0.9 % flush bag 25 mL  25 mL Intravenous PRN Pari Garcia MD       • sodium chloride (PF) 0.9 % injection 2 mL  2 mL Intracatheter 2 times per day Pari Garcia MD       • acetaminophen (TYLENOL) tablet 650 mg  650 mg Oral Q4H PRN Pari Garcia MD       • ondansetron (ZOFRAN) injection 4 mg  4 mg Intravenous Q12H PRN Pari Garcia MD       • aspirin chewable 81 mg  81 mg Oral Daily Bartolo Alexander MD       • metoPROLOL tartrate (LOPRESSOR) tablet 12.5 mg  12.5 mg Oral 2 times per day Jose Marin MD       • nitroGLYcerin (NITROLINGUAL) translingual solution 0.4 mg  0.4 mg Sublingual Q5 Min PRN Alexander J Kahnweiler   0.4 mg at 22   • nitroGLYcerin 50 mg in dextrose 5% 250 mL infusion  0-200 mcg/min Intravenous Continuous Alexander J Kahnweiler 1.5 mL/hr at 22 5 mcg/min at 22   • heparin  (porcine) 25,000 units/250 mL in dextrose 5 % infusion  1-30 Units/kg/hr (Order-Specific) Intravenous Continuous Alexander J Kahnweiler 8.3 mL/hr at 03/23/22 0548 15 Units/kg/hr at 03/23/22 0548   • heparin (porcine) injection 3,300 Units  60 Units/kg (Order-Specific) Intravenous PRN Alexander J Kahnweiler   3,300 Units at 03/23/22 0547   • heparin (porcine) injection 1,700 Units  30 Units/kg (Order-Specific) Intravenous PRN Alexander J Kahnweiler           ROS  Review of Systems   Respiratory: Positive for shortness of breath.    Cardiovascular: Positive for chest pain.   All other systems reviewed and are negative.       PE  Vitals with min/max:      Vital Last Value 24 Hour Range   Temperature 98.4 °F (36.9 °C) (03/23/22 0809) Temp  Min: 98.2 °F (36.8 °C)  Max: 98.4 °F (36.9 °C)   Pulse 74 (03/23/22 0809) Pulse  Min: 70  Max: 98   Respiratory 14 (03/23/22 0809) Resp  Min: 14  Max: 19   Non-Invasive  Blood Pressure 116/66 (03/23/22 0809) BP  Min: 114/73  Max: 169/88   Pulse Oximetry 97 % (03/23/22 0809) SpO2  Min: 95 %  Max: 99 %   Arterial   Blood Pressure   No data recorded            Physical Exam  Vitals reviewed.   HENT:      Head: Normocephalic.      Nose: Nose normal.      Mouth/Throat:      Mouth: Mucous membranes are moist.   Cardiovascular:      Rate and Rhythm: Normal rate and regular rhythm.      Pulses: Normal pulses.      Heart sounds: Normal heart sounds.   Pulmonary:      Effort: Pulmonary effort is normal.      Breath sounds: Normal breath sounds.   Abdominal:      General: Abdomen is flat.      Palpations: Abdomen is soft.   Musculoskeletal:         General: No swelling.   Skin:     General: Skin is warm.   Neurological:      General: No focal deficit present.      Mental Status: She is alert.   Psychiatric:         Mood and Affect: Mood normal.          LABS  Recent Results (from the past 24 hour(s))   Electrocardiogram 12-Lead    Collection Time: 03/22/22  6:12 PM   Result Value Ref Range     Ventricular Rate EKG/Min (BPM) 83     Atrial Rate (BPM) 83     ME-Interval (MSEC) 128     QRS-Interval (MSEC) 84     QT-Interval (MSEC) 386     QTc 453     P Axis (Degrees) 63     R Axis (Degrees) -19     T Axis (Degrees) 79     REPORT TEXT       Normal sinus rhythm  T wave abnormality, consider lateral ischemia  Abnormal ECG  No previous ECGs available  Confirmed by BRYANT HAYS MD ([id number]) on 3/22/2022 8:20:41 PM     Comprehensive Metabolic Panel    Collection Time: 03/22/22  7:41 PM   Result Value Ref Range    Fasting Status      Sodium 132 (L) 135 - 145 mmol/L    Potassium 3.7 3.4 - 5.1 mmol/L    Chloride 100 98 - 107 mmol/L    Carbon Dioxide 27 21 - 32 mmol/L    Anion Gap 9 (L) 10 - 20 mmol/L    Glucose 240 (H) 70 - 99 mg/dL    BUN 12 6 - 20 mg/dL    Creatinine 0.63 0.51 - 0.95 mg/dL    Glomerular Filtration Rate >90 >=60    BUN/ Creatinine Ratio 19 7 - 25    Calcium 8.8 8.4 - 10.2 mg/dL    Bilirubin, Total 0.5 0.2 - 1.0 mg/dL    GOT/AST 90 (H) <=37 Units/L    GPT/ALT 22 <64 Units/L    Alkaline Phosphatase 60 45 - 117 Units/L    Albumin 3.8 3.6 - 5.1 g/dL    Protein, Total 8.0 6.4 - 8.2 g/dL    Globulin 4.2 (H) 2.0 - 4.0 g/dL    A/G Ratio 0.9 (L) 1.0 - 2.4   TROPONIN I, HIGH SENSITIVITY    Collection Time: 03/22/22  7:41 PM   Result Value Ref Range    Troponin I, High Sensitivity 9,277 (HH) <52 ng/L   CBC with Automated Differential (performable only)    Collection Time: 03/22/22  7:41 PM   Result Value Ref Range    WBC 14.6 (H) 4.2 - 11.0 K/mcL    RBC 4.60 4.00 - 5.20 mil/mcL    HGB 13.8 12.0 - 15.5 g/dL    HCT 40.4 36.0 - 46.5 %    MCV 87.8 78.0 - 100.0 fl    MCH 30.0 26.0 - 34.0 pg    MCHC 34.2 32.0 - 36.5 g/dL    RDW-CV 13.0 11.0 - 15.0 %    RDW-SD 41.6 39.0 - 50.0 fL     140 - 450 K/mcL    NRBC 0 <=0 /100 WBC    Neutrophil, Percent 81 %    Lymphocytes, Percent 11 %    Mono, Percent 8 %    Eosinophils, Percent 0 %    Basophils, Percent 0 %    Immature Granulocytes 0 %    Absolute Neutrophils 11.8  (H) 1.8 - 7.7 K/mcL    Absolute Lymphocytes 1.6 1.0 - 4.8 K/mcL    Absolute Monocytes 1.1 (H) 0.3 - 0.9 K/mcL    Absolute Eosinophils  0.0 0.0 - 0.5 K/mcL    Absolute Basophils 0.0 0.0 - 0.3 K/mcL    Absolute Immmature Granulocytes 0.1 0.0 - 0.2 K/mcL   Light Blue Top    Collection Time: 03/22/22  7:41 PM   Result Value Ref Range    Extra Tube Hold for Add Ons    Prothrombin Time    Collection Time: 03/22/22  7:41 PM   Result Value Ref Range    Prothrombin Time 10.5 9.7 - 11.8 sec    INR 1.0     Partial Thromboplastin Time    Collection Time: 03/22/22  7:41 PM   Result Value Ref Range    PTT 27 22 - 30 sec   NT proBNP    Collection Time: 03/22/22  7:41 PM   Result Value Ref Range    NT-proBNP 1,066 (H) <=125 pg/mL   Electrocardiogram 12-Lead    Collection Time: 03/22/22  9:17 PM   Result Value Ref Range    Ventricular Rate EKG/Min (BPM) 92     Atrial Rate (BPM) 92     CA-Interval (MSEC) 130     QRS-Interval (MSEC) 86     QT-Interval (MSEC) 380     QTc 470     P Axis (Degrees) 52     R Axis (Degrees) -18     T Axis (Degrees) 86     REPORT TEXT       Normal sinus rhythm  Normal ECG  When compared with ECG of  22-MAR-2022 18:12,  No significant change was found     TROPONIN I, HIGH SENSITIVITY    Collection Time: 03/22/22 10:17 PM   Result Value Ref Range    Troponin I, High Sensitivity 9,176 (HH) <52 ng/L   CBC No Differential    Collection Time: 03/22/22 10:17 PM   Result Value Ref Range    WBC 13.8 (H) 4.2 - 11.0 K/mcL    RBC 4.40 4.00 - 5.20 mil/mcL    HGB 13.7 12.0 - 15.5 g/dL    HCT 38.5 36.0 - 46.5 %    MCV 87.5 78.0 - 100.0 fl    MCH 31.1 26.0 - 34.0 pg    MCHC 35.6 32.0 - 36.5 g/dL     140 - 450 K/mcL    RDW-CV 12.8 11.0 - 15.0 %    RDW-SD 40.6 39.0 - 50.0 fL    NRBC 0 <=0 /100 WBC   COVID/Flu/RSV panel    Collection Time: 03/22/22 10:18 PM   Result Value Ref Range    Rapid SARS-COV-2 by PCR Not Detected Not Detected / Detected / Presumptive Positive / Inhibitors present    Influenza A by PCR Not  Detected Not Detected    Influenza B by PCR Not Detected Not Detected    RSV BY PCR Not Detected Not Detected    Isolation Guidelines      Procedural Comment     Partial Thromboplastin Time    Collection Time: 03/23/22  4:35 AM   Result Value Ref Range    PTT 31 (H) 22 - 30 sec   CBC No Differential    Collection Time: 03/23/22  4:35 AM   Result Value Ref Range    WBC 11.9 (H) 4.2 - 11.0 K/mcL    RBC 4.18 4.00 - 5.20 mil/mcL    HGB 12.6 12.0 - 15.5 g/dL    HCT 36.2 36.0 - 46.5 %    MCV 86.6 78.0 - 100.0 fl    MCH 30.1 26.0 - 34.0 pg    MCHC 34.8 32.0 - 36.5 g/dL     140 - 450 K/mcL    RDW-CV 13.2 11.0 - 15.0 %    RDW-SD 41.0 39.0 - 50.0 fL    NRBC 0 <=0 /100 WBC   TROPONIN I, HIGH SENSITIVITY    Collection Time: 03/23/22  4:35 AM   Result Value Ref Range    Troponin I, High Sensitivity 8,092 (HH) <52 ng/L   Basic Metabolic Panel    Collection Time: 03/23/22  4:35 AM   Result Value Ref Range    Fasting Status      Sodium 133 (L) 135 - 145 mmol/L    Potassium 3.7 3.4 - 5.1 mmol/L    Chloride 104 98 - 107 mmol/L    Carbon Dioxide 22 21 - 32 mmol/L    Anion Gap 11 10 - 20 mmol/L    Glucose 159 (H) 70 - 99 mg/dL    BUN 10 6 - 20 mg/dL    Creatinine 0.55 0.51 - 0.95 mg/dL    Glomerular Filtration Rate >90 >=60    BUN/ Creatinine Ratio 18 7 - 25    Calcium 8.7 8.4 - 10.2 mg/dL       IMAGING STUDIES  LAST ECHO/ECHO STRESS:  No valid procedures specified.    LAST MRI:  No results found for this or any previous visit.    LAST CT:  === 03/22/22 ===    CTA CHEST PULMONARY EMBOLISM W CONTRAST    - Narrative -  History: 122.14 lb  Ordering doc red stat exam PE suspected, high prob    Technique: CT Pulmonary angiogram P.E. protocol using 75 cc intravenous  Omnipaque 350.  Sagittal and coronal 3D reformations are obtained.    Comparison:None.    Findings: CT Pulmonary angiogram:  No filling defects within the central pulmonary arteries to suggest acute  pulmonary emboli.  The peripheral branches also do not demonstrate acute  pulmonary emboli but  are less well evaluated.    The Aorta is normal.  The heart is normal in size.  The coronary arteries are normal.    CT Thorax:  The thoracic inlet and supraclavicular regions and axillae are normal.  The mediastinum is normal.  The lungs are clear.  No pneumothorax is noted.  no pleural effusions are noted.    The bones and remaining soft tissues are unremarkable.  The visualized upper abdomen is unremarkable.    - Impression -  Impression: No CT evidence for pulmonary embolus. No acute infiltrates.    Electronically Signed by: AMAN MASCORRO MD  Signed on: 3/22/2022 9:29 PM    LAST EKG:  Encounter Date: 03/22/22   Electrocardiogram 12-Lead   Result Value    Ventricular Rate EKG/Min (BPM) 92    Atrial Rate (BPM) 92    WV-Interval (MSEC) 130    QRS-Interval (MSEC) 86    QT-Interval (MSEC) 380    QTc 470    P Axis (Degrees) 52    R Axis (Degrees) -18    T Axis (Degrees) 86    REPORT TEXT      Normal sinus rhythm  Normal ECG  When compared with ECG of  22-MAR-2022 18:12,  No significant change was found         LAST X-RAY:  === 03/22/22 ===    XR CHEST PA AND LATERAL 2 VIEWS    - Narrative -  HISTORY:Chest Pain. 122.14 lb    PA and Lateral view of the chest.    comparison:none      Cardiac and mediastinal silhouette is normal.    There is no lobar consolidation.    There is no  pleural effusion.  No pneumothorax is seen.    Pulmonary vascularity is normal.  There are no pulmonary nodules or masses.    Bones and soft tissues are unremarkable.  Remainder of the exam is otherwise unremarkable.    - Impression -  No acute disease              Electronically Signed by: AMAN MASCORRO MD  Signed on: 3/22/2022 7:50 PM    LAST U/S:  No results found for this or any previous visit.        ASSESSMENT and RECOMMENDATIONS     NSTEMI  Pt has no established medical problems or cardiac risk factors but she has not seen a doctor in many years  Angina noted intermittently since Sunday got worse last night and  partially relieved since admit  EKG shows no acute ST T abnl  Trop 9176, 8092  TTE, FLP ordered  Add low dose metoprolol  Continue asa, heparin  NPO for cardiac cath today. R/B/I/A d/w pt who agrees to proceed as recommended    Hyperglycemia  Possibly stress related  Check hgb A1C    Leukocytosis, elevated AST  Secondary to NSTEMI          Nacho Marin MD, FACC  3/23/2022

## 2022-04-06 ENCOUNTER — PATIENT OUTREACH (OUTPATIENT)
Dept: CARE COORDINATION | Facility: CLINIC | Age: 37
End: 2022-04-06
Payer: COMMERCIAL
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
## 2022-04-12 ENCOUNTER — PATIENT OUTREACH (OUTPATIENT)
Dept: CARE COORDINATION | Facility: CLINIC | Age: 37
End: 2022-04-12
Payer: COMMERCIAL
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
## 2022-04-19 ENCOUNTER — OFFICE VISIT (OUTPATIENT)
Dept: FAMILY MEDICINE | Facility: CLINIC | Age: 37
End: 2022-04-19
Payer: COMMERCIAL

## 2022-04-19 VITALS
RESPIRATION RATE: 20 BRPM | WEIGHT: 281 LBS | HEART RATE: 81 BPM | HEIGHT: 65 IN | TEMPERATURE: 97.9 F | OXYGEN SATURATION: 97 % | BODY MASS INDEX: 46.82 KG/M2 | DIASTOLIC BLOOD PRESSURE: 82 MMHG | SYSTOLIC BLOOD PRESSURE: 131 MMHG

## 2022-04-19 DIAGNOSIS — R19.02 LEFT UPPER QUADRANT ABDOMINAL MASS: ICD-10-CM

## 2022-04-19 DIAGNOSIS — K21.9 GASTROESOPHAGEAL REFLUX DISEASE, UNSPECIFIED WHETHER ESOPHAGITIS PRESENT: ICD-10-CM

## 2022-04-19 DIAGNOSIS — G43.809 OTHER MIGRAINE WITHOUT STATUS MIGRAINOSUS, NOT INTRACTABLE: ICD-10-CM

## 2022-04-19 DIAGNOSIS — M25.859 HIP IMPINGEMENT SYNDROME, UNSPECIFIED LATERALITY: ICD-10-CM

## 2022-04-19 DIAGNOSIS — R63.5 WEIGHT GAIN: ICD-10-CM

## 2022-04-19 DIAGNOSIS — M54.2 NECK PAIN: Primary | ICD-10-CM

## 2022-04-19 DIAGNOSIS — R93.7 ABNORMAL MRI, CERVICAL SPINE: ICD-10-CM

## 2022-04-19 DIAGNOSIS — J45.40 ASTHMA, MODERATE PERSISTENT, POORLY-CONTROLLED: ICD-10-CM

## 2022-04-19 DIAGNOSIS — R93.89 ABNORMAL MRI: ICD-10-CM

## 2022-04-19 DIAGNOSIS — M16.0 OSTEOARTHRITIS OF BOTH HIPS, UNSPECIFIED OSTEOARTHRITIS TYPE: ICD-10-CM

## 2022-04-19 PROCEDURE — 99214 OFFICE O/P EST MOD 30 MIN: CPT | Performed by: NURSE PRACTITIONER

## 2022-04-19 RX ORDER — SUCRALFATE 1 G/1
1 TABLET ORAL 4 TIMES DAILY PRN
Qty: 30 TABLET | Refills: 1 | Status: SHIPPED | OUTPATIENT
Start: 2022-04-19 | End: 2022-05-04

## 2022-04-19 RX ORDER — PANTOPRAZOLE SODIUM 40 MG/1
40 TABLET, DELAYED RELEASE ORAL DAILY
Qty: 30 TABLET | Refills: 1 | Status: SHIPPED | OUTPATIENT
Start: 2022-04-19 | End: 2022-06-29

## 2022-04-19 RX ORDER — TOPIRAMATE 25 MG/1
TABLET, FILM COATED ORAL
Qty: 120 TABLET | Refills: 1 | Status: SHIPPED | OUTPATIENT
Start: 2022-04-19 | End: 2022-04-19

## 2022-04-19 RX ORDER — MOMETASONE FUROATE AND FORMOTEROL FUMARATE DIHYDRATE 200; 5 UG/1; UG/1
2 AEROSOL RESPIRATORY (INHALATION) 2 TIMES DAILY
Qty: 13 G | Refills: 2 | Status: SHIPPED | OUTPATIENT
Start: 2022-04-19 | End: 2022-12-12

## 2022-04-19 RX ORDER — TOPIRAMATE 25 MG/1
TABLET, FILM COATED ORAL
Qty: 60 TABLET | Refills: 1 | Status: SHIPPED | OUTPATIENT
Start: 2022-04-19 | End: 2022-12-12

## 2022-04-19 ASSESSMENT — ASTHMA QUESTIONNAIRES: ACT_TOTALSCORE: 15

## 2022-04-19 ASSESSMENT — PATIENT HEALTH QUESTIONNAIRE - PHQ9
SUM OF ALL RESPONSES TO PHQ QUESTIONS 1-9: 14
SUM OF ALL RESPONSES TO PHQ QUESTIONS 1-9: 14
10. IF YOU CHECKED OFF ANY PROBLEMS, HOW DIFFICULT HAVE THESE PROBLEMS MADE IT FOR YOU TO DO YOUR WORK, TAKE CARE OF THINGS AT HOME, OR GET ALONG WITH OTHER PEOPLE: VERY DIFFICULT
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
## 2022-04-19 NOTE — PATIENT INSTRUCTIONS
To schedule your imaging exam at any of the [facility] imaging locations, please call [phone]  for abdominal US

## 2022-05-11 ENCOUNTER — E-VISIT (OUTPATIENT)
Dept: FAMILY MEDICINE | Facility: CLINIC | Age: 37
End: 2022-05-11
Payer: COMMERCIAL

## 2022-05-11 ENCOUNTER — OFFICE VISIT (OUTPATIENT)
Dept: FAMILY MEDICINE | Facility: CLINIC | Age: 37
End: 2022-05-11
Payer: COMMERCIAL

## 2022-05-11 VITALS
TEMPERATURE: 98.9 F | BODY MASS INDEX: 46.65 KG/M2 | HEART RATE: 83 BPM | OXYGEN SATURATION: 97 % | WEIGHT: 280 LBS | HEIGHT: 65 IN | RESPIRATION RATE: 18 BRPM | SYSTOLIC BLOOD PRESSURE: 107 MMHG | DIASTOLIC BLOOD PRESSURE: 60 MMHG

## 2022-05-11 DIAGNOSIS — R06.02 SHORTNESS OF BREATH: ICD-10-CM

## 2022-05-11 DIAGNOSIS — R06.2 WHEEZE: Primary | ICD-10-CM

## 2022-05-11 DIAGNOSIS — J45.41 MODERATE PERSISTENT ASTHMA WITH ACUTE EXACERBATION: Primary | ICD-10-CM

## 2022-05-11 PROCEDURE — 99207 PR NON-BILLABLE SERV PER CHARTING: CPT | Performed by: NURSE PRACTITIONER

## 2022-05-11 PROCEDURE — 99213 OFFICE O/P EST LOW 20 MIN: CPT | Performed by: NURSE PRACTITIONER

## 2022-05-11 RX ORDER — MONTELUKAST SODIUM 10 MG/1
10 TABLET ORAL AT BEDTIME
Qty: 90 TABLET | Refills: 1 | Status: SHIPPED | OUTPATIENT
Start: 2022-05-11 | End: 2024-01-29

## 2022-05-11 RX ORDER — PREDNISONE 10 MG/1
TABLET ORAL
Qty: 27 TABLET | Refills: 0 | Status: SHIPPED | OUTPATIENT
Start: 2022-05-11 | End: 2022-12-12

## 2022-05-11 ASSESSMENT — PAIN SCALES - GENERAL: PAINLEVEL: NO PAIN (0)

## 2022-05-11 NOTE — PATIENT INSTRUCTIONS
Thank you for choosing us for your care. I think an in-clinic visit would be best next steps based on your symptoms. Please schedule a clinic appointment; you won t be charged for this eVisit.      You can schedule an appointment right here in [facility], or call [phone]

## 2022-05-11 NOTE — PATIENT INSTRUCTIONS
At [facility], we strive to deliver an exceptional experience to you, every time we see you. If you receive a survey, please complete it as we do value your feedback.  If you have MyChart, you can expect to receive results automatically within 24 hours of their completion.  Your provider will send a note interpreting your results as well.   If you do not have MyChart, you should receive your results in about a week by mail.    Your care team:                            Family Medicine Internal Medicine   MD Tushar Dixon MD Shantel Branch-Fleming, MD Srinivasa Vaka, MD Katya Belousova, GLENNA Heard CNP, MD ([facility]   Mickey Drake, MD Shanika Laurent MD Amelia Massimini APRN CNP Kim Thein, APRN CNP Bethany Templen, MD             Clinic hours: Monday - Thursday 7 am-6 pm; Fridays 7 am-5 pm.   Urgent care: Monday - Friday 10 am- 8 pm; Saturday and Sunday 9 am-5 pm.    Clinic: [phone]       [facility]: Monday - Thursday 8 am - 7 pm; Friday 8 am - 6 pm  [facility]: [phone]

## 2022-05-11 NOTE — PROGRESS NOTES
Assessment & Plan     Moderate persistent asthma with acute exacerbation  Continue Dulera, albuterol neb and inhaler PRN. Restarting Spiriva and singulair. Prednisone for exacerbation today. Has follow up with allergist at the end of the month. She understands she needs to keep that appointment.  - tiotropium (SPIRIVA RESPIMAT) 2.5 MCG/ACT inhaler; Inhale 2 puffs into the lungs daily  - montelukast (SINGULAIR) 10 MG tablet; Take 1 tablet (10 mg) by mouth At Bedtime  - predniSONE (DELTASONE) 10 MG tablet; Take 40 mg daily x3 days then 30 mg daily x3 days then 20 mg daily x2 days then 10 mg x2       See Patient Instructions    Return in about 2 weeks (around 5/25/2022), or if symptoms worsen or fail to improve.     The benefits, risks and potential side effects were discussed in detail. Black box warnings discussed as relevant. All patient questions were answered. The patient was instructed to follow up immediately if any adverse reactions develop.    Return precautions discussed, including when to seek urgent/emergent care.    Patient verbalizes understanding and agrees with plan of care. Patient stable for discharge.      GLENNA Awad CNP  [facility] TREVER Hernandez is a 37 year old who presents for the following health issues     HPI     Acute Illness  Acute illness concerns: Sinus  Onset/Duration: 2 days ago  Symptoms:  Fever: no  Chills/Sweats: YES- sweats  Headache (location?): YES  Sinus Pressure: YES  Conjunctivitis:  YES  Ear Pain: YES: right  Rhinorrhea: YES  Congestion: YES  Sore Throat: YES  Cough: YES-productive of clear sputum, with shortness of breath  Wheeze: YES  Decreased Appetite: no  Nausea: no  Vomiting: no  Diarrhea: no  Dysuria/Freq.: no  Dysuria or Hematuria: no  Fatigue/Achiness: YES- fatigue  Sick/Strep Exposure: YES  Therapies tried and outcome: [facility] 4/[facility]/22 given Prednisone & Augmentin      Negative covid test 4/29  Sees allergist  "May 31  Was on Spiriva, Dulera and albuterol and nebs PRN but out of spiriva and singulair for long time.  This is her usual asthma exacerbation  Has sinus symptoms but those are improving    Review of Systems   Constitutional, HEENT, cardiovascular, pulmonary, GI, , musculoskeletal, neuro, skin, endocrine and psych systems are negative, except as otherwise noted.      Objective    /60 (BP Location: Left arm, Patient Position: Chair, Cuff Size: Adult Large)   Pulse 83   Temp 98.9  F (37.2  C) (Tympanic)   Resp 18   Ht 1.651 m (5' 5\")   Wt 127 kg (280 lb)   SpO2 97%   BMI 46.59 kg/m    Body mass index is 46.59 kg/m .  Physical Exam   GENERAL: healthy, alert and no distress  EYES: Eyes grossly normal to inspection, PERRL and conjunctivae and sclerae normal  HENT: ear canals and TM's normal, nose and mouth without ulcers or lesions  NECK: no adenopathy, no asymmetry, masses, or scars and thyroid normal to palpation  RESP: lungs clear to auscultation - no rales, rhonchi or wheezes  CV: regular rate and rhythm, normal S1 S2, no S3 or S4, no murmur, click or rub, no peripheral edema and peripheral pulses strong  MS: no gross musculoskeletal defects noted, no edema  PSYCH: mentation appears normal, affect normal/bright                "

## 2022-05-12 ENCOUNTER — VIRTUAL VISIT (OUTPATIENT)
Dept: INTERNAL MEDICINE | Facility: CLINIC | Age: 37
End: 2022-05-12
Payer: COMMERCIAL

## 2022-05-12 DIAGNOSIS — U07.1 CLINICAL DIAGNOSIS OF COVID-19: Primary | ICD-10-CM

## 2022-05-12 PROCEDURE — 99213 OFFICE O/P EST LOW 20 MIN: CPT | Mod: 95 | Performed by: NURSE PRACTITIONER

## 2022-05-12 NOTE — PROGRESS NOTES
Mary is a 37 year old who is being evaluated via a billable video visit.      How would you like to obtain your AVS? MyChart  If the video visit is dropped, the invitation should be resent by: Text to cell phone: [phone]  Will anyone else be joining your video visit? No      Video Start Time: 4:30 PM    Assessment & Plan     Clinical diagnosis of COVID-19  Lots of medication interactions. She is hesitant about stopping her Clonazepam because she has had withdrawals in the past when stopping it.     We will prescribe molnupiravir instead. Warning signs and symptoms for which she should seek immediate medical evaluation were reviewed.   - molnupiravir (LAGEVRIO) 200 MG capsule; Take 4 capsules (800 mg) by mouth every 12 hours for 5 days    Vinay Carlin, [facility]   Mary is a 37 year old who presents for the following health issues     HPI       COVID-19 Symptom Review  How many days ago did these symptoms start? 05/11    Are any of the following symptoms significant for you?    New or worsening difficulty breathing? No    Worsening cough? Yes, I am coughing up mucus.    Fever or chills? No    Headache: YES    Sore throat: YES    Chest pain: YES    Diarrhea: no    Body aches? YES    What treatments has patient tried? Nonsteroidals and Antihistamines    Does patient live in a nursing home, group home, or shelter? no  Does patient have a way to get food/medications during quarantined? NO              Review of Systems   Constitutional, HEENT, cardiovascular, pulmonary, gi and gu systems are negative, except as otherwise noted.      Objective           Vitals:  No vitals were obtained today due to virtual visit.    Physical Exam   GENERAL: Healthy, alert and no distress  EYES: Eyes grossly normal to inspection.  No discharge or erythema, or obvious scleral/conjunctival abnormalities.  RESP: No audible wheeze, cough, or visible cyanosis.  No visible  retractions or increased work of breathing.    SKIN: Visible skin clear. No significant rash, abnormal pigmentation or lesions.  NEURO: Cranial nerves grossly intact.  Mentation and speech appropriate for age.  PSYCH: Mentation appears normal, affect normal/bright, judgement and insight intact, normal speech and appearance well-groomed.          Video-Visit Details    Type of service:  Video Visit    Video End Time:4:49 PM    Originating Location (pt. Location): Home    Distant Location (provider location):  [facility]     Platform used for Video Visit: Julio César

## 2022-05-13 ENCOUNTER — TELEPHONE (OUTPATIENT)
Dept: FAMILY MEDICINE | Facility: CLINIC | Age: 37
End: 2022-05-13
Payer: COMMERCIAL

## 2022-05-13 DIAGNOSIS — U07.1 CLINICAL DIAGNOSIS OF COVID-19: ICD-10-CM

## 2022-05-13 NOTE — TELEPHONE ENCOUNTER
Reason for Call:  Other prescription    Detailed comments: Pt called and said that the prescription molnupiravir (LAGEVRIO) 200 MG capsule - we need to call in a different prescription.     I called the pharmacy [facility] [id number] [place] [facility] - [address] - for more clairfication and they said that they don't have the drug, they only have paxlovid. I asked the pharmacy if its just that [facility] who does not have it in stock or [facility] in general, she says she didn't know & that we would need to find another pharmacy that has this medication.     Please send a substitute for the original medication.       Phone Number Patient can be reached at: Cell number on file:    Telephone Information:   Mobile [phone]       Best Time: any    Can we leave a detailed message on this number? None    Call taken on 5/13/2022 at 1:05 PM by Kris Hugo

## 2022-05-13 NOTE — TELEPHONE ENCOUNTER
Do you want to change the prescription to Paxlovid or have pt check around for a pharmacy that will cover Molnupiravir? Thanks.

## 2022-05-13 NOTE — TELEPHONE ENCOUNTER
Patient notified and is working on getting this picked up now.  Shanta Bah CMA ............... 5:11 PM, 05/13/22

## 2022-05-13 NOTE — TELEPHONE ENCOUNTER
I have resent the prescription to the [facility] pharmacy in [place] which is the closest location to her.  It is open till 7.  Please let her know.

## 2022-05-18 ENCOUNTER — PATIENT OUTREACH (OUTPATIENT)
Dept: CARE COORDINATION | Facility: CLINIC | Age: 37
End: 2022-05-18
Payer: COMMERCIAL

## 2022-05-18 NOTE — LETTER
[facility] CARE COORDINATION    June 2, 2022    Mary Shipman  [address]  [place] [address]      Dear Mary,      I have been attempting to reach you since our last contact. I would like to continue to work with you on the goals from our last conversation and provide the support you may need. I would appreciate if you would give me a call at [phone] and let me know if you would like to continue working together. I know that there are many things that can affect our ability to communicate and hope we can plan better moving forward.     All of us at [facility] are invested in your health and are here to assist you in meeting your goals.     Sincerely,      Olga Arizmendi, MANOLO, MSW Clinic   [facility]   [email or web address]  Office: [phone]  Employed by [employer]

## 2022-05-18 NOTE — PROGRESS NOTES
Clinic Care Coordination Contact  [facility]/Voicemail    Referral Source: PCP  Clinical Data: Care Coordinator Outreach.  Patient is enrolled   Outreach attempted x 1.  Left message on patient's voicemail with call back information and requested return call.  Plan: Care Coordinator will try to reach patient again in 3-5 business days.    MANOLO Byrd, MSW   [facility]  Care Coordination  [facility]  [phone]  5/18/2022 1:44 PM

## 2022-06-02 NOTE — PROGRESS NOTES
Clinic Care Coordination Contact  [facility]/Voicemail    Referral Source: PCP  Clinical Data: Care Coordinator Outreach.  Patient is enrolled   Outreach attempted x 2.  Left message on patient's voicemail with call back information and requested return call.  Plan: Care Coordinator will send unable to contact letter with care coordinator contact information via [employer]. Care Coordinator will try to reach patient again in 1 month.    MANOLO Byrd, MSW   [facility]  Care Coordination  [facility]  [phone]  6/2/2022 3:02 PM

## 2022-06-06 ENCOUNTER — VIRTUAL VISIT (OUTPATIENT)
Dept: FAMILY MEDICINE | Facility: CLINIC | Age: 37
End: 2022-06-06
Payer: COMMERCIAL

## 2022-06-06 ENCOUNTER — OFFICE VISIT (OUTPATIENT)
Dept: ALLERGY | Facility: CLINIC | Age: 37
End: 2022-06-06
Payer: COMMERCIAL

## 2022-06-06 ENCOUNTER — ANCILLARY PROCEDURE (OUTPATIENT)
Dept: GENERAL RADIOLOGY | Facility: CLINIC | Age: 37
End: 2022-06-06
Attending: NURSE PRACTITIONER
Payer: COMMERCIAL

## 2022-06-06 ENCOUNTER — MYC MEDICAL ADVICE (OUTPATIENT)
Dept: FAMILY MEDICINE | Facility: CLINIC | Age: 37
End: 2022-06-06

## 2022-06-06 VITALS — OXYGEN SATURATION: 99 % | HEART RATE: 102 BPM

## 2022-06-06 DIAGNOSIS — R53.83 FATIGUE, UNSPECIFIED TYPE: ICD-10-CM

## 2022-06-06 DIAGNOSIS — Z86.16 HISTORY OF COVID-19: Primary | ICD-10-CM

## 2022-06-06 DIAGNOSIS — Z86.16 HISTORY OF COVID-19: ICD-10-CM

## 2022-06-06 DIAGNOSIS — H57.9 ITCHY EYES: ICD-10-CM

## 2022-06-06 DIAGNOSIS — J45.40 MODERATE PERSISTENT ASTHMA WITHOUT COMPLICATION: ICD-10-CM

## 2022-06-06 DIAGNOSIS — R05.9 COUGH: ICD-10-CM

## 2022-06-06 DIAGNOSIS — J45.40 UNCONTROLLED MODERATE PERSISTENT ASTHMA: Primary | ICD-10-CM

## 2022-06-06 LAB
BASOPHILS # BLD AUTO: 0 10E3/UL (ref 0–0.2)
BASOPHILS NFR BLD AUTO: 0 %
EOSINOPHIL # BLD AUTO: 0.3 10E3/UL (ref 0–0.7)
EOSINOPHIL NFR BLD AUTO: 4 %
ERYTHROCYTE [DISTWIDTH] IN BLOOD BY AUTOMATED COUNT: 13.2 % (ref 10–15)
HCT VFR BLD AUTO: 41.7 % (ref 35–47)
HGB BLD-MCNC: 13.6 G/DL (ref 11.7–15.7)
LYMPHOCYTES # BLD AUTO: 1.8 10E3/UL (ref 0.8–5.3)
LYMPHOCYTES NFR BLD AUTO: 25 %
MCH RBC QN AUTO: 28.5 PG (ref 26.5–33)
MCHC RBC AUTO-ENTMCNC: 32.6 G/DL (ref 31.5–36.5)
MCV RBC AUTO: 87 FL (ref 78–100)
MONOCYTES # BLD AUTO: 0.5 10E3/UL (ref 0–1.3)
MONOCYTES NFR BLD AUTO: 7 %
NEUTROPHILS # BLD AUTO: 4.5 10E3/UL (ref 1.6–8.3)
NEUTROPHILS NFR BLD AUTO: 64 %
PLATELET # BLD AUTO: 347 10E3/UL (ref 150–450)
RBC # BLD AUTO: 4.77 10E6/UL (ref 3.8–5.2)
WBC # BLD AUTO: 7 10E3/UL (ref 4–11)

## 2022-06-06 PROCEDURE — 99214 OFFICE O/P EST MOD 30 MIN: CPT | Performed by: ALLERGY & IMMUNOLOGY

## 2022-06-06 PROCEDURE — 82785 ASSAY OF IGE: CPT | Performed by: ALLERGY & IMMUNOLOGY

## 2022-06-06 PROCEDURE — 36415 COLL VENOUS BLD VENIPUNCTURE: CPT | Performed by: ALLERGY & IMMUNOLOGY

## 2022-06-06 PROCEDURE — 85025 COMPLETE CBC W/AUTO DIFF WBC: CPT | Performed by: ALLERGY & IMMUNOLOGY

## 2022-06-06 PROCEDURE — 99213 OFFICE O/P EST LOW 20 MIN: CPT | Mod: 95 | Performed by: NURSE PRACTITIONER

## 2022-06-06 PROCEDURE — 71046 X-RAY EXAM CHEST 2 VIEWS: CPT | Mod: TC | Performed by: RADIOLOGY

## 2022-06-06 PROCEDURE — 86003 ALLG SPEC IGE CRUDE XTRC EA: CPT | Performed by: ALLERGY & IMMUNOLOGY

## 2022-06-06 RX ORDER — OLOPATADINE HYDROCHLORIDE 2 MG/ML
1 SOLUTION/ DROPS OPHTHALMIC DAILY
Qty: 2.5 ML | Refills: 2 | Status: SHIPPED | OUTPATIENT
Start: 2022-06-06

## 2022-06-06 RX ORDER — PREDNISONE 10 MG/1
TABLET ORAL
Qty: 30 TABLET | Refills: 0 | Status: SHIPPED | OUTPATIENT
Start: 2022-06-06 | End: 2022-06-18

## 2022-06-06 ASSESSMENT — ENCOUNTER SYMPTOMS
SHORTNESS OF BREATH: 1
FACIAL SWELLING: 0
WHEEZING: 1
CHILLS: 0
CHEST TIGHTNESS: 0
NAUSEA: 0
ACTIVITY CHANGE: 0
ARTHRALGIAS: 0
COUGH: 1
JOINT SWELLING: 0
RHINORRHEA: 1
DIARRHEA: 0
ADENOPATHY: 0
EYE DISCHARGE: 0
SINUS PRESSURE: 1
FEVER: 0
EYE ITCHING: 0
VOMITING: 0
MYALGIAS: 0
HEADACHES: 1
EYE REDNESS: 0

## 2022-06-06 ASSESSMENT — PATIENT HEALTH QUESTIONNAIRE - PHQ9
SUM OF ALL RESPONSES TO PHQ QUESTIONS 1-9: 11
SUM OF ALL RESPONSES TO PHQ QUESTIONS 1-9: 11
10. IF YOU CHECKED OFF ANY PROBLEMS, HOW DIFFICULT HAVE THESE PROBLEMS MADE IT FOR YOU TO DO YOUR WORK, TAKE CARE OF THINGS AT HOME, OR GET ALONG WITH OTHER PEOPLE: SOMEWHAT DIFFICULT

## 2022-06-06 NOTE — PATIENT INSTRUCTIONS
If you have any questions regarding your allergies, asthma, or what we discussed during your visit today please call the allergy clinic or contact us via [employer].    [facility] Allergy RN Line: [phone] - call this number with any questions during or after business/clinic hours  [facility] Scheduling Line: [phone]  [facility] Scheduling Line: [phone] - this number is ONLY for scheduling at the [facility] and should not be used to get in touch with the allergy team    All visits for food challenges, medication/drug allergy testing, and drug challenges MUST be scheduled through the allergy clinic nurse. Please call the nurse at [phone] or send a [employer] message for scheduling. Appointments for these visits that are made through the schedulers or via [employer] may be cancelled or rescheduled.    Clinic Schedule:   [place] - Monday, Tuesday, and Thursday  [address] [place] [address]    [facility] - Wednesday  [address] [place] [address]      Take 2 puffs of the Dulera TWICE a day. Use with the spacer. Rinse your mouth and brush your teeth afterwards.  Take 2 puffs of the Spiriva ONCE a day.  Take 1 tablet of the montelukast ONCE a day  Use your albuterol inhaler and/or nebulizer every 4 hours as needed      Website demonstrating appropriate inhaler technique:    https://www.fpanetwork.org/clinical-integration/health-resources/inhalers/index.html    Links for using a spacer without a mask:    Without a mask: https://www.[employer].com/watch?v=3sB5uSF34WY

## 2022-06-06 NOTE — PROGRESS NOTES
"Mary Shipman was seen in the Allergy Clinic at [facility].      Mary Shipman is a 37 year old Choose not to answer female who is seen today for a follow-up visit.    States she hasn't been doing well, avoiding coming into the clinic for 2 years due to concerns about COVID. Had a virtual visit with her PCP this morning - plans to prescribe prednisone and antibiotics if CXR is negative. Asthma uncontrolled in the past year, started to worsen 2 years ago. Mary reports she has been taking Dulera, Spiriva, and montelukast, Recently was able to get meds refilled until she could be seen but had been inconsistent with medications prior to this. She has been using albuterol via inhaler every 3 hours for the past month. She does have a nebulizer but hasn't been using it due to concerns about spreading COVID.    Had COVID 5/12/22 and asthma symptoms became significantly worse. Did complete a course of prednisone last month but still having symptoms.    Chest xray from this morning independently reviewed and discussed with patient.      Past Medical History:   Diagnosis Date     Allergic rhinitis due to other allergen      Antepartum mild preeclampsia 11/23/2012     Asthma      Depressive disorder      Esophageal reflux      Frequent UTI     had a kidney infection also in the past     Gestational hypertension 11/20/2012     Helicobacter pylori (H. pylori)     treated     Hyperlipidemia      Irritable bowel syndrome      Liver disease     \"fatty liver\" resolved on own.     Lump or mass in breast 11/30/2015     Mild preeclampsia 12/18/2012     Obesity      Polycystic ovaries      Thyrotoxicosis 05/09/2007     Family History   Problem Relation Age of Onset     Gynecology Mother         ectopic pregnancy/endometriosis     Cancer - colorectal Mother      Congenital Anomalies Mother         kidney problems     Colon Cancer Mother      Hyperlipidemia Mother      Other Cancer Mother      Asthma Mother  "     Arthritis Mother      Hypertension Mother      Chronic Obstructive Pulmonary Disease Mother      Breast Cancer Mother      Uterine Cancer Mother      Kidney Disease Mother      GERD Mother      Gynecology Sister         ectopic pregnancy/endometriosis     Unknown/Adopted Sister      Ovarian Cancer Sister      Heart Disease Father      Coronary Artery Disease Father      Heart Failure Father      Hyperlipidemia Father      Psychotic Disorder Brother      Unknown/Adopted Brother      Unknown/Adopted Brother      Unknown/Adopted Brother      Unknown/Adopted Brother      Unknown/Adopted Brother      Unknown/Adopted Sister      Cerebrovascular Disease Maternal Grandmother      Hyperlipidemia Maternal Grandmother      GERD Maternal Grandmother      Unknown/Adopted Maternal Grandfather      Unknown/Adopted Paternal Grandmother      Unknown/Adopted Paternal Grandfather      Social History     Tobacco Use     Smoking status: Former Smoker     Smokeless tobacco: Never Used   Vaping Use     Vaping Use: Never used   Substance Use Topics     Alcohol use: No     Drug use: No     Social History     Social History Narrative           Past medical, family, and social history were reviewed.    Review of Systems   Constitutional: Negative for activity change, chills and fever.   HENT: Positive for congestion, postnasal drip, rhinorrhea and sinus pressure. Negative for dental problem, ear pain, facial swelling, nosebleeds and sneezing.         Positive for ear fullness   Eyes: Negative for discharge, redness and itching.   Respiratory: Positive for cough, shortness of breath and wheezing. Negative for chest tightness.    Cardiovascular: Negative for chest pain.   Gastrointestinal: Negative for diarrhea, nausea and vomiting.   Musculoskeletal: Negative for arthralgias, joint swelling and myalgias.   Skin: Negative for rash.   Neurological: Positive for headaches.   Hematological: Negative for adenopathy.   Psychiatric/Behavioral:  Negative for behavioral problems and self-injury.         Current Outpatient Medications:      albuterol (ACCUNEB) 1.25 MG/3ML neb solution, Take 1 vial (1.25 mg) by nebulization every 6 hours as needed for shortness of breath / dyspnea or wheezing, Disp: 100 mL, Rfl: 1     albuterol (PROAIR HFA/PROVENTIL HFA/VENTOLIN HFA) 108 (90 Base) MCG/ACT inhaler, Inhale 2 puffs into the lungs every 6 hours, Disp: 18 g, Rfl: 2     amLODIPine (NORVASC) 10 MG tablet, TAKE 1 TABLET(10 MG) BY MOUTH DAILY, Disp: 90 tablet, Rfl: 1     amoxicillin-clavulanate (AUGMENTIN) 875-125 MG tablet, Take 1 tablet by mouth 2 times daily, Disp: 14 tablet, Rfl: 0     baclofen (LIORESAL) 10 MG tablet, Take 1-2 tablets (10-20 mg) by mouth 3 times daily, Disp: 90 tablet, Rfl: 1     clonazePAM (KLONOPIN) 0.5 MG tablet, Take 0.25 mg by mouth nightly as needed, Disp: , Rfl:      Fexofenadine HCl (ALLEGRA PO), Take 180 mg by mouth daily, Disp: , Rfl:      lamoTRIgine (LAMICTAL) 25 MG tablet, Take 25 mg by mouth daily, Disp: , Rfl:      molnupiravir (LAGEVRIO) 200 MG capsule, Take 4 capsules (800 mg) by mouth every 12 hours, Disp: 40 each, Rfl: 0     mometasone-formoterol (DULERA) 200-5 MCG/ACT inhaler, Inhale 2 puffs into the lungs 2 times daily Needs appointment for additional refills, Disp: 13 g, Rfl: 2     montelukast (SINGULAIR) 10 MG tablet, Take 1 tablet (10 mg) by mouth At Bedtime, Disp: 90 tablet, Rfl: 1     naproxen (NAPROSYN) 500 MG tablet, , Disp: , Rfl:      norgestim-eth estrad triphasic (ORTHO TRI-CYCLEN) 0.18/0.215/0.25 MG-35 MCG tablet, Take 1 tablet by mouth daily, Disp: 84 tablet, Rfl: 3     olopatadine (PATADAY) 0.2 % ophthalmic solution, Place 1 drop into both eyes daily, Disp: 2.5 mL, Rfl: 2     pantoprazole (PROTONIX) 40 MG EC tablet, Take 1 tablet (40 mg) by mouth daily, Disp: 30 tablet, Rfl: 1     predniSONE (DELTASONE) 10 MG tablet, Take 4 tablets (40 mg) by mouth daily for 3 days, THEN 3 tablets (30 mg) daily for 3 days, THEN 2  tablets (20 mg) daily for 3 days, THEN 1 tablet (10 mg) daily for 3 days., Disp: 30 tablet, Rfl: 0     predniSONE (DELTASONE) 10 MG tablet, Take 40 mg daily x3 days then 30 mg daily x3 days then 20 mg daily x2 days then 10 mg x2, Disp: 27 tablet, Rfl: 0     Sertraline HCl (ZOLOFT PO), Take 200 mg by mouth daily, Disp: , Rfl:      sucralfate (CARAFATE) 1 GM tablet, Take 1 tablet (1 g) by mouth 4 times daily as needed for nausea (or acid reflux), Disp: 30 tablet, Rfl: 1     tiotropium (SPIRIVA RESPIMAT) 2.5 MCG/ACT inhaler, Inhale 2 puffs into the lungs daily, Disp: 4 g, Rfl: 1     topiramate (TOPAMAX) 25 MG tablet, Take 1 tablet at night x1 week then take 1 tablet twice daily, Disp: 60 tablet, Rfl: 1     traZODone (DESYREL) 50 MG tablet, Take 1-3 tablets ( mg) by mouth At Bedtime, Disp: 90 tablet, Rfl: 3     triamcinolone (NASACORT) 55 MCG/ACT nasal aerosol, Spray 2 sprays into both nostrils daily, Disp: , Rfl:   No current facility-administered medications for this visit.    Facility-Administered Medications Ordered in Other Visits:      lidocaine-EPINEPHrine 1.5 %-1:069333 injection, , EPIDURAL, PRN, Abdulaziz Donahue MD, 3 mL at 09/10/20 2244  Allergies   Allergen Reactions     Acetaminophen Anaphylaxis     Uncertain - hives/anaphylaxis     Levofloxacin Anaphylaxis     Hydrocodone-Acetaminophen Rash     Feels like throat swelling, was given after Tonsillectomy       EXAM:   Pulse 102   SpO2 99%   Physical Exam  Vitals and nursing note reviewed.   Constitutional:       Appearance: Normal appearance.   HENT:      Head: Normocephalic and atraumatic.      Right Ear: External ear normal.      Left Ear: External ear normal.      Nose: No mucosal edema or rhinorrhea.      Right Turbinates: Not enlarged, swollen or pale.      Left Turbinates: Not enlarged, swollen or pale.      Mouth/Throat:      Mouth: Mucous membranes are moist. No oral lesions.      Pharynx: Oropharynx is clear. Uvula midline. No  posterior oropharyngeal erythema.   Eyes:      General: Lids are normal.      Extraocular Movements: Extraocular movements intact.      Conjunctiva/sclera: Conjunctivae normal.   Neck:      Comments: Symmetric, no scars or lesions  Cardiovascular:      Rate and Rhythm: Normal rate and regular rhythm.      Heart sounds: Normal heart sounds, S1 normal and S2 normal.   Pulmonary:      Effort: Pulmonary effort is normal. No respiratory distress.      Comments: Poor aeration, no wheezing  Musculoskeletal:      Comments: No musculoskeletal defects appreciated   Skin:     Findings: No lesion or rash.   Neurological:      General: No focal deficit present.      Mental Status: She is alert.   Psychiatric:         Mood and Affect: Mood and affect normal.         Behavior: Behavior normal.       WORKUP:  None    ASSESSMENT/PLAN:  Mary Shipman is a 37 year old female here for a follow-up visit.    1. Uncontrolled moderate persistent asthma - Mary reports that her asthma has been uncontrolled for the past 1-2 years. She had some difficulty getting her prescriptions refilled but now states she has resumed taking Dulera, Spiriva, and montelukast as prescribed. She had COVID last month and states her respiratory symptoms significantly worsened.    - continue Dulera 200-5mcg - 2 puffs twice daily  - continue Spiriva 2.5mcg - 2 puffs daily  - continue montelukast - 10mg daily  - take 2 to 4 puffs of albuterol HFA and/or use nebulizer every 4 hours as needed  - predniSONE (DELTASONE) 10 MG tablet; Take 4 tablets (40 mg) by mouth daily for 3 days, THEN 3 tablets (30 mg) daily for 3 days, THEN 2 tablets (20 mg) daily for 3 days, THEN 1 tablet (10 mg) daily for 3 days.  Dispense: 30 tablet; Refill: 0  - amoxicillin-clavulanate (AUGMENTIN) 875-125 MG tablet; Take 1 tablet by mouth 2 times daily  Dispense: 14 tablet; Refill: 0  - Allergen cat epithellium IgE; Future  - Allergen dog epithelium IgE; Future  - Allergen Estuardo grass  IgE; Future  - Allergen ina IgE; Future  - Allergen D farinae IgE; Future  - Allergen D pteronyssinus IgE; Future  - Allergen alternaria alternata IgE; Future  - Allergen aspergillus fumigatus IgE; Future  - Allergen cladosporium herbarum IgE; Future  - Allergen Epicoccum purpurascens IgE; Future  - Allergen penicillium notatum IgE; Future  - Allergen carter white IgE; Future  - Allergen Cedar IgE; Future  - Allergen cottonwood IgE; Future  - Allergen elm IgE; Future  - Allergen maple box elder IgE; Future  - Allergen oak white IgE; Future  - Allergen [place] IgE; Future  - Allergen silver  birch IgE; Future  - Allergen Tree [place] IgE; Future  - Allergen [place] Tree; Future  - Allergen white pine IgE; Future  - Allergen English plantain IgE; Future  - Allergen giant ragweed IgE; Future  - Allergen lamb's quarter IgE; Future  - Allergen Mugwort IgE; Future  - Allergen ragweed short IgE; Future  - Allergen Sagebrush [place]ormwood IgE; Future  - Allergen Sheep Sorrel IgE; Future  - Allergen thistle Russian IgE; Future  - Allergen Weed Nettle IgE; Future  - Allergen, Kochia/Firebush; Future  - IgE; Future  - CBC with Platelets & Differential; Future    2. Itchy eyes    - Allergen cat epithellium IgE; Future  - Allergen dog epithelium IgE; Future  - Allergen Estuardo grass IgE; Future  - Allergen ina IgE; Future  - Allergen D farinae IgE; Future  - Allergen D pteronyssinus IgE; Future  - Allergen alternaria alternata IgE; Future  - Allergen aspergillus fumigatus IgE; Future  - Allergen cladosporium herbarum IgE; Future  - Allergen Epicoccum purpurascens IgE; Future  - Allergen penicillium notatum IgE; Future  - Allergen carter white IgE; Future  - Allergen Cedar IgE; Future  - Allergen cottonwood IgE; Future  - Allergen elm IgE; Future  - Allergen maple box elder IgE; Future  - Allergen oak white IgE; Future  - Allergen [place] IgE; Future  - Allergen silver  birch IgE; Future  - Allergen Tree [place] IgE; Future  - Allergen [place] Tree; Future  - Allergen white pine IgE; Future  - Allergen English plantain IgE; Future  - Allergen giant ragweed IgE; Future  - Allergen lamb's quarter IgE; Future  - Allergen Mugwort IgE; Future  - Allergen ragweed short IgE; Future  - Allergen Sagebrush [place]orm[place] IgE; Future  - Allergen Sheep Sorrel IgE; Future  - Allergen thistle Russian IgE; Future  - Allergen Weed Nettle IgE; Future  - Allergen, Kochia/Firebush; Future  - olopatadine (PATADAY) 0.2 % ophthalmic solution; Place 1 drop into both eyes daily  Dispense: 2.5 mL; Refill: 2      Follow-up in 4-6 weeks, sooner if needed      Thank you for allowing me to participate in the care of aMry Shipman.      Terry Cruz MD, [facility]  Allergy/Immunology  [facility]      Chart documentation done in part with Dragon Voice Recognition Software. Although reviewed after completion, some word and grammatical errors may remain.

## 2022-06-06 NOTE — PATIENT INSTRUCTIONS
At [facility], we strive to deliver an exceptional experience to you, every time we see you. If you receive a survey, please complete it as we do value your feedback.  If you have MyChart, you can expect to receive results automatically within 24 hours of their completion.  Your provider will send a note interpreting your results as well.   If you do not have MyChart, you should receive your results in about a week by mail.    Your care team:                            Family Medicine Internal Medicine   MD Tushar Dixon MD Shantel Branch-Fleming, MD Srinivasa Vaka, MD Katya Belousova, GLENNA Heard CNP, MD ([facility]   Mickey Drake, MD Shanika Laurent MD Amelia Massimini APRN CNP Kim Thein, APRN CNP Bethany Templen, MD             Clinic hours: Monday - Thursday 7 am-6 pm; Fridays 7 am-5 pm.   Urgent care: Monday - Friday 10 am- 8 pm; Saturday and Sunday 9 am-5 pm.    Clinic: [phone]       [facility]: Monday - Thursday 8 am - 7 pm; Friday 8 am - 6 pm  [facility]: [phone]

## 2022-06-06 NOTE — PROGRESS NOTES
Mary is a 37 year old who is being evaluated via a billable video visit.      How would you like to obtain your AVS? MyChart  If the video visit is dropped, the invitation should be resent by: Text to cell phone: [phone]  Will anyone else be joining your video visit? No    Video Start Time: 8:08 am    Assessment & Plan     History of COVID-19  About 1 month ago. Now with ongoing cough and fatigue. Hx asthma. Will get chest x-ray. Plan to treat with steroids and/or antibiotics pending chest x-ray. She also has visit with allergist/asthma specialist later this afternoon.  - XR Chest 2 Views; Future    Cough  - XR Chest 2 Views; Future    Moderate persistent asthma without complication  - XR Chest 2 Views; Future    Fatigue, unspecified type  - XR Chest 2 Views; Future             See Patient Instructions    Return in about 4 weeks (around 7/4/2022), or if symptoms worsen or fail to improve.     The benefits, risks and potential side effects were discussed in detail. Black box warnings discussed as relevant. All patient questions were answered. The patient was instructed to follow up immediately if any adverse reactions develop.    Return precautions discussed, including when to seek urgent/emergent care.    Patient verbalizes understanding and agrees with plan of care. Patient stable for discharge.      GLENNA SHORT [facility]    Danyelle Hernandez is a 37 year old who presents for the following health issues     History of Present Illness       Reason for visit:  Not sure if its covid symptoms linger or if now a sinus infection    She eats 0-1 servings of fruits and vegetables daily.She consumes 3 sweetened beverage(s) daily.She exercises with enough effort to increase her heart rate 9 or less minutes per day.  She exercises with enough effort to increase her heart rate 3 or less days per week. She is missing 2 dose(s) of medications per week.  She is not taking  prescribed medications regularly due to remembering to take.    Today's PHQ-9         PHQ-9 Total Score: 11    PHQ-9 Q9 Thoughts of better off dead/self-harm past 2 weeks :   Not at all    How difficult have these problems made it for you to do your work, take care of things at home, or get along with other people: Somewhat difficult     Pleasant 37 year old female initiated a virtual visit to discuss ongoing symptoms. She was diagnosed with covid 5/12. She was on prednisone d/t to asthma exacerbation and she reports the prednisone helped a lot. She took molnupiravir. She continues to have fatigue, cough, chest discomfort and frequent clear nasal discharge. No fever. Cough is productive. Some shortness of breath. Taking OCP and bleeding since starting the antiviral - states she was told it could interfere with OCP. Sees her asthma specialist later this morning.     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 States she can work from home for her primary job. Exhausted and 2nd job currently difficult.    Has appt with allergist later today    Review of Systems   Constitutional, HEENT, cardiovascular, pulmonary, gi and gu systems are negative, except as otherwise noted.      Objective         Vitals:  No vitals were obtained today due to virtual visit.    Physical Exam   GENERAL: No distress. Appears tired and worn down. Coughs intermittently.  EYES: Eyes grossly normal to inspection.  No discharge or erythema, or obvious scleral/conjunctival abnormalities.  RESP: No audible wheeze, cough, or visible cyanosis.  No visible retractions or increased work of breathing.    SKIN: Visible skin clear. No significant rash, abnormal pigmentation or lesions.  NEURO: Cranial nerves grossly intact.  Mentation and speech appropriate for age.  PSYCH: Mentation appears normal, affect normal/bright, judgement and insight intact, normal  speech and appearance well-groomed.    Results for orders placed or performed in visit on 06/06/22   IgE     Status: Normal   Result Value Ref Range    Immunoglobulin E 27 0 - 114 kU/L   CBC with platelets and differential     Status: None   Result Value Ref Range    WBC Count 7.0 4.0 - 11.0 10e3/uL    RBC Count 4.77 3.80 - 5.20 10e6/uL    Hemoglobin 13.6 11.7 - 15.7 g/dL    Hematocrit 41.7 35.0 - 47.0 %    MCV 87 78 - 100 fL    MCH 28.5 26.5 - 33.0 pg    MCHC 32.6 31.5 - 36.5 g/dL    RDW 13.2 10.0 - 15.0 %    Platelet Count 347 150 - 450 10e3/uL    % Neutrophils 64 %    % Lymphocytes 25 %    % Monocytes 7 %    % Eosinophils 4 %    % Basophils 0 %    Absolute Neutrophils 4.5 1.6 - 8.3 10e3/uL    Absolute Lymphocytes 1.8 0.8 - 5.3 10e3/uL    Absolute Monocytes 0.5 0.0 - 1.3 10e3/uL    Absolute Eosinophils 0.3 0.0 - 0.7 10e3/uL    Absolute Basophils 0.0 0.0 - 0.2 10e3/uL   CBC with Platelets & Differential     Status: None    Narrative    The following orders were created for panel order CBC with Platelets & Differential.  Procedure                               Abnormality         Status                     ---------                               -----------         ------                     CBC with platelets and d...[[id number]]                      Final result                 Please view results for these tests on the individual orders.   Results for orders placed or performed in visit on 06/06/22   XR Chest 2 Views     Status: None    Narrative    CHEST TWO VIEWS  6/6/2022 10:12 AM     HISTORY: Diagnosed COVID 5/12, ongoing cough, chest discomfort,  asthma. History of COVID-19. Cough. Fatigue, unspecified type.  Moderate persistent asthma without complication.    COMPARISON: 11/30/2015    FINDINGS: Heart size and pulmonary vascularity are within normal  limits. The lungs are clear. No pneumothorax or pleural effusion.       Impression    IMPRESSION: No radiographic evidence of acute chest abnormality.      SANFORD BETTENCOURT MD         SYSTEM ID:  [id number]               Video-Visit Details    Type of service:  Video Visit    Video End Time:8:20 am    Originating Location (pt. Location): Home    Distant Location (provider location):  [facility]     Platform used for Video Visit: JonaWell

## 2022-06-06 NOTE — LETTER
June 6, 2022      Mary Shipman  [address]  [place] [address]        To Whom It May Concern,      Mary Shipman continues to have ongoing illness and symptoms affecting her ability to do activities of daily living as well as work. Please excuse her from work starting 6/6/2022. She may return to work 6/13/2022.          Sincerely,        GLENNA SHORT CNP

## 2022-06-06 NOTE — LETTER
"    6/6/2022         RE: Mary Shipman  [address]  [facility] [address]        Dear Colleague,    Thank you for referring your patient, Mary Shipman, to the [facility]. Please see a copy of my visit note below.    Mary Shipman was seen in the Allergy Clinic at [facility].      Mary Shipman is a 37 year old Choose not to answer female who is seen today for a follow-up visit.    States she hasn't been doing well, avoiding coming into the clinic for 2 years due to concerns about COVID. Had a virtual visit with her PCP this morning - plans to prescribe prednisone and antibiotics if CXR is negative. Asthma uncontrolled in the past year, started to worsen 2 years ago. Mary reports she has been taking Dulera, Spiriva, and montelukast, Recently was able to get meds refilled until she could be seen but had been inconsistent with medications prior to this. She has been using albuterol via inhaler every 3 hours for the past month. She does have a nebulizer but hasn't been using it due to concerns about spreading COVID.    Had COVID 5/12/22 and asthma symptoms became significantly worse. Did complete a course of prednisone last month but still having symptoms.    Chest xray from this morning independently reviewed and discussed with patient.      Past Medical History:   Diagnosis Date     Allergic rhinitis due to other allergen      Antepartum mild preeclampsia 11/23/2012     Asthma      Depressive disorder      Esophageal reflux      Frequent UTI     had a kidney infection also in the past     Gestational hypertension 11/20/2012     Helicobacter pylori (H. pylori)     treated     Hyperlipidemia      Irritable bowel syndrome      Liver disease     \"fatty liver\" resolved on own.     Lump or mass in breast 11/30/2015     Mild preeclampsia 12/18/2012     Obesity      Polycystic ovaries      Thyrotoxicosis 05/09/2007     Family History   Problem Relation Age of Onset "     Gynecology Mother         ectopic pregnancy/endometriosis     Cancer - colorectal Mother      Congenital Anomalies Mother         kidney problems     Colon Cancer Mother      Hyperlipidemia Mother      Other Cancer Mother      Asthma Mother      Arthritis Mother      Hypertension Mother      Chronic Obstructive Pulmonary Disease Mother      Breast Cancer Mother      Uterine Cancer Mother      Kidney Disease Mother      GERD Mother      Gynecology Sister         ectopic pregnancy/endometriosis     Unknown/Adopted Sister      Ovarian Cancer Sister      Heart Disease Father      Coronary Artery Disease Father      Heart Failure Father      Hyperlipidemia Father      Psychotic Disorder Brother      Unknown/Adopted Brother      Unknown/Adopted Brother      Unknown/Adopted Brother      Unknown/Adopted Brother      Unknown/Adopted Brother      Unknown/Adopted Sister      Cerebrovascular Disease Maternal Grandmother      Hyperlipidemia Maternal Grandmother      GERD Maternal Grandmother      Unknown/Adopted Maternal Grandfather      Unknown/Adopted Paternal Grandmother      Unknown/Adopted Paternal Grandfather      Social History     Tobacco Use     Smoking status: Former Smoker     Smokeless tobacco: Never Used   Vaping Use     Vaping Use: Never used   Substance Use Topics     Alcohol use: No     Drug use: No     Social History     Social History Narrative           Past medical, family, and social history were reviewed.    Review of Systems   Constitutional: Negative for activity change, chills and fever.   HENT: Positive for congestion, postnasal drip, rhinorrhea and sinus pressure. Negative for dental problem, ear pain, facial swelling, nosebleeds and sneezing.         Positive for ear fullness   Eyes: Negative for discharge, redness and itching.   Respiratory: Positive for cough, shortness of breath and wheezing. Negative for chest tightness.    Cardiovascular: Negative for chest pain.   Gastrointestinal: Negative  for diarrhea, nausea and vomiting.   Musculoskeletal: Negative for arthralgias, joint swelling and myalgias.   Skin: Negative for rash.   Neurological: Positive for headaches.   Hematological: Negative for adenopathy.   Psychiatric/Behavioral: Negative for behavioral problems and self-injury.         Current Outpatient Medications:      albuterol (ACCUNEB) 1.25 MG/3ML neb solution, Take 1 vial (1.25 mg) by nebulization every 6 hours as needed for shortness of breath / dyspnea or wheezing, Disp: 100 mL, Rfl: 1     albuterol (PROAIR HFA/PROVENTIL HFA/VENTOLIN HFA) 108 (90 Base) MCG/ACT inhaler, Inhale 2 puffs into the lungs every 6 hours, Disp: 18 g, Rfl: 2     amLODIPine (NORVASC) 10 MG tablet, TAKE 1 TABLET(10 MG) BY MOUTH DAILY, Disp: 90 tablet, Rfl: 1     amoxicillin-clavulanate (AUGMENTIN) 875-125 MG tablet, Take 1 tablet by mouth 2 times daily, Disp: 14 tablet, Rfl: 0     baclofen (LIORESAL) 10 MG tablet, Take 1-2 tablets (10-20 mg) by mouth 3 times daily, Disp: 90 tablet, Rfl: 1     clonazePAM (KLONOPIN) 0.5 MG tablet, Take 0.25 mg by mouth nightly as needed, Disp: , Rfl:      Fexofenadine HCl (ALLEGRA PO), Take 180 mg by mouth daily, Disp: , Rfl:      lamoTRIgine (LAMICTAL) 25 MG tablet, Take 25 mg by mouth daily, Disp: , Rfl:      molnupiravir (LAGEVRIO) 200 MG capsule, Take 4 capsules (800 mg) by mouth every 12 hours, Disp: 40 each, Rfl: 0     mometasone-formoterol (DULERA) 200-5 MCG/ACT inhaler, Inhale 2 puffs into the lungs 2 times daily Needs appointment for additional refills, Disp: 13 g, Rfl: 2     montelukast (SINGULAIR) 10 MG tablet, Take 1 tablet (10 mg) by mouth At Bedtime, Disp: 90 tablet, Rfl: 1     naproxen (NAPROSYN) 500 MG tablet, , Disp: , Rfl:      norgestim-eth estrad triphasic (ORTHO TRI-CYCLEN) 0.18/0.215/0.25 MG-35 MCG tablet, Take 1 tablet by mouth daily, Disp: 84 tablet, Rfl: 3     olopatadine (PATADAY) 0.2 % ophthalmic solution, Place 1 drop into both eyes daily, Disp: 2.5 mL, Rfl:  2     pantoprazole (PROTONIX) 40 MG EC tablet, Take 1 tablet (40 mg) by mouth daily, Disp: 30 tablet, Rfl: 1     predniSONE (DELTASONE) 10 MG tablet, Take 4 tablets (40 mg) by mouth daily for 3 days, THEN 3 tablets (30 mg) daily for 3 days, THEN 2 tablets (20 mg) daily for 3 days, THEN 1 tablet (10 mg) daily for 3 days., Disp: 30 tablet, Rfl: 0     predniSONE (DELTASONE) 10 MG tablet, Take 40 mg daily x3 days then 30 mg daily x3 days then 20 mg daily x2 days then 10 mg x2, Disp: 27 tablet, Rfl: 0     Sertraline HCl (ZOLOFT PO), Take 200 mg by mouth daily, Disp: , Rfl:      sucralfate (CARAFATE) 1 GM tablet, Take 1 tablet (1 g) by mouth 4 times daily as needed for nausea (or acid reflux), Disp: 30 tablet, Rfl: 1     tiotropium (SPIRIVA RESPIMAT) 2.5 MCG/ACT inhaler, Inhale 2 puffs into the lungs daily, Disp: 4 g, Rfl: 1     topiramate (TOPAMAX) 25 MG tablet, Take 1 tablet at night x1 week then take 1 tablet twice daily, Disp: 60 tablet, Rfl: 1     traZODone (DESYREL) 50 MG tablet, Take 1-3 tablets ( mg) by mouth At Bedtime, Disp: 90 tablet, Rfl: 3     triamcinolone (NASACORT) 55 MCG/ACT nasal aerosol, Spray 2 sprays into both nostrils daily, Disp: , Rfl:   No current facility-administered medications for this visit.    Facility-Administered Medications Ordered in Other Visits:      lidocaine-EPINEPHrine 1.5 %-1:379002 injection, , EPIDURAL, PRN, Abdulaziz Donahue MD, 3 mL at 09/10/20 7444  Allergies   Allergen Reactions     Acetaminophen Anaphylaxis     Uncertain - hives/anaphylaxis     Levofloxacin Anaphylaxis     Hydrocodone-Acetaminophen Rash     Feels like throat swelling, was given after Tonsillectomy       EXAM:   Pulse 102   SpO2 99%   Physical Exam  Vitals and nursing note reviewed.   Constitutional:       Appearance: Normal appearance.   HENT:      Head: Normocephalic and atraumatic.      Right Ear: External ear normal.      Left Ear: External ear normal.      Nose: No mucosal edema or  rhinorrhea.      Right Turbinates: Not enlarged, swollen or pale.      Left Turbinates: Not enlarged, swollen or pale.      Mouth/Throat:      Mouth: Mucous membranes are moist. No oral lesions.      Pharynx: Oropharynx is clear. Uvula midline. No posterior oropharyngeal erythema.   Eyes:      General: Lids are normal.      Extraocular Movements: Extraocular movements intact.      Conjunctiva/sclera: Conjunctivae normal.   Neck:      Comments: Symmetric, no scars or lesions  Cardiovascular:      Rate and Rhythm: Normal rate and regular rhythm.      Heart sounds: Normal heart sounds, S1 normal and S2 normal.   Pulmonary:      Effort: Pulmonary effort is normal. No respiratory distress.      Comments: Poor aeration, no wheezing  Musculoskeletal:      Comments: No musculoskeletal defects appreciated   Skin:     Findings: No lesion or rash.   Neurological:      General: No focal deficit present.      Mental Status: She is alert.   Psychiatric:         Mood and Affect: Mood and affect normal.         Behavior: Behavior normal.       WORKUP:  None    ASSESSMENT/PLAN:  Mary Shipman is a 37 year old female here for a follow-up visit.    1. Uncontrolled moderate persistent asthma - Mary reports that her asthma has been uncontrolled for the past 1-2 years. She had some difficulty getting her prescriptions refilled but now states she has resumed taking Dulera, Spiriva, and montelukast as prescribed. She had COVID last month and states her respiratory symptoms significantly worsened.    - continue Dulera 200-5mcg - 2 puffs twice daily  - continue Spiriva 2.5mcg - 2 puffs daily  - continue montelukast - 10mg daily  - take 2 to 4 puffs of albuterol HFA and/or use nebulizer every 4 hours as needed  - predniSONE (DELTASONE) 10 MG tablet; Take 4 tablets (40 mg) by mouth daily for 3 days, THEN 3 tablets (30 mg) daily for 3 days, THEN 2 tablets (20 mg) daily for 3 days, THEN 1 tablet (10 mg) daily for 3 days.  Dispense: 30  tablet; Refill: 0  - amoxicillin-clavulanate (AUGMENTIN) 875-125 MG tablet; Take 1 tablet by mouth 2 times daily  Dispense: 14 tablet; Refill: 0  - Allergen cat epithellium IgE; Future  - Allergen dog epithelium IgE; Future  - Allergen Estuardo grass IgE; Future  - Allergen ina IgE; Future  - Allergen D farinae IgE; Future  - Allergen D pteronyssinus IgE; Future  - Allergen alternaria alternata IgE; Future  - Allergen aspergillus fumigatus IgE; Future  - Allergen cladosporium herbarum IgE; Future  - Allergen Epicoccum purpurascens IgE; Future  - Allergen penicillium notatum IgE; Future  - Allergen carter white IgE; Future  - Allergen Cedar IgE; Future  - Allergen cottonwood IgE; Future  - Allergen elm IgE; Future  - Allergen maple box elder IgE; Future  - Allergen oak white IgE; Future  - Allergen Red [place] IgE; Future  - Allergen silver  birch IgE; Future  - Allergen Tree White [place] IgE; Future  - Allergen [place] Tree; Future  - Allergen white pine IgE; Future  - Allergen English plantain IgE; Future  - Allergen giant ragweed IgE; Future  - Allergen lamb's quarter IgE; Future  - Allergen Mugwort IgE; Future  - Allergen ragweed short IgE; Future  - Allergen Sagebrush Wormwood IgE; Future  - Allergen Sheep Sorrel IgE; Future  - Allergen thistle Russian IgE; Future  - Allergen Weed Nettle IgE; Future  - Allergen, Kochia/Firebush; Future  - IgE; Future  - CBC with Platelets & Differential; Future    2. Itchy eyes    - Allergen cat epithellium IgE; Future  - Allergen dog epithelium IgE; Future  - Allergen Estuardo grass IgE; Future  - Allergen ina IgE; Future  - Allergen D farinae IgE; Future  - Allergen D pteronyssinus IgE; Future  - Allergen alternaria alternata IgE; Future  - Allergen aspergillus fumigatus IgE; Future  - Allergen cladosporium herbarum IgE; Future  - Allergen Epicoccum purpurascens IgE; Future  - Allergen penicillium notatum IgE; Future  - Allergen carter white IgE; Future  - Allergen Cedar  IgE; Future  - Allergen cottonwood IgE; Future  - Allergen elm IgE; Future  - Allergen maple box elder IgE; Future  - Allergen oak white IgE; Future  - Allergen Red [place] IgE; Future  - Allergen silver  birch IgE; Future  - Allergen Tree White [place] IgE; Future  - Allergen [place]leton Tree; Future  - Allergen white pine IgE; Future  - Allergen English plantain IgE; Future  - Allergen giant ragweed IgE; Future  - Allergen lamb's quarter IgE; Future  - Allergen Mugwort IgE; Future  - Allergen ragweed short IgE; Future  - Allergen Sagebrush Wormwood IgE; Future  - Allergen Sheep Sorrel IgE; Future  - Allergen thistle Russian IgE; Future  - Allergen Weed Nettle IgE; Future  - Allergen, Kochia/Firebush; Future  - olopatadine (PATADAY) 0.2 % ophthalmic solution; Place 1 drop into both eyes daily  Dispense: 2.5 mL; Refill: 2      Follow-up in 4-6 weeks, sooner if needed      Thank you for allowing me to participate in the care of Mary AARTI Juniorio.      Terry Cruz MD, FAAAAI  Allergy/Immunology  [facility]      Chart documentation done in part with Dragon Voice Recognition Software. Although reviewed after completion, some word and grammatical errors may remain.      Again, thank you for allowing me to participate in the care of your patient.        Sincerely,        Terry Cruz MD

## 2022-06-06 NOTE — CONFIDENTIAL NOTE
Video visit completed this morning with primary care provider. Patient was on schedule for 8:00 am visit with 7:40 am arrival time.      Kenna Thomas RN  [facility]

## 2022-06-07 DIAGNOSIS — J45.41 MODERATE PERSISTENT ASTHMA WITH (ACUTE) EXACERBATION: ICD-10-CM

## 2022-06-07 LAB
A ALTERNATA IGE QN: <0.1 KU(A)/L
A FUMIGATUS IGE QN: <0.1 KU(A)/L
C HERBARUM IGE QN: <0.1 KU(A)/L
CALIF WALNUT POLN IGE QN: <0.1 KU(A)/L
CAT DANDER IGG QN: <0.1 KU(A)/L
CEDAR IGE QN: <0.1 KU(A)/L
COMMON RAGWEED IGE QN: <0.1 KU(A)/L
COTTONWOOD IGE QN: <0.1 KU(A)/L
D FARINAE IGE QN: <0.1 KU(A)/L
D PTERONYSS IGE QN: <0.1 KU(A)/L
DOG DANDER+EPITH IGE QN: <0.1 KU(A)/L
E PURPURASCENS IGE QN: <0.1 KU(A)/L
EAST WHITE PINE IGE QN: <0.1 KU(A)/L
ENGL PLANTAIN IGE QN: <0.1 KU(A)/L
FIREBUSH IGE QN: <0.1 KU(A)/L
GIANT RAGWEED IGE QN: <0.1 KU(A)/L
GOOSEFOOT IGE QN: <0.1 KU(A)/L
IGE SERPL-ACNC: 27 KU/L (ref 0–114)
JOHNSON GRASS IGE QN: <0.1 KU(A)/L
MAPLE IGE QN: <0.1 KU(A)/L
MUGWORT IGE QN: <0.1 KU(A)/L
NETTLE IGE QN: <0.1 KU(A)/L
P NOTATUM IGE QN: <0.1 KU(A)/L
RED MULBERRY IGE QN: <0.1 KU(A)/L
SALTWORT IGE QN: <0.1 KU(A)/L
SHEEP SORREL IGE QN: <0.1 KU(A)/L
SILVER BIRCH IGE QN: <0.1 KU(A)/L
TIMOTHY IGE QN: <0.1 KU(A)/L
WHITE ASH IGE QN: <0.1 KU(A)/L
WHITE ELM IGE QN: <0.1 KU(A)/L
WHITE MULBERRY IGE QN: <0.1 KU(A)/L
WHITE OAK IGE QN: <0.1 KU(A)/L
WORMWOOD IGE QN: <0.1 KU(A)/L

## 2022-06-07 RX ORDER — ALBUTEROL SULFATE 90 UG/1
2 AEROSOL, METERED RESPIRATORY (INHALATION) EVERY 6 HOURS
Qty: 18 G | Refills: 2 | Status: CANCELLED | OUTPATIENT
Start: 2022-06-07

## 2022-06-07 RX ORDER — ALBUTEROL SULFATE 90 UG/1
2 AEROSOL, METERED RESPIRATORY (INHALATION) EVERY 6 HOURS
Qty: 18 G | Refills: 2 | Status: SHIPPED | OUTPATIENT
Start: 2022-06-07 | End: 2022-08-31

## 2022-06-07 NOTE — TELEPHONE ENCOUNTER
Pt seen by allergy on 6/6, according to notes     Had a virtual visit with her PCP this morning - plans to prescribe prednisone and antibiotics if CXR is negative- Dr Cruz       Looks like pt is being seen by Jeniffer Holt APRN [facility]    Family Medicine out of Chelle Carlisle RN

## 2022-06-28 DIAGNOSIS — K21.9 GASTROESOPHAGEAL REFLUX DISEASE, UNSPECIFIED WHETHER ESOPHAGITIS PRESENT: ICD-10-CM

## 2022-06-29 RX ORDER — PANTOPRAZOLE SODIUM 40 MG/1
40 TABLET, DELAYED RELEASE ORAL DAILY
Qty: 30 TABLET | Refills: 1 | Status: SHIPPED | OUTPATIENT
Start: 2022-06-29 | End: 2022-08-31

## 2022-06-29 NOTE — TELEPHONE ENCOUNTER
Prescription approved per [facility] Refill Protocol.    Naina Alcantara RN  [facility]

## 2022-06-30 NOTE — TELEPHONE ENCOUNTER
Patient called about this refill request. Informed patient prescription was sent to pharmacy, and to contact pharmacy directly for refill.    May Weaver RN  [facility]

## 2022-07-06 ENCOUNTER — PATIENT OUTREACH (OUTPATIENT)
Dept: CARE COORDINATION | Facility: CLINIC | Age: 37
End: 2022-07-06

## 2022-07-06 NOTE — PROGRESS NOTES
Clinic Care Coordination Contact    Assessment: Care Coordinator contacted patient for 2 month follow up.  Patient has continued to follow the plan of care and assessment is negative for any new needs or concerns.    Enrollment status: Graduated.  Per our discussion today, patient is no longer in need of case management or financial resources as has returned to work.  She reports this is going well, she has good emotional support from family.  She has established with a new provider at [facility] as [facility].  Patient states she continues to have treatments at [facility] clinic.  She reports no current needs and is not interested in being placed on maintenance/future calls as already has many on her care team      Plan: No further outreaches at this time.  Patient will continue to follow the plan of care.  If new needs arise a new Care Coordination referral may be placed.  FYI to PCP    Olga Arizmendi, MANOLO, MSW   [facility]  Care Coordination  [facility]  [phone]  7/6/2022 1:00 PM

## 2022-07-06 NOTE — LETTER
[facility]  [address]  [facility] [place] [address]    July 6, 2022    Mary Shipman  [address]  [place] [address]    Dear Mary,  Your Care Team congratulates you on your journey to maintain wellness. This document will help guide you on your journey to maintain a healthy lifestyle.  You can use this to help you overcome any barriers you may encounter.  If you should have any questions or concerns, you can contact the members of your Care Team or contact your Primary Care Clinic for assistance.     Health Maintenance  Health Maintenance Reviewed:    Health Maintenance Due   Topic Date Due     ADVANCE CARE PLANNING  Never done     Pneumococcal Vaccine: Pediatrics (0 to 5 Years) and At-Risk Patients (6 to 64 Years) (2 - PCV) 11/30/2011     ASTHMA ACTION PLAN  12/03/2019     COVID-19 Vaccine (3 - Booster for Pfizer series) 04/18/2022     HPV TEST  11/18/2022     PAP  11/18/2022       My Access Plan  Medical Emergency 911   Primary Clinic Line [facility] - [phone]   24 Hour Appointment Line [phone] or  [phone]) (toll-free)   24 Hour Nurse Line [phone] (toll-free)   Preferred Urgent Care [facility], [phone]   Preferred Hospital [facility]  [phone]   Preferred Pharmacy [facility] - PHARMACY CLOSED - [facility]OCATED TO [facility]     Behavioral Health Crisis Line The National Suicide Prevention Lifeline at 1-965.301.2651 or 911     My Care Team Members  Patient Care Team       Relationship Specialty Notifications Start End    Jeniffer Holt APRN CNP PCP - General Family Medicine Admissions 7/6/22     Phone: [phone] Fax: [phone]         [address] [facility] [place] [address]    Vamshi Downing MD MD ENT  5/6/13     Phone: [phone] Fax: [phone]         [address] [address]     Yehuda Javed    5/6/13     Phone: [phone] Fax: [phone]         [address] [facility] [address]    Catrachito Michael MD MD Neurology  3/31/17     Karl Alcantara MD MD Ophthalmology  5/9/17     Phone: [phone] Fax: [phone]         [address] [facility] [address]    Carmelita Talavera MD Assigned OBGYN Provider   10/23/20     Phone: [phone] Fax: [phone]         [address] [facility] [address] [facility] [address]    Jeniffer Holt APRN CNP Assigned PCP   5/15/22     Phone: [phone] Fax: [phone] [address] [facility] [address]    Terry Cruz MD Assigned Allergy Provider   6/11/22     Phone: [phone] Fax: [phone]         [address] [facility] [place] [address]                   Advance Care Plans/Directives Type: please discuss with your provider as desired     It has been your Clinic Care Team's pleasure to work with you on your goals.    Regards,  Your Clinic Care Team

## 2022-07-07 ENCOUNTER — TELEPHONE (OUTPATIENT)
Dept: FAMILY MEDICINE | Facility: CLINIC | Age: 37
End: 2022-07-07

## 2022-07-07 NOTE — TELEPHONE ENCOUNTER
Patient Quality Outreach    Patient is due for the following:   Asthma  -  ACT needed    NEXT STEPS:   No follow up needed at this time.    Type of outreach:    Sent [employer] message.      Questions for provider review:    None     Jose Enrique Garcia MA

## 2022-07-20 NOTE — TELEPHONE ENCOUNTER
RECORDS RECEIVED FROM: Internal   REASON FOR VISIT: Neck pain, Other migraine without status migrainosus, not tractable,   Abnormal MRI, cervical spine. Patient does not want surgery.    Date of Appt: 08/04/2022   NOTES (FOR ALL VISITS) STATUS DETAILS   OFFICE NOTE from referring provider Internal 04/19/2022 Dr Holt [facility]    OFFICE NOTE from other specialist N/A    DISCHARGE SUMMARY from hospital N/A    DISCHARGE REPORT from the ER Care Everywhere 10/26/2021 [facility]   OPERATIVE REPORT N/A    MEDICATION LIST N/A    IMAGING  (FOR ALL VISITS)     EMG N/A    EEG N/A    LUMBAR PUNCTURE N/A    RED SCAN N/A    ULTRASOUND (CAROTID BILAT) *VASCULAR* N/A    MRI (HEAD, NECK, SPINE) Received 04/02/2022 lumbar cervical spine (RECEIVED)  08/07/2018 brain - received   CT (HEAD, NECK, SPINE) Received 10/26/2021 cervical spine, head      Images in PACS

## 2022-07-26 NOTE — TELEPHONE ENCOUNTER
Patient Quality Outreach    Patient is due for the following:   Asthma  -  ACT needed    NEXT STEPS:   No follow up needed at this time.    Type of outreach:    Copy of ACT mailed to patient.      Questions for provider review:    None     Jose Enrique Garcia MA

## 2022-07-29 ENCOUNTER — NURSE TRIAGE (OUTPATIENT)
Dept: NURSING | Facility: CLINIC | Age: 37
End: 2022-07-29

## 2022-07-30 NOTE — TELEPHONE ENCOUNTER
"Pt reports she feels like she has the flu, \"body aches, chills, mild cough, nasal congestion, sore throat and earache, dizziness\". Pt reports she had a negative home test for Covid today. Covid positive in May. Covid exposure at work this week. Pt does not think she has a severe fever.     See Care Advice and call back protocol reviewed with pt below.    Pt verbalizes understanding of advice, states she will retest at home tomorrow.      Reason for Disposition    HIGH RISK for severe COVID complications (e.g., weak immune system, age > 64 years, obesity with BMI > 25, pregnant, chronic lung disease or other chronic medical condition)  (Exception: Already seen by PCP and no new or worsening symptoms.)    COVID-19 Testing, questions about    Additional Information    Negative: SEVERE difficulty breathing (e.g., struggling for each breath, speaks in single words)    Negative: Difficult to awaken or acting confused (e.g., disoriented, slurred speech)    Negative: Bluish (or gray) lips or face now    Negative: Shock suspected (e.g., cold/pale/clammy skin, too weak to stand, low BP, rapid pulse)    Negative: Sounds like a life-threatening emergency to the triager    Negative: [1] Diagnosed or suspected COVID-19 AND [2] symptoms lasting 3 or more weeks    Negative: [1] COVID-19 exposure AND [2] no symptoms    Negative: COVID-19 vaccine reaction suspected (e.g., fever, headache, muscle aches) occurring 1 to 3 days after getting vaccine    Negative: COVID-19 vaccine, questions about    Negative: [1] Lives with someone known to have influenza (flu test positive) AND [2] flu-like symptoms (e.g., cough, runny nose, sore throat, SOB; with or without fever)    Negative: [1] Adult with possible COVID-19 symptoms AND [2] triager concerned about severity of symptoms or other causes    Negative: COVID-19 and breastfeeding, questions about    Negative: SEVERE or constant chest pain or pressure  (Exception: Mild central chest pain, " present only when coughing.)    Negative: MODERATE difficulty breathing (e.g., speaks in phrases, SOB even at rest, pulse 100-120)    Negative: [1] Headache AND [2] stiff neck (can't touch chin to chest)    Negative: Oxygen level (e.g., pulse oximetry) 90 percent or lower    Negative: Chest pain or pressure    Negative: Patient sounds very sick or weak to the triager    Negative: MILD difficulty breathing (e.g., minimal/no SOB at rest, SOB with walking, pulse <100)    Negative: Fever > 103 F (39.4 C)    Negative: [1] Fever > 101 F (38.3 C) AND [2] age > 60 years    Negative: [1] Fever > 100.0 F (37.8 C) AND [2] bedridden (e.g., nursing home patient, CVA, chronic illness, recovering from surgery)    Protocols used: CORONAVIRUS (COVID-19) DIAGNOSED OR [id number] 1.18.2022    COVID 19 Nurse Triage Plan/Patient Instructions    Please be aware that novel coronavirus (COVID-19) may be circulating in the community. If you develop symptoms such as fever, cough, or SOB or if you have concerns about the presence of another infection including coronavirus (COVID-19), please contact your health care provider or visit [email or web address].     Disposition/Instructions    Virtual Visit with provider recommended. Reference Visit Selection Guide.    Thank you for taking steps to prevent the spread of this virus.  o Limit your contact with others.  o Wear a simple mask to cover your cough.  o Wash your hands well and often.    Resources    [facility]: About COVID-19: [email or web address]    CDC: What to Do If You're Sick: www.cdc.gov/coronavirus/2019-ncov/about/steps-when-sick.html    CDC: Ending Home Isolation: www.cdc.gov/coronavirus/2019-ncov/hcp/disposition-in-home-patients.html     CDC: Caring for Someone: www.cdc.gov/coronavirus/2019-ncov/if-you-are-sick/care-for-someone.html     MD: Interim Guidance for Hospital Discharge to Home:  www.[email or web address]/coronavirus/hcp/hospdischarge.pdf    [facility] clinical trials (COVID-19 research studies): [email or web address]-trials     Below are the COVID-19 hotlines at the [facility] ([facility]). Interpreters are available.   o For health questions: Call 303-605-9572 or 1-625.764.3829 (7 a.m. to 7 p.m.)  o For questions about schools and childcare: Call 421-227-7162 or 1-607.923.3069 (7 a.m. to 7 p.m.)

## 2022-08-04 ENCOUNTER — PRE VISIT (OUTPATIENT)
Dept: NEUROSURGERY | Facility: CLINIC | Age: 37
End: 2022-08-04

## 2022-08-18 ENCOUNTER — OFFICE VISIT (OUTPATIENT)
Dept: NEUROSURGERY | Facility: CLINIC | Age: 37
End: 2022-08-18
Payer: COMMERCIAL

## 2022-08-18 VITALS
SYSTOLIC BLOOD PRESSURE: 104 MMHG | DIASTOLIC BLOOD PRESSURE: 62 MMHG | HEART RATE: 93 BPM | BODY MASS INDEX: 47.09 KG/M2 | WEIGHT: 283 LBS

## 2022-08-18 DIAGNOSIS — M54.2 NECK PAIN: ICD-10-CM

## 2022-08-18 DIAGNOSIS — R93.7 ABNORMAL MRI, CERVICAL SPINE: ICD-10-CM

## 2022-08-18 DIAGNOSIS — G43.809 OTHER MIGRAINE WITHOUT STATUS MIGRAINOSUS, NOT INTRACTABLE: ICD-10-CM

## 2022-08-18 DIAGNOSIS — G95.0 SYRINX OF SPINAL CORD (H): Primary | ICD-10-CM

## 2022-08-18 PROCEDURE — 99203 OFFICE O/P NEW LOW 30 MIN: CPT | Performed by: PHYSICIAN ASSISTANT
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
## 2022-10-11 ENCOUNTER — E-VISIT (OUTPATIENT)
Dept: OBGYN | Facility: CLINIC | Age: 37
End: 2022-10-11
Payer: COMMERCIAL

## 2022-10-11 DIAGNOSIS — L30.9 VULVAR DERMATITIS: ICD-10-CM

## 2022-10-11 DIAGNOSIS — B37.31 CANDIDIASIS OF VULVA AND VAGINA: Primary | ICD-10-CM

## 2022-10-11 DIAGNOSIS — B37.31 CANDIDAL VULVOVAGINITIS: ICD-10-CM

## 2022-10-11 PROCEDURE — 99421 OL DIG E/M SVC 5-10 MIN: CPT | Performed by: OBSTETRICS & GYNECOLOGY

## 2022-10-13 RX ORDER — FLUCONAZOLE 150 MG/1
150 TABLET ORAL
Qty: 3 TABLET | Refills: 0 | Status: SHIPPED | OUTPATIENT
Start: 2022-10-13 | End: 2022-10-20

## 2022-10-13 NOTE — PATIENT INSTRUCTIONS
Thank you for choosing us for your care. I have placed an order for a prescription so that you can start treatment. View your full visit summary for details by clicking on the link below. Your pharmacist will able to address any questions you may have about the medication.     If you re not feeling better within 2-3 days, please schedule an appointment.  You can schedule an appointment right here in [employer], or call [phone]  If the visit is for the same symptoms as your eVisit, we ll refund the cost of your eVisit if seen within seven days.    Thank you for choosing us for your care. I think an in-clinic visit would be best next steps based on your symptoms. Please schedule a clinic appointment; you won t be charged for this eVisit.      You can schedule an appointment right here in [employer], or call [phone]      Yeast Infection (Candida Vaginal Infection)    You have a Candida vaginal infection. This is also known as a yeast infection. It's most often caused by a type of yeast (fungus) called Candida. Candida are normally found in the vagina. But if they increase in number, this can lead to infection and cause symptoms.   Symptoms of a yeast infection can include:     Clumpy or thin, white discharge, which may look like cottage cheese    Itching or burning    Burning with urination  Certain factors can make a yeast infection more likely. These can include:     Taking certain medicines, such as antibiotics or birth control pills    Pregnancy    Diabetes    Weak immune system  A yeast infection is most often treated with antifungal medicine. This may be given as a vaginal cream or pills you take by mouth. Treatment may last for about 1 to 7 days. Women with severe or recurrent infections may need longer courses of treatment.   Home care    If you re prescribed medicine, be sure to use it as directed. Finish all of the medicine, even if your symptoms go away. Don t try to treat yourself using  over-the-counter products without talking with your provider first. They will let you know if this is a good option for you.    Ask your provider what steps you can take to help reduce your risk of having a yeast infection in the future.    Follow-up care  Follow up with your healthcare provider, or as directed.   When to seek medical advice  Call your healthcare provider right away if:     You have a fever of 100.4 F (38 C) or higher, or as directed by your provider.    Your symptoms worsen, or they don t go away within a few days of starting treatment.    You have new pain in the lower belly or pelvic region.    You have side effects that bother you or a reaction to the cream or pills you re prescribed.    You or any partners you have sex with have new symptoms, such as a rash, joint pain, or sores.  [employer] last reviewed this educational content on 7/1/2020 2000-2021 [employer]. All rights reserved. This information is not intended as a substitute for professional medical care. Always follow your healthcare professional's instructions.

## 2022-10-21 ENCOUNTER — ANCILLARY PROCEDURE (OUTPATIENT)
Dept: ULTRASOUND IMAGING | Facility: CLINIC | Age: 37
End: 2022-10-21
Attending: NURSE PRACTITIONER
Payer: COMMERCIAL

## 2022-10-21 DIAGNOSIS — R19.02 LEFT UPPER QUADRANT ABDOMINAL MASS: ICD-10-CM

## 2022-10-21 PROCEDURE — 76705 ECHO EXAM OF ABDOMEN: CPT | Performed by: RADIOLOGY

## 2022-10-24 NOTE — TELEPHONE ENCOUNTER
DIAGNOSIS: Patient is already seeing spine-Was recommended to see ortho for bilateral hip pain into legs also   APPOINTMENT DATE: 10/29/2022   NOTES STATUS DETAILS   OFFICE NOTE from referring provider Internal 04/19/2022 Dr Holt [facility]    OFFICE NOTE from other specialist N/A    DISCHARGE SUMMARY from hospital N/A    DISCHARGE REPORT from the ER N/A    OPERATIVE REPORT N/A    EMG report N/A    MEDICATION LIST N/A    MRI Received 04/02/2022 lumbar spine   DEXA (osteoporosis/bone health) N/A    CT SCAN N/A    XRAYS (IMAGES & REPORTS) N/A      Images in PACS

## 2022-10-28 DIAGNOSIS — M25.552 BILATERAL HIP PAIN: Primary | ICD-10-CM

## 2022-10-28 DIAGNOSIS — M25.551 BILATERAL HIP PAIN: Primary | ICD-10-CM

## 2022-10-29 ENCOUNTER — PRE VISIT (OUTPATIENT)
Dept: ORTHOPEDICS | Facility: CLINIC | Age: 37
End: 2022-10-29

## 2022-11-18 ENCOUNTER — TELEPHONE (OUTPATIENT)
Dept: OBGYN | Facility: CLINIC | Age: 37
End: 2022-11-18

## 2022-11-18 NOTE — TELEPHONE ENCOUNTER
Patient needs to schedule a visit in order for us to provide refills as her only visit was an eVisit and the provider requested to see her in that visit. Patient sent instructions via [facility].   Em Lozada RN

## 2022-11-21 ENCOUNTER — HEALTH MAINTENANCE LETTER (OUTPATIENT)
Age: 37
End: 2022-11-21

## 2022-12-02 DIAGNOSIS — Z30.09 GENERAL COUNSELING FOR PRESCRIPTION OF ORAL CONTRACEPTIVES: ICD-10-CM

## 2022-12-02 RX ORDER — NORGESTIMATE AND ETHINYL ESTRADIOL 7DAYSX3 28
1 KIT ORAL DAILY
Qty: 84 TABLET | Refills: 3 | Status: SHIPPED | OUTPATIENT
Start: 2022-12-02 | End: 2024-01-29

## 2022-12-02 NOTE — TELEPHONE ENCOUNTER
norgestim-eth estrad triphasic (ORTHO TRI-CYCLEN) 0.18/0.215/0.25 MG-35 MCG tablet [[id number]] (Order [id number]      Appt scheduled for 1/26/2023

## 2022-12-05 ENCOUNTER — OFFICE VISIT (OUTPATIENT)
Dept: URGENT CARE | Facility: URGENT CARE | Age: 37
End: 2022-12-05
Payer: COMMERCIAL

## 2022-12-05 VITALS
OXYGEN SATURATION: 97 % | WEIGHT: 271.1 LBS | TEMPERATURE: 97.3 F | DIASTOLIC BLOOD PRESSURE: 76 MMHG | SYSTOLIC BLOOD PRESSURE: 115 MMHG | HEART RATE: 70 BPM | BODY MASS INDEX: 45.11 KG/M2

## 2022-12-05 DIAGNOSIS — J45.909 UNCOMPLICATED ASTHMA, UNSPECIFIED ASTHMA SEVERITY, UNSPECIFIED WHETHER PERSISTENT: ICD-10-CM

## 2022-12-05 DIAGNOSIS — R07.0 THROAT PAIN: Primary | ICD-10-CM

## 2022-12-05 DIAGNOSIS — J06.9 UPPER RESPIRATORY TRACT INFECTION, UNSPECIFIED TYPE: ICD-10-CM

## 2022-12-05 LAB
DEPRECATED S PYO AG THROAT QL EIA: NEGATIVE
GROUP A STREP BY PCR: NOT DETECTED
SARS-COV-2 RNA RESP QL NAA+PROBE: NEGATIVE

## 2022-12-05 PROCEDURE — 87651 STREP A DNA AMP PROBE: CPT | Performed by: FAMILY MEDICINE

## 2022-12-05 PROCEDURE — U0003 INFECTIOUS AGENT DETECTION BY NUCLEIC ACID (DNA OR RNA); SEVERE ACUTE RESPIRATORY SYNDROME CORONAVIRUS 2 (SARS-COV-2) (CORONAVIRUS DISEASE [COVID-19]), AMPLIFIED PROBE TECHNIQUE, MAKING USE OF HIGH THROUGHPUT TECHNOLOGIES AS DESCRIBED BY CMS-2020-01-R: HCPCS | Performed by: FAMILY MEDICINE

## 2022-12-05 PROCEDURE — U0005 INFEC AGEN DETEC AMPLI PROBE: HCPCS | Performed by: FAMILY MEDICINE

## 2022-12-05 PROCEDURE — 99213 OFFICE O/P EST LOW 20 MIN: CPT | Mod: CS | Performed by: FAMILY MEDICINE

## 2022-12-05 RX ORDER — PREDNISONE 20 MG/1
40 TABLET ORAL DAILY
Qty: 10 TABLET | Refills: 0 | Status: SHIPPED | OUTPATIENT
Start: 2022-12-05 | End: 2022-12-10

## 2022-12-05 NOTE — PROGRESS NOTES
Assessment & Plan     Throat pain  neg  - Streptococcus A Rapid Screen w/Reflex to PCR - Clinic Collect  - Symptomatic; Auto-generated order COVID-19 Virus (Coronavirus) by PCR Nose  - Group A Streptococcus PCR Throat Swab    Upper respiratory tract infection, unspecified type    - predniSONE (DELTASONE) 20 MG tablet  Dispense: 10 tablet; Refill: 0    Uncomplicated asthma, unspecified asthma severity, unspecified whether persistent  If needed  - predniSONE (DELTASONE) 20 MG tablet  Dispense: 10 tablet; Refill: 0             No follow-ups on file.    Pierre Fraser MD  [facility] LEOLA Hernandez is a 37 year old female who presents to clinic today for the following health issues:  Chief Complaint   Patient presents with     Urgent Care     URI     Has a sore throat, weak and fatigue, started a cough yesterday with iron blood taste when cough in the morning      Pharyngitis     Covid 19 Testing     HPI    Has a ST and weak/fatigue and coughing started yesterday.  Tickle. Irony taste in mouth.  Has sinus problems and asthma.        Review of Systems        Objective    /76 (BP Location: Left arm, Patient Position: Sitting, Cuff Size: Adult Large)   Pulse 70   Temp 97.3  F (36.3  C) (Tympanic)   Wt 123 kg (271 lb 1.6 oz)   SpO2 97%   BMI 45.11 kg/m    Physical Exam  Vitals and nursing note reviewed.   Constitutional:       Appearance: Normal appearance.   HENT:      Right Ear: Tympanic membrane normal.      Left Ear: Tympanic membrane normal.      Mouth/Throat:      Mouth: Mucous membranes are moist.   Eyes:      Pupils: Pupils are equal, round, and reactive to light.   Cardiovascular:      Rate and Rhythm: Normal rate and regular rhythm.      Pulses: Normal pulses.      Heart sounds: Normal heart sounds.   Pulmonary:      Effort: Pulmonary effort is normal.      Breath sounds: Normal breath sounds.   Musculoskeletal:      Cervical back: Neck supple.    Neurological:      Mental Status: She is alert.

## 2022-12-07 ENCOUNTER — TELEPHONE (OUTPATIENT)
Dept: FAMILY MEDICINE | Facility: CLINIC | Age: 37
End: 2022-12-07

## 2022-12-07 NOTE — TELEPHONE ENCOUNTER
Patient Quality Outreach    Patient is due for the following:   Asthma  -  ACT needed    Next Steps:   No follow up needed at this time.    Type of outreach:    Sent [employer] message.      Questions for provider review:    None     Jose Enrique Garcia MA

## 2022-12-12 ENCOUNTER — VIRTUAL VISIT (OUTPATIENT)
Dept: FAMILY MEDICINE | Facility: CLINIC | Age: 37
End: 2022-12-12
Payer: COMMERCIAL

## 2022-12-12 DIAGNOSIS — J01.90 ACUTE SINUSITIS, RECURRENCE NOT SPECIFIED, UNSPECIFIED LOCATION: Primary | ICD-10-CM

## 2022-12-12 DIAGNOSIS — J45.40 ASTHMA, MODERATE PERSISTENT, POORLY-CONTROLLED: ICD-10-CM

## 2022-12-12 PROCEDURE — 99214 OFFICE O/P EST MOD 30 MIN: CPT | Mod: 95 | Performed by: FAMILY MEDICINE

## 2022-12-12 RX ORDER — DOXYCYCLINE 100 MG/1
100 CAPSULE ORAL 2 TIMES DAILY
Qty: 20 CAPSULE | Refills: 0 | Status: SHIPPED | OUTPATIENT
Start: 2022-12-12 | End: 2022-12-22

## 2022-12-12 RX ORDER — MOMETASONE FUROATE AND FORMOTEROL FUMARATE DIHYDRATE 200; 5 UG/1; UG/1
2 AEROSOL RESPIRATORY (INHALATION) 2 TIMES DAILY
Qty: 13 G | Refills: 2 | Status: SHIPPED | OUTPATIENT
Start: 2022-12-12

## 2022-12-12 RX ORDER — FLUTICASONE PROPIONATE 50 MCG
1 SPRAY, SUSPENSION (ML) NASAL DAILY
Qty: 16 G | Refills: 0 | Status: SHIPPED | OUTPATIENT
Start: 2022-12-12 | End: 2023-02-08

## 2022-12-12 ASSESSMENT — ENCOUNTER SYMPTOMS: COUGH: 1

## 2022-12-12 ASSESSMENT — PATIENT HEALTH QUESTIONNAIRE - PHQ9
SUM OF ALL RESPONSES TO PHQ QUESTIONS 1-9: 18
10. IF YOU CHECKED OFF ANY PROBLEMS, HOW DIFFICULT HAVE THESE PROBLEMS MADE IT FOR YOU TO DO YOUR WORK, TAKE CARE OF THINGS AT HOME, OR GET ALONG WITH OTHER PEOPLE: SOMEWHAT DIFFICULT
SUM OF ALL RESPONSES TO PHQ QUESTIONS 1-9: 18

## 2022-12-12 ASSESSMENT — ASTHMA QUESTIONNAIRES
ACT_TOTALSCORE: 12
QUESTION_4 LAST FOUR WEEKS HOW OFTEN HAVE YOU USED YOUR RESCUE INHALER OR NEBULIZER MEDICATION (SUCH AS ALBUTEROL): THREE OR MORE TIMES PER DAY
QUESTION_2 LAST FOUR WEEKS HOW OFTEN HAVE YOU HAD SHORTNESS OF BREATH: ONCE A DAY
ACT_TOTALSCORE: 12
QUESTION_3 LAST FOUR WEEKS HOW OFTEN DID YOUR ASTHMA SYMPTOMS (WHEEZING, COUGHING, SHORTNESS OF BREATH, CHEST TIGHTNESS OR PAIN) WAKE YOU UP AT NIGHT OR EARLIER THAN USUAL IN THE MORNING: ONCE OR TWICE
QUESTION_5 LAST FOUR WEEKS HOW WOULD YOU RATE YOUR ASTHMA CONTROL: POORLY CONTROLLED
QUESTION_1 LAST FOUR WEEKS HOW MUCH OF THE TIME DID YOUR ASTHMA KEEP YOU FROM GETTING AS MUCH DONE AT WORK, SCHOOL OR AT HOME: SOME OF THE TIME

## 2022-12-12 NOTE — PROGRESS NOTES
Mary is a 37 year old who is being evaluated via a billable video visit.      How would you like to obtain your AVS? MyChart  If the video visit is dropped, the invitation should be resent by: Text to cell phone: [phone]  Will anyone else be joining your video visit? No          Assessment & Plan     Asthma, moderate persistent, poorly-controlled  Refilled Dulera, giving her chest congestion symptoms with history of asthma, worsening symptoms despite recently being on prednisone, recommend she get a chest x-ray to rule out pneumonia.  If positive, consider adding azithromycin for atypical coverage in conjunction with doxycycline.  - mometasone-formoterol (DULERA) 200-5 MCG/ACT inhaler  Dispense: 13 g; Refill: 2  - XR Chest 2 Views    Acute sinusitis, recurrence not specified, unspecified location  Symptoms greater than 10 days, previously on Augmentin 2 months ago, recommend starting doxycycline for suspected bacterial sinusitis, also start Flonase.  Labs reviewed, negative strep and COVID.  - fluticasone (FLONASE) 50 MCG/ACT nasal spray  Dispense: 16 g; Refill: 0  - doxycycline hyclate (VIBRAMYCIN) 100 MG capsule  Dispense: 20 capsule; Refill: 0        Return today (on 12/12/2022) for xray.    Blas Moss MD  [facility]    Danyelle Hernandez is a 37 year old, presenting for the following health issues:  Cough and Sinus Problem      Cough    Sinus Problem   Associated symptoms include cough.   History of Present Illness       Reason for visit:  URI symptoms    She eats 0-1 servings of fruits and vegetables daily.She consumes 3 sweetened beverage(s) daily.She exercises with enough effort to increase her heart rate 9 or less minutes per day.  She exercises with enough effort to increase her heart rate 3 or less days per week. She is missing 2 dose(s) of medications per week.  She is not taking prescribed medications regularly due to remembering to take.    Today's PHQ-9        "  PHQ-9 Total Score: 18    PHQ-9 Q9 Thoughts of better off dead/self-harm past 2 weeks :   Not at all    How difficult have these problems made it for you to do your work, take care of things at home, or get along with other people: Somewhat difficult     Patient who presents for urgent care follow-up via video virtual visit, first encounter with patient.  Seen for throat pain, URI symptoms on 12-5-2022, approximately a week ago, negative strep and COVID, prescribed prednisone as she has history of asthma.  Today continues to feel chest congestion, shortness of breath, requesting refill of Dulera.        Review of Systems   Respiratory: Positive for cough.             Objective    Vitals - Patient Reported  Weight (Patient Reported): 123.8 kg (273 lb)  Height (Patient Reported): 165.1 cm (5' 5\")  BMI (Based on Pt Reported Ht/Wt): 45.43        Physical Exam   GENERAL: Appears ill, no distress.  EYES: Eyes grossly normal to inspection.  No discharge or erythema, or obvious scleral/conjunctival abnormalities.  RESP: No audible wheeze, cough, or visible cyanosis.  No visible retractions or increased work of breathing.    SKIN: Visible skin clear. No significant rash, abnormal pigmentation or lesions.  NEURO: Cranial nerves grossly intact.  Mentation and speech appropriate for age.  PSYCH: Mentation appears normal, affect normal/bright, judgement and insight intact, normal speech and appearance well-groomed.                Video-Visit Details    Video Start Time: 4:55 PM    Type of service:  Video Visit    Video End Time:5:19 PM    Originating Location (pt. Location): Home        Distant Location (provider location):  On-site    Platform used for Video Visit: Dilan"

## 2022-12-13 ENCOUNTER — ANCILLARY PROCEDURE (OUTPATIENT)
Dept: GENERAL RADIOLOGY | Facility: CLINIC | Age: 37
End: 2022-12-13
Attending: FAMILY MEDICINE
Payer: COMMERCIAL

## 2022-12-13 DIAGNOSIS — J45.40 ASTHMA, MODERATE PERSISTENT, POORLY-CONTROLLED: ICD-10-CM

## 2022-12-13 PROCEDURE — 71046 X-RAY EXAM CHEST 2 VIEWS: CPT | Mod: TC | Performed by: RADIOLOGY

## 2022-12-29 ENCOUNTER — VIRTUAL VISIT (OUTPATIENT)
Dept: FAMILY MEDICINE | Facility: CLINIC | Age: 37
End: 2022-12-29
Payer: COMMERCIAL

## 2022-12-29 DIAGNOSIS — N39.46 MIXED INCONTINENCE URGE AND STRESS (MALE)(FEMALE): ICD-10-CM

## 2022-12-29 DIAGNOSIS — Z87.892 HISTORY OF ANAPHYLAXIS: ICD-10-CM

## 2022-12-29 DIAGNOSIS — R05.1 ACUTE COUGH: Primary | ICD-10-CM

## 2022-12-29 PROCEDURE — 99214 OFFICE O/P EST MOD 30 MIN: CPT | Mod: 95 | Performed by: NURSE PRACTITIONER

## 2022-12-29 RX ORDER — EPINEPHRINE 0.3 MG/.3ML
0.3 INJECTION SUBCUTANEOUS PRN
COMMUNITY
End: 2022-12-29

## 2022-12-29 RX ORDER — PREDNISONE 20 MG/1
40 TABLET ORAL DAILY
Qty: 10 TABLET | Refills: 0 | Status: SHIPPED | OUTPATIENT
Start: 2022-12-29 | End: 2023-01-03

## 2022-12-29 RX ORDER — BENZONATATE 200 MG/1
200 CAPSULE ORAL 3 TIMES DAILY PRN
Qty: 30 CAPSULE | Refills: 0 | Status: SHIPPED | OUTPATIENT
Start: 2022-12-29 | End: 2023-01-20

## 2022-12-29 RX ORDER — EPINEPHRINE 0.3 MG/.3ML
0.3 INJECTION SUBCUTANEOUS PRN
Qty: 2 EACH | Refills: 1 | Status: SHIPPED | OUTPATIENT
Start: 2022-12-29

## 2022-12-29 NOTE — PROGRESS NOTES
"Mary is a 37 year old who is being evaluated via a billable video visit.      How would you like to obtain your AVS? MyChart  If the video visit is dropped, the invitation should be resent by: Text to cell phone: [phone]  Will anyone else be joining your video visit? No        Assessment & Plan      Acute cough  Suspected influenza as both of her kids tested positive today. She's had symptoms for 7 days now. Treat with the below. Switch from albuterol inhaler to neb for time being. Follow up in clinic if worsening/not improving.  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 Refer to physical therapy.   - Physical Therapy Referral; Future    History of anaphylaxis  Refilled. No issues currently - just to have on hand.  - EPINEPHrine (ANY BX GENERIC EQUIV) 0.3 MG/0.3ML injection 2-pack; Inject 0.3 mLs (0.3 mg) into the muscle as needed for anaphylaxis May repeat one time in 5-15 minutes if response to initial dose is inadequate.         BMI:   Estimated body mass index is 45.11 kg/m  as calculated from the following:    Height as of 5/11/22: 1.651 m (5' 5\").    Weight as of 12/5/22: 123 kg (271 lb 1.6 oz).       See Patient Instructions    Return in about 1 week (around 1/5/2023), or if symptoms worsen or fail to improve.     The benefits, risks and potential side effects were discussed in detail. Black box warnings discussed as relevant. All patient questions were answered. The patient was instructed to follow up immediately if any adverse reactions develop.    Return precautions discussed, including when to seek urgent/emergent care.    Patient verbalizes understanding and agrees with plan of care.     GLENNA SHORT [facility] " TREVER Hernandez is a 37 year old, presenting for the following health issues:  Cough      History of Present Illness       Reason for visit:  Cough  Symptom onset:  3-7 days ago  Symptoms include:  Cough, chest hurting  Symptom intensity:  Severe  Symptom progression:  Worsening  Had these symptoms before:  No  What makes it worse:  No energy  What makes it better:  Hot fluids, Mucinex, asthma/allergy meds    She eats 0-1 servings of fruits and vegetables daily.She consumes 4 sweetened beverage(s) daily.She exercises with enough effort to increase her heart rate 9 or less minutes per day.  She exercises with enough effort to increase her heart rate 3 or less days per week. She is missing 2 dose(s) of medications per week.  She is not taking prescribed medications regularly due to remembering to take.       Acute Illness  Acute illness concerns: Throat, chest, head  Onset/Duration: 12/24/2022  Symptoms:  Fever: YES  Chills/Sweats: YES  Headache (location?): YES  Sinus Pressure: YES  Conjunctivitis:  No  Ear Pain: no  Rhinorrhea: YES  Congestion: YES- Under water  Sore Throat: No  Cough: no  Wheeze: No  Decreased Appetite: Yes  Nausea: YES  Vomiting: YES  Diarrhea: No  Dysuria/Freq.: No  Dysuria or Hematuria: No  Fatigue/Achiness: YES  Sick/Strep Exposure: YES, kids are sick, lingering flu  Therapies tried and outcome: Mucinex, hot liquids, cough medicine    Cough x1 week  Kids dx with influenza today  Can't sleep due to cough  Hx asthma  Sometimes coughs so hard she urinates. Has had this problem since giving birth but worsening  Negative home covid test  Hx long covid  12/12 given doxycycline and fluticasone (Flonase) for different illness  Using dulera, albuterol and spiriva    Review of Systems   Constitutional, HEENT, cardiovascular, pulmonary, GI, , musculoskeletal, neuro, skin, endocrine and psych systems are negative, except as otherwise noted.      Objective    Vitals - Patient  Reported  Temperature (Patient Reported): 100.2  F (37.9  C)  Pain Score: Worst Pain (10)  Pain Loc: Chest (Chest and head)      Vitals:  No vitals were obtained today due to virtual visit.    Physical Exam   GENERAL: Healthy, alert and no distress. coughing  EYES: Eyes grossly normal to inspection.  No discharge or erythema, or obvious scleral/conjunctival abnormalities.  RESP: No audible wheeze, cough, or visible cyanosis.  No visible retractions or increased work of breathing.    SKIN: Visible skin clear. No significant rash, abnormal pigmentation or lesions.  NEURO: Cranial nerves grossly intact.  Mentation and speech appropriate for age.  PSYCH: Mentation appears normal, affect normal/bright, judgement and insight intact, normal speech and appearance well-groomed.                Video-Visit Details    Type of service:  Video Visit   Video Start Time: 3:10 pm  Video End Time:3:20 pm    Originating Location (pt. Location): Home  Distant Location (provider location):  On-site  Platform used for Video Visit: Dilan

## 2023-01-05 NOTE — PROGRESS NOTES
"Mary Shipman was seen in the Allergy Clinic at [facility]. The following are my recommendations regarding her Moderate Persistent Asthma and Chronic Urticaria    1. Resume dulera 200/5mcg, 2 puffs twice daily  2. Continue spiriva, 2 puffs daily  3. Continue montelukast 10mg daily  4. Use albuterol HFA, 2-4 puffs every 4 hours as needed  5. Continue cetirizine 20mg twice daily  6. Continue ranitidine 150mg twice daily  7. Begin dapsone 100mg daily  8. Will check CBC and hepatic function panel today  9. Plan to initiate prior authorization process for xolair 300mg every 4 weeks  10. Follow-up in 1-2 months      Mary Shipman is a 33 year old American female who is seen today for follow-up of asthma and chronic urticaria. She states that she has continued to have daily hives since her last visit. Her symptoms have not improved despite treatment with high dose cetirizine, ranitidine, montelukast, and additional diphenhydramine. Mary reports that prednisone has not been helpful in relieving her symptoms and she does not wish to be on steroids for a prolonged period of time.    Since her last visit Mary has continued to have daily asthma symptoms. She understood the spiriva was to be in place of the dulera and has not resumed her dulera. She continues to use albuterol daily and has been waking up at night twice per week with symptoms of cough and wheezing.      Past Medical History:   Diagnosis Date     Allergic rhinitis due to other allergen      Antepartum mild preeclampsia 11/23/2012     Asthma      Depressive disorder      Esophageal reflux      Frequent UTI     had a kidney infection also in the past     Gestational hypertension 11/20/2012     Helicobacter pylori (H. pylori)     treated     Hyperlipidemia      Irritable bowel syndrome      Liver disease     \"fatty liver\" resolved on own.     Lump or mass in breast 11/30/2015     Lump or mass in breast      Lump or mass in breast      Mild " preeclampsia 12/18/2012     Obesity      Polycystic ovaries      Thyrotoxicosis 5/9/2007       REVIEW OF SYSTEMS:  General: negative for weight gain. negative for weight loss. negative for changes in sleep.   Eyes: negative for itching. negative for redness. negative for tearing/watering. negative for vision changes  Ears: negative for fullness. negative for hearing loss. negative for dizziness.   Nose: negative for snoring.negative for changes in smell. negative for drainage.   Throat: negative for hoarseness. negative for sore throat. negative for trouble swallowing.   Lungs: positive  for cough. positive  for shortness of breath.positive  for wheezing. negative for sputum production.   Cardiovascular: negative for chest pain. negative for swelling of ankles. negative for fast or irregular heartbeat.   Gastrointestinal: negative for nausea. negative for heartburn. negative for acid reflux.   Musculoskeletal: negative for joint pain. negative for joint stiffness. negative for joint swelling.   Neurologic: negative for seizures. negative for fainting. negative for weakness.   Psychiatric: negative for changes in mood. negative for anxiety.   Endocrine: negative for cold intolerance. negative for heat intolerance. negative for tremors.   Hematologic: negative for easy bruising. negative for easy bleeding.  Integumentary: positive  for rash. negative for scaling. negative for nail changes.       Current Outpatient Medications:      albuterol (PROAIR HFA/PROVENTIL HFA/VENTOLIN HFA) 108 (90 Base) MCG/ACT inhaler, Inhale 2 puffs into the lungs every 4 hours as needed for shortness of breath / dyspnea or wheezing, Disp: 1 Inhaler, Rfl: 2     ARIPiprazole (ABILIFY) 5 MG tablet, Take 1 tablet (5 mg) by mouth At Bedtime, Disp: 30 tablet, Rfl: 1     cetirizine (ZYRTEC) 10 MG tablet, Take 2 tablets (20 mg) by mouth 2 times daily, Disp: 120 tablet, Rfl: 11     cholecalciferol (VITAMIN D3) 50181 units capsule, Take 1 capsule  (50,000 Units) by mouth once a week, Disp: 4 capsule, Rfl: 1     clonazePAM (KLONOPIN) 0.5 MG tablet, Take 0.5 mg by mouth, Disp: , Rfl:      fexofenadine-pseudoePHEDrine (ALLEGRA-D 24) 180-240 MG per 24 hr tablet, Take 1 tablet by mouth daily, Disp: , Rfl:      fluticasone (FLONASE) 50 MCG/ACT spray, Spray 1-2 sprays into both nostrils daily as needed for rhinitis or allergies, Disp: , Rfl:      gabapentin (NEURONTIN) 100 MG capsule, TAKE 1 CAPSULE BY ORAL ROUTE 3 TIMES PER DAY, Disp: , Rfl: 5     hydrOXYzine (ATARAX) 25 MG tablet, Take 1-2 tablets (25-50 mg) by mouth every 6 hours as needed for anxiety, Disp: 60 tablet, Rfl: 1     ibuprofen (ADVIL,MOTRIN) 400-800 mg tablet, Take 1-2 tablets by mouth every 6 hours as needed (cramping)., Disp: 30 tablet, Rfl: 0     LANsoprazole (PREVACID) 30 MG capsule, Take 1 capsule (30 mg) by mouth daily Take 30-60 minutes before a meal., Disp: 90 capsule, Rfl: 0     levalbuterol (XOPENEX CONC) 1.25 MG/0.5ML NEBU, Take 1.25 mg by nebulization every 6 hours as needed for wheezing, Disp: , Rfl:      montelukast (SINGULAIR) 10 MG tablet, Take 1 tablet (10 mg) by mouth At Bedtime, Disp: 90 tablet, Rfl: 1     polyethylene glycol (MIRALAX/GLYCOLAX) Packet, Take 17 g by mouth daily as needed for constipation, Disp: , Rfl:      ranitidine (ZANTAC) 150 MG tablet, Take 1 tablet (150 mg) by mouth 2 times daily, Disp: 60 tablet, Rfl: 5     sertraline (ZOLOFT) 100 MG tablet, 2 TABLETS DAILY, Disp: 180 tablet, Rfl: 1     tiotropium (SPIRIVA RESPIMAT) 2.5 MCG/ACT inhaler, Inhale 2 puffs into the lungs daily, Disp: 4 g, Rfl: 1     topiramate (TOPAMAX) 50 MG tablet, TAKE 6 TABLETS (300 MG) BY MOUTH AT BEDTIME, Disp: 180 tablet, Rfl: 5     mometasone-formoterol (DULERA) 200-5 MCG/ACT inhaler, Inhale 2 puffs into the lungs 2 times daily (Patient not taking: Reported on 1/14/2019), Disp: 1 Inhaler, Rfl: 1    EXAM:   /85 (BP Location: Right arm, Patient Position: Sitting, Cuff Size: Adult  Regular)   Pulse 76   Resp 16   Wt 131.2 kg (289 lb 3.2 oz)   SpO2 98%   BMI 48.13 kg/m    GENERAL APPEARANCE: alert, cooperative and not in distress  SKIN: diffuse urticarial lesions on upper extremities and trunk  HEAD: atraumatic, normocephalic  ENT: no scars or lesions, tongue midline and normal, soft palate, uvula, and tonsils normal  NECK: no asymmetry, masses, or scars, supple without significant adenopathy  LUNGS: unlabored respirations, no intercostal retractions or accessory muscle use, clear to auscultation without rales or wheezes  HEART: regular rate and rhythm without murmurs and normal S1 and S2  MUSCULOSKELETAL: no musculoskeletal defects are noted  NEURO: no focal deficits noted  PSYCH: does not appear depressed or anxious      WORKUP:  Spirometry    SPIROMETRY (best effort)       FVC 3.29L (85% of predicted).     FEV1 2.63L (82% of predicted).     FEV1/FVC 80%     FEF 25%-75%  2.64L/s (79% of predicted).    Spirometry could not be fully interpreted as patient did not have consistently reproducible efforts.    Asthma Control Test (ACT) total score: 14       ASSESSMENT/PLAN:  Mary Shipman is a 33 year old female here for follow-up of asthma and urticaria. She has continued to have daily symptoms of urticaria without relief from her current medications. She was counseled regarding additional options for medication management and the potential side effects and likelihood of efficacy of each option. Xolair is most likely to be effective in treating chronic urticaria and she was counseled regarding the time commitment, auto injectable epinephrine device policy, risks, including possible cardiovascular events, and benefits and she wishes to proceed.    Mary was counseled regarding the importance of asthma control. Her medication regimen including maintenance vs rescue medications was reviewed. She was advised to resume dulera in addition to the spiriva and return for follow-up.    1. Resume  dulera 200/5mcg, 2 puffs twice daily  2. Continue spiriva, 2 puffs daily  3. Continue montelukast 10mg daily  4. Use albuterol HFA, 2-4 puffs every 4 hours as needed  5. Continue cetirizine 20mg twice daily  6. Continue ranitidine 150mg twice daily  7. Begin dapsone 100mg daily  8. Will check CBC and hepatic function panel today  9. Plan to initiate prior authorization process for xolair 300mg every 4 weeks  10. Follow-up in 1-2 months      Terry Cruz MD  Allergy/Immunology  [place], [place]      Chart documentation done in part with Dragon Voice Recognition Software. Although reviewed after completion, some word and grammatical errors may remain.   Adbry Counseling: I discussed with the patient the risks of tralokinumab including but not limited to eye infection and irritation, cold sores, injection site reactions, worsening of asthma, allergic reactions and increased risk of parasitic infection.  Live vaccines should be avoided while taking tralokinumab. The patient understands that monitoring is required and they must alert us or the primary physician if symptoms of infection or other concerning signs are noted.

## 2023-01-20 ENCOUNTER — VIRTUAL VISIT (OUTPATIENT)
Dept: INTERNAL MEDICINE | Facility: CLINIC | Age: 38
End: 2023-01-20
Payer: COMMERCIAL

## 2023-01-20 DIAGNOSIS — R09.81 CONGESTION OF PARANASAL SINUS: Primary | ICD-10-CM

## 2023-01-20 DIAGNOSIS — J30.2 SEASONAL ALLERGIC RHINITIS, UNSPECIFIED TRIGGER: ICD-10-CM

## 2023-01-20 PROCEDURE — 99213 OFFICE O/P EST LOW 20 MIN: CPT | Mod: 95 | Performed by: PHYSICIAN ASSISTANT

## 2023-01-20 RX ORDER — PREDNISONE 20 MG/1
TABLET ORAL
Qty: 20 TABLET | Refills: 0 | Status: SHIPPED | OUTPATIENT
Start: 2023-01-20 | End: 2023-02-02

## 2023-01-20 ASSESSMENT — ASTHMA QUESTIONNAIRES
QUESTION_5 LAST FOUR WEEKS HOW WOULD YOU RATE YOUR ASTHMA CONTROL: SOMEWHAT CONTROLLED
ACT_TOTALSCORE: 16
QUESTION_2 LAST FOUR WEEKS HOW OFTEN HAVE YOU HAD SHORTNESS OF BREATH: ONCE A DAY
ACT_TOTALSCORE: 16
QUESTION_4 LAST FOUR WEEKS HOW OFTEN HAVE YOU USED YOUR RESCUE INHALER OR NEBULIZER MEDICATION (SUCH AS ALBUTEROL): ONCE A WEEK OR LESS
QUESTION_1 LAST FOUR WEEKS HOW MUCH OF THE TIME DID YOUR ASTHMA KEEP YOU FROM GETTING AS MUCH DONE AT WORK, SCHOOL OR AT HOME: SOME OF THE TIME
QUESTION_3 LAST FOUR WEEKS HOW OFTEN DID YOUR ASTHMA SYMPTOMS (WHEEZING, COUGHING, SHORTNESS OF BREATH, CHEST TIGHTNESS OR PAIN) WAKE YOU UP AT NIGHT OR EARLIER THAN USUAL IN THE MORNING: ONCE OR TWICE

## 2023-01-20 ASSESSMENT — PATIENT HEALTH QUESTIONNAIRE - PHQ9
SUM OF ALL RESPONSES TO PHQ QUESTIONS 1-9: 9
SUM OF ALL RESPONSES TO PHQ QUESTIONS 1-9: 9
10. IF YOU CHECKED OFF ANY PROBLEMS, HOW DIFFICULT HAVE THESE PROBLEMS MADE IT FOR YOU TO DO YOUR WORK, TAKE CARE OF THINGS AT HOME, OR GET ALONG WITH OTHER PEOPLE: VERY DIFFICULT

## 2023-01-20 NOTE — PROGRESS NOTES
"Mary is a 37 year old who is being evaluated via a billable video visit.      How would you like to obtain your AVS? MyChart  If the video visit is dropped, the invitation should be resent by: Text to cell phone: [phone]  Will anyone else be joining your video visit? No          Assessment & Plan     Congestion of paranasal sinus    - predniSONE (DELTASONE) 20 MG tablet; Take 3 tabs by mouth daily x 3 days, then 2 tabs daily x 3 days, then 1 tab daily x 3 days, then 1/2 tab daily x 3 days.    Seasonal allergic rhinitis, unspecified trigger    - predniSONE (DELTASONE) 20 MG tablet; Take 3 tabs by mouth daily x 3 days, then 2 tabs daily x 3 days, then 1 tab daily x 3 days, then 1/2 tab daily x 3 days.             BMI:   Estimated body mass index is 45.11 kg/m  as calculated from the following:    Height as of 5/11/22: 1.651 m (5' 5\").    Weight as of 12/5/22: 123 kg (271 lb 1.6 oz).   Weight management plan: Patient was referred to their PCP to discuss a diet and exercise plan.        Return in about 2 weeks (around 2/3/2023) for recheck if not improving, regular primary provider.    Fara Bean PA-C  [facility]    Subjective   Mary is a 37 year old, presenting for the following health issues:  URI (Patient having a virtual visit with complaints of having sinus inflammation.  Ears feel like they are underwater.)      History of Present Illness       Reason for visit:  Possible sinus inflammation or infection  Symptom onset:  3-7 days ago  Symptoms include:  Ears full under watet off blanace horrible sinus headache blowing nose  Symptom intensity:  Severe  Symptom progression:  Staying the same  Had these symptoms before:  Yes    She eats 0-1 servings of fruits and vegetables daily.She consumes 0 sweetened beverage(s) daily.She exercises with enough effort to increase her heart rate 9 or less minutes per day.  She exercises with enough effort to increase her heart " rate 3 or less days per week. She is missing 2 dose(s) of medications per week.  She is not taking prescribed medications regularly due to remembering to take.    Today's PHQ-9         PHQ-9 Total Score: 9    PHQ-9 Q9 Thoughts of better off dead/self-harm past 2 weeks :   Not at all    How difficult have these problems made it for you to do your work, take care of things at home, or get along with other people: Very difficult     Covid 19 + 12/23  Given prednisone 20mg x days did feel better for a week or two 1/13- started with more sinus congestion, runny nose  And then with increase pain and pressure- and Right side ear pain and pressure  Clear drainage  Tried to get into ENT - no appt until Thurs          Review of Systems         Objective           Vitals:  No vitals were obtained today due to virtual visit.    Physical Exam   GENERAL: Healthy, alert and no distress  EYES: Eyes grossly normal to inspection.  No discharge or erythema, or obvious scleral/conjunctival abnormalities.  RESP: No audible wheeze, cough, or visible cyanosis.  No visible retractions or increased work of breathing.    Voice sounds very congested  SKIN: Visible skin clear. No significant rash, abnormal pigmentation or lesions.  NEURO: Cranial nerves grossly intact.  Mentation and speech appropriate for age.  PSYCH: Mentation appears normal, affect normal/bright, judgement and insight intact, normal speech and appearance well-groomed.                Video-Visit Details    Type of service:  Video Visit   Video Start Time: 10:16 am  Video End Time:10:23 AM    Originating Location (pt. Location): Home    Distant Location (provider location):  Off-site  Platform used for Video Visit: [employer]GuruWell

## 2023-01-24 ENCOUNTER — TELEPHONE (OUTPATIENT)
Dept: OBGYN | Facility: CLINIC | Age: 38
End: 2023-01-24
Payer: COMMERCIAL

## 2023-01-24 NOTE — TELEPHONE ENCOUNTER
Reason for call:  Other   Patient called regarding (reason for call): appointment  Additional comments: Pt wants to know If she should fast for a blood draw before coming to appt on 1/26. Did not see any orders for that. Also has questions about IUD being placed since it was difficult for her last time.     Phone number to reach patient:  Home number on file [phone] (home)    Best Time:  Any    Can we leave a detailed message on this number?  YES    Travel screening: Not Applicable

## 2023-01-26 ENCOUNTER — OFFICE VISIT (OUTPATIENT)
Dept: OBGYN | Facility: CLINIC | Age: 38
End: 2023-01-26
Payer: COMMERCIAL

## 2023-01-26 VITALS
HEART RATE: 106 BPM | BODY MASS INDEX: 45.05 KG/M2 | SYSTOLIC BLOOD PRESSURE: 116 MMHG | DIASTOLIC BLOOD PRESSURE: 80 MMHG | HEIGHT: 65 IN | WEIGHT: 270.4 LBS | TEMPERATURE: 97.7 F

## 2023-01-26 DIAGNOSIS — Z30.430 ENCOUNTER FOR INSERTION OF INTRAUTERINE CONTRACEPTIVE DEVICE: ICD-10-CM

## 2023-01-26 DIAGNOSIS — G95.0 SYRINX OF SPINAL CORD (H): ICD-10-CM

## 2023-01-26 DIAGNOSIS — Z11.3 SCREEN FOR STD (SEXUALLY TRANSMITTED DISEASE): ICD-10-CM

## 2023-01-26 DIAGNOSIS — F33.1 MAJOR DEPRESSIVE DISORDER, RECURRENT EPISODE, MODERATE (H): ICD-10-CM

## 2023-01-26 DIAGNOSIS — E66.01 MORBID OBESITY (H): ICD-10-CM

## 2023-01-26 DIAGNOSIS — N89.8 VAGINAL ITCHING: ICD-10-CM

## 2023-01-26 DIAGNOSIS — Z00.00 ROUTINE GENERAL MEDICAL EXAMINATION AT A HEALTH CARE FACILITY: Primary | ICD-10-CM

## 2023-01-26 DIAGNOSIS — S06.9X9S MILD TRAUMATIC BRAIN INJURY WITH LOSS OF CONSCIOUSNESS, SEQUELA (H): ICD-10-CM

## 2023-01-26 LAB
CLUE CELLS: ABNORMAL
HCG UR QL: NEGATIVE
TRICHOMONAS, WET PREP: ABNORMAL
WBC'S/HIGH POWER FIELD, WET PREP: ABNORMAL
YEAST, WET PREP: ABNORMAL

## 2023-01-26 PROCEDURE — 87210 SMEAR WET MOUNT SALINE/INK: CPT | Performed by: OBSTETRICS & GYNECOLOGY

## 2023-01-26 PROCEDURE — 58300 INSERT INTRAUTERINE DEVICE: CPT | Performed by: OBSTETRICS & GYNECOLOGY

## 2023-01-26 PROCEDURE — 87591 N.GONORRHOEAE DNA AMP PROB: CPT | Performed by: OBSTETRICS & GYNECOLOGY

## 2023-01-26 PROCEDURE — G0145 SCR C/V CYTO,THINLAYER,RESCR: HCPCS | Performed by: OBSTETRICS & GYNECOLOGY

## 2023-01-26 PROCEDURE — 81025 URINE PREGNANCY TEST: CPT | Performed by: OBSTETRICS & GYNECOLOGY

## 2023-01-26 PROCEDURE — 99395 PREV VISIT EST AGE 18-39: CPT | Mod: 25 | Performed by: OBSTETRICS & GYNECOLOGY

## 2023-01-26 PROCEDURE — 87491 CHLMYD TRACH DNA AMP PROBE: CPT | Performed by: OBSTETRICS & GYNECOLOGY

## 2023-01-26 PROCEDURE — 87624 HPV HI-RISK TYP POOLED RSLT: CPT | Performed by: OBSTETRICS & GYNECOLOGY

## 2023-01-26 PROCEDURE — 99212 OFFICE O/P EST SF 10 MIN: CPT | Mod: 25 | Performed by: OBSTETRICS & GYNECOLOGY

## 2023-01-26 ASSESSMENT — PATIENT HEALTH QUESTIONNAIRE - PHQ9
5. POOR APPETITE OR OVEREATING: MORE THAN HALF THE DAYS
SUM OF ALL RESPONSES TO PHQ QUESTIONS 1-9: 15

## 2023-01-26 ASSESSMENT — ANXIETY QUESTIONNAIRES
GAD7 TOTAL SCORE: 14
7. FEELING AFRAID AS IF SOMETHING AWFUL MIGHT HAPPEN: MORE THAN HALF THE DAYS
1. FEELING NERVOUS, ANXIOUS, OR ON EDGE: MORE THAN HALF THE DAYS
IF YOU CHECKED OFF ANY PROBLEMS ON THIS QUESTIONNAIRE, HOW DIFFICULT HAVE THESE PROBLEMS MADE IT FOR YOU TO DO YOUR WORK, TAKE CARE OF THINGS AT HOME, OR GET ALONG WITH OTHER PEOPLE: NOT DIFFICULT AT ALL
5. BEING SO RESTLESS THAT IT IS HARD TO SIT STILL: MORE THAN HALF THE DAYS
3. WORRYING TOO MUCH ABOUT DIFFERENT THINGS: MORE THAN HALF THE DAYS
2. NOT BEING ABLE TO STOP OR CONTROL WORRYING: MORE THAN HALF THE DAYS
6. BECOMING EASILY ANNOYED OR IRRITABLE: MORE THAN HALF THE DAYS
GAD7 TOTAL SCORE: 14

## 2023-01-26 NOTE — PROGRESS NOTES
Mary is a 37 year old  female who presents for annual exam.     Menses are regular q 28-30 on oral contraceptive pills looking to switch to IUD for convenience and more reliability.    Patient's last menstrual period was 2023..  Not currently considering pregnancy.  Besides routine health maintenance, several other concerns.  Periods have been more crampy/heavy  Wants to be tested for std's and vaginitis.  Used a toy and usually gets vaginitis after that.   She needs a primary care doc since GABI Wick  left the [facility] clinic.   Wt Readings from Last 4 Encounters:   23 122.7 kg (270 lb 6.4 oz)   22 123 kg (271 lb 1.6 oz)   22 128.4 kg (283 lb)   22 127 kg (280 lb)      GYNECOLOGIC HISTORY:  Menarche: 9  Age at first intercourse: 14  Mary is sexually active with partner/male partner(s) and is currently in monogamous relationship with male/partner.    History sexually transmitted infections:No STD history and discuss  STI testing offered?  Accepted  MADIE exposure: No  History of abnormal Pap smear: NO - age 30- 65 PAP every 3 years recommended  Family history of breast CA: maternal aunt  Family history of uterine/ovarian CA: half/sister    Family history of colon CA: mother    HEALTH MAINTENANCE:  Cholesterol: (  Cholesterol   Date Value Ref Range Status   02/15/2022 163 <200 mg/dL Final   2019 215 (H) <200 mg/dL Final     Comment:     Desirable:       <200 mg/dl   2018 198 <200 mg/dL Final    History of abnormal lipids: Yes  Mammo:yes/? . History of abnormal Mammo: YES, No.  Regular Self Breast Exams: Yes  Calcium/Vitamin D intake: source:  dairy Adequate? Yes  TSH: (  TSH   Date Value Ref Range Status   2019 0.54 0.40 - 4.00 mU/L Final    )  Pap; (  Lab Results   Component Value Date    PAP NIL 2019    PAP NIL 2014    PAP NIL 2012    )    HISTORY:  OB History    Para Term  AB Living   3 2 2 0 1 2   SAB IAB Ectopic  "Multiple Live Births   0 1 0 0 2      # Outcome Date GA Lbr Frank/2nd Weight Sex Delivery Anes PTL Lv   3 Term 09/11/20 39w0d 03:18 / 00:13 3.8 kg (8 lb 6 oz) F Vag-Spont EPI N MELISSA      Name: DARLIN ERICKSON-АЛЕКСАНДР      Apgar1: 8  Apgar5: 9   2 IAB 2017     IAB      1 Term 12/20/12 37w2d 03:50 / 00:48 3.11 kg (6 lb 13.7 oz) M Vag-Spont EPI N MELISSA      Birth Comments: Mother induced for pre-ecclamsia, required manual cervical dilation, hemorrhage during labor.  Juma had hypoglycemia at birth.      Name: MEGAN ERICKSON      Apgar1: 8  Apgar5: 9     Past Medical History:   Diagnosis Date     Allergic rhinitis due to other allergen      Antepartum mild preeclampsia 11/23/2012     Asthma      Depressive disorder      Esophageal reflux      Frequent UTI     had a kidney infection also in the past     Gestational hypertension 11/20/2012     Helicobacter pylori (H. pylori)     treated     Hyperlipidemia      Irritable bowel syndrome      Liver disease     \"fatty liver\" resolved on own.     Lump or mass in breast 11/30/2015     Mild preeclampsia 12/18/2012     Obesity      Polycystic ovaries      Thyrotoxicosis 05/09/2007     Past Surgical History:   Procedure Laterality Date     TONSILLECTOMY & ADENOIDECTOMY      surgery several times for this     ZZC APPENDECTOMY       ZZC NONSPECIFIC PROCEDURE      nasal surgery      Family History   Problem Relation Age of Onset     Gynecology Mother         ectopic pregnancy/endometriosis     Cancer - colorectal Mother      Congenital Anomalies Mother         kidney problems     Colon Cancer Mother      Hyperlipidemia Mother      Other Cancer Mother      Asthma Mother      Arthritis Mother      Hypertension Mother      Chronic Obstructive Pulmonary Disease Mother      Breast Cancer Mother      Uterine Cancer Mother      Kidney Disease Mother      GERD Mother      Gynecology Sister         ectopic pregnancy/endometriosis     Unknown/Adopted Sister      Ovarian Cancer Sister      Heart " Disease Father      Coronary Artery Disease Father      Heart Failure Father      Hyperlipidemia Father      Psychotic Disorder Brother      Unknown/Adopted Brother      Unknown/Adopted Brother      Unknown/Adopted Brother      Unknown/Adopted Brother      Unknown/Adopted Brother      Unknown/Adopted Sister      Cerebrovascular Disease Maternal Grandmother      Hyperlipidemia Maternal Grandmother      GERD Maternal Grandmother      Unknown/Adopted Maternal Grandfather      Unknown/Adopted Paternal Grandmother      Unknown/Adopted Paternal Grandfather      Social History     Socioeconomic History     Marital status:      Spouse name: None     Number of children: 0     Years of education: None     Highest education level: None   Tobacco Use     Smoking status: Former     Smokeless tobacco: Never   Vaping Use     Vaping Use: Never used   Substance and Sexual Activity     Alcohol use: No     Drug use: No     Sexual activity: Yes     Partners: Male     Birth control/protection: OCP   Other Topics Concern     Parent/sibling w/ CABG, MI or angioplasty before 65F 55M? No   Social History Narrative         Social Determinants of Health     Financial Resource Strain: High Risk     Difficulty of Paying Living Expenses: Very hard   Transportation Needs: No Transportation Needs     Lack of Transportation (Medical): No     Lack of Transportation (Non-Medical): No       Current Outpatient Medications:      albuterol (ACCUNEB) 1.25 MG/3ML neb solution, Take 1 vial (1.25 mg) by nebulization every 6 hours as needed for shortness of breath / dyspnea or wheezing, Disp: 100 mL, Rfl: 1     albuterol (PROAIR HFA/PROVENTIL HFA/VENTOLIN HFA) 108 (90 Base) MCG/ACT inhaler, Inhale 2 puffs into the lungs every 6 hours, Disp: 18 g, Rfl: 2     amLODIPine (NORVASC) 10 MG tablet, TAKE 1 TABLET(10 MG) BY MOUTH DAILY, Disp: 90 tablet, Rfl: 1     clonazePAM (KLONOPIN) 0.5 MG tablet, Take 0.25 mg by mouth nightly as needed, Disp: , Rfl:       EPINEPHrine (ANY BX GENERIC EQUIV) 0.3 MG/0.3ML injection 2-pack, Inject 0.3 mLs (0.3 mg) into the muscle as needed for anaphylaxis May repeat one time in 5-15 minutes if response to initial dose is inadequate., Disp: 2 each, Rfl: 1     Fexofenadine HCl (ALLEGRA PO), Take 180 mg by mouth daily, Disp: , Rfl:      fluticasone (FLONASE) 50 MCG/ACT nasal spray, Spray 1 spray into both nostrils daily, Disp: 16 g, Rfl: 0     mometasone-formoterol (DULERA) 200-5 MCG/ACT inhaler, Inhale 2 puffs into the lungs 2 times daily Needs appointment for additional refills, Disp: 13 g, Rfl: 2     montelukast (SINGULAIR) 10 MG tablet, Take 1 tablet (10 mg) by mouth At Bedtime, Disp: 90 tablet, Rfl: 1     naproxen (NAPROSYN) 500 MG tablet, , Disp: , Rfl:      norgestim-eth estrad triphasic (ORTHO TRI-CYCLEN) 0.18/0.215/0.25 MG-35 MCG tablet, Take 1 tablet by mouth daily, Disp: 84 tablet, Rfl: 3     olopatadine (PATADAY) 0.2 % ophthalmic solution, Place 1 drop into both eyes daily, Disp: 2.5 mL, Rfl: 2     pantoprazole (PROTONIX) 40 MG EC tablet, Take 1 tablet (40 mg) by mouth daily, Disp: 30 tablet, Rfl: 1     predniSONE (DELTASONE) 20 MG tablet, Take 3 tabs by mouth daily x 3 days, then 2 tabs daily x 3 days, then 1 tab daily x 3 days, then 1/2 tab daily x 3 days., Disp: 20 tablet, Rfl: 0     Sertraline HCl (ZOLOFT PO), Take 200 mg by mouth daily, Disp: , Rfl:      sucralfate (CARAFATE) 1 GM tablet, Take 1 tablet (1 g) by mouth 4 times daily as needed for nausea (or acid reflux), Disp: 30 tablet, Rfl: 1     tiotropium (SPIRIVA RESPIMAT) 2.5 MCG/ACT inhaler, Inhale 2 puffs into the lungs daily, Disp: 4 g, Rfl: 1     traZODone (DESYREL) 50 MG tablet, Take 1-3 tablets ( mg) by mouth At Bedtime, Disp: 90 tablet, Rfl: 3     triamcinolone (NASACORT) 55 MCG/ACT nasal aerosol, Spray 2 sprays into both nostrils daily, Disp: , Rfl:   No current facility-administered medications for this visit.    Facility-Administered Medications Ordered  "in Other Visits:      lidocaine-EPINEPHrine 1.5 %-1:[id number] injection, , EPIDURAL, PRN, Abdulaziz Donahue MD, 3 mL at 09/10/20 [id number]     Allergies   Allergen Reactions     Acetaminophen Anaphylaxis     Uncertain - hives/anaphylaxis     Levofloxacin Anaphylaxis     Hydrocodone-Acetaminophen Rash     Feels like throat swelling, was given after Tonsillectomy       Past medical, surgical, social and family history were reviewed and updated in EPIC.    ROS:   C:     NEGATIVE for fever, chills, change in weight  I:       NEGATIVE for worrisome rashes, moles or lesions  E:     NEGATIVE for vision changes or irritation  E/M: NEGATIVE for ear, mouth and throat problems  R:     NEGATIVE for significant cough or SOB  CV:   NEGATIVE for chest pain, palpitations or peripheral edema  GI:     NEGATIVE for nausea, abdominal pain, heartburn, or change in bowel habits  :   NEGATIVE for frequency, dysuria, hematuria, vaginal discharge, or irregular bleeding  M:     NEGATIVE for significant arthralgias or myalgia  N:      NEGATIVE for weakness, dizziness or paresthesias  E:      NEGATIVE for temperature intolerance, skin/hair changes  P:      NEGATIVE for changes in mood or affect.    EXAM:  /80   Pulse 106   Temp 97.7  F (36.5  C) (Temporal)   Ht 1.651 m (5' 5\")   Wt 122.7 kg (270 lb 6.4 oz)   LMP 01/09/2023   Breastfeeding No   BMI 45.00 kg/m     BMI: Body mass index is 45 kg/m .  Constitutional: healthy, alert and no distress  Head: Normocephalic. No masses, lesions, tenderness or abnormalities  Neck: Neck supple. Trachea midline. No adenopathy. Thyroid symmetric, normal size.   Cardiovascular: RRR.   Respiratory: Negative.   Breast: No nodularity, asymmetry or nipple discharge bilaterally.  Gastrointestinal: Abdomen soft, non-tender, non-distended. Obese but No organomegaly.  7 x 8 cm lipoma type lesion on the lower chest and upper abdomen just below the left breast  : BME limited by habitus  Vulva:  No " external lesions, normal female hair distribution   Urethra:  Midline, non-tender, well supported, no discharge  Vagina:  Moist, pink, no abnormal discharge, no lesions  Uterus:  Normal size,  non-tender   Ovaries:  No masses appreciated, non-tender   Rectal Exam: deferred  Musculoskeletal: extremities normal  Skin: no suspicious lesions or rashes  Psychiatric: Affect appropriate, cooperative,mentation appears normal.     COUNSELING:   Reviewed preventive health counseling, as reflected in patient instructions       Regular exercise       Healthy diet/nutrition       Contraception       Family planning       Folic Acid       Safe sex practices/STD prevention   reports that she has quit smoking. She has never used smokeless tobacco.    Body mass index is 45 kg/m .  Weight management plan: not addressed today due to multiple other concerns addressed.  refer to primary care for weight management   FRAX Risk Assessment    ASSESSMENT:  37 year old female with satisfactory annual exam  (Z00.00) Routine general medical examination at a health care facility  (primary encounter diagnosis)  Comment:    Plan: Pap screen with HPV - recommended age 30 - 65         years, HCG qualitative urine, TSH with free T4         reflex, Vitamin D Deficiency, CBC with         platelets, Hemoglobin A1c, Lipid panel reflex         to direct LDL Fasting, Comprehensive metabolic         panel, HPV Hold (Lab Only)    (Z11.3) Screen for STD (sexually transmitted disease)  Comment:    Plan: NEISSERIA GONORRHOEA PCR, CHLAMYDIA TRACHOMATIS        PCR    (N89.8) Vaginal itching  Comment: no discharge   Plan: Wet prep - Clinic Collect   negative today.     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, sequela (H)  Comment:    Plan: Internal Medicine Referral       (G95.0) Syrinx of spinal cord (H)  Comment:    Plan: Internal Medicine Referral    (F33.1)  "Major depressive disorder, recurrent episode, moderate (H)  Comment:  Currently on meds, needs a primary care provider to follow   Plan: Internal Medicine Referral       (Z30.085) Encounter for insertion of intrauterine contraceptive device  Comment:    Plan: levonorgestrel (MIRENA) 20 MCG/DAY IUD,         levonorgestrel (MIRENA) 20 MCG/DAY IUD 20 mcg,         INSERTION INTRAUTERINE DEVICE      IUD Insertion:  CONSULT:    Is a pregnancy test required: Yes.  Was it positive or negative?  Negative  Was a consent obtained?  Yes    Subjective: Mary Shipman is a 37 year old  presents for IUD and desires Mirena type IUD.     Patient has been given the opportunity to ask questions about all forms of birth control, including all options appropriate for Mary Shipman. Discussed that no method of birth control, except abstinence is 100% effective against pregnancy or sexually transmitted infection.     Mary Shipman understands she may have the IUD removed at any time. IUD should be removed by a health care provider.    The entire insertion procedure was reviewed with the patient, including care after placement.    Patient's last menstrual period was 2023. . No allergy to betadine or shellfish. Patient desires STD screening  HCG Qual Urine     hCG Urine Qualitative   Date Value Ref Range Status   2023 Negative Negative Final     Comment:     This test is for screening purposes.  Results should be interpreted along with the clinical picture.  Confirmation testing is available if warranted by ordering [id number], HCG Quantitative Pregnancy.         /80   Pulse 106   Temp 97.7  F (36.5  C) (Temporal)   Ht 1.651 m (5' 5\")   Wt 122.7 kg (270 lb 6.4 oz)   LMP 2023   Breastfeeding No   BMI 45.00 kg/m      Pelvic Exam:   EG/BUS: normal genital architecture without lesions, erythema or abnormal secretions.   Vagina: moist, pink, rugae with physiologic discharge and secretions  Cervix: parous no " lesions and pink, moist, closed, without lesion or CMT  Uterus: mid position, mobile, no pain  Adnexa: within normal limits and no masses, nodularity, tenderness    PROCEDURE NOTE: -- IUD Insertion    Reason for Insertion: contraception    Under sterile technique, cervix was visualized with speculum and prepped with Betadine solution swab x 3. Tenaculum was placed for stability. The uterus was gently straightened and sounded to 8.0 cm. IUD prepared for placement, and IUD inserted according to 's instructions without difficulty or significant resitance, and deployed at the fundus. The strings were visualized and trimmed to 2.5 cm from the external os. Tenaculum was removed and hemostasis noted. Speculum removed.  Patient tolerated procedure well.  EBL: minimal    Complications: none    ASSESSMENT:     ICD-10-CM    1. Routine general medical examination at a health care facility  Z00.00 Pap screen with HPV - recommended age 30 - 65 years     HCG qualitative urine     HCG qualitative urine     TSH with free T4 reflex     Vitamin D Deficiency     CBC with platelets     Hemoglobin A1c     Lipid panel reflex to direct LDL Fasting     Comprehensive metabolic panel      2. Screen for STD (sexually transmitted disease)  Z11.3 NEISSERIA GONORRHOEA PCR     CHLAMYDIA TRACHOMATIS PCR     NEISSERIA GONORRHOEA PCR     CHLAMYDIA TRACHOMATIS PCR      3. Vaginal itching  N89.8 Wet prep - Clinic Collect      4. Morbid obesity (H)  E66.01 Internal Medicine Referral      5. 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 Syrinx of spinal cord (H)  G95.0 Internal Medicine Referral      7. Major depressive disorder, recurrent episode, moderate (H)  F33.1 Internal Medicine Referral      8. Encounter for insertion of intrauterine contraceptive device  Z30.430 levonorgestrel (MIRENA) 20 MCG/DAY IUD     levonorgestrel (MIRENA) 20 MCG/DAY IUD 20 mcg     INSERTION  INTRAUTERINE DEVICE           PLAN:  discussed side effects and normal expectations following IUD insertion.   The patient should return to clinic for a speculum examination for confirmation that the IUD is in place in one month. Bleeding pattern of this particular IUD was discussed with the patient.      Carmelita Talavera MD

## 2023-01-26 NOTE — PATIENT INSTRUCTIONS
What Mirena Users May Expect    What to watch for right after Mirena is placed  Some women may experience uterine cramps, bleeding, and/or dizziness during and right after Mirena is placed. To help minimize the cramps, you may taken ibuprofen 600 mg with food prior to your appointment. These symptoms should improve over the next 24 hours.  Mild cramping may be present for a few days after your placement  As a follow up, you should check your strings on 4 weeks or visit your clinic once in the first 4 to 12 weeks after Mirena is placed to make sure it is in the right position. After that, Mirena can be checked once a year as part of your routine exam.    Please use a back-up method (abstinence or condoms) for 5 days after placement.    Your periods may change  For the first 3 to 6 months, your monthly period may become irregular. You may also have frequent spotting or light bleeding. A few women have heavy bleeding during this time. After your body adjusts, the number of bleeding days is likely to decrease (but may remain irregular), and you may even find that your periods stop altogether for as long as Mirena is in place. Around the end of the third month of use, you may see up to a 75% reduction in the amount of menstrual bleeding. By one year, about 1 out of 5 users may hay have no period at all. At the end of two years, 70% have little or no bleeding. Your periods will return rapidly once Mirena is removed.     Mirena Strings  You may check your own Mirena strings by inserting a finger into the vagina and feeling the strings as they exit the cervix.  The strings will initially feel firm, like fishing line, but will soften over a few weeks.  After the strings have softened, you or your partner should not be able to feel the strings during intercourse.  If you can feel the IUD, see your healthcare provider to have the position confirmed.  You may use tampons with Mirena in place.    Mirena does not protect against  HIV or STDs.  Mirena does not prevent the formation of ovarian cysts.  Mirena does not typically reduce acne or cause weight gain or mood changes.    Please call [facility] at [phone] if you have questions or concerns.    For more information:  [email or web address]   IUD information:     Benefits: The IUD can be 97-99% effective when carefully following directions regarding use. It can be more effective if used with additional contraception. IUD containing progestin may decrease menstrual flow and menstrual cramping.     Risks/Side Effects: include but are not limited to spotting, bleeding, hemorrhage, or anemia: cramping or pain: partial or complete expulsion of device; lost IUD strings; uterine or cervical perforation; embedding of IUD in the uterine wall; increased risk of pelvic inflammatory disease. Women who become pregnant with an IUD in place are at a higher risk for ectopic pregnancy should a pregnancy occur with an IUD in situ. There is a higher rate of miscarriage when pregnancy occurs with IUD in place.    Warning signs: Please call clinic if you have abnormal spotting or heavy bleeding, abdominal pain, dyspareunia, fever, chills, flu like symptoms, or unable to locate strings of IUD, or strings are longer or shorter than expected.    You are encouraged to use condoms for prevention of STD. You may also need back up contraception for 7 days with your IUD. You may use pain medications (ibuprofen) as needed for mild to moderate pain. Please follow-up in clinic in 4-6 weeks for IUD string check if unable to find strings or as directed by provider.

## 2023-01-27 LAB
C TRACH DNA SPEC QL NAA+PROBE: NEGATIVE
N GONORRHOEA DNA SPEC QL NAA+PROBE: NEGATIVE

## 2023-01-30 ENCOUNTER — E-VISIT (OUTPATIENT)
Dept: FAMILY MEDICINE | Facility: CLINIC | Age: 38
End: 2023-01-30
Payer: COMMERCIAL

## 2023-01-30 DIAGNOSIS — U07.1 INFECTION DUE TO 2019 NOVEL CORONAVIRUS: Primary | ICD-10-CM

## 2023-01-30 LAB
BKR LAB AP GYN ADEQUACY: NORMAL
BKR LAB AP GYN INTERPRETATION: NORMAL
BKR LAB AP HPV REFLEX: NORMAL
BKR LAB AP LMP: NORMAL
BKR LAB AP PREVIOUS ABNORMAL: NORMAL
HUMAN PAPILLOMA VIRUS 16 DNA: NEGATIVE
HUMAN PAPILLOMA VIRUS 18 DNA: NEGATIVE
HUMAN PAPILLOMA VIRUS FINAL DIAGNOSIS: NORMAL
HUMAN PAPILLOMA VIRUS OTHER HR: NEGATIVE
PATH REPORT.COMMENTS IMP SPEC: NORMAL
PATH REPORT.COMMENTS IMP SPEC: NORMAL
PATH REPORT.RELEVANT HX SPEC: NORMAL

## 2023-01-30 PROCEDURE — 99421 OL DIG E/M SVC 5-10 MIN: CPT | Mod: CS | Performed by: NURSE PRACTITIONER

## 2023-02-02 ENCOUNTER — VIRTUAL VISIT (OUTPATIENT)
Dept: FAMILY MEDICINE | Facility: CLINIC | Age: 38
End: 2023-02-02
Payer: COMMERCIAL

## 2023-02-02 DIAGNOSIS — U07.1 INFECTION DUE TO 2019 NOVEL CORONAVIRUS: Primary | ICD-10-CM

## 2023-02-02 DIAGNOSIS — J45.40 MODERATE PERSISTENT ASTHMA WITHOUT COMPLICATION: ICD-10-CM

## 2023-02-02 PROCEDURE — 99213 OFFICE O/P EST LOW 20 MIN: CPT | Mod: CS | Performed by: NURSE PRACTITIONER

## 2023-02-02 NOTE — PATIENT INSTRUCTIONS
COVID-19 Outpatient Treatments  Your care team can help you find the best treatments for COVID-19. Talk to a health care provider or refer to the FDA medicine fact sheets below.    Important: You can't have Paxlovid or molnupiravir if you're starting the medicine more than 5 days after your symptoms have started.  Paxlovid: https://www.fda.gov/media/725818/download  Molnupiravir (Lagevrio): https://www.fda.gov/media/571607/download  Paxlovid (nimatrelvir and ritonavir)  How it works  Two medicines (nirmatrelvir and ritonavir) are taken together. They stop the virus from growing. Less amount of virus is easier for your body to fight.  Benefits  Lowers risk of a hospital stay or death from COVID-19.  How to take    Medicine comes in a daily container with both medicine tablets. Take by mouth twice daily (once in the morning, once at night) for 5 days.    The number of tablets to take varies by patient.    Don't chew or break capsules. Swallow whole.  When to take  Take as soon as possible after positive COVID-19 test result, and within 5 days of your first symptoms.  Who can take it  Patients must be 12 years or older, weigh at least 88 pounds, and have tested positive for COVID-19. Paxlovid is the preferred treatment for pregnant patients.  Possible side effects  Can cause altered sense of taste, diarrhea (loose, watery stools), high blood pressure, muscle aches.  Medicine conflicts    Some medicines may conflict with Paxlovid and may cause serious side effects.    Tell your care team about all the medicines you take, including prescription and over-the-counter medicines, vitamins, and herbal supplements.    Your care team will review your medicines to make sure that you can safely take Paxlovid.  Cautions    Paxlovid is not advised for patients with severe kidney or liver disease. If you have kidney or liver problems, the dose may need to be changed.    If you're pregnant or breastfeeding, talk to your care team  about your options.    If you take hormonal birth control (such as the Pill), then you or your partner should also use a non-hormonal form of birth control (such as a condom). Keep doing this for 1 menstrual cycle after your last dose of Paxlovid.  Molnupiravir (Lagevrio)  How it works  Stops the virus from growing. Less amount of virus is easier for your body to fight.  Benefits  Lowers risk of a hospital stay or death from COVID-19.  How to take  Take 4 capsules by mouth every 12 hours (4 in the morning and 4 at night) for 5 days. Don't chew or break capsules. Swallow whole.  When to take  Take as soon as possible after positive COVID-19 test result, and within 5 days of your first symptoms.  Who can take it  Patients must be 18 years or older and have tested positive for COVID-19.  Possible side effects  Diarrhea (loose, watery stools), nausea (feeling sick to your stomach), dizziness, headaches.  Medicine conflicts  Right now, there are no known conflicts with other drugs. But tell your care team about all medicines you take.  Cautions    This medicine is not advised for patients who are pregnant.    If you are someone who could become pregnant, use trusted birth control until 4 days after your last dose of molnupiravir.    If your partner could become pregnant, you should use trusted birth control until 3 months after your last dose of molnupiravir.  For informational purposes only. Not to replace the advice of your health care provider. Copyright   2022 [facility]. All rights reserved. Clinically reviewed by Laurie Weeks, PharmD, BCACP. [employer] [id number] - REV 12/22.

## 2023-02-02 NOTE — PROGRESS NOTES
Mary is a 37 year old who is being evaluated via a billable video visit.      How would you like to obtain your AVS? MyChart  If the video visit is dropped, the invitation should be resent by: Text to cell phone: [phone]  Will anyone else be joining your video visit? No        Assessment & Plan     Infection due to 2019 novel coronavirus  Tested Monday and had light positive but symptoms hit yesterday. Unable to take Paxlovid d/t drug-drug interactions. She is at high risk for complication due to asthma. Risks/benefits discussed. Follow up in person if worsening.   - molnupiravir (LAGEVRIO) 200 MG capsule; Take 4 capsules (800 mg) by mouth every 12 hours for 5 days    Moderate persistent asthma without complication  Continue current medications.                See Patient Instructions    Return in about 3 days (around 2/5/2023), or if symptoms worsen or fail to improve.     The benefits, risks and potential side effects were discussed in detail. Black box warnings discussed as relevant. All patient questions were answered. The patient was instructed to follow up immediately if any adverse reactions develop.    Return precautions discussed, including when to seek urgent/emergent care.    Patient verbalizes understanding and agrees with plan of care.       GLENNA SHORT CNP  [facility]   Mary is a 37 year old, presenting for the following health issues:  covid treatment      HPI       COVID-19 Symptom Review  How many days ago did these symptoms start? Last night     Are any of the following symptoms significant for you?    New or worsening difficulty breathing? No    Worsening cough? Yes, it's a dry cough.     Fever or chills? Yes, I felt feverish or had chills.    Headache: YES    Sore throat: YES    Chest pain: No    Diarrhea: No    Body aches? YES    What treatments has patient tried? None    Does patient live in a nursing home, group home, or shelter?  No  Does patient have a way to get food/medications during quarantined? Yes, I have a friend or family member who can help me.    Started neb and inhaler last night  Using Dulera, singulair, spiriva  On OCP + Mirena  Not breastfeeding    Felt fine Mon/Tues. Son tested positive on Monday. She also may have had a very faint line but hard to tell  Felt like hit with a ton of bricks last night: wheezy, chest tight, headache, sweating, cold, no taste/smell  Test very positive with 2 dark lines today  Had molnupirivir in the spring when had covid - had diarrhea. Doesn't want to stop clonazepam so needs to avoid paxlovid    Hx asthma  Recently on steroids for sinus. That resolved completely    Review of Systems   Constitutional, HEENT, cardiovascular, pulmonary, GI, , musculoskeletal, neuro, skin, endocrine and psych systems are negative, except as otherwise noted.      Objective           Vitals:  No vitals were obtained today due to virtual visit.    Physical Exam   GENERAL: Healthy, alert and no distress  EYES: Eyes grossly normal to inspection.  No discharge or erythema, or obvious scleral/conjunctival abnormalities.  RESP: No audible wheeze, cough, or visible cyanosis.  No visible retractions or increased work of breathing.    SKIN: Visible skin clear. No significant rash, abnormal pigmentation or lesions.  NEURO: Cranial nerves grossly intact.  Mentation and speech appropriate for age.  PSYCH: Mentation appears normal, affect normal/bright, judgement and insight intact, normal speech and appearance well-groomed.                Video-Visit Details    Type of service:  Video Visit   Video Start Time: 10:35 am  Video End Time:10:51 AM    Originating Location (pt. Location): Home  Distant Location (provider location):  On-site  Platform used for Video Visit: [employer]

## 2023-02-08 DIAGNOSIS — J01.90 ACUTE SINUSITIS, RECURRENCE NOT SPECIFIED, UNSPECIFIED LOCATION: ICD-10-CM

## 2023-02-08 RX ORDER — FLUTICASONE PROPIONATE 50 MCG
1 SPRAY, SUSPENSION (ML) NASAL DAILY
Qty: 16 G | Refills: 3 | Status: SHIPPED | OUTPATIENT
Start: 2023-02-08

## 2023-02-08 NOTE — TELEPHONE ENCOUNTER
Prescription approved per [facility] Refill Protocol.    Nicole HERNANDEZ RN

## 2023-02-19 ENCOUNTER — OFFICE VISIT (OUTPATIENT)
Dept: URGENT CARE | Facility: URGENT CARE | Age: 38
End: 2023-02-19
Payer: COMMERCIAL

## 2023-02-19 VITALS
BODY MASS INDEX: 44.48 KG/M2 | SYSTOLIC BLOOD PRESSURE: 134 MMHG | DIASTOLIC BLOOD PRESSURE: 93 MMHG | HEART RATE: 76 BPM | WEIGHT: 267.3 LBS | OXYGEN SATURATION: 96 % | TEMPERATURE: 98.3 F

## 2023-02-19 DIAGNOSIS — B96.89 ACUTE BACTERIAL RHINOSINUSITIS: Primary | ICD-10-CM

## 2023-02-19 DIAGNOSIS — J01.90 ACUTE BACTERIAL RHINOSINUSITIS: Primary | ICD-10-CM

## 2023-02-19 PROCEDURE — 99214 OFFICE O/P EST MOD 30 MIN: CPT | Performed by: NURSE PRACTITIONER

## 2023-02-19 NOTE — LETTER
[facility] [address] [address]  Phone: [phone]    February 19, 2023        Mary Shipman  [facility] [address] [place] [id number]          To whom it may concern:    RE: Mary D Umberto    Patient was seen and treated today at our clinic and missed work due to bacterial infection being treated with antibiotic will likely miss 1-3 additional days    Please contact me for questions or concerns.      Sincerely,        GLENNA Mcginnis CNP

## 2023-02-19 NOTE — PROGRESS NOTES
Assessment & Plan      Diagnosis Comments   1. Acute bacterial rhinosinusitis  amoxicillin-clavulanate (AUGMENTIN) 875-125 MG tablet continue on Augmentin due to her recently taking 2 tablets daily for the past 5 days.  And is feeling better but still having symptoms as noted below.  Side effects were discussed related to medication recommendations of home care related to sinus symptoms.  Patient verbalized understanding.            GLENNA Mcginnis [facility] LEOLAMAYA Hernandez is a 37 year old female who presents to clinic today for the following health issues:  Chief Complaint   Patient presents with     Urgent Care     URI     Got sick-first had covid on 2/1, had a negative re test, felt better for two days, then fatigue, headache, ear felt under the water, throat irritated, dizzy, weak, a lot of congestion in chest     Headache     Fatigue     Dizziness     Ear Problem     Right      HPI    Patient presents to clinic states she has been sick since beginning of this month.  She was getting better and recovering from COVID however then started to have increased sinus congestion and ear pressure.  She notes she has been taking Tylenol and/or ibuprofen as needed for discomfort so it is unclear if she has been running a fever however she has been feeling body aches and malaise.  She did start taking an oral antibiotic Augmentin she is on day 5 of this and states that she is feeling somewhat better.  She also has a history of asthma.    Review of Systems  Constitutional, HEENT, cardiovascular, pulmonary, gi and gu systems are negative, except as otherwise noted.      Objective    BP (!) 134/93 (BP Location: Left arm, Patient Position: Sitting, Cuff Size: Adult Large)   Pulse 76   Temp 98.3  F (36.8  C) (Tympanic)   Wt 121.2 kg (267 lb 4.8 oz)   LMP 01/09/2023   SpO2 96%   BMI 44.48 kg/m    Physical Exam   GENERAL: alert and no distress  EYES: Eyes grossly  normal to inspection, PERRL and conjunctivae and sclerae normal  HENT: normal cephalic/atraumatic, ear canals and TM's normal, nose and mouth without ulcers or lesions, nasal mucosa edematous , rhinorrhea yellow and green, oropharynx clear, oral mucous membranes moist and sinuses: maxillary, frontal tenderness on bilateral  NECK: bilateral anterior cervical adenopathy, no asymmetry, masses, or scars and thyroid normal to palpation  RESP: lungs clear to auscultation - no rales, rhonchi or wheezes  CV: regular rate and rhythm, normal S1 S2, no S3 or S4, no murmur, click or rub, no peripheral edema and peripheral pulses strong  ABDOMEN: soft, nontender, no hepatosplenomegaly, no masses and bowel sounds normal  MS: no gross musculoskeletal defects noted, no edema  SKIN: no suspicious lesions or rashes

## 2023-02-27 ENCOUNTER — E-VISIT (OUTPATIENT)
Dept: OBGYN | Facility: CLINIC | Age: 38
End: 2023-02-27
Payer: COMMERCIAL

## 2023-02-27 DIAGNOSIS — R30.0 DYSURIA: Primary | ICD-10-CM

## 2023-02-27 PROCEDURE — 99207 PR NON-BILLABLE SERV PER CHARTING: CPT | Performed by: OBSTETRICS & GYNECOLOGY

## 2023-02-28 ENCOUNTER — OFFICE VISIT (OUTPATIENT)
Dept: OBGYN | Facility: CLINIC | Age: 38
End: 2023-02-28
Payer: COMMERCIAL

## 2023-02-28 VITALS
HEART RATE: 88 BPM | TEMPERATURE: 98.5 F | SYSTOLIC BLOOD PRESSURE: 124 MMHG | WEIGHT: 265.4 LBS | DIASTOLIC BLOOD PRESSURE: 70 MMHG | BODY MASS INDEX: 44.16 KG/M2

## 2023-02-28 DIAGNOSIS — Z30.431 INTRAUTERINE DEVICE SURVEILLANCE: Primary | ICD-10-CM

## 2023-02-28 DIAGNOSIS — F33.1 MAJOR DEPRESSIVE DISORDER, RECURRENT EPISODE, MODERATE (H): ICD-10-CM

## 2023-02-28 DIAGNOSIS — R30.0 DYSURIA: ICD-10-CM

## 2023-02-28 DIAGNOSIS — B37.31 YEAST INFECTION OF THE VAGINA: ICD-10-CM

## 2023-02-28 LAB
ALBUMIN UR-MCNC: NEGATIVE MG/DL
APPEARANCE UR: CLEAR
BACTERIA #/AREA URNS HPF: ABNORMAL /HPF
BILIRUB UR QL STRIP: NEGATIVE
CLUE CELLS: ABNORMAL
COLOR UR AUTO: YELLOW
GLUCOSE UR STRIP-MCNC: NEGATIVE MG/DL
HGB UR QL STRIP: ABNORMAL
KETONES UR STRIP-MCNC: NEGATIVE MG/DL
LEUKOCYTE ESTERASE UR QL STRIP: ABNORMAL
NITRATE UR QL: NEGATIVE
PH UR STRIP: 7 [PH] (ref 5–7)
RBC #/AREA URNS AUTO: ABNORMAL /HPF
SP GR UR STRIP: 1.02 (ref 1–1.03)
SQUAMOUS #/AREA URNS AUTO: ABNORMAL /LPF
TRICHOMONAS, WET PREP: ABNORMAL
UROBILINOGEN UR STRIP-ACNC: 0.2 E.U./DL
WBC #/AREA URNS AUTO: ABNORMAL /HPF
WBC'S/HIGH POWER FIELD, WET PREP: ABNORMAL
YEAST, WET PREP: PRESENT

## 2023-02-28 PROCEDURE — 99214 OFFICE O/P EST MOD 30 MIN: CPT | Performed by: OBSTETRICS & GYNECOLOGY

## 2023-02-28 PROCEDURE — 87210 SMEAR WET MOUNT SALINE/INK: CPT | Performed by: OBSTETRICS & GYNECOLOGY

## 2023-02-28 PROCEDURE — 81001 URINALYSIS AUTO W/SCOPE: CPT | Performed by: OBSTETRICS & GYNECOLOGY

## 2023-02-28 RX ORDER — FLUCONAZOLE 150 MG/1
150 TABLET ORAL
Qty: 3 TABLET | Refills: 0 | Status: SHIPPED | OUTPATIENT
Start: 2023-02-28 | End: 2023-03-07

## 2023-02-28 ASSESSMENT — ANXIETY QUESTIONNAIRES
2. NOT BEING ABLE TO STOP OR CONTROL WORRYING: MORE THAN HALF THE DAYS
GAD7 TOTAL SCORE: 17
IF YOU CHECKED OFF ANY PROBLEMS ON THIS QUESTIONNAIRE, HOW DIFFICULT HAVE THESE PROBLEMS MADE IT FOR YOU TO DO YOUR WORK, TAKE CARE OF THINGS AT HOME, OR GET ALONG WITH OTHER PEOPLE: VERY DIFFICULT
7. FEELING AFRAID AS IF SOMETHING AWFUL MIGHT HAPPEN: MORE THAN HALF THE DAYS
1. FEELING NERVOUS, ANXIOUS, OR ON EDGE: NEARLY EVERY DAY
GAD7 TOTAL SCORE: 17
3. WORRYING TOO MUCH ABOUT DIFFERENT THINGS: MORE THAN HALF THE DAYS
6. BECOMING EASILY ANNOYED OR IRRITABLE: NEARLY EVERY DAY
5. BEING SO RESTLESS THAT IT IS HARD TO SIT STILL: MORE THAN HALF THE DAYS

## 2023-02-28 ASSESSMENT — PATIENT HEALTH QUESTIONNAIRE - PHQ9
SUM OF ALL RESPONSES TO PHQ QUESTIONS 1-9: 14
5. POOR APPETITE OR OVEREATING: NEARLY EVERY DAY

## 2023-02-28 NOTE — PROGRESS NOTES
Mary Shipman is 37 year old female here for an IUD check, and additional concerns.     She had a mirena IUD placed about one month ago, without complication.  Since then she has been doing well. Rare spotting now.  Pleased with the IUD.     Also dysuria since yesterday.  Has increased her po intake.  Feeling a little better today.   took diflucan 2 nights ago when she felt irritation and itching.  But still feeling irritation.    Took amoxicillin for strep throat recently which triggered her vaginal symptoms.     Her son had a recent severe episode of depression.  Has been stressful for Mary.   She has a good therapist that  she is currently working with and denies suicidal ideation  Her relationship with partner Is strained.  They don't talk much.     PHQ 1/20/2023 1/26/2023 2/28/2023   PHQ-9 Total Score 9 15 14   Q9: Thoughts of better off dead/self-harm past 2 weeks Not at all Not at all Not at all     CRUZ-7 SCORE 5/25/2021 1/26/2023 2/28/2023   Total Score - - -   Total Score 16 14 17           OBJECTIVE:  /70   Pulse 88   Temp 98.5  F (36.9  C) (Oral)   Wt 120.4 kg (265 lb 6.4 oz)   LMP 01/09/2023   Breastfeeding No   BMI 44.16 kg/m     Pleasant, NAD   Normal mood and affect  normal respiratory and cardiovascular effort     Pelvic:  EG - normal adult female.  BUS - within normal limits.  Vagina - well rugated, no discharge.  Cervix - no lesions, no CMT.   IUD strings noted at the cervix about 2 cm long.      Results for orders placed or performed in visit on 02/28/23   UA Macro with Reflex to Micro and Culture - lab collect     Status: Abnormal    Specimen: Urine, Midstream   Result Value Ref Range    Color Urine Yellow Colorless, Straw, Light Yellow, Yellow    Appearance Urine Clear Clear    Glucose Urine Negative Negative mg/dL    Bilirubin Urine Negative Negative    Ketones Urine Negative Negative mg/dL    Specific Gravity Urine 1.020 1.003 - 1.035    Blood Urine Moderate (A) Negative    pH  Urine 7.0 5.0 - 7.0    Protein Albumin Urine Negative Negative mg/dL    Urobilinogen Urine 0.2 0.2, 1.0 E.U./dL    Nitrite Urine Negative Negative    Leukocyte Esterase Urine Trace (A) Negative   UA Microscopic with Reflex to Culture     Status: Abnormal   Result Value Ref Range    Bacteria Urine Few (A) None Seen /HPF    RBC Urine 0-2 0-2 /HPF /HPF    WBC Urine None Seen 0-5 /HPF /HPF    Squamous Epithelials Urine Few (A) None Seen /LPF    Narrative    Urine Culture not indicated      Wet prep: + yeast     ASSESSMENT:    Encounter Diagnoses   Name Primary?     Intrauterine device surveillance Yes     Yeast infection of the vagina      Dysuria      Major depressive disorder, recurrent episode, moderate (H)       IUD well placed   No evidence of bladder infection, this was reviewed with patient at her visit today.  Anxiety and depression is a chronic problem with acute exacerbation due to her son's recent episode of severe depression.  She feels comfortable continuing to work with her therapist.      PLAN:  discussed management of her anxiety and depression, and patient working will with her therapist. She is not suicidal.  Rx given for Diflucan for the yeast infection.  Stay adequately hydrated.   Reviewed long-term expectations for her Mirena IUD.  return to clinic when due for next annual physical exam  or with any concerns in regards to her IUD.

## 2023-03-02 NOTE — PATIENT INSTRUCTIONS
Dear Mary Shipman       Thanks for choosing us as your health care partner,    Carmelita Talavera MD    Urinary Tract Infections in Women  Urinary tract infections (UTIs) are most often caused by bacteria. These bacteria enter the urinary tract. The bacteria may come from inside the body. Or they may travel from the skin outside the rectum or vagina into the urethra. Female anatomy makes it easy for bacteria from the bowel to enter a woman s urinary tract, which is the most common source of UTI. This means women develop UTIs more often than men. Pain in or around the urinary tract is a common UTI symptom. But the only way to know for sure if you have a UTI for the healthcare provider to test your urine. The two tests that may be done are the urinalysis and urine culture.     Types of UTIs  Cystitis. A bladder infection (cystitis) is the most common UTI in women. You may have urgent or frequent need to pee. You may also have pain, burning when you pee, and bloody urine.  Urethritis. This is an inflamed urethra, which is the tube that carries urine from the bladder to outside the body. You may have lower stomach or back pain. You may also have urgent or frequent need to pee.  Pyelonephritis. This is a kidney infection. If not treated, it can be serious and damage your kidneys. In severe cases, you may need to stay in the hospital. You may have a fever and lower back pain.    Medicines to treat a UTI  Most UTIs are treated with antibiotics. These kill the bacteria. The length of time you need to take them depends on the type of infection. It may be as short as 3 days. If you have repeated UTIs, you may need a low-dose antibiotic for several months. Take antibiotics exactly as directed. Don t stop taking them until all of the medicine is gone. If you stop taking the antibiotic too soon, the infection may not go away. You may also develop a resistance to the antibiotic. This can make it much harder to treat.    Lifestyle changes to treat and prevent UTIs   The lifestyle changes below will help get rid of your UTI. They may also help prevent future UTIs.   Drink plenty of fluids. This includes water, juice, or other caffeine-free drinks. Fluids help flush bacteria out of your body.  Empty your bladder. Always empty your bladder when you feel the urge to pee. And always pee before going to sleep. Urine that stays in your bladder can lead to infection. Try to pee before and after sex as well.  Practice good personal hygiene. Wipe yourself from front to back after using the toilet. This helps keep bacteria from getting into the urethra.  Use condoms during sex. These help prevent UTIs caused by sexually transmitted bacteria. Also don't use spermicides during sex. These can increase the risk for UTIs. Choose other forms of birth control instead. For women who tend to get UTIs after sex, a low-dose of a preventive antibiotic may be used. Be sure to discuss this option with your healthcare provider.  Follow up with your healthcare provider as directed. He or she may test to make sure the infection has cleared. If needed, more treatment may be started.  [employer] last reviewed this educational content on 7/1/2019 2000-2021 [employer]. All rights reserved. This information is not intended as a substitute for professional medical care. Always follow your healthcare professional's instructions.

## 2023-03-18 ENCOUNTER — PRE VISIT (OUTPATIENT)
Dept: ORTHOPEDICS | Facility: CLINIC | Age: 38
End: 2023-03-18

## 2023-03-21 ENCOUNTER — VIRTUAL VISIT (OUTPATIENT)
Dept: FAMILY MEDICINE | Facility: CLINIC | Age: 38
End: 2023-03-21
Payer: COMMERCIAL

## 2023-03-21 DIAGNOSIS — Z20.818 EXPOSURE TO STREP THROAT: ICD-10-CM

## 2023-03-21 DIAGNOSIS — J01.90 ACUTE NON-RECURRENT SINUSITIS, UNSPECIFIED LOCATION: Primary | ICD-10-CM

## 2023-03-21 DIAGNOSIS — R21 RASH AND NONSPECIFIC SKIN ERUPTION: ICD-10-CM

## 2023-03-21 PROCEDURE — 99213 OFFICE O/P EST LOW 20 MIN: CPT | Mod: VID | Performed by: NURSE PRACTITIONER

## 2023-03-21 RX ORDER — TRIAMCINOLONE ACETONIDE 1 MG/G
CREAM TOPICAL 2 TIMES DAILY
Qty: 30 G | Refills: 1 | Status: SHIPPED | OUTPATIENT
Start: 2023-03-21 | End: 2023-03-28

## 2023-03-21 NOTE — PATIENT INSTRUCTIONS
At [facility], we strive to deliver an exceptional experience to you, every time we see you. If you receive a survey, please complete it as we do value your feedback.  If you have MyChart, you can expect to receive results automatically within 24 hours of their completion.  Your provider will send a note interpreting your results as well.   If you do not have MyChart, you should receive your results in about a week by mail.    Your care team:                            Family Medicine Internal Medicine   MD Tushar Dixon MD Shantel Branch-Fleming, MD Srinivasa Vaka, MD Katya Belousova, PAGLENNA Miller CNP, MD ([facility]   Mickey Drake, MD Shanika Laurent MD Amelia Massimini APRN LARA Doyle APRN MD Marcela Tobar MD          Clinic hours: Monday - Thursday 7 am-6 pm; Fridays 7 am-5 pm.   Urgent care: Monday - Friday 10 am- 8 pm; Saturday and Sunday 9 am-5 pm.    Clinic: [phone]       [facility]: Monday - Thursday 8 am - 7 pm; Friday 8 am - 6 pm  [facility]: [phone]

## 2023-03-21 NOTE — LETTER
March 21, 2023      Mary AARTI Shipman  [address]  [facility] [address]        To Whom It May Concern,      Mary was seen on 3/21/2023. She may return to work at her next scheduled shift next week.          Sincerely,        GLENNA SHORT CNP

## 2023-03-21 NOTE — PROGRESS NOTES
Mary is a 38 year old who is being evaluated via a billable video visit.      How would you like to obtain your AVS? MyChart  If the video visit is dropped, the invitation should be resent by: Text to cell phone: [phone]  Will anyone else be joining your video visit? No        Assessment & Plan     Acute non-recurrent sinusitis, unspecified location  Had allergy symptoms first and then sinus symptoms. Treat with the below. Treating for 10 days to cover strep as well given both of her children have strep and she also has symptoms.   - amoxicillin-clavulanate (AUGMENTIN) 875-125 MG tablet; Take 1 tablet by mouth 2 times daily for 10 days    Exposure to strep throat  As above.  - amoxicillin-clavulanate (AUGMENTIN) 875-125 MG tablet; Take 1 tablet by mouth 2 times daily for 10 days    Rash and nonspecific skin eruption  Start the below. Follow up in clinic if worsening/not improving.   - triamcinolone (KENALOG) 0.1 % external cream; Apply topically 2 times daily for 7 days Rash on abdomen       See Patient Instructions    Return in about 1 week (around 3/28/2023), or if symptoms worsen or fail to improve.     The benefits, risks and potential side effects were discussed in detail. Black box warnings discussed as relevant. All patient questions were answered. The patient was instructed to follow up immediately if any adverse reactions develop.    Return precautions discussed, including when to seek urgent/emergent care.    Patient verbalizes understanding and agrees with plan of care.     GLENNA SHORT CNP  [facility]    Danyelle Hernandez is a 38 year old, presenting for the following health issues:  Pharyngitis and Nasal Congestion      HPI     Acute Illness  Acute illness concerns: Sore throat and congestion   Onset/Duration: 1 week   Symptoms:  Fever: No  Chills/Sweats: YES- chills in evening   Headache (location?): YES  Sinus Pressure: No  Conjunctivitis:  Itching  eyes and watery with some redness   Ear Pain: YES- Pressure   Rhinorrhea: YES  Congestion: YES  Sore Throat: YES  Cough: no  Wheeze: No  Decreased Appetite: No  Nausea: No  Vomiting: No  Diarrhea: No  Dysuria/Freq.: No  Dysuria or Hematuria: No  Fatigue/Achiness: No  Sick/Strep Exposure: YES- Daughter and son have strep  Therapies tried and outcome: None      Seen in  3/17 and negative strep swab but both kids tested positive. Was told throat was red. Now thinks got strep from kids about also has congestion/sinus pressure. Symptoms x1+ weeks.   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 Not scheduled there again until next week. Needs note that she was seen.  Since Dec 24, family has had flu, strep, covid, viral stomach bug and now strep again  Not pregnant or breastfeeding    Needs new psychiatrist. Was going to [facility] [facility] [employer] and seeing [place] but she missed 3 appointments and was dismissed. She did intake with [facility] over the weekend but waiting to hear back. May need refills of her medication until she can establish with new psychiatrist. On sertraline, lamotrigine and clonazepam. Does not current need refills    Review of Systems   Constitutional, HEENT, cardiovascular, pulmonary, GI, , musculoskeletal, neuro, skin, endocrine and psych systems are negative, except as otherwise noted.      Objective           Vitals:  No vitals were obtained today due to virtual visit.    Physical Exam   GENERAL: Healthy, alert and no distress  EYES: Eyes grossly normal to inspection.  No discharge or erythema, or obvious scleral/conjunctival abnormalities.  RESP: No audible wheeze, cough, or visible cyanosis.  No visible retractions or increased work of breathing.    SKIN: scaly erythematous rash to abdomen  NEURO: Cranial nerves grossly intact.  Mentation and speech appropriate for age.  PSYCH: Mentation appears normal, affect normal/bright, judgement  and insight intact, normal speech and appearance well-groomed.                Video-Visit Details    Type of service:  Video Visit   Video Start Time: 9:24 am  Video End Time:9:34 am    Originating Location (pt. Location): Home  Distant Location (provider location):  On-site  Platform used for Video Visit: [employer]

## 2023-03-22 ENCOUNTER — TELEPHONE (OUTPATIENT)
Dept: FAMILY MEDICINE | Facility: CLINIC | Age: 38
End: 2023-03-22
Payer: COMMERCIAL

## 2023-03-22 NOTE — TELEPHONE ENCOUNTER
Patient Quality Outreach    Patient is due for the following:   Asthma  -  ACT needed    Next Steps:   No follow up needed at this time.    Type of outreach:    Sent [employer] message.      Questions for provider review:    None     Jose Enrique Garcia MA

## 2023-04-11 ENCOUNTER — VIRTUAL VISIT (OUTPATIENT)
Dept: FAMILY MEDICINE | Facility: CLINIC | Age: 38
End: 2023-04-11
Payer: COMMERCIAL

## 2023-04-11 DIAGNOSIS — R05.1 ACUTE COUGH: ICD-10-CM

## 2023-04-11 DIAGNOSIS — R06.02 SOB (SHORTNESS OF BREATH): ICD-10-CM

## 2023-04-11 DIAGNOSIS — J45.41 MODERATE PERSISTENT ASTHMA WITH (ACUTE) EXACERBATION: Primary | ICD-10-CM

## 2023-04-11 PROCEDURE — 99214 OFFICE O/P EST MOD 30 MIN: CPT | Mod: VID | Performed by: NURSE PRACTITIONER

## 2023-04-11 RX ORDER — PREDNISONE 10 MG/1
TABLET ORAL
Qty: 30 TABLET | Refills: 0 | Status: SHIPPED | OUTPATIENT
Start: 2023-04-11 | End: 2024-01-29

## 2023-04-11 RX ORDER — CODEINE PHOSPHATE AND GUAIFENESIN 10; 100 MG/5ML; MG/5ML
1-2 SOLUTION ORAL EVERY 4 HOURS PRN
Qty: 180 ML | Refills: 0 | Status: SHIPPED | OUTPATIENT
Start: 2023-04-11 | End: 2024-01-29

## 2023-04-11 NOTE — PROGRESS NOTES
Mary is a 38 year old who is being evaluated via a billable video visit.      How would you like to obtain your AVS? MyChart  If the video visit is dropped, the invitation should be resent by: Text to cell phone: [phone]  Will anyone else be joining your video visit? No          Assessment & Plan     Moderate persistent asthma with (acute) exacerbation  Chronic, acute exacerbation.  Symptoms x7 days, worsening.  Having tightness in chest, shortness of breath, difficulty breathing at times. Has been using maintenance inhalers as prescribed and as needed inhalers/nebs without improvement. Denies any fever.  Treated with a course of antibiotics approximately 2 weeks ago for sinusitis.  Last course of steroids in January 2023.  Appears to be in mild respiratory distress, advised patient to recommend going into urgent care or ER for further work-up and evaluation.  Patient adamant she will not go into urgent care or ER at least until Thursday if at all given current circumstances and long wait times.  Concern for worsening condition, discussed with patient highly advised and encouraged to go in; however, in the interim we will have patient start course of prednisone, Robitussin-AC for cough, continued use of maintenance and as needed inhalers and patient to schedule a chest x-ray for further evaluation.  Supportive cares reviewed including rest, elevating head of bed, use of a coolmist vaporizer and increase fluids.  Reiterated with patient if not improving or worsening needs to go into ER or urgent care right away.  Patient verbalizes understanding.  - predniSONE (DELTASONE) 10 MG tablet; 4 po every day x 3 days, then 3 po every day x 3 days, then 2 po every day x 3 days, then 1 po every day x 3 days.    Acute cough  Acute cough likely secondary to asthma exacerbation as above.  - guaiFENesin-codeine (ROBITUSSIN AC) 100-10 MG/5ML solution; Take 5-10 mLs by mouth every 4 hours as needed for cough  - XR Chest 2  Views; Future    SOB (shortness of breath)  Shortness of breath accompanied by symptoms as above.  Further evaluate with chest x-ray to rule out pneumonia.  Patient to call and schedule.  - XR Chest 2 Views; Future             See Patient Instructions    Breana Jenkins DNP, APRN-CNP   [facility]    Danyelle Hernandez is a 38 year old, presenting for the following health issues:  URI         View : No data to display.              HPI   ENT Symptoms             Symptoms: cc Present Absent Comment   Fever/Chills   x    Fatigue  x     Muscle Aches   x    Eye Irritation  x  itching   Sneezing  x     Nasal Saturnino/Drg  x     Sinus Pressure/Pain  x     Loss of smell   x    Dental pain   x    Sore Throat  x  In AM only   Swollen Glands  x     Ear Pain/Fullness  x  fullness   Cough  x     Wheeze  x     Chest Pain  x  tightness   Shortness of breath  x     Rash   x    Other  x  Vomit last night phlegm from cough   Negative Home COVID test  Symptom duration:  1 week   Symptom severity:  moderate   Treatments tried:  Ziacam, all inhalers and all allergy medications, not helpful   Contacts:  daughter dx sinus and ear infection        Has been having bad allergies for over a week  Friday started feeling more weak; ears under water  Really bad, congested deep cough; then yesterday hard to take a deep breath  Tasting blood at times  Can't sleep - not for 2 nights   Tessalon not helping   Taking albuterol every 4 hours - not helpful; dulera and spiriva   Did use a neb x 2 - not helpful     Review of Systems   Constitutional, HEENT, cardiovascular, pulmonary, gi and gu systems are negative, except as otherwise noted.      Objective           Vitals:  No vitals were obtained today due to virtual visit.    Physical Exam   GENERAL: alert, mild distress and fatigued  EYES: Eyes grossly normal to inspection.  No discharge or erythema, or obvious scleral/conjunctival abnormalities.  RESP: no audible wheezes,  no cyanosis, evident shortness of breath, coarse cough   SKIN: Visible skin clear. No significant rash, abnormal pigmentation or lesions.  NEURO: Cranial nerves grossly intact.  Mentation and speech appropriate for age.  PSYCH: mentation appears normal and affect flat    Diagnostic Test Results:  CXR - Pending            Video-Visit Details    Type of service:  Video Visit   Video Start Time: 10:30 AM  Video End Time:10:44 AM    Originating Location (pt. Location): Home    Distant Location (provider location):  Off-site  Platform used for Video Visit: [employer]

## 2023-04-11 NOTE — TELEPHONE ENCOUNTER
DIAGNOSIS: bilateral hip pain   APPOINTMENT DATE: 04/22/2023   NOTES STATUS DETAILS   OFFICE NOTE from referring provider Internal 04/19/2022 Dr Holt [facility]    OFFICE NOTE from other specialist Internal 08/18/2022 Leno Guillen [facility]    DISCHARGE SUMMARY from hospital N/A    DISCHARGE REPORT from the ER N/A    OPERATIVE REPORT N/A    EMG report N/A    MEDICATION LIST N/A    MRI Internal 04/02/2022 lumbar spine   DEXA (osteoporosis/bone health) N/A    CT SCAN N/A    XRAYS (IMAGES & REPORTS) N/A

## 2023-04-11 NOTE — PATIENT INSTRUCTIONS
Moderate persistent asthma with (acute) exacerbation  Chronic, acute exacerbation.  Symptoms x7 days, worsening.  Having tightness in chest, shortness of breath, difficulty breathing at times. Has been using maintenance inhalers as prescribed and as needed inhalers/nebs without improvement. Denies any fever.  Treated with a course of antibiotics approximately 2 weeks ago for sinusitis.  Last course of steroids in January 2023.  Appears to be in mild respiratory distress, advised patient to recommend going into urgent care or ER for further work-up and evaluation.  Patient adamant she will not go into urgent care or ER at least until Thursday if at all given current circumstances and long wait times.  Concern for worsening condition, discussed with patient highly advised and encouraged to go in; however, in the interim we will have patient start course of prednisone, Robitussin-AC for cough, continued use of maintenance and as needed inhalers and patient to schedule a chest x-ray for further evaluation.  Supportive cares reviewed including rest, elevating head of bed, use of a coolmist vaporizer and increase fluids.  Reiterated with patient if not improving or worsening needs to go into ER or urgent care right away.  Patient verbalizes understanding.  - predniSONE (DELTASONE) 10 MG tablet; 4 po every day x 3 days, then 3 po every day x 3 days, then 2 po every day x 3 days, then 1 po every day x 3 days.    Acute cough  Acute cough likely secondary to asthma exacerbation as above.  - guaiFENesin-codeine (ROBITUSSIN AC) 100-10 MG/5ML solution; Take 5-10 mLs by mouth every 4 hours as needed for cough  - XR Chest 2 Views; Future    SOB (shortness of breath)  Shortness of breath accompanied by symptoms as above.  Further evaluate with chest x-ray to rule out pneumonia.  Patient to call and schedule.  - XR Chest 2 Views; Future

## 2023-04-13 ENCOUNTER — ANCILLARY PROCEDURE (OUTPATIENT)
Dept: GENERAL RADIOLOGY | Facility: CLINIC | Age: 38
End: 2023-04-13
Attending: NURSE PRACTITIONER
Payer: COMMERCIAL

## 2023-04-13 DIAGNOSIS — R06.02 SOB (SHORTNESS OF BREATH): ICD-10-CM

## 2023-04-13 DIAGNOSIS — R05.1 ACUTE COUGH: ICD-10-CM

## 2023-04-13 PROCEDURE — 71046 X-RAY EXAM CHEST 2 VIEWS: CPT | Mod: TC | Performed by: RADIOLOGY

## 2023-04-19 DIAGNOSIS — K21.9 GASTROESOPHAGEAL REFLUX DISEASE, UNSPECIFIED WHETHER ESOPHAGITIS PRESENT: ICD-10-CM

## 2023-04-19 RX ORDER — SUCRALFATE 1 G/1
1 TABLET ORAL 4 TIMES DAILY PRN
Qty: 30 TABLET | Refills: 1 | Status: SHIPPED | OUTPATIENT
Start: 2023-04-19 | End: 2023-05-03
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
## 2023-04-22 ENCOUNTER — PRE VISIT (OUTPATIENT)
Dept: ORTHOPEDICS | Facility: CLINIC | Age: 38
End: 2023-04-22

## 2023-04-22 ENCOUNTER — ANCILLARY PROCEDURE (OUTPATIENT)
Dept: GENERAL RADIOLOGY | Facility: CLINIC | Age: 38
End: 2023-04-22
Attending: STUDENT IN AN ORGANIZED HEALTH CARE EDUCATION/TRAINING PROGRAM
Payer: COMMERCIAL

## 2023-04-22 ENCOUNTER — OFFICE VISIT (OUTPATIENT)
Dept: ORTHOPEDICS | Facility: CLINIC | Age: 38
End: 2023-04-22
Payer: COMMERCIAL

## 2023-04-22 DIAGNOSIS — M25.551 BILATERAL HIP PAIN: ICD-10-CM

## 2023-04-22 DIAGNOSIS — M25.851 FEMOROACETABULAR IMPINGEMENT OF BOTH HIPS: ICD-10-CM

## 2023-04-22 DIAGNOSIS — M16.0 PRIMARY OSTEOARTHRITIS OF HIPS, BILATERAL: ICD-10-CM

## 2023-04-22 DIAGNOSIS — M54.17 LUMBOSACRAL RADICULOPATHY: Primary | ICD-10-CM

## 2023-04-22 DIAGNOSIS — M25.852 FEMOROACETABULAR IMPINGEMENT OF BOTH HIPS: ICD-10-CM

## 2023-04-22 DIAGNOSIS — M25.552 BILATERAL HIP PAIN: ICD-10-CM

## 2023-04-22 PROCEDURE — 99204 OFFICE O/P NEW MOD 45 MIN: CPT | Performed by: STUDENT IN AN ORGANIZED HEALTH CARE EDUCATION/TRAINING PROGRAM

## 2023-04-22 PROCEDURE — 73522 X-RAY EXAM HIPS BI 3-4 VIEWS: CPT | Performed by: RADIOLOGY

## 2023-04-22 ASSESSMENT — PAIN SCALES - GENERAL: PAINLEVEL: WORST PAIN (10)
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
## 2023-04-22 NOTE — PATIENT INSTRUCTIONS
Art Mary Shipman ,     A copy of your assessment and our treatment plan that we discussed together is included below, as written in your medical chart.   If you have any questions, please feel free to call the clinic.     --------------------------------------------------  Mary Shipman is a 38 year old female that presents with right worse than left bilateral hip pain and low back pain with intermittent radicular shooting pain and numbness tingling to the feet in an L5/S1 distribution.  MRI last year showed an L5/S1 disc bulge, and mild osteoarthritis of both hips with pincer type TITO.  Based on today's history and physical exam, her pain and symptoms appear most consistent with a lumbosacral radiculopathy, right worse than left, as well as mild hip OA and TITO. Discussed the nature of the condition and treatment options and mutually agreed upon the following plan:    - Imaging:          - Reviewed relevant imaging in the chart.  -XR pelvis and B/L hips ordered today pre-clinic and independently interpreted by myself.    - Reviewed results and images with patient.   - Medications:          - Discussed pharmacologic options for pain relief.   - May use NSAIDs (Ibuprofen, Naproxen) or Acetaminophen (Tylenol) as needed for pain control.   - May also use topical medications such as lidocaine, IcyHot, BioFreeze, or Voltaren gel as needed for pain control.   - Discussed oral steroid Dosepak for acute radiculopathy symptoms, currently on prednisone and will be finishing up a taper this week.  We will consider additional tapers in the future as needed.  - Injections:          - Discussed possible injection options and alternatives.    - Options include corticosteroid injections of the intra-articular hip joints, or epidural steroid injections in the future.   - Deferred injections today and will consider them in the future as needed.    - Therapy:          - Discussed the benefits of physical therapy vs home exercise  program for optimization of range of motion, flexibility, strength, stability and function.   - Preference is for physical therapy.   - Physical Therapy referral placed today and instructed to call [phone] to schedule appointments.   - Also given a home exercise program.   - Vamshi extension-based back program - Home Exercise Program given today in clinic and recommendation given to perform HEP daily and after exacerbations.  - Modalities:          - May use ice, heat, massage or other modalities as needed.   - Activity:          - Encouraged to remain active and participate in regular activities as symptoms allow.  Avoid exacerbating activities.   - Follow up:          - In 8 weeks for re-evaluation and update to treatment plan, or sooner for new/worsening symptoms.  - Patient has clinic contact information for questions or concerns.   --------------------------------------------------    It was a pleasure seeing you today. Thank you for choosing [facility] for your care.       Manjeet Damon DO, WILD  [facility] - Sports Medicine  [facility] - Department of Orthopedic Surgery     Disclaimer:  This note was prepared and written using Dragon Medical dictation software. As a result, there may be errors in the script that have gone undetected. Please consider this when interpreting the information in this note.

## 2023-04-22 NOTE — NURSING NOTE
Chief Complaint   Patient presents with     Left Hip - Pain, New Patient     Right Hip - Pain, New Patient       There were no vitals filed for this visit.    There is no height or weight on file to calculate BMI.      SILVANA Navarro NREMT

## 2023-05-01 ENCOUNTER — OFFICE VISIT (OUTPATIENT)
Dept: URGENT CARE | Facility: URGENT CARE | Age: 38
End: 2023-05-01
Payer: COMMERCIAL

## 2023-05-01 VITALS
RESPIRATION RATE: 17 BRPM | SYSTOLIC BLOOD PRESSURE: 118 MMHG | OXYGEN SATURATION: 96 % | TEMPERATURE: 99 F | WEIGHT: 274 LBS | BODY MASS INDEX: 45.6 KG/M2 | HEART RATE: 78 BPM | DIASTOLIC BLOOD PRESSURE: 79 MMHG

## 2023-05-01 DIAGNOSIS — J02.9 PHARYNGITIS, UNSPECIFIED ETIOLOGY: Primary | ICD-10-CM

## 2023-05-01 LAB
DEPRECATED S PYO AG THROAT QL EIA: NEGATIVE
GROUP A STREP BY PCR: NOT DETECTED

## 2023-05-01 PROCEDURE — 87651 STREP A DNA AMP PROBE: CPT | Performed by: EMERGENCY MEDICINE

## 2023-05-01 PROCEDURE — 99213 OFFICE O/P EST LOW 20 MIN: CPT | Performed by: EMERGENCY MEDICINE

## 2023-05-01 RX ORDER — PENICILLIN V POTASSIUM 500 MG/1
500 TABLET, FILM COATED ORAL 2 TIMES DAILY
Qty: 20 TABLET | Refills: 0 | Status: SHIPPED | OUTPATIENT
Start: 2023-05-01 | End: 2023-05-11

## 2023-05-01 NOTE — PROGRESS NOTES
Assessment & Plan     Diagnosis:  (J02.9) Pharyngitis, unspecified etiology  (primary encounter diagnosis)  Plan: penicillin V (VEETID) 500 MG         tablet      Medical Decision Making:  Mary Shipman is a 38 year old female presents with sore throat and clinical evidence of pharyngitis.  The rapid strep test is negative, however, clinically the patient's symptoms are consistent with streptococcal pharyngitis and patient's daughter tested positive today, her son also tested positive on Friday.  Therefore I have recommended treatment with antibiotics and analgesics.  If rash appear and any difficulty breathing the patient will stop the medication and go to the ER immediately. If patient has other URI symptoms with persistent sore throat then mono testing indicated. I see no clinical evidence of  peritonsillar abscess, retropharyngeal abscess, Lemierre's Syndrome, epiglottis, or Marcos's angina. Go to there ER if increasing pain, change in voice, neck pain, vomiting, fever, or shortness of breath. Follow-up with primary physician if not improving in 3-5 days.  They verbalized understanding and agreement with the plan.      Francisco Tran PA-C  [facility]    Subjective     Mary Shipman is a 38 year old female who presents to clinic today for the following health issues:  Chief Complaint   Patient presents with     URI     Pharyngitis     Sneezing,congestion,slight cough. Home tested for COVID=Neg, sone with recent strep throat       HPI  Patient said for the last couple days she has been experiencing sore throat, slight cough, runny nose and congestion, son tested positive for strep on Friday.  She states that yesterday her symptoms became much worse and she felt feverish, intense body aches, worsening sore throat.    No reported respiratory concerns, vomiting or difficulties swallowing food/fluids at this time. Patient is tolerating drinking liquids currently.      Review of Systems    See  HPI    Objective      Vitals: /79   Pulse 78   Temp 99  F (37.2  C) (Tympanic)   Resp 17   Wt 124.3 kg (274 lb)   LMP  (LMP Unknown)   SpO2 96%   BMI 45.60 kg/m      Patient Vitals for the past 24 hrs:   BP Temp Temp src Pulse Resp SpO2 Weight   05/01/23 1026 118/79 99  F (37.2  C) Tympanic 78 17 96 % 124.3 kg (274 lb)       Vital signs reviewed by: Francisco Tran PA-C    Physical Exam   Constitutional: Alert and active. No acute distress.  Non-toxic appearing.   HENT:   Right Ear: External ear normal. TM: gray/pale appearing  Left Ear: External ear normal. TM: gray/pale appearing.  Nose: Nose normal.    Eyes: Conjunctivae, EOM and lids are normal.   Mouth: Mucous membranes are moist. Posterior oropharynx is erythematous. Exudates not present. Tonsils are not enlarged. Uvula is midline. No submandibular swelling or erythema.  Neck: Normal ROM. No meningismus. Mild anterior cervical lymphadenopathy noted on the right. No posterior chain cervical lymphadenopathy noted.  Cardiovascular: Regular rate and rhythm  Pulmonary/Chest: Effort normal. No respiratory distress. Lungs are clear to auscultation throughout.  Musculoskeletal: Normal range of motion.   Neurological: Alert and appropriately oriented for age.   Skin: No rash noted on visualized skin.       Labs/Imaging:  Results for orders placed or performed in visit on 05/01/23   Streptococcus A Rapid Screen w/Reflex to PCR - Clinic Collect     Status: Normal    Specimen: Throat; Swab   Result Value Ref Range    Group A Strep antigen Negative Negative           Francisco Tran PA-C, May 1, 2023

## 2023-05-03 DIAGNOSIS — K21.9 GASTROESOPHAGEAL REFLUX DISEASE, UNSPECIFIED WHETHER ESOPHAGITIS PRESENT: ICD-10-CM

## 2023-05-03 RX ORDER — SUCRALFATE 1 G/1
1 TABLET ORAL 4 TIMES DAILY PRN
Qty: 30 TABLET | Refills: 1 | Status: SHIPPED | OUTPATIENT
Start: 2023-05-03 | End: 2023-09-28

## 2023-05-08 DIAGNOSIS — K21.9 GASTROESOPHAGEAL REFLUX DISEASE, UNSPECIFIED WHETHER ESOPHAGITIS PRESENT: ICD-10-CM

## 2023-05-08 RX ORDER — PANTOPRAZOLE SODIUM 40 MG/1
40 TABLET, DELAYED RELEASE ORAL DAILY
Qty: 30 TABLET | Refills: 1 | Status: SHIPPED | OUTPATIENT
Start: 2023-05-08

## 2023-05-31 ENCOUNTER — ANCILLARY PROCEDURE (OUTPATIENT)
Dept: MRI IMAGING | Facility: CLINIC | Age: 38
End: 2023-05-31
Attending: PHYSICIAN ASSISTANT
Payer: COMMERCIAL

## 2023-05-31 DIAGNOSIS — G95.0 SYRINX OF SPINAL CORD (H): ICD-10-CM

## 2023-05-31 DIAGNOSIS — R93.7 ABNORMAL MRI, CERVICAL SPINE: ICD-10-CM

## 2023-05-31 PROCEDURE — 72146 MRI CHEST SPINE W/O DYE: CPT | Performed by: RADIOLOGY

## 2023-06-09 ENCOUNTER — OFFICE VISIT (OUTPATIENT)
Dept: NEUROSURGERY | Facility: CLINIC | Age: 38
End: 2023-06-09
Payer: COMMERCIAL

## 2023-06-09 VITALS
DIASTOLIC BLOOD PRESSURE: 84 MMHG | HEART RATE: 100 BPM | BODY MASS INDEX: 45.65 KG/M2 | WEIGHT: 274 LBS | SYSTOLIC BLOOD PRESSURE: 130 MMHG | OXYGEN SATURATION: 96 % | HEIGHT: 65 IN

## 2023-06-09 DIAGNOSIS — M54.2 NECK PAIN: ICD-10-CM

## 2023-06-09 DIAGNOSIS — G89.29 CHRONIC LOW BACK PAIN, UNSPECIFIED BACK PAIN LATERALITY, UNSPECIFIED WHETHER SCIATICA PRESENT: ICD-10-CM

## 2023-06-09 DIAGNOSIS — G95.0 SYRINX OF SPINAL CORD (H): Primary | ICD-10-CM

## 2023-06-09 DIAGNOSIS — M54.50 CHRONIC LOW BACK PAIN, UNSPECIFIED BACK PAIN LATERALITY, UNSPECIFIED WHETHER SCIATICA PRESENT: ICD-10-CM

## 2023-06-09 DIAGNOSIS — M79.2 RADICULAR PAIN IN LEFT ARM: ICD-10-CM

## 2023-06-09 PROCEDURE — 99213 OFFICE O/P EST LOW 20 MIN: CPT | Performed by: PHYSICIAN ASSISTANT

## 2023-06-09 ASSESSMENT — PAIN SCALES - GENERAL: PAINLEVEL: SEVERE PAIN (6)

## 2023-06-09 NOTE — LETTER
6/9/2023         RE: Mary Shipman  [address]  [facility] [address]        Dear Colleague,    Thank you for referring your patient, Mary Shipman, to the [facility]UROS[facility]IN[facility] [place]. Please see a copy of my visit note below.    Neurosurgery follow-up    Ms. Shipman is a 38-year-old female who returns to clinic for follow-up regarding thoracic MRI.  She was seen in August 2022 with a cervical MRI demonstrating a prominent central canal/syrinx, and disc degeneration at C 6-7.  The full extent of the syrinx was not visualized and thus radiology recommended a follow-up thoracic scan, she has just now completed that scan and presents to clinic today for follow-up.    Today she states her symptoms are primarily neck pain, left upper extremity pain and numbness in a C8 distribution going into her lateral hand.  She also reports some pain on the left side of her thoracic spine and pain into her left leg.  She recently met with sports medicine for her low back and was recommended physical therapy.  She has not done conservative therapies recently.      Exam    B/P: 130/84, T: Data Unavailable, P: 100, R: Data Unavailable     Alert and oriented no acute distress  Bilateral upper extremities with 5/5 strength  Aris sign negative  Reflexes 2+ triceps, biceps, brachioradialis    Bilateral lower extremities with 5/5 strength  Reflexes 2+ patella/ankle  Negative straight leg raise bilaterally  Negative ankle clonus negative Babinski bilaterally  Gait is normal    Imaging    Thoracic MRI demonstrates    Impression: Prominent central spinal canal extending from  cervicothoracic junction down to mid thoracic spine.    Assessment    Cervical and  thoracic syrinx  Neck pain and back pain  Left arm radicular pain    Plan    I recommend the patient do a trial of physical therapy for her neck pain and back pain, I will review her imaging with one of the surgeons, and follow-up with her with any  recommendations regarding her syrinx if there are any.    Addendum 6/16/2023    Imaging reviewed with Dr. Martinez who would recommend cervical and thoracic MRIs with contrast to rule out the possibility of tumor that could lead to the syrinx, however most likely this is an idiopathic syrinx and no surgical intervention is required.    For the left radicular pain in a C8 distribution, we would recommend an EMG.              Again, thank you for allowing me to participate in the care of your patient.        Sincerely,        Leno Guillen PA-C

## 2023-06-09 NOTE — PROGRESS NOTES
Neurosurgery follow-up    Ms. Shipman is a 38-year-old female who returns to clinic for follow-up regarding thoracic MRI.  She was seen in August 2022 with a cervical MRI demonstrating a prominent central canal/syrinx, and disc degeneration at C 6-7.  The full extent of the syrinx was not visualized and thus radiology recommended a follow-up thoracic scan, she has just now completed that scan and presents to clinic today for follow-up.    Today she states her symptoms are primarily neck pain, left upper extremity pain and numbness in a C8 distribution going into her lateral hand.  She also reports some pain on the left side of her thoracic spine and pain into her left leg.  She recently met with sports medicine for her low back and was recommended physical therapy.  She has not done conservative therapies recently.      Exam    B/P: 130/84, T: Data Unavailable, P: 100, R: Data Unavailable     Alert and oriented no acute distress  Bilateral upper extremities with 5/5 strength  Aris sign negative  Reflexes 2+ triceps, biceps, brachioradialis    Bilateral lower extremities with 5/5 strength  Reflexes 2+ patella/ankle  Negative straight leg raise bilaterally  Negative ankle clonus negative Babinski bilaterally  Gait is normal    Imaging    Thoracic MRI demonstrates    Impression: Prominent central spinal canal extending from  cervicothoracic junction down to mid thoracic spine.    Assessment    Cervical and  thoracic syrinx  Neck pain and back pain  Left arm radicular pain    Plan    I recommend the patient do a trial of physical therapy for her neck pain and back pain, I will review her imaging with one of the surgeons, and follow-up with her with any recommendations regarding her syrinx if there are any.    Addendum 6/16/2023    Imaging reviewed with Dr. Martinez who would recommend cervical and thoracic MRIs with contrast to rule out the possibility of tumor that could lead to the syrinx, however most likely this is  an idiopathic syrinx and no surgical intervention is required.    For the left radicular pain in a C8 distribution, we would recommend an EMG.
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
## 2023-06-16 ENCOUNTER — DOCUMENTATION ONLY (OUTPATIENT)
Dept: NEUROSURGERY | Facility: CLINIC | Age: 38
End: 2023-06-16
Payer: COMMERCIAL

## 2023-06-16 NOTE — PROGRESS NOTES
"Leno Guillen PA-C attempted to call patient and left VM.     Leno stated     \"Imaging reviewed with Dr. Martinez who would recommend cervical and thoracic MRIs with contrast to rule out the possibility of tumor that could lead to the syrinx, however most likely this is an idiopathic syrinx and no surgical intervention is required.     For the left radicular pain in a C8 distribution, we would recommend an EMG\".     Will await call back.  "

## 2023-07-17 ENCOUNTER — E-VISIT (OUTPATIENT)
Dept: OBGYN | Facility: CLINIC | Age: 38
End: 2023-07-17
Payer: COMMERCIAL

## 2023-07-17 DIAGNOSIS — B37.31 CANDIDAL VULVOVAGINITIS: Primary | ICD-10-CM

## 2023-07-17 PROCEDURE — 99421 OL DIG E/M SVC 5-10 MIN: CPT | Performed by: OBSTETRICS & GYNECOLOGY

## 2023-07-18 RX ORDER — FLUCONAZOLE 150 MG/1
150 TABLET ORAL
Qty: 2 TABLET | Refills: 0 | Status: SHIPPED | OUTPATIENT
Start: 2023-07-18 | End: 2023-07-22

## 2023-07-18 NOTE — PATIENT INSTRUCTIONS
Yeast Infection (Candida Vaginal Infection)    You have a Candida vaginal infection. This is also known as a yeast infection. It's most often caused by a type of yeast (fungus) called Candida. Candida are normally found in the vagina. But if they increase in number, this can lead to infection and cause symptoms.   Symptoms of a yeast infection can include:     Clumpy or thin, white discharge, which may look like cottage cheese    Itching or burning    Burning with urination  Certain factors can make a yeast infection more likely. These can include:     Taking certain medicines, such as antibiotics or birth control pills    Pregnancy    Diabetes    Weak immune system  A yeast infection is most often treated with antifungal medicine. This may be given as a vaginal cream or pills you take by mouth. Treatment may last for about 1 to 7 days. Women with severe or recurrent infections may need longer courses of treatment.   Home care    If you re prescribed medicine, be sure to use it as directed. Finish all of the medicine, even if your symptoms go away. Don t try to treat yourself using over-the-counter products without talking with your provider first. They will let you know if this is a good option for you.    Ask your provider what steps you can take to help reduce your risk of having a yeast infection in the future.    Follow-up care  Follow up with your healthcare provider, or as directed.   When to seek medical advice  Call your healthcare provider right away if:     You have a fever of 100.4 F (38 C) or higher, or as directed by your provider.    Your symptoms worsen, or they don t go away within a few days of starting treatment.    You have new pain in the lower belly or pelvic region.    You have side effects that bother you or a reaction to the cream or pills you re prescribed.    You or any partners you have sex with have new symptoms, such as a rash, joint pain, or sores.  Concepcion last reviewed this  educational content on 7/1/2020 2000-2022 [employer]. All rights reserved. This information is not intended as a substitute for professional medical care. Always follow your healthcare professional's instructions.

## 2023-08-10 ENCOUNTER — OFFICE VISIT (OUTPATIENT)
Dept: URGENT CARE | Facility: URGENT CARE | Age: 38
End: 2023-08-10
Payer: COMMERCIAL

## 2023-08-10 VITALS
WEIGHT: 266.3 LBS | HEART RATE: 73 BPM | SYSTOLIC BLOOD PRESSURE: 117 MMHG | OXYGEN SATURATION: 96 % | DIASTOLIC BLOOD PRESSURE: 79 MMHG | TEMPERATURE: 98 F | BODY MASS INDEX: 44.31 KG/M2

## 2023-08-10 DIAGNOSIS — J45.40 MODERATE PERSISTENT ASTHMATIC BRONCHITIS WITHOUT COMPLICATION: Primary | ICD-10-CM

## 2023-08-10 DIAGNOSIS — J32.9 CHRONIC SINUSITIS, UNSPECIFIED LOCATION: ICD-10-CM

## 2023-08-10 PROCEDURE — 99214 OFFICE O/P EST MOD 30 MIN: CPT | Performed by: PHYSICIAN ASSISTANT

## 2023-08-10 RX ORDER — ALBUTEROL SULFATE 0.83 MG/ML
2.5 SOLUTION RESPIRATORY (INHALATION) EVERY 6 HOURS PRN
Qty: 90 ML | Refills: 0 | Status: SHIPPED | OUTPATIENT
Start: 2023-08-10

## 2023-08-10 RX ORDER — DOXYCYCLINE 100 MG/1
100 CAPSULE ORAL 2 TIMES DAILY WITH MEALS
Qty: 20 CAPSULE | Refills: 0 | Status: SHIPPED | OUTPATIENT
Start: 2023-08-10 | End: 2023-08-20

## 2023-08-10 RX ORDER — BENZONATATE 200 MG/1
200 CAPSULE ORAL 3 TIMES DAILY PRN
Qty: 30 CAPSULE | Refills: 0 | Status: SHIPPED | OUTPATIENT
Start: 2023-08-10 | End: 2023-08-10

## 2023-08-10 RX ORDER — PREDNISONE 20 MG/1
40 TABLET ORAL DAILY
Qty: 14 TABLET | Refills: 0 | Status: SHIPPED | OUTPATIENT
Start: 2023-08-10 | End: 2023-08-17

## 2023-08-10 ASSESSMENT — ENCOUNTER SYMPTOMS
CARDIOVASCULAR NEGATIVE: 1
CHILLS: 0
SHORTNESS OF BREATH: 1
FATIGUE: 0
RHINORRHEA: 1
PALPITATIONS: 0
SINUS PRESSURE: 1
SINUS PAIN: 1
WHEEZING: 1
GASTROINTESTINAL NEGATIVE: 1
SORE THROAT: 1
COUGH: 1
CONSTITUTIONAL NEGATIVE: 1
FEVER: 0

## 2023-08-10 NOTE — PROGRESS NOTES
Danyelle Hernandez is a 38 year old, presenting for the following health issues:  Sinus Problem (Pt c/o  allergy itchy eyes, blowing nose sx going on for 5 days, feeling dizzy, week, fatigue, sore throat, hard to catch a breath going on 1-2 days, pt NEG for Covid last night)    HPI   Acute Illness  Acute illness concerns:   Onset/Duration: 5days  Symptoms:  Fever: No  Chills/Sweats: No  Headache (location?): YES  Sinus Pressure: YES  Conjunctivitis:  No  Ear Pain: YES  Rhinorrhea: YES  Congestion: YES  Sore Throat: YES  Cough: YES-productive of yellow sputum, with shortness of breath  Wheeze: YES  Decreased Appetite: No  Nausea: No  Vomiting: No  Diarrhea: No  Dysuria/Freq.: No  Dysuria or Hematuria: No  Fatigue/Achiness: YES  Sick/Strep Exposure: YES- at home  Therapies tried and outcome: rest,fluids, allergy meds, inhalers with minimal relief    Patient Active Problem List   Diagnosis    Esophageal reflux    Allergic rhinitis due to other allergen    Polycystic ovaries    Thyrotoxicosis    Urticaria    Low back pain    HYPERLIPIDEMIA LDL GOAL <160    Irritable bowel syndrome with constipation    Migraine headache    Not immune to rubella    Major depressive disorder, recurrent episode, moderate (H)    CRUZ (generalized anxiety disorder)    Menometrorrhagia    Bilateral thoracic back pain, unspecified chronicity    Morbid obesity (H)    Moderate persistent asthma without complication    Chronic sinusitis, unspecified location    Mild traumatic brain injury (H)    Essential hypertension    Syrinx of spinal cord (H)     Current Outpatient Medications   Medication    albuterol (ACCUNEB) 1.25 MG/3ML neb solution    albuterol (PROAIR HFA/PROVENTIL HFA/VENTOLIN HFA) 108 (90 Base) MCG/ACT inhaler    amLODIPine (NORVASC) 10 MG tablet    clonazePAM (KLONOPIN) 0.5 MG tablet    EPINEPHrine (ANY BX GENERIC EQUIV) 0.3 MG/0.3ML injection 2-pack    Fexofenadine HCl (ALLEGRA PO)    fluticasone (FLONASE) 50 MCG/ACT nasal spray     guaiFENesin-codeine (ROBITUSSIN AC) 100-10 MG/5ML solution    LAMOTRIGINE PO    mometasone-formoterol (DULERA) 200-5 MCG/ACT inhaler    montelukast (SINGULAIR) 10 MG tablet    naproxen (NAPROSYN) 500 MG tablet    norgestim-eth estrad triphasic (ORTHO TRI-CYCLEN) 0.18/0.215/0.25 MG-35 MCG tablet    olopatadine (PATADAY) 0.2 % ophthalmic solution    pantoprazole (PROTONIX) 40 MG EC tablet    predniSONE (DELTASONE) 10 MG tablet    Sertraline HCl (ZOLOFT PO)    sucralfate (CARAFATE) 1 GM tablet    tiotropium (SPIRIVA RESPIMAT) 2.5 MCG/ACT inhaler    traZODone (DESYREL) 50 MG tablet    triamcinolone (NASACORT) 55 MCG/ACT nasal aerosol    levonorgestrel (MIRENA) 20 MCG/DAY IUD     No current facility-administered medications for this visit.     Facility-Administered Medications Ordered in Other Visits   Medication    lidocaine-EPINEPHrine 1.5 %-1:[id number] injection     Allergies   Allergen Reactions    Acetaminophen Anaphylaxis     Uncertain - hives/anaphylaxis    Levofloxacin Anaphylaxis    Hydrocodone-Acetaminophen Rash     Feels like throat swelling, was given after Tonsillectomy       Review of Systems   Constitutional: Negative.  Negative for chills, fatigue and fever.   HENT:  Positive for congestion, rhinorrhea, sinus pressure, sinus pain and sore throat. Negative for ear discharge, ear pain and hearing loss.    Respiratory:  Positive for cough, shortness of breath and wheezing.    Cardiovascular: Negative.  Negative for chest pain, palpitations and peripheral edema.   Gastrointestinal: Negative.    Allergic/Immunologic: Positive for environmental allergies.   All other systems reviewed and are negative.           Objective    /79 (BP Location: Left arm, Patient Position: Sitting, Cuff Size: Adult Large)   Pulse 73   Temp 98  F (36.7  C) (Tympanic)   Wt 120.8 kg (266 lb 4.8 oz)   SpO2 96%   BMI 44.31 kg/m    Body mass index is 44.31 kg/m .  Physical Exam  Vitals and nursing note reviewed.    Constitutional:       General: She is not in acute distress.     Appearance: Normal appearance. She is well-developed. She is obese. She is not ill-appearing.   HENT:      Head: Normocephalic and atraumatic.      Comments: TMs are intact without any erythema or bulging bilaterally.  Airway is patent.     Nose: Congestion and rhinorrhea present.      Right Turbinates: Swollen.      Left Turbinates: Swollen.      Right Sinus: Maxillary sinus tenderness and frontal sinus tenderness present.      Left Sinus: Maxillary sinus tenderness and frontal sinus tenderness present.      Mouth/Throat:      Lips: Pink.      Mouth: Mucous membranes are moist.      Pharynx: Oropharynx is clear. Uvula midline. No pharyngeal swelling, oropharyngeal exudate or posterior oropharyngeal erythema.      Tonsils: No tonsillar exudate.   Eyes:      General: No scleral icterus.     Extraocular Movements: Extraocular movements intact.      Conjunctiva/sclera: Conjunctivae normal.      Pupils: Pupils are equal, round, and reactive to light.   Neck:      Thyroid: No thyromegaly.   Cardiovascular:      Rate and Rhythm: Normal rate and regular rhythm.      Pulses: Normal pulses.      Heart sounds: Normal heart sounds, S1 normal and S2 normal. No murmur heard.     No friction rub. No gallop.   Pulmonary:      Effort: Pulmonary effort is normal. No accessory muscle usage, respiratory distress or retractions.      Breath sounds: Normal breath sounds and air entry. No stridor. No decreased breath sounds, wheezing, rhonchi or rales.   Musculoskeletal:      Cervical back: Normal range of motion and neck supple.   Lymphadenopathy:      Cervical: No cervical adenopathy.   Skin:     General: Skin is warm and dry.      Nails: There is no clubbing.   Neurological:      Mental Status: She is alert and oriented to person, place, and time.   Psychiatric:         Mood and Affect: Mood normal.         Behavior: Behavior normal.         Thought Content: Thought  content normal.         Judgment: Judgment normal.          Assessment/Plan:  Moderate persistent asthmatic bronchitis without complication:  Will treat with [id number], [id number], and continue with her inh/nebs as needed for symptoms.  Recommend treatment with rest, fluids and chicken soup. Tylenol/ibuprofen prn fever/pain.  Recheck in clinic if symptoms worsen or if symptoms do not improve.  To the ER if he develops hemoptysis, chest pain, fevers>102, worsening shortness of breath/wheezing.    -     doxycycline monohydrate (MONODOX) 100 MG capsule; Take 1 capsule (100 mg) by mouth 2 times daily (with meals) for 10 days Increases risk of heartburn and also sun sensitivity or sun burn. Contraindicated in pregnancy.  -     predniSONE (DELTASONE) 20 MG tablet; Take 2 tablets (40 mg) by mouth daily for 7 days  -     albuterol (PROVENTIL) (2.5 MG/3ML) 0.083% neb solution; Take 1 vial (2.5 mg) by nebulization every 6 hours as needed for shortness of breath, wheezing or cough    Chronic sinusitis, unspecified location:  Will cover with doxy above.        Coni Toussaint PA-C             verbal instruction

## 2023-09-28 DIAGNOSIS — K21.9 GASTROESOPHAGEAL REFLUX DISEASE, UNSPECIFIED WHETHER ESOPHAGITIS PRESENT: ICD-10-CM

## 2023-09-28 RX ORDER — SUCRALFATE 1 G/1
1 TABLET ORAL 4 TIMES DAILY PRN
Qty: 30 TABLET | Refills: 1 | Status: SHIPPED | OUTPATIENT
Start: 2023-09-28 | End: 2024-01-29

## 2023-10-10 ENCOUNTER — OFFICE VISIT (OUTPATIENT)
Dept: URGENT CARE | Facility: URGENT CARE | Age: 38
End: 2023-10-10
Payer: COMMERCIAL

## 2023-10-10 VITALS
OXYGEN SATURATION: 97 % | DIASTOLIC BLOOD PRESSURE: 88 MMHG | RESPIRATION RATE: 17 BRPM | TEMPERATURE: 98 F | HEART RATE: 75 BPM | WEIGHT: 267 LBS | SYSTOLIC BLOOD PRESSURE: 132 MMHG | BODY MASS INDEX: 44.43 KG/M2

## 2023-10-10 DIAGNOSIS — J02.9 SORETHROAT: ICD-10-CM

## 2023-10-10 DIAGNOSIS — J02.0 STREP THROAT: Primary | ICD-10-CM

## 2023-10-10 LAB — DEPRECATED S PYO AG THROAT QL EIA: POSITIVE

## 2023-10-10 PROCEDURE — 99213 OFFICE O/P EST LOW 20 MIN: CPT | Performed by: PHYSICIAN ASSISTANT

## 2023-10-10 PROCEDURE — 87880 STREP A ASSAY W/OPTIC: CPT | Performed by: PHYSICIAN ASSISTANT

## 2023-10-10 RX ORDER — AMOXICILLIN 500 MG/1
500 CAPSULE ORAL 2 TIMES DAILY
Qty: 20 CAPSULE | Refills: 0 | Status: SHIPPED | OUTPATIENT
Start: 2023-10-10 | End: 2023-10-20

## 2023-10-10 NOTE — LETTER
October 10, 2023      Mary Shipman  [address]  [facility] [address]        To Whom It May Concern:    Mary AARTI Shipman  was seen on 10/10/2023.  Please excuse her from work..        Sincerely,        Sachi Frias PA-C

## 2023-10-10 NOTE — PROGRESS NOTES
Chief Complaint   Patient presents with    Nasal Congestion    Dizziness    Pharyngitis     Results for orders placed or performed in visit on 10/10/23   Streptococcus A Rapid Screen w/Reflex to PCR - Clinic Collect     Status: Abnormal    Specimen: Throat; Swab   Result Value Ref Range    Group A Strep antigen Positive (A) Negative                 ASSESSMENT:     ICD-10-CM    1. Strep throat  J02.0 amoxicillin (AMOXIL) 500 MG capsule      2. Sorethroat  J02.9 Streptococcus A Rapid Screen w/Reflex to PCR - Clinic Collect     amoxicillin (AMOXIL) 500 MG capsule            PLAN: Strep.  Amoxicillin.  Salt water gargles.  Switch out toothbrush at 48 hours.  Contagious x12 hours.  I have discussed clinical findings with patient.  Side effects of medications discussed.  Symptomatic care is discussed.  I have discussed the possibility of  worsening symptoms and indication to RTC or go to the ER if they occur.  All questions are answered, patient indicates understanding of these issues and is in agreement with plan.   Patient care instructions are discussed/given at the end of visit.   Lots of rest and fluids.      Sachi Frias PA-C      SUBJECTIVE:  -year-old female with nasal congestion, sore throat and dizziness last couple days.  No vomiting or diarrhea.  No rash.  No fever.  Daughter with recent strep.      Allergies   Allergen Reactions    Acetaminophen Anaphylaxis     Uncertain - hives/anaphylaxis    Levofloxacin Anaphylaxis    Hydrocodone-Acetaminophen Rash     Feels like throat swelling, was given after Tonsillectomy       Past Medical History:   Diagnosis Date    Allergic rhinitis due to other allergen     Antepartum mild preeclampsia 11/23/2012    Asthma     Depressive disorder     Esophageal reflux     Frequent UTI     had a kidney infection also in the past    Gestational hypertension 11/20/2012    Helicobacter pylori (H. pylori)     treated    Hyperlipidemia     Irritable bowel syndrome     Liver  "disease     \"fatty liver\" resolved on own.    Lump or mass in breast 11/30/2015    Mild preeclampsia 12/18/2012    Obesity     Polycystic ovaries     Thyrotoxicosis 05/09/2007       albuterol (ACCUNEB) 1.25 MG/3ML neb solution, Take 1 vial (1.25 mg) by nebulization every 6 hours as needed for shortness of breath / dyspnea or wheezing  albuterol (PROAIR HFA/PROVENTIL HFA/VENTOLIN HFA) 108 (90 Base) MCG/ACT inhaler, Inhale 2 puffs into the lungs every 6 hours  albuterol (PROVENTIL) (2.5 MG/3ML) 0.083% neb solution, Take 1 vial (2.5 mg) by nebulization every 6 hours as needed for shortness of breath, wheezing or cough  amLODIPine (NORVASC) 10 MG tablet, TAKE 1 TABLET(10 MG) BY MOUTH DAILY  clonazePAM (KLONOPIN) 0.5 MG tablet, Take 0.25 mg by mouth nightly as needed  EPINEPHrine (ANY BX GENERIC EQUIV) 0.3 MG/0.3ML injection 2-pack, Inject 0.3 mLs (0.3 mg) into the muscle as needed for anaphylaxis May repeat one time in 5-15 minutes if response to initial dose is inadequate.  Fexofenadine HCl (ALLEGRA PO), Take 180 mg by mouth daily  fluticasone (FLONASE) 50 MCG/ACT nasal spray, Spray 1 spray into both nostrils daily  guaiFENesin-codeine (ROBITUSSIN AC) 100-10 MG/5ML solution, Take 5-10 mLs by mouth every 4 hours as needed for cough  LAMOTRIGINE PO,   mometasone-formoterol (DULERA) 200-5 MCG/ACT inhaler, Inhale 2 puffs into the lungs 2 times daily Needs appointment for additional refills  montelukast (SINGULAIR) 10 MG tablet, Take 1 tablet (10 mg) by mouth At Bedtime  naproxen (NAPROSYN) 500 MG tablet,   norgestim-eth estrad triphasic (ORTHO TRI-CYCLEN) 0.18/0.215/0.25 MG-35 MCG tablet, Take 1 tablet by mouth daily  olopatadine (PATADAY) 0.2 % ophthalmic solution, Place 1 drop into both eyes daily  pantoprazole (PROTONIX) 40 MG EC tablet, Take 1 tablet (40 mg) by mouth daily  predniSONE (DELTASONE) 10 MG tablet, 4 po every day x 3 days, then 3 po every day x 3 days, then 2 po every day x 3 days, then 1 po every day x 3 " days.  Sertraline HCl (ZOLOFT PO), Take 200 mg by mouth daily  sucralfate (CARAFATE) 1 GM tablet, Take 1 tablet (1 g) by mouth 4 times daily as needed for nausea (or acid reflux)  tiotropium (SPIRIVA RESPIMAT) 2.5 MCG/ACT inhaler, Inhale 2 puffs into the lungs daily  traZODone (DESYREL) 50 MG tablet, Take 1-3 tablets ( mg) by mouth At Bedtime  triamcinolone (NASACORT) 55 MCG/ACT nasal aerosol, Spray 2 sprays into both nostrils daily  levonorgestrel (MIRENA) 20 MCG/DAY IUD, by Intrauterine route once for 1 dose    lidocaine-EPINEPHrine 1.5 %-1:203390 injection        Social History     Tobacco Use    Smoking status: Former    Smokeless tobacco: Never   Substance Use Topics    Alcohol use: No       ROS:  CONSTITUTIONAL: Negative for fatigue or fever.  EYES: Negative for eye problems.  ENT: As above.  RESP: As above.  CV: Negative for chest pains.  GI: Negative for vomiting.  MUSCULOSKELETAL:  Negative for significant muscle or joint pains.  NEUROLOGIC: Negative for headaches.  SKIN: Negative for rash.  PSYCH: Normal mentation for age.    OBJECTIVE:  /88   Pulse 75   Temp 98  F (36.7  C) (Tympanic)   Resp 17   Wt 121.1 kg (267 lb)   SpO2 97%   BMI 44.43 kg/m    GENERAL APPEARANCE: Healthy, alert and no distress.  EYES:Conjunctiva/sclera clear.  EARS: No cerumen.   Ear canals w/o erythema.  TM's intact w/o erythema.    NOSE/MOUTH: Nose without ulcers, erythema or lesions.  SINUSES: No maxillary sinus tenderness.  THROAT: Mild to moderate erythema w/o tonsillar enlargement . No exudates.  NECK: Supple, nontender, no lymphadenopathy.  RESP: Lungs clear to auscultation - no rales, rhonchi or wheezes  CV: Regular rate and rhythm, normal S1 S2, no murmur noted.  NEURO: Awake, alert    SKIN: No rashes        Sachi Frias PA-C

## 2023-11-10 DIAGNOSIS — K21.9 GASTROESOPHAGEAL REFLUX DISEASE, UNSPECIFIED WHETHER ESOPHAGITIS PRESENT: ICD-10-CM

## 2023-11-10 RX ORDER — SUCRALFATE 1 G/1
1 TABLET ORAL 4 TIMES DAILY PRN
Qty: 30 TABLET | Refills: 1 | OUTPATIENT
Start: 2023-11-10

## 2023-11-27 NOTE — PATIENT INSTRUCTIONS
If you have any questions regarding your allergies, asthma, or what we discussed during your visit today please call the allergy clinic or contact us via [employer].    Landen [facility]: [phone]      Take the following medications daily for your hives:    1. Cetirizine 20mg twice daily  2. Ranitidine 150mg twice daily  3. Montelukast 10mg daily  4. Dapsone 100mg daily  5. Benadryl (diphenhydramine) or hydroxyzine every 6-8 hours as needed      Take the following medications daily for your asthma:    1. Dulera - 2 puffs twice daily  2. Spiriva - 2 puffs once daily  3. Albuterol inhaler - 2 to 4 puffs every 4 hours as needed      Follow-up in 1-2 months  
No

## 2023-12-12 NOTE — PROCEDURES
Initial RN admission and assessment performed and documented in Endoscopy navigator. Patient evaluated by anesthesia in pre-procedure holding. All procedural vital signs, airway assessment, and level of consciousness information monitored and recorded by anesthesia staff on the anesthesia record. Report received from CRNA post procedure. Patient transported to recovery area by RN. Endoscope was pre-cleaned at bedside immediately following procedure by anahi [facility] [place]     Neuroradiology Procedure Note     Lumbar Puncture Under Fluoroscopic Guidance:      3/29/2017 10:57 AM    Performed by: Joe Roman MD  Authorized by: MD Joe Odom MD    Indications: rule out MS    Consent given by: Patient who states understanding of the procedure being performed after discussing the risks, benefits and alternatives.    Prior to the start of the procedure and with procedural staff participation, I verbally confirmed the patient s identity using two indicators, relevant allergies, that the procedure was appropriate and matched the consent or emergent situation, and that the correct equipment/implants were available. Immediately prior to starting the procedure I conducted the Time Out with the procedural staff and re-confirmed the patient s name, procedure, and site/side. (The Joint Commission universal protocol was followed.) Yes    Procedure:    Under sterile conditions the patient was positioned lying prone. Using fluoroscopy, needle entrance side was defined.  Betadine solution and sterile drapes were utilized.  Local anesthetic at the site: 9 ml of lidocaine 1% without epinephrine.    A 22 gauge 5inch  spinal needle was inserted at the L2-L3 interspace.  Opening Pressure 21 cm H2O pressure.  A total of 14mL of clear and colorless spinal fluid was obtained and sent to the laboratory.   After the needle was removed, a bandaid and pressure were applied and the patient was instructed to stay horizontal until the results were back.    Complications:  None  Patient tolerance: Patient tolerated the procedure well with no immediate complications.    Condition: Stable Patient is transferred to Inpatient unit for post procedure observation.    Can Ozutemiz 3/29/2017 10:57 AM

## 2023-12-17 NOTE — L&D DELIVERY NOTE
OB Vaginal Delivery Note    L&D Delivery Note:     Mary Shipman is a 35 year old now  who presented at 38w6d in latent labor desiring admission for pain control. IOL was scheduled for 39 weeks gestation. Pregnancy was notable for suspected macrosomia, failed GCT, passed GTT, depression, hx of etoh abuse, rubella equivocal status, asthma, hx of pre-E w/o SF, and BV. Her IOL began with pitocin as patient was 3/90/-1. She was augmented with AROM with clear fluids. Epidural was administered for pain control. Labor course was uncomplicated.  She progressed to complete, pushed with good maternal effort, and had a spontaneous vaginal delivery of a viable female infant in TIMBO position. Nuchal cord x1 was noted.  Apgars of 8 and 9 with weight of 3800 grams.  The cord was double clamped after 30 seconds and cut due to NICU request for evaluation at the warmer.  A cord segment and cord blood were obtained.  30U of IV pitocin was started. The placenta was then delivered using gentle traction and suprapubic pressure.  The uterus was noted to be firm after fundal massage.  The perineum was assessed for lacerations revealing no lacerations. Total QBL was 113 mL. The placenta appeared intact with a 3V umbilical cord.  Dr. Woodson was present for the entire procedure.     Eunice Parks MD  OB/GYN PGY-2  20 4:35 AM      Mary Shipman MRN# [id number]   Age: 35 year old YOB: 1985       GA: 39w0d  GP:   Labor Complications: None   EBL:   mL  Delivery QBL:    Delivery Type: Vaginal, Spontaneous   ROM to Delivery Time: rupture date or rupture time have not been documented   Weight: 3.8 kg (8 lb 6 oz)    1 Minute 5 Minute 10 Minute   Apgar Totals: 8   9        EUNICE PARKS;ADAN RIVERA;EVELIN SHAH;KERRIE CHAPARRO     Delivery Details:  Mary Shipman, a 35 year old  female delivered a viable infant with apgars of 8  and 9 . Patient was fully dilated and  pushing after   hours   minutes in active labor. Delivery was via vaginal, spontaneous  to a sterile field under epidural  anesthesia. Infant delivered in vertex  left  occiput  anterior  position. Anterior and posterior shoulders delivered without difficulty. The cord was clamped, cut twice and 3 vessels  were noted. Cord blood was obtained in routine fashion with the following disposition: lab .      Cord complications: nuchal   Placenta delivered at 2020  4:05 AM . Placental disposition was Hospital disposal . Fundal massage performed and fundus found to be firm.     Episiotomy:     Perineum, vagina, cervix were inspected, and the following lacerations were noted:   Perineal lacerations:                   Excellent hemostasis was noted. Needle count correct. Infant and patient in delivery room in good and stable condition.        Labor Events     labor?:  No   steroids:  None  Labor Type:  Augmentation  Predominate monitoring during 1st stage:  continuous electronic fetal monitoring     Antibiotics received during labor?:  Yes  Reason for Antibiotics:  GBS  Antibiotics received for GBS:  Penicillin  Antibiotics Given (GBS):  Greater than 4 hours prior to delivery     Rupture identifier:  Sac 1  Rupture date/time: 20 0305   Rupture type:  Artificial Rupture of Membranes  Fluid color:  Clear  Fluid odor:  Normal     Augmentation:  Oxytocin  Indications for augmentation:  Ineffective Contraction Pattern  1:1 continuous labor support provided by?:  RN       Delivery/Placenta Date and Time    Delivery Date:  20 Delivery Time:   4:01 AM   Placenta Date/Time:  2020  4:05 AM  Oxytocin given at the time of delivery:  after delivery of baby     Vaginal Counts     Initial count performed by 2 team members:   Two Team Members   Dr. Chintan Whitehead RN       [place] [facility] [place] [employer] [employer]   Initial counts 2  5    Added to count       Final counts 2  5    Placed during  [de-identified] : General: No acute distress, conversant, well-nourished. Head: Normocephalic, atraumatic Neck: trachea midline, FROM Heart: normotensive and normal rate and rhythm Lungs: No labored breathing Skin: No abrasions, no rashes, no edema Psych: Alert and oriented to person, place and time Extremities: no peripheral edema or digital cyanosis Gait: Normal gait. Can perform tandem gait.   Vascular: warm and well perfused distally, palpable distal pulses MSK: Lumbar spine: Incision well healed  NEURO EXAM: Sensation  Left L2  -  2/2             Left L3  -  2/2 Left L4  -  2/2 Left L5  -  2/2 Left S1  -  2/2  Right L2  -  2/2             Right L3  -  2/2 Right L4  -  2/2 Right L5  -  2/2 Right S1  -  2/2  Motor:  Left L2 (hip flexion)                            5/5                 Left L3 (knee extension)                   5/5                 Left L4 (ankle dorsiflexion)                 5/5                 Left L5 (long toe extensor)                5/5                 Left S1 (ankle plantar flexion)           5/5  Right L2 (hip flexion)                            5/5                 Right L3 (knee extension)                   5/5                 Right L4 (ankle dorsiflexion)                 5/5                 Right L5 (long toe extensor)                5/5                 Right S1 (ankle plantar flexion)           5/5  Reflexes: Normal and symmetric Negative clonus.  Down-going Babinski.    labor Accounted for at the end of labor   No NA   No NA   No NA    Final count performed by 2 team members:   Two Team Members   Dr. Chintan Piedra, MEGHANA      Final count correct?:  Yes     Apgars    Living status:  Living   1 Minute 5 Minute 10 Minute 15 Minute 20 Minute   Skin color: 0  1       Heart rate: 2  2       Reflex irritability: 2  2       Muscle tone: 2  2       Respiratory effort: 2  2       Total: 8  9       Apgars assigned by:  GLENNA BAILEY CNP     Cord    Vessels:  3 Vessels Complications:  Nuchal   Cord Blood Disposition:  Lab Gases Sent?:  No      [place] Resuscitation    Methods:  Suctioning   Care at Delivery:  NICU team called by Dr. Woodson to attend a vaginal delivery of a term, 39 week infant for FHR decels and mother is prescribed Zoloft, therefor respiratory insufficiency was anticipated.  Infant presented with good tone and a fair cry after drying and stimulation.  Cord cut and clamped, infant brought to radiant warmer.  Drying and stimulation continued, infant wasn't crying lustily, preductal SpO2 placed and 96% by 5 minutes of age.  At this time, she was centrally pink with cap refill <3 seconds, HR 170s, and she had good tone. Gross physical exam significant for subcostal retractions though infant's WOB did not change/worsen while sucking on my gloved finger.  Parents updated at bedside.  NICU team dismissed.  RN instructed to notify NNP if there are concerns with infant's WOB/respiratory status.  Infant left in the care of the L&D care team.    GLENNA Goins CNP  2020 4:27 AM        Measurements    Weight:  8 lb 6 oz       Skin to Skin and Feeding Plan    Skin to skin initiation date/time: 1841    Skin to skin with:  Mother  Skin to skin end date/time:     How do you plan to feed your baby:  Formula     Labor Events and Shoulder Dystocia    Fetal Tracing Prior to Delivery:  Category 1  Shoulder dystocia present?:  Neg     Delivery  (Maternal) (Provider to Complete) ([id number])       Blood Loss  Mother: Mary Shipman #[id number]   Start of Mother's Information    IO Blood Loss  09/10/20 1601 - 09/11/20 0456    Delivery QBL (mL) Hospital Encounter 113 mL    Total  113 mL         End of Mother's Information  Mother: Mary Shipman #[id number]         Delivery - Provider to Complete (898666)    Delivering clinician:  Kori Woodson MD  Attempted Delivery Types (Choose all that apply):  Spontaneous Vaginal Delivery  Delivery Type (Choose the 1 that will go to the Birth History):  Vaginal, Spontaneous   Other personnel:   Provider Role   Fabian Parks MD Resident   Peewee Denney Medical Student   Anne Piedra RN Registered Nurse   Dyllan Jenkins RN Registered Nurse         Placenta    Delayed Cord Clamping:  Done  Date/Time:  9/11/2020  4:05 AM  Removal:  Expressed  Disposition:  Hospital disposal     Anesthesia    Method:  Epidural  Cervical dilation at placement:  4-7          Presentation and Position    Presentation:  Vertex  Position:  Left Occiput Anterior           Fabian Parks MD   [de-identified] :  I ordered radiographs to evaluate the patient's symptoms. Lumbar 4 view radiographs taken in the office today show no dislocation or fracture. s/p L4-L5 TLIF with hardware in appropriate position and alignment.

## 2023-12-27 ENCOUNTER — E-VISIT (OUTPATIENT)
Dept: OBGYN | Facility: CLINIC | Age: 38
End: 2023-12-27
Payer: COMMERCIAL

## 2023-12-27 ENCOUNTER — OFFICE VISIT (OUTPATIENT)
Dept: OBGYN | Facility: CLINIC | Age: 38
End: 2023-12-27
Payer: COMMERCIAL

## 2023-12-27 ENCOUNTER — TELEPHONE (OUTPATIENT)
Dept: OBGYN | Facility: CLINIC | Age: 38
End: 2023-12-27

## 2023-12-27 VITALS
DIASTOLIC BLOOD PRESSURE: 73 MMHG | BODY MASS INDEX: 44.25 KG/M2 | SYSTOLIC BLOOD PRESSURE: 110 MMHG | HEART RATE: 82 BPM | WEIGHT: 265.9 LBS | OXYGEN SATURATION: 97 %

## 2023-12-27 DIAGNOSIS — N76.0 VAGINITIS AND VULVOVAGINITIS: Primary | ICD-10-CM

## 2023-12-27 DIAGNOSIS — N94.9 VAGINAL SYMPTOM: ICD-10-CM

## 2023-12-27 DIAGNOSIS — N76.2 ACUTE VULVITIS: ICD-10-CM

## 2023-12-27 DIAGNOSIS — N94.9 VAGINAL SYMPTOM: Primary | ICD-10-CM

## 2023-12-27 DIAGNOSIS — B96.89 BV (BACTERIAL VAGINOSIS): Primary | ICD-10-CM

## 2023-12-27 DIAGNOSIS — N76.0 BV (BACTERIAL VAGINOSIS): Primary | ICD-10-CM

## 2023-12-27 DIAGNOSIS — N89.8 VAGINAL DRYNESS: ICD-10-CM

## 2023-12-27 DIAGNOSIS — N76.0 BACTERIAL VAGINOSIS: ICD-10-CM

## 2023-12-27 DIAGNOSIS — B96.89 BACTERIAL VAGINOSIS: ICD-10-CM

## 2023-12-27 LAB
CLUE CELLS: PRESENT
ESTRADIOL SERPL-MCNC: 23 PG/ML
T PALLIDUM AB SER QL: NONREACTIVE
TRICHOMONAS, WET PREP: ABNORMAL
WBC'S/HIGH POWER FIELD, WET PREP: ABNORMAL
YEAST, WET PREP: ABNORMAL

## 2023-12-27 PROCEDURE — 87210 SMEAR WET MOUNT SALINE/INK: CPT | Performed by: OBSTETRICS & GYNECOLOGY

## 2023-12-27 PROCEDURE — 36415 COLL VENOUS BLD VENIPUNCTURE: CPT | Performed by: OBSTETRICS & GYNECOLOGY

## 2023-12-27 PROCEDURE — 87491 CHLMYD TRACH DNA AMP PROBE: CPT | Performed by: OBSTETRICS & GYNECOLOGY

## 2023-12-27 PROCEDURE — 86780 TREPONEMA PALLIDUM: CPT | Performed by: OBSTETRICS & GYNECOLOGY

## 2023-12-27 PROCEDURE — 99214 OFFICE O/P EST MOD 30 MIN: CPT | Performed by: OBSTETRICS & GYNECOLOGY

## 2023-12-27 PROCEDURE — 87591 N.GONORRHOEAE DNA AMP PROB: CPT | Performed by: OBSTETRICS & GYNECOLOGY

## 2023-12-27 PROCEDURE — 82670 ASSAY OF TOTAL ESTRADIOL: CPT | Performed by: OBSTETRICS & GYNECOLOGY

## 2023-12-27 PROCEDURE — 99207 PR NON-BILLABLE SERV PER CHARTING: CPT | Performed by: OBSTETRICS & GYNECOLOGY

## 2023-12-27 RX ORDER — NYSTATIN AND TRIAMCINOLONE ACETONIDE 100000; 1 [USP'U]/G; MG/G
OINTMENT TOPICAL 2 TIMES DAILY
Qty: 30 G | Refills: 1 | Status: SHIPPED | OUTPATIENT
Start: 2023-12-27

## 2023-12-27 RX ORDER — METRONIDAZOLE 500 MG/1
500 TABLET ORAL 2 TIMES DAILY
Qty: 14 TABLET | Refills: 0 | Status: SHIPPED | OUTPATIENT
Start: 2023-12-27 | End: 2024-01-03

## 2023-12-27 NOTE — PROGRESS NOTES
"Fax received from pharmacy regarding premarin vaginal cream \"start PA or send alternative. Thanks\"    How would MD like to proceed?    MEGHANA Herzog  "

## 2023-12-27 NOTE — PATIENT INSTRUCTIONS
[email or web address]    Newer products are available for vaginal dryness and discomfort which are nonhormonal.  Some of the better products include a substance called hyaluronic acid.  This helps to retain moisture and elasticity of the vaginal mucosa. I would recommend trying a product that contains this substance.   Take care,   Carmelita

## 2023-12-27 NOTE — TELEPHONE ENCOUNTER
Patient initiated e-visit with MD to discuss.  Advised to scheduled lab visit.    Patient noticed that she had her IUD placed Jan 2023  Patient wondering if her infections are due to IUD    RN advised it is hard to say what is causing recurrence of yeast/BV but may be worth scheduling an in clinic appointment to discuss with provider.  Patient is about due for annual exam anyway.    Patient will look at schedule and call back to scheduled in clinic visit.     Lela Treviño RN

## 2023-12-27 NOTE — TELEPHONE ENCOUNTER
[facility] Call Center    Phone Message    May a detailed message be left on voicemail: yes     Reason for Call: Other: Pt would like a call back to discuss IUD and yeast infection symptoms. Please call pt to discuss.     Action Taken: Other: OBGYN    Travel Screening: Not Applicable

## 2023-12-27 NOTE — TELEPHONE ENCOUNTER
Provider E-Visit time total (minutes): see [employer] message, patient should ideally come to clinic for evaluation

## 2023-12-27 NOTE — PROGRESS NOTES
"    HPI:  Александр Erickson is a 38 year old  here for a consultation regarding: vaginitis symptoms     Patient sent an e visit request but clinical assessment was recommended.   Hx:   Ever since current mirena IUD went in one yr ago has been struggling with yeast vaginitis.   Usually Diflucan or monistat clears it up. Has not confirmed diagnosis with wet prep but treated based on clinical symptoms.   No new partner for the past 8 yrs.      Currently feeling irritated and itchy inside and out.    intercourse hurts and feels dry.   She is not bleeding due to the IUD.  Does not want it removed.        OB History    Para Term  AB Living   3 2 2 0 1 2   SAB IAB Ectopic Multiple Live Births   0 1 0 0 2      # Outcome Date GA Lbr Frank/2nd Weight Sex Delivery Anes PTL Lv   3 Term 20 39w0d 03:18 / 00:13 3.8 kg (8 lb 6 oz) F Vag-Spont EPI N MELISSA      Name: GEORGINAFEMALE-АЛЕКСАНДР      Apgar1: 8  Apgar5: 9   2 IAB 2017     IAB      1 Term 12 37w2d 03:50 / 00:48 3.11 kg (6 lb 13.7 oz) M Vag-Spont EPI N MELISSA      Birth Comments: Mother induced for pre-ecclamsia, required manual cervical dilation, hemorrhage during labor.  Juma had hypoglycemia at birth.      Name: MEGAN ERICKSON      Apgar1: 8  Apgar5: 9        No LMP recorded. (Menstrual status: IUD).            Past GYN history:   Lab Results   Component Value Date    PAP NIL 2019    PAP NIL 2014    PAP NIL 2012       Past Medical History:   Diagnosis Date    Allergic rhinitis due to other allergen     Antepartum mild preeclampsia 2012    Asthma     Depressive disorder     Esophageal reflux     Frequent UTI     had a kidney infection also in the past    Gestational hypertension 2012    Helicobacter pylori (H. pylori)     treated    Hyperlipidemia     Irritable bowel syndrome     Liver disease     \"fatty liver\" resolved on own.    Lump or mass in breast 2015    Mild preeclampsia 2012    Obesity     " Polycystic ovaries     Thyrotoxicosis 05/09/2007       Past Surgical History:   Procedure Laterality Date    TONSILLECTOMY & ADENOIDECTOMY      surgery several times for this    ZZC APPENDECTOMY      ZZC NONSPECIFIC PROCEDURE      nasal surgery        Family History   Problem Relation Age of Onset    Gynecology Mother         ectopic pregnancy/endometriosis    Cancer - colorectal Mother     Congenital Anomalies Mother         kidney problems    Colon Cancer Mother     Hyperlipidemia Mother     Other Cancer Mother     Asthma Mother     Arthritis Mother     Hypertension Mother     Chronic Obstructive Pulmonary Disease Mother     Breast Cancer Mother     Uterine Cancer Mother     Kidney Disease Mother     GERD Mother     Gynecology Sister         ectopic pregnancy/endometriosis    Unknown/Adopted Sister     Ovarian Cancer Sister     Heart Disease Father     Coronary Artery Disease Father     Heart Failure Father     Hyperlipidemia Father     Psychotic Disorder Brother     Unknown/Adopted Brother     Unknown/Adopted Brother     Unknown/Adopted Brother     Unknown/Adopted Brother     Unknown/Adopted Brother     Unknown/Adopted Sister     Cerebrovascular Disease Maternal Grandmother     Hyperlipidemia Maternal Grandmother     GERD Maternal Grandmother     Unknown/Adopted Maternal Grandfather     Unknown/Adopted Paternal Grandmother     Unknown/Adopted Paternal Grandfather          Medications:    Current Outpatient Medications:     albuterol (PROAIR HFA/PROVENTIL HFA/VENTOLIN HFA) 108 (90 Base) MCG/ACT inhaler, Inhale 2 puffs into the lungs every 6 hours, Disp: 18 g, Rfl: 2    Fexofenadine HCl (ALLEGRA PO), Take 180 mg by mouth daily, Disp: , Rfl:     fluticasone (FLONASE) 50 MCG/ACT nasal spray, Spray 1 spray into both nostrils daily, Disp: 16 g, Rfl: 3    mometasone-formoterol (DULERA) 200-5 MCG/ACT inhaler, Inhale 2 puffs into the lungs 2 times daily Needs appointment for additional refills, Disp: 13 g, Rfl: 2     olopatadine (PATADAY) 0.2 % ophthalmic solution, Place 1 drop into both eyes daily, Disp: 2.5 mL, Rfl: 2    pantoprazole (PROTONIX) 40 MG EC tablet, Take 1 tablet (40 mg) by mouth daily, Disp: 30 tablet, Rfl: 1    Sertraline HCl (ZOLOFT PO), Take 200 mg by mouth daily, Disp: , Rfl:     sucralfate (CARAFATE) 1 GM tablet, Take 1 tablet (1 g) by mouth 4 times daily as needed for nausea (or acid reflux), Disp: 30 tablet, Rfl: 1    albuterol (ACCUNEB) 1.25 MG/3ML neb solution, Take 1 vial (1.25 mg) by nebulization every 6 hours as needed for shortness of breath / dyspnea or wheezing (Patient not taking: Reported on 12/27/2023), Disp: 100 mL, Rfl: 1    albuterol (PROVENTIL) (2.5 MG/3ML) 0.083% neb solution, Take 1 vial (2.5 mg) by nebulization every 6 hours as needed for shortness of breath, wheezing or cough (Patient not taking: Reported on 12/27/2023), Disp: 90 mL, Rfl: 0    amLODIPine (NORVASC) 10 MG tablet, TAKE 1 TABLET(10 MG) BY MOUTH DAILY (Patient not taking: Reported on 12/27/2023), Disp: 90 tablet, Rfl: 1    clonazePAM (KLONOPIN) 0.5 MG tablet, Take 0.25 mg by mouth nightly as needed (Patient not taking: Reported on 12/27/2023), Disp: , Rfl:     EPINEPHrine (ANY BX GENERIC EQUIV) 0.3 MG/0.3ML injection 2-pack, Inject 0.3 mLs (0.3 mg) into the muscle as needed for anaphylaxis May repeat one time in 5-15 minutes if response to initial dose is inadequate. (Patient not taking: Reported on 12/27/2023), Disp: 2 each, Rfl: 1    guaiFENesin-codeine (ROBITUSSIN AC) 100-10 MG/5ML solution, Take 5-10 mLs by mouth every 4 hours as needed for cough (Patient not taking: Reported on 12/27/2023), Disp: 180 mL, Rfl: 0    LAMOTRIGINE PO, , Disp: , Rfl:     levonorgestrel (MIRENA) 20 MCG/DAY IUD, by Intrauterine route once for 1 dose, Disp: 1 Intra Uterine Device, Rfl: 0    montelukast (SINGULAIR) 10 MG tablet, Take 1 tablet (10 mg) by mouth At Bedtime (Patient not taking: Reported on 12/27/2023), Disp: 90 tablet, Rfl: 1     naproxen (NAPROSYN) 500 MG tablet, , Disp: , Rfl:     norgestim-eth estrad triphasic (ORTHO TRI-CYCLEN) 0.18/0.215/0.25 MG-35 MCG tablet, Take 1 tablet by mouth daily (Patient not taking: Reported on 12/27/2023), Disp: 84 tablet, Rfl: 3    predniSONE (DELTASONE) 10 MG tablet, 4 po every day x 3 days, then 3 po every day x 3 days, then 2 po every day x 3 days, then 1 po every day x 3 days. (Patient not taking: Reported on 12/27/2023), Disp: 30 tablet, Rfl: 0    tiotropium (SPIRIVA RESPIMAT) 2.5 MCG/ACT inhaler, Inhale 2 puffs into the lungs daily (Patient not taking: Reported on 12/27/2023), Disp: 4 g, Rfl: 1    traZODone (DESYREL) 50 MG tablet, Take 1-3 tablets ( mg) by mouth At Bedtime (Patient not taking: Reported on 12/27/2023), Disp: 90 tablet, Rfl: 3    triamcinolone (NASACORT) 55 MCG/ACT nasal aerosol, Spray 2 sprays into both nostrils daily (Patient not taking: Reported on 12/27/2023), Disp: , Rfl:   No current facility-administered medications for this visit.    Facility-Administered Medications Ordered in Other Visits:     lidocaine-EPINEPHrine 1.5 %-1:405074 injection, , EPIDURAL, PRN, Abdulaziz Donahue MD, 3 mL at 09/10/20 2244    Allergies:  Acetaminophen, Levofloxacin, and Hydrocodone-acetaminophen         EXAM:  /73   Pulse 82   Wt 120.6 kg (265 lb 14.4 oz)   SpO2 97%   BMI 44.25 kg/m      General - pleasant female in no acute distress.  Neurological - Alert and oriented  Psych:  normal mood and affect.  normal respiratory and cardiovascular effort   Musculoskeletal - no gross deformities.  Skin- slight non erythematous rash in the left inguinal crease.      Pelvic:  EG - area of mucosal irritation and exoriation on the inner left labia majora   BUS - within normal limits.  Vagina - dry, tender, inflamed and scant discharge.    Cervix - no lesions, no CMT.  IUD strings noted at 2-3 cm        Results for orders placed or performed in visit on 12/27/23 (from the past 48  hour(s))   Wet prep - Clinic Collect    Specimen: Vagina; Swab   Result Value Ref Range    Trichomonas Absent Absent    Yeast Absent Absent    Clue Cells Present (A) Absent    WBCs/high power field 2+ (A) None     *Note: Due to a large number of results and/or encounters for the requested time period, some results have not been displayed. A complete set of results can be found in Results Review.        ASSESSMENT:/PLAN:  (N76.0,  B96.89) BV (bacterial vaginosis)  (primary encounter diagnosis)  Comment:    Plan: Wet prep - Clinic Collect, metroNIDAZOLE         (FLAGYL) 500 MG tablet  Instructed to avoid any alcohol and take with full meals.            (N76.2) Acute vulvitis  Comment:    Plan: nystatin-triamcinolone (MYCOLOG) 285546-6.1         UNIT/GM-% external ointment           (N89.8) Vaginal dryness  Comment:  mucosa appears dry and irritated.   Plan: conjugated estrogens (PREMARIN) 0.625 MG/GM         vaginal cream, Estradiol        Use the metronidazole and after finished use a little local estrogen to help the tissue heal from inflammation/irritation    (N94.9) Vaginal symptom  Comment:    Plan: NEISSERIA GONORRHOEA PCR, CHLAMYDIA TRACHOMATIS        PCR        Orders Placed This Encounter   Procedures    Estradiol       I spent a total of 33 minutes reviewing records, reports, documentation and discussing with the patient on the date of encounter.        Carmelita Talavera MD

## 2023-12-28 LAB
C TRACH DNA SPEC QL NAA+PROBE: NEGATIVE
N GONORRHOEA DNA SPEC QL NAA+PROBE: NEGATIVE

## 2024-01-03 ENCOUNTER — TELEPHONE (OUTPATIENT)
Dept: OBGYN | Facility: CLINIC | Age: 39
End: 2024-01-03
Payer: COMMERCIAL

## 2024-01-03 NOTE — TELEPHONE ENCOUNTER
Prior Authorization Retail Medication Request    Medication/Dose:    Disp Refills Start End ZAC   conjugated estrogens (PREMARIN) 0.625 MG/GM vaginal cream          Diagnosis and ICD code (if different than what is on RX):    New/renewal/insurance change PA/secondary ins. PA:  Previously Tried and Failed:    Rationale:      Insurance   Primary:   Insurance ID:      Secondary (if applicable):  Insurance ID:      Pharmacy Information (if different than what is on RX)  Name:    [id number] [place] [facility] [id number] [place] [address]     Phone:  [phone]   Fax:     [phone]

## 2024-01-05 NOTE — TELEPHONE ENCOUNTER
Central Prior Authorization Team   Phone: [phone]      PA Initiation    Medication: PREMARIN 0.625 MG/GM [place] CREA  Insurance Company: [employer] - Phone [phone] Fax [phone]  Pharmacy Filling the Rx: [id number] [place] [facility] [address] [facility]  Filling Pharmacy Phone:    Filling Pharmacy Fax:    Start Date: 1/5/2024

## 2024-01-08 NOTE — TELEPHONE ENCOUNTER
Central Prior Authorization Team   Phone: [phone]        PRIOR AUTHORIZATION DENIED    Medication: PREMARIN 0.625 MG/GM VA CREA  Insurance Company: [employer] - Phone [phone] Fax [phone]  Denial Date: 1/6/2024  Denial Reason(s):           Appeal Information:         Patient Notified: No

## 2024-01-08 NOTE — TELEPHONE ENCOUNTER
Can you please call patient and see if her symptoms are improved or if she still needs the estrogen.  The insurance is denying this,

## 2024-01-10 NOTE — TELEPHONE ENCOUNTER
Called patient and she states she is just starting a meeting. She states she saw her [employer] message and will response later today.    Lela Treviño RN

## 2024-01-17 ENCOUNTER — TELEPHONE (OUTPATIENT)
Dept: FAMILY MEDICINE | Facility: CLINIC | Age: 39
End: 2024-01-17
Payer: COMMERCIAL

## 2024-01-17 NOTE — TELEPHONE ENCOUNTER
Patient Quality Outreach    Patient is due for the following:   Asthma  -  ACT needed  Depression  -  PHQ-9 needed    Next Steps:   Patient was assigned appropriate questionnaire to complete    Type of outreach:    Sent [employer] message.    Next Steps:  Reach out within 90 days via [employer].    Max number of attempts reached: No. Will try again in 90 days if patient still on fail list.    Questions for provider review:    None           Claudia Berrios, [facility]  Chart routed to Care Team.

## 2024-01-29 ENCOUNTER — VIRTUAL VISIT (OUTPATIENT)
Dept: FAMILY MEDICINE | Facility: OTHER | Age: 39
End: 2024-01-29
Payer: COMMERCIAL

## 2024-01-29 DIAGNOSIS — J01.00 ACUTE NON-RECURRENT MAXILLARY SINUSITIS: Primary | ICD-10-CM

## 2024-01-29 DIAGNOSIS — J45.41 MODERATE PERSISTENT ASTHMA WITH ACUTE EXACERBATION: ICD-10-CM

## 2024-01-29 DIAGNOSIS — B37.9 ANTIBIOTIC-INDUCED YEAST INFECTION: ICD-10-CM

## 2024-01-29 DIAGNOSIS — T36.95XA ANTIBIOTIC-INDUCED YEAST INFECTION: ICD-10-CM

## 2024-01-29 PROCEDURE — 99214 OFFICE O/P EST MOD 30 MIN: CPT | Mod: 95 | Performed by: PHYSICIAN ASSISTANT

## 2024-01-29 RX ORDER — FLUCONAZOLE 150 MG/1
150 TABLET ORAL DAILY
Qty: 2 TABLET | Refills: 0 | Status: SHIPPED | OUTPATIENT
Start: 2024-01-29

## 2024-01-29 RX ORDER — PREDNISONE 20 MG/1
40 TABLET ORAL DAILY
Qty: 10 TABLET | Refills: 0 | Status: SHIPPED | OUTPATIENT
Start: 2024-01-29 | End: 2024-02-03

## 2024-01-29 ASSESSMENT — ASTHMA QUESTIONNAIRES
QUESTION_1 LAST FOUR WEEKS HOW MUCH OF THE TIME DID YOUR ASTHMA KEEP YOU FROM GETTING AS MUCH DONE AT WORK, SCHOOL OR AT HOME: SOME OF THE TIME
QUESTION_4 LAST FOUR WEEKS HOW OFTEN HAVE YOU USED YOUR RESCUE INHALER OR NEBULIZER MEDICATION (SUCH AS ALBUTEROL): THREE OR MORE TIMES PER DAY
QUESTION_2 LAST FOUR WEEKS HOW OFTEN HAVE YOU HAD SHORTNESS OF BREATH: ONCE A DAY
QUESTION_5 LAST FOUR WEEKS HOW WOULD YOU RATE YOUR ASTHMA CONTROL: POORLY CONTROLLED
ACT_TOTALSCORE: 10
QUESTION_3 LAST FOUR WEEKS HOW OFTEN DID YOUR ASTHMA SYMPTOMS (WHEEZING, COUGHING, SHORTNESS OF BREATH, CHEST TIGHTNESS OR PAIN) WAKE YOU UP AT NIGHT OR EARLIER THAN USUAL IN THE MORNING: TWO OR THREE NIGHTS A WEEK
ACT_TOTALSCORE: 10

## 2024-01-29 ASSESSMENT — PATIENT HEALTH QUESTIONNAIRE - PHQ9
SUM OF ALL RESPONSES TO PHQ QUESTIONS 1-9: 13
10. IF YOU CHECKED OFF ANY PROBLEMS, HOW DIFFICULT HAVE THESE PROBLEMS MADE IT FOR YOU TO DO YOUR WORK, TAKE CARE OF THINGS AT HOME, OR GET ALONG WITH OTHER PEOPLE: VERY DIFFICULT
SUM OF ALL RESPONSES TO PHQ QUESTIONS 1-9: 13

## 2024-01-29 NOTE — PROGRESS NOTES
Mary is a 38 year old who is being evaluated via a billable video visit.      How would you like to obtain your AVS? MyChart  If the video visit is dropped, the invitation should be resent by: Text to cell phone: [phone]  Will anyone else be joining your video visit? No          Assessment & Plan     (J01.00) Acute non-recurrent maxillary sinusitis  (primary encounter diagnosis)  Comment: Concerned for bacterial sinusitis due to persistent in the congestion, worsening symptoms, she is very congested on the phone. Continue with the Flonase, encouraged Nettipot and plain mucinex.  Will start her on prednisone for both her asthma and her sinuses, reminded to take in the AM with food in stomach. Will start on Augmentin for the sinuses.  Recommended OTC Dramamine as needed for dizziness but discussed this should improve with treating sinuses and clearing the eustachian tubes.   Plan: predniSONE (DELTASONE) 20 MG tablet,         amoxicillin-clavulanate (AUGMENTIN) 875-125 MG         tablet            (J45.41) Moderate persistent asthma with acute exacerbation  Comment: Restart on Spiriva, continue on Dulera and Albuterol, declines refills on those today.  Encouraged to establish with new PCP since hers have all left.   Plan: tiotropium (SPIRIVA RESPIMAT) 2.5 MCG/ACT         inhaler, predniSONE (DELTASONE) 20 MG tablet            (B37.9,  T36.95XA) Antibiotic-induced yeast infection  Comment: Needs 2 doses.   Plan: fluconazole (DIFLUCAN) 150 MG tablet            Follow-up if not improving over the next 5-7 days sooner if worse or new concerns.     Subjective   Mary is a 38 year old, presenting for the following health issues:  No chief complaint on file.    History of Present Illness       Reason for visit:  Bad sinus infection thats affecting asthma now  Symptom onset:  More than a month  Symptoms include:  Congestion more then month rest symptoms Saturday  Symptom intensity:  Severe  Symptom progression:   Worsening  Had these symptoms before:  Yes  Has tried/received treatment for these symptoms:  No  What makes it worse:  Laying down  What makes it better:  Steam    She eats 0-1 servings of fruits and vegetables daily.She consumes 3 sweetened beverage(s) daily.She exercises with enough effort to increase her heart rate 9 or less minutes per day.  She exercises with enough effort to increase her heart rate 3 or less days per week.   She is not taking prescribed medications regularly due to remembering to take.     Has a history of bad sinuses and allergies  Has had symptoms for a month.   Over the last week she has gotten worse  She is getting dizzy non stop  Having so much sinus pressure.   The color of drainage is brown and green.   She is now starting to have more cough and worsening her asthma.   Now having more fatigue, couldn't make it to work  COVID test was negative.   OTC: She cannot take cold medication so takes Allegra, Dulera, Albuterol, Flonase.     Review of Systems  Constitutional, HEENT, cardiovascular, pulmonary, gi and gu systems are negative, except as otherwise noted.      Objective           Vitals:  No vitals were obtained today due to virtual visit.    Physical Exam   GENERAL: alert and no distress  EYES: Eyes grossly normal to inspection.  No discharge or erythema, or obvious scleral/conjunctival abnormalities.  RESP: No audible wheeze, cough, or visible cyanosis.    SKIN: Visible skin clear. No significant rash, abnormal pigmentation or lesions.  NEURO: Cranial nerves grossly intact.  Mentation and speech appropriate for age.  PSYCH: Appropriate affect, tone, and pace of words          Video-Visit Details    Type of service:  Video Visit   Video Start Time: 9:20 AM  Video End Time:9:28 AM    Originating Location (pt. Location): Home    Distant Location (provider location):  Off-site  Platform used for Video Visit: Dilan  Signed Electronically by: Chaim Duran PA-C

## 2024-02-01 ENCOUNTER — TELEPHONE (OUTPATIENT)
Dept: OBGYN | Facility: CLINIC | Age: 39
End: 2024-02-01
Payer: COMMERCIAL

## 2024-02-01 NOTE — TELEPHONE ENCOUNTER
RN called patient back.     IUD put in 1 year ago, never really had a cycle after that. Would have some breakthrough bleeding the first couple months but nothing since then.  Saw Dr Talavera on 12/27/23 d/t longstanding issues with BV and yeast  Rxed some estrogen cream, has not been able to start it because 2.5 weeks ago starting having vaginal bleeding.  Describes it as a light cycle, leaves a small streak on a panty liner. No full period. Would see some when wiping or a small spot on panty liner. Is bright red.   Did start antibiotics 4 days ago for sinus infection- is wondering if that caused bleeding?  No change in sexual behaviors.   Feels very gandhi  In regards to her vaginal symptoms, she was treated for BV end of December, still feels irritated. But hasn't been able to start the cream.     Advised to start the estradiol cream to see if that helps her symptoms.   Will route TE to MD to see if there is ant advisement for the prolonged breakthrough bleeding. Reviewed bleeding precautions just in case with the patient. She feels comfortable monitoring at home for now but it is bothersome and she is wondering what's going on.    MEGHANA Herzog

## 2024-02-01 NOTE — TELEPHONE ENCOUNTER
[facility] Call Center    Phone Message    May a detailed message be left on voicemail: no, but detailed MyChart can be left.     Reason for Call: Symptoms or Concerns     If patient has red-flag symptoms, warm transfer to triage line    Current symptom or concern: abnormal bleeding with IUD    Symptoms have been present for:  2.5 week(s)    Has patient previously been seen for this? No      Are there any new or worsening symptoms? No    Pt has not been taking the estrogen cream due to the bleeding starting once she was prescribed the cream.     Action Taken: Message routed to:  Other: RD OB    Travel Screening: Not Applicable

## 2024-02-02 ENCOUNTER — TELEPHONE (OUTPATIENT)
Dept: FAMILY MEDICINE | Facility: CLINIC | Age: 39
End: 2024-02-02
Payer: COMMERCIAL

## 2024-02-02 NOTE — TELEPHONE ENCOUNTER
Patient Quality Outreach    Patient is due for the following:   Breast Cancer Screening - Mammogram  Depression  -  PHQ-9 needed  Physical Preventive Adult Physical    Next Steps:   Schedule a Adult Preventative    Type of outreach:    Sent [email or web address] message.    Next Steps:  Reach out within 90 days via [email or web address].    Max number of attempts reached: Yes. Will try again in 90 days if patient still on fail list.    Questions for provider review:    None           Claudia Berrios [facility]  Chart routed to Care Team.

## 2024-02-04 ENCOUNTER — HEALTH MAINTENANCE LETTER (OUTPATIENT)
Age: 39
End: 2024-02-04

## 2024-04-14 ENCOUNTER — HEALTH MAINTENANCE LETTER (OUTPATIENT)
Age: 39
End: 2024-04-14

## 2024-06-19 ENCOUNTER — OFFICE VISIT (OUTPATIENT)
Dept: URGENT CARE | Facility: URGENT CARE | Age: 39
End: 2024-06-19
Payer: COMMERCIAL

## 2024-06-19 VITALS
OXYGEN SATURATION: 98 % | HEART RATE: 77 BPM | BODY MASS INDEX: 39.84 KG/M2 | RESPIRATION RATE: 16 BRPM | WEIGHT: 239.4 LBS | DIASTOLIC BLOOD PRESSURE: 71 MMHG | SYSTOLIC BLOOD PRESSURE: 110 MMHG | TEMPERATURE: 97.4 F

## 2024-06-19 DIAGNOSIS — J01.40 ACUTE NON-RECURRENT PANSINUSITIS: Primary | ICD-10-CM

## 2024-06-19 DIAGNOSIS — H66.91 RIGHT ACUTE OTITIS MEDIA: ICD-10-CM

## 2024-06-19 PROCEDURE — 99213 OFFICE O/P EST LOW 20 MIN: CPT | Performed by: STUDENT IN AN ORGANIZED HEALTH CARE EDUCATION/TRAINING PROGRAM

## 2024-06-19 NOTE — PROGRESS NOTES
ASSESSMENT & PLAN:   Diagnoses and all orders for this visit:  Acute non-recurrent pansinusitis  -     amoxicillin-clavulanate (AUGMENTIN) 875-125 MG tablet; Take 1 tablet by mouth 2 times daily for 10 days  Right acute otitis media    URI symptoms x 12 days. On exam, right ear bulging with purulent effusion. With duration of symptoms will treat with Augmentin x 10 days for sinusitis and right AOM coverage. Continue allergy regimen.    At the end of the encounter, I discussed results, diagnosis, medications. Discussed red flags for immediate return to clinic/ER, as well as indications for follow up if no improvement. Patient and/or caregiver understood and agreed to plan. Patient was stable for discharge.    There are no Patient Instructions on file for this visit.    No follow-ups on file.    ------------------------------------------------------------------------  SUBJECTIVE  History was obtained from patient.    Patient presents with:  Urgent Care  URI: Started 2 weeks ago (6/7), eyes puffy and swollen, a lot of congestion, sinus headache, off balance, pressure and under water, denies cough     HPI  Mary Shipman is a(n) 39 year old female presenting to urgent care for sinus congestion x 12 days. Reports right ear feeling full, sinus pressure, headache. No cough or fever. Has seasonal allergies and history of chronic sinusitis. Sinus surgery x 3 in the past. Taking Zyrtec, Allegra, Mucinex, Flonase with minimal improvement.    Review of Systems    Current Outpatient Medications   Medication Sig Dispense Refill    amoxicillin-clavulanate (AUGMENTIN) 875-125 MG tablet Take 1 tablet by mouth 2 times daily for 10 days 20 tablet 0    Cetirizine HCl (ZYRTEC PO)       Fexofenadine HCl (ALLEGRA PO) Take 180 mg by mouth daily      fluticasone (FLONASE) 50 MCG/ACT nasal spray Spray 1 spray into both nostrils daily 16 g 3    guaiFENesin (MUCINEX PO)       mometasone-formoterol (DULERA) 200-5 MCG/ACT inhaler Inhale 2 puffs  into the lungs 2 times daily Needs appointment for additional refills 13 g 2    olopatadine (PATADAY) 0.2 % ophthalmic solution Place 1 drop into both eyes daily 2.5 mL 2    pantoprazole (PROTONIX) 40 MG EC tablet Take 1 tablet (40 mg) by mouth daily 30 tablet 1    Sertraline HCl (ZOLOFT PO) Take 200 mg by mouth daily      tiotropium (SPIRIVA RESPIMAT) 2.5 MCG/ACT inhaler Inhale 2 puffs into the lungs daily 4 g 1    albuterol (ACCUNEB) 1.25 MG/3ML neb solution Take 1 vial (1.25 mg) by nebulization every 6 hours as needed for shortness of breath / dyspnea or wheezing (Patient not taking: Reported on 6/19/2024) 100 mL 1    albuterol (PROAIR HFA/PROVENTIL HFA/VENTOLIN HFA) 108 (90 Base) MCG/ACT inhaler Inhale 2 puffs into the lungs every 6 hours (Patient not taking: Reported on 6/19/2024) 18 g 2    albuterol (PROVENTIL) (2.5 MG/3ML) 0.083% neb solution Take 1 vial (2.5 mg) by nebulization every 6 hours as needed for shortness of breath, wheezing or cough (Patient not taking: Reported on 6/19/2024) 90 mL 0    amLODIPine (NORVASC) 10 MG tablet TAKE 1 TABLET(10 MG) BY MOUTH DAILY (Patient not taking: Reported on 6/19/2024) 90 tablet 1    conjugated estrogens (PREMARIN) 0.625 MG/GM vaginal cream Place 0.5 g vaginally twice a week (Patient not taking: Reported on 6/19/2024) 30 g 1    EPINEPHrine (ANY BX GENERIC EQUIV) 0.3 MG/0.3ML injection 2-pack Inject 0.3 mLs (0.3 mg) into the muscle as needed for anaphylaxis May repeat one time in 5-15 minutes if response to initial dose is inadequate. (Patient not taking: Reported on 12/27/2023) 2 each 1    estradiol (ESTRACE) 0.1 MG/GM vaginal cream Place 2 g vaginally nightly as needed (vaginal dryness) (Patient not taking: Reported on 6/19/2024) 42.5 g 0    fluconazole (DIFLUCAN) 150 MG tablet Take 1 tablet (150 mg) by mouth daily (Patient not taking: Reported on 6/19/2024) 2 tablet 0    levonorgestrel (MIRENA) 20 MCG/DAY IUD by Intrauterine route once for 1 dose 1 Intra Uterine  Device 0    nystatin-triamcinolone (MYCOLOG) 335883-5.1 UNIT/GM-% external ointment Apply topically 2 times daily (Patient not taking: Reported on 2024) 30 g 1     Problem List:  2023: Syrinx of spinal cord (H)  2021: Essential hypertension  2021: Mild traumatic brain injury (H)  2021: Chronic sinusitis, unspecified location  2020: Postpartum anxiety  2020:  (normal spontaneous vaginal delivery)  2020: Uterine contractions during pregnancy  2020: Encounter for triage in pregnant patient  2020: Need for Tdap vaccination  2019: Morbid obesity (H)  2019: Moderate persistent asthma without complication  2018: Other migraine without status migrainosus, not intractable  2017-10: Cervicalgia  2017-10: Bilateral thoracic back pain, unspecified chronicity  2017: Left hemiplegia (H)  2017: Seizure (H)  2016: Menometrorrhagia  2015: Lump or mass in breast  2015: CRUZ (generalized anxiety disorder)  2013: Health care home, active care coordination  2013: Health Care Home  2012: Mild preeclampsia  2012: Antepartum mild preeclampsia  2012: Gestational hypertension  2012: Major depressive disorder, recurrent episode, moderate (H)  2012-10: Decreased fetal movement  2012: Abdominal pain complicating pregnancy  2012: Pregnant state, incidental  2012: Pain in joint, pelvic region and thigh  2012: Supervision of normal first pregnancy  2012: Not immune to rubella  2011: Migraine headache  2011-10: Irritable bowel syndrome with constipation  2011: History of depression  2010: HYPERLIPIDEMIA LDL GOAL <160  -: Alcohol abuse  -: Intermittent asthma  -10: Low back pain  2008-07: Obesity  2008-04: Urticaria  2007-05: Thyrotoxicosis  -10: Polycystic ovaries  -: Allergic rhinitis due to other allergen  -12: Irritable Bowel Syndrome  -12: Esophageal reflux    Allergies   Allergen Reactions    Acetaminophen  Anaphylaxis     Uncertain - hives/anaphylaxis    Levofloxacin Anaphylaxis    Hydrocodone-Acetaminophen Rash     Feels like throat swelling, was given after Tonsillectomy         OBJECTIVE  Vitals:    06/19/24 1015   BP: 110/71   BP Location: Left arm   Patient Position: Sitting   Cuff Size: Adult Large   Pulse: 77   Resp: 16   Temp: 97.4  F (36.3  C)   TempSrc: Tympanic   SpO2: 98%   Weight: 108.6 kg (239 lb 6.4 oz)     Physical Exam   GENERAL: healthy, alert, no acute distress.   PSYCH: mentation appears normal. Normal affect  HEAD: normocephalic, atraumatic.  EYE: PERRL. EOMs intact. No scleral injection bilaterally.   EAR: external ear normal. Bilateral ear canals normal and nonpainful. Right TM bulging with purulent effusion. Left TM intact, pearly, translucent without bulging.  NOSE: external nose atraumatic without lesions.  OROPHARYNX: moist mucous membranes. Posterior oropharynx without erythema or exudate. Uvula midline. Patent airway.  NECK: nontender. No cervical adenopathy.   LUNGS: no increased work of breathing. Clear lung sounds bilaterally. No wheezing, rhonchi, or rales.   CV: regular rate and rhythm. No clicks, murmurs, or rubs.    No results found for any visits on 06/19/24.

## 2024-06-24 NOTE — TELEPHONE ENCOUNTER
Care coordination have contacted this patient. Appointment with neuro scheduled. Will route encounter to PCP to review hospital stay if wanted.    Joel Lorenzana RN    
Noted. Reviewed.   Miguel Hammer, MPAS, PA-C   
This patient was discharged from [place] on 03/29/2017.    Discharge Diagnosis: convulsions, unspecified convulsion type    Follow-up instructions: If you see a primary care provider, you can also send messages to your care team and make appointments    A follow-up visit has not been scheduled.      Number of ED/ER visits in the last 12 months:  3     Please follow-up with patient.    Yolanda Glover        
Detail Level: Detailed
Quality 226: Preventive Care And Screening: Tobacco Use: Screening And Cessation Intervention: Patient screened for tobacco use and is an ex/non-smoker
Quality 431: Preventive Care And Screening: Unhealthy Alcohol Use - Screening: Patient not identified as an unhealthy alcohol user when screened for unhealthy alcohol use using a systematic screening method

## 2024-09-19 ENCOUNTER — E-VISIT (OUTPATIENT)
Dept: OBGYN | Facility: CLINIC | Age: 39
End: 2024-09-19
Payer: COMMERCIAL

## 2024-09-19 DIAGNOSIS — N76.0 ACUTE VAGINITIS: Primary | ICD-10-CM

## 2024-09-19 PROCEDURE — 99421 OL DIG E/M SVC 5-10 MIN: CPT | Performed by: OBSTETRICS & GYNECOLOGY

## 2024-09-19 RX ORDER — FLUCONAZOLE 150 MG/1
150 TABLET ORAL
Qty: 3 TABLET | Refills: 0 | Status: SHIPPED | OUTPATIENT
Start: 2024-09-19 | End: 2024-09-26

## 2024-09-20 NOTE — PATIENT INSTRUCTIONS
Thank you for choosing us for your care. I have placed an order for a prescription so that you can start treatment. View your full visit summary for details by clicking on the link below. Your pharmacist will able to address any questions you may have about the medication.     If you re not feeling better within 2-3 days, please schedule an appointment.  You can schedule an appointment right here in [facility], or call [phone]  If the visit is for the same symptoms as your eVisit, we ll refund the cost of your eVisit if seen within seven days.    Vaginal Yeast Infection: Care Instructions  Overview     A vaginal yeast infection is the growth of too many yeast cells in the vagina. This is a common problem. Itching, vaginal discharge and irritation, and other symptoms can bother you. But yeast infections don't often cause other health problems.  Some medicines can increase your risk of getting a yeast infection. These include antibiotics, hormones, and steroids. You may also be more likely to get a yeast infection if you are pregnant, have diabetes, douche, or wear tight clothes.  With treatment, most yeast infections get better in a few days.  Follow-up care is a key part of your treatment and safety. Be sure to make and go to all appointments, and call your doctor if you are having problems. It's also a good idea to know your test results and keep a list of the medicines you take.  How can you care for yourself at home?  Take your medicines exactly as prescribed. Call your doctor if you think you are having a problem with your medicine.  Ask your doctor about over-the-counter (OTC) medicines for yeast infections. If you use an OTC treatment, read and follow all instructions on the label.  Don't use tampons while using a vaginal cream or suppository. The tampons can absorb the medicine. Use pads instead.  Wear loose cotton clothing. Don't wear nylon or other fabric that holds body heat and moisture close to the  "skin.  Try sleeping without underwear.  Don't scratch. Relieve itching with a cold pack or a cool bath.  Don't wash your vulva more than once a day. Use plain water or a mild, unscented soap. Air-dry the vulva.  Change out of wet or damp clothes as soon as possible.  If you are using a vaginal medicine, don't have sex until you have finished your treatment. But if you do have sex, don't depend on a condom or diaphragm for birth control. The oil in some vaginal medicines weakens latex.  Don't douche or use powders, sprays, or perfumes in your vagina or on your vulva. These items can change the normal balance of organisms in your vagina.  When should you call for help?   Call your doctor now or seek immediate medical care if:    You have new or increased pain in your vagina or pelvis.   Watch closely for changes in your health, and be sure to contact your doctor if:    You have unexpected vaginal bleeding.     You have a fever.     You are not getting better after 2 days.     Your symptoms come back after you finish your medicines.   Where can you learn more?  Go to https://www.[employer].net/patiented  Enter F639 in the search box to learn more about \"Vaginal Yeast Infection: Care Instructions.\"  Current as of: November 27, 2023               Content Version: 14.0    1741-0460 [employer].   Care instructions adapted under license by your healthcare professional. If you have questions about a medical condition or this instruction, always ask your healthcare professional. [employer] disclaims any warranty or liability for your use of this information.      "

## 2024-10-01 ENCOUNTER — OFFICE VISIT (OUTPATIENT)
Dept: OBGYN | Facility: CLINIC | Age: 39
End: 2024-10-01
Payer: COMMERCIAL

## 2024-10-01 VITALS
WEIGHT: 229 LBS | SYSTOLIC BLOOD PRESSURE: 139 MMHG | HEART RATE: 76 BPM | TEMPERATURE: 98.4 F | DIASTOLIC BLOOD PRESSURE: 101 MMHG | BODY MASS INDEX: 38.11 KG/M2

## 2024-10-01 DIAGNOSIS — B96.89 BV (BACTERIAL VAGINOSIS): ICD-10-CM

## 2024-10-01 DIAGNOSIS — Z30.09 ENCOUNTER FOR CONSULTATION FOR FEMALE STERILIZATION: ICD-10-CM

## 2024-10-01 DIAGNOSIS — N90.89 VULVAR IRRITATION: Primary | ICD-10-CM

## 2024-10-01 DIAGNOSIS — R68.82 DECREASED LIBIDO: ICD-10-CM

## 2024-10-01 DIAGNOSIS — N76.0 BV (BACTERIAL VAGINOSIS): ICD-10-CM

## 2024-10-01 LAB
CLUE CELLS: PRESENT
KOH PREPARATION: NORMAL
KOH PREPARATION: NORMAL
TRICHOMONAS, WET PREP: ABNORMAL
WBC'S/HIGH POWER FIELD, WET PREP: ABNORMAL
YEAST, WET PREP: ABNORMAL

## 2024-10-01 PROCEDURE — 99215 OFFICE O/P EST HI 40 MIN: CPT | Performed by: OBSTETRICS & GYNECOLOGY

## 2024-10-01 PROCEDURE — 99459 PELVIC EXAMINATION: CPT | Performed by: OBSTETRICS & GYNECOLOGY

## 2024-10-01 PROCEDURE — 87210 SMEAR WET MOUNT SALINE/INK: CPT | Performed by: OBSTETRICS & GYNECOLOGY

## 2024-10-01 PROCEDURE — 99417 PROLNG OP E/M EACH 15 MIN: CPT | Performed by: OBSTETRICS & GYNECOLOGY

## 2024-10-01 PROCEDURE — 87591 N.GONORRHOEAE DNA AMP PROB: CPT | Performed by: OBSTETRICS & GYNECOLOGY

## 2024-10-01 PROCEDURE — 87491 CHLMYD TRACH DNA AMP PROBE: CPT | Performed by: OBSTETRICS & GYNECOLOGY

## 2024-10-01 PROCEDURE — 87220 TISSUE EXAM FOR FUNGI: CPT | Performed by: OBSTETRICS & GYNECOLOGY

## 2024-10-01 RX ORDER — TRIAMCINOLONE ACETONIDE 0.25 MG/G
OINTMENT TOPICAL 2 TIMES DAILY
Qty: 80 G | Refills: 1 | Status: SHIPPED | OUTPATIENT
Start: 2024-10-01

## 2024-10-01 RX ORDER — METRONIDAZOLE 500 MG/1
500 TABLET ORAL 2 TIMES DAILY
Qty: 14 TABLET | Refills: 0 | Status: SHIPPED | OUTPATIENT
Start: 2024-10-01 | End: 2024-10-08

## 2024-10-01 RX ORDER — LIDOCAINE 50 MG/G
OINTMENT TOPICAL PRN
Qty: 50 G | Refills: 0 | Status: SHIPPED | OUTPATIENT
Start: 2024-10-01

## 2024-10-01 NOTE — PROGRESS NOTES
HPI:  Mary Shipman is a 39 year old female here for a consultation regarding: vulvar itching     Noted on set of external itching and burning about 2-3 wks ago.   Did an e visit and was treated with diflucan with no improvement in the external buring but the little vaginal discharge did improve with the diflucan.   Here today because Still itching on the outside . Very intense itching.   No new partner. Still with same man.     Has a mirena IUD and hates it, feels more dry with intercourse and lost her libido and intercourse is painful.   Does not like intercourse since she got the IUD.  Went several months without intercourse then when she had intercourse and used a toy, the symptoms started after that .     Losing weight.  Goal is to get to 180 lbs, then she wants to get a tubal ligation and have the IUD taken out.    She is done having children.      Wt Readings from Last 4 Encounters:   10/01/24 103.9 kg (229 lb)   06/19/24 108.6 kg (239 lb 6.4 oz)   12/27/23 120.6 kg (265 lb 14.4 oz)   10/10/23 121.1 kg (267 lb)        She was on zepbound and lost 60 lbs.   2 months ago had to switch to wegovy and metformin not losing weight on that.   She feels worse since she started it. Her body aches all over and no energy.  Terribly constipated.  Takes senna and stools are hard.     Period history:  Mirena IUD, started bleeding when she started losing weight.   Now just light spotting around the 20th of the month.     Relationship:  stable       No LMP recorded. (Menstrual status: IUD).     Past GYN history:   2023  pap and HPV          Past Medical History:   Diagnosis Date    Allergic rhinitis due to other allergen     Antepartum mild preeclampsia 11/23/2012    Asthma     Depressive disorder     Esophageal reflux     Frequent UTI     had a kidney infection also in the past    Gestational hypertension 11/20/2012    Helicobacter pylori (H. pylori)     treated    Hyperlipidemia     Irritable bowel syndrome     Liver  "disease     \"fatty liver\" resolved on own.    Lump or mass in breast 11/30/2015    Mild preeclampsia 12/18/2012    Obesity     Polycystic ovaries     Thyrotoxicosis 05/09/2007       Past Surgical History:   Procedure Laterality Date    TONSILLECTOMY & ADENOIDECTOMY      surgery several times for this    ZZC APPENDECTOMY      ZZC NONSPECIFIC PROCEDURE      nasal surgery        Family History   Problem Relation Age of Onset    Gynecology Mother         ectopic pregnancy/endometriosis    Cancer - colorectal Mother     Congenital Anomalies Mother         kidney problems    Colon Cancer Mother     Hyperlipidemia Mother     Other Cancer Mother     Asthma Mother     Arthritis Mother     Hypertension Mother     Chronic Obstructive Pulmonary Disease Mother     Breast Cancer Mother     Uterine Cancer Mother     Kidney Disease Mother     GERD Mother     Gynecology Sister         ectopic pregnancy/endometriosis    Unknown/Adopted Sister     Ovarian Cancer Sister     Heart Disease Father     Coronary Artery Disease Father     Heart Failure Father     Hyperlipidemia Father     Psychotic Disorder Brother     Unknown/Adopted Brother     Unknown/Adopted Brother     Unknown/Adopted Brother     Unknown/Adopted Brother     Unknown/Adopted Brother     Unknown/Adopted Sister     Cerebrovascular Disease Maternal Grandmother     Hyperlipidemia Maternal Grandmother     GERD Maternal Grandmother     Unknown/Adopted Maternal Grandfather     Unknown/Adopted Paternal Grandmother     Unknown/Adopted Paternal Grandfather          Medications:    Current Outpatient Medications:     Fexofenadine HCl (ALLEGRA PO), Take 180 mg by mouth daily, Disp: , Rfl:     fluticasone (FLONASE) 50 MCG/ACT nasal spray, Spray 1 spray into both nostrils daily, Disp: 16 g, Rfl: 3    mometasone-formoterol (DULERA) 200-5 MCG/ACT inhaler, Inhale 2 puffs into the lungs 2 times daily Needs appointment for additional refills, Disp: 13 g, Rfl: 2    olopatadine (PATADAY) 0.2 " % ophthalmic solution, Place 1 drop into both eyes daily, Disp: 2.5 mL, Rfl: 2    pantoprazole (PROTONIX) 40 MG EC tablet, Take 1 tablet (40 mg) by mouth daily, Disp: 30 tablet, Rfl: 1    Sertraline HCl (ZOLOFT PO), Take 200 mg by mouth daily, Disp: , Rfl:     tiotropium (SPIRIVA RESPIMAT) 2.5 MCG/ACT inhaler, Inhale 2 puffs into the lungs daily, Disp: 4 g, Rfl: 1    albuterol (ACCUNEB) 1.25 MG/3ML neb solution, Take 1 vial (1.25 mg) by nebulization every 6 hours as needed for shortness of breath / dyspnea or wheezing (Patient not taking: Reported on 6/19/2024), Disp: 100 mL, Rfl: 1    albuterol (PROAIR HFA/PROVENTIL HFA/VENTOLIN HFA) 108 (90 Base) MCG/ACT inhaler, Inhale 2 puffs into the lungs every 6 hours (Patient not taking: Reported on 6/19/2024), Disp: 18 g, Rfl: 2    albuterol (PROVENTIL) (2.5 MG/3ML) 0.083% neb solution, Take 1 vial (2.5 mg) by nebulization every 6 hours as needed for shortness of breath, wheezing or cough (Patient not taking: Reported on 6/19/2024), Disp: 90 mL, Rfl: 0    amLODIPine (NORVASC) 10 MG tablet, TAKE 1 TABLET(10 MG) BY MOUTH DAILY (Patient not taking: Reported on 6/19/2024), Disp: 90 tablet, Rfl: 1    Cetirizine HCl (ZYRTEC PO), , Disp: , Rfl:     conjugated estrogens (PREMARIN) 0.625 MG/GM vaginal cream, Place 0.5 g vaginally twice a week (Patient not taking: Reported on 6/19/2024), Disp: 30 g, Rfl: 1    EPINEPHrine (ANY BX GENERIC EQUIV) 0.3 MG/0.3ML injection 2-pack, Inject 0.3 mLs (0.3 mg) into the muscle as needed for anaphylaxis May repeat one time in 5-15 minutes if response to initial dose is inadequate. (Patient not taking: Reported on 12/27/2023), Disp: 2 each, Rfl: 1    estradiol (ESTRACE) 0.1 MG/GM vaginal cream, Place 2 g vaginally nightly as needed (vaginal dryness) (Patient not taking: Reported on 6/19/2024), Disp: 42.5 g, Rfl: 0    fluconazole (DIFLUCAN) 150 MG tablet, Take 1 tablet (150 mg) by mouth daily (Patient not taking: Reported on 6/19/2024), Disp: 2  tablet, Rfl: 0    guaiFENesin (MUCINEX PO), , Disp: , Rfl:     levonorgestrel (MIRENA) 20 MCG/DAY IUD, by Intrauterine route once for 1 dose, Disp: 1 Intra Uterine Device, Rfl: 0    nystatin-triamcinolone (MYCOLOG) 824172-1.1 UNIT/GM-% external ointment, Apply topically 2 times daily (Patient not taking: Reported on 6/19/2024), Disp: 30 g, Rfl: 1  No current facility-administered medications for this visit.    Facility-Administered Medications Ordered in Other Visits:     lidocaine-EPINEPHrine 1.5 %-1:051741 injection, , EPIDURAL, PRN, Abdulaziz Donahue MD, 3 mL at 09/10/20 4704    Allergies:  Acetaminophen, Levofloxacin, and Hydrocodone-acetaminophen         EXAM:  BP (!) 139/101   Pulse 76   Temp 98.4  F (36.9  C)   Wt 103.9 kg (229 lb)   BMI 38.11 kg/m      General - pleasant female in no acute distress.  Neurological - Alert and oriented  Psych:  normal mood and affect.  normal respiratory and cardiovascular effort   Breast - deferred.  Abdomen - soft, nontender, nondistended.  Musculoskeletal - no gross deformities.  Skin- no rashes seen    Pelvic:  EG -    normal antaomy but tissue erythematous throughout the entire EG teresa on the inner upper aspect of bilateral labia minora, no fissures or excoriations   BUS - within normal limits.    Vagina - well rugated, scant  discharge.  Cervix - no lesions, no CMT.  IUD strings at 2-3 cm   Uterus - firm, normal size and tender.  Adnexae - no masses or tenderness.  RV - deferred.    ASSESSMENT:/PLAN:  (N90.89) Vulvar irritation  (primary encounter diagnosis)  Comment:  suspected yeast but koh test neg and patient s/p 3 doses of diflucan.   Will treat with medium dose of steroids and lidocaine to prevent itching.  No intercourse or toys until symptoms resolve.   Plan: Wet prep - Clinic Collect, KOH prep (skin, hair        or nails only), NEISSERIA GONORRHOEA PCR,         CHLAMYDIA TRACHOMATIS PCR, lidocaine         (XYLOCAINE) 5 % external ointment,          triamcinolone (KENALOG) 0.025 % external         ointment           (N76.0,  B96.89) BV (bacterial vaginosis)  Comment:    Plan: metroNIDAZOLE (FLAGYL) 500 MG tablet           (Z30.09) Encounter for consultation for female sterilization  Comment:  discussed ok to proceed with tubal ligation surgery when she feels ready.  She is thinking about February.   Plan: Case Request: EUA, bilateral SALPINGECTOMY,         LAPAROSCOPIC, removal of IUD           (R68.82) Decreased libido  Comment: related to mirena   Plan: plan Bilateral salpingectomy  then we can remove the mirena IUD       No orders of the defined types were placed in this encounter.      55 minutes spent on the date of service for reviewing outside records, chart notes, other tests, counseling the patient and on documentation.       Carmelita Talavera MD

## 2024-10-02 ENCOUNTER — TELEPHONE (OUTPATIENT)
Dept: OBGYN | Facility: CLINIC | Age: 39
End: 2024-10-02
Payer: COMMERCIAL

## 2024-10-02 LAB
C TRACH DNA SPEC QL NAA+PROBE: NEGATIVE
N GONORRHOEA DNA SPEC QL NAA+PROBE: NEGATIVE

## 2024-10-02 NOTE — TELEPHONE ENCOUNTER
Patient called back but doesn't have access to  to listen to my message. Patient agreeable to February scheduling and our call back once schedules become available.     Lilian Portillo/her/hers  [facility]

## 2024-10-02 NOTE — TELEPHONE ENCOUNTER
WES to discuss surgery scheduling, explained in VM to patient that February schedules aren't posted yet but will call patient again once schedules become available.       Lilian Portillo/her/hers  [facility]

## 2024-10-03 ENCOUNTER — TELEPHONE (OUTPATIENT)
Dept: OBGYN | Facility: CLINIC | Age: 39
End: 2024-10-03
Payer: COMMERCIAL

## 2024-10-03 DIAGNOSIS — N90.89 VULVAR IRRITATION: Primary | ICD-10-CM

## 2024-10-03 NOTE — TELEPHONE ENCOUNTER
lidocaine (XYLOCAINE) 5 % external ointment  PRN           Summary: Apply topically as needed for moderate pain. Disp-50 g, R-0        [facility] requesting more explicit instructions  Want to know how frequently the medication can be used  Please add into sig of script and resend  Routing to provider to advise.  Maribell Emerson RN on 10/3/2024 at 9:49 AM

## 2024-10-08 RX ORDER — LIDOCAINE 50 MG/G
OINTMENT TOPICAL 2 TIMES DAILY PRN
Qty: 50 G | Refills: 1 | Status: SHIPPED | OUTPATIENT
Start: 2024-10-08

## 2024-11-11 NOTE — TELEPHONE ENCOUNTER
"Called patient with February schedules to discuss surgery scheduling.     Patient asked that I pass along a message to RR as patient is unsure if she should proceed with surgery given recent medical events.     Patient states she was hospitalized 10/04/24 for a week and was diagnosed with progressive MS. Still currently on prednisone but needs to make a decision soon on long-term medication. Patient is wondering \"if it's even safe for me to have the surgery anymore\".     Informed patient I would pass along info to RR and would reach back out with guidance on next steps.     Routing to  for next steps.       Lilian Portillo/her/hers  [facility]      "

## 2024-11-14 NOTE — TELEPHONE ENCOUNTER
I called patient.  She undergoing treatment for MS now.  New diagnosis.   Not stable for laparoscopic tubal ligation right now.   Please hang on to her surgery request and when she is stable, we will be able to do the procedure.   Thank you,   Carmelita Talavera MD

## 2025-01-13 NOTE — ED NOTES
Patient weill be transferred to [facility], floor called for reports and was told to call after 11:30.   [Initial Visit] : an initial visit for [Hand Pain] : hand pain [Wrist Pain] : wrist pain

## 2025-03-13 ENCOUNTER — ANCILLARY PROCEDURE (OUTPATIENT)
Dept: GENERAL RADIOLOGY | Facility: CLINIC | Age: 40
End: 2025-03-13
Payer: COMMERCIAL

## 2025-03-13 ENCOUNTER — OFFICE VISIT (OUTPATIENT)
Dept: URGENT CARE | Facility: URGENT CARE | Age: 40
End: 2025-03-13
Payer: COMMERCIAL

## 2025-03-13 VITALS
TEMPERATURE: 97.4 F | RESPIRATION RATE: 18 BRPM | DIASTOLIC BLOOD PRESSURE: 88 MMHG | HEART RATE: 74 BPM | SYSTOLIC BLOOD PRESSURE: 125 MMHG | OXYGEN SATURATION: 97 %

## 2025-03-13 DIAGNOSIS — R05.1 ACUTE COUGH: ICD-10-CM

## 2025-03-13 DIAGNOSIS — J45.41 MODERATE PERSISTENT REACTIVE AIRWAY DISEASE WITH ACUTE EXACERBATION: Primary | ICD-10-CM

## 2025-03-13 RX ORDER — AZITHROMYCIN 250 MG/1
TABLET, FILM COATED ORAL
Qty: 6 TABLET | Refills: 0 | Status: SHIPPED | OUTPATIENT
Start: 2025-03-13 | End: 2025-03-18

## 2025-03-13 ASSESSMENT — PAIN SCALES - GENERAL: PAINLEVEL_OUTOF10: SEVERE PAIN (8)

## 2025-03-13 NOTE — PATIENT INSTRUCTIONS
Chest x-ray shows the following per the radiologist report: Negative chest. Stable mild thoracic dextroscoliosis. Take the antibiotic as prescribed and finish the full course even if symptoms improve.  Follow-up with your primary care provider should symptoms persist.

## 2025-03-13 NOTE — PROGRESS NOTES
ASSESSMENT:  (J45.41) Moderate persistent reactive airway disease with acute exacerbation  (primary encounter diagnosis)  Plan: azithromycin (ZITHROMAX) 250 MG tablet    (R05.1) Acute cough  Plan: XR Chest 2 Views    PLAN:  Informed the patient that the chest x-ray shows the following per the radiologist report: Negative chest. Stable mild thoracic dextroscoliosis.  Antibiotic prescribed given the patient's continued worsening cough, history of asthma and multiple sclerosis and lack of response with symptoms to prednisone earlier this month.  Informed the patient of the need to take the antibiotic as prescribed and finish the full course even if symptoms improve.  We discussed following up with her primary care provider should symptoms persist.  Patient acknowledged her understanding of the above plan.    The use of Dragon/PowerMic dictation services may have been used to construct the content in this note; any grammatical or spelling errors are non-intentional. Please contact the author of this note directly if you are in need of any clarification.      John Doshi, GLENNA CNP      SUBJECTIVE:   Mary Shipman is a 40 year old female presenting with a chief complaint of cough - non-productive, hoarse voice and headache.  Onset of symptoms was 2 week(s) ago.  Course of illness is worsening.    Patient denies: ear pain, vomiting, and diarrhea  Treatment measures tried include Tamiflu and prednisone.  Predisposing factors include diagnosed with influenza earlier this month.    ROS:  Negative except noted above.    OBJECTIVE:  /88 (BP Location: Left arm, Patient Position: Sitting, Cuff Size: Adult Regular)   Pulse 74   Temp 97.4  F (36.3  C) (Tympanic)   Resp 18   SpO2 97%   GENERAL APPEARANCE: healthy, alert and no distress  EYES: EOMI,  PERRL, conjunctiva clear  HENT: ear canals and TM's normal.  Nose and mouth without ulcers, erythema or lesions  NECK: supple, nontender, no lymphadenopathy  RESP:  lungs clear to auscultation - no rales, rhonchi or wheezes  CV: regular rates and rhythm, normal S1 S2, no murmur noted  SKIN: no suspicious lesions or rashes    X-RAY: Chest x-ray shows the following per the radiologist report: Negative chest. Stable mild thoracic dextroscoliosis.

## 2025-04-19 ENCOUNTER — HEALTH MAINTENANCE LETTER (OUTPATIENT)
Age: 40
End: 2025-04-19

## 2025-05-08 ENCOUNTER — E-VISIT (OUTPATIENT)
Dept: OBGYN | Facility: CLINIC | Age: 40
End: 2025-05-08
Payer: COMMERCIAL

## 2025-05-08 DIAGNOSIS — N76.0 BV (BACTERIAL VAGINOSIS): Primary | ICD-10-CM

## 2025-05-08 DIAGNOSIS — B96.89 BV (BACTERIAL VAGINOSIS): Primary | ICD-10-CM

## 2025-05-08 PROCEDURE — 99207 PR NON-BILLABLE SERV PER CHARTING: CPT | Performed by: OBSTETRICS & GYNECOLOGY

## 2025-05-20 ENCOUNTER — E-VISIT (OUTPATIENT)
Dept: OBGYN | Facility: CLINIC | Age: 40
End: 2025-05-20
Payer: COMMERCIAL

## 2025-05-20 DIAGNOSIS — N76.0 BV (BACTERIAL VAGINOSIS): Primary | ICD-10-CM

## 2025-05-20 DIAGNOSIS — B96.89 BACTERIAL VAGINOSIS: ICD-10-CM

## 2025-05-20 DIAGNOSIS — B96.89 BV (BACTERIAL VAGINOSIS): Primary | ICD-10-CM

## 2025-05-20 DIAGNOSIS — N76.0 BACTERIAL VAGINOSIS: ICD-10-CM

## 2025-05-20 RX ORDER — METRONIDAZOLE 500 MG/1
500 TABLET ORAL 2 TIMES DAILY
Qty: 14 TABLET | Refills: 0 | Status: SHIPPED | OUTPATIENT
Start: 2025-05-20 | End: 2025-05-27

## 2025-05-20 NOTE — PATIENT INSTRUCTIONS
Thank you for choosing us for your care. I have placed an order for a prescription so that you can start treatment. View your full visit summary for details by clicking on the link below. Your pharmacist will able to address any questions you may have about the medication.     If you re not feeling better within 2-3 days, please schedule an appointment.  You can schedule an appointment right here in [employer], or call [phone]  If the visit is for the same symptoms as your eVisit, we ll refund the cost of your eVisit if seen within seven days.    Thank you for choosing us for your care. Given your symptoms, I would like you to do a lab-only visit to determine what is causing them.  I have placed the orders.  Please schedule an appointment with the lab right here in [employer], or call [phone].  I will let you know when the results are back and next steps to take.    Schedule a Lab Only appointment here.

## 2025-06-14 NOTE — PATIENT INSTRUCTIONS
Pregnancy test is negative    Your CBC is normal your kidney function test is pending    Continue to hydrate eat small meals throughout the day  He can take Zofran 4 mg every 8 hours as needed for nausea or dizziness.    Patient Education     [facility]   Discharged by : Opal Davis MA    Paper scripts provided to patient : no     If you have any questions regarding your visit please contact your care team:     Team Gold                Clinic Hours Telephone Number     Dr. Amina Zuniga, CNP 7am-7pm  Monday - Thursday   7am-5pm  Fridays  [phone]   (Appointment scheduling available 24/7)     RN Line  [phone] option 2     Urgent Care - [place] - 11am-9pm Monday-Friday Saturday-Sunday- 9am-5pm     [place] -   5pm-9pm Monday-Friday Saturday-Sunday- 9am-5pm    [phone] - [place]    [phone] - [place]     For a Price Quote for your services, please call our Consumer Price Line at [phone].     What options do I have for visits at the clinic other than the traditional office visit?     To expand how we care for you, many of our providers are utilizing electronic visits (e-visits) and telephone visits, when medically appropriate, for interactions with their patients rather than a visit in the clinic. We also offer nurse visits for many medical concerns. Just like any other service, we will bill your insurance company for this type of visit based on time spent on the phone with your provider. Not all insurance companies cover these visits. Please check with your medical insurance if this type of visit is covered. You will be responsible for any charges that are not paid by your insurance.   E-visits via [employer]: generally incur a $45.00 fee.     Telephone visits:  Time spent on the phone: *charged based on time that is spent on the phone in increments of 10 minutes. Estimated cost:   5-10 mins  $30.00   11-20 mins. $59.00   21-30 mins. $85.00     Use [employer] (secure email communication and access to your chart) to send your primary care provider a message or make an appointment. Ask someone on your Team how to sign up for [employer].     As always, Thank you for trusting us with your health care needs!    [facility]:    Scheduling Appointments  [facility]  Call: [phone]    [facility]  Call: [phone]    [facility]  Call: [phone]    For Gastroenterology referrals   [facility]enterology   Clinics and Surgery Center, 4th Floor   [address] [place] [address]   Appointments: [phone]    WHERE TO GO FOR CARE?  Clinic    Make an appointment if you:       Are sick (cold, cough, flu, sore throat, earache or in pain).       Have a small injury (sprain, small cut, burn or broken bone).       Need a physical exam, Pap smear, vaccine or prescription refill.       Have questions about your health or medicines.    To reach us:      Call [phone] ([phone]). Open 24 hours every day. (For counseling services, call [phone].)    Log into [employer] at [email or web address]. (Visit [email or web address] to create an account.) Hospital emergency room    An emergency is a serious or life- threatening problem that must be treated right away.    Call 457 or get to the hospital if you have:      Very bad or sudden:            - Chest pain or pressure         - Bleeding         - Head or belly pain         - Dizziness or trouble seeing, walking or                          Speaking      Problems breathing      Blood in your vomit or you are coughing up blood      A major injury (knocked out, loss of a finger or limb, rape, broken bone protruding from skin)    A mental health crisis. (Or call the Mental Health Crisis line at 1-644.301.9504 or Suicide Prevention Hotline at  6-145-195-9556.)    Open 24 hours every day. You don't need an appointment.     Urgent care    Visit urgent care for sickness or small injuries when the clinic is closed. You don't need an appointment. To check hours or find an urgent care near you, visit [email or web address]. Online care    Get online care from [facility] for more than 70 common problems, like colds, allergies and infections. Open 24 hours every day at:   [email or web address]   Need help deciding?    For advice about where to be seen, you may call your clinic and ask to speak with a nurse. We're here for you 24 hours every day.         If you are deaf or hard of hearing, please let us know. We provide many free services including sign language interpreters, oral interpreters, TTYs, telephone amplifiers, note takers and written materials.              Tear Troughs Filler Volume In Cc: 0 Map Statment: See Attach Map for Complete Details Consent: Written consent obtained. Risks include but not limited to bruising, beading, irregular texture, ulceration, infection, allergic reaction, scar formation, incomplete augmentation, temporary nature, procedural pain. Procedural Text: The filler was administered to the treatment areas noted above. Anesthesia Volume In Cc: 0.5 Additional Area 2 Location: perioral lines, vertical smokers lines Additional Area 2 Volume In Cc: 1 Filler: RHA Redensity Include Cannula Information In Note?: No Post-Care Instructions: Patient instructed to apply ice to reduce swelling. Anesthesia Type: 1% lidocaine with epinephrine Expiration Date (Month Year): 11/2025 Additional Area 1 Location: Vertical smokers lines Additional Anesthesia Volume In Cc: 6 Detail Level: Detailed Lot #: [id number]

## (undated) RX ORDER — LIDOCAINE HYDROCHLORIDE 10 MG/ML
INJECTION, SOLUTION INFILTRATION; PERINEURAL
Status: DISPENSED
Start: 2017-03-29